# Patient Record
Sex: FEMALE | Race: BLACK OR AFRICAN AMERICAN | NOT HISPANIC OR LATINO | Employment: OTHER | URBAN - METROPOLITAN AREA
[De-identification: names, ages, dates, MRNs, and addresses within clinical notes are randomized per-mention and may not be internally consistent; named-entity substitution may affect disease eponyms.]

---

## 2017-01-23 ENCOUNTER — OFFICE VISIT (OUTPATIENT)
Dept: INTERNAL MEDICINE | Facility: CLINIC | Age: 82
End: 2017-01-23
Payer: MEDICARE

## 2017-01-23 ENCOUNTER — LAB VISIT (OUTPATIENT)
Dept: LAB | Facility: HOSPITAL | Age: 82
End: 2017-01-23
Attending: INTERNAL MEDICINE
Payer: MEDICARE

## 2017-01-23 DIAGNOSIS — M79.673 PAIN OF FOOT, UNSPECIFIED LATERALITY: Primary | ICD-10-CM

## 2017-01-23 DIAGNOSIS — I10 ESSENTIAL HYPERTENSION: ICD-10-CM

## 2017-01-23 LAB
ALBUMIN SERPL BCP-MCNC: 3.7 G/DL
ALP SERPL-CCNC: 70 U/L
ALT SERPL W/O P-5'-P-CCNC: 11 U/L
ANION GAP SERPL CALC-SCNC: 11 MMOL/L
AST SERPL-CCNC: 22 U/L
BILIRUB SERPL-MCNC: 0.4 MG/DL
BUN SERPL-MCNC: 39 MG/DL
CALCIUM SERPL-MCNC: 9.3 MG/DL
CHLORIDE SERPL-SCNC: 111 MMOL/L
CHOLEST/HDLC SERPL: 3.4 {RATIO}
CO2 SERPL-SCNC: 18 MMOL/L
CREAT SERPL-MCNC: 1.7 MG/DL
EST. GFR  (AFRICAN AMERICAN): 31 ML/MIN/1.73 M^2
EST. GFR  (NON AFRICAN AMERICAN): 26.9 ML/MIN/1.73 M^2
GLUCOSE SERPL-MCNC: 78 MG/DL
HDL/CHOLESTEROL RATIO: 29.5 %
HDLC SERPL-MCNC: 166 MG/DL
HDLC SERPL-MCNC: 49 MG/DL
LDLC SERPL CALC-MCNC: 93.4 MG/DL
NONHDLC SERPL-MCNC: 117 MG/DL
POTASSIUM SERPL-SCNC: 5 MMOL/L
PROT SERPL-MCNC: 7.4 G/DL
SODIUM SERPL-SCNC: 140 MMOL/L
TRIGL SERPL-MCNC: 118 MG/DL

## 2017-01-23 PROCEDURE — 36415 COLL VENOUS BLD VENIPUNCTURE: CPT

## 2017-01-23 PROCEDURE — 99499 UNLISTED E&M SERVICE: CPT | Mod: S$GLB,,, | Performed by: INTERNAL MEDICINE

## 2017-01-23 PROCEDURE — 99215 OFFICE O/P EST HI 40 MIN: CPT | Mod: S$GLB,,, | Performed by: INTERNAL MEDICINE

## 2017-01-23 PROCEDURE — 1159F MED LIST DOCD IN RCRD: CPT | Mod: S$GLB,,, | Performed by: INTERNAL MEDICINE

## 2017-01-23 PROCEDURE — 1160F RVW MEDS BY RX/DR IN RCRD: CPT | Mod: S$GLB,,, | Performed by: INTERNAL MEDICINE

## 2017-01-23 PROCEDURE — 99999 PR PBB SHADOW E&M-EST. PATIENT-LVL III: CPT | Mod: PBBFAC,,, | Performed by: INTERNAL MEDICINE

## 2017-01-23 PROCEDURE — 80061 LIPID PANEL: CPT

## 2017-01-23 PROCEDURE — 1157F ADVNC CARE PLAN IN RCRD: CPT | Mod: S$GLB,,, | Performed by: INTERNAL MEDICINE

## 2017-01-23 PROCEDURE — 80053 COMPREHEN METABOLIC PANEL: CPT

## 2017-01-23 RX ORDER — FELODIPINE 10 MG/1
TABLET, EXTENDED RELEASE ORAL
Qty: 90 TABLET | Refills: 1 | Status: SHIPPED | OUTPATIENT
Start: 2017-01-23 | End: 2017-05-26 | Stop reason: SDUPTHER

## 2017-01-23 RX ORDER — LEVOTHYROXINE SODIUM 88 UG/1
88 TABLET ORAL DAILY
Qty: 90 TABLET | Refills: 1 | Status: SHIPPED | OUTPATIENT
Start: 2017-01-23 | End: 2017-05-26 | Stop reason: SDUPTHER

## 2017-01-23 RX ORDER — DOXAZOSIN 2 MG/1
2 TABLET ORAL NIGHTLY
Qty: 90 TABLET | Refills: 1 | Status: SHIPPED | OUTPATIENT
Start: 2017-01-23 | End: 2017-05-26 | Stop reason: SDUPTHER

## 2017-01-23 RX ORDER — BENAZEPRIL HYDROCHLORIDE 40 MG/1
TABLET ORAL
Qty: 90 TABLET | Refills: 1 | Status: SHIPPED | OUTPATIENT
Start: 2017-01-23 | End: 2017-05-26 | Stop reason: SDUPTHER

## 2017-01-23 RX ORDER — LOVASTATIN 20 MG/1
TABLET ORAL
Qty: 90 TABLET | Refills: 1 | Status: SHIPPED | OUTPATIENT
Start: 2017-01-23 | End: 2017-05-26 | Stop reason: SDUPTHER

## 2017-01-23 NOTE — MR AVS SNAPSHOT
[facility] - Internal Medicine  [address]  [facility] [address]  Phone: [phone]  Fax: [phone]                  Johanny Curtis   2017 10:00 AM   Office Visit    Description:  Female : 1930   Provider:  Leticia Weems MD   Department:  [facility] - Internal Medicine           Reason for Visit     Establish Care           Diagnoses this Visit        Comments    Pain of foot, unspecified laterality    -  Primary     Essential hypertension                To Do List           Future Appointments        Provider Department Dept Phone    2017 11:15 AM LAB, SAME DAY [facility] [phone]    2017 8:15 AM Kamilah Luna DPM [facility] Podiatry [phone]    2017 10:30 AM Leticia Weems MD [facility] Internal Medicine [phone]      Goals (5 Years of Data)     None      Follow-Up and Disposition     Return for EPP 4 months.       These Medications        Disp Refills Start End    doxazosin (CARDURA) 2 MG tablet 90 tablet 1 2017     Take 1 tablet (2 mg total) by mouth nightly. At bedtime - Oral    Pharmacy: [facility] - [address] Ph #: [phone]       benazepril (LOTENSIN) 40 MG tablet 90 tablet 1 2017     1 po Every day    Pharmacy: [facility] [address] Ph #: [phone]       felodipine (PLENDIL) 10 MG 24 hr tablet 90 tablet 1 2017     1 po Every day    Pharmacy: [facility] [place] [id number] Ph #: [phone]       levothyroxine (SYNTHROID) 88 MCG tablet 90 tablet 1 2017     Take 1 tablet (88 mcg total) by mouth once daily. - Oral    Pharmacy: [facility] [address] Ph #: [phone]       lovastatin (MEVACOR) 20 MG tablet 90 tablet 1 2017     1 po At bedtime    Pharmacy: [facility] [place], [place]  "- [address] #: [phone]         [facility] On Call     [facility] On Call Nurse Care Line - 24/7 Assistance  Registered nurses in the [facility] provide clinical advisement, health education, appointment booking, and other advisory services.  Call for this free service at [phone].             Medications           Message regarding Medications     Verify the changes and/or additions to your medication regime listed below are the same as discussed with your clinician today.  If any of these changes or additions are incorrect, please notify your healthcare provider.             Verify that the below list of medications is an accurate representation of the medications you are currently taking.  If none reported, the list may be blank. If incorrect, please contact your healthcare provider. Carry this list with you in case of emergency.           Current Medications     acetaminophen-codeine 300-30mg (TYLENOL #3) 300-30 mg Tab 1 po tid prn    ADACEL,TDAP ADOLESN/ADULT,,PF, 2 Lf-(2.5-5-3-5 mcg)-5Lf/0.5 mL Syrg     benazepril (LOTENSIN) 40 MG tablet 1 po Every day    CALCIUM CITRATE/VITAMIN D3 (CALCIUM CITRATE + D ORAL) Take by mouth. 1  By mouth Every day    colchicine (COLCRYS) 0.6 mg tablet Take 1 tablet (0.6 mg total) by mouth every other day.    doxazosin (CARDURA) 2 MG tablet Take 1 tablet (2 mg total) by mouth nightly. At bedtime    felodipine (PLENDIL) 10 MG 24 hr tablet 1 po Every day    levothyroxine (SYNTHROID) 88 MCG tablet Take 1 tablet (88 mcg total) by mouth once daily.    lovastatin (MEVACOR) 20 MG tablet 1 po At bedtime    ZOSTAVAX, PF, 19,400 unit/0.65 mL injection            Clinical Reference Information           Vital Signs - Last Recorded  Most recent update: 1/23/2017 10:13 AM by Rosalinda Navas LPN    Pulse Ht Wt SpO2 BMI    64 5' 1" (1.549 m) 77.3 kg (170 lb 6.7 oz) (!) 93% 32.2 kg/m2      Allergies as of 1/23/2017     No Known Allergies      Immunizations Administered " on Date of Encounter - 1/23/2017     None      Orders Placed During Today's Visit      Normal Orders This Visit    Ambulatory consult to Podiatry     Future Labs/Procedures Expected by Expires    Comprehensive metabolic panel  1/23/2017 1/23/2018    Lipid panel  1/23/2017 1/23/2018      [facility] Sign-Up     Activating your [facility] account is as easy as 1-2-3!     1) Visit [email or web address], select Sign Up Now, enter this activation code and your date of birth, then select Next.  [email or web address]  Expires: 3/9/2017 11:12 AM      2) Create a username and password to use when you visit [facility] in the future and select a security question in case you lose your password and select Next.    3) Enter your e-mail address and click Sign Up!    Additional Information  If you have questions, please e-mail [email or web address] or call [phone] to talk to our [facility] staff. Remember, [facility] is NOT to be used for urgent needs. For medical emergencies, dial 911.

## 2017-01-28 VITALS
WEIGHT: 170.44 LBS | BODY MASS INDEX: 32.18 KG/M2 | DIASTOLIC BLOOD PRESSURE: 60 MMHG | HEIGHT: 61 IN | OXYGEN SATURATION: 95 % | SYSTOLIC BLOOD PRESSURE: 116 MMHG | HEART RATE: 64 BPM

## 2017-01-29 NOTE — PROGRESS NOTES
Subjective:       Patient ID: Johanny Curtis is a 86 y.o. female.    Chief Complaint: Hypertension    HPI: She returns for management of hypertension.  She has had hypertension for over a year.  Current treatment has included medications outlined in medication list.  She denies chest pain or shortness of breath.  No palpitations.  Denies left arm or neck pain.  Review of Systems   Constitutional: Negative for chills, fatigue, fever and unexpected weight change.   Respiratory: Negative for chest tightness and shortness of breath.    Cardiovascular: Negative for chest pain and palpitations.   Gastrointestinal: Negative for abdominal pain and blood in stool.   Neurological: Negative for dizziness, syncope, numbness and headaches.       Objective:      Physical Exam   HENT:   Right Ear: External ear normal.   Left Ear: External ear normal.   Nose: Nose normal.   Mouth/Throat: Oropharynx is clear and moist.   Eyes: Pupils are equal, round, and reactive to light.   Neck: Normal range of motion.   Cardiovascular: Normal rate and regular rhythm.    No murmur heard.  Pulmonary/Chest: Breath sounds normal.   Abdominal: She exhibits no distension. There is no hepatosplenomegaly. There is no tenderness.   Lymphadenopathy:     She has no cervical adenopathy.     She has no axillary adenopathy.   Neurological: She has normal strength and normal reflexes. No cranial nerve deficit or sensory deficit.       Assessment:         assessment and plan: 1.  Hypertension: Check CMP and lipid panel  Plan:       As above

## 2017-02-20 ENCOUNTER — OFFICE VISIT (OUTPATIENT)
Dept: PODIATRY | Facility: CLINIC | Age: 82
End: 2017-02-20
Payer: MEDICARE

## 2017-02-20 VITALS
HEART RATE: 74 BPM | BODY MASS INDEX: 35.5 KG/M2 | SYSTOLIC BLOOD PRESSURE: 129 MMHG | DIASTOLIC BLOOD PRESSURE: 85 MMHG | WEIGHT: 188 LBS | HEIGHT: 61 IN

## 2017-02-20 DIAGNOSIS — M79.672 BILATERAL FOOT PAIN: ICD-10-CM

## 2017-02-20 DIAGNOSIS — L84 PRE-ULCERATIVE CORN OR CALLOUS: Primary | ICD-10-CM

## 2017-02-20 DIAGNOSIS — M79.671 BILATERAL FOOT PAIN: ICD-10-CM

## 2017-02-20 PROCEDURE — 1126F AMNT PAIN NOTED NONE PRSNT: CPT | Mod: S$GLB,,, | Performed by: PODIATRIST

## 2017-02-20 PROCEDURE — 99213 OFFICE O/P EST LOW 20 MIN: CPT | Mod: S$GLB,,, | Performed by: PODIATRIST

## 2017-02-20 PROCEDURE — 99999 PR PBB SHADOW E&M-EST. PATIENT-LVL III: CPT | Mod: PBBFAC,,, | Performed by: PODIATRIST

## 2017-02-20 PROCEDURE — 1157F ADVNC CARE PLAN IN RCRD: CPT | Mod: S$GLB,,, | Performed by: PODIATRIST

## 2017-02-20 PROCEDURE — 1159F MED LIST DOCD IN RCRD: CPT | Mod: S$GLB,,, | Performed by: PODIATRIST

## 2017-02-20 RX ORDER — UREA 1 ML/10ML
1 LOTION TOPICAL 2 TIMES DAILY
Qty: 177 ML | Refills: 6 | Status: SHIPPED | OUTPATIENT
Start: 2017-02-20 | End: 2019-08-12 | Stop reason: ALTCHOICE

## 2017-02-20 NOTE — PATIENT INSTRUCTIONS
Treating Corns and Calluses  If your corns or calluses are mild, reducing friction may help. Different shoes, moleskin patches, or soft pads may be all the treatment you need. In more severe cases, treating tissue buildup may require your doctors care. Sometimes custom-made shoe inserts (orthotics) or special pads are prescribed to reduce friction and pressure.    Change shoes  If you have corns, your doctor may suggest wearing shoes that have more toe room. This way, buckled joints are less likely to be pinched against the top of the shoe. If you have calluses, wearing a cushioned insole, arch support, or heel counter can help reduce friction.  Visit your doctor  In some cases, your doctor may trim away the outer layers of skin that make up the corn or callus. For a painful corn, medicine may be injected beneath the built-up tissue.  Wear orthotics  Orthotics are specially made to meet the needs of your feet. They cushion calluses or divert pressure away from these problem areas. Worn as directed, orthotics help limit existing problems and prevent new ones from forming.  If you need surgery  If a bone or joint is out of place, certain parts of your foot may be under too much pressure. This can cause severe corns and calluses. In such cases, surgery may be the best way to correct the problem.  Outpatient procedures  In most cases, surgery to improve bone position is an outpatient procedure. Your doctor may cut away excess bone, reposition prominent bones, or even fuse joints. Sometimes tendons or ligaments are cut to reduce tension on a bone or joint. Your doctor will talk with you about the procedure that is best suited to your needs.  Date Last Reviewed: 7/1/2016 © 2000-2016 [employer]. [address] [place] [address]. All rights reserved. This information is not intended as a substitute for professional medical care. Always follow your healthcare professional's instructions.

## 2017-02-20 NOTE — LETTER
February 20, 2017      Leticia Weems MD  [address]  [place] [address]           [facility] - Podiatry  [address]  [place] [address]  Phone: [phone]          Patient: Johanny Curtis   MR Number: [id number]   YOB: 1930   Date of Visit: 2/20/2017       Dear Dr. Leticia Weems:    Thank you for referring Johanny Curtis to me for evaluation. Attached you will find relevant portions of my assessment and plan of care.    If you have questions, please do not hesitate to call me. I look forward to following Johanny Curtis along with you.    Sincerely,    Kamilah Luna, KATHERYN    Enclosure  CC:  No Recipients    If you would like to receive this communication electronically, please contact [email or web address] or [phone] to request more information on [employer] Link access.    For providers and/or their staff who would like to refer a patient to [facility], please contact us through our one-stop-shop provider referral line, [facility] , at [phone].    If you feel you have received this communication in error or would no longer like to receive these types of communications, please e-mail [email or web address]

## 2017-02-20 NOTE — PROGRESS NOTES
Podiatry    Consult Requested By: Leticia Weems MD    SUBJECTIVE     History of Present Illness:  Johanny Curtis is a 86 y.o. female who presents today with the chief complaint of painful callus on the bottom of the right foot.  She is requesting debridement of calluses and toenails.    She is on her feet a lot and pain is worse with weight bearing.  She wears a slipper on the right foot for comfort.  She hasn't tried urea lotion to soften the calluses yet.      Patient presents in a wheelchair.  She is accompanied by a caregiver    PCP:  Leticia Weems MD          Patient Active Problem List   Diagnosis    Essential hypertension    Aortic aneurysm    Hereditary and idiopathic peripheral neuropathy    Gout, chronic    Thyroid nodule    Hypothyroidism    Renal osteodystrophy    Tortuous aorta    Anemia in CKD (chronic kidney disease)    Chronic kidney disease, stage IV (severe)    Obesity (BMI 30.0-34.9)         Current Outpatient Prescriptions on File Prior to Visit   Medication Sig Dispense Refill    acetaminophen-codeine 300-30mg (TYLENOL #3) 300-30 mg Tab 1 po tid prn 30 tablet 1    ADACEL,TDAP ADOLESN/ADULT,,PF, 2 Lf-(2.5-5-3-5 mcg)-5Lf/0.5 mL Syrg       benazepril (LOTENSIN) 40 MG tablet 1 po Every day 90 tablet 1    CALCIUM CITRATE/VITAMIN D3 (CALCIUM CITRATE + D ORAL) Take by mouth. 1  By mouth Every day      colchicine (COLCRYS) 0.6 mg tablet Take 1 tablet (0.6 mg total) by mouth every other day. 45 tablet 1    doxazosin (CARDURA) 2 MG tablet Take 1 tablet (2 mg total) by mouth nightly. At bedtime 90 tablet 1    felodipine (PLENDIL) 10 MG 24 hr tablet 1 po Every day 90 tablet 1    levothyroxine (SYNTHROID) 88 MCG tablet Take 1 tablet (88 mcg total) by mouth once daily. 90 tablet 1    lovastatin (MEVACOR) 20 MG tablet 1 po At bedtime 90 tablet 1    ZOSTAVAX, PF, 19,400 unit/0.65 mL injection        No current facility-administered medications on file prior to visit.        Review  "of patient's allergies indicates:  No Known Allergies        Review of Systems:  Constitutional: no fever or chills  Respiratory: no cough or shortness of breath  Cardiovascular: no chest pain or palpitations  Musculoskeletal: positive for arthralgias        OBJECTIVE     Vitals:    02/20/17 0754   BP: 129/85   Pulse: 74   Weight: 85.3 kg (188 lb)   Height: 5' 1" (1.549 m)       Dermatological:  There is atrophic skin tone, turgor, and temperature bilaterally.  The toenails are 1-5 thickened with subungual debris .  There is  presence of hyperkeratotic lesions on sub 1,2,5 of the left foot and a thick hyperkeratosis sub 5th of the right foot, tenderness to palpation.  There is no open wounds.  There is no ecchymoses.  no erythema noted.    Musculoskeletal:  Muscle strength is 4/5 in all groups bilaterally.  Metatarsophalangeal, subtalar, and ankle range of motion are within normal limits without crepitus bilaterally. right hallus varus.  Hammertoes 2-5 bilateral .  right tailor's bunion  Vascular:  1/4 pulses bilateral . Normal CFT.  Toes warm to touch.  Neurological:  Light touch, proprioception, and sharp/dull sensation are all intact bilaterally. Protective threshold with the Purlear-Wienstein monofilament is intact bilaterally.                ASSESSMENT/PLAN     I counseled the patient on her conditions, their implications and medical management.     Pre-ulcerative corn or callous  -     urea 10 % Lotn; Apply 1 application topically 2 (two) times daily. To dry skin on the feet.  - Better shoes with thick soft soles, wider toebox.    Ex. Dr. Das brand shoes.   - With patient's permission,  Calluses trimmed as a courtesy without complications.     Bilateral foot pain  -     urea 10 % Lotn; Apply 1 application topically 2 (two) times daily. To dry skin on the feet.      Return in about 6 months (around 8/20/2017).    "

## 2017-05-26 ENCOUNTER — TELEPHONE (OUTPATIENT)
Dept: NEPHROLOGY | Facility: CLINIC | Age: 82
End: 2017-05-26

## 2017-05-26 ENCOUNTER — HOSPITAL ENCOUNTER (OUTPATIENT)
Dept: RADIOLOGY | Facility: HOSPITAL | Age: 82
Discharge: HOME | End: 2017-05-26
Attending: INTERNAL MEDICINE
Payer: MEDICARE

## 2017-05-26 ENCOUNTER — OFFICE VISIT (OUTPATIENT)
Dept: INTERNAL MEDICINE | Facility: CLINIC | Age: 82
End: 2017-05-26
Payer: MEDICARE

## 2017-05-26 VITALS
HEIGHT: 61 IN | SYSTOLIC BLOOD PRESSURE: 126 MMHG | OXYGEN SATURATION: 95 % | WEIGHT: 158 LBS | DIASTOLIC BLOOD PRESSURE: 80 MMHG | BODY MASS INDEX: 29.83 KG/M2 | TEMPERATURE: 98 F | HEART RATE: 64 BPM

## 2017-05-26 DIAGNOSIS — I10 BENIGN ESSENTIAL HTN: ICD-10-CM

## 2017-05-26 DIAGNOSIS — N18.4 CHRONIC RENAL DISEASE, STAGE IV: ICD-10-CM

## 2017-05-26 DIAGNOSIS — R68.81 EARLY SATIETY: ICD-10-CM

## 2017-05-26 DIAGNOSIS — R63.4 WEIGHT LOSS: ICD-10-CM

## 2017-05-26 DIAGNOSIS — I10 BENIGN ESSENTIAL HTN: Primary | ICD-10-CM

## 2017-05-26 PROCEDURE — 99499 UNLISTED E&M SERVICE: CPT | Mod: S$GLB,,, | Performed by: INTERNAL MEDICINE

## 2017-05-26 PROCEDURE — 1159F MED LIST DOCD IN RCRD: CPT | Mod: S$GLB,,, | Performed by: INTERNAL MEDICINE

## 2017-05-26 PROCEDURE — 71020 XR CHEST PA AND LATERAL: CPT | Mod: TC

## 2017-05-26 PROCEDURE — 99999 PR PBB SHADOW E&M-EST. PATIENT-LVL IV: CPT | Mod: PBBFAC,,, | Performed by: INTERNAL MEDICINE

## 2017-05-26 PROCEDURE — 90732 PPSV23 VACC 2 YRS+ SUBQ/IM: CPT | Mod: S$GLB,,, | Performed by: INTERNAL MEDICINE

## 2017-05-26 PROCEDURE — G0009 ADMIN PNEUMOCOCCAL VACCINE: HCPCS | Mod: S$GLB,,, | Performed by: INTERNAL MEDICINE

## 2017-05-26 PROCEDURE — 1126F AMNT PAIN NOTED NONE PRSNT: CPT | Mod: S$GLB,,, | Performed by: INTERNAL MEDICINE

## 2017-05-26 PROCEDURE — 71020 XR CHEST PA AND LATERAL: CPT | Mod: 26,,, | Performed by: RADIOLOGY

## 2017-05-26 PROCEDURE — 99215 OFFICE O/P EST HI 40 MIN: CPT | Mod: S$GLB,,, | Performed by: INTERNAL MEDICINE

## 2017-05-26 RX ORDER — DOXAZOSIN 2 MG/1
2 TABLET ORAL NIGHTLY
Qty: 90 TABLET | Refills: 1 | Status: SHIPPED | OUTPATIENT
Start: 2017-05-26 | End: 2017-12-12 | Stop reason: SDUPTHER

## 2017-05-26 RX ORDER — LOVASTATIN 20 MG/1
TABLET ORAL
Qty: 90 TABLET | Refills: 1 | Status: SHIPPED | OUTPATIENT
Start: 2017-05-26 | End: 2017-12-12 | Stop reason: SDUPTHER

## 2017-05-26 RX ORDER — FELODIPINE 10 MG/1
TABLET, EXTENDED RELEASE ORAL
Qty: 90 TABLET | Refills: 1 | Status: SHIPPED | OUTPATIENT
Start: 2017-05-26 | End: 2017-12-12 | Stop reason: SDUPTHER

## 2017-05-26 RX ORDER — ACETAMINOPHEN AND CODEINE PHOSPHATE 300; 30 MG/1; MG/1
TABLET ORAL
Qty: 30 TABLET | Refills: 1 | Status: SHIPPED | OUTPATIENT
Start: 2017-05-26 | End: 2018-04-13 | Stop reason: SDUPTHER

## 2017-05-26 RX ORDER — BENAZEPRIL HYDROCHLORIDE 40 MG/1
TABLET ORAL
Qty: 90 TABLET | Refills: 1 | Status: SHIPPED | OUTPATIENT
Start: 2017-05-26 | End: 2017-08-07

## 2017-05-26 RX ORDER — LEVOTHYROXINE SODIUM 88 UG/1
88 TABLET ORAL DAILY
Qty: 90 TABLET | Refills: 1 | Status: SHIPPED | OUTPATIENT
Start: 2017-05-26 | End: 2017-12-12 | Stop reason: SDUPTHER

## 2017-05-27 ENCOUNTER — TELEPHONE (OUTPATIENT)
Dept: INTERNAL MEDICINE | Facility: CLINIC | Age: 82
End: 2017-05-27

## 2017-05-27 DIAGNOSIS — R93.89 ABNORMAL CXR: Primary | ICD-10-CM

## 2017-05-30 ENCOUNTER — TELEPHONE (OUTPATIENT)
Dept: NEPHROLOGY | Facility: CLINIC | Age: 82
End: 2017-05-30

## 2017-06-03 NOTE — PROGRESS NOTES
Subjective:       Patient ID: Johanny Curtis is a 87 y.o. female.    Chief Complaint: Hypertension    HPI: She returns for management of hypertension.  She has had hypertension for over a year.  Current treatment has included medications outlined in medication list.  She denies chest pain or shortness of breath.  No palpitations.  Denies left arm or neck pain.  She's had weight loss.  She does complain of early satiety.      Review of Systems   Constitutional: Negative for chills, fatigue, fever and unexpected weight change.   Respiratory: Negative for chest tightness and shortness of breath.    Cardiovascular: Negative for chest pain and palpitations.   Gastrointestinal: Negative for abdominal pain and blood in stool.   Neurological: Negative for dizziness, syncope, numbness and headaches.       Objective:      Physical Exam   HENT:   Right Ear: External ear normal.   Left Ear: External ear normal.   Nose: Nose normal.   Mouth/Throat: Oropharynx is clear and moist.   Eyes: Pupils are equal, round, and reactive to light.   Neck: Normal range of motion.   Cardiovascular: Normal rate and regular rhythm.    No murmur heard.  Pulmonary/Chest: Breath sounds normal.   Abdominal: She exhibits no distension. There is no hepatosplenomegaly. There is no tenderness.   Lymphadenopathy:     She has no cervical adenopathy.     She has no axillary adenopathy.   Neurological: She has normal strength and normal reflexes. No cranial nerve deficit or sensory deficit.       Assessment:         assessment and plan: 1.  Hypertension: Check CMP  2.  Weight loss: Check TSH, chest x-ray, CBC.  Schedule gastroenterology appointment  3.  Chronic renal insufficiency: Follow up with nephrology  4.  Discussed Pap smear, pelvic exam.  She refused all  Plan:       As above

## 2017-06-07 ENCOUNTER — TELEPHONE (OUTPATIENT)
Dept: INTERNAL MEDICINE | Facility: CLINIC | Age: 82
End: 2017-06-07

## 2017-06-07 ENCOUNTER — HOSPITAL ENCOUNTER (OUTPATIENT)
Dept: RADIOLOGY | Facility: HOSPITAL | Age: 82
Discharge: HOME | End: 2017-06-07
Attending: INTERNAL MEDICINE
Payer: MEDICARE

## 2017-06-07 DIAGNOSIS — R93.89 ABNORMAL CXR: ICD-10-CM

## 2017-06-07 DIAGNOSIS — R93.89 ABNORMAL CT OF THE CHEST: Primary | ICD-10-CM

## 2017-06-07 DIAGNOSIS — N28.89 RENAL MASS: ICD-10-CM

## 2017-06-07 PROCEDURE — 71250 CT THORAX DX C-: CPT | Mod: 26,,, | Performed by: RADIOLOGY

## 2017-06-07 PROCEDURE — 71250 CT THORAX DX C-: CPT | Mod: TC

## 2017-06-20 ENCOUNTER — HOSPITAL ENCOUNTER (OUTPATIENT)
Dept: RADIOLOGY | Facility: HOSPITAL | Age: 82
Discharge: HOME | End: 2017-06-20
Attending: INTERNAL MEDICINE
Payer: MEDICARE

## 2017-06-20 DIAGNOSIS — N28.89 RENAL MASS: ICD-10-CM

## 2017-06-20 PROCEDURE — 76770 US EXAM ABDO BACK WALL COMP: CPT | Mod: 26,,, | Performed by: RADIOLOGY

## 2017-06-20 PROCEDURE — 76770 US EXAM ABDO BACK WALL COMP: CPT | Mod: TC

## 2017-06-28 ENCOUNTER — OFFICE VISIT (OUTPATIENT)
Dept: PULMONOLOGY | Facility: CLINIC | Age: 82
End: 2017-06-28
Payer: MEDICARE

## 2017-06-28 VITALS
WEIGHT: 177 LBS | DIASTOLIC BLOOD PRESSURE: 58 MMHG | HEIGHT: 60 IN | SYSTOLIC BLOOD PRESSURE: 112 MMHG | BODY MASS INDEX: 34.75 KG/M2 | HEART RATE: 61 BPM | OXYGEN SATURATION: 98 %

## 2017-06-28 DIAGNOSIS — R91.8 MULTIPLE PULMONARY NODULES DETERMINED BY COMPUTED TOMOGRAPHY OF LUNG: Primary | ICD-10-CM

## 2017-06-28 PROCEDURE — 99204 OFFICE O/P NEW MOD 45 MIN: CPT | Mod: S$GLB,,, | Performed by: INTERNAL MEDICINE

## 2017-06-28 PROCEDURE — 99999 PR PBB SHADOW E&M-EST. PATIENT-LVL III: CPT | Mod: PBBFAC,,, | Performed by: INTERNAL MEDICINE

## 2017-06-28 PROCEDURE — 1159F MED LIST DOCD IN RCRD: CPT | Mod: S$GLB,,, | Performed by: INTERNAL MEDICINE

## 2017-06-28 NOTE — PROGRESS NOTES
Subjective:       Patient ID: Johanny Curtis is a 87 y.o. female.    Chief Complaint: Abnormal Chest X-ray    HPI   Johanny Curtis 87 y.o. female    has a past medical history of Anemia in CKD (chronic kidney disease); Arthritis; Cataract; Gout, chronic; High cholesterol; Hypertension; Hypothyroidism; Obesity; Plantar fasciitis; and Thyroid trouble.    has a past surgical history that includes Hysterectomy; Skin biopsy; Cholecystectomy; Cataract extraction (3/18/13); Eye surgery; and Breast surgery (Left, 1972).   reports that she quit smoking about 16 years ago. She has a 30.00 pack-year smoking history. She does not have any smokeless tobacco history on file. She reports that she does not drink alcohol or use drugs.  Referred by: Dr. Leticia Weems  Who had concerns including Abnormal Chest X-ray.  The patient's last visit with me was on Visit date not found.    Here for abnl cxr/ct  Lost 10lbs over 3 years  No fever chills, ns, wt changes, nausea, vomiting, diarrhea, constipation, chest pain, tightness, pressure  No etoh since 1985  No tob since 2001, 55 years, .5ppd  Calluses on feet, no surgery, and therefore cannot walk    Review of Systems   All other systems reviewed and are negative.      Objective:      Physical Exam   Constitutional: She is oriented to person, place, and time. She appears well-developed and well-nourished. She appears not cachectic. No distress. She is obese.   HENT:   Head: Normocephalic.   Nose: Nose normal. No mucosal edema.   Mouth/Throat: Normal dentition. No oropharyngeal exudate.   Neck: Normal range of motion. Neck supple. No tracheal deviation present.   Cardiovascular: Normal rate, regular rhythm, normal heart sounds and intact distal pulses.  Exam reveals no gallop and no friction rub.    No murmur heard.  Pulmonary/Chest: Normal expansion, symmetric chest wall expansion, effort normal and breath sounds normal. No stridor.   Abdominal: Soft. Bowel sounds are normal.    Musculoskeletal: Normal range of motion. She exhibits edema. She exhibits no tenderness or deformity.   Lymphadenopathy: No supraclavicular adenopathy is present.     She has no cervical adenopathy.   Neurological: She is alert and oriented to person, place, and time. No cranial nerve deficit. Gait normal.   Skin: Skin is warm and dry. No rash noted. She is not diaphoretic. No cyanosis or erythema. No pallor. Nails show no clubbing.   Psychiatric: She has a normal mood and affect. Her behavior is normal. Judgment and thought content normal.     Personal Diagnostic Review    No flowsheet data found.      Assessment:       No diagnosis found.    Outpatient Encounter Prescriptions as of 6/28/2017   Medication Sig Dispense Refill    acetaminophen-codeine 300-30mg (TYLENOL #3) 300-30 mg Tab 1 po tid prn 30 tablet 1    benazepril (LOTENSIN) 40 MG tablet 1 po Every day 90 tablet 1    CALCIUM CITRATE/VITAMIN D3 (CALCIUM CITRATE + D ORAL) Take by mouth. 1  By mouth Every day      colchicine (COLCRYS) 0.6 mg tablet Take 1 tablet (0.6 mg total) by mouth every other day. 45 tablet 1    doxazosin (CARDURA) 2 MG tablet Take 1 tablet (2 mg total) by mouth nightly. At bedtime 90 tablet 1    felodipine (PLENDIL) 10 MG 24 hr tablet 1 po Every day 90 tablet 1    levothyroxine (SYNTHROID) 88 MCG tablet Take 1 tablet (88 mcg total) by mouth once daily. 90 tablet 1    lovastatin (MEVACOR) 20 MG tablet 1 po At bedtime 90 tablet 1    MULTIVIT-IRON-MIN-FOLIC ACID 3,500-18-0.4 UNIT-MG-MG ORAL CHEW Take by mouth.      urea 10 % Lotn Apply 1 application topically 2 (two) times daily. To dry skin on the feet. 177 mL 6    [DISCONTINUED] ADACEL,TDAP ADOLESN/ADULT,,PF, 2 Lf-(2.5-5-3-5 mcg)-5Lf/0.5 mL Syrg       [DISCONTINUED] ZOSTAVAX, PF, 19,400 unit/0.65 mL injection        No facility-administered encounter medications on file as of 6/28/2017.      No orders of the defined types were placed in this encounter.    Plan:              I personally reviewed the      1. CXR   2. CXR report   3. CT chest   4. CT chest report     Assessment:  Johanny was seen today for abnormal chest x-ray.    Diagnoses and all orders for this visit:    Multiple pulmonary nodules determined by computed tomography of lung        Plan:  Reviewed with patient today, fibrotic changes chronic from 2008, micronodules likely related to infection/inflammation  No further workup in this laura 86yo, asymptomatic lady    Return if symptoms worsen or fail to improve.    There are no Patient Instructions on file for this visit.    Immunization History   Administered Date(s) Administered    Influenza 10/17/2007, 10/16/2008, 10/20/2009, 09/23/2010    Influenza - High Dose 11/03/2011, 12/20/2013, 10/28/2014, 10/22/2015    Pneumococcal Conjugate - 13 Valent 09/14/2015    Pneumococcal Polysaccharide - 23 Valent 05/26/2017    Tdap 06/29/2016    Zoster 06/29/2016    influenza - Quadrivalent 12/19/2016

## 2017-06-28 NOTE — LETTER
June 28, 2017      Leticia Weems MD  [address]  [place] [address]           [facility] - Pulmonary Services  [address]  [place] [address]  Phone: [phone]          Patient: Johanny Curtis   MR Number: [id number]   YOB: 1930   Date of Visit: 6/28/2017       Dear Dr. Leticia Weems:    Thank you for referring Johanny Curtis to me for evaluation. Attached you will find relevant portions of my assessment and plan of care.    If you have questions, please do not hesitate to call me. I look forward to following Johanny Curtis along with you.    Sincerely,    Netta Espinoza MD    Enclosure  CC:  No Recipients    If you would like to receive this communication electronically, please contact [email or web address] or [phone] to request more information on [employer] Link access.    For providers and/or their staff who would like to refer a patient to [facility], please contact us through our one-stop-shop provider referral line, [facility] , at [phone].    If you feel you have received this communication in error or would no longer like to receive these types of communications, please e-mail [email or web address]

## 2017-07-06 ENCOUNTER — OFFICE VISIT (OUTPATIENT)
Dept: INTERNAL MEDICINE | Facility: CLINIC | Age: 82
End: 2017-07-06
Payer: MEDICARE

## 2017-07-06 ENCOUNTER — TELEPHONE (OUTPATIENT)
Dept: INTERNAL MEDICINE | Facility: CLINIC | Age: 82
End: 2017-07-06

## 2017-07-06 VITALS
HEART RATE: 64 BPM | HEIGHT: 60 IN | SYSTOLIC BLOOD PRESSURE: 134 MMHG | DIASTOLIC BLOOD PRESSURE: 56 MMHG | WEIGHT: 183.19 LBS | TEMPERATURE: 99 F | OXYGEN SATURATION: 98 % | BODY MASS INDEX: 35.96 KG/M2

## 2017-07-06 DIAGNOSIS — Z00.00 ENCOUNTER FOR PREVENTIVE HEALTH EXAMINATION: Primary | ICD-10-CM

## 2017-07-06 DIAGNOSIS — G60.9 HEREDITARY AND IDIOPATHIC PERIPHERAL NEUROPATHY: ICD-10-CM

## 2017-07-06 DIAGNOSIS — I77.1 TORTUOUS AORTA: ICD-10-CM

## 2017-07-06 DIAGNOSIS — E66.01 SEVERE OBESITY (BMI 35.0-35.9 WITH COMORBIDITY): ICD-10-CM

## 2017-07-06 DIAGNOSIS — N18.4 CHRONIC KIDNEY DISEASE, STAGE IV (SEVERE): ICD-10-CM

## 2017-07-06 DIAGNOSIS — I70.0 ATHEROSCLEROSIS OF AORTA: ICD-10-CM

## 2017-07-06 DIAGNOSIS — E03.9 HYPOTHYROIDISM, UNSPECIFIED TYPE: ICD-10-CM

## 2017-07-06 DIAGNOSIS — I10 ESSENTIAL HYPERTENSION: ICD-10-CM

## 2017-07-06 PROCEDURE — G0439 PPPS, SUBSEQ VISIT: HCPCS | Mod: S$GLB,,, | Performed by: NURSE PRACTITIONER

## 2017-07-06 PROCEDURE — 99999 PR PBB SHADOW E&M-EST. PATIENT-LVL V: CPT | Mod: PBBFAC,,, | Performed by: NURSE PRACTITIONER

## 2017-07-06 PROCEDURE — 99499 UNLISTED E&M SERVICE: CPT | Mod: S$GLB,,, | Performed by: NURSE PRACTITIONER

## 2017-07-06 NOTE — PATIENT INSTRUCTIONS
Counseling and Referral of Other Preventative  (Italic type indicates deductible and co-insurance are waived)    Patient Name: Johanny Curtis  Today's Date: 7/6/2017      SERVICE LIMITATIONS RECOMMENDATION    Vaccines    · Pneumococcal (once after 65)    · Influenza (annually)    · Hepatitis B (if medium/high risk)    · Prevnar 13      Hepatitis B medium/high risk factors:       - End-stage renal disease       - Hemophiliacs who received Factor VII or         IX concentrates       - Clients of institutions for the mentally             retarded       - Persons who live in the same house as          a HepB carrier       - Homosexual men       - Illicit injectable drug abusers     Pneumococcal: Done, no repeat necessary     Influenza: Done, repeat in one year     Hepatitis B: N/A     Prevnar 13: Done, no repeat necessary    Mammogram (biennial age 50-74)  Annually (age 40 or over)  N/A    Pap (up to age 70 and after 70 if unknown history or abnormal study last 10 years)    N/A     The USPSTF recommends against screening for cervical cancer in women older than age 65 years who have had adequate prior screening and are not otherwise at high risk for cervical cancer.      Colorectal cancer screening (to age 75)    · Fecal occult blood test (annual)  · Flexible sigmoidoscopy (5y)  · Screening colonoscopy (10y)  · Barium enema   Last done 2010, repeat as needed only    Diabetes self-management training (no USPSTF recommendations)  Requires referral by treating physician for patient with diabetes or renal disease. 10 hours of initial DSMT sessions of no less than 30 minutes each in a continuous 12-month period. 2 hours of follow-up DSMT in subsequent years.  N/A    Bone mass measurements (age 65 & older, biennial)  Requires diagnosis related to osteoporosis or estrogen deficiency. Biennial benefit unless patient has history of long-term glucocorticoid  Last done 2015, recommend to repeat every 2  years    Glaucoma  screening (no USPSTF recommendation)  Diabetes mellitus, family history   , age 50 or over    American, age 65 or over  Done this year, repeat every year    Medical nutrition therapy for diabetes or renal disease (no recommended schedule)  Requires referral by treating physician for patient with diabetes or renal disease or kidney transplant within the past 3 years.  Can be provided in same year as diabetes self-management training (DSMT), and CMS recommends medical nutrition therapy take place after DSMT. Up to 3 hours for initial year and 2 hours in subsequent years.  N/A    Cardiovascular screening blood tests (every 5 years)  · Fasting lipid panel  Order as a panel if possible  Done this year, repeat every year    Diabetes screening tests (at least every 3 years, Medicare covers annually or at 6-month intervals for prediabetic patients)  · Fasting blood sugar (FBS) or glucose tolerance test (GTT)  Patient must be diagnosed with one of the following:       - Hypertension       - Dyslipidemia       - Obesity (BMI 30kg/m2)       - Previous elevated impaired FBS or GTT       ... or any two of the following:       - Overweight (BMI 25 but <30)       - Family history of diabetes       - Age 65 or older       - History of gestational diabetes or birth of baby weighing more than 9 pounds  Done this year, repeat every year    Abdominal aortic aneurysm screening (once)  · Sonogram   Limited to patients who meet one of the following criteria:       - Men who are 65-75 years old and have smoked more than 100 cigarette in their lifetime       - Anyone with a family history of abdominal aortic aneurysm       - Anyone recommended for screening by the USPSTF  N/A    HIV screening (annually for increased risk patients)  · HIV-1 and HIV-2 by EIA, or VALENCIA, rapid antibody test or oral mucosa transudate  Patients must be at increased risk for HIV infection per USPSTF guidelines or pregnant. Tests covered  annually for patient at increased risk or as requested by the patient. Pregnant patients may receive up to 3 tests during pregnancy.  Risks discussed, screening is not recommended    Smoking cessation counseling (up to 8 sessions per year)  Patients must be asymptomatic of tobacco-related conditions to receive as a preventative service.  Non-smoker    Subsequent annual wellness visit  At least 12 months since last AWV  Return in one year     The following information is provided to all patients.  This information is to help you find resources for any of the problems found today that may be affecting your health:                Living healthy guide: www.[facility].[place].[facility]      Understanding Diabetes: www.diabetes.org      Eating healthy: www.cdc.gov/healthyweight      CDC home safety checklist: www.cdc.gov/steadi/patient.html      Agency on Aging: www.goea.[place].gov      Alcoholics anonymous (AA): www.aa.org      Physical Activity: www.fabiola.nih.gov/hj9lxjz      Tobacco use: www.quitwithusla.org

## 2017-07-06 NOTE — PROGRESS NOTES
Johanny Curtis presented for a  Medicare AWV and comprehensive Health Risk Assessment today. The following components were reviewed and updated:    · Medical history  · Family History  · Social history  · Allergies and Current Medications  · Health Risk Assessment  · Health Maintenance  · Care Team     ** See Completed Assessments for Annual Wellness Visit within the encounter summary.**       The following assessments were completed:  · Living Situation  · CAGE  · Depression Screening  · Timed Get Up and Go  · Whisper Test  · Cognitive Function Screening  · Nutrition Screening  · ADL Screening  · PAQ Screening    Vitals:    07/06/17 1006   BP: (!) 134/56   BP Location: Right arm   Patient Position: Sitting   BP Method: Manual   Pulse: 64   Temp: 98.6 °F (37 °C)   TempSrc: Oral   SpO2: 98%   Weight: 83.1 kg (183 lb 3.2 oz)   Height: 5' (1.524 m)     Body mass index is 35.78 kg/m².  Physical Exam   Constitutional: She is oriented to person, place, and time. She appears well-developed.   obese   HENT:   Head: Normocephalic and atraumatic.   Nose: Nose normal.   Eyes: Conjunctivae and EOM are normal.   Neck: Normal range of motion. Neck supple.   Cardiovascular: Normal rate, regular rhythm, normal heart sounds and intact distal pulses.    No murmur heard.  Pulmonary/Chest: Effort normal and breath sounds normal.   Musculoskeletal: Normal range of motion.   Neurological: She is alert and oriented to person, place, and time.   Skin: Skin is warm and dry.   Psychiatric: She has a normal mood and affect. Her behavior is normal. Judgment and thought content normal.   Nursing note and vitals reviewed.        Diagnoses and health risks identified today and associated recommendations/orders:    1. Encounter for preventive health examination  Assessment performed. Health maintenance updated. Chart review completed.    2. Atherosclerosis of aorta  Noted on imaging. Stable on statin and blood pressure control. Followed by  PCP.    3. Tortuous aorta  Noted on imaging. Stable on statin and blood pressure control. Followed by PCP.    4. Severe obesity (BMI 35.0-35.9 with comorbidity)  Stable. Walks around house. Adequate diet. Reports weight loss. Followed by PCP.    5. Essential hypertension  Stable on medication. Followed by PCP.    6. Chronic kidney disease, stage IV (severe)  Last creatinine 1.9. Followed by Nephrology.    7. Hypothyroidism, unspecified type  Stable on medication regimen. Followed by PCP.    8. Hereditary and idiopathic peripheral neuropathy  Chronic. Stable. Followed by PCP.    *Daughter Katia present today.    Provided Johanny with a 5-10 year written screening schedule and personal prevention plan. Recommendations were developed using the USPSTF age appropriate recommendations. Education, counseling, and referrals were provided as needed. After Visit Summary printed and given to patient which includes a list of additional screenings\tests needed.    Return for follow up with Primary Care Provider as instructed, ;sooner if problems, HRA in 1 year.    DEMETRA Jamison

## 2017-08-02 ENCOUNTER — OFFICE VISIT (OUTPATIENT)
Dept: GASTROENTEROLOGY | Facility: CLINIC | Age: 82
End: 2017-08-02
Payer: MEDICARE

## 2017-08-02 VITALS
DIASTOLIC BLOOD PRESSURE: 60 MMHG | WEIGHT: 174.63 LBS | BODY MASS INDEX: 34.28 KG/M2 | HEIGHT: 60 IN | HEART RATE: 64 BPM | SYSTOLIC BLOOD PRESSURE: 124 MMHG

## 2017-08-02 DIAGNOSIS — R63.4 WEIGHT LOSS: ICD-10-CM

## 2017-08-02 DIAGNOSIS — R68.81 EARLY SATIETY: Primary | ICD-10-CM

## 2017-08-02 PROCEDURE — 99204 OFFICE O/P NEW MOD 45 MIN: CPT | Mod: GC,S$GLB,, | Performed by: INTERNAL MEDICINE

## 2017-08-02 PROCEDURE — 1125F AMNT PAIN NOTED PAIN PRSNT: CPT | Mod: GC,S$GLB,, | Performed by: INTERNAL MEDICINE

## 2017-08-02 PROCEDURE — 99999 PR PBB SHADOW E&M-EST. PATIENT-LVL IV: CPT | Mod: PBBFAC,GC,, | Performed by: INTERNAL MEDICINE

## 2017-08-02 PROCEDURE — 1159F MED LIST DOCD IN RCRD: CPT | Mod: GC,S$GLB,, | Performed by: INTERNAL MEDICINE

## 2017-08-02 NOTE — PROGRESS NOTES
I have personally taken the history and examined the patient, and I agree with the note and plan as outlined above.

## 2017-08-02 NOTE — PROGRESS NOTES
"     GASTROENTEROLOGY CLINIC NOTE    Reason for visit: The primary encounter diagnosis was Early satiety. A diagnosis of Weight loss was also pertinent to this visit.  Referring provider/PCP: Leticia Weems MD    HPI:  Johanny Curtis is a 87 y.o. female here for evaluation of early satiety and weight loss. She reports over a year, her weight went down from 240 lbs to 175 lbs. Reports initially she tried to lose weight and cut back on her diet. Lately, she reports having decreased appetite and early satiety as she describes "only able to eat smaller portions of the meal". Denies having any associated GI sx. No abdominal pain, nausea, vomiting. She also reports dizziness. She also has sister with pancreatic cancer.     GI ROS:  Reflux: No  Dysphagia: No   Bowel habits: recent diarrhea. Self resolved.  Rectal bleeding/Melena/hematemesis: No  NSAIDs: No  Anticoagulation: No  Anti-platelet therapy: No    Prior GI studies:  EGD: no prior EGD  Colonoscopy in 2010 - normal, good prep.    Review of Systems:  Constitutional: no fever, chills.   Eyes: no visual changes    ENT: no sore throat or dysphagia  Respiratory: no cough or shortness of breath    Cardiovascular: no chest pain or palpitations    Gastrointestinal: as per HPI  Hematologic/Lymphatic: No petechia  Musculoskeletal: no arthralgias or myalgias    Neurological: no seizures, tremors or change in mental status  Behavioral/Psych: No suicidal ideation    Past Medical History: has a past medical history of Anemia in CKD (chronic kidney disease); Arthritis; Cataract; Gout, chronic; High cholesterol; Hypertension; Hypothyroidism; Obesity; Plantar fasciitis; and Thyroid trouble.    Past Surgical History: has a past surgical history that includes Hysterectomy; Skin biopsy; Cholecystectomy; Cataract extraction (3/18/13); Eye surgery; and Breast surgery (Left, 1972).    Family History:family history includes Cancer in her brother, sister, sister, and sister; Cataracts " in her son; Diabetes in her son and son; Glaucoma in her daughter and son; Heart disease in her brother; Lupus in her daughter; Meningitis in her son; Mental illness in her son; No Known Problems in her brother.    Allergies: Reviewed in EPIC.     Social History: reports that she quit smoking about 16 years ago. She has a 30.00 pack-year smoking history. She has never used smokeless tobacco. She reports that she does not drink alcohol or use drugs.    Home medications:   Current Outpatient Prescriptions on File Prior to Visit   Medication Sig Dispense Refill    acetaminophen-codeine 300-30mg (TYLENOL #3) 300-30 mg Tab 1 po tid prn 30 tablet 1    benazepril (LOTENSIN) 40 MG tablet 1 po Every day 90 tablet 1    CALCIUM CITRATE/VITAMIN D3 (CALCIUM CITRATE + D ORAL) Take by mouth. 1  By mouth Every day      colchicine (COLCRYS) 0.6 mg tablet Take 1 tablet (0.6 mg total) by mouth every other day. 45 tablet 1    doxazosin (CARDURA) 2 MG tablet Take 1 tablet (2 mg total) by mouth nightly. At bedtime 90 tablet 1    felodipine (PLENDIL) 10 MG 24 hr tablet 1 po Every day 90 tablet 1    levothyroxine (SYNTHROID) 88 MCG tablet Take 1 tablet (88 mcg total) by mouth once daily. 90 tablet 1    lovastatin (MEVACOR) 20 MG tablet 1 po At bedtime 90 tablet 1    MULTIVIT-IRON-MIN-FOLIC ACID 3,500-18-0.4 UNIT-MG-MG ORAL CHEW Take by mouth.      urea 10 % Lotn Apply 1 application topically 2 (two) times daily. To dry skin on the feet. 177 mL 6     No current facility-administered medications on file prior to visit.        Vital signs:  /60   Pulse 64   Ht 5' (1.524 m)   Wt 79.2 kg (174 lb 9.7 oz)   BMI 34.10 kg/m²     Physical exam:  General: NAD, WBWD  HEENT: Head: Normocephalic, atraumatic.   Eyes: Sclera non-icteric  Neck: Supple  Heart: Regular rate and rythm  Lungs: Clear to auscultation  Abdomen: Bowel sounds normal, no organomegaly, masses, or hernia. No tenderness. No rebound or guarding.  Rectal:  Deferred  Extremities: No deformities, no C/C/E  Neurologic: Able to follow commands and move 4 extremities, sitting on wheelchair.     Routine labs:  Lab Results   Component Value Date    WBC 7.63 05/26/2017    HGB 11.6 (L) 05/26/2017    HCT 35.9 (L) 05/26/2017    MCV 90 05/26/2017     05/26/2017     Lab Results   Component Value Date    INR 1.0 03/29/2007     Lab Results   Component Value Date    IRON 48 05/26/2017    FERRITIN 270 05/26/2017    TIBC 333 05/26/2017    FESATURATED 14 (L) 05/26/2017     Lab Results   Component Value Date     05/26/2017     05/26/2017    K 5.3 (H) 05/26/2017    K 4.8 05/26/2017     05/26/2017     05/26/2017    CO2 26 05/26/2017    CO2 25 05/26/2017    BUN 43 (H) 05/26/2017    BUN 42 (H) 05/26/2017    CREATININE 1.9 (H) 05/26/2017    CREATININE 1.9 (H) 05/26/2017     Lab Results   Component Value Date    ALBUMIN 3.6 05/26/2017    ALBUMIN 3.6 05/26/2017    ALT 10 05/26/2017    AST 21 05/26/2017    ALKPHOS 81 05/26/2017    BILITOT 0.4 05/26/2017     No results found for: GLUCOSE    Imaging:  Imaging Results    None           Assessment:  Johanny Curtis is a 87 y.o. female with early satiety and weight loss. This is less likely due to underlying malignancy but given her family h/o pancreatic cancer and abnormal chest CT, we will evaluate pancreas and abdomen with CT scan. Her sx could be due to H.pylori infection, CKD/uremia. She is mildly anemic with iron studies consistent with anemia of chronic disease.     Recs:  HP stool test   CT abd/pelv - panc protocol (unable to obtain with contrast due to CKD)  Revisit PCP for evaluation of dizziness, this could be due to medications benazepril/cardura. May need to discontinue.  If sx continue worsens, we will consider EGD/EUS as needed.  RTC in 3 months.     Tess Longoria MD  Gastroenterology & Hepatology Fellow  Pager: [phone]      Orders Placed This Encounter   Procedures    CT Abdomen Pelvis   Without Contrast    H. pylori antigen, stool

## 2017-08-04 ENCOUNTER — OFFICE VISIT (OUTPATIENT)
Dept: NEPHROLOGY | Facility: CLINIC | Age: 82
End: 2017-08-04
Payer: MEDICARE

## 2017-08-04 ENCOUNTER — HOSPITAL ENCOUNTER (OUTPATIENT)
Dept: RADIOLOGY | Facility: HOSPITAL | Age: 82
Discharge: HOME | End: 2017-08-04
Attending: INTERNAL MEDICINE
Payer: MEDICARE

## 2017-08-04 VITALS
HEIGHT: 60 IN | WEIGHT: 178.56 LBS | DIASTOLIC BLOOD PRESSURE: 60 MMHG | BODY MASS INDEX: 35.05 KG/M2 | SYSTOLIC BLOOD PRESSURE: 122 MMHG

## 2017-08-04 DIAGNOSIS — N25.0 RENAL OSTEODYSTROPHY: ICD-10-CM

## 2017-08-04 DIAGNOSIS — N18.4 ANEMIA IN STAGE 4 CHRONIC KIDNEY DISEASE: ICD-10-CM

## 2017-08-04 DIAGNOSIS — D63.1 ANEMIA IN STAGE 4 CHRONIC KIDNEY DISEASE: ICD-10-CM

## 2017-08-04 DIAGNOSIS — R68.81 EARLY SATIETY: ICD-10-CM

## 2017-08-04 DIAGNOSIS — E04.1 THYROID NODULE: ICD-10-CM

## 2017-08-04 DIAGNOSIS — I10 ESSENTIAL HYPERTENSION: ICD-10-CM

## 2017-08-04 DIAGNOSIS — R63.4 WEIGHT LOSS: ICD-10-CM

## 2017-08-04 DIAGNOSIS — N18.4 CHRONIC KIDNEY DISEASE, STAGE IV (SEVERE): Primary | ICD-10-CM

## 2017-08-04 PROCEDURE — 1126F AMNT PAIN NOTED NONE PRSNT: CPT | Mod: S$GLB,,, | Performed by: INTERNAL MEDICINE

## 2017-08-04 PROCEDURE — 99499 UNLISTED E&M SERVICE: CPT | Mod: S$GLB,,, | Performed by: INTERNAL MEDICINE

## 2017-08-04 PROCEDURE — 99999 PR PBB SHADOW E&M-EST. PATIENT-LVL II: CPT | Mod: PBBFAC,,, | Performed by: INTERNAL MEDICINE

## 2017-08-04 PROCEDURE — 74176 CT ABD & PELVIS W/O CONTRAST: CPT | Mod: 26,,, | Performed by: RADIOLOGY

## 2017-08-04 PROCEDURE — 1159F MED LIST DOCD IN RCRD: CPT | Mod: S$GLB,,, | Performed by: INTERNAL MEDICINE

## 2017-08-04 PROCEDURE — 99213 OFFICE O/P EST LOW 20 MIN: CPT | Mod: S$GLB,,, | Performed by: INTERNAL MEDICINE

## 2017-08-04 NOTE — PATIENT INSTRUCTIONS
Labs today and in 3 months    Check blood pressure especially lightheaded goals are less than 140/90 and greater than 110/60

## 2017-08-04 NOTE — PROGRESS NOTES
Subjective:       Patient ID: Johanny Curtis is a 87 y.o. Black or  female who presents for follow-up evaluation of renal function.  HPI This is an 85-year-old -American female with  longstanding history of hypertension, stable currently, hypothyroidism, gout,   arthritis, aortic aneurysm, is coming in for f/u of CKD.   No LUTS, dysuria, hematuria, SOB, Chest pain.  The patient states that she is feeling well in general and her blood pressures are stable. She denies any urinary complaints. Her creatinine is stable at 1.8.  No NSAID's.  She has no proteinuria. Her PTH is slightly elevated, but stable for her GFR.  Last labs 5/2107, serum crt 1.5 and K 5.3  She is cold today but in no acute distress indications reviewed and there have been no changes according to the med list she is still taking benazepril 40 mg her daughter said that she is felt lightheaded last week her blood pressure today is stable at the lowest in the last month has been 112/58 they arenot checking it at home when she is lightheaded    6/2017  bilateral cortical thinning and simple cysts.    PAST MEDICAL HISTORY: Significant for hypertension for over 30 years,   hypothyroidism, thyroid nodule, gout, arthritis, aortic aneurysm,   hyperlipidemia, osteopenia, peripheral neuropathy.    SOCIAL HISTORY: Does not smoke, does not drink. Currently not working.    FAMILY HISTORY: No family history of kidney problems.  Review of Systems   Constitutional: Positive for fatigue.   Eyes: Negative for discharge.   Respiratory: Negative for cough, shortness of breath and wheezing.    Cardiovascular: Negative for chest pain and palpitations.   Gastrointestinal: Negative for abdominal pain, diarrhea, nausea and vomiting.   Genitourinary: Negative for dysuria, frequency, hematuria and urgency.   Skin: Negative for color change and rash.   Psychiatric/Behavioral: Negative for confusion.       Objective:     Blood pressure 122/60, height 5'  (1.524 m), weight 81 kg (178 lb 9.2 oz).    Physical Exam   Constitutional: She is oriented to person, place, and time. She appears well-developed and well-nourished. No distress.   Elderly appearing stated age in a wheelchair which limits exam, no apparent distress   HENT:   Head: Normocephalic and atraumatic.   Mouth/Throat: No oropharyngeal exudate.   Eyes: Conjunctivae and EOM are normal. Pupils are equal, round, and reactive to light. Right eye exhibits no discharge. Left eye exhibits no discharge. No scleral icterus.   Neck: Normal range of motion. Neck supple. No JVD present. No tracheal deviation present. No thyromegaly present.   Cardiovascular: Normal rate, regular rhythm, normal heart sounds and intact distal pulses.  Exam reveals no gallop and no friction rub.    No murmur heard.  No peripheral edema   Pulmonary/Chest: Effort normal and breath sounds normal. No stridor. No respiratory distress. She has no wheezes. She has no rales. She exhibits no tenderness.   Abdominal: Soft. Bowel sounds are normal. She exhibits no distension and no mass. There is no tenderness. There is no rebound and no guarding. No hernia.   Musculoskeletal: She exhibits no edema or tenderness.   Lymphadenopathy:     She has no cervical adenopathy.   Neurological: She is alert and oriented to person, place, and time. She exhibits normal muscle tone.   Skin: Skin is warm and dry. No rash noted. She is not diaphoretic. No erythema.   Psychiatric: She has a normal mood and affect. Judgment and thought content normal.   Nursing note and vitals reviewed.      Assessment:       1. Chronic kidney disease, stage IV (severe)    2. Essential hypertension    3. Anemia in stage 4 chronic kidney disease    4. Thyroid nodule    5. Renal osteodystrophy        Plan:         This is an 85-year-old -American female with   longstanding history of hypertension who is coming in for evaluation of CKD.  1. CKD, stage III-IV with an MDRD GFR of 35  mL per minute. Her baseline   creatinines are anywhere from 1.6-1.9 for the past several years with recent creatinine of 1.5 . Her GFR has been stable, but low. Her CKD is likely secondary to her advanced age along   with longstanding history of hypertension. The importance of blood pressure control to decrease progression of kidney disease was once again emphasized and asked her to check bp am/pm for one week prior to visits and bring results.    1. CKD: stable check labs today  Hyperkalemia patient is on a 2 g potassium diet restriction.  If potassium remains elevated we'll need to either cut the benazepril in half to 20 mg or stop completely.  Hypertension:Appears well controlled but with episodes of lightheadedness may need to reduce benazepril anyway await labs and then we'll discuss with her daughter on Monday.     Lab Results   Component Value Date    CREATININE 1.9 (H) 05/26/2017    CREATININE 1.9 (H) 05/26/2017     Protein Creatinine Ratios: Unable to give urine today    Prot/Creat Ratio, Ur   Date Value Ref Range Status   01/23/2015 Unable to calculate 0.0 - 0.2 Final       2. Acid-Base: low K 2gm potassium diet discussed/ given, would like to continue ace   Lab Results   Component Value Date     05/26/2017     05/26/2017    K 5.3 (H) 05/26/2017    K 4.8 05/26/2017    CO2 26 05/26/2017    CO2 25 05/26/2017         3. Renal osteodystrophy: last PTH vit D level  we'll order with next labs  Lab Results   Component Value Date    .0 (H) 01/23/2015    CALCIUM 9.9 05/26/2017    CALCIUM 9.7 05/26/2017    PHOS 3.3 05/26/2017       4. Anemia: will trend  Lab Results   Component Value Date    HGB 11.6 (L) 05/26/2017        5. HTN:       Medication: no change for now but have asked the daughter to follow her blood pressure at home especially  to check it  if she is lightheaded.  Blood pressure should not be less than 110/60      Monitor BP as directed in instructions      7. Weight: Weight: 81 kg  (178 lb 9.2 oz). she states that her daily weights are stable.    Wt Readings from Last 3 Encounters:   08/04/17 81 kg (178 lb 9.2 oz)   08/02/17 79.2 kg (174 lb 9.7 oz)   07/06/17 83.1 kg (183 lb 3.2 oz)       8. Lipid management:   Lab Results   Component Value Date    LDLCALC 93.4 01/23/2017       9. Diet:  Education/referral:       Sodium: 2gm       Potassium: 2gm            11. Please avoid or minimize all NSAIDS (ibuprofen, motrin, aleve, indocin, naprosyn) to minimize the risk to your kidneys.      12. Follow up in  3- months renal function panel CBC today and in 3 months

## 2017-08-07 DIAGNOSIS — N18.5 CKD (CHRONIC KIDNEY DISEASE) STAGE 5, GFR LESS THAN 15 ML/MIN: ICD-10-CM

## 2017-08-07 RX ORDER — SODIUM BICARBONATE 650 MG/1
1300 TABLET ORAL 3 TIMES DAILY
Qty: 120 TABLET | Refills: 11 | COMMUNITY
Start: 2017-08-07 | End: 2017-08-22 | Stop reason: SDUPTHER

## 2017-08-08 ENCOUNTER — TELEPHONE (OUTPATIENT)
Dept: GASTROENTEROLOGY | Facility: CLINIC | Age: 82
End: 2017-08-08

## 2017-08-08 NOTE — PROGRESS NOTES
Labs today potassium 4.8 serum creatinine up to 2.8 from prior 1.9.  Message left with the daughter's mobile number to please call as soon as possible to our office.  DC Benzapril.  Serum CO2 15 begin sodium bicarbonate 650 mg 2 tabs 3 times daily  We'll need to discuss the possibility of renal replacement therapy with the daughter and patient.  Repeat labs in 5 days  Nausea vomiting shortness of breath immediately to the emergency room.

## 2017-08-08 NOTE — TELEPHONE ENCOUNTER
----- Message from Tess Longoria MD sent at 8/8/2017 11:57 AM CDT -----  Irene- Please let her know that CT scan did not show any pancreatic lesions. Showed known kidney lesions as well 3.9 cm infrarenal abdominal aortic aneurysms.

## 2017-08-08 NOTE — TELEPHONE ENCOUNTER
Results given to patient per Dr. Longoria. Patient verbalized understanding. She has an appointment with her kidney doctor on Monday.

## 2017-08-09 ENCOUNTER — TELEPHONE (OUTPATIENT)
Dept: INTERNAL MEDICINE | Facility: CLINIC | Age: 82
End: 2017-08-09

## 2017-08-09 ENCOUNTER — TELEPHONE (OUTPATIENT)
Dept: NEPHROLOGY | Facility: CLINIC | Age: 82
End: 2017-08-09

## 2017-08-09 NOTE — TELEPHONE ENCOUNTER
----- Message from Kim Zee sent at 8/9/2017 11:19 AM CDT -----  Contact: self [phone]  Patient is returning a call from the office . Please advise ., Thanks !

## 2017-08-09 NOTE — TELEPHONE ENCOUNTER
----- Message from Bette Sauer sent at 8/9/2017 10:25 AM CDT -----  Contact: Patient  daughter  Jodi  [phone]  Stephan   -   Returning  The Dr phone call on the pt in regards to renal replacement   Thanks,     Gave pt a call back no answer left vm for pt valentino to call the office.

## 2017-08-14 ENCOUNTER — LAB VISIT (OUTPATIENT)
Dept: LAB | Facility: HOSPITAL | Age: 82
End: 2017-08-14
Attending: INTERNAL MEDICINE
Payer: MEDICARE

## 2017-08-14 DIAGNOSIS — I10 ESSENTIAL HYPERTENSION: ICD-10-CM

## 2017-08-14 DIAGNOSIS — D63.1 ANEMIA IN STAGE 4 CHRONIC KIDNEY DISEASE: ICD-10-CM

## 2017-08-14 DIAGNOSIS — N18.4 ANEMIA IN STAGE 4 CHRONIC KIDNEY DISEASE: ICD-10-CM

## 2017-08-14 DIAGNOSIS — E04.1 THYROID NODULE: ICD-10-CM

## 2017-08-14 DIAGNOSIS — N18.5 CKD (CHRONIC KIDNEY DISEASE) STAGE 5, GFR LESS THAN 15 ML/MIN: ICD-10-CM

## 2017-08-14 DIAGNOSIS — N25.0 RENAL OSTEODYSTROPHY: ICD-10-CM

## 2017-08-14 DIAGNOSIS — N18.4 CHRONIC KIDNEY DISEASE, STAGE IV (SEVERE): ICD-10-CM

## 2017-08-14 LAB
ALBUMIN SERPL BCP-MCNC: 3.4 G/DL
ALBUMIN SERPL BCP-MCNC: 3.4 G/DL
ANION GAP SERPL CALC-SCNC: 8 MMOL/L
ANION GAP SERPL CALC-SCNC: 8 MMOL/L
BASOPHILS # BLD AUTO: 0.02 K/UL
BASOPHILS NFR BLD: 0.3 %
BUN SERPL-MCNC: 27 MG/DL
BUN SERPL-MCNC: 27 MG/DL
CALCIUM SERPL-MCNC: 9.4 MG/DL
CALCIUM SERPL-MCNC: 9.4 MG/DL
CHLORIDE SERPL-SCNC: 111 MMOL/L
CHLORIDE SERPL-SCNC: 111 MMOL/L
CO2 SERPL-SCNC: 21 MMOL/L
CO2 SERPL-SCNC: 21 MMOL/L
CREAT SERPL-MCNC: 1.8 MG/DL
CREAT SERPL-MCNC: 1.8 MG/DL
DIFFERENTIAL METHOD: ABNORMAL
EOSINOPHIL # BLD AUTO: 0.1 K/UL
EOSINOPHIL NFR BLD: 1.1 %
ERYTHROCYTE [DISTWIDTH] IN BLOOD BY AUTOMATED COUNT: 15.7 %
EST. GFR  (AFRICAN AMERICAN): 28.8 ML/MIN/1.73 M^2
EST. GFR  (AFRICAN AMERICAN): 28.8 ML/MIN/1.73 M^2
EST. GFR  (NON AFRICAN AMERICAN): 24.9 ML/MIN/1.73 M^2
EST. GFR  (NON AFRICAN AMERICAN): 24.9 ML/MIN/1.73 M^2
GLUCOSE SERPL-MCNC: 70 MG/DL
GLUCOSE SERPL-MCNC: 70 MG/DL
HCT VFR BLD AUTO: 32.8 %
HGB BLD-MCNC: 10.5 G/DL
LYMPHOCYTES # BLD AUTO: 1.5 K/UL
LYMPHOCYTES NFR BLD: 23.9 %
MCH RBC QN AUTO: 28.3 PG
MCHC RBC AUTO-ENTMCNC: 32 G/DL
MCV RBC AUTO: 88 FL
MONOCYTES # BLD AUTO: 0.4 K/UL
MONOCYTES NFR BLD: 6.5 %
NEUTROPHILS # BLD AUTO: 4.2 K/UL
NEUTROPHILS NFR BLD: 68 %
PHOSPHATE SERPL-MCNC: 2.4 MG/DL
PHOSPHATE SERPL-MCNC: 2.4 MG/DL
PLATELET # BLD AUTO: 201 K/UL
PMV BLD AUTO: 11 FL
POTASSIUM SERPL-SCNC: 4.2 MMOL/L
POTASSIUM SERPL-SCNC: 4.2 MMOL/L
RBC # BLD AUTO: 3.71 M/UL
SODIUM SERPL-SCNC: 140 MMOL/L
SODIUM SERPL-SCNC: 140 MMOL/L
WBC # BLD AUTO: 6.11 K/UL

## 2017-08-14 PROCEDURE — 36415 COLL VENOUS BLD VENIPUNCTURE: CPT

## 2017-08-14 PROCEDURE — 80069 RENAL FUNCTION PANEL: CPT

## 2017-08-14 PROCEDURE — 85025 COMPLETE CBC W/AUTO DIFF WBC: CPT

## 2017-08-15 ENCOUNTER — TELEPHONE (OUTPATIENT)
Dept: GASTROENTEROLOGY | Facility: CLINIC | Age: 82
End: 2017-08-15

## 2017-08-17 ENCOUNTER — TELEPHONE (OUTPATIENT)
Dept: NEPHROLOGY | Facility: CLINIC | Age: 82
End: 2017-08-17

## 2017-08-17 NOTE — TELEPHONE ENCOUNTER
----- Message from Sindy Carroll sent at 8/17/2017 11:24 AM CDT -----  Contact: Caregiver: Annette Lombard  tel: [phone] / cell: [phone] leave msg.  Wants lab results.   Pls call.     Gave pt a call no answer.

## 2017-08-21 ENCOUNTER — TELEPHONE (OUTPATIENT)
Dept: NEPHROLOGY | Facility: CLINIC | Age: 82
End: 2017-08-21

## 2017-08-23 DIAGNOSIS — N18.4 CHRONIC KIDNEY DISEASE, STAGE IV (SEVERE): Primary | ICD-10-CM

## 2017-08-23 DIAGNOSIS — I71.40 ANEURYSM OF ABDOMINAL VESSEL: ICD-10-CM

## 2017-08-23 RX ORDER — SODIUM BICARBONATE 650 MG/1
1300 TABLET ORAL 3 TIMES DAILY
Qty: 120 TABLET | Refills: 11 | COMMUNITY
Start: 2017-08-23 | End: 2017-08-24 | Stop reason: SDUPTHER

## 2017-08-24 DIAGNOSIS — I71.40 AAA (ABDOMINAL AORTIC ANEURYSM) WITHOUT RUPTURE: Primary | ICD-10-CM

## 2017-08-24 RX ORDER — SODIUM BICARBONATE 650 MG/1
1300 TABLET ORAL 3 TIMES DAILY
Qty: 120 TABLET | Refills: 11 | COMMUNITY
Start: 2017-08-24 | End: 2018-01-15 | Stop reason: SDUPTHER

## 2017-08-25 ENCOUNTER — TELEPHONE (OUTPATIENT)
Dept: NEPHROLOGY | Facility: CLINIC | Age: 82
End: 2017-08-25

## 2017-08-25 NOTE — TELEPHONE ENCOUNTER
----- Message from Jennifer Pina sent at 8/24/2017 11:00 AM CDT -----  Contact: dong    OUT OF SODIUM BIOCARB-    Over two weeks     DONG  NEEDS   A   NEW SCRIPT   TO FILL   -    The old script says OTC and they cannot file that as a prescription and submit to insurance     Please send new  Script   For Sodium Bicarb     Thanks     Spoke with pt daughter few days ago daughter stated she was going to buy it OTC gave daughter a phone call today no answer wanted to know did she by chance get the medication.

## 2017-08-25 NOTE — TELEPHONE ENCOUNTER
----- Message from Bette Sauer sent at 8/25/2017 10:03 AM CDT -----  Contact: Pt daughter  Fang    [phone]  Brittanycarluis   -    Returning the nurse phone call in regards to getting a new script for the pt   SODIUM  BIOCARB    Thanks,    Gave call back no answer

## 2017-09-14 ENCOUNTER — HOSPITAL ENCOUNTER (OUTPATIENT)
Dept: VASCULAR SURGERY | Facility: CLINIC | Age: 82
Discharge: HOME | End: 2017-09-14
Attending: SURGERY
Payer: MEDICARE

## 2017-09-14 ENCOUNTER — OFFICE VISIT (OUTPATIENT)
Dept: VASCULAR SURGERY | Facility: CLINIC | Age: 82
End: 2017-09-14
Payer: MEDICARE

## 2017-09-14 VITALS
TEMPERATURE: 98 F | HEIGHT: 60 IN | DIASTOLIC BLOOD PRESSURE: 63 MMHG | HEART RATE: 66 BPM | SYSTOLIC BLOOD PRESSURE: 126 MMHG | BODY MASS INDEX: 34.16 KG/M2 | WEIGHT: 174 LBS

## 2017-09-14 DIAGNOSIS — I71.40 AAA (ABDOMINAL AORTIC ANEURYSM) WITHOUT RUPTURE: ICD-10-CM

## 2017-09-14 DIAGNOSIS — N18.5 CKD (CHRONIC KIDNEY DISEASE) STAGE 5, GFR LESS THAN 15 ML/MIN: ICD-10-CM

## 2017-09-14 DIAGNOSIS — I71.40 AAA (ABDOMINAL AORTIC ANEURYSM) WITHOUT RUPTURE: Primary | ICD-10-CM

## 2017-09-14 PROCEDURE — 99999 PR PBB SHADOW E&M-EST. PATIENT-LVL III: CPT | Mod: PBBFAC,,, | Performed by: SURGERY

## 2017-09-14 PROCEDURE — 1126F AMNT PAIN NOTED NONE PRSNT: CPT | Mod: S$GLB,,, | Performed by: SURGERY

## 2017-09-14 PROCEDURE — 93978 VASCULAR STUDY: CPT | Mod: S$GLB,,, | Performed by: SURGERY

## 2017-09-14 PROCEDURE — 99499 UNLISTED E&M SERVICE: CPT | Mod: S$GLB,,, | Performed by: SURGERY

## 2017-09-14 PROCEDURE — 3008F BODY MASS INDEX DOCD: CPT | Mod: S$GLB,,, | Performed by: SURGERY

## 2017-09-14 PROCEDURE — 1159F MED LIST DOCD IN RCRD: CPT | Mod: S$GLB,,, | Performed by: SURGERY

## 2017-09-14 PROCEDURE — 99213 OFFICE O/P EST LOW 20 MIN: CPT | Mod: S$GLB,,, | Performed by: SURGERY

## 2017-09-14 RX ORDER — HYDROXYZINE HYDROCHLORIDE 50 MG/1
50 TABLET, FILM COATED ORAL 3 TIMES DAILY PRN
COMMUNITY
End: 2017-11-06

## 2017-09-14 NOTE — PROGRESS NOTES
Patient ID: Johanny Curtis is a 87 y.o. female.    I. HISTORY     Chief Complaint: Follow-up      HPI   Johanny Curtis is a 87 y.o. female here for evaluation of a smal AAA. Was told she had an aneurysm several years ago and underwent a recent duplex to follow-up. No family history of AAA. Never smoker.  Lives independently. No claudication symptoms, CP or SOB with exertion.    Her previous U/S showed 3cm AAA, On imaging today it measures 3.1cm at maximum diamte    Review of Systems   Constitution: Negative for decreased appetite.   Eyes: Negative for pain.   Cardiovascular: Negative for chest pain and claudication.   Respiratory: Negative for shortness of breath.    Skin: Negative for color change.   Gastrointestinal: Negative for change in bowel habit.   Genitourinary: Negative for flank pain.   Neurological: Negative for dizziness.   Psychiatric/Behavioral: Negative for altered mental status.          II. PHYSICAL EXAM     Physical Exam   Constitutional: She is oriented to person, place, and time. She appears well-developed and well-nourished. No distress.   HENT:   Head: Normocephalic and atraumatic.   Neck: Normal range of motion. Neck supple.   Cardiovascular: Normal rate and regular rhythm.    No palpable Abdominal mass   Pulmonary/Chest: Effort normal and breath sounds normal. No respiratory distress.   Abdominal: Soft. Bowel sounds are normal. She exhibits no distension. There is no tenderness.   Musculoskeletal: Normal range of motion. She exhibits no edema or deformity.   Neurological: She is alert and oriented to person, place, and time. No cranial nerve deficit.   Skin: Skin is warm and dry. She is not diaphoretic.   Psychiatric: She has a normal mood and affect. Her behavior is normal. Judgment and thought content normal.   Vitals reviewed.      III. ASSESSMENT & PLAN (MEDICAL DECISION MAKING)     1. AAA (abdominal aortic aneurysm) without rupture    2. CKD (chronic kidney disease) stage 5, GFR  less than 15 ml/min        Imaging Results:  AA Ultrasound: 9/14/17 3.0X3.1cm infrarenal AAA   CT: reviewed    Assessment/Diagnosis and Plan:    Small AAA. No indication for treatment.  Repeat duplex every 3 years to monitor for growth.    Melyssa Gordon MD FACS [facility]  Vascular & Endovascular Surgery

## 2017-09-20 ENCOUNTER — TELEPHONE (OUTPATIENT)
Dept: INTERNAL MEDICINE | Facility: CLINIC | Age: 82
End: 2017-09-20

## 2017-09-20 NOTE — TELEPHONE ENCOUNTER
----- Message from Sarah Thacker sent at 9/20/2017 10:58 AM CDT -----  Contact: self/[phone]  Pt called in regards to having a cold and wants to know what can she take.        [facility] pharmacy  Please advise

## 2017-09-28 ENCOUNTER — OFFICE VISIT (OUTPATIENT)
Dept: INTERNAL MEDICINE | Facility: CLINIC | Age: 82
End: 2017-09-28
Payer: MEDICARE

## 2017-09-28 ENCOUNTER — LAB VISIT (OUTPATIENT)
Dept: LAB | Facility: HOSPITAL | Age: 82
End: 2017-09-28
Attending: INTERNAL MEDICINE
Payer: MEDICARE

## 2017-09-28 DIAGNOSIS — I10 ESSENTIAL HYPERTENSION: ICD-10-CM

## 2017-09-28 DIAGNOSIS — Z12.31 ENCOUNTER FOR SCREENING MAMMOGRAM FOR BREAST CANCER: Primary | ICD-10-CM

## 2017-09-28 DIAGNOSIS — R45.84 ANHEDONIA: ICD-10-CM

## 2017-09-28 DIAGNOSIS — H91.90 HEARING LOSS, UNSPECIFIED HEARING LOSS TYPE, UNSPECIFIED LATERALITY: ICD-10-CM

## 2017-09-28 LAB
ALBUMIN SERPL BCP-MCNC: 3 G/DL
ALP SERPL-CCNC: 77 U/L
ALT SERPL W/O P-5'-P-CCNC: 6 U/L
ANION GAP SERPL CALC-SCNC: 12 MMOL/L
AST SERPL-CCNC: 18 U/L
BILIRUB SERPL-MCNC: 0.4 MG/DL
BUN SERPL-MCNC: 27 MG/DL
CALCIUM SERPL-MCNC: 9.7 MG/DL
CHLORIDE SERPL-SCNC: 108 MMOL/L
CO2 SERPL-SCNC: 23 MMOL/L
CREAT SERPL-MCNC: 2.1 MG/DL
EST. GFR  (AFRICAN AMERICAN): 23.9 ML/MIN/1.73 M^2
EST. GFR  (NON AFRICAN AMERICAN): 20.7 ML/MIN/1.73 M^2
FOLATE SERPL-MCNC: 15 NG/ML
GLUCOSE SERPL-MCNC: 80 MG/DL
POTASSIUM SERPL-SCNC: 5 MMOL/L
PROT SERPL-MCNC: 7.4 G/DL
SODIUM SERPL-SCNC: 143 MMOL/L
VIT B12 SERPL-MCNC: 326 PG/ML

## 2017-09-28 PROCEDURE — 3008F BODY MASS INDEX DOCD: CPT | Mod: S$GLB,,, | Performed by: INTERNAL MEDICINE

## 2017-09-28 PROCEDURE — 99214 OFFICE O/P EST MOD 30 MIN: CPT | Mod: S$GLB,,, | Performed by: INTERNAL MEDICINE

## 2017-09-28 PROCEDURE — 99499 UNLISTED E&M SERVICE: CPT | Mod: S$GLB,,, | Performed by: INTERNAL MEDICINE

## 2017-09-28 PROCEDURE — 80053 COMPREHEN METABOLIC PANEL: CPT

## 2017-09-28 PROCEDURE — 82746 ASSAY OF FOLIC ACID SERUM: CPT

## 2017-09-28 PROCEDURE — 1159F MED LIST DOCD IN RCRD: CPT | Mod: S$GLB,,, | Performed by: INTERNAL MEDICINE

## 2017-09-28 PROCEDURE — 36415 COLL VENOUS BLD VENIPUNCTURE: CPT

## 2017-09-28 PROCEDURE — 82607 VITAMIN B-12: CPT

## 2017-09-28 PROCEDURE — 99999 PR PBB SHADOW E&M-EST. PATIENT-LVL V: CPT | Mod: PBBFAC,,, | Performed by: INTERNAL MEDICINE

## 2017-09-28 RX ORDER — DOXYCYCLINE HYCLATE 100 MG
100 TABLET ORAL 2 TIMES DAILY WITH MEALS
Qty: 14 TABLET | Refills: 0 | Status: SHIPPED | OUTPATIENT
Start: 2017-09-28 | End: 2017-10-05

## 2017-10-01 VITALS
TEMPERATURE: 99 F | OXYGEN SATURATION: 95 % | BODY MASS INDEX: 34.16 KG/M2 | HEIGHT: 60 IN | SYSTOLIC BLOOD PRESSURE: 126 MMHG | WEIGHT: 174 LBS | DIASTOLIC BLOOD PRESSURE: 80 MMHG | HEART RATE: 62 BPM

## 2017-10-01 NOTE — PROGRESS NOTES
Subjective:       Patient ID: Johanny Curtis is a 87 y.o. female.    Chief Complaint: Hypertension    HPI: She returns for management of hypertension.  She has had hypertension for over a year.  Current treatment has included medications outlined in medication list.  She denies chest pain or shortness of breath.  No palpitations.  Denies left arm or neck pain.    PAST MEDICAL HISTORY: Hypertension, aortic aneurysm, hypothyroidism/thyroid nodule,   hyperlipidemia, osteopenia, ankle fracture,   osteoarthritis, gout, peripheral neuropathy. She had a  colonoscopy July 2010     PAST SURGICAL HISTORY: Hysterectomy.     MEDICATIONS: Cardura 2 mg at bedtime,  Plendil 10 mg daily, Synthroid 0.088 mg daily, lovastatin 20 mg nightly, colchicine .6mg q OD    ALLERGIES: No known drug allergies.        Review of Systems   Constitutional: Negative for chills, fatigue, fever and unexpected weight change.   Respiratory: Negative for chest tightness and shortness of breath.    Cardiovascular: Negative for chest pain and palpitations.   Gastrointestinal: Negative for abdominal pain and blood in stool.   Neurological: Negative for dizziness, syncope, numbness and headaches.       Objective:      Physical Exam   HENT:   Right Ear: External ear normal.   Left Ear: External ear normal.   Nose: Nose normal.   Mouth/Throat: Oropharynx is clear and moist.   Eyes: Pupils are equal, round, and reactive to light.   Neck: Normal range of motion.   Cardiovascular: Normal rate and regular rhythm.    No murmur heard.  Pulmonary/Chest: Breath sounds normal.   Abdominal: She exhibits no distension. There is no hepatosplenomegaly. There is no tenderness.   Lymphadenopathy:     She has no cervical adenopathy.     She has no axillary adenopathy.   Neurological: She has normal strength and normal reflexes. No cranial nerve deficit or sensory deficit.     breast exam: No mass palpated, no nipple discharge expressed  Assessment:     assessment and plan:  Hypertension: Check CMP, B12, folate.  Schedule mammogram.  Discussed neurology appointment, she declined      Plan:       As above

## 2017-10-26 ENCOUNTER — HOSPITAL ENCOUNTER (OUTPATIENT)
Dept: RADIOLOGY | Facility: HOSPITAL | Age: 82
Discharge: HOME | End: 2017-10-26
Attending: INTERNAL MEDICINE
Payer: MEDICARE

## 2017-10-26 ENCOUNTER — TELEPHONE (OUTPATIENT)
Dept: OTOLARYNGOLOGY | Facility: CLINIC | Age: 82
End: 2017-10-26

## 2017-10-26 VITALS — HEIGHT: 59 IN | WEIGHT: 174 LBS | BODY MASS INDEX: 35.08 KG/M2

## 2017-10-26 DIAGNOSIS — Z12.31 ENCOUNTER FOR SCREENING MAMMOGRAM FOR BREAST CANCER: ICD-10-CM

## 2017-10-26 PROCEDURE — 77067 SCR MAMMO BI INCL CAD: CPT | Mod: 26,,, | Performed by: RADIOLOGY

## 2017-10-26 PROCEDURE — 77067 SCR MAMMO BI INCL CAD: CPT | Mod: TC

## 2017-10-26 NOTE — TELEPHONE ENCOUNTER
----- Message from Sofi Colmenares sent at 10/26/2017  4:20 PM CDT -----  Contact: patient daughter  [phone]-please call above patient daughter ms.lombard need to reschedule her moms appointment call number in message

## 2017-11-06 ENCOUNTER — OFFICE VISIT (OUTPATIENT)
Dept: GASTROENTEROLOGY | Facility: CLINIC | Age: 82
End: 2017-11-06
Payer: MEDICARE

## 2017-11-06 VITALS
WEIGHT: 171.94 LBS | HEART RATE: 76 BPM | SYSTOLIC BLOOD PRESSURE: 113 MMHG | BODY MASS INDEX: 33.76 KG/M2 | HEIGHT: 60 IN | DIASTOLIC BLOOD PRESSURE: 58 MMHG

## 2017-11-06 DIAGNOSIS — R68.81 EARLY SATIETY: Primary | ICD-10-CM

## 2017-11-06 DIAGNOSIS — R63.4 WEIGHT LOSS: ICD-10-CM

## 2017-11-06 PROCEDURE — 99999 PR PBB SHADOW E&M-EST. PATIENT-LVL III: CPT | Mod: PBBFAC,,, | Performed by: NURSE PRACTITIONER

## 2017-11-06 PROCEDURE — 99213 OFFICE O/P EST LOW 20 MIN: CPT | Mod: S$GLB,,, | Performed by: NURSE PRACTITIONER

## 2017-11-06 NOTE — PROGRESS NOTES
[facility] Note    Reason for Visit:  The primary encounter diagnosis was Early satiety. A diagnosis of Weight loss was also pertinent to this visit.    PCP:   Leticia Weems       Referring MD:  No referring provider defined for this encounter.    HPI:  This is a 87 y.o. female here for f/u evaluation of early satiety and weight loss.   Here with a family member.  Previously seen in GI per Dr. Longoria/Dr. Gong 3 months ago.  Subsequent CT w/ pancreatic mass protocol and h. Pylori testing unrevealing.     Currently reports resolution of early satiety. Also states weight is now stable. States she has a very good appetite and is able to complete all meals w/o becoming full.    Abdominal pain - no  Reflux - no  Dysphagia/odynophagia - no   Bowel habits - normal. 2 formed BM/day  GI bleeding - denies hematochezia, hematemesis, melena, BRBPR, black/tarry stools, and coffee ground emesis  NSAID usage - none    ROS:  Constitutional: No fevers, no chills, No unintentional weight loss, no increased fatigue,   ENT: No allergies  CV: No chest pain, no palpitations, no perif. edema, no sob on exertion  Pulm: No cough, No shortness of breath, no wheezes, no sputum  Ophtho: No vision changes  GI: see HPI; also no nausea, no vomiting, no change in appetite  Derm: No rash  Heme: No lymphadenopathy, No bruising  MSK: No arthritis, no muscle pain, no muscle weakness  : No dysuria, No hematuria  Endo: No hot or cold intolerance  Neuro: No syncope, No seizure,       Medical History:  has a past medical history of Anemia in CKD (chronic kidney disease); Arthritis; Cataract; Gout, chronic; High cholesterol; Hypertension; Hypothyroidism; Obesity; Plantar fasciitis; and Thyroid trouble.    Surgical History:  has a past surgical history that includes Hysterectomy; Skin biopsy; Cholecystectomy; Cataract extraction (3/18/13); Eye surgery; Breast surgery (Left, 1972); and Breast biopsy (Left).    Family History:  family history includes Breast cancer in her sister; Cancer in her brother, sister, and sister; Cataracts in her son; Diabetes in her son and son; Glaucoma in her daughter and son; Heart disease in her brother; Lupus in her daughter; Meningitis in her son; Mental illness in her son; No Known Problems in her brother..     Social History:  reports that she quit smoking about 16 years ago. She has a 30.00 pack-year smoking history. She has never used smokeless tobacco. She reports that she does not drink alcohol or use drugs.    Review of patient's allergies indicates:  No Known Allergies    Current Outpatient Prescriptions   Medication Sig    acetaminophen-codeine 300-30mg (TYLENOL #3) 300-30 mg Tab 1 po tid prn    CALCIUM CITRATE/VITAMIN D3 (CALCIUM CITRATE + D ORAL) Take by mouth. 1  By mouth Every day    colchicine (COLCRYS) 0.6 mg tablet Take 1 tablet (0.6 mg total) by mouth every other day.    doxazosin (CARDURA) 2 MG tablet Take 1 tablet (2 mg total) by mouth nightly. At bedtime    felodipine (PLENDIL) 10 MG 24 hr tablet 1 po Every day    levothyroxine (SYNTHROID) 88 MCG tablet Take 1 tablet (88 mcg total) by mouth once daily.    lovastatin (MEVACOR) 20 MG tablet 1 po At bedtime    MULTIVIT-IRON-MIN-FOLIC ACID 3,500-18-0.4 UNIT-MG-MG ORAL CHEW Take by mouth.    sodium bicarbonate 650 MG tablet Take 2 tablets (1,300 mg total) by mouth 3 (three) times daily.    urea 10 % Lotn Apply 1 application topically 2 (two) times daily. To dry skin on the feet.     No current facility-administered medications for this visit.        Objective Findings:    Vital Signs:  BP (!) 113/58   Pulse 76   Ht 5' (1.524 m)   Wt 78 kg (171 lb 15.3 oz)   BMI 33.58 kg/m²   Body mass index is 33.58 kg/m².    Physical Exam:  General Appearance: Well appearing in no acute distress, in w/c  Head:   Normocephalic, without obvious abnormality  Eyes:    No scleral icterus, EOMI  ENT: Neck supple, Lips, mucosa, and tongue normal;  teeth and gums normal  Lungs: CTA bilaterally in anterior and posterior fields, no wheezes, no crackles.  Heart:  Regular rate and rhythm, S1, S2 normal, no murmurs heard  Abdomen: Soft, non tender, non distended with positive bowel sounds in all four quadrants. No hepatosplenomegaly, ascites, or mass  Extremities: 2+ radial pulses, no clubbing, cyanosis or edema  Skin: No rash to exposed areas  Neurologic: A&Ox4      Labs:  Lab Results   Component Value Date    WBC 6.11 2017    HGB 10.5 (L) 2017    HCT 32.8 (L) 2017     2017    CHOL 166 2017    TRIG 118 2017    HDL 49 2017    ALT 6 (L) 2017    AST 18 2017     2017    K 5.0 2017     2017    CREATININE 2.1 (H) 2017    BUN 27 (H) 2017    CO2 23 2017    TSH 1.265 2017    INR 1.0 2007    HGBA1C 6.1 2006       Imagin2017 CT abd/pelvis-kidney lesions. S/p jennifer and hysterectomy. Lung micro nodule and ground glass opacities. Abdominal AA 3.9 cm.   Endoscopy:     colonoscopy Dr. Pagan- CTC. GP. WNL. Repeat PRN.   Assessment:  1. Early satiety    2. Weight loss           Recommendations:  1. Early satiety-resolved. Pt states feeling very well.  2. Weight loss-wt stable.    F/u PRN. EGD +/- EUS if s/s recur.       Order summary:         Thank you so much for allowing me to participate in the care of Johanny De La Paz, APRN, FNP-C

## 2017-11-15 ENCOUNTER — OFFICE VISIT (OUTPATIENT)
Dept: OTOLARYNGOLOGY | Facility: CLINIC | Age: 82
End: 2017-11-15
Payer: MEDICARE

## 2017-11-15 ENCOUNTER — CLINICAL SUPPORT (OUTPATIENT)
Dept: AUDIOLOGY | Facility: CLINIC | Age: 82
End: 2017-11-15
Payer: MEDICARE

## 2017-11-15 VITALS — SYSTOLIC BLOOD PRESSURE: 137 MMHG | TEMPERATURE: 98 F | HEART RATE: 66 BPM | DIASTOLIC BLOOD PRESSURE: 61 MMHG

## 2017-11-15 DIAGNOSIS — H90.A32 MIXED CONDUCTIVE AND SENSORINEURAL HEARING LOSS OF LEFT EAR WITH RESTRICTED HEARING OF RIGHT EAR: Primary | ICD-10-CM

## 2017-11-15 DIAGNOSIS — H65.04 RECURRENT ACUTE SEROUS OTITIS MEDIA OF RIGHT EAR: ICD-10-CM

## 2017-11-15 DIAGNOSIS — H90.3 SENSORINEURAL HEARING LOSS, BILATERAL: Primary | ICD-10-CM

## 2017-11-15 DIAGNOSIS — H90.A22 SENSORINEURAL HEARING LOSS (SNHL) OF LEFT EAR WITH RESTRICTED HEARING OF RIGHT EAR: ICD-10-CM

## 2017-11-15 PROCEDURE — 92550 TYMPANOMETRY & REFLEX THRESH: CPT | Mod: S$GLB,,, | Performed by: PHYSICIAN ASSISTANT

## 2017-11-15 PROCEDURE — 99202 OFFICE O/P NEW SF 15 MIN: CPT | Mod: S$GLB,,, | Performed by: OTOLARYNGOLOGY

## 2017-11-15 PROCEDURE — 92557 COMPREHENSIVE HEARING TEST: CPT | Mod: S$GLB,,, | Performed by: PHYSICIAN ASSISTANT

## 2017-11-15 PROCEDURE — 99999 PR PBB SHADOW E&M-EST. PATIENT-LVL III: CPT | Mod: PBBFAC,,, | Performed by: OTOLARYNGOLOGY

## 2017-11-15 PROCEDURE — 99999 PR PBB SHADOW E&M-EST. PATIENT-LVL I: CPT | Mod: PBBFAC,,,

## 2017-11-15 RX ORDER — AMOXICILLIN 500 MG/1
500 CAPSULE ORAL EVERY 12 HOURS
Qty: 20 CAPSULE | Refills: 0 | Status: SHIPPED | OUTPATIENT
Start: 2017-11-15 | End: 2018-04-03

## 2017-11-15 NOTE — PROGRESS NOTES
Subjective:       Patient ID: Johanny Curtis is a 87 y.o. female.    Chief Complaint: No chief complaint on file.    HPI: Ms. Curtis is an 87-year-old Afro-American female who presents in a wheelchair.  She is accompanied by her daughter I believe.  Her daughter has noticed her mother's decrease hearing perception especially while calling her on the telephone and trying tocommunicateg with her.  I last examined and  treated Ms. Curtis in November 2013 for a right serous otitis media condition and a mixed hearing loss in the right ear compared to the better hearing left ear.    The condition responded to a course of Amoxil/Augmentin and her hearing normalized ultimately.  The right ear audiogram completed at that time* after Rx/DOUG resolution) is reproducible low  She may have the same AD DOUG condition once again.       Past Medical History:   Diagnosis Date    Anemia in CKD (chronic kidney disease)     Arthritis     Cataract     Gout, chronic     High cholesterol     Hypertension     Hypothyroidism     Obesity     Plantar fasciitis     Thyroid trouble      Current Outpatient Prescriptions on File Prior to Visit   Medication Sig Dispense Refill    acetaminophen-codeine 300-30mg (TYLENOL #3) 300-30 mg Tab 1 po tid prn 30 tablet 1    CALCIUM CITRATE/VITAMIN D3 (CALCIUM CITRATE + D ORAL) Take by mouth. 1  By mouth Every day      colchicine (COLCRYS) 0.6 mg tablet Take 1 tablet (0.6 mg total) by mouth every other day. 45 tablet 1    doxazosin (CARDURA) 2 MG tablet Take 1 tablet (2 mg total) by mouth nightly. At bedtime 90 tablet 1    felodipine (PLENDIL) 10 MG 24 hr tablet 1 po Every day 90 tablet 1    levothyroxine (SYNTHROID) 88 MCG tablet Take 1 tablet (88 mcg total) by mouth once daily. 90 tablet 1    lovastatin (MEVACOR) 20 MG tablet 1 po At bedtime 90 tablet 1    MULTIVIT-IRON-MIN-FOLIC ACID 3,500-18-0.4 UNIT-MG-MG ORAL CHEW Take by mouth.      sodium bicarbonate 650 MG tablet Take 2 tablets  (1,300 mg total) by mouth 3 (three) times daily. 120 tablet 11    urea 10 % Lotn Apply 1 application topically 2 (two) times daily. To dry skin on the feet. 177 mL 6     No current facility-administered medications on file prior to visit.      Review of Systems   Ears: Positive for hearing loss and dizziness.    Mouth/Throat: Positive for hoarse voice.   Cardiovascular:  Positive for history of high blood pressure.   Other:  Positive for arthritis.         The patient completed an audiometric study performed by the [facility] audiology service.  The study is duplicated below the results are reviewed with the patient  Objective:             Blood pressure 137/61 pulse 66 temperature 97.9  The patient is helped from the wheelchair into the reclining chair and the micro-procedure room.  Ears are examined under the microscope.  Physical Exam   HENT:   Ears:    Mouth/Throat:           Assessment:       1. Mixed conductive and sensorineural hearing loss of left ear with restricted hearing of right ear    2. 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 Recurrent acute serous otitis media of right ear        Plan:     Audiometry reviewed; compare to 2014 study  Rx for Amoxil 500 mg # 20; take BID x 10 days  Gentle middle ear insufflation techniques encouraged; politzerization attempted; E.T. literatrue provided  RTC 3 weeks  Pt. is a candidate for amplification for the left or both ears  Copy of audiogam provided

## 2017-11-15 NOTE — PROGRESS NOTES
Johanny was seen in the clinic today for an audiological evaluation.  Results indicated a moderate to severe SNHL, AD with poor speech discrimination and a moderate to moderately-severe SNHL, AS with fair speech discrimination. A speech reception threshold was obtained at 70 dBHL, AD and 50 dBHL, AS. Tympanometry testing revealed a flat type B tympanogram, AD and a normal type A tympanogram, AS.  Ipsilateral acoustic reflexes were absent for 1000 Hz and 2000 Hz, AD and were present at 85 dBHL for 1000 Hz and at 90 dBHL for 2000 Hz, AS.     Recommendations:  1. Otologic evaluation  2. Annual hearing evaluation  3. Noise protection  4. Hearing aid consultation following medical clearance.

## 2017-11-15 NOTE — PATIENT INSTRUCTIONS
Audiometry reviewed; compare to 2014 study  Rx for Amoxil 500 mg # 20; take BID x 10 days  Gentle middle ear insufflation techniques encouraged; politzerization attempted; KARRI literatrue provided  RTC 3 weeks  Pt. is a candidate for amplification for the left or both ears  Copy of audiogam provided

## 2017-11-15 NOTE — LETTER
November 16, 2017      Leticia Weems MD  [address]  [facility] [address]           [facility] O[facility]  [address]  [facility] [address]  Phone: [phone]  Fax: [phone]          Patient: Johanny Curtis   MR Number: [id number]   YOB: 1930   Date of Visit: 11/15/2017       Dear Dr. Leticia Weems:    Thank you for referring Johanny Curtis to me for evaluation. Attached you will find relevant portions of my assessment and plan of care.    If you have questions, please do not hesitate to call me. I look forward to following Johanny Curtis along with you.    Sincerely,    Jaswinder Lopez III, MD    Enclosure  CC:  No Recipients    If you would like to receive this communication electronically, please contact [email or web address] or [phone] to request more information on [facility] Link access.    For providers and/or their staff who would like to refer a patient to [facility], please contact us through our one-stop-shop provider referral line, [facility], at [phone].    If you feel you have received this communication in error or would no longer like to receive these types of communications, please e-mail [email or web address]

## 2017-12-11 ENCOUNTER — OFFICE VISIT (OUTPATIENT)
Dept: OTOLARYNGOLOGY | Facility: CLINIC | Age: 82
End: 2017-12-11
Payer: MEDICARE

## 2017-12-11 ENCOUNTER — CLINICAL SUPPORT (OUTPATIENT)
Dept: AUDIOLOGY | Facility: CLINIC | Age: 82
End: 2017-12-11
Payer: MEDICARE

## 2017-12-11 VITALS — HEART RATE: 65 BPM | DIASTOLIC BLOOD PRESSURE: 57 MMHG | SYSTOLIC BLOOD PRESSURE: 126 MMHG | TEMPERATURE: 97 F

## 2017-12-11 DIAGNOSIS — Z86.69 HISTORY OF HEARING LOSS: ICD-10-CM

## 2017-12-11 DIAGNOSIS — H69.91 NEGATIVE MIDDLE EAR PRESSURE OF RIGHT EAR: ICD-10-CM

## 2017-12-11 DIAGNOSIS — Z86.69 HISTORY OF SEROUS OTITIS MEDIA: Primary | ICD-10-CM

## 2017-12-11 DIAGNOSIS — H90.3 SENSORINEURAL HEARING LOSS, BILATERAL: Primary | ICD-10-CM

## 2017-12-11 DIAGNOSIS — H90.A21 SENSORINEURAL HEARING LOSS (SNHL) OF RIGHT EAR WITH RESTRICTED HEARING OF LEFT EAR: ICD-10-CM

## 2017-12-11 PROCEDURE — 92567 TYMPANOMETRY: CPT | Mod: 52,S$GLB,, | Performed by: AUDIOLOGIST

## 2017-12-11 PROCEDURE — 99212 OFFICE O/P EST SF 10 MIN: CPT | Mod: S$GLB,,, | Performed by: OTOLARYNGOLOGY

## 2017-12-11 PROCEDURE — 99999 PR PBB SHADOW E&M-EST. PATIENT-LVL I: CPT | Mod: PBBFAC,,,

## 2017-12-11 PROCEDURE — 92557 COMPREHENSIVE HEARING TEST: CPT | Mod: 52,S$GLB,, | Performed by: AUDIOLOGIST

## 2017-12-11 PROCEDURE — 99999 PR PBB SHADOW E&M-EST. PATIENT-LVL III: CPT | Mod: PBBFAC,,, | Performed by: OTOLARYNGOLOGY

## 2017-12-11 NOTE — PROGRESS NOTES
Subjective:       Patient ID: Johanny Curtis is a 87 y.o. female.    Chief Complaint: No chief complaint on file.    HPI: Ms. Curtis s an 87-year-old Afro-American female who is accompanied by her daughter today.  She presents in a wheelchair.  She is finished a 10 day course of Amoxil prescribed for a right serous otitis media condition physically in evidence during her last visit with me 11/15/17.  Audiometry indicated a right ear mixed hearing loss at that time with a flat type B tympanogram.  SRT scores measured 70 dB for the right ear versus 50 for the left then.  She indicates better hearing in her right ear without pain or discomfort presently.    Past Medical History:   Diagnosis Date    Anemia in CKD (chronic kidney disease)     Arthritis     Cataract     Gout, chronic     High cholesterol     Hypertension     Hypothyroidism     Obesity     Plantar fasciitis     Thyroid trouble      Current Outpatient Prescriptions on File Prior to Visit   Medication Sig Dispense Refill    acetaminophen-codeine 300-30mg (TYLENOL #3) 300-30 mg Tab 1 po tid prn 30 tablet 1    amoxicillin (AMOXIL) 500 MG capsule Take 1 capsule (500 mg total) by mouth every 12 (twelve) hours. 20 capsule 0    CALCIUM CITRATE/VITAMIN D3 (CALCIUM CITRATE + D ORAL) Take by mouth. 1  By mouth Every day      colchicine (COLCRYS) 0.6 mg tablet Take 1 tablet (0.6 mg total) by mouth every other day. 45 tablet 1    doxazosin (CARDURA) 2 MG tablet Take 1 tablet (2 mg total) by mouth nightly. At bedtime 90 tablet 1    felodipine (PLENDIL) 10 MG 24 hr tablet 1 po Every day 90 tablet 1    levothyroxine (SYNTHROID) 88 MCG tablet Take 1 tablet (88 mcg total) by mouth once daily. 90 tablet 1    lovastatin (MEVACOR) 20 MG tablet 1 po At bedtime 90 tablet 1    MULTIVIT-IRON-MIN-FOLIC ACID 3,500-18-0.4 UNIT-MG-MG ORAL CHEW Take by mouth.      sodium bicarbonate 650 MG tablet Take 2 tablets (1,300 mg total) by mouth 3 (three) times daily. 120  tablet 11    urea 10 % Lotn Apply 1 application topically 2 (two) times daily. To dry skin on the feet. 177 mL 6     No current facility-administered medications on file prior to visit.          Review of Systems      The ppatient completed a right ear audiometric study performed by the [facility] audiology service.  The studies duplicated below compared to previous study.  Objective:       Blood pressure 126/57 pulse 65 temperature 96.7  Gen.: Alert and oriented lady in no acute distress  Both ears were examined under the microscope in the micro-procedure room.  Right eardrum is clear and the right middle ear space appears well aerated;   this is a distinct distinct change in appearance from the previous exam of 11/15/17 which indicated a yellowish serous fluid visible behind the right eardrum.  Physical Exam   HENT:   Ears:        Assessment:       1. History of serous otitis media    2. History of hearing loss    3. Negative middle ear pressure of right ear    4. Sensorineural hearing loss (SNHL) of right ear with restricted hearing of left ear        Plan:     AD audiometry reviewed  Gentle middle ear insuffaltion techniques encouraged  Pt. is a candidate for amplification for one or both ears at this time   copies of audiograms ( today's + previous)  provided  Monitor hearing yearly

## 2017-12-11 NOTE — PATIENT INSTRUCTIONS
AD audiometry reviewed  Gentle middle ear insuffaltion techniques encouraged  Pt. is a candidate for amplification for one or both ears at this time   copies of audiograms ( today's + previous)  provided  Monitor hearing yearly

## 2017-12-12 ENCOUNTER — OFFICE VISIT (OUTPATIENT)
Dept: INTERNAL MEDICINE | Facility: CLINIC | Age: 82
End: 2017-12-12
Payer: MEDICARE

## 2017-12-12 ENCOUNTER — LAB VISIT (OUTPATIENT)
Dept: LAB | Facility: HOSPITAL | Age: 82
End: 2017-12-12
Attending: INTERNAL MEDICINE
Payer: MEDICARE

## 2017-12-12 ENCOUNTER — IMMUNIZATION (OUTPATIENT)
Dept: INTERNAL MEDICINE | Facility: CLINIC | Age: 82
End: 2017-12-12
Payer: MEDICARE

## 2017-12-12 DIAGNOSIS — N18.9 CHRONIC RENAL IMPAIRMENT, UNSPECIFIED CKD STAGE: Primary | ICD-10-CM

## 2017-12-12 DIAGNOSIS — I10 HYPERTENSION, UNSPECIFIED TYPE: ICD-10-CM

## 2017-12-12 LAB
ALBUMIN SERPL BCP-MCNC: 3.6 G/DL
ALP SERPL-CCNC: 81 U/L
ALT SERPL W/O P-5'-P-CCNC: 7 U/L
ANION GAP SERPL CALC-SCNC: 8 MMOL/L
AST SERPL-CCNC: 19 U/L
BILIRUB SERPL-MCNC: 0.4 MG/DL
BUN SERPL-MCNC: 43 MG/DL
CALCIUM SERPL-MCNC: 9.3 MG/DL
CHLORIDE SERPL-SCNC: 107 MMOL/L
CO2 SERPL-SCNC: 27 MMOL/L
CREAT SERPL-MCNC: 1.9 MG/DL
EST. GFR  (AFRICAN AMERICAN): 27 ML/MIN/1.73 M^2
EST. GFR  (NON AFRICAN AMERICAN): 23 ML/MIN/1.73 M^2
GLUCOSE SERPL-MCNC: 77 MG/DL
POTASSIUM SERPL-SCNC: 4.6 MMOL/L
PROT SERPL-MCNC: 7.1 G/DL
SODIUM SERPL-SCNC: 142 MMOL/L

## 2017-12-12 PROCEDURE — 99214 OFFICE O/P EST MOD 30 MIN: CPT | Mod: 25,S$GLB,, | Performed by: INTERNAL MEDICINE

## 2017-12-12 PROCEDURE — 90662 IIV NO PRSV INCREASED AG IM: CPT | Mod: S$GLB,,, | Performed by: INTERNAL MEDICINE

## 2017-12-12 PROCEDURE — 99499 UNLISTED E&M SERVICE: CPT | Mod: S$GLB,,, | Performed by: INTERNAL MEDICINE

## 2017-12-12 PROCEDURE — 99999 PR PBB SHADOW E&M-EST. PATIENT-LVL IV: CPT | Mod: PBBFAC,,, | Performed by: INTERNAL MEDICINE

## 2017-12-12 PROCEDURE — 80053 COMPREHEN METABOLIC PANEL: CPT

## 2017-12-12 PROCEDURE — G0008 ADMIN INFLUENZA VIRUS VAC: HCPCS | Mod: S$GLB,,, | Performed by: INTERNAL MEDICINE

## 2017-12-12 PROCEDURE — 36415 COLL VENOUS BLD VENIPUNCTURE: CPT

## 2017-12-12 RX ORDER — FELODIPINE 10 MG/1
TABLET, EXTENDED RELEASE ORAL
Qty: 90 TABLET | Refills: 1 | Status: SHIPPED | OUTPATIENT
Start: 2017-12-12 | End: 2018-04-13 | Stop reason: SDUPTHER

## 2017-12-12 RX ORDER — LEVOTHYROXINE SODIUM 88 UG/1
88 TABLET ORAL DAILY
Qty: 90 TABLET | Refills: 1 | Status: SHIPPED | OUTPATIENT
Start: 2017-12-12 | End: 2018-04-13 | Stop reason: SDUPTHER

## 2017-12-12 RX ORDER — DOXAZOSIN 2 MG/1
2 TABLET ORAL NIGHTLY
Qty: 90 TABLET | Refills: 1 | Status: SHIPPED | OUTPATIENT
Start: 2017-12-12 | End: 2018-04-13 | Stop reason: SDUPTHER

## 2017-12-12 RX ORDER — LOVASTATIN 20 MG/1
TABLET ORAL
Qty: 90 TABLET | Refills: 1 | Status: SHIPPED | OUTPATIENT
Start: 2017-12-12 | End: 2018-04-13 | Stop reason: SDUPTHER

## 2017-12-16 VITALS
TEMPERATURE: 99 F | WEIGHT: 169 LBS | DIASTOLIC BLOOD PRESSURE: 70 MMHG | BODY MASS INDEX: 33.18 KG/M2 | OXYGEN SATURATION: 99 % | SYSTOLIC BLOOD PRESSURE: 128 MMHG | HEIGHT: 60 IN | HEART RATE: 64 BPM

## 2017-12-17 NOTE — PROGRESS NOTES
Subjective:       Patient ID: Johanny Curtis is a 87 y.o. female.    Chief Complaint: Hypertension    HPI: She returns for management of hypertension.  She has had hypertension for over a year.  Current treatment has included medications outlined in medication list.  She denies chest pain or shortness of breath.  No palpitations.  Denies left arm or neck pain.    Medications: See med card    Social history: Does not smoke, does not drink alcohol      Review of Systems   Constitutional: Negative for chills, fatigue, fever and unexpected weight change.   Respiratory: Negative for chest tightness and shortness of breath.    Cardiovascular: Negative for chest pain and palpitations.   Gastrointestinal: Negative for abdominal pain and blood in stool.   Neurological: Negative for dizziness, syncope, numbness and headaches.       Objective:      Physical Exam   HENT:   Right Ear: External ear normal.   Left Ear: External ear normal.   Nose: Nose normal.   Mouth/Throat: Oropharynx is clear and moist.   Eyes: Pupils are equal, round, and reactive to light.   Neck: Normal range of motion.   Cardiovascular: Normal rate and regular rhythm.    No murmur heard.  Pulmonary/Chest: Breath sounds normal.   Abdominal: She exhibits no distension. There is no hepatosplenomegaly. There is no tenderness.   Lymphadenopathy:     She has no cervical adenopathy.     She has no axillary adenopathy.   Neurological: She has normal strength and normal reflexes. No cranial nerve deficit or sensory deficit.       Assessment:         assessment and plan: Hypertension: Check CMP.  Discussed thyroid ultrasound.  She declined  Plan:       As above

## 2018-01-15 ENCOUNTER — OFFICE VISIT (OUTPATIENT)
Dept: NEPHROLOGY | Facility: CLINIC | Age: 83
End: 2018-01-15
Payer: MEDICARE

## 2018-01-15 VITALS
HEIGHT: 60 IN | WEIGHT: 175.94 LBS | OXYGEN SATURATION: 99 % | SYSTOLIC BLOOD PRESSURE: 132 MMHG | HEART RATE: 57 BPM | DIASTOLIC BLOOD PRESSURE: 54 MMHG | BODY MASS INDEX: 34.54 KG/M2

## 2018-01-15 DIAGNOSIS — E55.9 VITAMIN D DEFICIENCY: ICD-10-CM

## 2018-01-15 DIAGNOSIS — N25.81 SECONDARY HYPERPARATHYROIDISM: ICD-10-CM

## 2018-01-15 DIAGNOSIS — N18.4 ANEMIA IN STAGE 4 CHRONIC KIDNEY DISEASE: Primary | ICD-10-CM

## 2018-01-15 DIAGNOSIS — N18.4 CHRONIC KIDNEY DISEASE, STAGE IV (SEVERE): ICD-10-CM

## 2018-01-15 DIAGNOSIS — N25.0 RENAL OSTEODYSTROPHY: ICD-10-CM

## 2018-01-15 DIAGNOSIS — D63.1 ANEMIA IN STAGE 4 CHRONIC KIDNEY DISEASE: Primary | ICD-10-CM

## 2018-01-15 PROCEDURE — 99499 UNLISTED E&M SERVICE: CPT | Mod: S$GLB,,, | Performed by: INTERNAL MEDICINE

## 2018-01-15 PROCEDURE — 99213 OFFICE O/P EST LOW 20 MIN: CPT | Mod: S$GLB,,, | Performed by: INTERNAL MEDICINE

## 2018-01-15 PROCEDURE — 99999 PR PBB SHADOW E&M-EST. PATIENT-LVL III: CPT | Mod: PBBFAC,,, | Performed by: INTERNAL MEDICINE

## 2018-01-15 RX ORDER — FERROUS SULFATE 325(65) MG
325 TABLET ORAL 2 TIMES DAILY
Qty: 180 TABLET | Refills: 3 | COMMUNITY
Start: 2018-01-15 | End: 2020-07-30 | Stop reason: SDUPTHER

## 2018-01-15 RX ORDER — PSEUDOEPHEDRINE HCL 30 MG
1 TABLET ORAL 2 TIMES DAILY
Qty: 180 EACH | Refills: 3 | Status: SHIPPED | OUTPATIENT
Start: 2018-01-15 | End: 2019-04-01 | Stop reason: SDUPTHER

## 2018-01-15 RX ORDER — SODIUM BICARBONATE 650 MG/1
650 TABLET ORAL 2 TIMES DAILY
Qty: 180 TABLET | Refills: 4 | COMMUNITY
Start: 2018-01-15 | End: 2019-08-13 | Stop reason: SDUPTHER

## 2018-01-15 RX ORDER — ERGOCALCIFEROL 1.25 MG/1
50000 CAPSULE ORAL
Qty: 4 CAPSULE | Refills: 3 | Status: SHIPPED | OUTPATIENT
Start: 2018-01-15 | End: 2019-01-15

## 2018-01-15 NOTE — PROGRESS NOTES
Subjective:       Patient ID: Johanny Curtis is a 87 y.o. Black or  female who presents for follow-up evaluation of renal function.    Interval Hx:   off benzapril serum crt improved from 2.8--> 1.9  At baseline   unable to find sodium bicarb   not using cardura only felodipine and lovastatin  She is only using saji citrate w vit d no other vit d supplement      HPI This is an 85-year-old -American female with  longstanding history of hypertension, stable currently, hypothyroidism, gout,   arthritis, aortic aneurysm, is coming in for f/u of CKD.   No LUTS, dysuria, hematuria, SOB, Chest pain.  The patient states that she is feeling well in general and her blood pressures are stable. She denies any urinary complaints. Her creatinine is stable at 1.8.  No NSAID's.  She has no proteinuria. Her PTH is slightly elevated, but stable for her GFR.  Last labs 5/2107, serum crt 1.5 and K 5.3  She is cold today but in no acute distress indications reviewed and there have been no changes according to the med list she is still taking benazepril 40 mg her daughter said that she is felt lightheaded last week her blood pressure today is stable at the lowest in the last month has been 112/58 they arenot checking it at home when she is lightheaded    6/2017  bilateral cortical thinning and simple cysts.    PAST MEDICAL HISTORY: Significant for hypertension for over 30 years,   hypothyroidism, thyroid nodule, gout, arthritis, aortic aneurysm,   hyperlipidemia, osteopenia, peripheral neuropathy.    SOCIAL HISTORY: Does not smoke, does not drink. Currently not working.    FAMILY HISTORY: No family history of kidney problems.  Review of Systems   Constitutional: Positive for fatigue.   Eyes: Negative for discharge.   Respiratory: Positive for shortness of breath. Negative for cough and wheezing.         Sob after housework   Cardiovascular: Negative for chest pain and palpitations.   Gastrointestinal: Negative for  abdominal pain, diarrhea, nausea and vomiting.   Genitourinary: Negative for dysuria, frequency, hematuria and urgency.   Skin: Negative for color change and rash.   Psychiatric/Behavioral: Negative for confusion.       Objective:     Blood pressure (!) 132/54, pulse (!) 57, height 5' (1.524 m), weight 79.8 kg (175 lb 14.8 oz), SpO2 99 %.    Physical Exam   Constitutional: She is oriented to person, place, and time. She appears well-developed and well-nourished. No distress.   Elderly appearing stated age in a wheelchair which limits exam, no apparent distress   HENT:   Head: Normocephalic and atraumatic.   Mouth/Throat: No oropharyngeal exudate.   Eyes: Conjunctivae and EOM are normal. Pupils are equal, round, and reactive to light. Right eye exhibits no discharge. Left eye exhibits no discharge. No scleral icterus.   Neck: Normal range of motion. Neck supple. No JVD present. No tracheal deviation present. No thyromegaly present.   Cardiovascular: Normal rate, regular rhythm, normal heart sounds and intact distal pulses.  Exam reveals no gallop and no friction rub.    No murmur heard.  No peripheral edema   Pulmonary/Chest: Effort normal and breath sounds normal. No stridor. No respiratory distress. She has no wheezes. She has no rales. She exhibits no tenderness.   Abdominal: Soft. Bowel sounds are normal. She exhibits no distension and no mass. There is no tenderness. There is no rebound and no guarding. No hernia.   Musculoskeletal: She exhibits no edema or tenderness.   Lymphadenopathy:     She has no cervical adenopathy.   Neurological: She is alert and oriented to person, place, and time. She exhibits normal muscle tone.   Skin: Skin is warm and dry. No rash noted. She is not diaphoretic. No erythema.   Psychiatric: She has a normal mood and affect. Judgment and thought content normal.   Nursing note and vitals reviewed.      Assessment:       1. Anemia in stage 4 chronic kidney disease    2. Renal  osteodystrophy    3. Chronic kidney disease, stage IV (severe)    4. Vitamin D deficiency    5. Secondary hyperparathyroidism        Plan:         This is an 85-year-old -American female with   longstanding history of hypertension who is coming in for evaluation of CKD.  1. CKD, stage III-IV with an MDRD GFR of 35 mL per minute. Her baseline   creatinines are anywhere from 1.6-1.9 for the past several years with recent creatinine of 1.5 . Her GFR has been stable, but low. Her CKD is likely secondary to her advanced age along   with longstanding history of hypertension. The importance of blood pressure control to decrease progression of kidney disease was once again emphasized and asked her to check bp am/pm for one week prior to visits and bring results.    1. CKD: stable check labs today  Hyperkalemia patient is on a 2 g potassium diet restriction.  If potassium remains elevated we'll need to either cut the benazepril in half to 20 mg or stop completely.  Hypertension:Appears well controlled but with episodes of lightheadedness may need to reduce benazepril anyway await labs and then we'll discuss with her daughter on Monday.     Stable   Lab Results   Component Value Date    CREATININE 1.9 (H) 12/12/2017     Protein Creatinine Ratios: Unable to give urine today    Prot/Creat Ratio, Ur   Date Value Ref Range Status   08/14/2017 Unable to calculate 0.00 - 0.20 Final   01/23/2015 Unable to calculate 0.0 - 0.2 Final       2. Acid-Base: low K 2gm potassium diet   Off benazapril   would use sodium bicarb 650 bid    Lab Results   Component Value Date     12/12/2017    K 4.6 12/12/2017    CO2 27 12/12/2017         3. Renal osteodystrophy: last PTH elevated vit D level  25 , will begin vit d 50k monthly and repeat pth and vit d  And consider 1,25 vit d  Sorensen e saji citr w vit d to saji citrate 25 bid   Lab Results   Component Value Date    .0 (H) 08/04/2017    CALCIUM 9.3 12/12/2017    PHOS 2.4 (L)  08/14/2017    PHOS 2.4 (L) 08/14/2017       4. Anemia: will  Begin po iron feso4 325 bid  Lab Results   Component Value Date    HGB 10.5 (L) 08/14/2017        5. HTN: stable       Medication: no change for now but have asked the daughter to follow her blood pressure at home especially  to check it  if she is lightheaded.  Blood pressure should not be less than 110/60      Monitor BP as directed in instructions      7. Weight: Weight: 79.8 kg (175 lb 14.8 oz). she states that her daily weights are stable.    Wt Readings from Last 3 Encounters:   01/15/18 79.8 kg (175 lb 14.8 oz)   12/12/17 76.7 kg (169 lb)   11/06/17 78 kg (171 lb 15.3 oz)       8. Lipid management:   Lab Results   Component Value Date    LDLCALC 93.4 01/23/2017       9. Diet:  Education/referral:       Sodium: 2gm       Potassium: 2gm            11. Please avoid or minimize all NSAIDS (ibuprofen, motrin, aleve, indocin, naprosyn) to minimize the risk to your kidneys.      12. Follow up in  4 months with labs prior

## 2018-01-15 NOTE — PATIENT INSTRUCTIONS
Rfp, cbc, iron studies, ferritin, pth  Vit d ua uprct in 3 mo    rtc 4 mo    feso4 325 mg twice daily    Stop calcium citrate with vit d   use calcium citrate 250 mg twice daily without vit d     begin vit d 50,0000u once  A month     sodium bicarb 650 mg twice a day with food

## 2018-04-03 ENCOUNTER — TELEPHONE (OUTPATIENT)
Dept: NEPHROLOGY | Facility: CLINIC | Age: 83
End: 2018-04-03

## 2018-04-03 ENCOUNTER — HOSPITAL ENCOUNTER (EMERGENCY)
Facility: HOSPITAL | Age: 83
Discharge: HOME | End: 2018-04-03
Attending: EMERGENCY MEDICINE
Payer: MEDICARE

## 2018-04-03 ENCOUNTER — TELEPHONE (OUTPATIENT)
Dept: INTERNAL MEDICINE | Facility: CLINIC | Age: 83
End: 2018-04-03

## 2018-04-03 VITALS
SYSTOLIC BLOOD PRESSURE: 158 MMHG | WEIGHT: 175 LBS | TEMPERATURE: 98 F | HEART RATE: 83 BPM | RESPIRATION RATE: 18 BRPM | OXYGEN SATURATION: 97 % | BODY MASS INDEX: 35.28 KG/M2 | DIASTOLIC BLOOD PRESSURE: 72 MMHG | HEIGHT: 59 IN

## 2018-04-03 DIAGNOSIS — M25.561 ACUTE PAIN OF RIGHT KNEE: ICD-10-CM

## 2018-04-03 DIAGNOSIS — M17.11 PRIMARY OSTEOARTHRITIS OF RIGHT KNEE: ICD-10-CM

## 2018-04-03 DIAGNOSIS — R30.0 DYSURIA: Primary | ICD-10-CM

## 2018-04-03 LAB
BACTERIA #/AREA URNS AUTO: ABNORMAL /HPF
BILIRUB UR QL STRIP: NEGATIVE
CLARITY UR REFRACT.AUTO: ABNORMAL
COLOR UR AUTO: YELLOW
GLUCOSE UR QL STRIP: NEGATIVE
HGB UR QL STRIP: NEGATIVE
HYALINE CASTS UR QL AUTO: 5 /LPF
KETONES UR QL STRIP: NEGATIVE
LEUKOCYTE ESTERASE UR QL STRIP: ABNORMAL
MICROSCOPIC COMMENT: ABNORMAL
NITRITE UR QL STRIP: NEGATIVE
PH UR STRIP: 5 [PH] (ref 5–8)
PROT UR QL STRIP: ABNORMAL
RBC #/AREA URNS AUTO: 2 /HPF (ref 0–4)
SP GR UR STRIP: 1.02 (ref 1–1.03)
SQUAMOUS #/AREA URNS AUTO: 12 /HPF
URN SPEC COLLECT METH UR: ABNORMAL
UROBILINOGEN UR STRIP-ACNC: NEGATIVE EU/DL
WBC #/AREA URNS AUTO: 15 /HPF (ref 0–5)
YEAST UR QL AUTO: ABNORMAL

## 2018-04-03 PROCEDURE — 87186 SC STD MICRODIL/AGAR DIL: CPT

## 2018-04-03 PROCEDURE — 81001 URINALYSIS AUTO W/SCOPE: CPT

## 2018-04-03 PROCEDURE — 99284 EMERGENCY DEPT VISIT MOD MDM: CPT | Mod: 25

## 2018-04-03 PROCEDURE — 99285 EMERGENCY DEPT VISIT HI MDM: CPT | Mod: ,,, | Performed by: EMERGENCY MEDICINE

## 2018-04-03 PROCEDURE — 87088 URINE BACTERIA CULTURE: CPT

## 2018-04-03 PROCEDURE — 87077 CULTURE AEROBIC IDENTIFY: CPT

## 2018-04-03 PROCEDURE — 25000003 PHARM REV CODE 250: Performed by: EMERGENCY MEDICINE

## 2018-04-03 PROCEDURE — 87086 URINE CULTURE/COLONY COUNT: CPT

## 2018-04-03 RX ORDER — METHYLPREDNISOLONE 4 MG/1
TABLET ORAL
Qty: 1 PACKAGE | Refills: 0 | Status: SHIPPED | OUTPATIENT
Start: 2018-04-03 | End: 2018-04-13

## 2018-04-03 RX ORDER — AMOXICILLIN AND CLAVULANATE POTASSIUM 875; 125 MG/1; MG/1
1 TABLET, FILM COATED ORAL
Status: COMPLETED | OUTPATIENT
Start: 2018-04-03 | End: 2018-04-03

## 2018-04-03 RX ORDER — AMOXICILLIN AND CLAVULANATE POTASSIUM 875; 125 MG/1; MG/1
1 TABLET, FILM COATED ORAL 2 TIMES DAILY
Qty: 10 TABLET | Refills: 0 | Status: SHIPPED | OUTPATIENT
Start: 2018-04-03 | End: 2018-04-08

## 2018-04-03 RX ORDER — ACETAMINOPHEN 325 MG/1
650 TABLET ORAL
Status: COMPLETED | OUTPATIENT
Start: 2018-04-03 | End: 2018-04-03

## 2018-04-03 RX ORDER — PHENAZOPYRIDINE HYDROCHLORIDE 100 MG/1
100 TABLET, FILM COATED ORAL
Status: COMPLETED | OUTPATIENT
Start: 2018-04-03 | End: 2018-04-03

## 2018-04-03 RX ADMIN — PHENAZOPYRIDINE HYDROCHLORIDE 100 MG: 100 TABLET ORAL at 10:04

## 2018-04-03 RX ADMIN — AMOXICILLIN AND CLAVULANATE POTASSIUM 1 TABLET: 875; 125 TABLET, FILM COATED ORAL at 10:04

## 2018-04-03 RX ADMIN — ACETAMINOPHEN 650 MG: 325 TABLET, FILM COATED ORAL at 10:04

## 2018-04-03 NOTE — TELEPHONE ENCOUNTER
----- Message from Jennifer Pina sent at 4/3/2018 12:30 PM CDT -----  Contact: pt's sister  Annette Lombard    Daughter   Called for urgent  appt       Pt right leg very swollen from just under knee going UP  Including thigh   Area is very hard      Please call [phone]     Thanks

## 2018-04-03 NOTE — TELEPHONE ENCOUNTER
----- Message from Carloz Couch sent at 4/3/2018 12:49 PM CDT -----  Contact: Pt's Daughter Ms. Razia gillette  Pt is requesting an earlier appt due to scheduling issues with transportation and would lynnette to have appt time after tomorrow.     Pt can be reached at [phone] Home                                     [phone] Cell    Thank You.

## 2018-04-03 NOTE — TELEPHONE ENCOUNTER
----- Message from Jennifer Pina sent at 4/3/2018 12:30 PM CDT -----  Contact: pt's sister  Annette Lombard    Daughter   Called for urgent  appt       Pt right leg very swollen from just under knee going UP  Including thigh   Area is very hard      Please call [phone]     Thanks

## 2018-04-04 NOTE — ED TRIAGE NOTES
Patient states pain to left knee x 2 days, no injury. No prior pain, usually has gout and arthritis in toes, states swelling on right side and on mid thigh. Pain worse at night. No OTC meds

## 2018-04-04 NOTE — ED NOTES
Patient identifiers verified and correct for Ms Curtis  C/C: Left knee pain   APPEARANCE: awake and alert in NAD.  SKIN: warm, dry and intact. No breakdown or bruising.  MUSCULOSKELETAL: Patient moving all extremities spontaneously, swelling to inside of right knee to mid thigh, Ambulates independently.  RESPIRATORY: Denies shortness of breath.Respirations unlabored.   CARDIAC: Denies CP, 2+ distal pulses; no peripheral edema  ABDOMEN: S/ND/NT, Denies nausea  : voids spontaneously, denies difficulty  Neurologic: AAO x 4; follows commands equal strength in all extremities; denies numbness/tingling. Denies dizziness Denies weakness

## 2018-04-04 NOTE — DISCHARGE INSTRUCTIONS
Take tylenol 650 mg every 6 hours for pain. Use walker provided at all times for next 5 days, then as needed for stability ambulating.

## 2018-04-04 NOTE — ED PROVIDER NOTES
Encounter Date: 4/3/2018    SCRIBE #1 NOTE: I, Robbie Vasquez, am scribing for, and in the presence of,  Dr. Miles. I have scribed the entire note.       History     Chief Complaint   Patient presents with    Knee Pain     r pain and swelling started 2-3 days ago     86 y/o male with co-morbidities of HTN and arthritis and hx of obesity presents to the ED with rt knee pain and associated swelling for 3-4 days. Pt states sudden onset of the symptoms that is new. Pt denies any fall or physical trauma to the leg. Pt also notes of increased frequency of urination, every five minutes. No numbness. No other complaints.       The history is provided by the patient.     Review of patient's allergies indicates:  No Known Allergies  Past Medical History:   Diagnosis Date    AAA (abdominal aortic aneurysm)     Anemia in CKD (chronic kidney disease)     Arthritis     Cataract     Gout, chronic     High cholesterol     Hypertension     Hypothyroidism     Obesity     Plantar fasciitis     Thyroid trouble      Past Surgical History:   Procedure Laterality Date    BREAST BIOPSY Left     BREAST SURGERY Left 1972    benign breast lump    CATARACT EXTRACTION  3/18/13    both eyes -     CHOLECYSTECTOMY      EYE SURGERY      HYSTERECTOMY      SKIN BIOPSY      Left breast     Family History   Problem Relation Age of Onset    Breast cancer Sister     Cataracts Son     Glaucoma Son     Mental illness Son     Diabetes Son     Glaucoma Daughter     Lupus Daughter     Meningitis Son     Heart disease Brother     Cancer Sister      leukemia    Cancer Sister      pancreatic    No Known Problems Brother     Cancer Brother      unknown    Diabetes Son     Blindness Neg Hx     Amblyopia Neg Hx     Hypertension Neg Hx     Macular degeneration Neg Hx     Retinal detachment Neg Hx     Strabismus Neg Hx     Stroke Neg Hx     Thyroid disease Neg Hx      Social History   Substance Use Topics    Smoking  status: Former Smoker     Packs/day: 0.50     Years: 60.00     Quit date: 6/29/2001    Smokeless tobacco: Never Used      Comment: quit in the 90's    Alcohol use No     Review of Systems   Musculoskeletal: Positive for arthralgias and joint swelling.   Neurological: Negative for numbness.       Physical Exam     Initial Vitals [04/03/18 1844]   BP Pulse Resp Temp SpO2   (!) 141/64 87 18 98.1 °F (36.7 °C) 98 %      MAP       89.67         Physical Exam    Constitutional:   88 y/o  female in no acute distress.    HENT:   Head: Normocephalic and atraumatic.   No intraoral lesions noted.   Eyes: EOM are normal. Pupils are equal, round, and reactive to light.   Neck: No tracheal deviation present. No JVD present.   Cardiovascular: Normal rate, regular rhythm, normal heart sounds and intact distal pulses.   Pulmonary/Chest: Breath sounds normal. No stridor. No respiratory distress.   Abdominal: Soft. She exhibits no distension.   Obese abdomen.    Musculoskeletal:   No peripheral edema.   Rt knee: mild global swelling to the rt knee with no associated erythema or deformity. Mild patellar tenderness and pain noted with passive ROM.    Neurological: She is alert and oriented to person, place, and time.   Psychiatric: She has a normal mood and affect. Thought content normal.         ED Course   Procedures  Labs Reviewed   URINALYSIS, REFLEX TO URINE CULTURE - Abnormal; Notable for the following:        Result Value    Appearance, UA Cloudy (*)     Protein, UA 1+ (*)     Leukocytes, UA 2+ (*)     All other components within normal limits    Narrative:     Preferred Collection Type->Urine, Clean Catch   URINALYSIS MICROSCOPIC - Abnormal; Notable for the following:     WBC, UA 15 (*)     Bacteria, UA Few (*)     Yeast, UA Rare (*)     Hyaline Casts, UA 5 (*)     All other components within normal limits    Narrative:     Preferred Collection Type->Urine, Clean Catch   CULTURE, URINE          X-Rays:    Independently Interpreted Readings:   Other Readings:  Knee xray: significant degenerative changes noted to the lateral articular cartiladge, w/o evidence of acute frx or dislocation.     Imaging Results          X-Ray Knee 1 or 2 View Right (Final result)  Result time 04/03/18 21:31:13    Final result by Brandon Yap MD (04/03/18 21:31:13)                 Impression:      1. No acute displaced fracture or dislocation of the knee noting progressive degenerative changes.      Electronically signed by: Brandon Yap MD  Date:    04/03/2018  Time:    21:31             Narrative:    EXAMINATION:  XR KNEE 1 OR 2 VIEW RIGHT    CLINICAL HISTORY:  right knee pain;    TECHNIQUE:  AP and lateral views of the right knee were performed.    COMPARISON:  12/20/2013    FINDINGS:  Three views.    There is marked degenerative change of the knee, particularly involving the lateral compartment, progressed since the previous examination.  No acute displaced fracture or dislocation of the knee.  There is vascular calcification.                                Medical Decision Making:   History:   Old Medical Records: I decided to obtain old medical records.  Differential Diagnosis:   Dysuria   UTI  Knee sprain  Knee dislocation  Independently Interpreted Test(s):   I have ordered and independently interpreted X-rays - see prior notes.  Clinical Tests:   Lab Tests: Ordered and Reviewed  Radiological Study: Ordered and Reviewed            Scribe Attestation:   Scribe #1: I performed the above scribed service and the documentation accurately describes the services I performed. I attest to the accuracy of the note.    Attending Attestation:             Attending ED Notes:   Emergency department evaluation today reveals evidence of pyuria and mild bacteriuria in this patient presenting with urinary frequency without associated fever, nausea, or flank pain.  Also, plain films of the right knee reveal evidence of significant  degenerative change without evidence of dislocation or fracture.  A urine culture has been sent for further clarification of underlying infection 4/inflammation.  An Ace bandage has been applied to the right knee and a walker has been provided for diminished weight-bearing.  Also, a 5 day course of Augmentin 875 has been initiated in the ED.  I have also prescribed a Medrol Dosepak to be taken for right knee acute on chronic pain with underlying degenerative joint disease.  I have recommended the patient maintain her follow-up appointment with her primary care physician in 10 days and return to the emergency department as needed for urgent concerns.             Clinical Impression:   The primary encounter diagnosis was Dysuria. Diagnoses of Acute pain of right knee and Primary osteoarthritis of right knee were also pertinent to this visit.    Disposition:   Disposition: Discharged  Condition: Stable                        Reji Miles MD  04/03/18 6122

## 2018-04-05 LAB — BACTERIA UR CULT: NORMAL

## 2018-04-09 ENCOUNTER — PES CALL (OUTPATIENT)
Dept: ADMINISTRATIVE | Facility: CLINIC | Age: 83
End: 2018-04-09

## 2018-04-13 ENCOUNTER — OFFICE VISIT (OUTPATIENT)
Dept: ORTHOPEDICS | Facility: CLINIC | Age: 83
End: 2018-04-13
Payer: MEDICARE

## 2018-04-13 ENCOUNTER — OFFICE VISIT (OUTPATIENT)
Dept: INTERNAL MEDICINE | Facility: CLINIC | Age: 83
End: 2018-04-13
Payer: MEDICARE

## 2018-04-13 ENCOUNTER — LAB VISIT (OUTPATIENT)
Dept: LAB | Facility: HOSPITAL | Age: 83
End: 2018-04-13
Attending: INTERNAL MEDICINE
Payer: MEDICARE

## 2018-04-13 VITALS — WEIGHT: 180.56 LBS | HEIGHT: 59 IN | BODY MASS INDEX: 36.4 KG/M2

## 2018-04-13 DIAGNOSIS — Z78.0 ASYMPTOMATIC MENOPAUSAL STATE: ICD-10-CM

## 2018-04-13 DIAGNOSIS — M19.90 ARTHRITIS: Primary | ICD-10-CM

## 2018-04-13 DIAGNOSIS — E03.9 HYPOTHYROIDISM, UNSPECIFIED TYPE: ICD-10-CM

## 2018-04-13 DIAGNOSIS — M17.11 PRIMARY OSTEOARTHRITIS OF RIGHT KNEE: Primary | ICD-10-CM

## 2018-04-13 DIAGNOSIS — I71.40 ABDOMINAL AORTIC ANEURYSM WITHOUT RUPTURE: ICD-10-CM

## 2018-04-13 DIAGNOSIS — I10 HYPERTENSION, UNSPECIFIED TYPE: ICD-10-CM

## 2018-04-13 DIAGNOSIS — E66.01 MORBID OBESITY: ICD-10-CM

## 2018-04-13 DIAGNOSIS — M19.90 ARTHRITIS: ICD-10-CM

## 2018-04-13 DIAGNOSIS — N18.5 CHRONIC KIDNEY DISEASE, STAGE V: ICD-10-CM

## 2018-04-13 LAB
ALBUMIN SERPL BCP-MCNC: 3.3 G/DL
ALP SERPL-CCNC: 75 U/L
ALT SERPL W/O P-5'-P-CCNC: 13 U/L
ANION GAP SERPL CALC-SCNC: 12 MMOL/L
AST SERPL-CCNC: 19 U/L
BILIRUB SERPL-MCNC: 0.6 MG/DL
BUN SERPL-MCNC: 36 MG/DL
CALCIUM SERPL-MCNC: 9.8 MG/DL
CHLORIDE SERPL-SCNC: 105 MMOL/L
CHOLEST SERPL-MCNC: 165 MG/DL
CHOLEST/HDLC SERPL: 3.1 {RATIO}
CO2 SERPL-SCNC: 26 MMOL/L
CREAT SERPL-MCNC: 1.9 MG/DL
EST. GFR  (AFRICAN AMERICAN): 27 ML/MIN/1.73 M^2
EST. GFR  (NON AFRICAN AMERICAN): 23 ML/MIN/1.73 M^2
GLUCOSE SERPL-MCNC: 100 MG/DL
HDLC SERPL-MCNC: 53 MG/DL
HDLC SERPL: 32.1 %
LDLC SERPL CALC-MCNC: 89 MG/DL
NONHDLC SERPL-MCNC: 112 MG/DL
POTASSIUM SERPL-SCNC: 4.3 MMOL/L
PROT SERPL-MCNC: 7 G/DL
RHEUMATOID FACT SERPL-ACNC: <10 IU/ML
SODIUM SERPL-SCNC: 143 MMOL/L
TRIGL SERPL-MCNC: 115 MG/DL
TSH SERPL DL<=0.005 MIU/L-ACNC: 2.99 UIU/ML

## 2018-04-13 PROCEDURE — 80053 COMPREHEN METABOLIC PANEL: CPT

## 2018-04-13 PROCEDURE — 36415 COLL VENOUS BLD VENIPUNCTURE: CPT

## 2018-04-13 PROCEDURE — 80061 LIPID PANEL: CPT

## 2018-04-13 PROCEDURE — 99214 OFFICE O/P EST MOD 30 MIN: CPT | Mod: S$GLB,,, | Performed by: INTERNAL MEDICINE

## 2018-04-13 PROCEDURE — 99999 PR PBB SHADOW E&M-EST. PATIENT-LVL III: CPT | Mod: PBBFAC,,, | Performed by: PHYSICIAN ASSISTANT

## 2018-04-13 PROCEDURE — 99499 UNLISTED E&M SERVICE: CPT | Mod: S$PBB,,, | Performed by: INTERNAL MEDICINE

## 2018-04-13 PROCEDURE — 99999 PR PBB SHADOW E&M-EST. PATIENT-LVL IV: CPT | Mod: PBBFAC,,, | Performed by: INTERNAL MEDICINE

## 2018-04-13 PROCEDURE — 84443 ASSAY THYROID STIM HORMONE: CPT

## 2018-04-13 PROCEDURE — 99203 OFFICE O/P NEW LOW 30 MIN: CPT | Mod: S$GLB,,, | Performed by: PHYSICIAN ASSISTANT

## 2018-04-13 PROCEDURE — 86431 RHEUMATOID FACTOR QUANT: CPT

## 2018-04-13 RX ORDER — LOVASTATIN 20 MG/1
TABLET ORAL
Qty: 90 TABLET | Refills: 1 | Status: SHIPPED | OUTPATIENT
Start: 2018-04-13 | End: 2018-09-28 | Stop reason: SDUPTHER

## 2018-04-13 RX ORDER — FELODIPINE 10 MG/1
TABLET, EXTENDED RELEASE ORAL
Qty: 90 TABLET | Refills: 1 | Status: SHIPPED | OUTPATIENT
Start: 2018-04-13 | End: 2018-12-12 | Stop reason: SDUPTHER

## 2018-04-13 RX ORDER — DOXAZOSIN 2 MG/1
2 TABLET ORAL NIGHTLY
Qty: 90 TABLET | Refills: 1 | Status: SHIPPED | OUTPATIENT
Start: 2018-04-13 | End: 2018-09-28 | Stop reason: SDUPTHER

## 2018-04-13 RX ORDER — ACETAMINOPHEN AND CODEINE PHOSPHATE 300; 30 MG/1; MG/1
TABLET ORAL
Qty: 30 TABLET | Refills: 1 | Status: SHIPPED | OUTPATIENT
Start: 2018-04-13 | End: 2018-09-28 | Stop reason: SDUPTHER

## 2018-04-13 RX ORDER — LEVOTHYROXINE SODIUM 88 UG/1
88 TABLET ORAL DAILY
Qty: 90 TABLET | Refills: 1 | Status: SHIPPED | OUTPATIENT
Start: 2018-04-13 | End: 2018-09-28 | Stop reason: SDUPTHER

## 2018-04-13 NOTE — PROGRESS NOTES
Subjective:      Patient ID: Johanny Curtis is a 87 y.o. female.    Chief Complaint: Pain of the Right Knee    HPI  87 year old female presents with chief complaint of right knee pain x 2 weeks. She denies trauma. She has some pain with walking. She took medrol dose pack and says it helped. She is now taking tylenol with good relief. She has h/o CKD. She had some swelling but it has gotten better. She ambulates with a walker. She doesn't have much pain at this time.   Review of Systems   Constitution: Negative for chills, fever and night sweats.   Cardiovascular: Negative for chest pain.   Respiratory: Negative for cough and shortness of breath.    Hematologic/Lymphatic: Does not bruise/bleed easily.   Skin: Negative for color change.   Gastrointestinal: Negative for heartburn.   Genitourinary: Negative for dysuria.   Neurological: Negative for numbness and paresthesias.   Psychiatric/Behavioral: Negative for altered mental status.   Allergic/Immunologic: Negative for persistent infections.         Objective:            General    Vitals reviewed.  Constitutional: She is oriented to person, place, and time. She appears well-developed and well-nourished.   Cardiovascular: Normal rate.    Neurological: She is alert and oriented to person, place, and time.             Right Knee Exam:  ROM 0-110 degrees  No effusion  No warmth or erythema  TTP medial and lateral joint line      X-ray: reviewed by myself. There is marked degenerative change of the knee, particularly involving the lateral compartment, progressed since the previous examination.  No acute displaced fracture or dislocation of the knee.      Assessment:       Encounter Diagnosis   Name Primary?    Primary osteoarthritis of right knee Yes          Plan:       Discussed treatment options with patient. Offered her a cortisone injection but she is not interested at this time. She will continue tylenol as needed. RTC prn.

## 2018-04-17 VITALS
DIASTOLIC BLOOD PRESSURE: 60 MMHG | HEIGHT: 59 IN | WEIGHT: 180.63 LBS | BODY MASS INDEX: 36.41 KG/M2 | SYSTOLIC BLOOD PRESSURE: 116 MMHG | HEART RATE: 73 BPM

## 2018-04-17 PROBLEM — R63.4 WEIGHT LOSS: Status: RESOLVED | Noted: 2017-08-02 | Resolved: 2018-04-17

## 2018-04-17 PROBLEM — R68.81 EARLY SATIETY: Status: RESOLVED | Noted: 2017-08-02 | Resolved: 2018-04-17

## 2018-04-18 NOTE — PROGRESS NOTES
Subjective:       Patient ID: Johanny Curtis is a 87 y.o. female.    Chief Complaint: Hypertension    HPI  She returns for management of hypertension.  She has had hypertension for over a year.  Current treatment has included medications outlined in medication list.  She denies chest pain or shortness of breath.  No palpitations.  Denies left arm or neck pain.    PAST MEDICAL HISTORY: Hypertension, aortic aneurysm, hypothyroidism/thyroid nodule,   hyperlipidemia, osteopenia, ankle fracture,   osteoarthritis, gout, peripheral neuropathy. She had a  colonoscopy July 2010     PAST SURGICAL HISTORY: Hysterectomy.     MEDICATIONS: Cardura 2 mg at bedtime,  Plendil 10 mg daily, Synthroid 0.088 mg daily, lovastatin 20 mg nightly, colchicine .6mg q OD    ALLERGIES: No known drug allergies.        Review of Systems   Constitutional: Negative for chills, fatigue, fever and unexpected weight change.   Respiratory: Negative for chest tightness and shortness of breath.    Cardiovascular: Negative for chest pain and palpitations.   Gastrointestinal: Negative for abdominal pain and blood in stool.   Neurological: Negative for dizziness, syncope, numbness and headaches.       Objective:      Physical Exam   HENT:   Right Ear: External ear normal.   Left Ear: External ear normal.   Nose: Nose normal.   Mouth/Throat: Oropharynx is clear and moist.   Eyes: Pupils are equal, round, and reactive to light.   Neck: Normal range of motion.   Cardiovascular: Normal rate and regular rhythm.    No murmur heard.  Pulmonary/Chest: Breath sounds normal.   Abdominal: She exhibits no distension. There is no hepatosplenomegaly. There is no tenderness.   Lymphadenopathy:     She has no cervical adenopathy.     She has no axillary adenopathy.   Neurological: She has normal strength and normal reflexes. No cranial nerve deficit or sensory deficit.       Assessment/Plan       Assessment and plan: Hypertension: Check CMP and lipid panel  2.  Aortic  aneurysm: No treatment indicated  3.  Check rheumatoid factor and TSH.  Schedule bone density

## 2018-04-30 ENCOUNTER — INITIAL CONSULT (OUTPATIENT)
Dept: RHEUMATOLOGY | Facility: CLINIC | Age: 83
End: 2018-04-30
Payer: MEDICARE

## 2018-04-30 ENCOUNTER — HOSPITAL ENCOUNTER (OUTPATIENT)
Dept: RADIOLOGY | Facility: CLINIC | Age: 83
Discharge: HOME | End: 2018-04-30
Attending: INTERNAL MEDICINE
Payer: MEDICARE

## 2018-04-30 VITALS
DIASTOLIC BLOOD PRESSURE: 63 MMHG | HEIGHT: 59 IN | RESPIRATION RATE: 18 BRPM | BODY MASS INDEX: 35.08 KG/M2 | SYSTOLIC BLOOD PRESSURE: 115 MMHG | HEART RATE: 68 BPM | WEIGHT: 174 LBS

## 2018-04-30 DIAGNOSIS — M15.9 PRIMARY OSTEOARTHRITIS INVOLVING MULTIPLE JOINTS: Primary | ICD-10-CM

## 2018-04-30 DIAGNOSIS — Z78.0 ASYMPTOMATIC MENOPAUSAL STATE: ICD-10-CM

## 2018-04-30 PROCEDURE — 77080 DXA BONE DENSITY AXIAL: CPT | Mod: TC

## 2018-04-30 PROCEDURE — 99203 OFFICE O/P NEW LOW 30 MIN: CPT | Mod: S$GLB,,, | Performed by: INTERNAL MEDICINE

## 2018-04-30 PROCEDURE — 77080 DXA BONE DENSITY AXIAL: CPT | Mod: 26,,, | Performed by: INTERNAL MEDICINE

## 2018-04-30 PROCEDURE — 99999 PR PBB SHADOW E&M-EST. PATIENT-LVL III: CPT | Mod: PBBFAC,,, | Performed by: INTERNAL MEDICINE

## 2018-04-30 NOTE — PROGRESS NOTES
Subjective:       Patient ID: Johanny Curtis is a 87 y.o. female.    Chief Complaint: Disease Management    HPI  86 yo F  with PMH of CKD, peripheral neuropathy, hypothyroidism, gout here for evaluation.  Reports she has gout for many years(does not remember for how long).  She reports history of podagra in the past. Has not had podagra since last year.   She takes colchicine every other day. She has not taken allopurinol. She hs pain in right knee and left shoulder.   Reports she has had the pain in left knee for 4 weeks.  Reports that she has been having left shoulder pain for several months.  Reports swelling in right knee for a bout 4 weeks.  Denies stiffness. Denies any rashes, oral ulcers,fevers, raynauds, photosensitivity, or pleurisy. Pain level is 8/10, aching and non-radiating.  She takes tylenol-3 about twice a week.  Reports that the tylenol- 3 helps her pain. Walking makes her pain worse.  Raising her left arm makes her pain worse.  Denies headaches or jaw pain.      Past Medical History:   Diagnosis Date    AAA (abdominal aortic aneurysm)     Anemia in CKD (chronic kidney disease)     Arthritis     Cataract     Gout, chronic     High cholesterol     Hypertension     Hypothyroidism     Obesity     Plantar fasciitis     Thyroid trouble        Review of Systems   Constitutional: Positive for fatigue. Negative for activity change, appetite change, chills, diaphoresis, fever and unexpected weight change.   HENT: Negative for congestion, ear discharge, ear pain, facial swelling, mouth sores, sinus pressure, sneezing, sore throat, tinnitus and trouble swallowing.    Eyes: Negative for photophobia, pain, discharge, redness, itching and visual disturbance.   Respiratory: Negative for apnea, chest tightness, shortness of breath, wheezing and stridor.    Cardiovascular: Negative for leg swelling.   Gastrointestinal: Negative for abdominal distention, abdominal pain, anal bleeding, blood in stool,  "constipation, diarrhea and nausea.   Endocrine: Negative for cold intolerance and heat intolerance.   Genitourinary: Negative for difficulty urinating and dysuria.   Musculoskeletal: Positive for arthralgias, gait problem and joint swelling. Negative for back pain, myalgias, neck pain and neck stiffness.   Skin: Negative for color change, pallor, rash and wound.   Neurological: Negative for dizziness, seizures, light-headedness and numbness.   Hematological: Negative for adenopathy. Does not bruise/bleed easily.   Psychiatric/Behavioral: Negative for sleep disturbance. The patient is not nervous/anxious.              Objective:   /63   Pulse 68   Resp 18   Ht 4' 11" (1.499 m)   Wt 78.9 kg (174 lb)   BMI 35.14 kg/m²      Physical Exam   Constitutional: She is oriented to person, place, and time.   HENT:   Head: Normocephalic and atraumatic.   Right Ear: External ear normal.   Left Ear: External ear normal.   Nose: Nose normal.   Mouth/Throat: Oropharynx is clear and moist. No oropharyngeal exudate.   Eyes: Conjunctivae and EOM are normal. Pupils are equal, round, and reactive to light. Right eye exhibits no discharge. Left eye exhibits no discharge. No scleral icterus.   Neck: Neck supple. No JVD present. No thyromegaly present.   Cardiovascular: Normal rate, regular rhythm, normal heart sounds and intact distal pulses.  Exam reveals no gallop and no friction rub.    No murmur heard.  Pulmonary/Chest: Effort normal and breath sounds normal. No respiratory distress. She has no wheezes. She has no rales. She exhibits no tenderness.   Abdominal: Soft. Bowel sounds are normal. She exhibits no distension and no mass. There is no tenderness. There is no rebound and no guarding.   Lymphadenopathy:     She has no cervical adenopathy.   Neurological: She is alert and oriented to person, place, and time. No cranial nerve deficit. Gait normal. Coordination normal.   Skin: Skin is dry. No rash noted. No erythema. No " pallor.     Psychiatric: Affect and judgment normal.   Musculoskeletal: She exhibits tenderness and deformity. She exhibits no edema.   RIGHT KNEE WITH CREPITUS AND MILD PAIN WITH EXTENSION           no temporal artery tenderness  Right knee xray (2018): I personally reviewed; 1. No acute displaced fracture or dislocation of the knee noting progressive degenerative changes      Assessment:      88 yo F  with PMH of CKD, peripheral neuropathy, hypothyroidism, gout here for evaluation  On exam, she has crepitus of right knee and xrays are consistent with osteoarthritis.  I dicussed treatment options with patient and daughter and I told them she should go back to ortho. Her knee is enlarged so I will let ortho do steroid injection.  Given her age, would avoid narcotics. There is not enough fluid on exam for me to aspirate but I have low suspicion for her knee pain being from gout given her advanced DJD.    No diagnosis found.        Plan:    follow up ortho  Encouraged weight loss  *

## 2018-04-30 NOTE — LETTER
April 30, 2018      Leticia Weems MD  [address]  [place] [address]           [facility] - Rheumatology  [address]  [place] [address]  Phone: [phone]  Fax: [phone]          Patient: Johanny Curtis   MR Number: [id number]   YOB: 1930   Date of Visit: 4/30/2018       Dear Dr. Leticia Weems:    Thank you for referring Johanny Curtis to me for evaluation. Attached you will find relevant portions of my assessment and plan of care.    If you have questions, please do not hesitate to call me. I look forward to following Johanny Curtis along with you.    Sincerely,    Yeimi Choudhary MD    Enclosure  CC:  No Recipients    If you would like to receive this communication electronically, please contact [email or web address] or [phone] to request more information on [employer] Link access.    For providers and/or their staff who would like to refer a patient to [facility], please contact us through our one-stop-shop provider referral line, [facility] [place], at [phone].    If you feel you have received this communication in error or would no longer like to receive these types of communications, please e-mail [email or web address]

## 2018-05-08 ENCOUNTER — HOSPITAL ENCOUNTER (OUTPATIENT)
Dept: RADIOLOGY | Facility: HOSPITAL | Age: 83
Discharge: HOME | End: 2018-05-08
Attending: PHYSICIAN ASSISTANT
Payer: MEDICARE

## 2018-05-08 ENCOUNTER — OFFICE VISIT (OUTPATIENT)
Dept: ORTHOPEDICS | Facility: CLINIC | Age: 83
End: 2018-05-08
Payer: MEDICARE

## 2018-05-08 DIAGNOSIS — M17.11 PRIMARY OSTEOARTHRITIS OF RIGHT KNEE: ICD-10-CM

## 2018-05-08 DIAGNOSIS — S86.811A STRAIN OF CALF MUSCLE, RIGHT, INITIAL ENCOUNTER: Primary | ICD-10-CM

## 2018-05-08 DIAGNOSIS — M79.604 RIGHT LEG PAIN: ICD-10-CM

## 2018-05-08 PROCEDURE — 99213 OFFICE O/P EST LOW 20 MIN: CPT | Mod: 25,S$GLB,, | Performed by: PHYSICIAN ASSISTANT

## 2018-05-08 PROCEDURE — 99999 PR PBB SHADOW E&M-EST. PATIENT-LVL III: CPT | Mod: PBBFAC,,, | Performed by: PHYSICIAN ASSISTANT

## 2018-05-08 PROCEDURE — 73590 X-RAY EXAM OF LOWER LEG: CPT | Mod: TC,RT

## 2018-05-08 PROCEDURE — 73590 X-RAY EXAM OF LOWER LEG: CPT | Mod: 26,RT,, | Performed by: RADIOLOGY

## 2018-05-08 PROCEDURE — 20610 DRAIN/INJ JOINT/BURSA W/O US: CPT | Mod: RT,S$GLB,, | Performed by: PHYSICIAN ASSISTANT

## 2018-05-08 RX ORDER — METHYLPREDNISOLONE ACETATE 80 MG/ML
80 INJECTION, SUSPENSION INTRA-ARTICULAR; INTRALESIONAL; INTRAMUSCULAR; SOFT TISSUE
Status: COMPLETED | OUTPATIENT
Start: 2018-05-08 | End: 2018-05-08

## 2018-05-08 RX ADMIN — METHYLPREDNISOLONE ACETATE 80 MG: 80 INJECTION, SUSPENSION INTRA-ARTICULAR; INTRALESIONAL; INTRAMUSCULAR; SOFT TISSUE at 03:05

## 2018-05-08 NOTE — PROGRESS NOTES
Subjective:      Patient ID: Johanny Curtis is a 87 y.o. female.    Chief Complaint: No chief complaint on file.    HPI  Patient returns with chief complaint of right knee and lower leg pain. Her knee gave way and she fell on 4/30. She has h/o OA. She reports pain at the calf and mild pain at the knee. It occurs with walking. Tylenol prn gives some relief. She reports swelling and has been icing. She denies LBP. She ambulates with a walker.   Review of Systems   Constitution: Negative for chills, fever and night sweats.   Cardiovascular: Negative for chest pain.   Respiratory: Negative for cough and shortness of breath.    Hematologic/Lymphatic: Does not bruise/bleed easily.   Skin: Negative for color change.   Gastrointestinal: Negative for heartburn.   Genitourinary: Negative for dysuria.   Neurological: Negative for numbness and paresthesias.   Psychiatric/Behavioral: Negative for altered mental status.   Allergic/Immunologic: Negative for persistent infections.         Objective:            General    Vitals reviewed.  Constitutional: She is oriented to person, place, and time. She appears well-developed and well-nourished.   Cardiovascular: Normal rate.    Neurological: She is alert and oriented to person, place, and time.             Right LE Exam:  Knee ROM 0-120 degrees  No effusion or LE swelling  TTP medial and lateral joint line and diffusely at the calf  Normal Bermeo test      X-ray: ordered and reviewed by myself. There is DJD of the knee and ankle.  No fracture dislocation bone destruction seen.      Assessment:       Encounter Diagnoses   Name Primary?    Strain of calf muscle, right, initial encounter Yes    Primary osteoarthritis of right knee           Plan:       Discussed treatment options with patient. She would like a knee injection. Elevate. Alternate ice/heat. Tylenol as needed. She would like a knee brace for support. RTC prn.     PROCEDURE:  I have explained the risks, benefits, and  alternatives of the procedure in detail.  The patient voices understanding and all questions have been answered.  The patient agrees to proceed as planned. So after I performed a sterile prep of the skin in the normal fashion the right knee is injected using a 22 gauge needle from the anterolateral approach with a combination of 4cc 1% plain lidocaine and 80 mg of depo medrol.  The patient is cautioned and immediate relief of pain is secondary to the local anesthetic and will be temporary.  After the anesthetic wears off there may be a increase in pain that may last for a few hours or a few days and they should use ice to help alleviate this flair up of pain.

## 2018-07-06 ENCOUNTER — PES CALL (OUTPATIENT)
Dept: ADMINISTRATIVE | Facility: CLINIC | Age: 83
End: 2018-07-06

## 2018-09-28 ENCOUNTER — OFFICE VISIT (OUTPATIENT)
Dept: INTERNAL MEDICINE | Facility: CLINIC | Age: 83
End: 2018-09-28
Payer: MEDICARE

## 2018-09-28 ENCOUNTER — LAB VISIT (OUTPATIENT)
Dept: LAB | Facility: HOSPITAL | Age: 83
End: 2018-09-28
Attending: INTERNAL MEDICINE
Payer: MEDICARE

## 2018-09-28 ENCOUNTER — IMMUNIZATION (OUTPATIENT)
Dept: INTERNAL MEDICINE | Facility: CLINIC | Age: 83
End: 2018-09-28
Payer: MEDICARE

## 2018-09-28 VITALS
HEART RATE: 76 BPM | DIASTOLIC BLOOD PRESSURE: 56 MMHG | BODY MASS INDEX: 36.4 KG/M2 | WEIGHT: 180.56 LBS | HEIGHT: 59 IN | SYSTOLIC BLOOD PRESSURE: 122 MMHG

## 2018-09-28 DIAGNOSIS — N18.9 CHRONIC RENAL IMPAIRMENT, UNSPECIFIED CKD STAGE: Primary | ICD-10-CM

## 2018-09-28 DIAGNOSIS — I10 HYPERTENSION, UNSPECIFIED TYPE: ICD-10-CM

## 2018-09-28 DIAGNOSIS — Z00.00 ENCOUNTER FOR PREVENTIVE HEALTH EXAMINATION: Primary | ICD-10-CM

## 2018-09-28 DIAGNOSIS — M1A.00X0 IDIOPATHIC CHRONIC GOUT WITHOUT TOPHUS, UNSPECIFIED SITE: ICD-10-CM

## 2018-09-28 DIAGNOSIS — N18.4 CHRONIC KIDNEY DISEASE, STAGE IV (SEVERE): ICD-10-CM

## 2018-09-28 DIAGNOSIS — G60.9 HEREDITARY AND IDIOPATHIC PERIPHERAL NEUROPATHY: ICD-10-CM

## 2018-09-28 DIAGNOSIS — E03.9 HYPOTHYROIDISM, UNSPECIFIED TYPE: ICD-10-CM

## 2018-09-28 DIAGNOSIS — E66.01 SEVERE OBESITY (BMI 35.0-35.9 WITH COMORBIDITY): ICD-10-CM

## 2018-09-28 DIAGNOSIS — R45.84 ANHEDONIA: ICD-10-CM

## 2018-09-28 DIAGNOSIS — I10 ESSENTIAL HYPERTENSION: ICD-10-CM

## 2018-09-28 DIAGNOSIS — I71.40 AAA (ABDOMINAL AORTIC ANEURYSM) WITHOUT RUPTURE: ICD-10-CM

## 2018-09-28 DIAGNOSIS — I71.40 ANEURYSM OF ABDOMINAL VESSEL: ICD-10-CM

## 2018-09-28 PROBLEM — N18.5 CKD (CHRONIC KIDNEY DISEASE) STAGE 5, GFR LESS THAN 15 ML/MIN: Status: RESOLVED | Noted: 2017-08-07 | Resolved: 2018-09-28

## 2018-09-28 LAB
ALBUMIN SERPL BCP-MCNC: 3.9 G/DL
ALP SERPL-CCNC: 78 U/L
ALT SERPL W/O P-5'-P-CCNC: 13 U/L
ANION GAP SERPL CALC-SCNC: 12 MMOL/L
AST SERPL-CCNC: 24 U/L
BASOPHILS # BLD AUTO: 0.02 K/UL
BASOPHILS NFR BLD: 0.2 %
BILIRUB SERPL-MCNC: 0.5 MG/DL
BUN SERPL-MCNC: 33 MG/DL
CALCIUM SERPL-MCNC: 10.3 MG/DL
CHLORIDE SERPL-SCNC: 107 MMOL/L
CO2 SERPL-SCNC: 24 MMOL/L
CREAT SERPL-MCNC: 2.1 MG/DL
DIFFERENTIAL METHOD: ABNORMAL
EOSINOPHIL # BLD AUTO: 0.1 K/UL
EOSINOPHIL NFR BLD: 0.9 %
ERYTHROCYTE [DISTWIDTH] IN BLOOD BY AUTOMATED COUNT: 14.9 %
EST. GFR  (AFRICAN AMERICAN): 24 ML/MIN/1.73 M^2
EST. GFR  (NON AFRICAN AMERICAN): 21 ML/MIN/1.73 M^2
FOLATE SERPL-MCNC: 16.7 NG/ML
GLUCOSE SERPL-MCNC: 82 MG/DL
HCT VFR BLD AUTO: 37.1 %
HGB BLD-MCNC: 11.6 G/DL
LYMPHOCYTES # BLD AUTO: 1.8 K/UL
LYMPHOCYTES NFR BLD: 20.5 %
MCH RBC QN AUTO: 28.6 PG
MCHC RBC AUTO-ENTMCNC: 31.3 G/DL
MCV RBC AUTO: 92 FL
MONOCYTES # BLD AUTO: 0.8 K/UL
MONOCYTES NFR BLD: 9 %
NEUTROPHILS # BLD AUTO: 5.9 K/UL
NEUTROPHILS NFR BLD: 68.8 %
PLATELET # BLD AUTO: 213 K/UL
PMV BLD AUTO: 10.8 FL
POTASSIUM SERPL-SCNC: 4.2 MMOL/L
PROT SERPL-MCNC: 7.8 G/DL
RBC # BLD AUTO: 4.05 M/UL
SODIUM SERPL-SCNC: 143 MMOL/L
VIT B12 SERPL-MCNC: 288 PG/ML
WBC # BLD AUTO: 8.6 K/UL

## 2018-09-28 PROCEDURE — 90662 IIV NO PRSV INCREASED AG IM: CPT | Mod: PBBFAC

## 2018-09-28 PROCEDURE — 80053 COMPREHEN METABOLIC PANEL: CPT

## 2018-09-28 PROCEDURE — 36415 COLL VENOUS BLD VENIPUNCTURE: CPT

## 2018-09-28 PROCEDURE — 85025 COMPLETE CBC W/AUTO DIFF WBC: CPT

## 2018-09-28 PROCEDURE — 82746 ASSAY OF FOLIC ACID SERUM: CPT

## 2018-09-28 PROCEDURE — 99499 UNLISTED E&M SERVICE: CPT | Mod: HCNC,S$GLB,, | Performed by: NURSE PRACTITIONER

## 2018-09-28 PROCEDURE — 99999 PR PBB SHADOW E&M-EST. PATIENT-LVL IV: CPT | Mod: PBBFAC,,, | Performed by: INTERNAL MEDICINE

## 2018-09-28 PROCEDURE — 99999 PR PBB SHADOW E&M-EST. PATIENT-LVL IV: CPT | Mod: PBBFAC,,, | Performed by: NURSE PRACTITIONER

## 2018-09-28 PROCEDURE — 99214 OFFICE O/P EST MOD 30 MIN: CPT | Mod: PBBFAC,25,27 | Performed by: INTERNAL MEDICINE

## 2018-09-28 PROCEDURE — 99214 OFFICE O/P EST MOD 30 MIN: CPT | Mod: PBBFAC | Performed by: NURSE PRACTITIONER

## 2018-09-28 PROCEDURE — 99397 PER PM REEVAL EST PAT 65+ YR: CPT | Mod: S$PBB,,, | Performed by: INTERNAL MEDICINE

## 2018-09-28 PROCEDURE — 82607 VITAMIN B-12: CPT

## 2018-09-28 PROCEDURE — G0439 PPPS, SUBSEQ VISIT: HCPCS | Mod: S$GLB,,, | Performed by: NURSE PRACTITIONER

## 2018-09-28 RX ORDER — ACETAMINOPHEN AND CODEINE PHOSPHATE 300; 30 MG/1; MG/1
TABLET ORAL
Qty: 30 TABLET | Refills: 1 | Status: SHIPPED | OUTPATIENT
Start: 2018-09-28 | End: 2019-03-27 | Stop reason: SDUPTHER

## 2018-09-28 RX ORDER — LEVOTHYROXINE SODIUM 88 UG/1
88 TABLET ORAL DAILY
Qty: 90 TABLET | Refills: 1 | Status: SHIPPED | OUTPATIENT
Start: 2018-09-28 | End: 2019-03-27 | Stop reason: SDUPTHER

## 2018-09-28 RX ORDER — DOXAZOSIN 2 MG/1
2 TABLET ORAL NIGHTLY
Qty: 90 TABLET | Refills: 1 | Status: SHIPPED | OUTPATIENT
Start: 2018-09-28 | End: 2019-03-27 | Stop reason: SDUPTHER

## 2018-09-28 RX ORDER — LOVASTATIN 20 MG/1
TABLET ORAL
Qty: 90 TABLET | Refills: 1 | Status: SHIPPED | OUTPATIENT
Start: 2018-09-28 | End: 2019-03-27 | Stop reason: SDUPTHER

## 2018-09-28 RX ORDER — COLCHICINE 0.6 MG/1
0.6 TABLET ORAL EVERY OTHER DAY
Qty: 45 TABLET | Refills: 1 | Status: SHIPPED | OUTPATIENT
Start: 2018-09-28 | End: 2019-03-27 | Stop reason: SDUPTHER

## 2018-09-28 NOTE — PROGRESS NOTES
"Johanny Curtis presented for a  Medicare AWV and comprehensive Health Risk Assessment today. The following components were reviewed and updated:    · Medical history  · Family History  · Social history  · Allergies and Current Medications  · Health Risk Assessment  · Health Maintenance  · Care Team     ** See Completed Assessments for Annual Wellness Visit within the encounter summary.**       The following assessments were completed:  · Living Situation  · CAGE  · Depression Screening  · Timed Get Up and Go  · Whisper Test  · Cognitive Function Screening*previous memory cog abnormal. PCP notified. Children assist in cares as needed  · Nutrition Screening  · ADL Screening  · PAQ Screening    Vitals:    09/28/18 1112   BP: (!) 122/56   Pulse: 76   Weight: 81.9 kg (180 lb 8.9 oz)   Height: 4' 11" (1.499 m)     Body mass index is 36.47 kg/m².  Physical Exam   Constitutional: She is oriented to person, place, and time. She appears well-developed.   obese   HENT:   Head: Normocephalic and atraumatic.   Nose: Nose normal.   Eyes: Conjunctivae and EOM are normal.   Neck: Neck supple.   Cardiovascular: Normal rate, regular rhythm, normal heart sounds and intact distal pulses.   No murmur heard.  Pulmonary/Chest: Effort normal and breath sounds normal.   Musculoskeletal: Normal range of motion.   Neurological: She is alert and oriented to person, place, and time.   Skin: Skin is warm and dry.   Psychiatric: She has a normal mood and affect. Her behavior is normal. Judgment and thought content normal.   Nursing note and vitals reviewed.        Diagnoses and health risks identified today and associated recommendations/orders:    1. Encounter for preventive health examination  Assessment performed. Health maintenance updated. Chart review completed.    2. AAA (abdominal aortic aneurysm) without rupture  Stable on imaging. Followed by Vascular Surgery.  Chronic.    3. Aneurysm of abdominal vessel  Stable on imaging. Followed by " Vascular Surgery.  Chronic.    4. Severe obesity (BMI 35.0-35.9 with comorbidity)  Chronic. Discussed being more active as tolerated. Patient is a significant fall risk. Discussed chair exercises. Followed by PCP.    5. Idiopathic chronic gout without tophus, unspecified site  Chronic. Continue current regimen. Followed by PCP.    6. Hypothyroidism, unspecified type  Chronic. Continue current regimen. Followed by PCP.    7. Hereditary and idiopathic peripheral neuropathy  Chronic. Continue current regimen. Followed by PCP.    8. Essential hypertension  Chronic. Continue current regimen. Followed by PCP.    9. Chronic kidney disease, stage IV (severe)  Chronic. Continue regimen as instructed. Followed by Nephrology.      Provided Johanny with a 5-10 year written screening schedule and personal prevention plan. Recommendations were developed using the USPSTF age appropriate recommendations. Education, counseling, and referrals were provided as needed. After Visit Summary printed and given to patient which includes a list of additional screenings\tests needed.    Follow-up for follow up with Primary Care Provider as instructed, ;sooner if problems, HRA in 1 year.    DEMETRA Jamison

## 2018-09-28 NOTE — PATIENT INSTRUCTIONS
Counseling and Referral of Other Preventative  (Italic type indicates deductible and co-insurance are waived)    Patient Name: Johanny Curtis  Today's Date: 9/28/2018    Health Maintenance       Date Due Completion Date    Influenza Vaccine 08/01/2018 12/12/2017    Override on 10/22/2015: Done    Lipid Panel 04/13/2019 4/13/2018    DEXA SCAN 04/30/2020 4/30/2018    TETANUS VACCINE 06/29/2026 6/29/2016 (N/S)    Override on 6/29/2016: (N/S) (prescription given)        No orders of the defined types were placed in this encounter.    The following information is provided to all patients.  This information is to help you find resources for any of the problems found today that may be affecting your health:                Living healthy guide: www.Pending sale to [facility].[place].gov      Understanding Diabetes: www.diabetes.org      Eating healthy: www.cdc.gov/healthyweight      CDC home safety checklist: www.cdc.gov/steadi/patient.html      Agency on Aging: www.goea.[place].[facility]      Alcoholics anonymous (AA): www.aa.org      Physical Activity: www.fabiola.nih.gov/uj5sxgz      Tobacco use: www.quitwithus[place].org

## 2018-09-30 VITALS
HEART RATE: 76 BPM | WEIGHT: 180.56 LBS | BODY MASS INDEX: 36.4 KG/M2 | DIASTOLIC BLOOD PRESSURE: 62 MMHG | HEIGHT: 59 IN | SYSTOLIC BLOOD PRESSURE: 124 MMHG

## 2018-09-30 NOTE — PROGRESS NOTES
Subjective:       Patient ID: Johanny Curtis is a 88 y.o. female.    Chief Complaint: Annual Exam    HPI  She is here for annual exam.  Currently without complaint  Review of Systems   Constitutional: Negative for chills, fatigue, fever and unexpected weight change.   Respiratory: Negative for chest tightness and shortness of breath.    Cardiovascular: Negative for chest pain and palpitations.   Gastrointestinal: Negative for abdominal pain and blood in stool.   Neurological: Negative for dizziness, syncope, numbness and headaches.       Objective:      Physical Exam   HENT:   Right Ear: External ear normal.   Left Ear: External ear normal.   Nose: Nose normal.   Mouth/Throat: Oropharynx is clear and moist.   Eyes: Pupils are equal, round, and reactive to light.   Neck: Normal range of motion.   Cardiovascular: Normal rate and regular rhythm.   No murmur heard.  Pulmonary/Chest: Breath sounds normal.   Abdominal: She exhibits no distension. There is no hepatosplenomegaly. There is no tenderness.   Lymphadenopathy:     She has no cervical adenopathy.     She has no axillary adenopathy.   Neurological: She has normal strength and normal reflexes. No cranial nerve deficit or sensory deficit.       Assessment/Plan           assessment and plan:  Annual exam.  Check CMP, CBC, B12, folate

## 2018-12-12 RX ORDER — FELODIPINE 10 MG/1
TABLET, EXTENDED RELEASE ORAL
Qty: 90 TABLET | Refills: 0 | Status: SHIPPED | OUTPATIENT
Start: 2018-12-12 | End: 2019-03-27 | Stop reason: SDUPTHER

## 2019-03-27 ENCOUNTER — LAB VISIT (OUTPATIENT)
Dept: LAB | Facility: HOSPITAL | Age: 84
End: 2019-03-27
Attending: INTERNAL MEDICINE
Payer: MEDICARE

## 2019-03-27 ENCOUNTER — OFFICE VISIT (OUTPATIENT)
Dept: INTERNAL MEDICINE | Facility: CLINIC | Age: 84
End: 2019-03-27
Payer: MEDICARE

## 2019-03-27 DIAGNOSIS — I10 HYPERTENSION, UNSPECIFIED TYPE: ICD-10-CM

## 2019-03-27 DIAGNOSIS — M79.673 PAIN OF FOOT, UNSPECIFIED LATERALITY: Primary | ICD-10-CM

## 2019-03-27 LAB
ALBUMIN SERPL BCP-MCNC: 3.8 G/DL (ref 3.5–5.2)
ALP SERPL-CCNC: 77 U/L (ref 55–135)
ALT SERPL W/O P-5'-P-CCNC: 10 U/L (ref 10–44)
ANION GAP SERPL CALC-SCNC: 9 MMOL/L (ref 8–16)
AST SERPL-CCNC: 24 U/L (ref 10–40)
BILIRUB SERPL-MCNC: 0.4 MG/DL (ref 0.1–1)
BUN SERPL-MCNC: 35 MG/DL (ref 8–23)
CALCIUM SERPL-MCNC: 10.5 MG/DL (ref 8.7–10.5)
CHLORIDE SERPL-SCNC: 107 MMOL/L (ref 95–110)
CHOLEST SERPL-MCNC: 181 MG/DL (ref 120–199)
CHOLEST/HDLC SERPL: 3.6 {RATIO} (ref 2–5)
CO2 SERPL-SCNC: 26 MMOL/L (ref 23–29)
CREAT SERPL-MCNC: 2.1 MG/DL (ref 0.5–1.4)
EST. GFR  (AFRICAN AMERICAN): 24 ML/MIN/1.73 M^2
EST. GFR  (NON AFRICAN AMERICAN): 21 ML/MIN/1.73 M^2
GLUCOSE SERPL-MCNC: 88 MG/DL (ref 70–110)
HDLC SERPL-MCNC: 50 MG/DL (ref 40–75)
HDLC SERPL: 27.6 % (ref 20–50)
LDLC SERPL CALC-MCNC: 100.6 MG/DL (ref 63–159)
NONHDLC SERPL-MCNC: 131 MG/DL
POTASSIUM SERPL-SCNC: 4.8 MMOL/L (ref 3.5–5.1)
PROT SERPL-MCNC: 7.8 G/DL (ref 6–8.4)
SODIUM SERPL-SCNC: 142 MMOL/L (ref 136–145)
TRIGL SERPL-MCNC: 152 MG/DL (ref 30–150)
TSH SERPL DL<=0.005 MIU/L-ACNC: 2.21 UIU/ML (ref 0.4–4)

## 2019-03-27 PROCEDURE — 1101F PT FALLS ASSESS-DOCD LE1/YR: CPT | Mod: HCNC,CPTII,S$GLB, | Performed by: INTERNAL MEDICINE

## 2019-03-27 PROCEDURE — 99999 PR PBB SHADOW E&M-EST. PATIENT-LVL IV: CPT | Mod: PBBFAC,HCNC,, | Performed by: INTERNAL MEDICINE

## 2019-03-27 PROCEDURE — 99499 UNLISTED E&M SERVICE: CPT | Mod: S$GLB,,, | Performed by: INTERNAL MEDICINE

## 2019-03-27 PROCEDURE — 99214 PR OFFICE/OUTPT VISIT, EST, LEVL IV, 30-39 MIN: ICD-10-PCS | Mod: HCNC,S$GLB,, | Performed by: INTERNAL MEDICINE

## 2019-03-27 PROCEDURE — 36415 COLL VENOUS BLD VENIPUNCTURE: CPT | Mod: HCNC

## 2019-03-27 PROCEDURE — 1101F PR PT FALLS ASSESS DOC 0-1 FALLS W/OUT INJ PAST YR: ICD-10-PCS | Mod: HCNC,CPTII,S$GLB, | Performed by: INTERNAL MEDICINE

## 2019-03-27 PROCEDURE — 99499 RISK ADDL DX/OHS AUDIT: ICD-10-PCS | Mod: S$GLB,,, | Performed by: INTERNAL MEDICINE

## 2019-03-27 PROCEDURE — 99999 PR PBB SHADOW E&M-EST. PATIENT-LVL IV: ICD-10-PCS | Mod: PBBFAC,HCNC,, | Performed by: INTERNAL MEDICINE

## 2019-03-27 PROCEDURE — 84443 ASSAY THYROID STIM HORMONE: CPT | Mod: HCNC

## 2019-03-27 PROCEDURE — 80053 COMPREHEN METABOLIC PANEL: CPT | Mod: HCNC

## 2019-03-27 PROCEDURE — 80061 LIPID PANEL: CPT | Mod: HCNC

## 2019-03-27 PROCEDURE — 99214 OFFICE O/P EST MOD 30 MIN: CPT | Mod: HCNC,S$GLB,, | Performed by: INTERNAL MEDICINE

## 2019-03-27 RX ORDER — LEVOTHYROXINE SODIUM 88 UG/1
88 TABLET ORAL DAILY
Qty: 90 TABLET | Refills: 1 | Status: SHIPPED | OUTPATIENT
Start: 2019-03-27 | End: 2019-04-03 | Stop reason: SDUPTHER

## 2019-03-27 RX ORDER — ACETAMINOPHEN AND CODEINE PHOSPHATE 300; 30 MG/1; MG/1
TABLET ORAL
Qty: 30 TABLET | Refills: 1 | Status: SHIPPED | OUTPATIENT
Start: 2019-03-27 | End: 2020-09-03 | Stop reason: SDUPTHER

## 2019-03-27 RX ORDER — LOVASTATIN 20 MG/1
TABLET ORAL
Qty: 90 TABLET | Refills: 1 | Status: SHIPPED | OUTPATIENT
Start: 2019-03-27 | End: 2019-07-29 | Stop reason: SDUPTHER

## 2019-03-27 RX ORDER — FELODIPINE 10 MG/1
TABLET, EXTENDED RELEASE ORAL
Qty: 90 TABLET | Refills: 1 | Status: SHIPPED | OUTPATIENT
Start: 2019-03-27 | End: 2019-07-29 | Stop reason: SDUPTHER

## 2019-03-27 RX ORDER — DOXAZOSIN 2 MG/1
2 TABLET ORAL NIGHTLY
Qty: 90 TABLET | Refills: 1 | Status: SHIPPED | OUTPATIENT
Start: 2019-03-27 | End: 2019-07-29 | Stop reason: SDUPTHER

## 2019-03-27 RX ORDER — COLCHICINE 0.6 MG/1
0.6 TABLET ORAL EVERY OTHER DAY
Qty: 45 TABLET | Refills: 1 | Status: SHIPPED | OUTPATIENT
Start: 2019-03-27 | End: 2019-07-29 | Stop reason: SDUPTHER

## 2019-04-01 DIAGNOSIS — E55.9 VITAMIN D DEFICIENCY: ICD-10-CM

## 2019-04-01 DIAGNOSIS — D63.1 ANEMIA IN STAGE 4 CHRONIC KIDNEY DISEASE: ICD-10-CM

## 2019-04-01 DIAGNOSIS — N25.0 RENAL OSTEODYSTROPHY: ICD-10-CM

## 2019-04-01 DIAGNOSIS — N18.4 ANEMIA IN STAGE 4 CHRONIC KIDNEY DISEASE: ICD-10-CM

## 2019-04-01 DIAGNOSIS — N25.81 SECONDARY HYPERPARATHYROIDISM: ICD-10-CM

## 2019-04-01 DIAGNOSIS — N18.4 CHRONIC KIDNEY DISEASE, STAGE IV (SEVERE): ICD-10-CM

## 2019-04-01 RX ORDER — PSEUDOEPHEDRINE HCL 30 MG
1 TABLET ORAL 2 TIMES DAILY
Qty: 180 EACH | Refills: 3 | Status: SHIPPED | OUTPATIENT
Start: 2019-04-01 | End: 2020-07-30

## 2019-04-03 ENCOUNTER — TELEPHONE (OUTPATIENT)
Dept: INTERNAL MEDICINE | Facility: CLINIC | Age: 84
End: 2019-04-03

## 2019-04-03 RX ORDER — LEVOTHYROXINE SODIUM 88 UG/1
88 TABLET ORAL DAILY
Qty: 30 TABLET | Refills: 1 | Status: SHIPPED | OUTPATIENT
Start: 2019-04-03 | End: 2019-05-09 | Stop reason: SDUPTHER

## 2019-04-03 NOTE — TELEPHONE ENCOUNTER
----- Message from Woody Wise sent at 4/3/2019  8:55 AM CDT -----  Contact: Pharmacy and Patient  [phone]  RX request - refill or new RX.  Is this a refill or new RX:  Refill  RX name and strength: levothyroxine (SYNTHROID) 88 MCG tablet    Is this a 30 day or 90 day RX:  Temp supply    Pharmacy name and phone # [id number] - [place], [place] - [address] AT [facility] [phone] (Phone)  [phone] (Fax)    Comments:  Temp supply until mail order reaches pt. Home.  Please call an advise  Thank you

## 2019-04-03 NOTE — TELEPHONE ENCOUNTER
----- Message from Yeimi Pate sent at 4/3/2019  2:02 PM CDT -----  Contact: self/[phone]  Patient is returning a phone call.  Who left a message for the patient: Ketty  Does patient know what this is regarding:    Comments:

## 2019-04-06 VITALS
OXYGEN SATURATION: 96 % | HEART RATE: 69 BPM | DIASTOLIC BLOOD PRESSURE: 62 MMHG | HEIGHT: 59 IN | WEIGHT: 182.56 LBS | BODY MASS INDEX: 36.8 KG/M2 | SYSTOLIC BLOOD PRESSURE: 126 MMHG

## 2019-04-07 NOTE — PROGRESS NOTES
Subjective:       Patient ID: Johanny Curtis is a 88 y.o. female.    Chief Complaint: Hypertension    HPI  She returns for management of hypertension.  She has had hypertension for over a year.  Current treatment has included medications outlined in medication list.  She denies chest pain or shortness of breath.  No palpitations.  Denies left arm or neck pain.    Medications:  See med list    Social history:  Does not smoke, does not drink alcohol      Review of Systems   Constitutional: Negative for chills, fatigue, fever and unexpected weight change.   Respiratory: Negative for chest tightness and shortness of breath.    Cardiovascular: Negative for chest pain and palpitations.   Gastrointestinal: Negative for abdominal pain and blood in stool.   Neurological: Negative for dizziness, syncope, numbness and headaches.       Objective:      Physical Exam   HENT:   Right Ear: External ear normal.   Left Ear: External ear normal.   Nose: Nose normal.   Mouth/Throat: Oropharynx is clear and moist.   Eyes: Pupils are equal, round, and reactive to light.   Neck: Normal range of motion.   Cardiovascular: Normal rate and regular rhythm.   No murmur heard.  Pulmonary/Chest: Breath sounds normal.   Abdominal: She exhibits no distension. There is no hepatosplenomegaly. There is no tenderness.   Lymphadenopathy:     She has no cervical adenopathy.     She has no axillary adenopathy.   Neurological: She has normal strength and normal reflexes. No cranial nerve deficit or sensory deficit.       Assessment/Plan       Assessment and plan:  Hypertension:  Check CMP, lipid panel, TSH.  Discussed mammogram and breast exam.  She declined

## 2019-04-09 NOTE — PROGRESS NOTES
Patient, Johanny Curtis (MRN #[id number]), presented with a recorded BMI of 36.87 kg/m^2 and a documented comorbidity(s):  - Hypertension  to which the severe obesity is a contributing factor. This is consistent with the definition of severe obesity (BMI 35.0-39.9) with comorbidity (ICD-10 E66.01, Z68.35). The patient's severe obesity was monitored, evaluated, addressed and/or treated. This addendum to the medical record is made on 04/09/2019.

## 2019-04-10 ENCOUNTER — OFFICE VISIT (OUTPATIENT)
Dept: PODIATRY | Facility: CLINIC | Age: 84
End: 2019-04-10
Payer: MEDICARE

## 2019-04-10 VITALS
DIASTOLIC BLOOD PRESSURE: 68 MMHG | SYSTOLIC BLOOD PRESSURE: 146 MMHG | BODY MASS INDEX: 36.69 KG/M2 | HEART RATE: 74 BPM | HEIGHT: 59 IN | WEIGHT: 182 LBS

## 2019-04-10 DIAGNOSIS — L84 CORN OR CALLUS: ICD-10-CM

## 2019-04-10 DIAGNOSIS — I73.9 PERIPHERAL VASCULAR DISEASE: Primary | ICD-10-CM

## 2019-04-10 DIAGNOSIS — L60.9 DISEASE OF NAIL: ICD-10-CM

## 2019-04-10 PROCEDURE — 99999 PR PBB SHADOW E&M-EST. PATIENT-LVL III: CPT | Mod: PBBFAC,HCNC,, | Performed by: PODIATRIST

## 2019-04-10 PROCEDURE — 99999 PR PBB SHADOW E&M-EST. PATIENT-LVL III: ICD-10-PCS | Mod: PBBFAC,HCNC,, | Performed by: PODIATRIST

## 2019-04-10 PROCEDURE — 1101F PR PT FALLS ASSESS DOC 0-1 FALLS W/OUT INJ PAST YR: ICD-10-PCS | Mod: HCNC,CPTII,S$GLB, | Performed by: PODIATRIST

## 2019-04-10 PROCEDURE — 11057 PARNG/CUTG B9 HYPRKR LES >4: CPT | Mod: Q8,HCNC,S$GLB, | Performed by: PODIATRIST

## 2019-04-10 PROCEDURE — 1101F PT FALLS ASSESS-DOCD LE1/YR: CPT | Mod: HCNC,CPTII,S$GLB, | Performed by: PODIATRIST

## 2019-04-10 PROCEDURE — 11721 DEBRIDE NAIL 6 OR MORE: CPT | Mod: 59,Q8,HCNC,S$GLB | Performed by: PODIATRIST

## 2019-04-10 PROCEDURE — 99214 OFFICE O/P EST MOD 30 MIN: CPT | Mod: 25,HCNC,S$GLB, | Performed by: PODIATRIST

## 2019-04-10 PROCEDURE — 11721 PR DEBRIDEMENT OF NAILS, 6 OR MORE: ICD-10-PCS | Mod: 59,Q8,HCNC,S$GLB | Performed by: PODIATRIST

## 2019-04-10 PROCEDURE — 11057 PR TRIM BENIGN HYPERKERATOTIC SKIN LESION,>4: ICD-10-PCS | Mod: Q8,HCNC,S$GLB, | Performed by: PODIATRIST

## 2019-04-10 PROCEDURE — 99214 PR OFFICE/OUTPT VISIT, EST, LEVL IV, 30-39 MIN: ICD-10-PCS | Mod: 25,HCNC,S$GLB, | Performed by: PODIATRIST

## 2019-04-10 RX ORDER — AMMONIUM LACTATE 12 G/100G
CREAM TOPICAL
Qty: 140 G | Refills: 11 | Status: ON HOLD | OUTPATIENT
Start: 2019-04-10 | End: 2020-09-09

## 2019-04-10 NOTE — LETTER
April 12, 2019      Leticia Weems MD  [address]  [place] [address]           [facility] - Podiatry  [address]  [facility] [address]  Phone: [phone]          Patient: Johanny Curtis   MR Number: [id number]   YOB: 1930   Date of Visit: 4/10/2019       Dear Dr. Leticia Weems:    Thank you for referring Johanny Curtis to me for evaluation. Attached you will find relevant portions of my assessment and plan of care.    If you have questions, please do not hesitate to call me. I look forward to following Johanny Curtis along with you.    Sincerely,    Alexx Huang, DPOLIVER    Enclosure  CC:  No Recipients    If you would like to receive this communication electronically, please contact [email or web address] or [phone] to request more information on [email or web address] Link access.    For providers and/or their staff who would like to refer a patient to [facility], please contact us through our one-stop-shop provider referral line, [facility], at [phone].    If you feel you have received this communication in error or would no longer like to receive these types of communications, please e-mail [email or web address]

## 2019-04-12 NOTE — PROGRESS NOTES
Subjective:      Patient ID: Johanny Curtis is a 88 y.o. female.    Chief Complaint: Callouses (bilateral pcp Dr Cyr 3/27/19) and Nail Care    Johanny is a 88 y.o. female who presents to the clinic for evaluation and treatment of high risk feet. Johanny has a past medical history of AAA (abdominal aortic aneurysm), Anemia in CKD (chronic kidney disease), Arthritis, Cataract, Gout, chronic, High cholesterol, Hypertension, Hypothyroidism, Obesity, Plantar fasciitis, and Thyroid trouble. The patient's chief complaint is long, thick toenails. This patient has documented high risk feet requiring routine maintenance secondary to peripheral vascular disease.    PCP: Leticia Weems MD    Date Last Seen by PCP:   Chief Complaint   Patient presents with    Elmer     bilateral pcp Dr Cyr 3/27/19    Nail Care         Current shoe gear:  Affected Foot: Casual shoes     Unaffected Foot: Casual shoes    Last encounter in this department: Visit date not found    Hemoglobin A1C   Date Value Ref Range Status   04/28/2006 6.1 4.5 - 6.2 % Final       Review of Systems   Constitution: Negative for chills and fever.   HENT: Negative for congestion and tinnitus.    Eyes: Negative for double vision and visual disturbance.   Cardiovascular: Positive for claudication. Negative for chest pain.   Respiratory: Negative for hemoptysis and shortness of breath.    Endocrine: Negative for cold intolerance and heat intolerance.   Hematologic/Lymphatic: Negative for adenopathy and bleeding problem.   Skin: Positive for color change, dry skin, nail changes and poor wound healing.   Musculoskeletal: Positive for stiffness. Negative for myalgias.   Gastrointestinal: Negative for nausea and vomiting.   Genitourinary: Negative for dysuria and hematuria.   Neurological: Positive for sensory change.   Psychiatric/Behavioral: Negative for altered mental status and suicidal ideas.   Allergic/Immunologic: Negative for environmental allergies  and persistent infections.           Objective:      Physical Exam   Constitutional: She is oriented to person, place, and time. She appears well-developed and well-nourished.   Cardiovascular:   Pulses:       Dorsalis pedis pulses are 0 on the right side, and 0 on the left side.        Posterior tibial pulses are 1+ on the right side, and 1+ on the left side.   Pulmonary/Chest: Effort normal.   Musculoskeletal: Normal range of motion.   Anterior, lateral, and posterior muscle groups bilateral lower extremities show strength 4 over 5 symmetrically. Inspection and palpation of the joints and bones reveal no crepitus or joint effusion. No tenderness upon palpation. Mild plantar flexor contractures noted to digits 2 through 5 bilaterally.  Angle and base of gait are normal.   Feet:   Right Foot:   Skin Integrity: Positive for callus and dry skin.   Left Foot:   Skin Integrity: Positive for callus and dry skin.   Neurological: She is alert and oriented to person, place, and time. She displays atrophy and abnormal reflex. A sensory deficit is present.   Reflex Scores:       Patellar reflexes are 1+ on the right side and 1+ on the left side.       Achilles reflexes are 1+ on the right side and 1+ on the left side.  Skin: Skin is warm and dry. Capillary refill takes 2 to 3 seconds. There is pallor.   Skin bilateral lower extremities noted to be thin, dry, and atrophic.  Toenails thickened, discolored, with subungual fungal debris and tenderness noted.  Hyperkeratotic lesions noted to both feet plantarly with tenderness, x6 lesions total located sub 1st and 5th metatarsophalangeal joints bilaterally as well as bilateral heels..   Psychiatric: She has a normal mood and affect.   Vitals reviewed.            Assessment:       Encounter Diagnoses   Name Primary?    Peripheral vascular disease Yes    Disease of nail     Corn or callus          Plan:       Johanny was seen today for callouses and nail care.    Diagnoses and  all orders for this visit:    Peripheral vascular disease    Disease of nail    Corn or callus    Other orders  -     ammonium lactate 12 % Crea; Apply twice daily to affected parts both feet as needed.      I counseled the patient on her conditions, their implications and medical management.      Routine Foot Care    Performed by:  Alexx Huang. DPM  Authorized by:  Patient     Consent Done?:  Yes (Verbal)     Nail Care Type:  Debride  Location(s): All  (Left 1st Toe, Left 3rd Toe, Left 2nd Toe, Left 4th Toe, Left 5th Toe, Right 1st Toe, Right 2nd Toe, Right 3rd Toe, Right 4th Toe and Right 5th Toe)  Patient tolerance:  Patient tolerated the procedure well with no immediate complications     With patient's permission, the toenails mentioned above were aggressively reduced and debrided using a nail nipper, removing all offending nail and debris. The patient will continue to monitor the areas daily, inspect the feet, wear protective shoe gear when ambulatory, and moisturizer to maintain skin integrity.      Callus Care Type: Debride    With patient's permission, the calluses/hyperkeratotic lesions mentioned above were aggressively reduced and debrided using a number 15 blade. The patient will continue to monitor the areas daily, inspect the feet, wear protective shoe gear when ambulatory, and moisturizer to maintain skin integrity.     Appropriate foot care discussed in detail as well as appropriate shoe gear.  Follow up in 3-6 months as needed.  .

## 2019-04-15 NOTE — PROGRESS NOTES
Patient Johanny Curtis, MRN [id number], was dependent on dialysis (ICD10 Z99.2) at the time of this visit on 4/10/19. This addendum is made to the medical record on 04/15/2019.

## 2019-04-26 ENCOUNTER — PES CALL (OUTPATIENT)
Dept: ADMINISTRATIVE | Facility: CLINIC | Age: 84
End: 2019-04-26

## 2019-05-09 RX ORDER — LEVOTHYROXINE SODIUM 88 UG/1
88 TABLET ORAL DAILY
Qty: 30 TABLET | Refills: 2 | Status: SHIPPED | OUTPATIENT
Start: 2019-05-09 | End: 2019-07-10 | Stop reason: SDUPTHER

## 2019-05-09 NOTE — TELEPHONE ENCOUNTER
----- Message from Soumya Dos Santos sent at 5/9/2019 10:51 AM CDT -----  Contact: [facility] [phone]  .Refill request. -- 90 day supply   levothyroxine (SYNTHROID) 88 MCG tablet     ..  [facility] - [place], [place] - [address]  [address] [address]  Phone: [phone] Fax: [phone]

## 2019-05-09 NOTE — TELEPHONE ENCOUNTER
Approved Medications      levothyroxine (SYNTHROID) 88 MCG tablet         Sig: Take 1 tablet (88 mcg total) by mouth once daily.    Disp:  30 tablet    Refills:  2    Start: 5/9/2019    Class: Normal    Authorized by: Leticia Weems MD        To be filled at: [facility] [address]

## 2019-07-10 RX ORDER — LEVOTHYROXINE SODIUM 88 UG/1
88 TABLET ORAL DAILY
Qty: 90 TABLET | Refills: 0 | Status: SHIPPED | OUTPATIENT
Start: 2019-07-10 | End: 2019-11-30 | Stop reason: SDUPTHER

## 2019-07-29 ENCOUNTER — LAB VISIT (OUTPATIENT)
Dept: LAB | Facility: HOSPITAL | Age: 84
End: 2019-07-29
Attending: INTERNAL MEDICINE
Payer: MEDICARE

## 2019-07-29 ENCOUNTER — OFFICE VISIT (OUTPATIENT)
Dept: INTERNAL MEDICINE | Facility: CLINIC | Age: 84
End: 2019-07-29
Payer: MEDICARE

## 2019-07-29 VITALS
BODY MASS INDEX: 34.89 KG/M2 | DIASTOLIC BLOOD PRESSURE: 76 MMHG | HEIGHT: 60 IN | SYSTOLIC BLOOD PRESSURE: 130 MMHG | HEART RATE: 65 BPM | WEIGHT: 177.69 LBS | OXYGEN SATURATION: 97 %

## 2019-07-29 DIAGNOSIS — M79.673 PAIN OF FOOT, UNSPECIFIED LATERALITY: Primary | ICD-10-CM

## 2019-07-29 DIAGNOSIS — E04.1 THYROID NODULE: ICD-10-CM

## 2019-07-29 DIAGNOSIS — I10 HYPERTENSION, UNSPECIFIED TYPE: ICD-10-CM

## 2019-07-29 DIAGNOSIS — I73.9 PVD (PERIPHERAL VASCULAR DISEASE): ICD-10-CM

## 2019-07-29 LAB
ALBUMIN SERPL BCP-MCNC: 3.8 G/DL (ref 3.5–5.2)
ALP SERPL-CCNC: 75 U/L (ref 55–135)
ALT SERPL W/O P-5'-P-CCNC: 11 U/L (ref 10–44)
ANION GAP SERPL CALC-SCNC: 13 MMOL/L (ref 8–16)
AST SERPL-CCNC: 24 U/L (ref 10–40)
BILIRUB SERPL-MCNC: 0.4 MG/DL (ref 0.1–1)
BUN SERPL-MCNC: 43 MG/DL (ref 8–23)
CALCIUM SERPL-MCNC: 10 MG/DL (ref 8.7–10.5)
CHLORIDE SERPL-SCNC: 108 MMOL/L (ref 95–110)
CO2 SERPL-SCNC: 21 MMOL/L (ref 23–29)
CREAT SERPL-MCNC: 2.3 MG/DL (ref 0.5–1.4)
EST. GFR  (AFRICAN AMERICAN): 21 ML/MIN/1.73 M^2
EST. GFR  (NON AFRICAN AMERICAN): 18 ML/MIN/1.73 M^2
GLUCOSE SERPL-MCNC: 83 MG/DL (ref 70–110)
POTASSIUM SERPL-SCNC: 4.3 MMOL/L (ref 3.5–5.1)
PROT SERPL-MCNC: 7.4 G/DL (ref 6–8.4)
SODIUM SERPL-SCNC: 142 MMOL/L (ref 136–145)

## 2019-07-29 PROCEDURE — 36415 COLL VENOUS BLD VENIPUNCTURE: CPT | Mod: HCNC

## 2019-07-29 PROCEDURE — 99214 PR OFFICE/OUTPT VISIT, EST, LEVL IV, 30-39 MIN: ICD-10-PCS | Mod: HCNC,S$GLB,, | Performed by: INTERNAL MEDICINE

## 2019-07-29 PROCEDURE — 99214 OFFICE O/P EST MOD 30 MIN: CPT | Mod: HCNC,S$GLB,, | Performed by: INTERNAL MEDICINE

## 2019-07-29 PROCEDURE — 99999 PR PBB SHADOW E&M-EST. PATIENT-LVL V: CPT | Mod: PBBFAC,HCNC,, | Performed by: INTERNAL MEDICINE

## 2019-07-29 PROCEDURE — 80053 COMPREHEN METABOLIC PANEL: CPT | Mod: HCNC

## 2019-07-29 PROCEDURE — 1101F PR PT FALLS ASSESS DOC 0-1 FALLS W/OUT INJ PAST YR: ICD-10-PCS | Mod: HCNC,CPTII,S$GLB, | Performed by: INTERNAL MEDICINE

## 2019-07-29 PROCEDURE — 99999 PR PBB SHADOW E&M-EST. PATIENT-LVL V: ICD-10-PCS | Mod: PBBFAC,HCNC,, | Performed by: INTERNAL MEDICINE

## 2019-07-29 PROCEDURE — 1101F PT FALLS ASSESS-DOCD LE1/YR: CPT | Mod: HCNC,CPTII,S$GLB, | Performed by: INTERNAL MEDICINE

## 2019-07-29 RX ORDER — DOXAZOSIN 2 MG/1
2 TABLET ORAL NIGHTLY
Qty: 90 TABLET | Refills: 1 | Status: SHIPPED | OUTPATIENT
Start: 2019-07-29 | End: 2020-01-20

## 2019-07-29 RX ORDER — COLCHICINE 0.6 MG/1
0.6 TABLET ORAL EVERY OTHER DAY
Qty: 45 TABLET | Refills: 1 | Status: SHIPPED | OUTPATIENT
Start: 2019-07-29 | End: 2020-07-30

## 2019-07-29 RX ORDER — LOVASTATIN 20 MG/1
TABLET ORAL
Qty: 90 TABLET | Refills: 1 | Status: SHIPPED | OUTPATIENT
Start: 2019-07-29 | End: 2020-05-06 | Stop reason: SDUPTHER

## 2019-07-29 RX ORDER — FELODIPINE 10 MG/1
TABLET, EXTENDED RELEASE ORAL
Qty: 90 TABLET | Refills: 1 | Status: SHIPPED | OUTPATIENT
Start: 2019-07-29 | End: 2020-04-10 | Stop reason: SDUPTHER

## 2019-08-03 ENCOUNTER — TELEPHONE (OUTPATIENT)
Dept: INTERNAL MEDICINE | Facility: CLINIC | Age: 84
End: 2019-08-03

## 2019-08-03 DIAGNOSIS — N18.9 CHRONIC RENAL IMPAIRMENT, UNSPECIFIED CKD STAGE: Primary | ICD-10-CM

## 2019-08-03 NOTE — TELEPHONE ENCOUNTER
Please contact patient and inform her that her lab work indicates she needs to follow up Nephrology.  Order in.  Please schedule

## 2019-08-03 NOTE — PROGRESS NOTES
Subjective:       Patient ID: Johanny Curtis is a 89 y.o. female.    Chief Complaint: Hypertension    HPI  She returns for management of hypertension.  She has had hypertension for over a year.  Current treatment has included medications outlined in medication list.  She denies chest pain or shortness of breath.  No palpitations.  Denies left arm or neck pain.    PAST MEDICAL HISTORY: Hypertension, aortic aneurysm, hypothyroidism/thyroid nodule,   hyperlipidemia, osteopenia, ankle fracture,   osteoarthritis, gout, peripheral neuropathy. She had a  colonoscopy July 2010     PAST SURGICAL HISTORY: Hysterectomy.     MEDICATIONS: Cardura 2 mg at bedtime,  Plendil 10 mg daily, Synthroid 0.088 mg daily, lovastatin 20 mg nightly, colchicine .6mg q OD    ALLERGIES: No known drug allergies.        Review of Systems   Constitutional: Negative for chills, fatigue, fever and unexpected weight change.   Respiratory: Negative for chest tightness and shortness of breath.    Cardiovascular: Negative for chest pain and palpitations.   Gastrointestinal: Negative for abdominal pain and blood in stool.   Neurological: Negative for dizziness, syncope, numbness and headaches.       Objective:      Physical Exam   HENT:   Right Ear: External ear normal.   Left Ear: External ear normal.   Nose: Nose normal.   Mouth/Throat: Oropharynx is clear and moist.   Eyes: Pupils are equal, round, and reactive to light.   Neck: Normal range of motion.   Cardiovascular: Normal rate and regular rhythm.   No murmur heard.  Pulmonary/Chest: Breath sounds normal.   Abdominal: She exhibits no distension. There is no hepatosplenomegaly. There is no tenderness.   Lymphadenopathy:     She has no cervical adenopathy.     She has no axillary adenopathy.   Neurological: She has normal strength and normal reflexes. No cranial nerve deficit or sensory deficit.       Assessment/Plan       Assessment and plan:  Hypertension:  Check CMP.  Schedule SHEEBA and thyroid  ultrasound.  Discussed Pap smear, pelvic exam.  She declined all

## 2019-08-05 NOTE — TELEPHONE ENCOUNTER
Pt informed of results, understanding verbalized. Called nephrology dept to schedule appt. Pt stated she would have to get in contact with her daughter because her daughter brings her to the appts. LM for daughter to call nephrology to schedule appt. Number left on voicemail.

## 2019-08-07 ENCOUNTER — TELEPHONE (OUTPATIENT)
Dept: NEPHROLOGY | Facility: CLINIC | Age: 84
End: 2019-08-07

## 2019-08-07 DIAGNOSIS — N18.4 CHRONIC KIDNEY DISEASE, STAGE IV (SEVERE): Primary | ICD-10-CM

## 2019-08-09 ENCOUNTER — TELEPHONE (OUTPATIENT)
Dept: INTERNAL MEDICINE | Facility: CLINIC | Age: 84
End: 2019-08-09

## 2019-08-12 ENCOUNTER — HOSPITAL ENCOUNTER (OUTPATIENT)
Dept: VASCULAR SURGERY | Facility: CLINIC | Age: 84
Discharge: HOME | End: 2019-08-12
Attending: INTERNAL MEDICINE
Payer: MEDICARE

## 2019-08-12 ENCOUNTER — TELEPHONE (OUTPATIENT)
Dept: NEPHROLOGY | Facility: CLINIC | Age: 84
End: 2019-08-12

## 2019-08-12 ENCOUNTER — OFFICE VISIT (OUTPATIENT)
Dept: INTERNAL MEDICINE | Facility: CLINIC | Age: 84
End: 2019-08-12
Payer: MEDICARE

## 2019-08-12 VITALS
HEART RATE: 73 BPM | HEIGHT: 60 IN | DIASTOLIC BLOOD PRESSURE: 64 MMHG | BODY MASS INDEX: 34.89 KG/M2 | WEIGHT: 177.69 LBS | SYSTOLIC BLOOD PRESSURE: 122 MMHG

## 2019-08-12 DIAGNOSIS — N18.4 ANEMIA IN STAGE 4 CHRONIC KIDNEY DISEASE: ICD-10-CM

## 2019-08-12 DIAGNOSIS — D63.1 ANEMIA IN STAGE 4 CHRONIC KIDNEY DISEASE: ICD-10-CM

## 2019-08-12 DIAGNOSIS — I70.0 AORTIC ATHEROSCLEROSIS: ICD-10-CM

## 2019-08-12 DIAGNOSIS — N18.4 CHRONIC KIDNEY DISEASE, STAGE IV (SEVERE): ICD-10-CM

## 2019-08-12 DIAGNOSIS — I10 ESSENTIAL HYPERTENSION: ICD-10-CM

## 2019-08-12 DIAGNOSIS — I73.9 PVD (PERIPHERAL VASCULAR DISEASE): ICD-10-CM

## 2019-08-12 DIAGNOSIS — N25.0 RENAL OSTEODYSTROPHY: ICD-10-CM

## 2019-08-12 DIAGNOSIS — E04.1 THYROID NODULE: ICD-10-CM

## 2019-08-12 DIAGNOSIS — G60.9 HEREDITARY AND IDIOPATHIC PERIPHERAL NEUROPATHY: ICD-10-CM

## 2019-08-12 DIAGNOSIS — M1A.00X0 IDIOPATHIC CHRONIC GOUT WITHOUT TOPHUS, UNSPECIFIED SITE: ICD-10-CM

## 2019-08-12 DIAGNOSIS — M85.80 OSTEOPENIA, UNSPECIFIED LOCATION: ICD-10-CM

## 2019-08-12 DIAGNOSIS — Z00.00 ENCOUNTER FOR PREVENTIVE HEALTH EXAMINATION: Primary | ICD-10-CM

## 2019-08-12 DIAGNOSIS — I71.40 AAA (ABDOMINAL AORTIC ANEURYSM) WITHOUT RUPTURE: ICD-10-CM

## 2019-08-12 DIAGNOSIS — E66.09 CLASS 1 OBESITY DUE TO EXCESS CALORIES WITH SERIOUS COMORBIDITY IN ADULT, UNSPECIFIED BMI: ICD-10-CM

## 2019-08-12 PROCEDURE — 93923 PR NON-INVASIVE PHYSIOLOGIC STUDY EXTREMITY 3 LEVELS: ICD-10-PCS | Mod: HCNC,S$GLB,, | Performed by: SURGERY

## 2019-08-12 PROCEDURE — 99499 UNLISTED E&M SERVICE: CPT | Mod: HCNC,S$GLB,, | Performed by: NURSE PRACTITIONER

## 2019-08-12 PROCEDURE — 99499 RISK ADDL DX/OHS AUDIT: ICD-10-PCS | Mod: HCNC,S$GLB,, | Performed by: NURSE PRACTITIONER

## 2019-08-12 PROCEDURE — 99999 PR PBB SHADOW E&M-EST. PATIENT-LVL IV: ICD-10-PCS | Mod: PBBFAC,HCNC,, | Performed by: NURSE PRACTITIONER

## 2019-08-12 PROCEDURE — G0439 PR MEDICARE ANNUAL WELLNESS SUBSEQUENT VISIT: ICD-10-PCS | Mod: HCNC,S$GLB,, | Performed by: NURSE PRACTITIONER

## 2019-08-12 PROCEDURE — G0439 PPPS, SUBSEQ VISIT: HCPCS | Mod: HCNC,S$GLB,, | Performed by: NURSE PRACTITIONER

## 2019-08-12 PROCEDURE — 99999 PR PBB SHADOW E&M-EST. PATIENT-LVL IV: CPT | Mod: PBBFAC,HCNC,, | Performed by: NURSE PRACTITIONER

## 2019-08-12 PROCEDURE — 93923 UPR/LXTR ART STDY 3+ LVLS: CPT | Mod: HCNC,S$GLB,, | Performed by: SURGERY

## 2019-08-12 NOTE — PATIENT INSTRUCTIONS
Counseling and Referral of Other Preventative  (Italic type indicates deductible and co-insurance are waived)    Patient Name: Johanny Curtis  Today's Date: 8/12/2019    Health Maintenance       Date Due Completion Date    Influenza Vaccine (1) 08/01/2019 9/28/2018    Shingles Vaccine (2 of 3) 08/12/2020 (Originally 8/24/2016) 6/29/2016  - Obtain new shingles vaccine - SHINGRIX - when available    Lipid Panel 03/27/2020 3/27/2019    DEXA SCAN 04/30/2020 4/30/2018    TETANUS VACCINE 06/29/2026 6/29/2016 (N/S)    Override on 6/29/2016: (N/S) (prescription given)        No orders of the defined types were placed in this encounter.    The following information is provided to all patients.  This information is to help you find resources for any of the problems found today that may be affecting your health:                Living healthy guide: www.[facility].[place].gov      Understanding Diabetes: www.diabetes.org      Eating healthy: www.cdc.gov/healthyweight      CDC home safety checklist: www.cdc.gov/steadi/patient.html      Agency on Aging: www.goea.[place].[facility]      Alcoholics anonymous (AA): www.aa.org      Physical Activity: www.fabiola.nih.gov/ku0yweu      Tobacco use: www.quitwithusla.org

## 2019-08-12 NOTE — PROGRESS NOTES
I offered to discuss end of life issues, including information on how to make advance directives that the patient could use to name someone who would make medical decisions on their behalf if they became too ill to make themselves.    _X_Patient declined, Patient has cognitive impairment. Family makes decisions  ___Patient is interested, I provided paper work and offered to discuss.

## 2019-08-12 NOTE — PROGRESS NOTES
Johanny Curtis presented for a  Medicare AWV and comprehensive Health Risk Assessment today. The following components were reviewed and updated:    · Medical history  · Family History  · Social history  · Allergies and Current Medications  · Health Risk Assessment  · Health Maintenance  · Care Team     ** See Completed Assessments for Annual Wellness Visit within the encounter summary.**       The following assessments were completed:  · Living Situation  · CAGE  · Depression Screening  · Timed Get Up and Go  · Whisper Test  · Cognitive Function Screening - not administered secondary to cognitive impairment  · Nutrition Screening  · ADL Screening  · PAQ Screening    Vitals:    08/12/19 1035   BP: 122/64   BP Location: Right arm   Pulse: 73   Weight: 80.6 kg (177 lb 11.1 oz)   Height: 5' (1.524 m)     Body mass index is 34.7 kg/m².  Physical Exam   Constitutional: She is oriented to person, place, and time.   Frail   HENT:   Head: Normocephalic.   Cardiovascular: Normal rate and regular rhythm.   Pulmonary/Chest: Effort normal and breath sounds normal.   Abdominal: Soft. Bowel sounds are normal.   Musculoskeletal:   In wheelchair today   Neurological: She is alert and oriented to person, place, and time.   Skin: Skin is warm and dry.   Psychiatric: She has a normal mood and affect.   Nursing note and vitals reviewed.        Diagnoses and health risks identified today and associated recommendations/orders:    1. Encounter for preventive health examination  Here for Health Risk Assessment/Annual Wellness Visit.  Health maintenance reviewed and updated. Follow up in one year.    2. Essential hypertension  Chronic, stable on current medications. Followed by PCP.    3. Aortic atherosclerosis  Chronic, stable on current medications. Noted CT abdomen/pelvis 8/02/17. Followed by PCP.    4. AAA (abdominal aortic aneurysm) without rupture  Chronic, stable on current medications. Followed by PCP, Vascular Surgery.    5. PVD  (peripheral vascular disease)  Chronic, stable on current medications. Followed by PCP, Vascular Surgery.    6. Hereditary and idiopathic peripheral neuropathy  Chronic, stable. Followed by PCP.    7. Chronic kidney disease, stage IV (severe)  Chronic, stable. Followed by PCP, Nephrology.    8. Anemia in stage 4 chronic kidney disease  Chronic, stable. Followed by PCP, Nephrology.    9. Renal osteodystrophy  Chronic, stable. Followed by PCP, Nephrology.    10. Thyroid nodule  Chronic, stable. Followed by PCP.    11. Class 1 obesity due to excess calories with serious comorbidity in adult, unspecified BMI  Chronic, stable. Followed by PCP.    12. Idiopathic chronic gout without tophus, unspecified site  Chronic, stable on current medication. Followed by PCP.    13. Osteopenia, unspecified location  Chronic, stable on current medication. Followed by PCP.      Provided Johanny with a 5-10 year written screening schedule and personal prevention plan. Recommendations were developed using the USPSTF age appropriate recommendations. Education, counseling, and referrals were provided as needed. After Visit Summary printed and given to patient which includes a list of additional screenings\tests needed.    Follow up in about 5 months (around 1/25/2020).with PCP    Reva Dunaway NP

## 2019-08-13 ENCOUNTER — TELEPHONE (OUTPATIENT)
Dept: NEPHROLOGY | Facility: CLINIC | Age: 84
End: 2019-08-13

## 2019-08-13 ENCOUNTER — OFFICE VISIT (OUTPATIENT)
Dept: NEPHROLOGY | Facility: CLINIC | Age: 84
End: 2019-08-13
Payer: MEDICARE

## 2019-08-13 VITALS
WEIGHT: 177.69 LBS | DIASTOLIC BLOOD PRESSURE: 56 MMHG | SYSTOLIC BLOOD PRESSURE: 114 MMHG | HEART RATE: 67 BPM | OXYGEN SATURATION: 100 % | HEIGHT: 60 IN | RESPIRATION RATE: 14 BRPM | BODY MASS INDEX: 34.89 KG/M2

## 2019-08-13 DIAGNOSIS — N18.4 ANEMIA IN STAGE 4 CHRONIC KIDNEY DISEASE: ICD-10-CM

## 2019-08-13 DIAGNOSIS — I10 ESSENTIAL HYPERTENSION: ICD-10-CM

## 2019-08-13 DIAGNOSIS — D63.1 ANEMIA IN STAGE 4 CHRONIC KIDNEY DISEASE: ICD-10-CM

## 2019-08-13 DIAGNOSIS — E87.20 ACIDOSIS, METABOLIC: Primary | ICD-10-CM

## 2019-08-13 DIAGNOSIS — M1A.00X0 IDIOPATHIC CHRONIC GOUT WITHOUT TOPHUS, UNSPECIFIED SITE: ICD-10-CM

## 2019-08-13 DIAGNOSIS — I73.9 PVD (PERIPHERAL VASCULAR DISEASE): ICD-10-CM

## 2019-08-13 DIAGNOSIS — N18.4 CHRONIC KIDNEY DISEASE, STAGE IV (SEVERE): ICD-10-CM

## 2019-08-13 DIAGNOSIS — I10 HYPERTENSION, UNSPECIFIED TYPE: ICD-10-CM

## 2019-08-13 PROCEDURE — 1101F PT FALLS ASSESS-DOCD LE1/YR: CPT | Mod: HCNC,CPTII,S$GLB, | Performed by: INTERNAL MEDICINE

## 2019-08-13 PROCEDURE — 99214 PR OFFICE/OUTPT VISIT, EST, LEVL IV, 30-39 MIN: ICD-10-PCS | Mod: HCNC,S$GLB,, | Performed by: INTERNAL MEDICINE

## 2019-08-13 PROCEDURE — 99214 OFFICE O/P EST MOD 30 MIN: CPT | Mod: HCNC,S$GLB,, | Performed by: INTERNAL MEDICINE

## 2019-08-13 PROCEDURE — 99499 UNLISTED E&M SERVICE: CPT | Mod: HCNC,S$GLB,, | Performed by: INTERNAL MEDICINE

## 2019-08-13 PROCEDURE — 99999 PR PBB SHADOW E&M-EST. PATIENT-LVL IV: CPT | Mod: PBBFAC,HCNC,, | Performed by: INTERNAL MEDICINE

## 2019-08-13 PROCEDURE — 99999 PR PBB SHADOW E&M-EST. PATIENT-LVL IV: ICD-10-PCS | Mod: PBBFAC,HCNC,, | Performed by: INTERNAL MEDICINE

## 2019-08-13 PROCEDURE — 1101F PR PT FALLS ASSESS DOC 0-1 FALLS W/OUT INJ PAST YR: ICD-10-PCS | Mod: HCNC,CPTII,S$GLB, | Performed by: INTERNAL MEDICINE

## 2019-08-13 PROCEDURE — 99499 RISK ADDL DX/OHS AUDIT: ICD-10-PCS | Mod: HCNC,S$GLB,, | Performed by: INTERNAL MEDICINE

## 2019-08-13 RX ORDER — SODIUM BICARBONATE 650 MG/1
650 TABLET ORAL 2 TIMES DAILY
Qty: 180 TABLET | Refills: 4 | COMMUNITY
Start: 2019-08-13 | End: 2020-05-27 | Stop reason: SDUPTHER

## 2019-08-13 NOTE — LETTER
August 13, 2019      Leticia Weems MD  [address]  [place] [address]           [facility] - Nephrology  [address]  [place] [address]  Phone: [phone]  Fax: [phone]          Patient: Johanny Curtis   MR Number: [id number]   YOB: 1930   Date of Visit: 8/13/2019       Dear Dr. Leticia Weems:    Thank you for referring Johanny Curtis to me for evaluation. Attached you will find relevant portions of my assessment and plan of care.    If you have questions, please do not hesitate to call me. I look forward to following Johanny Curtis along with you.    Sincerely,    Columba Kelsey MD    Enclosure  CC:  No Recipients    If you would like to receive this communication electronically, please contact [email or web address] or [phone] to request more information on [employer] access.    For providers and/or their staff who would like to refer a patient to [facility], please contact us through our one-stop-shop provider referral line, [facility], at [phone].    If you feel you have received this communication in error or would no longer like to receive these types of communications, please e-mail [email or web address]

## 2019-08-13 NOTE — PROGRESS NOTES
Progress Note  Nephrology      Referring physician: Leticia Weems MD    SUBJECTIVE:   89 y.o. female who is new to me  has a past medical history of AAA (abdominal aortic aneurysm), Anemia in CKD (chronic kidney disease), Arthritis, Cataract, Gout, chronic, High cholesterol, Hypertension, Hypothyroidism, Obesity, Plantar fasciitis, PVD (peripheral vascular disease), and Thyroid trouble. who has been following up in renal clinic for CKD, she feels well, no sob or urinary symptoms,no major complaints, her cr was noted to be in baseline of around 2s with deterioration over years, she uses walker at home but her functionality is declining.        OBJECTIVE:     Vitals:    08/13/19 1008   BP: (!) 114/56   Pulse: 67   Resp: 14          Physical Exam:  General: no distress, well nourished, on wheel chair  HENT: PERRLA, Normal mouth nose and ears.  Neck: no JVD and thyroid not enlarged, symmetric, no tenderness/mass/nodules  Lungs: clear to auscultation bilaterally and normal respiratory effort  Cardiovascular: regular rate and rhythm, S1, S2 normal, no murmur, click, rub or gallop.   Abdomen: soft, non-tender non-distented; bowel sounds normal  Skin: No rashes or lesions  Musculoskeletal:no edema, no deformities.   Lymph Nodes: No cervical or supraclavicular adenopathy  Neurologic: Normal strength and tone. No focal numbness or weakness    Dialysis Access: Not applicable.        Medications:    Current Outpatient Medications:     acetaminophen-codeine 300-30mg (TYLENOL #3) 300-30 mg Tab, 1 po tid prn, Disp: 30 tablet, Rfl: 1    ammonium lactate 12 % Crea, Apply twice daily to affected parts both feet as needed., Disp: 140 g, Rfl: 11    calcium citrate 250 mg calcium Tab, Take 1 tablet by mouth 2 (two) times daily., Disp: 180 each, Rfl: 3    colchicine (COLCRYS) 0.6 mg tablet, Take 1 tablet (0.6 mg total) by mouth every other day., Disp: 45 tablet, Rfl: 1    doxazosin (CARDURA) 2 MG tablet, Take 1 tablet (2 mg  total) by mouth nightly. At bedtime, Disp: 90 tablet, Rfl: 1    felodipine (PLENDIL) 10 MG 24 hr tablet, TAKE 1 TABLET EVERY DAY, Disp: 90 tablet, Rfl: 1    ferrous sulfate 325 mg (65 mg iron) Tab tablet, Take 1 tablet (325 mg total) by mouth 2 (two) times daily., Disp: 180 tablet, Rfl: 3    levothyroxine (SYNTHROID) 88 MCG tablet, TAKE 1 TABLET (88 MCG TOTAL) BY MOUTH ONCE DAILY., Disp: 90 tablet, Rfl: 0    lovastatin (MEVACOR) 20 MG tablet, 1 po At bedtime, Disp: 90 tablet, Rfl: 1    MULTIVIT-IRON-MIN-FOLIC ACID 3,500-18-0.4 UNIT-MG-MG ORAL CHEW, Take by mouth., Disp: , Rfl:     sodium bicarbonate 650 MG tablet, Take 1 tablet (650 mg total) by mouth 2 (two) times daily., Disp: 180 tablet, Rfl: 4         Laboratory:  Lab Results   Component Value Date    CREATININE 2.3 (H) 07/29/2019       Prot/Creat Ratio, Ur   Date Value Ref Range Status   08/14/2017 Unable to calculate 0.00 - 0.20 Final   01/23/2015 Unable to calculate 0.0 - 0.2 Final       Lab Results   Component Value Date     07/29/2019    K 4.3 07/29/2019    CO2 21 (L) 07/29/2019       last PTH   Lab Results   Component Value Date    .0 (H) 08/04/2017    CALCIUM 10.0 07/29/2019    PHOS 2.4 (L) 08/14/2017    PHOS 2.4 (L) 08/14/2017       Lab Results   Component Value Date    HGB 11.6 (L) 09/28/2018        Lab Results   Component Value Date    HGBA1C 6.1 04/28/2006       Lab Results   Component Value Date    LDLCALC 100.6 03/27/2019       Other Labs were reviewed  I personally reviewed her renal US: shows chronic changes    ASSESSMENT/PLAN:     CKD IV  -likely from HTN and PVD  -Cr baseline ~ 2.5 with GFR ~ 20 ml/min which has been declining 2-3 ml/min/yr  -UPCR none  -Renal US in 2017 with chronic changes and two left cysts/ one right cyst    HTN  -well controlled off meds    Anemia  -from ckd  -Hgb goal ~ 10  -Iron stores not available    Secondary Hyperparathyroidism  -Phos/Ca acceptable  -PTH n/a    Acid/Base  -None gap Metabolic  acidosis        PLAN:  -add Nabicarb 650 mg bid.  -discussed the goal of care and possible need for dialysis down the road, she and family will think about it, given risks and benefits of it.  -Avoid NSAIDs intake        RTC in 3 months with blood work      SARWAT MAURICIO MD  NEPHROLOGY ATTENDING

## 2019-08-16 ENCOUNTER — TELEPHONE (OUTPATIENT)
Dept: INTERNAL MEDICINE | Facility: CLINIC | Age: 84
End: 2019-08-16

## 2019-08-16 DIAGNOSIS — I73.9 PERIPHERAL VASCULAR DISEASE: Primary | ICD-10-CM

## 2019-08-16 NOTE — TELEPHONE ENCOUNTER
Please contact patient and inform her that her vascular study does reveal evidence of vascular disease. I would like for her to see vascular Medicine.  Order in.  Please schedule

## 2019-08-19 ENCOUNTER — HOSPITAL ENCOUNTER (OUTPATIENT)
Dept: RADIOLOGY | Facility: HOSPITAL | Age: 84
Discharge: HOME | End: 2019-08-19
Attending: INTERNAL MEDICINE
Payer: MEDICARE

## 2019-08-19 DIAGNOSIS — E04.1 THYROID NODULE: ICD-10-CM

## 2019-08-19 PROCEDURE — 76536 US EXAM OF HEAD AND NECK: CPT | Mod: TC,HCNC

## 2019-08-19 PROCEDURE — 76536 US SOFT TISSUE HEAD NECK THYROID: ICD-10-PCS | Mod: 26,HCNC,, | Performed by: INTERNAL MEDICINE

## 2019-08-19 PROCEDURE — 76536 US EXAM OF HEAD AND NECK: CPT | Mod: 26,HCNC,, | Performed by: INTERNAL MEDICINE

## 2019-08-26 ENCOUNTER — OFFICE VISIT (OUTPATIENT)
Dept: PODIATRY | Facility: CLINIC | Age: 84
End: 2019-08-26
Payer: MEDICARE

## 2019-08-26 ENCOUNTER — OFFICE VISIT (OUTPATIENT)
Dept: CARDIOLOGY | Facility: CLINIC | Age: 84
End: 2019-08-26
Payer: MEDICARE

## 2019-08-26 VITALS
DIASTOLIC BLOOD PRESSURE: 62 MMHG | HEIGHT: 57 IN | WEIGHT: 175 LBS | SYSTOLIC BLOOD PRESSURE: 159 MMHG | HEART RATE: 82 BPM | BODY MASS INDEX: 37.76 KG/M2

## 2019-08-26 VITALS
SYSTOLIC BLOOD PRESSURE: 146 MMHG | HEIGHT: 57 IN | HEART RATE: 65 BPM | WEIGHT: 175.94 LBS | DIASTOLIC BLOOD PRESSURE: 64 MMHG | BODY MASS INDEX: 37.95 KG/M2

## 2019-08-26 DIAGNOSIS — L84 CORN OR CALLUS: ICD-10-CM

## 2019-08-26 DIAGNOSIS — I70.0 AORTIC ATHEROSCLEROSIS: ICD-10-CM

## 2019-08-26 DIAGNOSIS — I73.9 PERIPHERAL ARTERIAL DISEASE: Primary | ICD-10-CM

## 2019-08-26 DIAGNOSIS — E78.5 HYPERLIPIDEMIA, UNSPECIFIED HYPERLIPIDEMIA TYPE: ICD-10-CM

## 2019-08-26 DIAGNOSIS — N18.4 CHRONIC KIDNEY DISEASE, STAGE IV (SEVERE): ICD-10-CM

## 2019-08-26 DIAGNOSIS — B35.1 ONYCHOMYCOSIS DUE TO DERMATOPHYTE: ICD-10-CM

## 2019-08-26 DIAGNOSIS — I71.40 AAA (ABDOMINAL AORTIC ANEURYSM) WITHOUT RUPTURE: ICD-10-CM

## 2019-08-26 DIAGNOSIS — I73.9 PERIPHERAL VASCULAR DISEASE: Primary | ICD-10-CM

## 2019-08-26 DIAGNOSIS — I10 ESSENTIAL HYPERTENSION: ICD-10-CM

## 2019-08-26 DIAGNOSIS — I73.9 PVD (PERIPHERAL VASCULAR DISEASE): ICD-10-CM

## 2019-08-26 PROBLEM — E66.09 CLASS 1 OBESITY DUE TO EXCESS CALORIES WITH SERIOUS COMORBIDITY IN ADULT: Status: RESOLVED | Noted: 2017-07-06 | Resolved: 2019-08-26

## 2019-08-26 PROCEDURE — 99999 PR PBB SHADOW E&M-EST. PATIENT-LVL IV: ICD-10-PCS | Mod: PBBFAC,HCNC,,

## 2019-08-26 PROCEDURE — 99999 PR PBB SHADOW E&M-EST. PATIENT-LVL III: CPT | Mod: PBBFAC,HCNC,, | Performed by: PODIATRIST

## 2019-08-26 PROCEDURE — 99213 OFFICE O/P EST LOW 20 MIN: CPT | Mod: 25,HCNC,S$GLB, | Performed by: PODIATRIST

## 2019-08-26 PROCEDURE — 11721 DEBRIDE NAIL 6 OR MORE: CPT | Mod: 59,Q8,HCNC,S$GLB | Performed by: PODIATRIST

## 2019-08-26 PROCEDURE — 99999 PR PBB SHADOW E&M-EST. PATIENT-LVL IV: CPT | Mod: PBBFAC,HCNC,,

## 2019-08-26 PROCEDURE — 99999 PR PBB SHADOW E&M-EST. PATIENT-LVL III: ICD-10-PCS | Mod: PBBFAC,HCNC,, | Performed by: PODIATRIST

## 2019-08-26 PROCEDURE — 99213 PR OFFICE/OUTPT VISIT, EST, LEVL III, 20-29 MIN: ICD-10-PCS | Mod: 25,HCNC,S$GLB, | Performed by: PODIATRIST

## 2019-08-26 PROCEDURE — 99203 PR OFFICE/OUTPT VISIT, NEW, LEVL III, 30-44 MIN: ICD-10-PCS | Mod: HCNC,S$GLB,, | Performed by: INTERNAL MEDICINE

## 2019-08-26 PROCEDURE — 1101F PT FALLS ASSESS-DOCD LE1/YR: CPT | Mod: HCNC,CPTII,S$GLB, | Performed by: INTERNAL MEDICINE

## 2019-08-26 PROCEDURE — 99203 OFFICE O/P NEW LOW 30 MIN: CPT | Mod: HCNC,S$GLB,, | Performed by: INTERNAL MEDICINE

## 2019-08-26 PROCEDURE — 1101F PR PT FALLS ASSESS DOC 0-1 FALLS W/OUT INJ PAST YR: ICD-10-PCS | Mod: HCNC,CPTII,S$GLB, | Performed by: PODIATRIST

## 2019-08-26 PROCEDURE — 1101F PT FALLS ASSESS-DOCD LE1/YR: CPT | Mod: HCNC,CPTII,S$GLB, | Performed by: PODIATRIST

## 2019-08-26 PROCEDURE — 11056 PARNG/CUTG B9 HYPRKR LES 2-4: CPT | Mod: Q8,HCNC,S$GLB, | Performed by: PODIATRIST

## 2019-08-26 PROCEDURE — 11721 PR DEBRIDEMENT OF NAILS, 6 OR MORE: ICD-10-PCS | Mod: 59,Q8,HCNC,S$GLB | Performed by: PODIATRIST

## 2019-08-26 PROCEDURE — 11056 PR TRIM BENIGN HYPERKERATOTIC SKIN LESION,2-4: ICD-10-PCS | Mod: Q8,HCNC,S$GLB, | Performed by: PODIATRIST

## 2019-08-26 PROCEDURE — 1101F PR PT FALLS ASSESS DOC 0-1 FALLS W/OUT INJ PAST YR: ICD-10-PCS | Mod: HCNC,CPTII,S$GLB, | Performed by: INTERNAL MEDICINE

## 2019-08-26 RX ORDER — NAPROXEN SODIUM 220 MG/1
81 TABLET, FILM COATED ORAL DAILY
Qty: 30 TABLET | Refills: 11 | COMMUNITY
Start: 2019-08-26 | End: 2022-01-01

## 2019-08-26 NOTE — PROGRESS NOTES
Clinic Note  8/26/2019      Subjective:       Patient ID:  Johanny is a 89 y.o. female being seen for an new visit.      Chief Complaint: Peripheral Vascular Disease    Ms. Curtis is an 89 y/o AAF presenting to the cardiology clinic per referral from her primary care doctor from recent SHEEBA 8/19/19 indicating PAD. The patient has known medical issues of AAA (diagnosed in 2015 3 cm --> 2017 3X3.1cm followed by Dr. Gordon), HLD, HTN, and CKD IV. The patient reports no pain or burning sensation in her legs with walking. 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 She does not reported any temperature changes, loss of sensation, or skin color or texture changes in her legs.     SHEEBA Showed 0.88 on the Right LE and 0.75 on the Left LE indicating Mild peripheral arterial obstructive disease and mild-mod peripheral arterial obstructive disease respectively. B/L pedal pulses were palpable and right posterior tibial pulse was biphasic on doppler with left posterior tibial pulse weak per doppler. Patient has recently been started on a lovastatin 20 mg QD 7/29/19 by PCP for elevated lipid panel on 3/2019 showing total cholesterol 181,  HDL  50. Repeat lipid panel ordered with goal for LDL to be below 70. Patient was started on ASA 81 mg for peripheral vascular disease. Plans to follow up in 6 months for re-evaluation of B/L LE PVD and further work up of trigeminy arrhythmia auscultated this visit.      Family History   Problem Relation Age of Onset    Breast cancer Sister     Cataracts Son     Glaucoma Son     Mental illness Son     Diabetes Son     Glaucoma Daughter     Lupus Daughter     Meningitis Son     Heart disease Brother     Cancer Sister         leukemia    Cancer Sister         pancreatic    No Known Problems Brother     Cancer Brother         unknown    Diabetes Son      Blindness Neg Hx     Amblyopia Neg Hx     Hypertension Neg Hx     Macular degeneration Neg Hx     Retinal detachment Neg Hx     Strabismus Neg Hx     Stroke Neg Hx     Thyroid disease Neg Hx      Social History     Socioeconomic History    Marital status:      Spouse name: Not on file    Number of children: Not on file    Years of education: Not on file    Highest education level: Not on file   Occupational History    Not on file   Social Needs    Financial resource strain: Not on file    Food insecurity:     Worry: Not on file     Inability: Not on file    Transportation needs:     Medical: Not on file     Non-medical: Not on file   Tobacco Use    Smoking status: Former Smoker     Packs/day: 0.50     Years: 60.00     Pack years: 30.00     Last attempt to quit: 2001     Years since quittin.1    Smokeless tobacco: Never Used    Tobacco comment: quit in the    Substance and Sexual Activity    Alcohol use: No    Drug use: No    Sexual activity: Not Currently   Lifestyle    Physical activity:     Days per week: Not on file     Minutes per session: Not on file    Stress: Not on file   Relationships    Social connections:     Talks on phone: Not on file     Gets together: Not on file     Attends Religion service: Not on file     Active member of club or organization: Not on file     Attends meetings of clubs or organizations: Not on file     Relationship status: Not on file   Other Topics Concern    Not on file   Social History Narrative    Not on file     Past Surgical History:   Procedure Laterality Date    BREAST BIOPSY Left     BREAST SURGERY Left     benign breast lump    CATARACT EXTRACTION  3/18/13    both eyes -     CHOLECYSTECTOMY      EYE SURGERY      HYSTERECTOMY      INSERTION, IOL PROSTHESIS Left 2013    [facility]    PHACOEMULSIFICATION, CATARACT Left 2013    [facility]  at [facility]    PHACOEMULSIFICATION, CATARACT Right 3/18/2013    [facility] at [facility] OR    SKIN BIOPSY      Left breast     Medication List with Changes/Refills   New Medications    ASPIRIN 81 MG CHEW    Take 1 tablet (81 mg total) by mouth once daily.   Current Medications    ACETAMINOPHEN-CODEINE 300-30MG (TYLENOL #3) 300-30 MG TAB    1 po tid prn    AMMONIUM LACTATE 12 % CREA    Apply twice daily to affected parts both feet as needed.    CALCIUM CITRATE 250 MG CALCIUM TAB    Take 1 tablet by mouth 2 (two) times daily.    COLCHICINE (COLCRYS) 0.6 MG TABLET    Take 1 tablet (0.6 mg total) by mouth every other day.    DOXAZOSIN (CARDURA) 2 MG TABLET    Take 1 tablet (2 mg total) by mouth nightly. At bedtime    FELODIPINE (PLENDIL) 10 MG 24 HR TABLET    TAKE 1 TABLET EVERY DAY    FERROUS SULFATE 325 MG (65 MG IRON) TAB TABLET    Take 1 tablet (325 mg total) by mouth 2 (two) times daily.    LEVOTHYROXINE (SYNTHROID) 88 MCG TABLET    TAKE 1 TABLET (88 MCG TOTAL) BY MOUTH ONCE DAILY.    LOVASTATIN (MEVACOR) 20 MG TABLET    1 po At bedtime    MULTIVIT-IRON-MIN-FOLIC ACID 3,500-18-0.4 UNIT-MG-MG ORAL CHEW    Take by mouth.    SODIUM BICARBONATE 650 MG TABLET    Take 1 tablet (650 mg total) by mouth 2 (two) times daily.     Patient Active Problem List   Diagnosis    Essential hypertension    Hereditary and idiopathic peripheral neuropathy    Gout, chronic    Thyroid nodule    Hypothyroidism    Renal osteodystrophy    Anemia in CKD (chronic kidney disease)    Chronic kidney disease, stage IV (severe)    Class 1 obesity due to excess calories with serious comorbidity in adult    AAA (abdominal aortic aneurysm) without rupture    PVD (peripheral vascular disease)    Aortic atherosclerosis    Osteopenia     Review of Systems   Constitutional: Negative for chills and malaise/fatigue.   HENT: Negative for hearing loss and tinnitus.    Respiratory: Negative for cough and shortness of breath.   "  Cardiovascular: Negative for chest pain, palpitations, claudication and leg swelling.   Gastrointestinal: Negative for abdominal pain, blood in stool, heartburn, nausea and vomiting.   Neurological: Negative for dizziness, tingling, loss of consciousness, weakness and headaches.         Objective:      BP (!) 146/64 (BP Location: Left arm, Patient Position: Sitting, BP Method: Large (Automatic))   Pulse 65   Ht 4' 9" (1.448 m)   Wt 79.8 kg (175 lb 14.8 oz)   BMI 38.07 kg/m²   Estimated body mass index is 38.07 kg/m² as calculated from the following:    Height as of this encounter: 4' 9" (1.448 m).    Weight as of this encounter: 79.8 kg (175 lb 14.8 oz).  Physical Exam   Constitutional: She is oriented to person, place, and time and well-developed, well-nourished, and in no distress. No distress.   HENT:   Head: Normocephalic and atraumatic.   Eyes: Conjunctivae and EOM are normal.   Neck: Neck supple. No JVD present.   Cardiovascular: An irregular rhythm present.   Pulses:       Dorsalis pedis pulses are 2+ on the right side, and 1+ on the left side.        Posterior tibial pulses are 2+ on the right side, and 1+ on the left side.   Trigeminal arrhythmia auscultated  No abdominal bruit auscultated   Abdominal: Soft. Bowel sounds are normal. She exhibits no distension. There is no tenderness.   Musculoskeletal: She exhibits no edema.   Neurological: She is alert and oriented to person, place, and time.   Skin: Skin is warm and dry. She is not diaphoretic.   Calluses present on planta aspect of feet B/L   Psychiatric: Mood, memory, affect and judgment normal.         Assessment and Plan:     1. Peripheral Vascular Disease  -- repeat lipid panel ordered with goal LDL less than 70. Continue on statin at current dosage for now  -- begin taking ASA 81 mg    2. AAA  -- Follow up with Dr. Gordon in one year for repeat U/S    3. Trigeminy arrhythmia  -- Asymptomatic  -- Follow up in 6 months for further work up and " management    4. Hyperlipidemia   -- see above    5. Essential HTN  -- Continue current home medications      Problem List Items Addressed This Visit     None      Visit Diagnoses     Peripheral arterial disease    -  Primary    Relevant Orders    Lipid panel    Hyperlipidemia         Relevant Orders    Lipid panel          Follow up:   Follow up in about 6 months (around 2/26/2020).     Other Orders Placed This Visit:  Orders Placed This Encounter   Procedures    Lipid panel     Standing Status:   Future     Standing Expiration Date:   10/24/2020           Divina Tripathi

## 2019-08-26 NOTE — LETTER
August 29, 2019      Leticia Weems MD  [address]  [place] [address]           [facility] - Podiatry  [address]  [place] [address]  Phone: [phone]          Patient: Johanny Curtis   MR Number: [id number]   YOB: 1930   Date of Visit: 8/26/2019       Dear Dr. Leticia Weems:    Thank you for referring Johanny Curtis to me for evaluation. Attached you will find relevant portions of my assessment and plan of care.    If you have questions, please do not hesitate to call me. I look forward to following Johanny Curtis along with you.    Sincerely,    Alexx Huang, DPOLIVER    Enclosure  CC:  No Recipients    If you would like to receive this communication electronically, please contact [email or web address] or [phone] to request more information on [facility] Link access.    For providers and/or their staff who would like to refer a patient to [facility], please contact us through our one-stop-shop provider referral line, [facility], at [phone].    If you feel you have received this communication in error or would no longer like to receive these types of communications, please e-mail [email or web address]

## 2019-08-29 NOTE — PROGRESS NOTES
Subjective:      Patient ID: Johanny Curtis is a 89 y.o. female.    Chief Complaint: Foot Pain (calluse )    Johanny is a 89 y.o. female who presents to the clinic for evaluation and treatment of high risk feet. Johanny has a past medical history of AAA (abdominal aortic aneurysm), Anemia in CKD (chronic kidney disease), Arthritis, Cataract, Class 1 obesity due to excess calories with serious comorbidity in adult (7/6/2017), Gout, chronic, High cholesterol, Hypertension, Hypothyroidism, Obesity, Plantar fasciitis, PVD (peripheral vascular disease), and Thyroid trouble. The patient's chief complaint is long, thick toenails. This patient has documented high risk feet requiring routine maintenance secondary to peripheral vascular disease.    PCP: Leticai Weems MD    Date Last Seen by PCP: alma      Current shoe gear:  Affected Foot: Casual shoes     Unaffected Foot: Casual shoes    Last encounter in this department: 4/10/2019    Hemoglobin A1C   Date Value Ref Range Status   04/28/2006 6.1 4.5 - 6.2 % Final       Review of Systems   Constitution: Negative for chills and fever.   HENT: Negative for congestion and tinnitus.    Eyes: Negative for double vision and visual disturbance.   Cardiovascular: Positive for claudication. Negative for chest pain.   Respiratory: Negative for hemoptysis and shortness of breath.    Endocrine: Negative for cold intolerance and heat intolerance.   Hematologic/Lymphatic: Negative for adenopathy and bleeding problem.   Skin: Positive for color change, dry skin, nail changes and poor wound healing.   Musculoskeletal: Positive for stiffness. Negative for myalgias.   Gastrointestinal: Negative for nausea and vomiting.   Genitourinary: Negative for dysuria and hematuria.   Neurological: Positive for sensory change.   Psychiatric/Behavioral: Negative for altered mental status and suicidal ideas.   Allergic/Immunologic: Negative for environmental allergies and persistent infections.            Objective:      Physical Exam   Constitutional: She is oriented to person, place, and time. She appears well-developed and well-nourished.   Cardiovascular:   Pulses:       Dorsalis pedis pulses are 0 on the right side, and 0 on the left side.        Posterior tibial pulses are 1+ on the right side, and 1+ on the left side.   Pulmonary/Chest: Effort normal.   Musculoskeletal: Normal range of motion.   Anterior, lateral, and posterior muscle groups bilateral lower extremities show strength 4 over 5 symmetrically. Inspection and palpation of the joints and bones reveal no crepitus or joint effusion. No tenderness upon palpation. Mild plantar flexor contractures noted to digits 2 through 5 bilaterally.  Angle and base of gait are normal.   Feet:   Right Foot:   Skin Integrity: Positive for callus and dry skin.   Left Foot:   Skin Integrity: Positive for callus and dry skin.   Neurological: She is alert and oriented to person, place, and time. She displays atrophy and abnormal reflex. A sensory deficit is present.   Reflex Scores:       Patellar reflexes are 1+ on the right side and 1+ on the left side.       Achilles reflexes are 1+ on the right side and 1+ on the left side.  Skin: Skin is warm and dry. Capillary refill takes 2 to 3 seconds. There is pallor.   Skin bilateral lower extremities noted to be thin, dry, and atrophic.  Toenails thickened, discolored, with subungual fungal debris and tenderness noted.  Hyperkeratotic lesions noted to both feet plantarly with tenderness.   Psychiatric: She has a normal mood and affect.   Vitals reviewed.            Assessment:       No diagnosis found.      Plan:       There are no diagnoses linked to this encounter.  I counseled the patient on her conditions, their implications and medical management.      Routine Foot Care    Performed by:  Alexx Huang. DPM  Authorized by:  Patient     Consent Done?:  Yes (Verbal)     Nail Care Type:  Debride  Location(s): All  (Left  1st Toe, Left 3rd Toe, Left 2nd Toe, Left 4th Toe, Left 5th Toe, Right 1st Toe, Right 2nd Toe, Right 3rd Toe, Right 4th Toe and Right 5th Toe)  Patient tolerance:  Patient tolerated the procedure well with no immediate complications     With patient's permission, the toenails mentioned above were aggressively reduced and debrided using a nail nipper, removing all offending nail and debris. The patient will continue to monitor the areas daily, inspect the feet, wear protective shoe gear when ambulatory, and moisturizer to maintain skin integrity.      Callus Care Type: Debride    With patient's permission, the calluses/hyperkeratotic lesions mentioned above were aggressively reduced and debrided using a number 15 blade. The patient will continue to monitor the areas daily, inspect the feet, wear protective shoe gear when ambulatory, and moisturizer to maintain skin integrity.     Appropriate skin care, foot care, and shoe gear were all discussed.  Follow-up in 3-6 months.    .

## 2019-09-26 ENCOUNTER — LAB VISIT (OUTPATIENT)
Dept: LAB | Facility: HOSPITAL | Age: 84
End: 2019-09-26
Payer: MEDICARE

## 2019-09-26 DIAGNOSIS — E78.5 HYPERLIPIDEMIA, UNSPECIFIED HYPERLIPIDEMIA TYPE: ICD-10-CM

## 2019-09-26 DIAGNOSIS — I73.9 PERIPHERAL ARTERIAL DISEASE: ICD-10-CM

## 2019-09-26 LAB
CHOLEST SERPL-MCNC: 179 MG/DL (ref 120–199)
CHOLEST/HDLC SERPL: 4.1 {RATIO} (ref 2–5)
HDLC SERPL-MCNC: 44 MG/DL (ref 40–75)
HDLC SERPL: 24.6 % (ref 20–50)
LDLC SERPL CALC-MCNC: 107.6 MG/DL (ref 63–159)
NONHDLC SERPL-MCNC: 135 MG/DL
TRIGL SERPL-MCNC: 137 MG/DL (ref 30–150)

## 2019-09-26 PROCEDURE — 36415 COLL VENOUS BLD VENIPUNCTURE: CPT | Mod: HCNC

## 2019-09-26 PROCEDURE — 80061 LIPID PANEL: CPT | Mod: HCNC

## 2019-09-30 NOTE — TELEPHONE ENCOUNTER
----- Message from Sarah Kruse sent at 9/30/2019  1:52 PM CDT -----  Contact: patient [phone]  Patient is calling for an RX refill or new RX.  Is this a refill or new RX:  refill  RX name and strength: acetaminophen-codeine 300-30mg (TYLENOL #3) 300-30 mg Tab  Directions (copy/paste from chart):   1 po tid prn  Is this a 30 day or 90 day RX:  30  Local pharmacy or mail order pharmacy:  local  Pharmacy name and phone # [id number] - [place], [place] - [address] AT [facility] [phone]    Comments:

## 2019-10-01 RX ORDER — ACETAMINOPHEN AND CODEINE PHOSPHATE 300; 30 MG/1; MG/1
TABLET ORAL
Qty: 30 TABLET | Refills: 1 | OUTPATIENT
Start: 2019-10-01

## 2019-11-08 ENCOUNTER — TELEPHONE (OUTPATIENT)
Dept: NEPHROLOGY | Facility: CLINIC | Age: 84
End: 2019-11-08

## 2019-11-11 ENCOUNTER — TELEPHONE (OUTPATIENT)
Dept: NEPHROLOGY | Facility: CLINIC | Age: 84
End: 2019-11-11

## 2019-11-14 ENCOUNTER — CLINICAL SUPPORT (OUTPATIENT)
Dept: INTERNAL MEDICINE | Facility: CLINIC | Age: 84
End: 2019-11-14
Payer: MEDICARE

## 2019-11-14 ENCOUNTER — LAB VISIT (OUTPATIENT)
Dept: LAB | Facility: HOSPITAL | Age: 84
End: 2019-11-14
Attending: INTERNAL MEDICINE
Payer: MEDICARE

## 2019-11-14 DIAGNOSIS — N18.4 CHRONIC KIDNEY DISEASE, STAGE IV (SEVERE): ICD-10-CM

## 2019-11-14 DIAGNOSIS — I10 HYPERTENSION, UNSPECIFIED TYPE: ICD-10-CM

## 2019-11-14 DIAGNOSIS — E87.20 ACIDOSIS, METABOLIC: ICD-10-CM

## 2019-11-14 LAB
ALBUMIN SERPL BCP-MCNC: 3.5 G/DL (ref 3.5–5.2)
ALBUMIN SERPL BCP-MCNC: 3.5 G/DL (ref 3.5–5.2)
ANION GAP SERPL CALC-SCNC: 9 MMOL/L (ref 8–16)
ANION GAP SERPL CALC-SCNC: 9 MMOL/L (ref 8–16)
BUN SERPL-MCNC: 50 MG/DL (ref 8–23)
BUN SERPL-MCNC: 50 MG/DL (ref 8–23)
CALCIUM SERPL-MCNC: 9.3 MG/DL (ref 8.7–10.5)
CALCIUM SERPL-MCNC: 9.3 MG/DL (ref 8.7–10.5)
CHLORIDE SERPL-SCNC: 109 MMOL/L (ref 95–110)
CHLORIDE SERPL-SCNC: 109 MMOL/L (ref 95–110)
CO2 SERPL-SCNC: 25 MMOL/L (ref 23–29)
CO2 SERPL-SCNC: 25 MMOL/L (ref 23–29)
CREAT SERPL-MCNC: 2.4 MG/DL (ref 0.5–1.4)
CREAT SERPL-MCNC: 2.4 MG/DL (ref 0.5–1.4)
ERYTHROCYTE [DISTWIDTH] IN BLOOD BY AUTOMATED COUNT: 15.5 % (ref 11.5–14.5)
EST. GFR  (AFRICAN AMERICAN): 20 ML/MIN/1.73 M^2
EST. GFR  (AFRICAN AMERICAN): 20 ML/MIN/1.73 M^2
EST. GFR  (NON AFRICAN AMERICAN): 17.4 ML/MIN/1.73 M^2
EST. GFR  (NON AFRICAN AMERICAN): 17.4 ML/MIN/1.73 M^2
FERRITIN SERPL-MCNC: 218 NG/ML (ref 20–300)
GLUCOSE SERPL-MCNC: 90 MG/DL (ref 70–110)
GLUCOSE SERPL-MCNC: 90 MG/DL (ref 70–110)
HCT VFR BLD AUTO: 33.7 % (ref 37–48.5)
HGB BLD-MCNC: 10.9 G/DL (ref 12–16)
IRON SERPL-MCNC: 44 UG/DL (ref 30–160)
MCH RBC QN AUTO: 29 PG (ref 27–31)
MCHC RBC AUTO-ENTMCNC: 32.3 G/DL (ref 32–36)
MCV RBC AUTO: 90 FL (ref 82–98)
PHOSPHATE SERPL-MCNC: 3.6 MG/DL (ref 2.7–4.5)
PHOSPHATE SERPL-MCNC: 3.6 MG/DL (ref 2.7–4.5)
PLATELET # BLD AUTO: 209 K/UL (ref 150–350)
PMV BLD AUTO: 10.3 FL (ref 9.2–12.9)
POTASSIUM SERPL-SCNC: 4.8 MMOL/L (ref 3.5–5.1)
POTASSIUM SERPL-SCNC: 4.8 MMOL/L (ref 3.5–5.1)
PTH-INTACT SERPL-MCNC: 423 PG/ML (ref 9–77)
RBC # BLD AUTO: 3.76 M/UL (ref 4–5.4)
SATURATED IRON: 14 % (ref 20–50)
SODIUM SERPL-SCNC: 143 MMOL/L (ref 136–145)
SODIUM SERPL-SCNC: 143 MMOL/L (ref 136–145)
TOTAL IRON BINDING CAPACITY: 308 UG/DL (ref 250–450)
TRANSFERRIN SERPL-MCNC: 208 MG/DL (ref 200–375)
URATE SERPL-MCNC: 7.9 MG/DL (ref 2.4–5.7)
WBC # BLD AUTO: 7.04 K/UL (ref 3.9–12.7)

## 2019-11-14 PROCEDURE — G0008 FLU VACCINE - HIGH DOSE (65+) PRESERVATIVE FREE IM: ICD-10-PCS | Mod: HCNC,S$GLB,, | Performed by: INTERNAL MEDICINE

## 2019-11-14 PROCEDURE — G0008 ADMIN INFLUENZA VIRUS VAC: HCPCS | Mod: HCNC,S$GLB,, | Performed by: INTERNAL MEDICINE

## 2019-11-14 PROCEDURE — 36415 COLL VENOUS BLD VENIPUNCTURE: CPT | Mod: HCNC

## 2019-11-14 PROCEDURE — 85027 COMPLETE CBC AUTOMATED: CPT | Mod: HCNC

## 2019-11-14 PROCEDURE — 84550 ASSAY OF BLOOD/URIC ACID: CPT | Mod: HCNC

## 2019-11-14 PROCEDURE — 90662 IIV NO PRSV INCREASED AG IM: CPT | Mod: HCNC,S$GLB,, | Performed by: INTERNAL MEDICINE

## 2019-11-14 PROCEDURE — 90662 FLU VACCINE - HIGH DOSE (65+) PRESERVATIVE FREE IM: ICD-10-PCS | Mod: HCNC,S$GLB,, | Performed by: INTERNAL MEDICINE

## 2019-11-14 PROCEDURE — 80069 RENAL FUNCTION PANEL: CPT | Mod: HCNC

## 2019-11-14 PROCEDURE — 82728 ASSAY OF FERRITIN: CPT | Mod: HCNC

## 2019-11-14 PROCEDURE — 83970 ASSAY OF PARATHORMONE: CPT | Mod: HCNC

## 2019-11-14 PROCEDURE — 83540 ASSAY OF IRON: CPT | Mod: HCNC

## 2019-11-15 ENCOUNTER — LAB VISIT (OUTPATIENT)
Dept: LAB | Facility: HOSPITAL | Age: 84
End: 2019-11-15
Attending: INTERNAL MEDICINE
Payer: MEDICARE

## 2019-11-15 DIAGNOSIS — N18.4 CHRONIC KIDNEY DISEASE, STAGE IV (SEVERE): ICD-10-CM

## 2019-11-15 DIAGNOSIS — E87.20 ACIDOSIS, METABOLIC: ICD-10-CM

## 2019-11-15 DIAGNOSIS — I10 HYPERTENSION, UNSPECIFIED TYPE: ICD-10-CM

## 2019-11-15 PROCEDURE — 82043 UR ALBUMIN QUANTITATIVE: CPT | Mod: HCNC

## 2019-11-15 PROCEDURE — 82570 ASSAY OF URINE CREATININE: CPT | Mod: HCNC

## 2019-11-15 PROCEDURE — 81001 URINALYSIS AUTO W/SCOPE: CPT | Mod: HCNC

## 2019-11-16 LAB
ALBUMIN/CREAT UR: 24.9 UG/MG (ref 0–30)
BACTERIA #/AREA URNS AUTO: ABNORMAL /HPF
BILIRUB UR QL STRIP: NEGATIVE
CLARITY UR REFRACT.AUTO: ABNORMAL
COLOR UR AUTO: YELLOW
CREAT UR-MCNC: 181 MG/DL (ref 15–325)
CREAT UR-MCNC: 181 MG/DL (ref 15–325)
GLUCOSE UR QL STRIP: NEGATIVE
HGB UR QL STRIP: NEGATIVE
HYALINE CASTS UR QL AUTO: 7 /LPF
KETONES UR QL STRIP: NEGATIVE
LEUKOCYTE ESTERASE UR QL STRIP: ABNORMAL
MICROALBUMIN UR DL<=1MG/L-MCNC: 45 UG/ML
MICROSCOPIC COMMENT: ABNORMAL
NITRITE UR QL STRIP: NEGATIVE
PH UR STRIP: 5 [PH] (ref 5–8)
PROT UR QL STRIP: NEGATIVE
PROT UR-MCNC: 18 MG/DL (ref 0–15)
PROT/CREAT UR: 0.1 MG/G{CREAT} (ref 0–0.2)
RBC #/AREA URNS AUTO: 1 /HPF (ref 0–4)
SP GR UR STRIP: 1.01 (ref 1–1.03)
SQUAMOUS #/AREA URNS AUTO: 2 /HPF
URN SPEC COLLECT METH UR: ABNORMAL
WBC #/AREA URNS AUTO: 11 /HPF (ref 0–5)

## 2019-11-19 ENCOUNTER — OFFICE VISIT (OUTPATIENT)
Dept: NEPHROLOGY | Facility: CLINIC | Age: 84
End: 2019-11-19
Payer: MEDICARE

## 2019-11-19 ENCOUNTER — TELEPHONE (OUTPATIENT)
Dept: NEPHROLOGY | Facility: CLINIC | Age: 84
End: 2019-11-19

## 2019-11-19 VITALS
DIASTOLIC BLOOD PRESSURE: 56 MMHG | WEIGHT: 166.69 LBS | HEIGHT: 57 IN | HEART RATE: 68 BPM | BODY MASS INDEX: 35.96 KG/M2 | SYSTOLIC BLOOD PRESSURE: 122 MMHG | OXYGEN SATURATION: 97 %

## 2019-11-19 DIAGNOSIS — I73.9 PVD (PERIPHERAL VASCULAR DISEASE): ICD-10-CM

## 2019-11-19 DIAGNOSIS — N18.4 CHRONIC KIDNEY DISEASE, STAGE IV (SEVERE): Primary | ICD-10-CM

## 2019-11-19 DIAGNOSIS — D63.1 ANEMIA IN STAGE 4 CHRONIC KIDNEY DISEASE: ICD-10-CM

## 2019-11-19 DIAGNOSIS — I10 HYPERTENSION, UNSPECIFIED TYPE: ICD-10-CM

## 2019-11-19 DIAGNOSIS — N18.4 ANEMIA IN STAGE 4 CHRONIC KIDNEY DISEASE: ICD-10-CM

## 2019-11-19 DIAGNOSIS — N25.81 SECONDARY HYPERPARATHYROIDISM: ICD-10-CM

## 2019-11-19 PROCEDURE — 99214 OFFICE O/P EST MOD 30 MIN: CPT | Mod: HCNC,S$GLB,, | Performed by: INTERNAL MEDICINE

## 2019-11-19 PROCEDURE — 99214 PR OFFICE/OUTPT VISIT, EST, LEVL IV, 30-39 MIN: ICD-10-PCS | Mod: HCNC,S$GLB,, | Performed by: INTERNAL MEDICINE

## 2019-11-19 PROCEDURE — 1101F PR PT FALLS ASSESS DOC 0-1 FALLS W/OUT INJ PAST YR: ICD-10-PCS | Mod: HCNC,CPTII,S$GLB, | Performed by: INTERNAL MEDICINE

## 2019-11-19 PROCEDURE — 1159F MED LIST DOCD IN RCRD: CPT | Mod: HCNC,S$GLB,, | Performed by: INTERNAL MEDICINE

## 2019-11-19 PROCEDURE — 99999 PR PBB SHADOW E&M-EST. PATIENT-LVL III: ICD-10-PCS | Mod: PBBFAC,HCNC,, | Performed by: INTERNAL MEDICINE

## 2019-11-19 PROCEDURE — 99999 PR PBB SHADOW E&M-EST. PATIENT-LVL III: CPT | Mod: PBBFAC,HCNC,, | Performed by: INTERNAL MEDICINE

## 2019-11-19 PROCEDURE — 1159F PR MEDICATION LIST DOCUMENTED IN MEDICAL RECORD: ICD-10-PCS | Mod: HCNC,S$GLB,, | Performed by: INTERNAL MEDICINE

## 2019-11-19 PROCEDURE — 1126F PR PAIN SEVERITY QUANTIFIED, NO PAIN PRESENT: ICD-10-PCS | Mod: HCNC,S$GLB,, | Performed by: INTERNAL MEDICINE

## 2019-11-19 PROCEDURE — 1101F PT FALLS ASSESS-DOCD LE1/YR: CPT | Mod: HCNC,CPTII,S$GLB, | Performed by: INTERNAL MEDICINE

## 2019-11-19 PROCEDURE — 1126F AMNT PAIN NOTED NONE PRSNT: CPT | Mod: HCNC,S$GLB,, | Performed by: INTERNAL MEDICINE

## 2019-11-19 RX ORDER — CALCITRIOL 0.25 UG/1
0.25 CAPSULE ORAL
Qty: 60 CAPSULE | Refills: 6 | Status: SHIPPED | OUTPATIENT
Start: 2019-11-22 | End: 2020-05-15 | Stop reason: SDUPTHER

## 2019-11-19 RX ORDER — HEPARIN 100 UNIT/ML
500 SYRINGE INTRAVENOUS
Status: CANCELLED | OUTPATIENT
Start: 2019-12-02

## 2019-11-19 RX ORDER — SODIUM CHLORIDE 0.9 % (FLUSH) 0.9 %
10 SYRINGE (ML) INJECTION
Status: CANCELLED | OUTPATIENT
Start: 2019-12-02

## 2019-11-19 NOTE — PROGRESS NOTES
Progress Note  Nephrology      Referring physician: Leticia Weems MD    SUBJECTIVE:   89 y.o. female  has a past medical history of AAA (abdominal aortic aneurysm), Anemia in CKD (chronic kidney disease), Arthritis, Cataract, Class 1 obesity due to excess calories with serious comorbidity in adult (7/6/2017), Gout, chronic, High cholesterol, Hypertension, Hypothyroidism, Obesity, Plantar fasciitis, PVD (peripheral vascular disease), and Thyroid trouble. who has been following up in renal clinic for CKD, she feels well, no sob or urinary symptoms,no major complaints, her cr was noted to be in baseline of around 2s with deterioration over years, she uses walker at home but her functionality is declining.        OBJECTIVE:     Vitals:    11/19/19 0933   BP: (!) 122/56   Pulse: 68          Physical Exam:  General: no distress, well nourished, on wheel chair  HENT: PERRLA, Normal mouth nose and ears.  Neck: no JVD and thyroid not enlarged, symmetric, no tenderness/mass/nodules  Lungs: clear to auscultation bilaterally and normal respiratory effort  Cardiovascular: regular rate and rhythm, S1, S2 normal, + murmur, no click, rub or gallop.   Abdomen: soft, non-tender non-distented; bowel sounds normal  Skin: No rashes or lesions  Musculoskeletal:no edema, no deformities.   Lymph Nodes: No cervical or supraclavicular adenopathy  Neurologic: Normal strength and tone. No focal numbness or weakness    Dialysis Access: Not applicable.        Medications:    Current Outpatient Medications:     acetaminophen-codeine 300-30mg (TYLENOL #3) 300-30 mg Tab, 1 po tid prn, Disp: 30 tablet, Rfl: 1    ammonium lactate 12 % Crea, Apply twice daily to affected parts both feet as needed., Disp: 140 g, Rfl: 11    aspirin 81 MG Chew, Take 1 tablet (81 mg total) by mouth once daily., Disp: 30 tablet, Rfl: 11    calcium citrate 250 mg calcium Tab, Take 1 tablet by mouth 2 (two) times daily., Disp: 180 each, Rfl: 3    colchicine (COLCRYS)  0.6 mg tablet, Take 1 tablet (0.6 mg total) by mouth every other day., Disp: 45 tablet, Rfl: 1    doxazosin (CARDURA) 2 MG tablet, Take 1 tablet (2 mg total) by mouth nightly. At bedtime, Disp: 90 tablet, Rfl: 1    felodipine (PLENDIL) 10 MG 24 hr tablet, TAKE 1 TABLET EVERY DAY, Disp: 90 tablet, Rfl: 1    ferrous sulfate 325 mg (65 mg iron) Tab tablet, Take 1 tablet (325 mg total) by mouth 2 (two) times daily., Disp: 180 tablet, Rfl: 3    levothyroxine (SYNTHROID) 88 MCG tablet, TAKE 1 TABLET (88 MCG TOTAL) BY MOUTH ONCE DAILY., Disp: 90 tablet, Rfl: 0    lovastatin (MEVACOR) 20 MG tablet, 1 po At bedtime, Disp: 90 tablet, Rfl: 1    MULTIVIT-IRON-MIN-FOLIC ACID 3,500-18-0.4 UNIT-MG-MG ORAL CHEW, Take by mouth., Disp: , Rfl:     sodium bicarbonate 650 MG tablet, Take 1 tablet (650 mg total) by mouth 2 (two) times daily., Disp: 180 tablet, Rfl: 4    [START ON 11/22/2019] calcitRIOL (ROCALTROL) 0.25 MCG Cap, Take 1 capsule (0.25 mcg total) by mouth every Monday and Friday., Disp: 60 capsule, Rfl: 6         Laboratory:  Lab Results   Component Value Date    CREATININE 2.4 (H) 11/14/2019    CREATININE 2.4 (H) 11/14/2019       Prot/Creat Ratio, Ur   Date Value Ref Range Status   11/15/2019 0.10 0.00 - 0.20 Final   08/14/2017 Unable to calculate 0.00 - 0.20 Final   01/23/2015 Unable to calculate 0.0 - 0.2 Final       Lab Results   Component Value Date     11/14/2019     11/14/2019    K 4.8 11/14/2019    K 4.8 11/14/2019    CO2 25 11/14/2019    CO2 25 11/14/2019       last PTH   Lab Results   Component Value Date    .0 (H) 11/14/2019    CALCIUM 9.3 11/14/2019    CALCIUM 9.3 11/14/2019    PHOS 3.6 11/14/2019    PHOS 3.6 11/14/2019       Lab Results   Component Value Date    HGB 10.9 (L) 11/14/2019        Lab Results   Component Value Date    HGBA1C 6.1 04/28/2006       Lab Results   Component Value Date    LDLCALC 107.6 09/26/2019       Other Labs were reviewed      ASSESSMENT/PLAN:     CKD  IV  -likely from HTN and PVD  -Cr baseline ~ 2.5 with GFR ~ 20 ml/min which has been declining 2-3 ml/min/yr  -UPCR none  -Renal US in 2017 with chronic changes and two left cysts/ one right cyst    HTN  -well controlled off meds    Anemia  -from ckd  -Hgb goal ~ 10  -Iron stores low    Secondary Hyperparathyroidism  -Phos/Ca acceptable  -PTH high    Acid/Base  -None gap Metabolic acidosis, improved on Nabicrab        PLAN:  -Start Iron infusion.  -Start Calcitriol M/F  -discussed the goal of care and possible need for dialysis down the road, she and family will think about it, given risks and benefits.  -Avoid NSAIDs intake        RTC in 3 months with blood work      SARWAT MAURICIO MD  NEPHROLOGY ATTENDING

## 2019-11-26 ENCOUNTER — OFFICE VISIT (OUTPATIENT)
Dept: PODIATRY | Facility: CLINIC | Age: 84
End: 2019-11-26
Payer: MEDICARE

## 2019-11-26 VITALS
WEIGHT: 166 LBS | HEIGHT: 57 IN | DIASTOLIC BLOOD PRESSURE: 63 MMHG | HEART RATE: 67 BPM | SYSTOLIC BLOOD PRESSURE: 142 MMHG | BODY MASS INDEX: 35.81 KG/M2

## 2019-11-26 DIAGNOSIS — B35.1 ONYCHOMYCOSIS DUE TO DERMATOPHYTE: ICD-10-CM

## 2019-11-26 DIAGNOSIS — I73.9 PERIPHERAL VASCULAR DISEASE: Primary | ICD-10-CM

## 2019-11-26 DIAGNOSIS — L60.9 DISEASE OF NAIL: ICD-10-CM

## 2019-11-26 PROCEDURE — 1126F PR PAIN SEVERITY QUANTIFIED, NO PAIN PRESENT: ICD-10-PCS | Mod: HCNC,S$GLB,, | Performed by: PODIATRIST

## 2019-11-26 PROCEDURE — 99999 PR PBB SHADOW E&M-EST. PATIENT-LVL III: CPT | Mod: PBBFAC,HCNC,, | Performed by: PODIATRIST

## 2019-11-26 PROCEDURE — 1159F MED LIST DOCD IN RCRD: CPT | Mod: HCNC,S$GLB,, | Performed by: PODIATRIST

## 2019-11-26 PROCEDURE — 99212 PR OFFICE/OUTPT VISIT, EST, LEVL II, 10-19 MIN: ICD-10-PCS | Mod: 25,HCNC,S$GLB, | Performed by: PODIATRIST

## 2019-11-26 PROCEDURE — 11056 PR TRIM BENIGN HYPERKERATOTIC SKIN LESION,2-4: ICD-10-PCS | Mod: HCNC,S$GLB,, | Performed by: PODIATRIST

## 2019-11-26 PROCEDURE — 1101F PT FALLS ASSESS-DOCD LE1/YR: CPT | Mod: HCNC,CPTII,S$GLB, | Performed by: PODIATRIST

## 2019-11-26 PROCEDURE — 99999 PR PBB SHADOW E&M-EST. PATIENT-LVL III: ICD-10-PCS | Mod: PBBFAC,HCNC,, | Performed by: PODIATRIST

## 2019-11-26 PROCEDURE — 99212 OFFICE O/P EST SF 10 MIN: CPT | Mod: 25,HCNC,S$GLB, | Performed by: PODIATRIST

## 2019-11-26 PROCEDURE — 1101F PR PT FALLS ASSESS DOC 0-1 FALLS W/OUT INJ PAST YR: ICD-10-PCS | Mod: HCNC,CPTII,S$GLB, | Performed by: PODIATRIST

## 2019-11-26 PROCEDURE — 11721 PR DEBRIDEMENT OF NAILS, 6 OR MORE: ICD-10-PCS | Mod: HCNC,Q8,59,S$GLB | Performed by: PODIATRIST

## 2019-11-26 PROCEDURE — 1126F AMNT PAIN NOTED NONE PRSNT: CPT | Mod: HCNC,S$GLB,, | Performed by: PODIATRIST

## 2019-11-26 PROCEDURE — 11721 DEBRIDE NAIL 6 OR MORE: CPT | Mod: HCNC,Q8,59,S$GLB | Performed by: PODIATRIST

## 2019-11-26 PROCEDURE — 1159F PR MEDICATION LIST DOCUMENTED IN MEDICAL RECORD: ICD-10-PCS | Mod: HCNC,S$GLB,, | Performed by: PODIATRIST

## 2019-11-26 PROCEDURE — 11056 PARNG/CUTG B9 HYPRKR LES 2-4: CPT | Mod: HCNC,S$GLB,, | Performed by: PODIATRIST

## 2019-11-26 NOTE — LETTER
December 1, 2019      Leticia Weems MD  [address]  [place] [address]           [facility] - Podiatry  [address]  [place] [address]  Phone: [phone]          Patient: Johanny Curtis   MR Number: [id number]   YOB: 1930   Date of Visit: 11/26/2019       Dear Dr. Leticia Weems:    Thank you for referring Johanny Curtis to me for evaluation. Attached you will find relevant portions of my assessment and plan of care.    If you have questions, please do not hesitate to call me. I look forward to following Johanny Curtis along with you.    Sincerely,    Alexx Huang, DPOLIVER    Enclosure  CC:  No Recipients    If you would like to receive this communication electronically, please contact [email or web address] or [phone] to request more information on [employer] [facility] access.    For providers and/or their staff who would like to refer a patient to [facility], please contact us through our one-stop-shop provider referral line, [facility], at [phone].    If you feel you have received this communication in error or would no longer like to receive these types of communications, please e-mail [email or web address]

## 2019-12-01 RX ORDER — LEVOTHYROXINE SODIUM 88 UG/1
88 TABLET ORAL DAILY
Qty: 90 TABLET | Refills: 0 | Status: SHIPPED | OUTPATIENT
Start: 2019-12-01 | End: 2019-12-02

## 2019-12-01 NOTE — PROGRESS NOTES
Subjective:      Patient ID: Johanny Curtis is a 89 y.o. female.    Chief Complaint: Nail Care (bilateral pcp Dr Cyr 7/29/19)    Johanny is a 89 y.o. female who presents to the clinic for evaluation and treatment of high risk feet. Johanny has a past medical history of AAA (abdominal aortic aneurysm), Anemia in CKD (chronic kidney disease), Arthritis, Cataract, Class 1 obesity due to excess calories with serious comorbidity in adult (7/6/2017), Gout, chronic, High cholesterol, Hypertension, Hypothyroidism, Obesity, Plantar fasciitis, PVD (peripheral vascular disease), and Thyroid trouble. The patient's chief complaint is long, thick toenails. This patient has documented high risk feet requiring routine maintenance secondary to peripheral vascular disease.  She is here for routine foot care and bilateral lower extremity edema.    PCP: Leticia Weems MD    Date Last Seen by PCP:   Chief Complaint   Patient presents with    Nail Care     bilateral pcp Dr Cyr 7/29/19           Current shoe gear:  Affected Foot: Casual shoes     Unaffected Foot: Casual shoes    Last encounter in this department: 4/10/2019    Hemoglobin A1C   Date Value Ref Range Status   04/28/2006 6.1 4.5 - 6.2 % Final       Review of Systems   Constitution: Negative for chills and fever.   HENT: Negative for congestion and tinnitus.    Eyes: Negative for double vision and visual disturbance.   Cardiovascular: Positive for claudication. Negative for chest pain.   Respiratory: Negative for hemoptysis and shortness of breath.    Endocrine: Negative for cold intolerance and heat intolerance.   Hematologic/Lymphatic: Negative for adenopathy and bleeding problem.   Skin: Positive for color change, dry skin, nail changes and poor wound healing.   Musculoskeletal: Positive for stiffness. Negative for myalgias.   Gastrointestinal: Negative for nausea and vomiting.   Genitourinary: Negative for dysuria and hematuria.   Neurological: Positive for  sensory change.   Psychiatric/Behavioral: Negative for altered mental status and suicidal ideas.   Allergic/Immunologic: Negative for environmental allergies and persistent infections.           Objective:      Physical Exam   Constitutional: She is oriented to person, place, and time. She appears well-developed and well-nourished.   Cardiovascular:   Pulses:       Dorsalis pedis pulses are 0 on the right side, and 0 on the left side.        Posterior tibial pulses are 1+ on the right side, and 1+ on the left side.   Pulmonary/Chest: Effort normal.   Musculoskeletal: Normal range of motion.   Anterior, lateral, and posterior muscle groups bilateral lower extremities show strength 4 over 5 symmetrically. Inspection and palpation of the joints and bones reveal no crepitus or joint effusion. No tenderness upon palpation. Mild plantar flexor contractures noted to digits 2 through 5 bilaterally.  Angle and base of gait are normal.   Feet:   Right Foot:   Skin Integrity: Positive for callus and dry skin.   Left Foot:   Skin Integrity: Positive for callus and dry skin.   Neurological: She is alert and oriented to person, place, and time. She displays atrophy and abnormal reflex. A sensory deficit is present.   Reflex Scores:       Patellar reflexes are 1+ on the right side and 1+ on the left side.       Achilles reflexes are 1+ on the right side and 1+ on the left side.  Skin: Skin is warm and dry. Capillary refill takes 2 to 3 seconds. There is pallor.   Skin bilateral lower extremities noted to be thin, dry, and atrophic.  Toenails thickened, discolored, with subungual fungal debris and tenderness noted.  Hyperkeratotic lesions noted to both feet plantarly with tenderness.   Psychiatric: She has a normal mood and affect.   Vitals reviewed.            Assessment:       Encounter Diagnoses   Name Primary?    Peripheral vascular disease Yes    Onychomycosis due to dermatophyte     Disease of nail          Plan:        Johanny was seen today for nail care.    Diagnoses and all orders for this visit:    Peripheral vascular disease    Onychomycosis due to dermatophyte    Disease of nail      I counseled the patient on her conditions, their implications and medical management.      Routine Foot Care    Performed by:  Alexx Huang. DPM  Authorized by:  Patient     Consent Done?:  Yes (Verbal)     Nail Care Type:  Debride  Location(s): All  (Left 1st Toe, Left 3rd Toe, Left 2nd Toe, Left 4th Toe, Left 5th Toe, Right 1st Toe, Right 2nd Toe, Right 3rd Toe, Right 4th Toe and Right 5th Toe)  Patient tolerance:  Patient tolerated the procedure well with no immediate complications     With patient's permission, the toenails mentioned above were aggressively reduced and debrided using a nail nipper, removing all offending nail and debris. The patient will continue to monitor the areas daily, inspect the feet, wear protective shoe gear when ambulatory, and moisturizer to maintain skin integrity.      Callus Care Type: Debride    With patient's permission, the calluses/hyperkeratotic lesions mentioned above were aggressively reduced and debrided using a number 15 blade. The patient will continue to monitor the areas daily, inspect the feet, wear protective shoe gear when ambulatory, and moisturizer to maintain skin integrity.     Discussed over-the-counter mild compression therapy bilateral lower extremities in appropriate skin care.  Appropriate skin care, foot care, and shoe gear were all discussed.  Follow-up in 3-6 months.    .

## 2019-12-02 ENCOUNTER — TELEPHONE (OUTPATIENT)
Dept: INTERNAL MEDICINE | Facility: CLINIC | Age: 84
End: 2019-12-02

## 2019-12-02 RX ORDER — LEVOTHYROXINE SODIUM 88 UG/1
88 TABLET ORAL DAILY
Qty: 14 TABLET | Refills: 0 | Status: SHIPPED | OUTPATIENT
Start: 2019-12-02 | End: 2020-02-04

## 2019-12-02 NOTE — TELEPHONE ENCOUNTER
----- Message from Doe King sent at 12/2/2019 12:09 PM CST -----  Contact: Katia machado [phone]  Patient is calling for an RX refill or new RX.  Is this a refill or new RX:  refill  RX name and strength: levothyroxine (SYNTHROID) 88 MCG tablet  Directions (copy/paste from chart):    Is this a 30 day or 90 day RX:  90 day   Local pharmacy or mail order pharmacy:  Mail order  Pharmacy name and phone # (copy/paste from chart):   [facility] [address] [phone] (Phone)  [phone] (Fax)  Comments:      Patient is calling for an RX refill or new RX.  Is this a refill or new RX:  new  RX name and strength: levothyroxine (SYNTHROID) 88 MCG tablet  Directions (copy/paste from chart):    Is this a 30 day or 90 day RX:  Two week supplies  Local pharmacy or mail order pharmacy:  local  Pharmacy name and phone # (copy/paste from chart):  [id number] - [place], [place] - [address] AT [facility] [phone] (Phone)  [phone] (Fax)   Comments:  Patient is out of medication, please advise

## 2019-12-02 NOTE — TELEPHONE ENCOUNTER
Pt gets her synthroid through [facility] mail delivery but wants to know if she can have a 2 week supply for until the mail delivery gets there

## 2020-01-20 RX ORDER — DOXAZOSIN 2 MG/1
TABLET ORAL
Qty: 90 TABLET | Refills: 0 | Status: SHIPPED | OUTPATIENT
Start: 2020-01-20 | End: 2020-06-04 | Stop reason: SDUPTHER

## 2020-01-24 ENCOUNTER — OFFICE VISIT (OUTPATIENT)
Dept: INTERNAL MEDICINE | Facility: CLINIC | Age: 85
End: 2020-01-24
Payer: MEDICARE

## 2020-01-24 ENCOUNTER — LAB VISIT (OUTPATIENT)
Dept: LAB | Facility: HOSPITAL | Age: 85
End: 2020-01-24
Attending: INTERNAL MEDICINE
Payer: MEDICARE

## 2020-01-24 VITALS
BODY MASS INDEX: 34.5 KG/M2 | HEIGHT: 61 IN | DIASTOLIC BLOOD PRESSURE: 78 MMHG | WEIGHT: 182.75 LBS | SYSTOLIC BLOOD PRESSURE: 130 MMHG | HEART RATE: 71 BPM | OXYGEN SATURATION: 99 %

## 2020-01-24 DIAGNOSIS — E03.9 HYPOTHYROIDISM, UNSPECIFIED TYPE: ICD-10-CM

## 2020-01-24 DIAGNOSIS — I10 HYPERTENSION, UNSPECIFIED TYPE: Primary | ICD-10-CM

## 2020-01-24 DIAGNOSIS — I10 HYPERTENSION, UNSPECIFIED TYPE: ICD-10-CM

## 2020-01-24 LAB
ALBUMIN SERPL BCP-MCNC: 3.8 G/DL (ref 3.5–5.2)
ALP SERPL-CCNC: 78 U/L (ref 55–135)
ALT SERPL W/O P-5'-P-CCNC: 10 U/L (ref 10–44)
ANION GAP SERPL CALC-SCNC: 10 MMOL/L (ref 8–16)
AST SERPL-CCNC: 21 U/L (ref 10–40)
BILIRUB SERPL-MCNC: 0.4 MG/DL (ref 0.1–1)
BUN SERPL-MCNC: 40 MG/DL (ref 8–23)
CALCIUM SERPL-MCNC: 9.9 MG/DL (ref 8.7–10.5)
CHLORIDE SERPL-SCNC: 107 MMOL/L (ref 95–110)
CO2 SERPL-SCNC: 26 MMOL/L (ref 23–29)
CREAT SERPL-MCNC: 2.4 MG/DL (ref 0.5–1.4)
EST. GFR  (AFRICAN AMERICAN): 20 ML/MIN/1.73 M^2
EST. GFR  (NON AFRICAN AMERICAN): 17 ML/MIN/1.73 M^2
GLUCOSE SERPL-MCNC: 84 MG/DL (ref 70–110)
POTASSIUM SERPL-SCNC: 4.5 MMOL/L (ref 3.5–5.1)
PROT SERPL-MCNC: 8 G/DL (ref 6–8.4)
SODIUM SERPL-SCNC: 143 MMOL/L (ref 136–145)

## 2020-01-24 PROCEDURE — 1159F MED LIST DOCD IN RCRD: CPT | Mod: HCNC,S$GLB,, | Performed by: INTERNAL MEDICINE

## 2020-01-24 PROCEDURE — 1159F PR MEDICATION LIST DOCUMENTED IN MEDICAL RECORD: ICD-10-PCS | Mod: HCNC,S$GLB,, | Performed by: INTERNAL MEDICINE

## 2020-01-24 PROCEDURE — 99214 OFFICE O/P EST MOD 30 MIN: CPT | Mod: HCNC,S$GLB,, | Performed by: INTERNAL MEDICINE

## 2020-01-24 PROCEDURE — 99214 PR OFFICE/OUTPT VISIT, EST, LEVL IV, 30-39 MIN: ICD-10-PCS | Mod: HCNC,S$GLB,, | Performed by: INTERNAL MEDICINE

## 2020-01-24 PROCEDURE — 1101F PR PT FALLS ASSESS DOC 0-1 FALLS W/OUT INJ PAST YR: ICD-10-PCS | Mod: HCNC,CPTII,S$GLB, | Performed by: INTERNAL MEDICINE

## 2020-01-24 PROCEDURE — 1101F PT FALLS ASSESS-DOCD LE1/YR: CPT | Mod: HCNC,CPTII,S$GLB, | Performed by: INTERNAL MEDICINE

## 2020-01-24 PROCEDURE — 84443 ASSAY THYROID STIM HORMONE: CPT | Mod: HCNC

## 2020-01-24 PROCEDURE — 80053 COMPREHEN METABOLIC PANEL: CPT | Mod: HCNC

## 2020-01-24 PROCEDURE — 1126F AMNT PAIN NOTED NONE PRSNT: CPT | Mod: HCNC,S$GLB,, | Performed by: INTERNAL MEDICINE

## 2020-01-24 PROCEDURE — 36415 COLL VENOUS BLD VENIPUNCTURE: CPT | Mod: HCNC

## 2020-01-24 PROCEDURE — 1126F PR PAIN SEVERITY QUANTIFIED, NO PAIN PRESENT: ICD-10-PCS | Mod: HCNC,S$GLB,, | Performed by: INTERNAL MEDICINE

## 2020-01-24 PROCEDURE — 99999 PR PBB SHADOW E&M-EST. PATIENT-LVL IV: ICD-10-PCS | Mod: PBBFAC,HCNC,, | Performed by: INTERNAL MEDICINE

## 2020-01-24 PROCEDURE — 99999 PR PBB SHADOW E&M-EST. PATIENT-LVL IV: CPT | Mod: PBBFAC,HCNC,, | Performed by: INTERNAL MEDICINE

## 2020-01-25 LAB — TSH SERPL DL<=0.005 MIU/L-ACNC: 0.98 UIU/ML (ref 0.4–4)

## 2020-01-27 NOTE — PROGRESS NOTES
Subjective:       Patient ID: Johanny Curtis is a 89 y.o. female.    Chief Complaint: Hypertension    HPI  She returns for management of hypertension.  She has had hypertension for over a year.  Current treatment has included medications outlined in medication list.  She denies chest pain or shortness of breath.  No palpitations.  Denies left arm or neck pain.    Medications:  See med list    Social history:  Does not smoke, does not drink alcohol      Review of Systems   Constitutional: Negative for chills, fatigue, fever and unexpected weight change.   Respiratory: Negative for chest tightness and shortness of breath.    Cardiovascular: Negative for chest pain and palpitations.   Gastrointestinal: Negative for abdominal pain and blood in stool.   Neurological: Negative for dizziness, syncope, numbness and headaches.       Objective:      Physical Exam   HENT:   Right Ear: External ear normal.   Left Ear: External ear normal.   Nose: Nose normal.   Mouth/Throat: Oropharynx is clear and moist.   Eyes: Pupils are equal, round, and reactive to light.   Neck: Normal range of motion.   Cardiovascular: Normal rate and regular rhythm.   No murmur heard.  Pulmonary/Chest: Breath sounds normal.   Abdominal: She exhibits no distension. There is no hepatosplenomegaly. There is no tenderness.   Lymphadenopathy:     She has no cervical adenopathy.     She has no axillary adenopathy.   Neurological: She has normal strength and normal reflexes. No cranial nerve deficit or sensory deficit.       Assessment/Plan       Assessment and plan:  Hypertension:  Check CMP and TSH.  Discussed mammogram, she declined

## 2020-02-04 RX ORDER — LEVOTHYROXINE SODIUM 88 UG/1
88 TABLET ORAL DAILY
Qty: 90 TABLET | Refills: 1 | Status: SHIPPED | OUTPATIENT
Start: 2020-02-04 | End: 2020-06-04 | Stop reason: SDUPTHER

## 2020-02-10 ENCOUNTER — LAB VISIT (OUTPATIENT)
Dept: LAB | Facility: HOSPITAL | Age: 85
End: 2020-02-10
Attending: INTERNAL MEDICINE
Payer: MEDICARE

## 2020-02-10 DIAGNOSIS — I10 HYPERTENSION, UNSPECIFIED TYPE: ICD-10-CM

## 2020-02-10 DIAGNOSIS — N18.4 ANEMIA IN STAGE 4 CHRONIC KIDNEY DISEASE: ICD-10-CM

## 2020-02-10 DIAGNOSIS — N18.4 CHRONIC KIDNEY DISEASE, STAGE IV (SEVERE): ICD-10-CM

## 2020-02-10 DIAGNOSIS — I73.9 PVD (PERIPHERAL VASCULAR DISEASE): ICD-10-CM

## 2020-02-10 DIAGNOSIS — N25.81 SECONDARY HYPERPARATHYROIDISM: ICD-10-CM

## 2020-02-10 DIAGNOSIS — D63.1 ANEMIA IN STAGE 4 CHRONIC KIDNEY DISEASE: ICD-10-CM

## 2020-02-10 LAB
ALBUMIN SERPL BCP-MCNC: 3.5 G/DL (ref 3.5–5.2)
ANION GAP SERPL CALC-SCNC: 10 MMOL/L (ref 8–16)
BUN SERPL-MCNC: 51 MG/DL (ref 8–23)
CALCIUM SERPL-MCNC: 9.9 MG/DL (ref 8.7–10.5)
CHLORIDE SERPL-SCNC: 108 MMOL/L (ref 95–110)
CO2 SERPL-SCNC: 23 MMOL/L (ref 23–29)
CREAT SERPL-MCNC: 2.5 MG/DL (ref 0.5–1.4)
ERYTHROCYTE [DISTWIDTH] IN BLOOD BY AUTOMATED COUNT: 14.9 % (ref 11.5–14.5)
EST. GFR  (AFRICAN AMERICAN): 19.1 ML/MIN/1.73 M^2
EST. GFR  (NON AFRICAN AMERICAN): 16.5 ML/MIN/1.73 M^2
FERRITIN SERPL-MCNC: 193 NG/ML (ref 20–300)
GLUCOSE SERPL-MCNC: 91 MG/DL (ref 70–110)
HCT VFR BLD AUTO: 35.2 % (ref 37–48.5)
HGB BLD-MCNC: 10.5 G/DL (ref 12–16)
IRON SERPL-MCNC: 37 UG/DL (ref 30–160)
MCH RBC QN AUTO: 27.9 PG (ref 27–31)
MCHC RBC AUTO-ENTMCNC: 29.8 G/DL (ref 32–36)
MCV RBC AUTO: 93 FL (ref 82–98)
PHOSPHATE SERPL-MCNC: 3.4 MG/DL (ref 2.7–4.5)
PLATELET # BLD AUTO: 190 K/UL (ref 150–350)
PMV BLD AUTO: 10.9 FL (ref 9.2–12.9)
POTASSIUM SERPL-SCNC: 5.3 MMOL/L (ref 3.5–5.1)
PTH-INTACT SERPL-MCNC: 260 PG/ML (ref 9–77)
RBC # BLD AUTO: 3.77 M/UL (ref 4–5.4)
SATURATED IRON: 12 % (ref 20–50)
SODIUM SERPL-SCNC: 141 MMOL/L (ref 136–145)
TOTAL IRON BINDING CAPACITY: 300 UG/DL (ref 250–450)
TRANSFERRIN SERPL-MCNC: 203 MG/DL (ref 200–375)
WBC # BLD AUTO: 5.86 K/UL (ref 3.9–12.7)

## 2020-02-10 PROCEDURE — 83540 ASSAY OF IRON: CPT | Mod: HCNC

## 2020-02-10 PROCEDURE — 80069 RENAL FUNCTION PANEL: CPT | Mod: HCNC

## 2020-02-10 PROCEDURE — 85027 COMPLETE CBC AUTOMATED: CPT | Mod: HCNC

## 2020-02-10 PROCEDURE — 83970 ASSAY OF PARATHORMONE: CPT | Mod: HCNC

## 2020-02-10 PROCEDURE — 82728 ASSAY OF FERRITIN: CPT | Mod: HCNC

## 2020-02-10 PROCEDURE — 36415 COLL VENOUS BLD VENIPUNCTURE: CPT | Mod: HCNC

## 2020-02-19 ENCOUNTER — OFFICE VISIT (OUTPATIENT)
Dept: NEPHROLOGY | Facility: CLINIC | Age: 85
End: 2020-02-19
Payer: MEDICARE

## 2020-02-19 VITALS
BODY MASS INDEX: 34.01 KG/M2 | DIASTOLIC BLOOD PRESSURE: 56 MMHG | HEART RATE: 80 BPM | HEIGHT: 61 IN | OXYGEN SATURATION: 98 % | SYSTOLIC BLOOD PRESSURE: 122 MMHG | WEIGHT: 180.13 LBS

## 2020-02-19 DIAGNOSIS — N18.4 CHRONIC KIDNEY DISEASE, STAGE IV (SEVERE): Primary | ICD-10-CM

## 2020-02-19 DIAGNOSIS — I73.9 PVD (PERIPHERAL VASCULAR DISEASE): ICD-10-CM

## 2020-02-19 DIAGNOSIS — I10 ESSENTIAL HYPERTENSION: ICD-10-CM

## 2020-02-19 PROCEDURE — 99214 OFFICE O/P EST MOD 30 MIN: CPT | Mod: HCNC,S$GLB,, | Performed by: INTERNAL MEDICINE

## 2020-02-19 PROCEDURE — 1125F PR PAIN SEVERITY QUANTIFIED, PAIN PRESENT: ICD-10-PCS | Mod: HCNC,S$GLB,, | Performed by: INTERNAL MEDICINE

## 2020-02-19 PROCEDURE — 99999 PR PBB SHADOW E&M-EST. PATIENT-LVL IV: ICD-10-PCS | Mod: PBBFAC,HCNC,, | Performed by: INTERNAL MEDICINE

## 2020-02-19 PROCEDURE — 1101F PT FALLS ASSESS-DOCD LE1/YR: CPT | Mod: HCNC,CPTII,S$GLB, | Performed by: INTERNAL MEDICINE

## 2020-02-19 PROCEDURE — 99499 RISK ADDL DX/OHS AUDIT: ICD-10-PCS | Mod: HCNC,S$GLB,, | Performed by: INTERNAL MEDICINE

## 2020-02-19 PROCEDURE — 1101F PR PT FALLS ASSESS DOC 0-1 FALLS W/OUT INJ PAST YR: ICD-10-PCS | Mod: HCNC,CPTII,S$GLB, | Performed by: INTERNAL MEDICINE

## 2020-02-19 PROCEDURE — 1159F PR MEDICATION LIST DOCUMENTED IN MEDICAL RECORD: ICD-10-PCS | Mod: HCNC,S$GLB,, | Performed by: INTERNAL MEDICINE

## 2020-02-19 PROCEDURE — 99214 PR OFFICE/OUTPT VISIT, EST, LEVL IV, 30-39 MIN: ICD-10-PCS | Mod: HCNC,S$GLB,, | Performed by: INTERNAL MEDICINE

## 2020-02-19 PROCEDURE — 99999 PR PBB SHADOW E&M-EST. PATIENT-LVL IV: CPT | Mod: PBBFAC,HCNC,, | Performed by: INTERNAL MEDICINE

## 2020-02-19 PROCEDURE — 99499 UNLISTED E&M SERVICE: CPT | Mod: HCNC,S$GLB,, | Performed by: INTERNAL MEDICINE

## 2020-02-19 PROCEDURE — 1125F AMNT PAIN NOTED PAIN PRSNT: CPT | Mod: HCNC,S$GLB,, | Performed by: INTERNAL MEDICINE

## 2020-02-19 PROCEDURE — 1159F MED LIST DOCD IN RCRD: CPT | Mod: HCNC,S$GLB,, | Performed by: INTERNAL MEDICINE

## 2020-02-19 RX ORDER — FERROUS SULFATE 325(65) MG
325 TABLET, DELAYED RELEASE (ENTERIC COATED) ORAL 2 TIMES DAILY
Qty: 120 TABLET | Refills: 6 | Status: ON HOLD | COMMUNITY
Start: 2020-02-19 | End: 2021-04-17 | Stop reason: SDUPTHER

## 2020-02-19 NOTE — PROGRESS NOTES
Progress Note  Nephrology      Referring physician: Leticia Weems MD    SUBJECTIVE:   89 y.o. female  has a past medical history of AAA (abdominal aortic aneurysm), Anemia in CKD (chronic kidney disease), Arthritis, Cataract, Class 1 obesity due to excess calories with serious comorbidity in adult (7/6/2017), Gout, chronic, High cholesterol, Hypertension, Hypothyroidism, Obesity, Plantar fasciitis, PVD (peripheral vascular disease), and Thyroid trouble. who has been following up in renal clinic for CKD, she feels well, no sob or urinary symptoms,no major complaints, her cr was noted to be in baseline of around 2s with deterioration over years, she uses walker at home but her functionality is declining.        OBJECTIVE:     /56     Physical Exam:  General: no distress, well nourished, on wheel chair  HENT: PERRLA, Normal mouth nose and ears.  Neck: no JVD and thyroid not enlarged, symmetric, no tenderness/mass/nodules  Lungs: clear to auscultation bilaterally and normal respiratory effort  Cardiovascular: regular rate and rhythm, S1, S2 normal, + murmur, no click, rub or gallop.   Abdomen: soft, non-tender non-distented; bowel sounds normal  Skin: No rashes or lesions  Musculoskeletal:no edema, no deformities.   Lymph Nodes: No cervical or supraclavicular adenopathy  Neurologic: Normal strength and tone. No focal numbness or weakness    Dialysis Access: Not applicable.        Medications:    Current Outpatient Medications:     acetaminophen-codeine 300-30mg (TYLENOL #3) 300-30 mg Tab, 1 po tid prn, Disp: 30 tablet, Rfl: 1    ammonium lactate 12 % Crea, Apply twice daily to affected parts both feet as needed., Disp: 140 g, Rfl: 11    aspirin 81 MG Chew, Take 1 tablet (81 mg total) by mouth once daily., Disp: 30 tablet, Rfl: 11    calcitRIOL (ROCALTROL) 0.25 MCG Cap, Take 1 capsule (0.25 mcg total) by mouth every Monday and Friday., Disp: 60 capsule, Rfl: 6    calcium citrate 250 mg calcium Tab, Take 1  tablet by mouth 2 (two) times daily., Disp: 180 each, Rfl: 3    colchicine (COLCRYS) 0.6 mg tablet, Take 1 tablet (0.6 mg total) by mouth every other day., Disp: 45 tablet, Rfl: 1    doxazosin (CARDURA) 2 MG tablet, TAKE 1 TABLET NIGHTLY AT BEDTIME, Disp: 90 tablet, Rfl: 0    felodipine (PLENDIL) 10 MG 24 hr tablet, TAKE 1 TABLET EVERY DAY, Disp: 90 tablet, Rfl: 1    ferrous sulfate 325 mg (65 mg iron) Tab tablet, Take 1 tablet (325 mg total) by mouth 2 (two) times daily., Disp: 180 tablet, Rfl: 3    levothyroxine (SYNTHROID) 88 MCG tablet, TAKE 1 TABLET (88 MCG TOTAL) BY MOUTH ONCE DAILY., Disp: 90 tablet, Rfl: 1    lovastatin (MEVACOR) 20 MG tablet, 1 po At bedtime, Disp: 90 tablet, Rfl: 1    MULTIVIT-IRON-MIN-FOLIC ACID 3,500-18-0.4 UNIT-MG-MG ORAL CHEW, Take by mouth., Disp: , Rfl:     sodium bicarbonate 650 MG tablet, Take 1 tablet (650 mg total) by mouth 2 (two) times daily., Disp: 180 tablet, Rfl: 4         Laboratory:  Lab Results   Component Value Date    CREATININE 2.5 (H) 02/10/2020       Prot/Creat Ratio, Ur   Date Value Ref Range Status   11/15/2019 0.10 0.00 - 0.20 Final   08/14/2017 Unable to calculate 0.00 - 0.20 Final   01/23/2015 Unable to calculate 0.0 - 0.2 Final       Lab Results   Component Value Date     02/10/2020    K 5.3 (H) 02/10/2020    CO2 23 02/10/2020       last PTH   Lab Results   Component Value Date    .0 (H) 02/10/2020    CALCIUM 9.9 02/10/2020    PHOS 3.4 02/10/2020       Lab Results   Component Value Date    HGB 10.5 (L) 02/10/2020        Lab Results   Component Value Date    HGBA1C 6.1 04/28/2006       Lab Results   Component Value Date    LDLCALC 107.6 09/26/2019       Other Labs were reviewed      ASSESSMENT/PLAN:     CKD IV  -likely from HTN and PVD  -Cr baseline ~ 2.5 with GFR ~ 20 ml/min which has been declining 2-3 ml/min/yr  -UPCR none  -Renal US in 2017 with chronic changes and two left cysts/ one right cyst  -Mild hyperkalemia    HTN  -well  controlled off meds    Anemia  -from ckd  -Hgb goal ~ 10  -Iron stores low    Secondary Hyperparathyroidism  -Phos/Ca acceptable  -PTH trending down on Calcitriol    Acid/Base  -None gap Metabolic acidosis, improved on Nabicrab        PLAN:  -didn't show for Iron infusion, will start oral Iron  -Discussed low K diet  -Start Calcitriol M/F  -discussed the goal of care and possible need for dialysis down the road, she and family will think about it, given risks and benefits.  -Avoid NSAIDs intake        RTC in 3-4 months with blood work      SARWAT MAURICIO MD  NEPHROLOGY ATTENDING

## 2020-03-11 ENCOUNTER — PES CALL (OUTPATIENT)
Dept: ADMINISTRATIVE | Facility: CLINIC | Age: 85
End: 2020-03-11

## 2020-04-10 RX ORDER — FELODIPINE 10 MG/1
TABLET, EXTENDED RELEASE ORAL
Qty: 90 TABLET | Refills: 0 | Status: SHIPPED | OUTPATIENT
Start: 2020-04-10 | End: 2020-07-09

## 2020-04-10 NOTE — TELEPHONE ENCOUNTER
----- Message from Kat Bates sent at 4/10/2020  1:23 PM CDT -----  Contact: Katia pt daughter   Katia pt daughter called and pt needs a refill     Refill on : felodipine (PLENDIL) 10 MG 24 hr tablet    [facility] [address]   [address] [facility] [address]   Phone number [phone]    If you mail the order please mail it to her daughter address because she staying with her for the time being.  Address : [address] [place] [address]  [place] [id number]      Katia can be reached at [phone] or [phone]   Pt need it as soon as possible Pt took the last one today

## 2020-05-04 ENCOUNTER — OFFICE VISIT (OUTPATIENT)
Dept: CARDIOLOGY | Facility: CLINIC | Age: 85
End: 2020-05-04
Payer: MEDICARE

## 2020-05-04 DIAGNOSIS — E03.9 HYPOTHYROIDISM, UNSPECIFIED TYPE: ICD-10-CM

## 2020-05-04 DIAGNOSIS — I73.9 PERIPHERAL ARTERIAL DISEASE: ICD-10-CM

## 2020-05-04 DIAGNOSIS — I10 ESSENTIAL HYPERTENSION: ICD-10-CM

## 2020-05-04 DIAGNOSIS — I70.0 AORTIC ATHEROSCLEROSIS: ICD-10-CM

## 2020-05-04 DIAGNOSIS — N18.4 CHRONIC KIDNEY DISEASE, STAGE IV (SEVERE): ICD-10-CM

## 2020-05-04 DIAGNOSIS — E78.5 HYPERLIPIDEMIA, UNSPECIFIED HYPERLIPIDEMIA TYPE: ICD-10-CM

## 2020-05-04 DIAGNOSIS — I71.40 AAA (ABDOMINAL AORTIC ANEURYSM) WITHOUT RUPTURE: Primary | ICD-10-CM

## 2020-05-04 PROCEDURE — 1159F PR MEDICATION LIST DOCUMENTED IN MEDICAL RECORD: ICD-10-PCS | Mod: 95,HCNC,, | Performed by: INTERNAL MEDICINE

## 2020-05-04 PROCEDURE — 99441 PR PHYSICIAN TELEPHONE EVALUATION 5-10 MIN: CPT | Mod: 95,HCNC,, | Performed by: INTERNAL MEDICINE

## 2020-05-04 PROCEDURE — 1101F PR PT FALLS ASSESS DOC 0-1 FALLS W/OUT INJ PAST YR: ICD-10-PCS | Mod: 95,HCNC,CPTII, | Performed by: INTERNAL MEDICINE

## 2020-05-04 PROCEDURE — 99441 PR PHYSICIAN TELEPHONE EVALUATION 5-10 MIN: ICD-10-PCS | Mod: 95,HCNC,, | Performed by: INTERNAL MEDICINE

## 2020-05-04 PROCEDURE — 1159F MED LIST DOCD IN RCRD: CPT | Mod: 95,HCNC,, | Performed by: INTERNAL MEDICINE

## 2020-05-04 PROCEDURE — 1101F PT FALLS ASSESS-DOCD LE1/YR: CPT | Mod: 95,HCNC,CPTII, | Performed by: INTERNAL MEDICINE

## 2020-05-04 NOTE — PROGRESS NOTES
Established Patient - Audio Only Telehealth Visit     The patient location is: Home (daughter's)  The chief complaint leading to consultation is: Fllow-up of abdominal aortic aneurysm  Visit type: Virtual visit with audio only (telephone)  Total time spent with patient: 10 min       The reason for the audio only service rather than synchronous audio and video virtual visit was related to technical difficulties or patient preference/necessity.     Each patient to whom I provide medical services by telemedicine is:  (1) informed of the relationship between the physician and patient and the respective role of any other health care provider with respect to management of the patient; and (2) notified that they may decline to receive medical services by telemedicine and may withdraw from such care at any time. Patient verbally consented to receive this service via voice-only telephone call.    Chart has been dictated using voice recognition software.  It is not been reviewed carefully for any transcriptional errors due to this technology.   Subjective:   Patient ID:  Johanny Curtis is a 89 y.o. female who presents for follow-up of No chief complaint on file.      HPI: Patient previously seen in fellos' clinic with peripheral arterial disease (positive SHEEBA 29-Jul-2019), AAA (diagnosed in 2015 3 cm --> 2017 3X3.1cm followed by Dr. Gordon), hyperlipidemia, hypertension, and CKD-4.      Patient denies any chest discomfort on exertion or at rest. Patient denies any palpitations, lightheadedness, or syncope.  Patient denies any dyspnea at rest or on exertion, orthopnea, or PND. Patient denies lower extremity claudication. Ablle to walk around house at least 4x with walker.        Past Medical History:   Diagnosis Date    AAA (abdominal aortic aneurysm)     Anemia in CKD (chronic kidney disease)     Arthritis     Cataract     Class 1 obesity due to excess calories with serious comorbidity in adult 7/6/2017    Gout,  chronic     High cholesterol     Hypertension     Hypothyroidism     Obesity     Plantar fasciitis     PVD (peripheral vascular disease)     Thyroid trouble        Outpatient Medications Prior to Visit   Medication Sig Dispense Refill    acetaminophen-codeine 300-30mg (TYLENOL #3) 300-30 mg Tab 1 po tid prn 30 tablet 1    aspirin 81 MG Chew Take 1 tablet (81 mg total) by mouth once daily. 30 tablet 11    calcitRIOL (ROCALTROL) 0.25 MCG Cap Take 1 capsule (0.25 mcg total) by mouth every Monday and Friday. 60 capsule 6    doxazosin (CARDURA) 2 MG tablet TAKE 1 TABLET NIGHTLY AT BEDTIME 90 tablet 0    felodipine (PLENDIL) 10 MG 24 hr tablet TAKE 1 TABLET EVERY DAY 90 tablet 0    ferrous sulfate 325 (65 FE) MG EC tablet Take 1 tablet (325 mg total) by mouth 2 (two) times daily. 120 tablet 6    levothyroxine (SYNTHROID) 88 MCG tablet TAKE 1 TABLET (88 MCG TOTAL) BY MOUTH ONCE DAILY. 90 tablet 1    lovastatin (MEVACOR) 20 MG tablet 1 po At bedtime 90 tablet 1    MULTIVIT-IRON-MIN-FOLIC ACID 3,500-18-0.4 UNIT-MG-MG ORAL CHEW Take by mouth.      ammonium lactate 12 % Crea Apply twice daily to affected parts both feet as needed. 140 g 11    calcium citrate 250 mg calcium Tab Take 1 tablet by mouth 2 (two) times daily. (Patient not taking: Reported on 5/4/2020) 180 each 3    colchicine (COLCRYS) 0.6 mg tablet Take 1 tablet (0.6 mg total) by mouth every other day. (Patient not taking: Reported on 5/4/2020) 45 tablet 1    ferrous sulfate 325 mg (65 mg iron) Tab tablet Take 1 tablet (325 mg total) by mouth 2 (two) times daily. (Patient not taking: Reported on 5/4/2020) 180 tablet 3    sodium bicarbonate 650 MG tablet Take 1 tablet (650 mg total) by mouth 2 (two) times daily. (Patient not taking: Reported on 5/4/2020) 180 tablet 4     No facility-administered medications prior to visit.        ROS - No change from prior visit in neurologic, respiratory, endocrine, GI, hematologic, or constitutional complaints    Objective:   Physical Exam   Constitutional:   Vital signs:  Blood pressure - left arm: 136/69                              right arm: 128/64  Pulse - 71 bpm  Temp - 98.2 degrees F  Weight - 182.1 lbs           Lab Results   Component Value Date     02/10/2020    K 5.3 (H) 02/10/2020    BUN 51 (H) 02/10/2020    CREATININE 2.5 (H) 02/10/2020    GLU 91 02/10/2020    HGBA1C 6.1 04/28/2006    CHOL 179 09/26/2019    HDL 44 09/26/2019    LDLCALC 107.6 09/26/2019    TRIG 137 09/26/2019    CHOLHDL 24.6 09/26/2019    HGB 10.5 (L) 02/10/2020    HCT 35.2 (L) 02/10/2020     02/10/2020    INR 1.0 03/29/2007       Assessment:     1. AAA (abdominal aortic aneurysm) without rupture    2. Aortic atherosclerosis    3. Chronic kidney disease, stage IV (severe)    4. Essential hypertension    5. Peripheral arterial disease    6. Hyperlipidemia, unspecified hyperlipidemia type    7. Hypothyroidism, unspecified type      Patient has no symptoms of cardiac ischemia, heart failure, or significant arrhythmias.  Blood pressure is adequately controlled.  Will continue on current medications.  Patient should have a repeat abdominal ultrasound in approximately 6 months to reassess the size of her aortic aneurysm. Unless there are intervening problems, patient should return for re-evaluation in 6 months.   Plan:     Diagnoses and all orders for this visit:    AAA (abdominal aortic aneurysm) without rupture    Aortic atherosclerosis    Chronic kidney disease, stage IV (severe)    Essential hypertension    Peripheral arterial disease    Hyperlipidemia, unspecified hyperlipidemia type    Hypothyroidism, unspecified type          Donell Stanton MD  Consultative Cardiology     This service was not originating from a related E/M service provided within the previous 7 days nor will  to an E/M service or procedure within the next 24 hours or my soonest available appointment.  Prevailing standard of care was able to be met in this  audio-only visit.

## 2020-05-04 NOTE — Clinical Note
Thank you for referring Johanny Curtis for follow-up of abdominal aortic aneurysm. Please see my note for details of this encounter. If you have any questions, please contact me.  Thank you again for the referral.

## 2020-05-04 NOTE — LETTER
May 4, 2020      Leticia Weems MD  [address]  [place] [address]           [facility]  [address]  [place] [address]  Phone: [phone]          Patient: Johanny Curtis   MR Number: [id number]   YOB: 1930   Date of Visit: 5/4/2020       Dear Dr. Leticia Weems:    Thank you for referring Johanny Curtis to me for evaluation. Attached you will find relevant portions of my assessment and plan of care.    If you have questions, please do not hesitate to call me. I look forward to following Johanny Curtis along with you.    Sincerely,    Donell Stanton MD    Enclosure  CC:  No Recipients    If you would like to receive this communication electronically, please contact [email or web address] or [phone] to request more information on [facility] access.    For providers and/or their staff who would like to refer a patient to [facility], please contact us through our one-stop-shop provider referral line, [facility] , at [phone].    If you feel you have received this communication in error or would no longer like to receive these types of communications, please e-mail [email or web address]

## 2020-05-06 RX ORDER — LOVASTATIN 20 MG/1
TABLET ORAL
Qty: 90 TABLET | Refills: 0 | Status: SHIPPED | OUTPATIENT
Start: 2020-05-06 | End: 2020-07-30 | Stop reason: SDUPTHER

## 2020-05-06 NOTE — TELEPHONE ENCOUNTER
----- Message from Ingrid Martinez sent at 5/6/2020  2:13 PM CDT -----  Contact: [phone]  Type: Rx    Name of medication(s): lovastatin (MEVACOR) 20 MG tablet    Is this a refill? New rx?  Temporary supply     Who prescribed medication?  Dr. Weems    Pharmacy Name, Phone, & Location:  [id number] [address] [facility]  Comments:   Please advise, thank you

## 2020-05-15 ENCOUNTER — TELEPHONE (OUTPATIENT)
Dept: NEPHROLOGY | Facility: CLINIC | Age: 85
End: 2020-05-15

## 2020-05-15 RX ORDER — CALCITRIOL 0.25 UG/1
0.25 CAPSULE ORAL
Qty: 60 CAPSULE | Refills: 6 | Status: CANCELLED | OUTPATIENT
Start: 2020-05-15

## 2020-05-18 ENCOUNTER — PES CALL (OUTPATIENT)
Dept: ADMINISTRATIVE | Facility: CLINIC | Age: 85
End: 2020-05-18

## 2020-05-19 ENCOUNTER — TELEPHONE (OUTPATIENT)
Dept: NEPHROLOGY | Facility: CLINIC | Age: 85
End: 2020-05-19

## 2020-05-19 RX ORDER — CALCITRIOL 0.25 UG/1
0.25 CAPSULE ORAL
Qty: 60 CAPSULE | Refills: 6 | Status: SHIPPED | OUTPATIENT
Start: 2020-05-22 | End: 2021-04-19 | Stop reason: SDUPTHER

## 2020-05-19 NOTE — TELEPHONE ENCOUNTER
----- Message from Surendra Ragsdale sent at 5/19/2020 11:31 AM CDT -----  Contact: Pt Daughter - Katia Bojorquezard   calcitRIOL (ROCALTROL) 0.25 MCG Cap    [facility] - [place], [place] - [address]      [phone] (home)

## 2020-05-26 ENCOUNTER — TELEPHONE (OUTPATIENT)
Dept: NEPHROLOGY | Facility: CLINIC | Age: 85
End: 2020-05-26

## 2020-05-26 NOTE — TELEPHONE ENCOUNTER
Pt daughter call in office , stated sod bicarb needed strength.  Fwd msg to  for clarifications.   [phone] - [phone]

## 2020-05-27 ENCOUNTER — TELEPHONE (OUTPATIENT)
Dept: NEPHROLOGY | Facility: CLINIC | Age: 85
End: 2020-05-27

## 2020-05-27 RX ORDER — SODIUM BICARBONATE 650 MG/1
650 TABLET ORAL 2 TIMES DAILY
Qty: 180 TABLET | Refills: 4 | COMMUNITY
Start: 2020-05-27 | End: 2021-04-19 | Stop reason: SDUPTHER

## 2020-05-27 NOTE — TELEPHONE ENCOUNTER
sodium bicarbonate 650 MG tablet 180 tablet 4 5/27/2020 5/27/2021 No   Sig - Route: Take 1 tablet (650 mg total) by mouth 2 (two) times daily. - Oral     [id number]

## 2020-06-04 RX ORDER — DOXAZOSIN 2 MG/1
2 TABLET ORAL NIGHTLY
Qty: 90 TABLET | Refills: 0 | Status: SHIPPED | OUTPATIENT
Start: 2020-06-04 | End: 2020-07-30 | Stop reason: SDUPTHER

## 2020-06-04 RX ORDER — LEVOTHYROXINE SODIUM 88 UG/1
88 TABLET ORAL DAILY
Qty: 90 TABLET | Refills: 0 | Status: SHIPPED | OUTPATIENT
Start: 2020-06-04 | End: 2020-07-30 | Stop reason: SDUPTHER

## 2020-06-04 NOTE — TELEPHONE ENCOUNTER
----- Message from Karely Menezes sent at 6/4/2020 10:38 AM CDT -----  Contact: Katia/daughter/ [phone]  Requesting an RX refill or new RX.  Is this a refill or new RX: refill   RX name and strength: levothyroxine (SYNTHROID) 88 MCG tablet 90 tablet   Directions (copy/paste from chart):   TAKE 1 TABLET (88 MCG TOTAL) BY MOUTH ONCE DAILY.   Is this a 30 day or 90 day RX:  90  Local pharmacy or mail order pharmacy:  [facility] #[id number] [address] AT [facility] [phone] (Phone)  [phone] (Fax)  Pharmacy name and phone # (copy/paste from chart):     Comments:      Requesting an RX refill or new RX.  Is this a refill or new RX: refill   RX name and strength: doxazosin (CARDURA) 2 MG tablet 90 tablet   Directions (copy/paste from chart):  TAKE 1 TABLET NIGHTLY AT BEDTIME   Is this a 30 day or 90 day RX:  90  Local pharmacy or mail order pharmacy:  [facility] #[id number] [place] [id number] [facility] AT [facility] [phone] (Phone)  [phone] (Fax)  Pharmacy name and phone # (copy/paste from chart):     Comments:

## 2020-06-04 NOTE — TELEPHONE ENCOUNTER
Approved Medications      levothyroxine (SYNTHROID) 88 MCG tablet         Sig: Take 1 tablet (88 mcg total) by mouth once daily.    Disp:  90 tablet    Refills:  0    Start: 6/4/2020    Class: Normal    Authorized by: Leticia Weems MD         doxazosin (CARDURA) 2 MG tablet         Sig: Take 1 tablet (2 mg total) by mouth nightly.    Disp:  90 tablet    Refills:  0    Start: 6/4/2020    Class: Normal    Authorized by: Leticia Weems MD    To pharmacy: .        To be filled at: [facility] #[id number] [facility] [address] [facility] AT [facility]

## 2020-07-30 ENCOUNTER — OFFICE VISIT (OUTPATIENT)
Dept: INTERNAL MEDICINE | Facility: CLINIC | Age: 85
End: 2020-07-30
Payer: MEDICARE

## 2020-07-30 ENCOUNTER — LAB VISIT (OUTPATIENT)
Dept: LAB | Facility: HOSPITAL | Age: 85
End: 2020-07-30
Attending: INTERNAL MEDICINE
Payer: MEDICARE

## 2020-07-30 DIAGNOSIS — R45.84 ANHEDONIA: ICD-10-CM

## 2020-07-30 DIAGNOSIS — I10 HYPERTENSION, UNSPECIFIED TYPE: ICD-10-CM

## 2020-07-30 DIAGNOSIS — N18.9 CHRONIC RENAL IMPAIRMENT, UNSPECIFIED CKD STAGE: Primary | ICD-10-CM

## 2020-07-30 LAB
ALBUMIN SERPL BCP-MCNC: 3.5 G/DL (ref 3.5–5.2)
ALP SERPL-CCNC: 209 U/L (ref 55–135)
ALT SERPL W/O P-5'-P-CCNC: 235 U/L (ref 10–44)
ANION GAP SERPL CALC-SCNC: 12 MMOL/L (ref 8–16)
AST SERPL-CCNC: 227 U/L (ref 10–40)
BASOPHILS # BLD AUTO: 0.02 K/UL (ref 0–0.2)
BASOPHILS NFR BLD: 0.3 % (ref 0–1.9)
BILIRUB SERPL-MCNC: 0.5 MG/DL (ref 0.1–1)
BUN SERPL-MCNC: 41 MG/DL (ref 8–23)
CALCIUM SERPL-MCNC: 9.5 MG/DL (ref 8.7–10.5)
CHLORIDE SERPL-SCNC: 107 MMOL/L (ref 95–110)
CO2 SERPL-SCNC: 22 MMOL/L (ref 23–29)
CREAT SERPL-MCNC: 2 MG/DL (ref 0.5–1.4)
DIFFERENTIAL METHOD: ABNORMAL
EOSINOPHIL # BLD AUTO: 0.1 K/UL (ref 0–0.5)
EOSINOPHIL NFR BLD: 1.1 % (ref 0–8)
ERYTHROCYTE [DISTWIDTH] IN BLOOD BY AUTOMATED COUNT: 14.9 % (ref 11.5–14.5)
EST. GFR  (AFRICAN AMERICAN): 24.8 ML/MIN/1.73 M^2
EST. GFR  (NON AFRICAN AMERICAN): 21.5 ML/MIN/1.73 M^2
FOLATE SERPL-MCNC: 32.6 NG/ML (ref 4–24)
GLUCOSE SERPL-MCNC: 82 MG/DL (ref 70–110)
HCT VFR BLD AUTO: 38.1 % (ref 37–48.5)
HGB BLD-MCNC: 11.5 G/DL (ref 12–16)
IMM GRANULOCYTES # BLD AUTO: 0.03 K/UL (ref 0–0.04)
IMM GRANULOCYTES NFR BLD AUTO: 0.5 % (ref 0–0.5)
LYMPHOCYTES # BLD AUTO: 1.2 K/UL (ref 1–4.8)
LYMPHOCYTES NFR BLD: 19 % (ref 18–48)
MCH RBC QN AUTO: 28.9 PG (ref 27–31)
MCHC RBC AUTO-ENTMCNC: 30.2 G/DL (ref 32–36)
MCV RBC AUTO: 96 FL (ref 82–98)
MONOCYTES # BLD AUTO: 0.4 K/UL (ref 0.3–1)
MONOCYTES NFR BLD: 7 % (ref 4–15)
NEUTROPHILS # BLD AUTO: 4.4 K/UL (ref 1.8–7.7)
NEUTROPHILS NFR BLD: 72.1 % (ref 38–73)
NRBC BLD-RTO: 0 /100 WBC
PLATELET # BLD AUTO: 183 K/UL (ref 150–350)
PMV BLD AUTO: 11 FL (ref 9.2–12.9)
POTASSIUM SERPL-SCNC: 4.5 MMOL/L (ref 3.5–5.1)
PROT SERPL-MCNC: 7.4 G/DL (ref 6–8.4)
RBC # BLD AUTO: 3.98 M/UL (ref 4–5.4)
SODIUM SERPL-SCNC: 141 MMOL/L (ref 136–145)
VIT B12 SERPL-MCNC: 848 PG/ML (ref 210–950)
WBC # BLD AUTO: 6.15 K/UL (ref 3.9–12.7)

## 2020-07-30 PROCEDURE — 80053 COMPREHEN METABOLIC PANEL: CPT | Mod: HCNC

## 2020-07-30 PROCEDURE — 85025 COMPLETE CBC W/AUTO DIFF WBC: CPT | Mod: HCNC

## 2020-07-30 PROCEDURE — 99397 PR PREVENTIVE VISIT,EST,65 & OVER: ICD-10-PCS | Mod: HCNC,S$GLB,, | Performed by: INTERNAL MEDICINE

## 2020-07-30 PROCEDURE — 82746 ASSAY OF FOLIC ACID SERUM: CPT | Mod: HCNC

## 2020-07-30 PROCEDURE — 36415 COLL VENOUS BLD VENIPUNCTURE: CPT | Mod: HCNC

## 2020-07-30 PROCEDURE — 99999 PR PBB SHADOW E&M-EST. PATIENT-LVL IV: ICD-10-PCS | Mod: PBBFAC,HCNC,, | Performed by: INTERNAL MEDICINE

## 2020-07-30 PROCEDURE — 99999 PR PBB SHADOW E&M-EST. PATIENT-LVL IV: CPT | Mod: PBBFAC,HCNC,, | Performed by: INTERNAL MEDICINE

## 2020-07-30 PROCEDURE — 82607 VITAMIN B-12: CPT | Mod: HCNC

## 2020-07-30 PROCEDURE — 99397 PER PM REEVAL EST PAT 65+ YR: CPT | Mod: HCNC,S$GLB,, | Performed by: INTERNAL MEDICINE

## 2020-07-30 RX ORDER — LOVASTATIN 20 MG/1
TABLET ORAL
Qty: 90 TABLET | Refills: 1 | Status: SHIPPED | OUTPATIENT
Start: 2020-07-30 | End: 2020-08-31 | Stop reason: CLARIF

## 2020-07-30 RX ORDER — LEVOTHYROXINE SODIUM 88 UG/1
88 TABLET ORAL DAILY
Qty: 90 TABLET | Refills: 1 | Status: SHIPPED | OUTPATIENT
Start: 2020-07-30 | End: 2020-08-18 | Stop reason: SDUPTHER

## 2020-07-30 RX ORDER — FELODIPINE 10 MG/1
TABLET, EXTENDED RELEASE ORAL
Qty: 90 TABLET | Refills: 1 | Status: SHIPPED | OUTPATIENT
Start: 2020-07-30 | End: 2020-08-18 | Stop reason: SDUPTHER

## 2020-07-30 RX ORDER — DOXAZOSIN 2 MG/1
2 TABLET ORAL NIGHTLY
Qty: 90 TABLET | Refills: 1 | Status: SHIPPED | OUTPATIENT
Start: 2020-07-30 | End: 2020-08-18 | Stop reason: SDUPTHER

## 2020-08-01 ENCOUNTER — TELEPHONE (OUTPATIENT)
Dept: INTERNAL MEDICINE | Facility: CLINIC | Age: 85
End: 2020-08-01

## 2020-08-01 DIAGNOSIS — I10 HYPERTENSION, UNSPECIFIED TYPE: Primary | ICD-10-CM

## 2020-08-01 DIAGNOSIS — N18.9 CHRONIC RENAL IMPAIRMENT, UNSPECIFIED CKD STAGE: ICD-10-CM

## 2020-08-01 NOTE — TELEPHONE ENCOUNTER
Please contact patient and inform her that her liver blood work is elevated.  Please advise her to discontinue her lovastatin.  Please ask if she is having any abdominal pain or nausea and vomiting.    Lab work also indicates she needs to follow-up with nephrology.  Order in.  Please schedule.    I would like to recheck her liver blood work in 1 week.  Lab order in.  Please schedule

## 2020-08-02 VITALS
OXYGEN SATURATION: 98 % | WEIGHT: 183.88 LBS | HEART RATE: 85 BPM | DIASTOLIC BLOOD PRESSURE: 60 MMHG | BODY MASS INDEX: 34.72 KG/M2 | SYSTOLIC BLOOD PRESSURE: 118 MMHG | TEMPERATURE: 99 F | HEIGHT: 61 IN

## 2020-08-02 NOTE — PROGRESS NOTES
Subjective:       Patient ID: Johanny Curtis is a 89 y.o. female.    Chief Complaint: Annual Exam    HPI  She is here for annual exam.  Currently without complaint       PAST MEDICAL HISTORY: Hypertension, aortic aneurysm, hypothyroidism, peripheral vascular disease,   hyperlipidemia, osteopenia, ankle fracture,   osteoarthritis, gout, peripheral neuropathy. She had a  colonoscopy July 2010     PAST SURGICAL HISTORY: Hysterectomy.     MEDICATIONS: Cardura 2 mg at bedtime,  Plendil 10 mg daily, Synthroid 0.088 mg daily, lovastatin 20 mg nightly    ALLERGIES: No known drug allergies.     Review of Systems   Constitutional: Negative for chills, fatigue, fever and unexpected weight change.   Respiratory: Negative for chest tightness and shortness of breath.    Cardiovascular: Negative for chest pain and palpitations.   Gastrointestinal: Negative for abdominal pain and blood in stool.   Neurological: Negative for dizziness, syncope, numbness and headaches.       Objective:      Physical Exam  HENT:      Right Ear: External ear normal.      Left Ear: External ear normal.      Nose: Nose normal.      Mouth/Throat:      Mouth: Mucous membranes are moist.      Pharynx: Oropharynx is clear.   Eyes:      Pupils: Pupils are equal, round, and reactive to light.   Neck:      Musculoskeletal: Normal range of motion.   Cardiovascular:      Rate and Rhythm: Normal rate and regular rhythm.      Heart sounds: No murmur.   Pulmonary:      Breath sounds: Normal breath sounds.   Abdominal:      General: There is no distension.      Palpations: There is no hepatomegaly or splenomegaly.      Tenderness: There is no abdominal tenderness.   Lymphadenopathy:      Cervical: No cervical adenopathy.      Upper Body:      Right upper body: No axillary adenopathy.      Left upper body: No axillary adenopathy.   Neurological:      Cranial Nerves: No cranial nerve deficit.      Sensory: No sensory deficit.      Motor: Motor function is intact.       Deep Tendon Reflexes: Reflexes are normal and symmetric.         Assessment/Plan       Assessment and plan:  Annual exam.  Check CMP, CBC, B12, folate

## 2020-08-03 NOTE — TELEPHONE ENCOUNTER
Spoke with pt Results given Pt was reluctant to be scheduled for Nephro and lab work .She says she now lives with her daughter and she would have to make sure her daughter can jesús her. Informed pt to stop taking Lovastatin . Verbalized understanding. Said she would have daughter to call us back.

## 2020-08-18 RX ORDER — FELODIPINE 10 MG/1
TABLET, EXTENDED RELEASE ORAL
Qty: 90 TABLET | Refills: 1 | Status: ON HOLD | OUTPATIENT
Start: 2020-08-18 | End: 2020-09-11 | Stop reason: HOSPADM

## 2020-08-18 RX ORDER — DOXAZOSIN 2 MG/1
2 TABLET ORAL NIGHTLY
Qty: 90 TABLET | Refills: 1 | Status: ON HOLD | OUTPATIENT
Start: 2020-08-18 | End: 2020-09-11 | Stop reason: HOSPADM

## 2020-08-18 RX ORDER — DOXAZOSIN 2 MG/1
2 TABLET ORAL NIGHTLY
Qty: 90 TABLET | Refills: 1 | Status: CANCELLED | OUTPATIENT
Start: 2020-08-18

## 2020-08-18 RX ORDER — FELODIPINE 10 MG/1
TABLET, EXTENDED RELEASE ORAL
Qty: 90 TABLET | Refills: 1 | Status: CANCELLED | OUTPATIENT
Start: 2020-08-18

## 2020-08-18 RX ORDER — LEVOTHYROXINE SODIUM 88 UG/1
88 TABLET ORAL DAILY
Qty: 90 TABLET | Refills: 1 | Status: CANCELLED | OUTPATIENT
Start: 2020-08-18

## 2020-08-18 RX ORDER — LEVOTHYROXINE SODIUM 88 UG/1
88 TABLET ORAL DAILY
Qty: 90 TABLET | Refills: 1 | Status: SHIPPED | OUTPATIENT
Start: 2020-08-18 | End: 2021-03-01 | Stop reason: SDUPTHER

## 2020-08-18 NOTE — TELEPHONE ENCOUNTER
"----- Message from Woody Wise sent at 8/18/2020 10:54 AM CDT -----  Regarding: Rx refill  Contact: [facility] [phone]  RX request - refill or new RX.  Is this a refill or new RX:  Refill   RX name and strength:  felodipine (PLENDIL) 10 MG 24 hr tablet   Directions:   Is this a 30 day or 90 day RX:    Pharmacy name and phone # [facility] - [address] [phone] (Phone) [phone] (Fax)      Comments:  3 Rx's requesting       RX request - refill or new RX.  Is this a refill or new RX:  Refill   RX name and strength:  levothyroxine (SYNTHROID) 88 MCG tablet   Directions:   Is this a 30 day or 90 day RX:    Pharmacy name and phone # (DON'T enter "on file" or "in chart"):   Comments:        RX request - refill or new RX.  Is this a refill or new RX:  Refill   RX name and strength: doxazosin (CARDURA) 2 MG tablet  Directions:   Is this a 30 day or 90 day RX:    Pharmacy name and phone # (DON'T enter "on file" or "in chart"):   Comments:        Please call an advise  Thank you      "

## 2020-08-31 ENCOUNTER — TELEPHONE (OUTPATIENT)
Dept: INTERNAL MEDICINE | Facility: CLINIC | Age: 85
End: 2020-08-31

## 2020-08-31 ENCOUNTER — HOSPITAL ENCOUNTER (EMERGENCY)
Facility: HOSPITAL | Age: 85
Discharge: HOME | End: 2020-08-31
Attending: EMERGENCY MEDICINE
Payer: MEDICARE

## 2020-08-31 VITALS
BODY MASS INDEX: 34.55 KG/M2 | WEIGHT: 183 LBS | HEART RATE: 76 BPM | SYSTOLIC BLOOD PRESSURE: 169 MMHG | HEIGHT: 61 IN | OXYGEN SATURATION: 100 % | TEMPERATURE: 97 F | RESPIRATION RATE: 16 BRPM | DIASTOLIC BLOOD PRESSURE: 75 MMHG

## 2020-08-31 DIAGNOSIS — R40.4 TRANSIENT ALTERATION OF AWARENESS: Primary | ICD-10-CM

## 2020-08-31 LAB
ALBUMIN SERPL BCP-MCNC: 2.9 G/DL (ref 3.5–5.2)
ALP SERPL-CCNC: 702 U/L (ref 55–135)
ALT SERPL W/O P-5'-P-CCNC: 119 U/L (ref 10–44)
ANION GAP SERPL CALC-SCNC: 11 MMOL/L (ref 8–16)
AST SERPL-CCNC: 92 U/L (ref 10–40)
BASOPHILS # BLD AUTO: 0.02 K/UL (ref 0–0.2)
BASOPHILS NFR BLD: 0.3 % (ref 0–1.9)
BILIRUB SERPL-MCNC: 0.8 MG/DL (ref 0.1–1)
BILIRUB UR QL STRIP: NEGATIVE
BUN SERPL-MCNC: 36 MG/DL (ref 8–23)
CALCIUM SERPL-MCNC: 9.6 MG/DL (ref 8.7–10.5)
CHLORIDE SERPL-SCNC: 107 MMOL/L (ref 95–110)
CLARITY UR REFRACT.AUTO: ABNORMAL
CO2 SERPL-SCNC: 23 MMOL/L (ref 23–29)
COLOR UR AUTO: YELLOW
CREAT SERPL-MCNC: 2.2 MG/DL (ref 0.5–1.4)
DIFFERENTIAL METHOD: ABNORMAL
EOSINOPHIL # BLD AUTO: 0 K/UL (ref 0–0.5)
EOSINOPHIL NFR BLD: 0.6 % (ref 0–8)
ERYTHROCYTE [DISTWIDTH] IN BLOOD BY AUTOMATED COUNT: 15.8 % (ref 11.5–14.5)
EST. GFR  (AFRICAN AMERICAN): 22.1 ML/MIN/1.73 M^2
EST. GFR  (NON AFRICAN AMERICAN): 19.2 ML/MIN/1.73 M^2
GLUCOSE SERPL-MCNC: 109 MG/DL (ref 70–110)
GLUCOSE UR QL STRIP: NEGATIVE
HCT VFR BLD AUTO: 35.4 % (ref 37–48.5)
HGB BLD-MCNC: 10.8 G/DL (ref 12–16)
HGB UR QL STRIP: NEGATIVE
IMM GRANULOCYTES # BLD AUTO: 0.16 K/UL (ref 0–0.04)
IMM GRANULOCYTES NFR BLD AUTO: 2.2 % (ref 0–0.5)
KETONES UR QL STRIP: NEGATIVE
LEUKOCYTE ESTERASE UR QL STRIP: NEGATIVE
LYMPHOCYTES # BLD AUTO: 1.1 K/UL (ref 1–4.8)
LYMPHOCYTES NFR BLD: 14.7 % (ref 18–48)
MAGNESIUM SERPL-MCNC: 2 MG/DL (ref 1.6–2.6)
MCH RBC QN AUTO: 29 PG (ref 27–31)
MCHC RBC AUTO-ENTMCNC: 30.5 G/DL (ref 32–36)
MCV RBC AUTO: 95 FL (ref 82–98)
MONOCYTES # BLD AUTO: 0.6 K/UL (ref 0.3–1)
MONOCYTES NFR BLD: 8.4 % (ref 4–15)
NEUTROPHILS # BLD AUTO: 5.3 K/UL (ref 1.8–7.7)
NEUTROPHILS NFR BLD: 73.8 % (ref 38–73)
NITRITE UR QL STRIP: NEGATIVE
NRBC BLD-RTO: 0 /100 WBC
PH UR STRIP: 5 [PH] (ref 5–8)
PHOSPHATE SERPL-MCNC: 2.9 MG/DL (ref 2.7–4.5)
PLATELET # BLD AUTO: 203 K/UL (ref 150–350)
PMV BLD AUTO: 10.9 FL (ref 9.2–12.9)
POCT GLUCOSE: 111 MG/DL (ref 70–110)
POTASSIUM SERPL-SCNC: 4.4 MMOL/L (ref 3.5–5.1)
PROT SERPL-MCNC: 7.5 G/DL (ref 6–8.4)
PROT UR QL STRIP: NEGATIVE
RBC # BLD AUTO: 3.73 M/UL (ref 4–5.4)
SODIUM SERPL-SCNC: 141 MMOL/L (ref 136–145)
SP GR UR STRIP: 1.01 (ref 1–1.03)
TSH SERPL DL<=0.005 MIU/L-ACNC: 1.56 UIU/ML (ref 0.4–4)
URN SPEC COLLECT METH UR: ABNORMAL
WBC # BLD AUTO: 7.15 K/UL (ref 3.9–12.7)

## 2020-08-31 PROCEDURE — 84100 ASSAY OF PHOSPHORUS: CPT | Mod: HCNC

## 2020-08-31 PROCEDURE — 99284 PR EMERGENCY DEPT VISIT,LEVEL IV: ICD-10-PCS | Mod: ,,, | Performed by: EMERGENCY MEDICINE

## 2020-08-31 PROCEDURE — 81003 URINALYSIS AUTO W/O SCOPE: CPT | Mod: HCNC

## 2020-08-31 PROCEDURE — 85025 COMPLETE CBC W/AUTO DIFF WBC: CPT | Mod: HCNC

## 2020-08-31 PROCEDURE — 93010 ELECTROCARDIOGRAM REPORT: CPT | Mod: HCNC,,, | Performed by: INTERNAL MEDICINE

## 2020-08-31 PROCEDURE — 80053 COMPREHEN METABOLIC PANEL: CPT | Mod: HCNC

## 2020-08-31 PROCEDURE — 93005 ELECTROCARDIOGRAM TRACING: CPT | Mod: HCNC

## 2020-08-31 PROCEDURE — 83735 ASSAY OF MAGNESIUM: CPT | Mod: HCNC

## 2020-08-31 PROCEDURE — 84443 ASSAY THYROID STIM HORMONE: CPT | Mod: HCNC

## 2020-08-31 PROCEDURE — 99285 EMERGENCY DEPT VISIT HI MDM: CPT | Mod: 25,HCNC

## 2020-08-31 PROCEDURE — 82962 GLUCOSE BLOOD TEST: CPT | Mod: HCNC

## 2020-08-31 PROCEDURE — 94761 N-INVAS EAR/PLS OXIMETRY MLT: CPT | Mod: HCNC

## 2020-08-31 PROCEDURE — 99284 EMERGENCY DEPT VISIT MOD MDM: CPT | Mod: ,,, | Performed by: EMERGENCY MEDICINE

## 2020-08-31 PROCEDURE — 93010 EKG 12-LEAD: ICD-10-PCS | Mod: HCNC,,, | Performed by: INTERNAL MEDICINE

## 2020-08-31 NOTE — DISCHARGE INSTRUCTIONS
CT head shows mild cerebral volume loss, slightly asymmetrically affecting the left cerebral hemisphere.  Chronic carotid stenosis to be considered.  Further evaluation with carotid ultrasound, CTA, or MRA if warranted clinically.     Alk phos elevated (700), LFT's elevated. Follow up with PCP.

## 2020-08-31 NOTE — TELEPHONE ENCOUNTER
----- Message from Maura Moise sent at 8/31/2020  9:26 AM CDT -----  Contact: dtr Annette Lombard [phone]  Dtr is calling to notify Dr Weems patient is on her way to the hospital, she passed out and don't know why. Bring her to the ED.    thanks

## 2020-08-31 NOTE — ED NOTES
Pt awake and alert; resting quietly on stretcher.  Pt remains on continuous cardiac and pulse ox monitoring with non-invasive blood pressure to cycle every 30 minutes.  VS stable; NSR noted. Pt denies pain at this time; no acute distress or discomfort reported or observed.  Pt on purewick; pt is able to reposition self on stretcher. Bed locked in lowest position; side rails up and locked x 2; call light, bedside table, and personal belongings within reach. Room assessed for safety measures and cleanliness; no action needed at this time. Plan of care discussed.  Pt instructed to alert nurse for assistance and before attempting to get out of bed; verbalizes understanding. Pt denies needs or complaints at this time; will continue to monitor.

## 2020-08-31 NOTE — ED TRIAGE NOTES
To the ED following her bath, she was sitting on the toilet, eyes began fixed, not responding to her daughter, leaning backward, 4th episode, 2 times in July, beginning of Aug and today.

## 2020-08-31 NOTE — ED PROVIDER NOTES
Encounter Date: 8/31/2020       History     Chief Complaint   Patient presents with    Fall     arrived via NO EMS with c/o syncope moving from bath tub, duration 2-3 minutes, witness by daughter, denies injury     89-year-old female with past medical history of ESRD (not on dialysis), HTN, hypothyroidism, HLD, and PVD who presents for evaluation of episode of altered mental status.  Patient's daughter Katia provides history via telephone.  States that today after she gave her mom a bath, she had an episode of unresponsiveness.  During the episode the patient was sitting, her eyes had fixed gaze, she did not lose muscle tone is, she was unable to follow commands or answer questions.  The patient does not recall the episode.  It lasted approximately 10 min.  After episode she felt fine in respect to her normal self.  Patient's daughter states she has had 3 other episodes like this within the past 2 months, always after she has a bath.  She has not been evaluated for this.  She has never fallen or sustained trauma to her head or body during these episodes.  She takes aspirin 81 mg daily.  She has no complaints at this time- no headache, lightheadedness, dizziness, changes in vision or hearing, chest pain, shortness of breath, abdominal pain, nausea vomiting diarrhea.  She has otherwise felt fine.  She ambulates with a walker.  She lives with her daughter. Per daughter, she was supposed to start dialysis this Spring but it got delayed because of COVID.         Review of patient's allergies indicates:  No Known Allergies  Past Medical History:   Diagnosis Date    AAA (abdominal aortic aneurysm)     Anemia in CKD (chronic kidney disease)     Arthritis     Cataract     Class 1 obesity due to excess calories with serious comorbidity in adult 7/6/2017    Gout, chronic     High cholesterol     Hypertension     Hypothyroidism     Obesity     Plantar fasciitis     PVD (peripheral vascular disease)     Thyroid  trouble      Past Surgical History:   Procedure Laterality Date    BREAST BIOPSY Left     BREAST SURGERY Left 1972    benign breast lump    CATARACT EXTRACTION  3/18/13    both eyes -     CHOLECYSTECTOMY      EYE SURGERY      HYSTERECTOMY      SKIN BIOPSY      Left breast     Family History   Problem Relation Age of Onset    Breast cancer Sister     Cataracts Son     Glaucoma Son     Mental illness Son     Diabetes Son     Glaucoma Daughter     Lupus Daughter     Meningitis Son     Heart disease Brother     Cancer Sister         leukemia    Cancer Sister         pancreatic    No Known Problems Brother     Cancer Brother         unknown    Diabetes Son     Blindness Neg Hx     Amblyopia Neg Hx     Hypertension Neg Hx     Macular degeneration Neg Hx     Retinal detachment Neg Hx     Strabismus Neg Hx     Stroke Neg Hx     Thyroid disease Neg Hx      Social History     Tobacco Use    Smoking status: Former Smoker     Packs/day: 0.50     Years: 60.00     Pack years: 30.00     Quit date: 2001     Years since quittin.1    Smokeless tobacco: Never Used    Tobacco comment: quit in the    Substance Use Topics    Alcohol use: No    Drug use: No     Review of Systems   Constitutional: Negative for chills, diaphoresis, fatigue and fever.   HENT: Negative for ear pain, hearing loss, sore throat and tinnitus.    Eyes: Negative for pain and visual disturbance.   Respiratory: Negative for cough, chest tightness and shortness of breath.    Cardiovascular: Negative for chest pain, palpitations and leg swelling.   Gastrointestinal: Negative for abdominal pain, diarrhea, nausea and vomiting.   Genitourinary: Negative for dysuria.   Musculoskeletal: Negative for back pain and neck pain.   Skin: Negative for rash.   Neurological: Negative for dizziness, syncope, weakness, light-headedness and headaches.   Psychiatric/Behavioral: Negative.        Physical Exam     Initial Vitals  [08/31/20 1031]   BP Pulse Resp Temp SpO2   (!) 130/57 80 16 96.8 °F (36 °C) 97 %      MAP       --         Physical Exam    Nursing note and vitals reviewed.  Constitutional: She appears well-developed and well-nourished. She is not diaphoretic. No distress.   HENT:   Head: Normocephalic.   Right Ear: External ear normal.   Left Ear: External ear normal.   Nose: Nose normal.   Mouth/Throat: Oropharynx is clear and moist. No oropharyngeal exudate.   Eyes: Conjunctivae and EOM are normal. Pupils are equal, round, and reactive to light. Right eye exhibits no discharge. Left eye exhibits no discharge. No scleral icterus.   Neck: Normal range of motion. Neck supple.   Cardiovascular: Normal rate, regular rhythm, normal heart sounds and intact distal pulses.   Pulmonary/Chest: Breath sounds normal. No respiratory distress. She has no wheezes. She has no rhonchi. She has no rales. She exhibits no tenderness.   Abdominal: Soft. She exhibits no distension. There is no abdominal tenderness. There is no rebound and no guarding.   Musculoskeletal: Normal range of motion. No tenderness or edema.   Neurological: She is alert and oriented to person, place, and time. She has normal reflexes. She displays normal reflexes. No cranial nerve deficit or sensory deficit. GCS score is 15. GCS eye subscore is 4. GCS verbal subscore is 5. GCS motor subscore is 6.   Skin: Skin is warm and dry. Capillary refill takes less than 2 seconds.   Psychiatric: She has a normal mood and affect. Her behavior is normal. Judgment and thought content normal.         ED Course   Procedures  Labs Reviewed   POCT GLUCOSE - Abnormal; Notable for the following components:       Result Value    POCT Glucose 111 (*)     All other components within normal limits   CBC W/ AUTO DIFFERENTIAL   COMPREHENSIVE METABOLIC PANEL   MAGNESIUM   PHOSPHORUS   URINALYSIS, REFLEX TO URINE CULTURE   TSH     EKG Readings: (Independently Interpreted)   Initial Reading: No STEMI.    No changes from prior     ECG Results          EKG 12-lead (In process)  Result time 08/31/20 11:54:20    In process by Interface, Lab In [facility] (08/31/20 11:54:20)                 Narrative:    Test Reason : R55,    Vent. Rate : 077 BPM     Atrial Rate : 081 BPM     P-R Int : 186 ms          QRS Dur : 070 ms      QT Int : 356 ms       P-R-T Axes : 051 014 073 degrees     QTc Int : 403 ms    Age and gender specific analysis  Normal sinus rhythm  Normal ECG  When compared with ECG of 29-APR-2011 11:04,  No significant change was found    Referred By: [email or web address]   SELF           Confirmed By:                             Imaging Results          CT Head Without Contrast (In process)               X-Rays:   Independently Interpreted Readings:   Head CT: CT Head Impression:     No evidence of acute hemorrhage or major vascular distribution infarct.     Mild cerebral volume loss, slightly asymmetrically affecting the left cerebral hemisphere.    Chronic carotid stenosis to be considered.  Further evaluation with carotid ultrasound, CTA, or MRA if warranted clinically.     Right mastoid air cell fluid.        Medical Decision Making:   History:   I obtained history from: someone other than patient and EMS provider.       <> Summary of History: See HPI  Old Medical Records: I decided to obtain old medical records.  Old Records Summarized: records from clinic visits.  Initial Assessment:   89yoF pmhx ESRD (not on dialysis), HTN, hypothyroidism, HLD, and PVD with 10-minute episode of AMS after bathing most likely due to vasovagal response.   Differential Diagnosis:   DDX  -vasovagal response: x3 similar episodes in the past 2 months, always after bathing, no post-event symptoms, no loss of muscle tone  -seizure: unlikely absence seizure- would except shorter episodes, would be unusual to have new onset in 89yo, episodes only occur after bathing  -TIA: possible but no reported FNDs during episodes other than AMS  -CVA:  unlikely given no FNDs  -subdural: possible given age, will obtain CT head non-con  -electrolyte abornormality: will eval with CMP, mag, phos  Independently Interpreted Test(s):   I have ordered and independently interpreted EKG Reading(s) - see prior notes  Clinical Tests:   Lab Tests: Ordered and Reviewed  Radiological Study: Ordered and Reviewed  Medical Tests: Ordered and Reviewed  ED Management:  On initial exam, patient A&Ox3, NAD, VS wnl. Non-toxic, non-septic, NVI with no FNDs. No complaints at this time. Does not recall the episode. Workup to evaluate above stated ddx ordred including CBC, CMP, mag, phos, TSH, CT head non-con, EKG.    1:00 PM  CMP consistent with ESRD; alk phos 700 from 200 on 7/31, AST/ALT 92/119 from 200's on 7/31  CBC with Hb 10.9 similar to baseline, no other abnormality  TSH wnl  Mag/phos wnl  CT head with no acute abnormality; chronic carotid stenosis considered       2:45 PM  UA wnl  Discussed with attending- patient safe for dispo home with outpatient f/u  Recommend f/u with PCP within 1 week for elevated LFTs and alk phos, CT head concerning for carotid stenosis.  Called patient's daughter Katia to update her on plan                                       Clinical Impression:       ICD-10-CM ICD-9-CM   1. Transient alteration of awareness  R40.4 780.02                                Nilsa Shepherd MD  Resident  08/31/20 [id number]

## 2020-08-31 NOTE — ED NOTES
Speaking with pt daughterKatia to obtain information. Contact name and # provided.   LOC: The patient is awake, alert, and oriented to place, time, situation. Affect is appropriate.  Speech is appropriate and clear.     APPEARANCE: Patient resting comfortably in no acute distress.  Patient is clean and well groomed.    SKIN: The skin is warm and dry; color consistent with ethnicity.  Patient has normal skin turgor and moist mucus membranes.  Skin intact; no breakdown or bruising noted.     MUSCULOSKELETAL: Patient moving upper and lower extremities without difficulty. Ambulates with walker assistance.   RESPIRATORY: Airway is open and patent. Respirations spontaneous, even, easy, and non-labored.  Patient has a normal effort and rate.  No accessory muscle use noted. Denies cough.     CARDIAC:  No peripheral edema noted. No complaints of chest pain.      ABDOMEN: Soft and non tender to palpation.  No distention noted.     NEUROLOGIC: Eyes open spontaneously.  Behavior appropriate to situation.  Follows commands; facial expression symmetrical.  Purposeful motor response noted; normal sensation in all extremities. Unaware of incident this am.

## 2020-09-01 ENCOUNTER — TELEPHONE (OUTPATIENT)
Dept: INTERNAL MEDICINE | Facility: CLINIC | Age: 85
End: 2020-09-01

## 2020-09-01 NOTE — TELEPHONE ENCOUNTER
----- Message from Yeimi Pate sent at 9/1/2020  8:54 AM CDT -----  Contact: Es/Daughter/[phone]  Caller is requesting an earlier appt than we can schedule.  Caller declined first available appointment listed below. Caller will not accept being placed on the wait list and is requesting a message be sent to the provider.  When is the next available appointment:  10/05/20  Did you offer to schedule the next available appt and put the patient on the wait list?:  yes, no   What visit type: ep  Symptoms:  er f/u  Patient preference of timeframe to be scheduled:  ASAP  What is the reason the patient is requesting a sooner appointment? (insurance terminating, changing jobs):    Would the patient rather a call back or a response via [facility]?:    Comments:  offered an jordan with an np but refused.

## 2020-09-01 NOTE — TELEPHONE ENCOUNTER
Spoke with the daughter and she agreed to date and time of appointment. 9/3/2020 at 8 am for an ER follow up.     Please schedule.

## 2020-09-03 ENCOUNTER — OFFICE VISIT (OUTPATIENT)
Dept: INTERNAL MEDICINE | Facility: CLINIC | Age: 85
End: 2020-09-03
Payer: MEDICARE

## 2020-09-03 ENCOUNTER — LAB VISIT (OUTPATIENT)
Dept: LAB | Facility: HOSPITAL | Age: 85
End: 2020-09-03
Attending: INTERNAL MEDICINE
Payer: MEDICARE

## 2020-09-03 DIAGNOSIS — R74.8 ELEVATED LIVER ENZYMES: ICD-10-CM

## 2020-09-03 DIAGNOSIS — I10 HYPERTENSION, UNSPECIFIED TYPE: ICD-10-CM

## 2020-09-03 DIAGNOSIS — R53.82 CHRONIC FATIGUE, UNSPECIFIED: ICD-10-CM

## 2020-09-03 DIAGNOSIS — N18.9 CHRONIC RENAL IMPAIRMENT, UNSPECIFIED CKD STAGE: Primary | ICD-10-CM

## 2020-09-03 DIAGNOSIS — R55 SYNCOPE, UNSPECIFIED SYNCOPE TYPE: ICD-10-CM

## 2020-09-03 LAB
ALBUMIN SERPL BCP-MCNC: 2.9 G/DL (ref 3.5–5.2)
ALP SERPL-CCNC: 707 U/L (ref 55–135)
ALT SERPL W/O P-5'-P-CCNC: 130 U/L (ref 10–44)
ANION GAP SERPL CALC-SCNC: 11 MMOL/L (ref 8–16)
AST SERPL-CCNC: 149 U/L (ref 10–40)
BILIRUB SERPL-MCNC: 2.9 MG/DL (ref 0.1–1)
BUN SERPL-MCNC: 33 MG/DL (ref 8–23)
CALCIUM SERPL-MCNC: 9.9 MG/DL (ref 8.7–10.5)
CHLORIDE SERPL-SCNC: 106 MMOL/L (ref 95–110)
CO2 SERPL-SCNC: 25 MMOL/L (ref 23–29)
CREAT SERPL-MCNC: 2.3 MG/DL (ref 0.5–1.4)
EST. GFR  (AFRICAN AMERICAN): 20.9 ML/MIN/1.73 M^2
EST. GFR  (NON AFRICAN AMERICAN): 18.2 ML/MIN/1.73 M^2
GLUCOSE SERPL-MCNC: 95 MG/DL (ref 70–110)
POTASSIUM SERPL-SCNC: 3.8 MMOL/L (ref 3.5–5.1)
PROT SERPL-MCNC: 7.5 G/DL (ref 6–8.4)
SODIUM SERPL-SCNC: 142 MMOL/L (ref 136–145)

## 2020-09-03 PROCEDURE — 99499 RISK ADDL DX/OHS AUDIT: ICD-10-PCS | Mod: HCNC,S$GLB,, | Performed by: INTERNAL MEDICINE

## 2020-09-03 PROCEDURE — 80074 ACUTE HEPATITIS PANEL: CPT | Mod: HCNC

## 2020-09-03 PROCEDURE — 99215 PR OFFICE/OUTPT VISIT, EST, LEVL V, 40-54 MIN: ICD-10-PCS | Mod: HCNC,S$GLB,, | Performed by: INTERNAL MEDICINE

## 2020-09-03 PROCEDURE — 3288F FALL RISK ASSESSMENT DOCD: CPT | Mod: HCNC,CPTII,S$GLB, | Performed by: INTERNAL MEDICINE

## 2020-09-03 PROCEDURE — 1159F PR MEDICATION LIST DOCUMENTED IN MEDICAL RECORD: ICD-10-PCS | Mod: HCNC,S$GLB,, | Performed by: INTERNAL MEDICINE

## 2020-09-03 PROCEDURE — 99215 OFFICE O/P EST HI 40 MIN: CPT | Mod: HCNC,S$GLB,, | Performed by: INTERNAL MEDICINE

## 2020-09-03 PROCEDURE — 80053 COMPREHEN METABOLIC PANEL: CPT | Mod: HCNC

## 2020-09-03 PROCEDURE — 3288F PR FALLS RISK ASSESSMENT DOCUMENTED: ICD-10-PCS | Mod: HCNC,CPTII,S$GLB, | Performed by: INTERNAL MEDICINE

## 2020-09-03 PROCEDURE — 36415 COLL VENOUS BLD VENIPUNCTURE: CPT | Mod: HCNC

## 2020-09-03 PROCEDURE — 99999 PR PBB SHADOW E&M-EST. PATIENT-LVL V: ICD-10-PCS | Mod: PBBFAC,HCNC,, | Performed by: INTERNAL MEDICINE

## 2020-09-03 PROCEDURE — 99499 UNLISTED E&M SERVICE: CPT | Mod: HCNC,S$GLB,, | Performed by: INTERNAL MEDICINE

## 2020-09-03 PROCEDURE — 1159F MED LIST DOCD IN RCRD: CPT | Mod: HCNC,S$GLB,, | Performed by: INTERNAL MEDICINE

## 2020-09-03 PROCEDURE — 1100F PR PT FALLS ASSESS DOC 2+ FALLS/FALL W/INJURY/YR: ICD-10-PCS | Mod: HCNC,CPTII,S$GLB, | Performed by: INTERNAL MEDICINE

## 2020-09-03 PROCEDURE — 1126F AMNT PAIN NOTED NONE PRSNT: CPT | Mod: HCNC,S$GLB,, | Performed by: INTERNAL MEDICINE

## 2020-09-03 PROCEDURE — 99999 PR PBB SHADOW E&M-EST. PATIENT-LVL V: CPT | Mod: PBBFAC,HCNC,, | Performed by: INTERNAL MEDICINE

## 2020-09-03 PROCEDURE — 1100F PTFALLS ASSESS-DOCD GE2>/YR: CPT | Mod: HCNC,CPTII,S$GLB, | Performed by: INTERNAL MEDICINE

## 2020-09-03 PROCEDURE — 1126F PR PAIN SEVERITY QUANTIFIED, NO PAIN PRESENT: ICD-10-PCS | Mod: HCNC,S$GLB,, | Performed by: INTERNAL MEDICINE

## 2020-09-04 LAB
HAV IGM SERPL QL IA: NEGATIVE
HBV CORE IGM SERPL QL IA: NEGATIVE
HBV SURFACE AG SERPL QL IA: NEGATIVE
HCV AB SERPL QL IA: NEGATIVE

## 2020-09-05 ENCOUNTER — TELEPHONE (OUTPATIENT)
Dept: INTERNAL MEDICINE | Facility: CLINIC | Age: 85
End: 2020-09-05

## 2020-09-05 DIAGNOSIS — R74.8 ELEVATED LIVER ENZYMES: Primary | ICD-10-CM

## 2020-09-05 NOTE — TELEPHONE ENCOUNTER
Contacted patient about her lab result. She is currently asymptomatic. Will schedule hepatology appt and liver ultrasound. Advised her if any symptoms develop, she is to call or go to the ER

## 2020-09-07 VITALS
OXYGEN SATURATION: 96 % | SYSTOLIC BLOOD PRESSURE: 118 MMHG | WEIGHT: 176.81 LBS | DIASTOLIC BLOOD PRESSURE: 60 MMHG | TEMPERATURE: 99 F | HEIGHT: 59 IN | HEART RATE: 62 BPM | BODY MASS INDEX: 35.64 KG/M2

## 2020-09-07 NOTE — PROGRESS NOTES
Subjective:       Patient ID: Johanny Curtis is a 89 y.o. female.    Chief Complaint: Follow-up    HPI  She is here for Emergency Department follow-up.  Please see emergency department records  Review of Systems   Constitutional: Negative for chills, fatigue, fever and unexpected weight change.   Respiratory: Negative for chest tightness and shortness of breath.    Cardiovascular: Negative for chest pain and palpitations.   Gastrointestinal: Negative for abdominal pain and blood in stool.   Neurological: Negative for dizziness, syncope, numbness and headaches.       Objective:      Physical Exam  HENT:      Right Ear: External ear normal.      Left Ear: External ear normal.      Nose: Nose normal.      Mouth/Throat:      Mouth: Mucous membranes are moist.      Pharynx: Oropharynx is clear.   Eyes:      Pupils: Pupils are equal, round, and reactive to light.   Neck:      Musculoskeletal: Normal range of motion.   Cardiovascular:      Rate and Rhythm: Normal rate and regular rhythm.      Heart sounds: No murmur.   Pulmonary:      Breath sounds: Normal breath sounds.   Abdominal:      General: There is no distension.      Palpations: There is no hepatomegaly or splenomegaly.      Tenderness: There is no abdominal tenderness.   Lymphadenopathy:      Cervical: No cervical adenopathy.      Upper Body:      Right upper body: No axillary adenopathy.      Left upper body: No axillary adenopathy.   Neurological:      Cranial Nerves: No cranial nerve deficit.      Sensory: No sensory deficit.      Motor: Motor function is intact.      Deep Tendon Reflexes: Reflexes are normal and symmetric.         Assessment/Plan           assessment and plan:  Check CMP, hepatitis panel, carotid ultrasound.  She was here for urgent care only appointment.  She will return to clinic for a physical

## 2020-09-08 ENCOUNTER — ANESTHESIA EVENT (OUTPATIENT)
Dept: ENDOSCOPY | Facility: HOSPITAL | Age: 85
DRG: 871 | End: 2020-09-08
Payer: MEDICARE

## 2020-09-08 ENCOUNTER — HOSPITAL ENCOUNTER (INPATIENT)
Facility: HOSPITAL | Age: 85
LOS: 3 days | Discharge: HOME HEALTH | DRG: 871 | End: 2020-09-11
Attending: EMERGENCY MEDICINE | Admitting: INTERNAL MEDICINE
Payer: MEDICARE

## 2020-09-08 ENCOUNTER — ANESTHESIA (OUTPATIENT)
Dept: ENDOSCOPY | Facility: HOSPITAL | Age: 85
DRG: 871 | End: 2020-09-08
Payer: MEDICARE

## 2020-09-08 DIAGNOSIS — K83.1 BILIARY OBSTRUCTION: Primary | ICD-10-CM

## 2020-09-08 DIAGNOSIS — N18.4 CKD (CHRONIC KIDNEY DISEASE), STAGE IV: ICD-10-CM

## 2020-09-08 DIAGNOSIS — R78.81 BACTEREMIA: ICD-10-CM

## 2020-09-08 DIAGNOSIS — I71.40 AAA (ABDOMINAL AORTIC ANEURYSM) WITHOUT RUPTURE: ICD-10-CM

## 2020-09-08 DIAGNOSIS — I10 ESSENTIAL HYPERTENSION: ICD-10-CM

## 2020-09-08 DIAGNOSIS — K83.09 ACUTE CHOLANGITIS: ICD-10-CM

## 2020-09-08 DIAGNOSIS — E03.9 HYPOTHYROIDISM, UNSPECIFIED TYPE: ICD-10-CM

## 2020-09-08 DIAGNOSIS — R10.9 ABDOMINAL PAIN: ICD-10-CM

## 2020-09-08 DIAGNOSIS — A41.9 SEPSIS, DUE TO UNSPECIFIED ORGANISM, UNSPECIFIED WHETHER ACUTE ORGAN DYSFUNCTION PRESENT: ICD-10-CM

## 2020-09-08 LAB
ALBUMIN SERPL BCP-MCNC: 2.6 G/DL (ref 3.5–5.2)
ALBUMIN SERPL BCP-MCNC: 3 G/DL (ref 3.5–5.2)
ALP SERPL-CCNC: 765 U/L (ref 55–135)
ALP SERPL-CCNC: 845 U/L (ref 55–135)
ALT SERPL W/O P-5'-P-CCNC: 146 U/L (ref 10–44)
ALT SERPL W/O P-5'-P-CCNC: 173 U/L (ref 10–44)
ANION GAP SERPL CALC-SCNC: 10 MMOL/L (ref 8–16)
ANION GAP SERPL CALC-SCNC: 14 MMOL/L (ref 8–16)
AST SERPL-CCNC: 143 U/L (ref 10–40)
AST SERPL-CCNC: 203 U/L (ref 10–40)
BACTERIA #/AREA URNS AUTO: NORMAL /HPF
BASOPHILS # BLD AUTO: 0.02 K/UL (ref 0–0.2)
BASOPHILS # BLD AUTO: 0.02 K/UL (ref 0–0.2)
BASOPHILS NFR BLD: 0.1 % (ref 0–1.9)
BASOPHILS NFR BLD: 0.1 % (ref 0–1.9)
BILIRUB SERPL-MCNC: 3.6 MG/DL (ref 0.1–1)
BILIRUB SERPL-MCNC: 4.6 MG/DL (ref 0.1–1)
BILIRUB UR QL STRIP: NEGATIVE
BUN SERPL-MCNC: 29 MG/DL (ref 8–23)
BUN SERPL-MCNC: 30 MG/DL (ref 6–30)
BUN SERPL-MCNC: 32 MG/DL (ref 8–23)
CALCIUM SERPL-MCNC: 9.2 MG/DL (ref 8.7–10.5)
CALCIUM SERPL-MCNC: 9.7 MG/DL (ref 8.7–10.5)
CHLORIDE SERPL-SCNC: 104 MMOL/L (ref 95–110)
CHLORIDE SERPL-SCNC: 106 MMOL/L (ref 95–110)
CHLORIDE SERPL-SCNC: 107 MMOL/L (ref 95–110)
CLARITY UR REFRACT.AUTO: ABNORMAL
CO2 SERPL-SCNC: 19 MMOL/L (ref 23–29)
CO2 SERPL-SCNC: 21 MMOL/L (ref 23–29)
COLOR UR AUTO: ABNORMAL
CREAT SERPL-MCNC: 2 MG/DL (ref 0.5–1.4)
CREAT SERPL-MCNC: 2.3 MG/DL (ref 0.5–1.4)
CREAT SERPL-MCNC: 2.4 MG/DL (ref 0.5–1.4)
DIFFERENTIAL METHOD: ABNORMAL
DIFFERENTIAL METHOD: ABNORMAL
EOSINOPHIL # BLD AUTO: 0 K/UL (ref 0–0.5)
EOSINOPHIL # BLD AUTO: 0 K/UL (ref 0–0.5)
EOSINOPHIL NFR BLD: 0 % (ref 0–8)
EOSINOPHIL NFR BLD: 0 % (ref 0–8)
ERYTHROCYTE [DISTWIDTH] IN BLOOD BY AUTOMATED COUNT: 15.9 % (ref 11.5–14.5)
ERYTHROCYTE [DISTWIDTH] IN BLOOD BY AUTOMATED COUNT: 16 % (ref 11.5–14.5)
EST. GFR  (AFRICAN AMERICAN): 20.9 ML/MIN/1.73 M^2
EST. GFR  (AFRICAN AMERICAN): 24.8 ML/MIN/1.73 M^2
EST. GFR  (NON AFRICAN AMERICAN): 18.2 ML/MIN/1.73 M^2
EST. GFR  (NON AFRICAN AMERICAN): 21.5 ML/MIN/1.73 M^2
GLUCOSE SERPL-MCNC: 182 MG/DL (ref 70–110)
GLUCOSE SERPL-MCNC: 186 MG/DL (ref 70–110)
GLUCOSE SERPL-MCNC: 198 MG/DL (ref 70–110)
GLUCOSE UR QL STRIP: ABNORMAL
HCT VFR BLD AUTO: 35.8 % (ref 37–48.5)
HCT VFR BLD AUTO: 36.4 % (ref 37–48.5)
HCT VFR BLD CALC: 35 %PCV (ref 36–54)
HGB BLD-MCNC: 10.8 G/DL (ref 12–16)
HGB BLD-MCNC: 11.3 G/DL (ref 12–16)
HGB UR QL STRIP: NEGATIVE
HYALINE CASTS UR QL AUTO: 0 /LPF
IMM GRANULOCYTES # BLD AUTO: 0.13 K/UL (ref 0–0.04)
IMM GRANULOCYTES # BLD AUTO: 0.19 K/UL (ref 0–0.04)
IMM GRANULOCYTES NFR BLD AUTO: 0.8 % (ref 0–0.5)
IMM GRANULOCYTES NFR BLD AUTO: 0.9 % (ref 0–0.5)
KETONES UR QL STRIP: NEGATIVE
LACTATE SERPL-SCNC: 1.2 MMOL/L (ref 0.5–2.2)
LACTATE SERPL-SCNC: 2.9 MMOL/L (ref 0.5–2.2)
LEUKOCYTE ESTERASE UR QL STRIP: NEGATIVE
LIPASE SERPL-CCNC: 146 U/L (ref 4–60)
LYMPHOCYTES # BLD AUTO: 0.6 K/UL (ref 1–4.8)
LYMPHOCYTES # BLD AUTO: 0.7 K/UL (ref 1–4.8)
LYMPHOCYTES NFR BLD: 3.1 % (ref 18–48)
LYMPHOCYTES NFR BLD: 3.5 % (ref 18–48)
MAGNESIUM SERPL-MCNC: 1.8 MG/DL (ref 1.6–2.6)
MCH RBC QN AUTO: 28.5 PG (ref 27–31)
MCH RBC QN AUTO: 29 PG (ref 27–31)
MCHC RBC AUTO-ENTMCNC: 30.2 G/DL (ref 32–36)
MCHC RBC AUTO-ENTMCNC: 31 G/DL (ref 32–36)
MCV RBC AUTO: 92 FL (ref 82–98)
MCV RBC AUTO: 96 FL (ref 82–98)
MICROSCOPIC COMMENT: NORMAL
MONOCYTES # BLD AUTO: 0.8 K/UL (ref 0.3–1)
MONOCYTES # BLD AUTO: 0.8 K/UL (ref 0.3–1)
MONOCYTES NFR BLD: 3.6 % (ref 4–15)
MONOCYTES NFR BLD: 4.8 % (ref 4–15)
NEUTROPHILS # BLD AUTO: 14.5 K/UL (ref 1.8–7.7)
NEUTROPHILS # BLD AUTO: 19.7 K/UL (ref 1.8–7.7)
NEUTROPHILS NFR BLD: 90.8 % (ref 38–73)
NEUTROPHILS NFR BLD: 92.3 % (ref 38–73)
NITRITE UR QL STRIP: NEGATIVE
NRBC BLD-RTO: 0 /100 WBC
NRBC BLD-RTO: 0 /100 WBC
PH UR STRIP: 6 [PH] (ref 5–8)
PLATELET # BLD AUTO: 296 K/UL (ref 150–350)
PLATELET # BLD AUTO: 300 K/UL (ref 150–350)
PMV BLD AUTO: 10 FL (ref 9.2–12.9)
PMV BLD AUTO: 9.7 FL (ref 9.2–12.9)
POC IONIZED CALCIUM: 1.19 MMOL/L (ref 1.06–1.42)
POC TCO2 (MEASURED): 20 MMOL/L (ref 23–29)
POTASSIUM BLD-SCNC: 4.1 MMOL/L (ref 3.5–5.1)
POTASSIUM SERPL-SCNC: 3.9 MMOL/L (ref 3.5–5.1)
POTASSIUM SERPL-SCNC: 4.2 MMOL/L (ref 3.5–5.1)
PROT SERPL-MCNC: 7.2 G/DL (ref 6–8.4)
PROT SERPL-MCNC: 7.5 G/DL (ref 6–8.4)
PROT UR QL STRIP: ABNORMAL
RBC # BLD AUTO: 3.73 M/UL (ref 4–5.4)
RBC # BLD AUTO: 3.97 M/UL (ref 4–5.4)
RBC #/AREA URNS AUTO: 1 /HPF (ref 0–4)
SAMPLE: ABNORMAL
SARS-COV-2 RDRP RESP QL NAA+PROBE: NEGATIVE
SODIUM BLD-SCNC: 137 MMOL/L (ref 136–145)
SODIUM SERPL-SCNC: 137 MMOL/L (ref 136–145)
SODIUM SERPL-SCNC: 138 MMOL/L (ref 136–145)
SP GR UR STRIP: 1.02 (ref 1–1.03)
TROPONIN I SERPL DL<=0.01 NG/ML-MCNC: 0.03 NG/ML (ref 0–0.03)
TROPONIN I SERPL DL<=0.01 NG/ML-MCNC: 0.04 NG/ML (ref 0–0.03)
URN SPEC COLLECT METH UR: ABNORMAL
WBC # BLD AUTO: 15.93 K/UL (ref 3.9–12.7)
WBC # BLD AUTO: 21.31 K/UL (ref 3.9–12.7)
WBC #/AREA URNS AUTO: 1 /HPF (ref 0–5)

## 2020-09-08 PROCEDURE — 84484 ASSAY OF TROPONIN QUANT: CPT | Mod: HCNC

## 2020-09-08 PROCEDURE — 87040 BLOOD CULTURE FOR BACTERIA: CPT | Mod: HCNC

## 2020-09-08 PROCEDURE — 93010 ELECTROCARDIOGRAM REPORT: CPT | Mod: HCNC,,, | Performed by: INTERNAL MEDICINE

## 2020-09-08 PROCEDURE — 93005 ELECTROCARDIOGRAM TRACING: CPT | Mod: HCNC

## 2020-09-08 PROCEDURE — 74328 PR  X-RAY FOR BILE DUCT ENDOSCOPY: ICD-10-PCS | Mod: 26,HCNC,, | Performed by: INTERNAL MEDICINE

## 2020-09-08 PROCEDURE — 85025 COMPLETE CBC W/AUTO DIFF WBC: CPT | Mod: 91,HCNC

## 2020-09-08 PROCEDURE — 43262 ENDO CHOLANGIOPANCREATOGRAPH: CPT | Mod: HCNC | Performed by: INTERNAL MEDICINE

## 2020-09-08 PROCEDURE — 96375 TX/PRO/DX INJ NEW DRUG ADDON: CPT | Mod: HCNC

## 2020-09-08 PROCEDURE — 93010 EKG 12-LEAD: ICD-10-PCS | Mod: HCNC,,, | Performed by: INTERNAL MEDICINE

## 2020-09-08 PROCEDURE — 99220 PR INITIAL OBSERVATION CARE,LEVL III: CPT | Mod: HCNC,GC,, | Performed by: INTERNAL MEDICINE

## 2020-09-08 PROCEDURE — 37000008 HC ANESTHESIA 1ST 15 MINUTES: Mod: HCNC | Performed by: INTERNAL MEDICINE

## 2020-09-08 PROCEDURE — 83690 ASSAY OF LIPASE: CPT | Mod: HCNC

## 2020-09-08 PROCEDURE — 99285 EMERGENCY DEPT VISIT HI MDM: CPT | Mod: ,,, | Performed by: PHYSICIAN ASSISTANT

## 2020-09-08 PROCEDURE — 43264 ERCP REMOVE DUCT CALCULI: CPT | Mod: HCNC,,, | Performed by: INTERNAL MEDICINE

## 2020-09-08 PROCEDURE — 63600175 PHARM REV CODE 636 W HCPCS: Mod: HCNC | Performed by: STUDENT IN AN ORGANIZED HEALTH CARE EDUCATION/TRAINING PROGRAM

## 2020-09-08 PROCEDURE — 27201702 HC BASKET, RETRIEVAL/LITHOTRIPTOR: Mod: HCNC | Performed by: INTERNAL MEDICINE

## 2020-09-08 PROCEDURE — 43259 EGD US EXAM DUODENUM/JEJUNUM: CPT | Mod: 51,HCNC,, | Performed by: INTERNAL MEDICINE

## 2020-09-08 PROCEDURE — 99220 PR INITIAL OBSERVATION CARE,LEVL III: ICD-10-PCS | Mod: HCNC,GC,, | Performed by: INTERNAL MEDICINE

## 2020-09-08 PROCEDURE — 80053 COMPREHEN METABOLIC PANEL: CPT | Mod: 91,HCNC

## 2020-09-08 PROCEDURE — C1769 GUIDE WIRE: HCPCS | Mod: HCNC | Performed by: INTERNAL MEDICINE

## 2020-09-08 PROCEDURE — 96366 THER/PROPH/DIAG IV INF ADDON: CPT | Performed by: EMERGENCY MEDICINE

## 2020-09-08 PROCEDURE — 96375 TX/PRO/DX INJ NEW DRUG ADDON: CPT | Performed by: EMERGENCY MEDICINE

## 2020-09-08 PROCEDURE — G0378 HOSPITAL OBSERVATION PER HR: HCPCS | Mod: HCNC

## 2020-09-08 PROCEDURE — 87186 SC STD MICRODIL/AGAR DIL: CPT | Mod: HCNC

## 2020-09-08 PROCEDURE — 63600175 PHARM REV CODE 636 W HCPCS: Mod: HCNC | Performed by: NURSE ANESTHETIST, CERTIFIED REGISTERED

## 2020-09-08 PROCEDURE — 25000003 PHARM REV CODE 250: Mod: HCNC | Performed by: STUDENT IN AN ORGANIZED HEALTH CARE EDUCATION/TRAINING PROGRAM

## 2020-09-08 PROCEDURE — 80047 BASIC METABLC PNL IONIZED CA: CPT | Mod: HCNC

## 2020-09-08 PROCEDURE — 27201674 HC SPHINCTERTOME: Mod: HCNC | Performed by: INTERNAL MEDICINE

## 2020-09-08 PROCEDURE — D9220A PRA ANESTHESIA: Mod: HCNC,CRNA,, | Performed by: NURSE ANESTHETIST, CERTIFIED REGISTERED

## 2020-09-08 PROCEDURE — 43262 PR ERCP,SPHINCTEROTOMY: ICD-10-PCS | Mod: 51,HCNC,, | Performed by: INTERNAL MEDICINE

## 2020-09-08 PROCEDURE — 99223 1ST HOSP IP/OBS HIGH 75: CPT | Mod: 25,HCNC,GC, | Performed by: INTERNAL MEDICINE

## 2020-09-08 PROCEDURE — 25000003 PHARM REV CODE 250: Mod: HCNC | Performed by: NURSE ANESTHETIST, CERTIFIED REGISTERED

## 2020-09-08 PROCEDURE — 25000003 PHARM REV CODE 250: Mod: HCNC | Performed by: PHYSICIAN ASSISTANT

## 2020-09-08 PROCEDURE — 96376 TX/PRO/DX INJ SAME DRUG ADON: CPT | Mod: HCNC

## 2020-09-08 PROCEDURE — 63600175 PHARM REV CODE 636 W HCPCS: Mod: HCNC | Performed by: PHYSICIAN ASSISTANT

## 2020-09-08 PROCEDURE — 43273 PR ENDOSCOPIC CANNULATION PAPILLA, ADD-ON: ICD-10-PCS | Mod: HCNC,,, | Performed by: INTERNAL MEDICINE

## 2020-09-08 PROCEDURE — 99285 PR EMERGENCY DEPT VISIT,LEVEL V: ICD-10-PCS | Mod: ,,, | Performed by: PHYSICIAN ASSISTANT

## 2020-09-08 PROCEDURE — 81001 URINALYSIS AUTO W/SCOPE: CPT | Mod: HCNC

## 2020-09-08 PROCEDURE — 74328 X-RAY BILE DUCT ENDOSCOPY: CPT | Mod: 26,HCNC,, | Performed by: INTERNAL MEDICINE

## 2020-09-08 PROCEDURE — 87040 BLOOD CULTURE FOR BACTERIA: CPT | Mod: 59,HCNC

## 2020-09-08 PROCEDURE — 43262 ENDO CHOLANGIOPANCREATOGRAPH: CPT | Mod: 51,HCNC,, | Performed by: INTERNAL MEDICINE

## 2020-09-08 PROCEDURE — 43259 EGD US EXAM DUODENUM/JEJUNUM: CPT | Mod: HCNC | Performed by: INTERNAL MEDICINE

## 2020-09-08 PROCEDURE — 83605 ASSAY OF LACTIC ACID: CPT | Mod: HCNC

## 2020-09-08 PROCEDURE — 99285 EMERGENCY DEPT VISIT HI MDM: CPT | Mod: 25,HCNC

## 2020-09-08 PROCEDURE — 43259 PR ENDOSCOPIC ULTRASOUND EXAM: ICD-10-PCS | Mod: 51,HCNC,, | Performed by: INTERNAL MEDICINE

## 2020-09-08 PROCEDURE — 43264 PR ERCP,W/REMOVAL STONE,BIL/PANCR DUCTS: ICD-10-PCS | Mod: HCNC,,, | Performed by: INTERNAL MEDICINE

## 2020-09-08 PROCEDURE — 11000001 HC ACUTE MED/SURG PRIVATE ROOM: Mod: HCNC

## 2020-09-08 PROCEDURE — 83605 ASSAY OF LACTIC ACID: CPT | Mod: 91,HCNC

## 2020-09-08 PROCEDURE — 27202125 HC BALLOON, EXTRACTION (ANY): Mod: HCNC | Performed by: INTERNAL MEDICINE

## 2020-09-08 PROCEDURE — 37000009 HC ANESTHESIA EA ADD 15 MINS: Mod: HCNC | Performed by: INTERNAL MEDICINE

## 2020-09-08 PROCEDURE — D9220A PRA ANESTHESIA: ICD-10-PCS | Mod: HCNC,CRNA,, | Performed by: NURSE ANESTHETIST, CERTIFIED REGISTERED

## 2020-09-08 PROCEDURE — 74328 X-RAY BILE DUCT ENDOSCOPY: CPT | Mod: HCNC | Performed by: INTERNAL MEDICINE

## 2020-09-08 PROCEDURE — 43273 ENDOSCOPIC PANCREATOSCOPY: CPT | Mod: HCNC | Performed by: INTERNAL MEDICINE

## 2020-09-08 PROCEDURE — 43273 ENDOSCOPIC PANCREATOSCOPY: CPT | Mod: HCNC,,, | Performed by: INTERNAL MEDICINE

## 2020-09-08 PROCEDURE — 25500020 PHARM REV CODE 255: Mod: HCNC | Performed by: INTERNAL MEDICINE

## 2020-09-08 PROCEDURE — 99223 PR INITIAL HOSPITAL CARE,LEVL III: ICD-10-PCS | Mod: 25,HCNC,GC, | Performed by: INTERNAL MEDICINE

## 2020-09-08 PROCEDURE — D9220A PRA ANESTHESIA: ICD-10-PCS | Mod: HCNC,ANES,, | Performed by: ANESTHESIOLOGY

## 2020-09-08 PROCEDURE — 43264 ERCP REMOVE DUCT CALCULI: CPT | Mod: HCNC | Performed by: INTERNAL MEDICINE

## 2020-09-08 PROCEDURE — 96361 HYDRATE IV INFUSION ADD-ON: CPT | Performed by: EMERGENCY MEDICINE

## 2020-09-08 PROCEDURE — 87077 CULTURE AEROBIC IDENTIFY: CPT | Mod: 59,HCNC

## 2020-09-08 PROCEDURE — 36415 COLL VENOUS BLD VENIPUNCTURE: CPT | Mod: HCNC

## 2020-09-08 PROCEDURE — U0002 COVID-19 LAB TEST NON-CDC: HCPCS | Mod: HCNC

## 2020-09-08 PROCEDURE — 83735 ASSAY OF MAGNESIUM: CPT | Mod: HCNC

## 2020-09-08 PROCEDURE — 25000003 PHARM REV CODE 250: Mod: HCNC | Performed by: INTERNAL MEDICINE

## 2020-09-08 PROCEDURE — 80053 COMPREHEN METABOLIC PANEL: CPT | Mod: HCNC

## 2020-09-08 PROCEDURE — D9220A PRA ANESTHESIA: Mod: HCNC,ANES,, | Performed by: ANESTHESIOLOGY

## 2020-09-08 PROCEDURE — 96365 THER/PROPH/DIAG IV INF INIT: CPT | Performed by: EMERGENCY MEDICINE

## 2020-09-08 RX ORDER — SODIUM CHLORIDE 0.9 % (FLUSH) 0.9 %
10 SYRINGE (ML) INJECTION
Status: DISCONTINUED | OUTPATIENT
Start: 2020-09-08 | End: 2020-09-11 | Stop reason: HOSPADM

## 2020-09-08 RX ORDER — HYDROMORPHONE HYDROCHLORIDE 1 MG/ML
0.2 INJECTION, SOLUTION INTRAMUSCULAR; INTRAVENOUS; SUBCUTANEOUS EVERY 6 HOURS PRN
Status: DISCONTINUED | OUTPATIENT
Start: 2020-09-08 | End: 2020-09-11 | Stop reason: HOSPADM

## 2020-09-08 RX ORDER — DOXAZOSIN 2 MG/1
2 TABLET ORAL NIGHTLY
Status: DISCONTINUED | OUTPATIENT
Start: 2020-09-08 | End: 2020-09-11 | Stop reason: HOSPADM

## 2020-09-08 RX ORDER — ONDANSETRON 8 MG/1
8 TABLET, ORALLY DISINTEGRATING ORAL EVERY 8 HOURS PRN
Status: DISCONTINUED | OUTPATIENT
Start: 2020-09-08 | End: 2020-09-11 | Stop reason: HOSPADM

## 2020-09-08 RX ORDER — NAPROXEN SODIUM 220 MG/1
81 TABLET, FILM COATED ORAL DAILY
Status: DISCONTINUED | OUTPATIENT
Start: 2020-09-09 | End: 2020-09-08

## 2020-09-08 RX ORDER — HYDROMORPHONE HYDROCHLORIDE 1 MG/ML
0.5 INJECTION, SOLUTION INTRAMUSCULAR; INTRAVENOUS; SUBCUTANEOUS EVERY 6 HOURS PRN
Status: DISCONTINUED | OUTPATIENT
Start: 2020-09-08 | End: 2020-09-08

## 2020-09-08 RX ORDER — LEVOTHYROXINE SODIUM 88 UG/1
88 TABLET ORAL
Status: DISCONTINUED | OUTPATIENT
Start: 2020-09-09 | End: 2020-09-11 | Stop reason: HOSPADM

## 2020-09-08 RX ORDER — LIDOCAINE HYDROCHLORIDE 20 MG/ML
INJECTION INTRAVENOUS
Status: DISCONTINUED | OUTPATIENT
Start: 2020-09-08 | End: 2020-09-08

## 2020-09-08 RX ORDER — PROPOFOL 10 MG/ML
VIAL (ML) INTRAVENOUS CONTINUOUS PRN
Status: DISCONTINUED | OUTPATIENT
Start: 2020-09-08 | End: 2020-09-08

## 2020-09-08 RX ORDER — SODIUM BICARBONATE 650 MG/1
650 TABLET ORAL 2 TIMES DAILY
Status: DISCONTINUED | OUTPATIENT
Start: 2020-09-08 | End: 2020-09-11 | Stop reason: HOSPADM

## 2020-09-08 RX ORDER — INDOMETHACIN 50 MG/1
SUPPOSITORY RECTAL
Status: COMPLETED | OUTPATIENT
Start: 2020-09-08 | End: 2020-09-08

## 2020-09-08 RX ORDER — PROCHLORPERAZINE EDISYLATE 5 MG/ML
5 INJECTION INTRAMUSCULAR; INTRAVENOUS EVERY 6 HOURS PRN
Status: DISCONTINUED | OUTPATIENT
Start: 2020-09-08 | End: 2020-09-11 | Stop reason: HOSPADM

## 2020-09-08 RX ORDER — SODIUM CHLORIDE 9 MG/ML
INJECTION, SOLUTION INTRAVENOUS CONTINUOUS
Status: DISCONTINUED | OUTPATIENT
Start: 2020-09-08 | End: 2020-09-09

## 2020-09-08 RX ORDER — ACETAMINOPHEN 325 MG/1
650 TABLET ORAL EVERY 8 HOURS PRN
Status: DISCONTINUED | OUTPATIENT
Start: 2020-09-08 | End: 2020-09-11 | Stop reason: HOSPADM

## 2020-09-08 RX ORDER — FELODIPINE 10 MG/1
10 TABLET, EXTENDED RELEASE ORAL DAILY
Status: DISCONTINUED | OUTPATIENT
Start: 2020-09-08 | End: 2020-09-10

## 2020-09-08 RX ORDER — TALC
6 POWDER (GRAM) TOPICAL NIGHTLY PRN
Status: DISCONTINUED | OUTPATIENT
Start: 2020-09-08 | End: 2020-09-11 | Stop reason: HOSPADM

## 2020-09-08 RX ORDER — MORPHINE SULFATE 4 MG/ML
4 INJECTION, SOLUTION INTRAMUSCULAR; INTRAVENOUS
Status: COMPLETED | OUTPATIENT
Start: 2020-09-08 | End: 2020-09-08

## 2020-09-08 RX ORDER — ENOXAPARIN SODIUM 100 MG/ML
40 INJECTION SUBCUTANEOUS EVERY 24 HOURS
Status: DISCONTINUED | OUTPATIENT
Start: 2020-09-09 | End: 2020-09-08

## 2020-09-08 RX ORDER — ENOXAPARIN SODIUM 100 MG/ML
30 INJECTION SUBCUTANEOUS EVERY 24 HOURS
Status: DISCONTINUED | OUTPATIENT
Start: 2020-09-09 | End: 2020-09-11 | Stop reason: HOSPADM

## 2020-09-08 RX ORDER — PROPOFOL 10 MG/ML
VIAL (ML) INTRAVENOUS
Status: DISCONTINUED | OUTPATIENT
Start: 2020-09-08 | End: 2020-09-08

## 2020-09-08 RX ADMIN — SODIUM CHLORIDE, SODIUM GLUCONATE, SODIUM ACETATE, POTASSIUM CHLORIDE, MAGNESIUM CHLORIDE, SODIUM PHOSPHATE, DIBASIC, AND POTASSIUM PHOSPHATE: .53; .5; .37; .037; .03; .012; .00082 INJECTION, SOLUTION INTRAVENOUS at 02:09

## 2020-09-08 RX ADMIN — SODIUM BICARBONATE 650 MG TABLET 650 MG: at 08:09

## 2020-09-08 RX ADMIN — IOHEXOL 42 ML: 300 INJECTION, SOLUTION INTRAVENOUS at 03:09

## 2020-09-08 RX ADMIN — PROPOFOL 100 MCG/KG/MIN: 10 INJECTION, EMULSION INTRAVENOUS at 02:09

## 2020-09-08 RX ADMIN — SODIUM CHLORIDE: 0.9 INJECTION, SOLUTION INTRAVENOUS at 01:09

## 2020-09-08 RX ADMIN — FELODIPINE 10 MG: 10 TABLET, EXTENDED RELEASE ORAL at 06:09

## 2020-09-08 RX ADMIN — INDOMETHACIN 100 MG: 50 SUPPOSITORY RECTAL at 03:09

## 2020-09-08 RX ADMIN — HYDROMORPHONE HYDROCHLORIDE 0.5 MG: 1 INJECTION, SOLUTION INTRAMUSCULAR; INTRAVENOUS; SUBCUTANEOUS at 12:09

## 2020-09-08 RX ADMIN — MORPHINE SULFATE 4 MG: 4 INJECTION INTRAVENOUS at 09:09

## 2020-09-08 RX ADMIN — SODIUM CHLORIDE 500 ML: 0.9 INJECTION, SOLUTION INTRAVENOUS at 11:09

## 2020-09-08 RX ADMIN — PIPERACILLIN SODIUM,TAZOBACTAM SODIUM 4.5 G: 4; .5 INJECTION, POWDER, FOR SOLUTION INTRAVENOUS at 11:09

## 2020-09-08 RX ADMIN — PIPERACILLIN AND TAZOBACTAM 4.5 G: 4; .5 INJECTION, POWDER, LYOPHILIZED, FOR SOLUTION INTRAVENOUS; PARENTERAL at 08:09

## 2020-09-08 RX ADMIN — DOXAZOSIN 2 MG: 2 TABLET ORAL at 08:09

## 2020-09-08 RX ADMIN — PROPOFOL 30 MG: 10 INJECTION, EMULSION INTRAVENOUS at 02:09

## 2020-09-08 RX ADMIN — LIDOCAINE HYDROCHLORIDE 75 MG: 20 INJECTION, SOLUTION INTRAVENOUS at 02:09

## 2020-09-08 RX ADMIN — PROPOFOL 50 MCG/KG/MIN: 10 INJECTION, EMULSION INTRAVENOUS at 02:09

## 2020-09-08 NOTE — ED NOTES
Pt resting comfortably and independently repositioned in stretcher with bed locked in lowest position for safety. NAD noted at this time. Respirations even and unlabored and visible chest rise noted.  Patient offered bathroom assistance and denies need at this time. Pt instructed to call if assistance is needed. Pt on continuous cardiac, BP, and O2 monitoring. Call light within reach. Daughter at bedside. Updated on POC. No needs at this time. Will continue to monitor.

## 2020-09-08 NOTE — [employer] PATIENT INSTRUCTIONS
Discharge Summary/Instructions after an Endoscopic Procedure  Patient Name: Johanny Curtis  Patient MRN: [id number]  Patient YOB: 1930 Tuesday, September 8, 2020  Rasheed Balbuena MD  RESTRICTIONS:  During your procedure today, you received medications for sedation.  These   medications may affect your judgment, balance and coordination.  Therefore,   for 24 hours, you have the following restrictions:   - DO NOT drive a car, operate machinery, make legal/financial decisions,   sign important papers or drink alcohol.    ACTIVITY:  Today: no heavy lifting, straining or running due to procedural   sedation/anesthesia.  The following day: return to full activity including work.  DIET:  Eat and drink normally unless instructed otherwise.     TREATMENT FOR COMMON SIDE EFFECTS:  - Mild abdominal pain, nausea, belching, bloating or excessive gas:  rest,   eat lightly and use a heating pad.  - Sore Throat: treat with throat lozenges and/or gargle with warm salt   water.  - Because air was used during the procedure, expelling large amounts of air   from your rectum or belching is normal.  - If a bowel prep was taken, you may not have a bowel movement for 1-3 days.    This is normal.  SYMPTOMS TO WATCH FOR AND REPORT TO YOUR PHYSICIAN:  1. Abdominal pain or bloating, other than gas cramps.  2. Chest pain.  3. Back pain.  4. Signs of infection such as: chills or fever occurring within 24 hours   after the procedure.  5. Rectal bleeding, which would show as bright red, maroon, or black stools.   (A tablespoon of blood from the rectum is not serious, especially if   hemorrhoids are present.)  6. Vomiting.  7. Weakness or dizziness.  GO DIRECTLY TO THE NEAREST EMERGENCY ROOM IF YOU HAVE ANY OF THE FOLLOWING:      Difficulty breathing              Chills and/or fever over 101 F   Persistent vomiting and/or vomiting blood   Severe abdominal pain   Severe chest pain   Black, tarry stools   Bleeding- more than one  tablespoon   Any other symptom or condition that you feel may need urgent attention  Your doctor recommends these additional instructions:  If any biopsies were taken, your doctors clinic will contact you in 1 to 2   weeks with any results.  - Proceed with ERCP.   - Resume previous diet.   - Continue present medications.   - Return to referring physician.   - Patient has a contact number available for emergencies.  The signs and   symptoms of potential delayed complications were discussed with the   patient.  Return to normal activities tomorrow.  Written discharge   instructions were provided to the patient.  For questions, problems or results please call your physician - Rasheed Balbuena MD at Work:  [phone].  [facility], EMERGENCY ROOM PHONE NUMBER: [phone]  IF A COMPLICATION OR EMERGENCY SITUATION ARISES AND YOU ARE UNABLE TO REACH   YOUR PHYSICIAN - GO DIRECTLY TO THE EMERGENCY ROOM.  Rasheed Balbuena MD  9/8/2020 3:14:03 PM  This report has been verified and signed electronically.  [employer]

## 2020-09-08 NOTE — HOSPITAL COURSE
Patient evaluated in the ED by [facility]. CT Abdo/pelvis showed mild intrahepatic in moderate extrahepatic biliary ductal dilatation. This is unfortunate as pt is already s/p cholecystectomy. [facility] consulted and took for EUS/ERCP 9/8. Stone was removed and bliary sphincterotomy was performed. Admitted to  2 for observation and management. LFTs as well as elevated bilirubin down-trended. Started on IV antibiotics for possible cholangitis. Patient abdominal pain resolved and tolerating liquid diet.  Blood cultures growing GNR, ultimately E coli. Midline placed and ID recommended 2 weeks of IV CTX. Pt subsequently d/c'd home with home health.

## 2020-09-08 NOTE — SUBJECTIVE & OBJECTIVE
Past Medical History:   Diagnosis Date    AAA (abdominal aortic aneurysm)     Anemia in CKD (chronic kidney disease)     Arthritis     Cataract     Class 1 obesity due to excess calories with serious comorbidity in adult 7/6/2017    Gout, chronic     High cholesterol     Hypertension     Hypothyroidism     Obesity     Plantar fasciitis     PVD (peripheral vascular disease)     Thyroid trouble        Past Surgical History:   Procedure Laterality Date    BREAST BIOPSY Left     BREAST SURGERY Left 1972    benign breast lump    CATARACT EXTRACTION  3/18/13    both eyes -     CHOLECYSTECTOMY      EYE SURGERY      HYSTERECTOMY      SKIN BIOPSY      Left breast       Review of patient's allergies indicates:  No Known Allergies    No current facility-administered medications on file prior to encounter.      Current Outpatient Medications on File Prior to Encounter   Medication Sig    levothyroxine (SYNTHROID) 88 MCG tablet Take 1 tablet (88 mcg total) by mouth once daily.    ammonium lactate 12 % Crea Apply twice daily to affected parts both feet as needed.    aspirin 81 MG Chew Take 1 tablet (81 mg total) by mouth once daily.    calcitRIOL (ROCALTROL) 0.25 MCG Cap Take 1 capsule (0.25 mcg total) by mouth every Monday and Friday.    doxazosin (CARDURA) 2 MG tablet Take 1 tablet (2 mg total) by mouth nightly.    felodipine (PLENDIL) 10 MG 24 hr tablet TAKE 1 TABLET BY MOUTH EVERY DAY    ferrous sulfate 325 (65 FE) MG EC tablet Take 1 tablet (325 mg total) by mouth 2 (two) times daily.    MULTIVIT-IRON-MIN-FOLIC ACID 3,500-18-0.4 UNIT-MG-MG ORAL CHEW Take by mouth.    sodium bicarbonate 650 MG tablet Take 1 tablet (650 mg total) by mouth 2 (two) times daily.     Family History     Problem Relation (Age of Onset)    Breast cancer Sister    Cancer Sister, Sister, Brother    Cataracts Son    Diabetes Son, Son    Glaucoma Son, Daughter    Heart disease Brother    Lupus Daughter    Meningitis  Son    Mental illness Son    No Known Problems Brother        Tobacco Use    Smoking status: Former Smoker     Packs/day: 0.50     Years: 60.00     Pack years: 30.00     Quit date: 2001     Years since quittin.2    Smokeless tobacco: Never Used    Tobacco comment: quit in the    Substance and Sexual Activity    Alcohol use: No    Drug use: No    Sexual activity: Not Currently     Review of Systems   Constitutional: Negative for appetite change, chills, diaphoresis and fever.   HENT: Negative for sinus pressure, sinus pain, sore throat and trouble swallowing.    Eyes: Negative for visual disturbance.   Respiratory: Negative for cough, shortness of breath and wheezing.    Cardiovascular: Negative for chest pain, palpitations and leg swelling.   Gastrointestinal: Positive for abdominal pain (localized to RLQ). Negative for abdominal distention, constipation, diarrhea, nausea and vomiting.   Endocrine: Negative for polyuria.   Genitourinary: Negative for dysuria.   Musculoskeletal: Negative for arthralgias, gait problem and neck pain.   Neurological: Negative for dizziness, weakness, light-headedness and headaches.     Objective:     Vital Signs (Most Recent):  Temp: 98.6 °F (37 °C) (20 1324)  Pulse: 97 (20 1324)  Resp: (!) 22 (20 1324)  BP: (!) 165/77 (20 1325)  SpO2: 100 % (20 1324) Vital Signs (24h Range):  Temp:  [97.5 °F (36.4 °C)-98.6 °F (37 °C)] 98.6 °F (37 °C)  Pulse:  [80-97] 97  Resp:  [16-22] 22  SpO2:  [95 %-100 %] 100 %  BP: (165-181)/(72-79) 165/77     Weight: 79.8 kg (176 lb)  Body mass index is 35.55 kg/m².    Physical Exam  Vitals signs reviewed.   Constitutional:       General: She is not in acute distress.     Appearance: Normal appearance. She is not ill-appearing, toxic-appearing or diaphoretic.   HENT:      Head: Normocephalic and atraumatic.      Mouth/Throat:      Mouth: Mucous membranes are moist.      Pharynx: Oropharynx is clear. No  oropharyngeal exudate or posterior oropharyngeal erythema.   Eyes:      General: No scleral icterus.     Extraocular Movements: Extraocular movements intact.      Conjunctiva/sclera: Conjunctivae normal.      Pupils: Pupils are equal, round, and reactive to light.   Neck:      Musculoskeletal: Normal range of motion and neck supple. No muscular tenderness.   Cardiovascular:      Rate and Rhythm: Normal rate and regular rhythm.      Pulses: Normal pulses.      Heart sounds: Normal heart sounds. No murmur. No gallop.    Pulmonary:      Effort: Pulmonary effort is normal. No respiratory distress.      Breath sounds: Normal breath sounds. No wheezing or rales.   Abdominal:      General: Abdomen is flat. Bowel sounds are normal. There is no distension.      Palpations: Abdomen is soft. There is no mass.      Tenderness: There is abdominal tenderness (TTP in RLQ). There is no guarding or rebound.   Musculoskeletal:         General: No swelling.      Right lower leg: No edema.      Left lower leg: No edema.   Skin:     Coloration: Skin is not jaundiced.   Neurological:      General: No focal deficit present.      Mental Status: She is alert and oriented to person, place, and time. Mental status is at baseline.           CRANIAL NERVES     CN III, IV, VI   Pupils are equal, round, and reactive to light.       Significant Labs:   BMP:   Recent Labs   Lab 09/08/20  0910   *      K 4.2      CO2 19*   BUN 32*   CREATININE 2.3*   CALCIUM 9.7   MG 1.8     CBC:   Recent Labs   Lab 09/08/20  0854 09/08/20  0905   WBC 15.93*  --    HGB 10.8*  --    HCT 35.8* 35*     --      CMP:   Recent Labs   Lab 09/08/20  0910      K 4.2      CO2 19*   *   BUN 32*   CREATININE 2.3*   CALCIUM 9.7   PROT 7.5   ALBUMIN 3.0*   BILITOT 3.6*   ALKPHOS 845*   *   *   ANIONGAP 14   EGFRNONAA 18.2*     All pertinent labs within the past 24 hours have been reviewed.    Significant Imaging: CT  Abd/pelvis wo contrast: The extrahepatic biliary system is also increased in caliber, measuring up to approximately 2.7 cm in transverse dimension

## 2020-09-08 NOTE — DISCHARGE INSTRUCTIONS
Patient Instructions: Please keep a log of your blood pressure and contact PCP if you are sustaining recordings greater than 150 systolic.  Return for eval for fever,  abdominal pain, nausea or vomiting.      ERCP (Endoscopic Retrograde Cholangiopancreatography)     A balloon at the tip of a catheter opens above the stone. The stone is pulled out of the duct and leaves your body through stool.     ERCP stands for endoscopic retrograde cholangiopancreatography. This procedure is used to view the biliary and pancreatic ducts.  It is used to evaluate diseases that affect the ducts and to help locate and treat blockages that may be present.  How do I get ready for ERCP?  · Talk to your doctor about any health problems or allergies you have.  · Ask your doctor about the risks of ERCP. These include pancreatitis, infection, bleeding, and tearing the bowel.  · Be sure your doctor knows about all medicines you take. You may be told to stop taking some or all of them before the test. This includes:  · All prescription medicines  · Over-the-counter medicines that don't need a prescription  ·  Any street drugs you may use   · Herbs, vitamins, kelp, seaweed, cough syrups, and other supplements  · You may be asked to take antibiotics ahead of time.  · Avoid blood-thinning medicines for 1 week before ERCP.  · Do not eat or drink for 8 to 12 hours before ERCP.  · Have someone ready to take you home.  What happens during the procedure?  · You may be given medicine through an IV to help you relax.  · Your throat is numbed.  · A thin tube (endoscope) is placed into your throat. It is advanced from the throat through the upper digestive tract, to the common bile duct opening. The endoscope lets the doctor see the common bile and pancreatic ducts on a video screen.  · A cut may be made where the common bile duct opens to the duodenum to make it easier to remove stones.  · As blockages are located and removed, X-rays are  taken.  · Contrast dye is injected through a catheter to make the duct show up better on the X-rays.  · An imaging technique that uses X-rays to obtain real-time moving images of internal organs is called fluoroscopy. Fluoroscopy is used to watch and guide progress of the procedure.   · In some cases, a plastic tube (stent) is placed to hold the ducts open. This stent may be replaced or removed in 6 to 8 weeks. Or it may be left to fall out on its own and be passed in the stool.  What happens after ERCP?  Your doctor may discuss the test results right away or a return visit may be scheduled. You may go home the same day or spend the night in the hospital. Follow these tips:  · You can return to a normal routine the day after the ERCP.  · If a cut was made in the duct, avoid blood-thinning medicines such as aspirin for 5 to 7 days.  · Call your doctor right away if you have a fever or abdominal pain. These may be signs of an infection or torn bowel.   Date Last Reviewed: 6/19/2015  © 4303-6750 [employer]. [address] [place] [address]. All rights reserved. This information is not intended as a substitute for professional medical care. Always follow your healthcare professional's instructions.        Endoscopic Ultrasound (EUS)    An endoscopic ultrasound (EUS) is a test to look at the inside of your gastrointestinal (GI) tract. It's commonly used to look for cancers or growths in the esophagus, stomach, pancreas, liver, and rectum. It can help to stage cancer (see how advanced a cancer is). EUS may also be used to help diagnose certain diseases or to drain cysts or abscesses.  What is EUS?  EUS shows both ultrasound images and live video of the GI tract. During the test, a flexible tube called an endoscope (scope) is used. At the end of the scope is a tiny video camera and light. The video camera sends live images to a monitor. The scope also contains a very small ultrasound device. This  uses sound waves to create images and send them to a monitor.  A needle is passed through the scope. The needle can be used take a small sample of tissue for testing. This is called a biopsy. The needle can be used to take a sample of fluid. This is called fine-needle aspiration (FNA).  Risks and possible complications of EUS  Risks and possible complications include the following:  · Bleeding  · Infection  · A perforation (hole) in the digestive tract   · Risks of sedation or anesthesia   Before the test  Be prepared prior to the test:  · Tell your healthcare provider what medicine you take. This includes vitamins, herbs, and over-the-counter medicine. It also includes any blood thinners, such as warfarin, clopidogrel, ibuprofen, or daily aspirin. Ask your healthcare provider if you need to stop taking some or all of them before the test.  · You may be prescribed antibiotics to take before or after the test. This depends on the area being studied and what is done during the test. These medicines help prevent infection.  · Carefully follow the instructions for preparing for the test to make sure results are accurate. Instructions may include:  ¨ If youre having an EUS of the upper GI tract (esophagus, stomach, duodenum, pancreas, liver):  § Do not eat or drink for 6 hours before the test.  ¨ If youre having an EUS of the lower GI tract (rectum):  § Before the test, do bowel prep as instructed to clean your rectum of stool. This may involve a clear liquid diet and using a laxative (liquid or pills) the night before the test. Or it may mean doing one or more enemas the morning of the test.  § Do not eat or drink for 6 hours before the test.  · Be sure to arrive on time at the facility. Bring your identification and health insurance card. Leave valuables at home. If you have them, bring X-rays or other test results with you.  Let the healthcare provider know  For your safety, tell the healthcare provider if  you:  · Take insulin. Your dose may need to be changed on the day of your test.  · Are allergic to latex.  · Have any other allergies.  · Are taking blood thinners.   During the test  An endoscopic ultrasound usually takes place in a hospital. The procedure itself may take 1 to 2 hours. You will likely go home soon afterward. During the test:  · You lie on your left side on an exam table.  · An intravenous (IV) line will be put into a vein in your arm or hand. This line supplies fluids and medicines. To keep you comfortable during the test, you will be given a sedative medicine. This medicine prevents discomfort and will make you sleepy.  · If you are having an EUS of the upper GI tract, local anesthetic may be sprayed in your throat. This will help you be more comfortable as the healthcare provider inserts the scope. The healthcare provider then gently puts the flexible scope into your mouth or nose and down your throat.  · If youre having an EUS of the lower GI tract, the healthcare provider gently puts the flexible scope into your anus.  · During the test, the scope sends live video and ultrasound images from inside your body to nearby monitors. These are used to examine your GI tract. Specialized procedures, such as drainage, are done as needed.  · The healthcare provider may discuss the results with you soon after the test. Biopsy results take several  days.  · In most cases, you can go home within a few hours of the test. When you leave the facility, have an adult family member or friend drive you, even if you don't feel that sleepy.  After the test  Here is what to expect after the test:  · You may feel tired from the sedative. This should wear off by the end of the day.  · If you had an upper digestive endoscopy, your throat may feel sore for a day or two. Over-the-counter sore throat lozenges and spray should help.  · You can eat and drink normally as soon as the test is done.  When to call the healthcare  provider  Call your healthcare provider if you notice any of the following:  · Fever of 100.4°F (38.0°C) or higher, or as advised by your healthcare provider  · Shortness of breath  · Vomiting blood, blood in stool, or black stools  · Coughing or hoarse voice that wont go away   Date Last Reviewed: 7/1/2016  © 1170-5127 [employer]. [address] [place] [address], [place] [address]. All rights reserved. This information is not intended as a substitute for professional medical care. Always follow your healthcare professional's instructions.

## 2020-09-08 NOTE — TREATMENT PLAN
"AES Post Procedure Treatment Plan    Interval history:  ERCP",EUS / ERCP completed. Dilated bile duct found with stone. Sphincterotomy performed, obstruction releived.    Plan:  - s/p ERCP",EUS / ERCP  - monitor for signs and symptoms of complications  - Continue ABx for cholangitis to complete 7 day course  - Patient's family informed of procedure results  - advance diet as tolerated  - Trend LFTs  - we will continue to follow    "

## 2020-09-08 NOTE — ANESTHESIA POSTPROCEDURE EVALUATION
Anesthesia Post Evaluation    Patient: Johanny Curtis    Procedure(s) Performed: Procedure(s) (LRB):  ERCP (ENDOSCOPIC RETROGRADE CHOLANGIOPANCREATOGRAPHY) (N/A)  ULTRASOUND, UPPER GI TRACT, ENDOSCOPIC (N/A)    Final Anesthesia Type: general    Patient location during evaluation: PACU  Patient participation: Yes- Able to Participate  Level of consciousness: awake and alert and oriented  Post-procedure vital signs: reviewed and stable  Pain management: adequate  Airway patency: patent    PONV status at discharge: No PONV  Anesthetic complications: no      Cardiovascular status: blood pressure returned to baseline  Respiratory status: unassisted  Hydration status: euvolemic  Follow-up not needed.          Vitals Value Taken Time   /77 09/08/20 1630   Temp 36.7 °C (98 °F) 09/08/20 1630   Pulse 85 09/08/20 1630   Resp 18 09/08/20 1630   SpO2 100 % 09/08/20 1630         Event Time   Out of Recovery 16:14:20         Pain/Liang Score: Pain Rating Prior to Med Admin: 10 (9/8/2020 12:45 PM)  Liang Score: 9 (9/8/2020  4:00 PM)

## 2020-09-08 NOTE — HPI
Mrs Curtis is a 89 yo AAF with a PMHx of HTN, hypothyroidism, obesity, abdominal aortic aneursym, and CKD who presents to [facility] ED complaining of abdominal pain. Patient awoke this morning around 5 am complaining of RLQ abdominal pain, worse when lying down, and radiating to the back. Patient says this pain woke her up from sleep, and she has never experienced this before. She describes this pain as constant in nature, and she did not try anything to relieve this pain. Patient had a normal BM yesterday and denies any BRBPR, dark stools, or change in bowel habits. She has hadite the past couple  her usual appetof days, and denies any nausea or vomiting. Patient denies any chest pain, fever, chills, or SOB. Of note, patient has been seen in clinic for elevated LFTs with upcoming US liver. She has also been seen recently in the ED for multiple episodes of syncope, with workup insignificant, likely vasovagal episodes.     In the ED, afebrile, pulse 80, R 16, 177/72, 97% on RA. Labs significant for WBC 15.9, AlkP 845, T bili 3.6, , , Lactate 2.9. CXR showed no acute infiltrates. CT noncon showed intrahepatic dilation and extrahepatic dilation up to 2.7 cm.

## 2020-09-08 NOTE — NURSING TRANSFER
Nursing Transfer Note      9/8/2020      Transfer 746    Transfer via stretcher    Transfer with O2    Transported by transporter    Medicines sent: none    Chart send with patient: yes    Notified: daughter    Patient reassessed at: 9/8/20

## 2020-09-08 NOTE — ASSESSMENT & PLAN NOTE
Hx of AAA, not greatly enlarged since last visualized on imaging. CT today showed 3.9-4.0 cm    Plan  - normal aneurysm monitoring

## 2020-09-08 NOTE — ED PROVIDER NOTES
"Encounter Date: 9/8/2020       History     Chief Complaint   Patient presents with    Abdominal Pain     Pt with recent elevated liver enzymes     HPI   Johanny Curtis is a 89 y.o. female with medical history of HTN, Hypothyroidism, Obesity, AAA, CKD presenting to the ED with the chief complaint of abdominal pain. History obtained from patient and daughter at bedside. Patient awoke today a few hours ago with RLQ abdominal pain that worsens with lying flat. She has had a "good" appetite over the past 2 days. She had a normal, non-bloody bowel movement yesterday. Seen in the ED on 8/31/2020 in the ED for syncopal episode likely vasovagal in etiology. Daughter reports she was about to have another syncopal episode this morning, but never experienced LOC. No reported fever, chest pain, SOB, nausea, vomiting, urinary or bowel movement changes. She is able to ambulate a short distance with a walker at her baseline. She has been having fluctuating elevated liver enzymes levels and scheduled to see Hepatology and Liver U/S for further evaluation in the future.    Review of patient's allergies indicates:  No Known Allergies  Past Medical History:   Diagnosis Date    AAA (abdominal aortic aneurysm)     Anemia in CKD (chronic kidney disease)     Arthritis     Cataract     Class 1 obesity due to excess calories with serious comorbidity in adult 7/6/2017    Gout, chronic     High cholesterol     Hypertension     Hypothyroidism     Obesity     Plantar fasciitis     PVD (peripheral vascular disease)     Thyroid trouble      Past Surgical History:   Procedure Laterality Date    BREAST BIOPSY Left     BREAST SURGERY Left 1972    benign breast lump    CATARACT EXTRACTION  3/18/13    both eyes -     CHOLECYSTECTOMY      EYE SURGERY      HYSTERECTOMY      SKIN BIOPSY      Left breast     Family History   Problem Relation Age of Onset    Breast cancer Sister     Cataracts Son     Glaucoma Son     " Mental illness Son     Diabetes Son     Glaucoma Daughter     Lupus Daughter     Meningitis Son     Heart disease Brother     Cancer Sister         leukemia    Cancer Sister         pancreatic    No Known Problems Brother     Cancer Brother         unknown    Diabetes Son     Blindness Neg Hx     Amblyopia Neg Hx     Hypertension Neg Hx     Macular degeneration Neg Hx     Retinal detachment Neg Hx     Strabismus Neg Hx     Stroke Neg Hx     Thyroid disease Neg Hx      Social History     Tobacco Use    Smoking status: Former Smoker     Packs/day: 0.50     Years: 60.00     Pack years: 30.00     Quit date: 2001     Years since quittin.2    Smokeless tobacco: Never Used    Tobacco comment: quit in the    Substance Use Topics    Alcohol use: No    Drug use: No     Review of Systems   Constitutional: Negative for chills, diaphoresis and fever.   HENT: Negative for sore throat.    Eyes: Negative for pain and redness.   Respiratory: Negative for shortness of breath.    Cardiovascular: Negative for chest pain.   Gastrointestinal: Positive for abdominal pain. Negative for nausea.   Genitourinary: Negative for dysuria.   Musculoskeletal: Negative for back pain.   Skin: Negative for rash.   Neurological: Negative for weakness.   Hematological: Does not bruise/bleed easily.     Physical Exam     Initial Vitals [20 0816]   BP Pulse Resp Temp SpO2   (!) 177/72 80 16 97.5 °F (36.4 °C) 97 %      MAP       --         Physical Exam    Constitutional: She appears well-developed and well-nourished. She is not diaphoretic.   Obese female. Appears uncomfortable   HENT:   Head: Normocephalic and atraumatic.   Mouth/Throat: Oropharynx is clear and moist.   Eyes: EOM are normal. Pupils are equal, round, and reactive to light.   Neck: Normal range of motion. Neck supple.   Cardiovascular: Normal rate and regular rhythm.   +2 femoral pulses BLE   Pulmonary/Chest: Breath sounds normal. No respiratory  distress. She has no wheezes.   Abdominal: Soft. She exhibits no distension. There is no abdominal tenderness.   Musculoskeletal: Normal range of motion. No tenderness or edema.   Neurological: She is alert and oriented to person, place, and time. She has normal strength. No cranial nerve deficit or sensory deficit.   Skin: Skin is warm and dry. No rash noted. No erythema.       ED Course   Procedures  Labs Reviewed   CBC W/ AUTO DIFFERENTIAL - Abnormal; Notable for the following components:       Result Value    WBC 15.93 (*)     RBC 3.73 (*)     Hemoglobin 10.8 (*)     Hematocrit 35.8 (*)     Mean Corpuscular Hemoglobin Conc 30.2 (*)     RDW 15.9 (*)     Immature Granulocytes 0.8 (*)     Gran # (ANC) 14.5 (*)     Immature Grans (Abs) 0.13 (*)     Lymph # 0.6 (*)     Gran% 90.8 (*)     Lymph% 3.5 (*)     All other components within normal limits   COMPREHENSIVE METABOLIC PANEL - Abnormal; Notable for the following components:    CO2 19 (*)     Glucose 186 (*)     BUN, Bld 32 (*)     Creatinine 2.3 (*)     Albumin 3.0 (*)     Total Bilirubin 3.6 (*)     Alkaline Phosphatase 845 (*)      (*)      (*)     eGFR if  20.9 (*)     eGFR if non  18.2 (*)     All other components within normal limits   LACTIC ACID, PLASMA - Abnormal; Notable for the following components:    Lactate (Lactic Acid) 2.9 (*)     All other components within normal limits   LIPASE - Abnormal; Notable for the following components:    Lipase 146 (*)     All other components within normal limits   TROPONIN I - Abnormal; Notable for the following components:    Troponin I 0.040 (*)     All other components within normal limits   ISTAT PROCEDURE - Abnormal; Notable for the following components:    POC Glucose 198 (*)     POC Creatinine 2.4 (*)     POC TCO2 (MEASURED) 20 (*)     POC Hematocrit 35 (*)     All other components within normal limits   CULTURE, BLOOD   CULTURE, BLOOD   MAGNESIUM   SARS-COV-2 RNA  AMPLIFICATION, QUAL   URINALYSIS, REFLEX TO URINE CULTURE   TROPONIN I   LACTIC ACID, PLASMA   ISTAT CHEM8        ECG Results          EKG 12-lead (In process)  Result time 09/08/20 10:34:49    In process by Interface, Lab In [facility] (09/08/20 10:34:49)                 Narrative:    Test Reason : R10.9,    Vent. Rate : 089 BPM     Atrial Rate : 089 BPM     P-R Int : 182 ms          QRS Dur : 076 ms      QT Int : 352 ms       P-R-T Axes : 047 012 073 degrees     QTc Int : 428 ms    Normal sinus rhythm  Normal ECG  When compared with ECG of 31-AUG-2020 11:33,  No significant change was found    Referred By: [email or web address]   SELF           Confirmed By:                             Imaging Results           CT Abdomen Pelvis  Without Contrast (Final result)  Result time 09/08/20 10:29:26   Procedure changed from CT Abdomen Pelvis With Contrast     Final result by Steve Mensah MD (09/08/20 10:29:26)                 Impression:      1. No definite acute findings in the abdomen or pelvis to account for the patient's reported symptoms.  2. Since the prior examination of 08/04/2017, there is new mild intrahepatic and moderate extrahepatic biliary ductal dilatation.  Within limitations imposed by lack of intravenous or oral contrast, no definite obstructing stone or mass lesion is identified.  While some of this appearance is at least partially on the basis of post cholecystectomy state and senescent change, ultrasound or MRI could be performed for more sensitive evaluation of this finding, as clinically warranted.  3. Redemonstrated multiple hyperattenuating lesions of both kidneys, which may represent hemorrhagic cysts.  As noted previously, solid renal masses cannot be excluded in the absence of intravenous contrast.  Further evaluation with ultrasound or MRI would provide superior characterization.  4. Similar appearance of fusiform infrarenal abdominal aortic aneurysm compared to the 2017 CT.  5. Additional details, as  per report body.  This report was flagged in Epic as abnormal.      Electronically signed by: Steve Mensah  Date:    09/08/2020  Time:    10:29             Narrative:    EXAMINATION:  CT ABDOMEN PELVIS WITHOUT CONTRAST    CLINICAL HISTORY:  Abdominal pain, acute, nonlocalized;    TECHNIQUE:  Low dose axial images, sagittal and coronal reformations were obtained from the lung bases to the pubic symphysis,.    COMPARISON:  CT of the abdomen pelvis performed 08/04/2017    FINDINGS:  Heart: Normal in size. No pericardial effusion.  Aortic annular and coronary artery calcifications are noted.    Lung Bases: Subsegmental atelectasis suggested at the right greater than left lung bases.  Additionally, a component of chronic scar is suggested given similar appearance to the 08/04/2017 study.    Liver: Non-specific coarse calcification the peripheral left hepatic lobe.    Gallbladder: Status post cholecystectomy.    Bile Ducts: Relatively mild degree of intrahepatic biliary ductal dilatation has progressed since the 08/04/2017 study.  The extrahepatic biliary system is also increased in caliber, measuring up to approximately 2.7 cm in transverse dimension.  Within limitations of unenhanced CT technique, no definite obstructing stone or mass lesion is noted in the region of the common duct, duodenum, or pancreas.    Pancreas: No mass or peripancreatic fat stranding.    Spleen: Unremarkable.    Adrenals: Unremarkable.    Kidneys/ Ureters: As before, both kidneys are lobulated in contour with multiple exophytic cystic and hyperattenuating lesions, the latter which may reflect hemorrhagic cysts, although mass cannot be excluded given lack of intravenous contrast.  Additional few subcentimeter hypoattenuating lesions in both kidneys are too small to definitely characterize by CT, but would be favored to be benign.  No evidence of hydro nephrosis or nephrolithiasis.  No ureteral dilatation.    Bladder: No evidence of wall  thickening.    Reproductive organs: Status post hysterectomy.    GI Tract/Mesentery: No evidence of bowel obstruction or inflammation.  A moderate stool burden is noted in the distal colon and rectum.  A normal appearing appendix is identified.  Scattered relatively minor sigmoid diverticulosis without evidence of acute inflammation.    Peritoneal Space: No ascites. No free air.    Retroperitoneum: No significant adenopathy.    Abdominal wall: Unremarkable.    Vasculature: Aortoiliac atherosclerotic calcification.  Infrarenal abdominal aortic aneurysm, fusiform, continues to measure up to approximately 3.9-4 cm, not substantially changed.    Bones: No acute fracture.  Degenerative findings are noted in the lumbar spine.                               X-Ray Chest AP Portable (Final result)  Result time 09/08/20 09:27:49    Final result by Sally Liz MD (09/08/20 09:27:49)                 Impression:      Reduced lung volumes.  Rightward shift of mediastinal structures at least in part related to patient positioning.  There may also be a component of volume loss on the right.  Repeat radiograph with better inspiratory effort may be considered.  I see no acute consolidation.      Electronically signed by: Sally Lzi  Date:    09/08/2020  Time:    09:27             Narrative:    EXAMINATION:  XR CHEST AP PORTABLE    CLINICAL HISTORY:  Unspecified abdominal pain    TECHNIQUE:  Single frontal view of the chest was performed.    COMPARISON:  05/26/2017    FINDINGS:  Patient is rotated to the right.  Lung volumes are reduced compared to previous.  Reticulonodular opacities again seen in the right lung, chronic.  No acute consolidation, pleural effusion, or pneumothorax.  Cardiac silhouette is stable in size when accounting for reduced lung volumes and technique.                                 Medical Decision Making:   History:   Old Medical Records: I decided to obtain old medical records.  Old Records  "Summarized: records from clinic visits and records from previous admission(s).  Clinical Tests:   Lab Tests: Ordered and Reviewed  Radiological Study: Ordered and Reviewed  Medical Tests: Ordered and Reviewed  Sepsis Perfusion Assessment: I attest, a sepsis perfusion exam was performed within 6 hours of Septic Shock presentation, following fluid resuscitation.  Other:   I have discussed this case with another health care provider.       <> Summary of the Discussion: [facility], Hospital Medicine       APC / Resident Notes:   89 y.o. female with medical history of HTN, Hypothyroidism, Obesity, AAA, CKD presenting to the ED c/o RLQ abdominal pain that began this morning. No associated N/V/D and daughter reports a "good appetite" over the past 2 days. Reports having transient elevation in liver enzymes and scheduled to have liver U/S and hepatology consult in the future. DDx includes but not limited to appendicitis, bowel obstruction, malignancy, AAA, nephrolithiasis, pyelonephritis, UTI.     Discussed concerns for biliary obstruction given CT findings with [facility] and they will come see the patient. Official recommendations pending. Will cover for cholangitis and sepsis at this time with Zosyn. Gentle IVF given her elderly age. Only meeting observation criteria at this time. Placed in observation under medicine service. Patient expresses understanding and agreeable to the plan. I have discussed the care of this patient with my supervising physician.              ED Course as of Sep 08 1226   Tue Sep 08, 2020   1011 Lactate, Kehinde(!): 2.9 [DC]   1012 WBC(!): 15.93 [DC]   1041 CT significant for new mild intrahepatic and moderate extrahepatic biliary ductal dilatation    [BA]   1224 BILIRUBIN TOTAL(!): 3.6 [BA]   1225 AST(!): 203 [BA]   1225 ALT(!): 173 [BA]   1225 Alkaline Phosphatase(!): 845 [BA]      ED Course User Index  [BA] Frederick Monroe PA-C  [DC] Erik Stephens MD                Clinical Impression:       ICD-10-CM " ICD-9-CM   1. Biliary obstruction  K83.1 576.2   2. Abdominal pain  R10.9 789.00   3. Sepsis, due to unspecified organism, unspecified whether acute organ dysfunction present  A41.9 038.9     995.91         Disposition:   Disposition: Placed in Observation  Condition: Serious     ED Disposition Condition    Observation                           Frederick Monroe PA-C  09/08/20 1226       Frederick Monroe PA-C  09/08/20 1236

## 2020-09-08 NOTE — H&P
[facility] Hospital Medicine  History & Physical    Patient Name: Johanny Curtis  MRN: [id number]  Admission Date: 9/8/2020  Attending Physician: Lynnette Marshall MD   Primary Care Provider: Leticia Weems MD    Hospital Medicine Team: [facility] HOSP MED 2 Vidal Jimenez MD     Patient information was obtained from patient, relative(s) and ER records.     Subjective:     Principal Problem:Biliary obstruction    Chief Complaint:   Chief Complaint   Patient presents with    Abdominal Pain     Pt with recent elevated liver enzymes        HPI: Mrs Curtis is a 89 yo AAF with a PMHx of HTN, hypothyroidism, obesity, abdominal aortic aneursym, and CKD who presents to [facility] ED complaining of abdominal pain. Patient awoke this morning around 5 am complaining of RLQ abdominal pain, worse when lying down, and radiating to the back. Patient says this pain woke her up from sleep, and she has never experienced this before. She describes this pain as constant in nature, and she did not try anything to relieve this pain. Patient had a normal BM yesterday and denies any BRBPR, dark stools, or change in bowel habits. She has hadite the past couple  her usual appetof days, and denies any nausea or vomiting. Patient denies any chest pain, fever, chills, or SOB. Of note, patient has been seen in clinic for elevated LFTs with upcoming US liver. She has also been seen recently in the ED for multiple episodes of syncope, with workup insignificant, likely vasovagal episodes.     In the ED, afebrile, pulse 80, R 16, 177/72, 97% on RA. Labs significant for WBC 15.9, AlkP 845, T bili 3.6, , , Lactate 2.9. CXR showed no acute infiltrates. CT noncon showed intrahepatic dilation and extrahepatic dilation up to 2.7 cm.         Past Medical History:   Diagnosis Date    AAA (abdominal aortic aneurysm)     Anemia in CKD (chronic kidney disease)     Arthritis     Cataract     Class 1 obesity due to excess calories  with serious comorbidity in adult 7/6/2017    Gout, chronic     High cholesterol     Hypertension     Hypothyroidism     Obesity     Plantar fasciitis     PVD (peripheral vascular disease)     Thyroid trouble        Past Surgical History:   Procedure Laterality Date    BREAST BIOPSY Left     BREAST SURGERY Left 1972    benign breast lump    CATARACT EXTRACTION  3/18/13    both eyes -     CHOLECYSTECTOMY      EYE SURGERY      HYSTERECTOMY      SKIN BIOPSY      Left breast       Review of patient's allergies indicates:  No Known Allergies    No current facility-administered medications on file prior to encounter.      Current Outpatient Medications on File Prior to Encounter   Medication Sig    levothyroxine (SYNTHROID) 88 MCG tablet Take 1 tablet (88 mcg total) by mouth once daily.    ammonium lactate 12 % Crea Apply twice daily to affected parts both feet as needed.    aspirin 81 MG Chew Take 1 tablet (81 mg total) by mouth once daily.    calcitRIOL (ROCALTROL) 0.25 MCG Cap Take 1 capsule (0.25 mcg total) by mouth every Monday and Friday.    doxazosin (CARDURA) 2 MG tablet Take 1 tablet (2 mg total) by mouth nightly.    felodipine (PLENDIL) 10 MG 24 hr tablet TAKE 1 TABLET BY MOUTH EVERY DAY    ferrous sulfate 325 (65 FE) MG EC tablet Take 1 tablet (325 mg total) by mouth 2 (two) times daily.    MULTIVIT-IRON-MIN-FOLIC ACID 3,500-18-0.4 UNIT-MG-MG ORAL CHEW Take by mouth.    sodium bicarbonate 650 MG tablet Take 1 tablet (650 mg total) by mouth 2 (two) times daily.     Family History     Problem Relation (Age of Onset)    Breast cancer Sister    Cancer Sister, Sister, Brother    Cataracts Son    Diabetes Son, Son    Glaucoma Son, Daughter    Heart disease Brother    Lupus Daughter    Meningitis Son    Mental illness Son    No Known Problems Brother        Tobacco Use    Smoking status: Former Smoker     Packs/day: 0.50     Years: 60.00     Pack years: 30.00     Quit date: 6/29/2001      Years since quittin.2    Smokeless tobacco: Never Used    Tobacco comment: quit in the s   Substance and Sexual Activity    Alcohol use: No    Drug use: No    Sexual activity: Not Currently     Review of Systems   Constitutional: Negative for appetite change, chills, diaphoresis and fever.   HENT: Negative for sinus pressure, sinus pain, sore throat and trouble swallowing.    Eyes: Negative for visual disturbance.   Respiratory: Negative for cough, shortness of breath and wheezing.    Cardiovascular: Negative for chest pain, palpitations and leg swelling.   Gastrointestinal: Positive for abdominal pain (localized to RLQ). Negative for abdominal distention, constipation, diarrhea, nausea and vomiting.   Endocrine: Negative for polyuria.   Genitourinary: Negative for dysuria.   Musculoskeletal: Negative for arthralgias, gait problem and neck pain.   Neurological: Negative for dizziness, weakness, light-headedness and headaches.     Objective:     Vital Signs (Most Recent):  Temp: 98.6 °F (37 °C) (20 1324)  Pulse: 97 (20 1324)  Resp: (!) 22 (20 1324)  BP: (!) 165/77 (20 1325)  SpO2: 100 % (20 1324) Vital Signs (24h Range):  Temp:  [97.5 °F (36.4 °C)-98.6 °F (37 °C)] 98.6 °F (37 °C)  Pulse:  [80-97] 97  Resp:  [16-22] 22  SpO2:  [95 %-100 %] 100 %  BP: (165-181)/(72-79) 165/77     Weight: 79.8 kg (176 lb)  Body mass index is 35.55 kg/m².    Physical Exam  Vitals signs reviewed.   Constitutional:       General: She is not in acute distress.     Appearance: Normal appearance. She is not ill-appearing, toxic-appearing or diaphoretic.   HENT:      Head: Normocephalic and atraumatic.      Mouth/Throat:      Mouth: Mucous membranes are moist.      Pharynx: Oropharynx is clear. No oropharyngeal exudate or posterior oropharyngeal erythema.   Eyes:      General: No scleral icterus.     Extraocular Movements: Extraocular movements intact.      Conjunctiva/sclera: Conjunctivae normal.       Pupils: Pupils are equal, round, and reactive to light.   Neck:      Musculoskeletal: Normal range of motion and neck supple. No muscular tenderness.   Cardiovascular:      Rate and Rhythm: Normal rate and regular rhythm.      Pulses: Normal pulses.      Heart sounds: Normal heart sounds. No murmur. No gallop.    Pulmonary:      Effort: Pulmonary effort is normal. No respiratory distress.      Breath sounds: Normal breath sounds. No wheezing or rales.   Abdominal:      General: Abdomen is flat. Bowel sounds are normal. There is no distension.      Palpations: Abdomen is soft. There is no mass.      Tenderness: There is abdominal tenderness (TTP in RLQ). There is no guarding or rebound.   Musculoskeletal:         General: No swelling.      Right lower leg: No edema.      Left lower leg: No edema.   Skin:     Coloration: Skin is not jaundiced.   Neurological:      General: No focal deficit present.      Mental Status: She is alert and oriented to person, place, and time. Mental status is at baseline.           CRANIAL NERVES     CN III, IV, VI   Pupils are equal, round, and reactive to light.       Significant Labs:   BMP:   Recent Labs   Lab 09/08/20  0910   *      K 4.2      CO2 19*   BUN 32*   CREATININE 2.3*   CALCIUM 9.7   MG 1.8     CBC:   Recent Labs   Lab 09/08/20  0854 09/08/20  0905   WBC 15.93*  --    HGB 10.8*  --    HCT 35.8* 35*     --      CMP:   Recent Labs   Lab 09/08/20  0910      K 4.2      CO2 19*   *   BUN 32*   CREATININE 2.3*   CALCIUM 9.7   PROT 7.5   ALBUMIN 3.0*   BILITOT 3.6*   ALKPHOS 845*   *   *   ANIONGAP 14   EGFRNONAA 18.2*     All pertinent labs within the past 24 hours have been reviewed.    Significant Imaging: CT Abd/pelvis wo contrast: The extrahepatic biliary system is also increased in caliber, measuring up to approximately 2.7 cm in transverse dimension    Assessment/Plan:     * Biliary obstruction  S/p  cholecsytectomy (90's), presents with abdominal pain mainly localized to RLQ, with obstructive lab picture consisting of elevated LFTs, Alk Phos, and Tbili.  CT noncontrast showed mild intrahepatic and moderate extrahepatic ductal dilation up to 2.7 cm.    Plan:  - [facility] consulted in ED  - ERCP today where sphincterotomy performed, obstruction relieved  - clear liquid diet, advance as tolerated  - concern for possible cholangitis with abdominal pain, biliary obstruction, and elevated WBC  - patient afebrile, hypertensive in ED and has remained so since  - continue zosyn - 7 day course for suspected cholangtis  - UA no nitrites no leukocytes  - CXR- no acute consolidations  - Bcx- will kandy      CKD (chronic kidney disease), stage IV  Baseline Cr 2.0-2.4.    Plan  - renally dose all medications  - avoid NSAIDs and other nephrotoxic agents      Essential hypertension  Hx of essential hypertension on felodipine 10 mg daily at home    Plan:  - continue home medication  - will re-evaluate patient's hypertension after pain is controlled      AAA (abdominal aortic aneurysm) without rupture  Hx of AAA, not greatly enlarged since last visualized on imaging. CT today showed 3.9-4.0 cm    Plan  - normal aneurysm monitoring      Hypothyroidism  Hx of hypothyroidism    Plan  - continue home synthroid        VTE Risk Mitigation (From admission, onward)         Ordered     IP VTE HIGH RISK PATIENT  Once      09/08/20 1231     Place sequential compression device  Until discontinued      09/08/20 1231                   Vidal Jimenez MD, PGY-1  Department of Hospital Medicine   [facility]

## 2020-09-08 NOTE — CONSULTS
[facility]  Advanced Endoscopy Service  Consult Note    Patient Name: Johanny Curtis  MRN: [id number]  Admission Date: 9/8/2020  Hospital Length of Stay: 0 days  Code Status: No Order   Attending Provider: Erik Stephens MD   Consulting Provider: Mabel James MD  Primary Care Physician: Leticia Weems MD  Principal Problem:<principal problem not specified>    Inpatient consult to Advanced Endoscopy Service (AES)  Consult performed by: Mabel James MD  Consult ordered by: Frederick Monroe PA-C        Subjective:     HPI: Johanny Curtis is a 89 y.o. female with history of HTN, hypothyroidism, AAA, CKD presented to the ED for right lower quadrant abdominal pain and back pain since this morning.  History was obtained from the patient and her daughter at bedside.  She reports that the patient was in her usual state of health this morning when all of a sudden she developed right lower quadrant abdominal pain radiating to her back that was continuous, not provoked by anything.  She did not take anything to help with the pain as she was not sure what it was coming from.  This has never happened before and was not associated with nausea, vomiting, fevers, diarrhea or blood in her stool.  She was previously noted to have elevated liver enzymes and was supposed to undergo an ultrasound to further work it up but developed this pain which prompted her ED visit.    In the ED the patient was afebrile and hemodynamically stable.  Labs notable for elevated LFTs ALT to 3 , alk-phos 845, T bili 3.6 WBC count 15.  CT abdomen and non con showed intra and extrahepatic biliary dilation up to 2.7 cm in diameter.  AES was consulted for possible cholangitis and ERCP.        Past Medical History:   Diagnosis Date    AAA (abdominal aortic aneurysm)     Anemia in CKD (chronic kidney disease)     Arthritis     Cataract     Class 1 obesity due to excess calories with serious comorbidity in adult  2017    Gout, chronic     High cholesterol     Hypertension     Hypothyroidism     Obesity     Plantar fasciitis     PVD (peripheral vascular disease)     Thyroid trouble        Past Surgical History:   Procedure Laterality Date    BREAST BIOPSY Left     BREAST SURGERY Left 1972    benign breast lump    CATARACT EXTRACTION  3/18/13    both eyes -     CHOLECYSTECTOMY      EYE SURGERY      HYSTERECTOMY      SKIN BIOPSY      Left breast       Family History   Problem Relation Age of Onset    Breast cancer Sister     Cataracts Son     Glaucoma Son     Mental illness Son     Diabetes Son     Glaucoma Daughter     Lupus Daughter     Meningitis Son     Heart disease Brother     Cancer Sister         leukemia    Cancer Sister         pancreatic    No Known Problems Brother     Cancer Brother         unknown    Diabetes Son     Blindness Neg Hx     Amblyopia Neg Hx     Hypertension Neg Hx     Macular degeneration Neg Hx     Retinal detachment Neg Hx     Strabismus Neg Hx     Stroke Neg Hx     Thyroid disease Neg Hx        Social History     Socioeconomic History    Marital status:      Spouse name: Not on file    Number of children: Not on file    Years of education: Not on file    Highest education level: Not on file   Occupational History    Not on file   Social Needs    Financial resource strain: Not on file    Food insecurity     Worry: Not on file     Inability: Not on file    Transportation needs     Medical: Not on file     Non-medical: Not on file   Tobacco Use    Smoking status: Former Smoker     Packs/day: 0.50     Years: 60.00     Pack years: 30.00     Quit date: 2001     Years since quittin.2    Smokeless tobacco: Never Used    Tobacco comment: quit in the 's   Substance and Sexual Activity    Alcohol use: No    Drug use: No    Sexual activity: Not Currently   Lifestyle    Physical activity     Days per week: Not on file      Minutes per session: Not on file    Stress: Not on file   Relationships    Social connections     Talks on phone: Not on file     Gets together: Not on file     Attends Sabianist service: Not on file     Active member of club or organization: Not on file     Attends meetings of clubs or organizations: Not on file     Relationship status: Not on file   Other Topics Concern    Not on file   Social History Narrative    Not on file       No current facility-administered medications on file prior to encounter.      Current Outpatient Medications on File Prior to Encounter   Medication Sig Dispense Refill    levothyroxine (SYNTHROID) 88 MCG tablet Take 1 tablet (88 mcg total) by mouth once daily. 90 tablet 1    ammonium lactate 12 % Crea Apply twice daily to affected parts both feet as needed. 140 g 11    aspirin 81 MG Chew Take 1 tablet (81 mg total) by mouth once daily. 30 tablet 11    calcitRIOL (ROCALTROL) 0.25 MCG Cap Take 1 capsule (0.25 mcg total) by mouth every Monday and Friday. 60 capsule 6    doxazosin (CARDURA) 2 MG tablet Take 1 tablet (2 mg total) by mouth nightly. 90 tablet 1    felodipine (PLENDIL) 10 MG 24 hr tablet TAKE 1 TABLET BY MOUTH EVERY DAY 90 tablet 1    ferrous sulfate 325 (65 FE) MG EC tablet Take 1 tablet (325 mg total) by mouth 2 (two) times daily. 120 tablet 6    MULTIVIT-IRON-MIN-FOLIC ACID 3,500-18-0.4 UNIT-MG-MG ORAL CHEW Take by mouth.      sodium bicarbonate 650 MG tablet Take 1 tablet (650 mg total) by mouth 2 (two) times daily. 180 tablet 4       Review of patient's allergies indicates:  No Known Allergies    Review of Systems   Constitutional: Negative for chills and fever.   HENT: Negative for sore throat.    Eyes: Negative.    Respiratory: Negative for cough and shortness of breath.    Cardiovascular: Negative for chest pain.   Gastrointestinal: Positive for abdominal pain. Negative for blood in stool, constipation, diarrhea, melena, nausea and vomiting.    Genitourinary: Negative for dysuria.   Musculoskeletal: Positive for back pain.   Neurological: Positive for dizziness.   Psychiatric/Behavioral: Positive for memory loss.        Objective:     Vitals:    09/08/20 1128   BP:    Pulse: 91   Resp:    Temp:          Constitutional:  not in acute distress  HENT: Head: Normal, normocephalic, atraumatic.  Eyes: conjunctiva clear and sclera nonicteric  Cardiovascular: regular rate and rhythm  Respiratory: normal chest expansion & respiratory effort   and no accessory muscle use  GI: soft, non-tender, without masses or organomegaly  Musculoskeletal: no muscular tenderness noted  Skin: normal color  Neurological: AOx2  Psychiatric: lethargic      Significant Labs:  Recent Labs   Lab 09/08/20  0854   HGB 10.8*       Lab Results   Component Value Date    WBC 15.93 (H) 09/08/2020    HGB 10.8 (L) 09/08/2020    HCT 35 (L) 09/08/2020    MCV 96 09/08/2020     09/08/2020       Lab Results   Component Value Date     09/08/2020    K 4.2 09/08/2020     09/08/2020    CO2 19 (L) 09/08/2020    BUN 32 (H) 09/08/2020    CREATININE 2.3 (H) 09/08/2020    CALCIUM 9.7 09/08/2020    ANIONGAP 14 09/08/2020    ESTGFRAFRICA 20.9 (A) 09/08/2020    EGFRNONAA 18.2 (A) 09/08/2020       Lab Results   Component Value Date     (H) 09/08/2020     (H) 09/08/2020    ALKPHOS 845 (H) 09/08/2020    BILITOT 3.6 (H) 09/08/2020       Lab Results   Component Value Date    INR 1.0 03/29/2007       Significant Imaging:  Reviewed pertinent radiology findings.       Assessment/Plan:     Johanny Curtis is a 89 y.o. female with history of HTN, hypothyroidism, AAA, CKD admitted for biliary obstruction and concern for cholangitis.    Problem List:  1. Biliary obstruction  2. Acute Cholangitis    Patient with no active signs of infection however in the setting of an elevated white count and biliary obstruction on imaging, cholangitis is in the picture.  Differential diagnosis for biliary  obstruction includes stone, fibrotic stricture, malignancy.  Will proceed with endoscopic evaluation with EUS/ERCP.    Recommendations:  - plan for EUS/ERCP today  - keep patient NPO  - hold anticoagulation  - keep Hgb > 7, platelets > 50  - agree with blood cultures and empiric antibiotics at this time    Thank you for involving us in the care of Johanny Curtis. Please call with any additional questions, concerns or changes in the patient's clinical status. We will continue to follow.    Mabel James MD  Gastroenterology Fellow PGY IV   [facility]

## 2020-09-08 NOTE — ANESTHESIA PREPROCEDURE EVALUATION
09/08/2020  Johanny Curtis is a 89 y.o., female with pmh listed below presenting for ERCP for dilated biliary system seen on CT and abdominal pain. Spoke to patient's daughter Annette Lombard for consent and medical history.    Past Medical History:   Diagnosis Date    AAA (abdominal aortic aneurysm)     Anemia in CKD (chronic kidney disease)     Arthritis     Cataract     Class 1 obesity due to excess calories with serious comorbidity in adult 7/6/2017    Gout, chronic     High cholesterol     Hypertension     Hypothyroidism     Obesity     Plantar fasciitis     PVD (peripheral vascular disease)     Thyroid trouble      Past Surgical History:   Procedure Laterality Date    BREAST BIOPSY Left     BREAST SURGERY Left 1972    benign breast lump    CATARACT EXTRACTION  3/18/13    both eyes -     CHOLECYSTECTOMY      EYE SURGERY      HYSTERECTOMY      SKIN BIOPSY      Left breast         Anesthesia Evaluation    I have reviewed the Patient Summary Reports.   I have reviewed the NPO Status.   I have reviewed the Medications.     Review of Systems  Anesthesia Hx:  No problems with previous Anesthesia   Denies Personal Hx of Anesthesia complications.   Social:  Non-Smoker    Cardiovascular:   Exercise tolerance: poor Hypertension ECG has been reviewed. Infrarenal aortic aneurysm seen on CT, stable at 3.9 cm as seen on previous scans   Pulmonary:  Pulmonary Normal    Renal/:   Chronic Renal Disease    Hepatic/GI:   Abdominal pain now with dilated biliary system seen on CT.   CT:9/8/20:  Impression:     1. No definite acute findings in the abdomen or pelvis to account for the patient's reported symptoms.  2. Since the prior examination of 08/04/2017, there is new mild intrahepatic and moderate extrahepatic biliary ductal dilatation. Within limitations imposed by lack of intravenous or  oral contrast, no definite obstructing stone or mass lesion is identified.  While some of this appearance is at least partially on the basis of post cholecystectomy state and senescent change, ultrasound or MRI could be performed for more sensitive evaluation of this finding, as clinically warranted.  3. Redemonstrated multiple hyperattenuating lesions of both kidneys, which may represent hemorrhagic cysts.  As noted previously, solid renal masses cannot be excluded in the absence of intravenous contrast.  Further evaluation with ultrasound or MRI would provide superior characterization.  4. Similar appearance of fusiform infrarenal abdominal aortic aneurysm compared to the 2017 CT.      Neurological:   Peripheral Neuropathy    Endocrine:   Hypothyroidism        Physical Exam  General:  Well nourished    Airway/Jaw/Neck:  Airway Findings: Mouth Opening: Normal Tongue: Normal  General Airway Assessment: Adult  Mallampati: II  Jaw/Neck Findings:  Neck ROM: Normal ROM      Dental:  Dental Findings: In tact   Chest/Lungs:  Chest/Lungs Findings: Clear to auscultation, Normal Respiratory Rate     Heart/Vascular:  Heart Findings: Rate: Normal  Rhythm: Regular Rhythm  Sounds: Normal        Mental Status:  Mental Status Findings: Normal        Anesthesia Plan  Type of Anesthesia, risks & benefits discussed:  Anesthesia Type:  general, MAC  Patient's Preference:   Intra-op Monitoring Plan: standard ASA monitors  Intra-op Monitoring Plan Comments:   Post Op Pain Control Plan: multimodal analgesia, IV/PO Opioids PRN and per primary service following discharge from PACU  Post Op Pain Control Plan Comments:   Induction:   IV  Beta Blocker:  Patient is not currently on a Beta-Blocker (No further documentation required).       Informed Consent: Patient understands risks and agrees with Anesthesia plan.  Questions answered. Anesthesia consent signed with patient.  ASA Score: 3     Day of Surgery Review of History & Physical:    H&P  update referred to the surgeon.         Ready For Surgery From Anesthesia Perspective.

## 2020-09-08 NOTE — ASSESSMENT & PLAN NOTE
Hx of essential hypertension on felodipine 10 mg daily at home    Plan:  - continue home medication  - will re-evaluate patient's hypertension after pain is controlled

## 2020-09-08 NOTE — TRANSFER OF CARE
"Anesthesia Transfer of Care Note    Patient: Johanny Curtis    Procedure(s) Performed: Procedure(s) (LRB):  ERCP (ENDOSCOPIC RETROGRADE CHOLANGIOPANCREATOGRAPHY) (N/A)  ULTRASOUND, UPPER GI TRACT, ENDOSCOPIC (N/A)    Patient location: PACU    Anesthesia Type: general    Transport from OR: Transported from OR on 2-3 L/min O2 by NC with adequate spontaneous ventilation    Post pain: adequate analgesia    Post assessment: no apparent anesthetic complications    Post vital signs: stable    Level of consciousness: awake    Nausea/Vomiting: no nausea/vomiting    Complications: none    Transfer of care protocol was followed      Last vitals:   Visit Vitals  BP (!) 165/77   Pulse 97   Temp 37 °C (98.6 °F) (Axillary)   Resp (!) 22   Ht 4' 11" (1.499 m)   Wt 79.8 kg (176 lb)   SpO2 100%   Breastfeeding No   BMI 35.55 kg/m²     "

## 2020-09-08 NOTE — ASSESSMENT & PLAN NOTE
S/p cholecsytectomy (90's), presents with abdominal pain mainly localized to RLQ, with obstructive lab picture consisting of elevated LFTs, Alk Phos, and Tbili.  CT noncontrast showed mild intrahepatic and moderate extrahepatic ductal dilation up to 2.7 cm.    Plan:  - AES consulted in ED  - ERCP 9/8 where sphincterotomy performed, obstruction relieved  - to full liquid diet today, advance as tolerated  - concern for possible cholangitis with abdominal pain, biliary obstruction, and elevated WBC  - patient afebrile, hypertensive in ED and has remained so since  - continue zosyn - 7 day course for suspected cholangtis  - will draw amylase, lipase is signs/symptoms of post-ERCP pancreatitis  - UA no nitrites no leukocytes  - CXR- no acute consolidations  - Bcx- 2/2 gram negative rods, continue zosyn, will repeat bcx tomorrow

## 2020-09-08 NOTE — PLAN OF CARE
Pt received from procedure. Oriented to room and call bell system. Denies pain. Daughter called and updated. Wcm.

## 2020-09-08 NOTE — ASSESSMENT & PLAN NOTE
Baseline Cr 2.0-2.4.    Plan  - renally dose all medications  - avoid NSAIDs and other nephrotoxic agents

## 2020-09-08 NOTE — ED NOTES
Pt resting comfortably and independently repositioned in stretcher with bed locked in lowest position for safety. NAD noted at this time. Respirations even and unlabored and visible chest rise noted.  Patient offered bathroom assistance and denies need at this time. Pt instructed to call if assistance is needed. Pt on continuous cardiac, BP, and O2 monitoring. Call light within reach. Daughter at bedside. No needs at this time. Will continue to monitor.

## 2020-09-08 NOTE — PROGRESS NOTES
Patient, Johanny Curtis (MRN #[id number]), presented with a recorded BMI of 35.71 kg/m^2 and a documented comorbidity(s):  - Hypertension  to which the severe obesity is a contributing factor. This is consistent with the definition of severe obesity (BMI 35.0-39.9) with comorbidity (ICD-10 E66.01, Z68.35). The patient's severe obesity was monitored, evaluated, addressed and/or treated. This addendum to the medical record is made on 09/08/2020.

## 2020-09-08 NOTE — ED TRIAGE NOTES
Johanny Curtis, a 89 y.o. female presents to the ED w/ complaint of right side abd pain starting this morning. Last BM yesterday. Denies appetite changes, n/v/d, constipation, CP, HA, vision changes, cough, fever, chills, dysuria. Pt has hx of increased liver enzymes. Was told to come to ED if abd pain occurred.     Triage note:  Chief Complaint   Patient presents with    Abdominal Pain     Pt with recent elevated liver enzymes     Review of patient's allergies indicates:  No Known Allergies  Past Medical History:   Diagnosis Date    AAA (abdominal aortic aneurysm)     Anemia in CKD (chronic kidney disease)     Arthritis     Cataract     Class 1 obesity due to excess calories with serious comorbidity in adult 7/6/2017    Gout, chronic     High cholesterol     Hypertension     Hypothyroidism     Obesity     Plantar fasciitis     PVD (peripheral vascular disease)     Thyroid trouble      Patient identifiers verified and correct for Johanny Curtis.    LOC: The patient is awake, alert and aware of environment with an appropriate affect, the patient is oriented x 3 and speaking appropriately.  APPEARANCE: Patient resting comfortably and in no acute distress, patient is clean and well groomed, patient's clothing is properly fastened.  SKIN: The skin is warm and dry, color consistent with ethnicity, patient has normal skin turgor and moist mucus membranes, skin intact, no breakdown or bruising noted.  MUSCULOSKELETAL: Patient moving all extremities spontaneously, no obvious swelling or deformities noted.  RESPIRATORY: Airway is open and patent, respirations are spontaneous, patient has a normal effort and rate, no accessory muscle use noted.   CARDIAC: Patient has a normal rate and regular rhythm, no periphreal edema noted, capillary refill < 3 seconds.  ABDOMEN: Soft and non tender to palpation, no distention noted. Guarding right side  NEUROLOGIC: PERRL, 3 mm bilaterally, eyes open spontaneously,  behavior appropriate to situation, follows commands, facial expression symmetrical, bilateral hand grasp equal and even, purposeful motor response noted, normal sensation in all extremities when touched with a finger.

## 2020-09-08 NOTE — [employer] PATIENT INSTRUCTIONS
Discharge Summary/Instructions after an Endoscopic Procedure  Patient Name: Johanny Curtis  Patient MRN: [id number]  Patient YOB: 1930 Tuesday, September 8, 2020  Rasheed Balbuena MD  RESTRICTIONS:  During your procedure today, you received medications for sedation.  These   medications may affect your judgment, balance and coordination.  Therefore,   for 24 hours, you have the following restrictions:   - DO NOT drive a car, operate machinery, make legal/financial decisions,   sign important papers or drink alcohol.    ACTIVITY:  Today: no heavy lifting, straining or running due to procedural   sedation/anesthesia.  The following day: return to full activity including work.  DIET:  Eat and drink normally unless instructed otherwise.     TREATMENT FOR COMMON SIDE EFFECTS:  - Mild abdominal pain, nausea, belching, bloating or excessive gas:  rest,   eat lightly and use a heating pad.  - Sore Throat: treat with throat lozenges and/or gargle with warm salt   water.  - Because air was used during the procedure, expelling large amounts of air   from your rectum or belching is normal.  - If a bowel prep was taken, you may not have a bowel movement for 1-3 days.    This is normal.  SYMPTOMS TO WATCH FOR AND REPORT TO YOUR PHYSICIAN:  1. Abdominal pain or bloating, other than gas cramps.  2. Chest pain.  3. Back pain.  4. Signs of infection such as: chills or fever occurring within 24 hours   after the procedure.  5. Rectal bleeding, which would show as bright red, maroon, or black stools.   (A tablespoon of blood from the rectum is not serious, especially if   hemorrhoids are present.)  6. Vomiting.  7. Weakness or dizziness.  GO DIRECTLY TO THE NEAREST EMERGENCY ROOM IF YOU HAVE ANY OF THE FOLLOWING:      Difficulty breathing              Chills and/or fever over 101 F   Persistent vomiting and/or vomiting blood   Severe abdominal pain   Severe chest pain   Black, tarry stools   Bleeding- more than one  tablespoon   Any other symptom or condition that you feel may need urgent attention  Your doctor recommends these additional instructions:  If any biopsies were taken, your doctors clinic will contact you in 1 to 2   weeks with any results.  - Resume previous diet.   - Continue present medications.   - Return to referring physician.   - Return patient to [facility] for ongoing care.   - Monitor liver tests.  - Antibiotics for clinical cholangitis for 10 days total.  For questions, problems or results please call your physician - Rasheed Balbuena MD at Work:  [phone].  [facility], EMERGENCY ROOM PHONE NUMBER: [phone]  IF A COMPLICATION OR EMERGENCY SITUATION ARISES AND YOU ARE UNABLE TO REACH   YOUR PHYSICIAN - GO DIRECTLY TO THE EMERGENCY ROOM.  Rasheed Balbuena MD  9/8/2020 3:22:08 PM  This report has been verified and signed electronically.  [employer]

## 2020-09-08 NOTE — ASSESSMENT & PLAN NOTE
S/p cholecsytectomy (90's), presents with abdominal pain mainly localized to RLQ, with obstructive lab picture consisting of elevated LFTs, Alk Phos, and Tbili.  CT noncontrast showed mild intrahepatic and moderate extrahepatic ductal dilation up to 2.7 cm.    Plan:  - AES consulted in ED  - ERCP today where sphincterotomy performed, obstruction relieved  - clear liquid diet, advance as tolerated  - concern for possible cholangitis with abdominal pain, biliary obstruction, and elevated WBC  - patient afebrile, hypertensive in ED and has remained so since  - continue zosyn - 7 day course for suspected cholangtis  - UA no nitrites no leukocytes  - CXR- no acute consolidations  - Bcx- will kandy

## 2020-09-09 ENCOUNTER — TELEPHONE (OUTPATIENT)
Dept: INTERNAL MEDICINE | Facility: CLINIC | Age: 85
End: 2020-09-09

## 2020-09-09 LAB
ALBUMIN SERPL BCP-MCNC: 2.4 G/DL (ref 3.5–5.2)
ALBUMIN SERPL BCP-MCNC: 2.4 G/DL (ref 3.5–5.2)
ALP SERPL-CCNC: 623 U/L (ref 55–135)
ALP SERPL-CCNC: 671 U/L (ref 55–135)
ALT SERPL W/O P-5'-P-CCNC: 102 U/L (ref 10–44)
ALT SERPL W/O P-5'-P-CCNC: 115 U/L (ref 10–44)
ANION GAP SERPL CALC-SCNC: 9 MMOL/L (ref 8–16)
ANION GAP SERPL CALC-SCNC: 9 MMOL/L (ref 8–16)
AST SERPL-CCNC: 107 U/L (ref 10–40)
AST SERPL-CCNC: 88 U/L (ref 10–40)
BASOPHILS # BLD AUTO: 0.01 K/UL (ref 0–0.2)
BASOPHILS # BLD AUTO: 0.02 K/UL (ref 0–0.2)
BASOPHILS NFR BLD: 0.1 % (ref 0–1.9)
BASOPHILS NFR BLD: 0.2 % (ref 0–1.9)
BILIRUB SERPL-MCNC: 3.2 MG/DL (ref 0.1–1)
BILIRUB SERPL-MCNC: 4.2 MG/DL (ref 0.1–1)
BUN SERPL-MCNC: 32 MG/DL (ref 8–23)
BUN SERPL-MCNC: 36 MG/DL (ref 8–23)
CALCIUM SERPL-MCNC: 9.1 MG/DL (ref 8.7–10.5)
CALCIUM SERPL-MCNC: 9.2 MG/DL (ref 8.7–10.5)
CHLORIDE SERPL-SCNC: 107 MMOL/L (ref 95–110)
CHLORIDE SERPL-SCNC: 107 MMOL/L (ref 95–110)
CO2 SERPL-SCNC: 23 MMOL/L (ref 23–29)
CO2 SERPL-SCNC: 23 MMOL/L (ref 23–29)
CREAT SERPL-MCNC: 2.1 MG/DL (ref 0.5–1.4)
CREAT SERPL-MCNC: 2.2 MG/DL (ref 0.5–1.4)
DIFFERENTIAL METHOD: ABNORMAL
DIFFERENTIAL METHOD: ABNORMAL
EOSINOPHIL # BLD AUTO: 0.1 K/UL (ref 0–0.5)
EOSINOPHIL # BLD AUTO: 0.1 K/UL (ref 0–0.5)
EOSINOPHIL NFR BLD: 0.6 % (ref 0–8)
EOSINOPHIL NFR BLD: 1.1 % (ref 0–8)
ERYTHROCYTE [DISTWIDTH] IN BLOOD BY AUTOMATED COUNT: 16.1 % (ref 11.5–14.5)
ERYTHROCYTE [DISTWIDTH] IN BLOOD BY AUTOMATED COUNT: 16.3 % (ref 11.5–14.5)
EST. GFR  (AFRICAN AMERICAN): 22.1 ML/MIN/1.73 M^2
EST. GFR  (AFRICAN AMERICAN): 23.4 ML/MIN/1.73 M^2
EST. GFR  (NON AFRICAN AMERICAN): 19.2 ML/MIN/1.73 M^2
EST. GFR  (NON AFRICAN AMERICAN): 20.3 ML/MIN/1.73 M^2
GLUCOSE SERPL-MCNC: 106 MG/DL (ref 70–110)
GLUCOSE SERPL-MCNC: 78 MG/DL (ref 70–110)
HCT VFR BLD AUTO: 30.2 % (ref 37–48.5)
HCT VFR BLD AUTO: 31.4 % (ref 37–48.5)
HGB BLD-MCNC: 10.1 G/DL (ref 12–16)
HGB BLD-MCNC: 9.7 G/DL (ref 12–16)
IMM GRANULOCYTES # BLD AUTO: 0.1 K/UL (ref 0–0.04)
IMM GRANULOCYTES # BLD AUTO: 0.11 K/UL (ref 0–0.04)
IMM GRANULOCYTES NFR BLD AUTO: 0.7 % (ref 0–0.5)
IMM GRANULOCYTES NFR BLD AUTO: 1 % (ref 0–0.5)
LYMPHOCYTES # BLD AUTO: 1.3 K/UL (ref 1–4.8)
LYMPHOCYTES # BLD AUTO: 1.6 K/UL (ref 1–4.8)
LYMPHOCYTES NFR BLD: 13.7 % (ref 18–48)
LYMPHOCYTES NFR BLD: 9.4 % (ref 18–48)
MAGNESIUM SERPL-MCNC: 2 MG/DL (ref 1.6–2.6)
MCH RBC QN AUTO: 29 PG (ref 27–31)
MCH RBC QN AUTO: 29.1 PG (ref 27–31)
MCHC RBC AUTO-ENTMCNC: 32.1 G/DL (ref 32–36)
MCHC RBC AUTO-ENTMCNC: 32.2 G/DL (ref 32–36)
MCV RBC AUTO: 90 FL (ref 82–98)
MCV RBC AUTO: 91 FL (ref 82–98)
MONOCYTES # BLD AUTO: 0.6 K/UL (ref 0.3–1)
MONOCYTES # BLD AUTO: 0.7 K/UL (ref 0.3–1)
MONOCYTES NFR BLD: 4.8 % (ref 4–15)
MONOCYTES NFR BLD: 5 % (ref 4–15)
NEUTROPHILS # BLD AUTO: 12.1 K/UL (ref 1.8–7.7)
NEUTROPHILS # BLD AUTO: 9.1 K/UL (ref 1.8–7.7)
NEUTROPHILS NFR BLD: 79 % (ref 38–73)
NEUTROPHILS NFR BLD: 84.4 % (ref 38–73)
NRBC BLD-RTO: 0 /100 WBC
NRBC BLD-RTO: 0 /100 WBC
PHOSPHATE SERPL-MCNC: 3.1 MG/DL (ref 2.7–4.5)
PLATELET # BLD AUTO: 277 K/UL (ref 150–350)
PLATELET # BLD AUTO: 281 K/UL (ref 150–350)
PMV BLD AUTO: 10.4 FL (ref 9.2–12.9)
PMV BLD AUTO: 10.4 FL (ref 9.2–12.9)
POTASSIUM SERPL-SCNC: 3.6 MMOL/L (ref 3.5–5.1)
POTASSIUM SERPL-SCNC: 3.8 MMOL/L (ref 3.5–5.1)
PROT SERPL-MCNC: 6.7 G/DL (ref 6–8.4)
PROT SERPL-MCNC: 6.7 G/DL (ref 6–8.4)
RBC # BLD AUTO: 3.34 M/UL (ref 4–5.4)
RBC # BLD AUTO: 3.47 M/UL (ref 4–5.4)
SODIUM SERPL-SCNC: 139 MMOL/L (ref 136–145)
SODIUM SERPL-SCNC: 139 MMOL/L (ref 136–145)
WBC # BLD AUTO: 11.5 K/UL (ref 3.9–12.7)
WBC # BLD AUTO: 14.33 K/UL (ref 3.9–12.7)

## 2020-09-09 PROCEDURE — 97530 THERAPEUTIC ACTIVITIES: CPT | Mod: HCNC

## 2020-09-09 PROCEDURE — 83735 ASSAY OF MAGNESIUM: CPT | Mod: HCNC

## 2020-09-09 PROCEDURE — 63600175 PHARM REV CODE 636 W HCPCS: Mod: HCNC | Performed by: STUDENT IN AN ORGANIZED HEALTH CARE EDUCATION/TRAINING PROGRAM

## 2020-09-09 PROCEDURE — 80053 COMPREHEN METABOLIC PANEL: CPT | Mod: HCNC

## 2020-09-09 PROCEDURE — 36415 COLL VENOUS BLD VENIPUNCTURE: CPT | Mod: HCNC

## 2020-09-09 PROCEDURE — 84100 ASSAY OF PHOSPHORUS: CPT | Mod: HCNC

## 2020-09-09 PROCEDURE — 85025 COMPLETE CBC W/AUTO DIFF WBC: CPT | Mod: HCNC

## 2020-09-09 PROCEDURE — 99233 SBSQ HOSP IP/OBS HIGH 50: CPT | Mod: HCNC,GC,, | Performed by: INTERNAL MEDICINE

## 2020-09-09 PROCEDURE — 97162 PT EVAL MOD COMPLEX 30 MIN: CPT | Mod: HCNC

## 2020-09-09 PROCEDURE — 25000003 PHARM REV CODE 250: Mod: HCNC | Performed by: STUDENT IN AN ORGANIZED HEALTH CARE EDUCATION/TRAINING PROGRAM

## 2020-09-09 PROCEDURE — 99233 PR SUBSEQUENT HOSPITAL CARE,LEVL III: ICD-10-PCS | Mod: HCNC,GC,, | Performed by: INTERNAL MEDICINE

## 2020-09-09 PROCEDURE — 97165 OT EVAL LOW COMPLEX 30 MIN: CPT | Mod: HCNC

## 2020-09-09 PROCEDURE — 96366 THER/PROPH/DIAG IV INF ADDON: CPT | Performed by: EMERGENCY MEDICINE

## 2020-09-09 PROCEDURE — 94761 N-INVAS EAR/PLS OXIMETRY MLT: CPT | Mod: HCNC

## 2020-09-09 PROCEDURE — 11000001 HC ACUTE MED/SURG PRIVATE ROOM: Mod: HCNC

## 2020-09-09 RX ORDER — SYRING-NEEDL,DISP,INSUL,0.3 ML 29 G X1/2"
296 SYRINGE, EMPTY DISPOSABLE MISCELLANEOUS DAILY PRN
Status: ON HOLD | COMMUNITY
End: 2021-04-17 | Stop reason: HOSPADM

## 2020-09-09 RX ADMIN — SODIUM BICARBONATE 650 MG TABLET 650 MG: at 08:09

## 2020-09-09 RX ADMIN — LEVOTHYROXINE SODIUM 88 MCG: 88 TABLET ORAL at 05:09

## 2020-09-09 RX ADMIN — PIPERACILLIN AND TAZOBACTAM 4.5 G: 4; .5 INJECTION, POWDER, LYOPHILIZED, FOR SOLUTION INTRAVENOUS; PARENTERAL at 08:09

## 2020-09-09 RX ADMIN — DOXAZOSIN 2 MG: 2 TABLET ORAL at 08:09

## 2020-09-09 RX ADMIN — ENOXAPARIN SODIUM 30 MG: 40 INJECTION SUBCUTANEOUS at 04:09

## 2020-09-09 NOTE — TELEPHONE ENCOUNTER
----- Message from Celina Hall sent at 9/9/2020 10:15 AM CDT -----  Contact: [phone] Katia (daughter) [phone]  Patient's daughter is requesting a call back from the nurse in regards to a liver ultrasound. Patient is in the hospital and daughter thinks is best to have the ultrasound done while she is there. Please advise

## 2020-09-09 NOTE — PLAN OF CARE
Safety measures maintained. VSS on RA. Pt had no complaints of pain throughout night. Bed in lowest position, call light within reach, side rails up x2. Pt has no complaints at this time. Will continue to monitor.

## 2020-09-09 NOTE — PLAN OF CARE
Problem: Occupational Therapy Goal  Goal: Occupational Therapy Goal  Description: Goals to be met by: 9/23/20     Patient will increase functional independence with ADLs by performing:    Feeding with [place].  UE Dressing with Minimal Assistance.  LE Dressing with Minimal Assistance.  Grooming while standing at sink with Contact Guard Assistance.  Toileting from toilet with Contact Guard Assistance for hygiene and clothing management.   Toilet transfer to toilet with Contact Guard Assistance.    Outcome: Ongoing, Progressing     Evaluation complete-see note for details. Goals and POC established.    AV Durbin  9/9/2020   Pager #: [phone]

## 2020-09-09 NOTE — SUBJECTIVE & OBJECTIVE
Interval History: Patient seen and examined by myself this morning. Patient reports no acute events overnight. Patient says her abdominal pain is gone today. No trouble tolerating her clear liquid diet. Will advance as tolerated. Made patient aware of her bacteremia, will call and update daughter later. VS stable overnight. All questions and concerns addressed with patient at bedside this morning. Will continue to monitor.      Review of Systems   Constitutional: Negative for appetite change, chills, diaphoresis and fever.   HENT: Negative for sinus pressure, sinus pain, sore throat and trouble swallowing.    Eyes: Negative for visual disturbance.   Respiratory: Negative for cough, shortness of breath and wheezing.    Cardiovascular: Negative for chest pain, palpitations and leg swelling.   Gastrointestinal: Negative for abdominal distention, abdominal pain (RLQ pain has resolved), constipation, diarrhea, nausea and vomiting.   Endocrine: Negative for polyuria.   Genitourinary: Negative for dysuria.   Musculoskeletal: Negative for arthralgias, gait problem and neck pain.   Neurological: Negative for dizziness, weakness, light-headedness and headaches.     Objective:     Vital Signs (Most Recent):  Temp: 97.6 °F (36.4 °C) (09/09/20 1118)  Pulse: 72 (09/09/20 1118)  Resp: 16 (09/09/20 1118)  BP: (!) 145/63 (09/09/20 1118)  SpO2: (!) 94 % (09/09/20 1118) Vital Signs (24h Range):  Temp:  [96.1 °F (35.6 °C)-98.1 °F (36.7 °C)] 97.6 °F (36.4 °C)  Pulse:  [] 72  Resp:  [16-22] 16  SpO2:  [92 %-100 %] 94 %  BP: (145-169)/(63-77) 145/63     Weight: 79.8 kg (176 lb)  Body mass index is 35.55 kg/m².    Intake/Output Summary (Last 24 hours) at 9/9/2020 1417  Last data filed at 9/9/2020 1310  Gross per 24 hour   Intake 1820 ml   Output --   Net 1820 ml      Physical Exam  Vitals signs reviewed.   Constitutional:       General: She is not in acute distress.     Appearance: Normal appearance. She is not ill-appearing,  toxic-appearing or diaphoretic.   HENT:      Head: Normocephalic and atraumatic.      Mouth/Throat:      Mouth: Mucous membranes are moist.      Pharynx: Oropharynx is clear. No oropharyngeal exudate or posterior oropharyngeal erythema.   Eyes:      General: No scleral icterus.     Extraocular Movements: Extraocular movements intact.      Conjunctiva/sclera: Conjunctivae normal.      Pupils: Pupils are equal, round, and reactive to light.   Neck:      Musculoskeletal: Normal range of motion and neck supple. No muscular tenderness.   Cardiovascular:      Rate and Rhythm: Normal rate and regular rhythm.      Pulses: Normal pulses.      Heart sounds: Normal heart sounds. No murmur. No gallop.    Pulmonary:      Effort: Pulmonary effort is normal. No respiratory distress.      Breath sounds: Normal breath sounds. No wheezing or rales.   Abdominal:      General: Abdomen is flat. Bowel sounds are normal. There is no distension.      Palpations: Abdomen is soft. There is no mass.      Tenderness: There is no abdominal tenderness (no tenderness to palpation in all 4 quadrants). There is no guarding or rebound.   Musculoskeletal:         General: No swelling.      Right lower leg: No edema.      Left lower leg: No edema.   Skin:     Coloration: Skin is not jaundiced.   Neurological:      General: No focal deficit present.      Mental Status: She is alert and oriented to person, place, and time. Mental status is at baseline.         Significant Labs:   Bilirubin:   Recent Labs   Lab 08/31/20  1200 09/03/20  0911 09/08/20  0910 09/08/20  1839 09/09/20  0747   BILITOT 0.8 2.9* 3.6* 4.6* 4.2*     CBC:   Recent Labs   Lab 09/08/20  0854 09/08/20  0905 09/08/20 1839 09/09/20  0747   WBC 15.93*  --  21.31* 14.33*   HGB 10.8*  --  11.3* 9.7*   HCT 35.8* 35* 36.4* 30.2*     --  300 277     CMP:   Recent Labs   Lab 09/08/20  0910 09/08/20  1839 09/09/20  0747    138 139   K 4.2 3.9 3.8    107 107   CO2 19* 21* 23    * 182* 78   BUN 32* 29* 32*   CREATININE 2.3* 2.0* 2.1*   CALCIUM 9.7 9.2 9.2   PROT 7.5 7.2 6.7   ALBUMIN 3.0* 2.6* 2.4*   BILITOT 3.6* 4.6* 4.2*   ALKPHOS 845* 765* 671*   * 143* 107*   * 146* 115*   ANIONGAP 14 10 9   EGFRNONAA 18.2* 21.5* 20.3*     Lipase:   Recent Labs   Lab 09/08/20  0910   LIPASE 146*     Urine Studies:   Recent Labs   Lab 09/08/20  2048   COLORU Divina   APPEARANCEUA Hazy*   PHUR 6.0   SPECGRAV 1.020   PROTEINUA 1+*   GLUCUA 2+*   KETONESU Negative   BILIRUBINUA Negative   OCCULTUA Negative   NITRITE Negative   LEUKOCYTESUR Negative   RBCUA 1   WBCUA 1   BACTERIA Rare   HYALINECASTS 0     All pertinent labs within the past 24 hours have been reviewed.    Significant Imaging: I have reviewed all pertinent imaging results/findings within the past 24 hours.

## 2020-09-09 NOTE — PROGRESS NOTES
[facility] Hospital Medicine  Progress Note    Patient Name: Johanny Curtis  MRN: [id number]  Patient Class: IP- Inpatient   Admission Date: 9/8/2020  Length of Stay: 0 days  Attending Physician: Lynnette Marshall MD  Primary Care Provider: Leticia Weems MD    Hospital Medicine [place] [facility] [place] [place] 2 Vidal Jimenez MD    Subjective:     Principal Problem:Biliary obstruction        HPI:  Mrs Curtis is a 89 yo AAF with a PMHx of HTN, hypothyroidism, obesity, abdominal aortic aneursym, and CKD who [place] [facility] [place] complaining of abdominal pain. Patient awoke this morning around 5 am complaining of RLQ abdominal pain, worse when lying down, and radiating to the back. Patient says this pain woke her up from sleep, and she has never experienced this before. She describes this pain as constant in nature, and she did not try anything to relieve this pain. Patient had a normal BM yesterday and denies any BRBPR, dark stools, or change in bowel habits. She has hadite the past couple  her usual appetof days, and denies any nausea or vomiting. Patient denies any chest pain, fever, chills, or SOB. Of note, patient has been seen in clinic for elevated LFTs with upcoming US liver. She has also been seen recently in the ED for multiple episodes of syncope, with workup insignificant, likely vasovagal episodes.     In the ED, afebrile, pulse 80, R 16, 177/72, 97% on RA. Labs significant for WBC 15.9, AlkP 845, T bili 3.6, , , Lactate 2.9. CXR showed no acute infiltrates. CT noncon showed intrahepatic dilation and extrahepatic dilation up to 2.7 cm.         Overview/Hospital Course:  Patient evaluated in the ED by [facility]. Taken to Endo suite for EUS -> ERCP where stone was removed. Admitted to IM 2 for observation and management.     Interval History: Patient seen and examined by myself this morning. Patient reports no acute events overnight. Patient says her abdominal pain is  gone today. No trouble tolerating her clear liquid diet. Will advance as tolerated. Made patient aware of her bacteremia, will call and update daughter later. VS stable overnight. All questions and concerns addressed with patient at bedside this morning. Will continue to monitor.      Review of Systems   Constitutional: Negative for appetite change, chills, diaphoresis and fever.   HENT: Negative for sinus pressure, sinus pain, sore throat and trouble swallowing.    Eyes: Negative for visual disturbance.   Respiratory: Negative for cough, shortness of breath and wheezing.    Cardiovascular: Negative for chest pain, palpitations and leg swelling.   Gastrointestinal: Negative for abdominal distention, abdominal pain (RLQ pain has resolved), constipation, diarrhea, nausea and vomiting.   Endocrine: Negative for polyuria.   Genitourinary: Negative for dysuria.   Musculoskeletal: Negative for arthralgias, gait problem and neck pain.   Neurological: Negative for dizziness, weakness, light-headedness and headaches.     Objective:     Vital Signs (Most Recent):  Temp: 97.6 °F (36.4 °C) (09/09/20 1118)  Pulse: 72 (09/09/20 1118)  Resp: 16 (09/09/20 1118)  BP: (!) 145/63 (09/09/20 1118)  SpO2: (!) 94 % (09/09/20 1118) Vital Signs (24h Range):  Temp:  [96.1 °F (35.6 °C)-98.1 °F (36.7 °C)] 97.6 °F (36.4 °C)  Pulse:  [] 72  Resp:  [16-22] 16  SpO2:  [92 %-100 %] 94 %  BP: (145-169)/(63-77) 145/63     Weight: 79.8 kg (176 lb)  Body mass index is 35.55 kg/m².    Intake/Output Summary (Last 24 hours) at 9/9/2020 1417  Last data filed at 9/9/2020 1310  Gross per 24 hour   Intake 1820 ml   Output --   Net 1820 ml      Physical Exam  Vitals signs reviewed.   Constitutional:       General: She is not in acute distress.     Appearance: Normal appearance. She is not ill-appearing, toxic-appearing or diaphoretic.   HENT:      Head: Normocephalic and atraumatic.      Mouth/Throat:      Mouth: Mucous membranes are moist.      Pharynx:  Oropharynx is clear. No oropharyngeal exudate or posterior oropharyngeal erythema.   Eyes:      General: No scleral icterus.     Extraocular Movements: Extraocular movements intact.      Conjunctiva/sclera: Conjunctivae normal.      Pupils: Pupils are equal, round, and reactive to light.   Neck:      Musculoskeletal: Normal range of motion and neck supple. No muscular tenderness.   Cardiovascular:      Rate and Rhythm: Normal rate and regular rhythm.      Pulses: Normal pulses.      Heart sounds: Normal heart sounds. No murmur. No gallop.    Pulmonary:      Effort: Pulmonary effort is normal. No respiratory distress.      Breath sounds: Normal breath sounds. No wheezing or rales.   Abdominal:      General: Abdomen is flat. Bowel sounds are normal. There is no distension.      Palpations: Abdomen is soft. There is no mass.      Tenderness: There is no abdominal tenderness (no tenderness to palpation in all 4 quadrants). There is no guarding or rebound.   Musculoskeletal:         General: No swelling.      Right lower leg: No edema.      Left lower leg: No edema.   Skin:     Coloration: Skin is not jaundiced.   Neurological:      General: No focal deficit present.      Mental Status: She is alert and oriented to person, place, and time. Mental status is at baseline.         Significant Labs:   Bilirubin:   Recent Labs   Lab 08/31/20  1200 09/03/20  0911 09/08/20  0910 09/08/20  1839 09/09/20  0747   BILITOT 0.8 2.9* 3.6* 4.6* 4.2*     CBC:   Recent Labs   Lab 09/08/20  0854 09/08/20  0905 09/08/20  1839 09/09/20  0747   WBC 15.93*  --  21.31* 14.33*   HGB 10.8*  --  11.3* 9.7*   HCT 35.8* 35* 36.4* 30.2*     --  300 277     CMP:   Recent Labs   Lab 09/08/20  0910 09/08/20  1839 09/09/20  0747    138 139   K 4.2 3.9 3.8    107 107   CO2 19* 21* 23   * 182* 78   BUN 32* 29* 32*   CREATININE 2.3* 2.0* 2.1*   CALCIUM 9.7 9.2 9.2   PROT 7.5 7.2 6.7   ALBUMIN 3.0* 2.6* 2.4*   BILITOT 3.6* 4.6* 4.2*    ALKPHOS 845* 765* 671*   * 143* 107*   * 146* 115*   ANIONGAP 14 10 9   EGFRNONAA 18.2* 21.5* 20.3*     Lipase:   Recent Labs   Lab 09/08/20  0910   LIPASE 146*     Urine Studies:   Recent Labs   Lab 09/08/20 2048   COLORU Divina   APPEARANCEUA Hazy*   PHUR 6.0   SPECGRAV 1.020   PROTEINUA 1+*   GLUCUA 2+*   KETONESU Negative   BILIRUBINUA Negative   OCCULTUA Negative   NITRITE Negative   LEUKOCYTESUR Negative   RBCUA 1   WBCUA 1   BACTERIA Rare   HYALINECASTS 0     All pertinent labs within the past 24 hours have been reviewed.    Significant Imaging: I have reviewed all pertinent imaging results/findings within the past 24 hours.      Assessment/Plan:      * Biliary obstruction  S/p cholecsytectomy (90's), presents with abdominal pain mainly localized to RLQ, with obstructive lab picture consisting of elevated LFTs, Alk Phos, and Tbili.  CT noncontrast showed mild intrahepatic and moderate extrahepatic ductal dilation up to 2.7 cm.    Plan:  - AES consulted in ED  - ERCP 9/8 where sphincterotomy performed, obstruction relieved  - to full liquid diet today, advance as tolerated  - concern for possible cholangitis with abdominal pain, biliary obstruction, and elevated WBC  - patient afebrile, hypertensive in ED and has remained so since  - continue zosyn - 7 day course for suspected cholangtis  - will draw amylase, lipase is signs/symptoms of post-ERCP pancreatitis  - UA no nitrites no leukocytes  - CXR- no acute consolidations  - Bcx- 2/2 gram negative rods, continue zosyn, will repeat bcx tomorrow      CKD (chronic kidney disease), stage IV  Baseline Cr 2.0-2.4.    Plan  - renally dose all medications  - avoid NSAIDs and other nephrotoxic agents      Essential hypertension  Hx of essential hypertension on felodipine 10 mg daily at home    Plan:  - continue home medication  - will re-evaluate patient's hypertension after pain is controlled      AAA (abdominal aortic aneurysm) without rupture  Hx of  AAA, not greatly enlarged since last visualized on imaging. CT today showed 3.9-4.0 cm    Plan  - normal aneurysm monitoring      Hypothyroidism  Hx of hypothyroidism    Plan  - continue home synthroid        VTE Risk Mitigation (From admission, onward)         Ordered     enoxaparin injection 30 mg  Every 24 hours      09/08/20 1856     IP VTE HIGH RISK PATIENT  Once      09/08/20 1231     Place sequential compression device  Until discontinued      09/08/20 1231                Discharge Planning   ION: 9/11/2020     Code Status: Full Code   Is the patient medically ready for discharge?:     Reason for patient still in hospital (select all that apply): Laboratory test and Treatment           Vidal Jimenez MD, PGY-1  Department of Hospital Medicine   [facility]

## 2020-09-09 NOTE — TREATMENT PLAN
AES Follow-up Note     Johanny Curtis was seen and examined.   1 Day Post-Op s/p ERCP   No abdominal discomfort. Tolerating diet.    Vitals stable. Afebrile.     Lab Results   Component Value Date     (H) 09/08/2020     (H) 09/08/2020    ALKPHOS 765 (H) 09/08/2020    BILITOT 4.6 (H) 09/08/2020    BILITOT 3.6 (H) 09/08/2020    BILITOT 2.9 (H) 09/03/2020       No sx/sx of post-ERCP pancreatitis or other procedural complications.   AES will sign off. Please call if any questions/concerns.  Complete ABx course for total 10 days for cholangitis  Patient will be contacted with follow-up information.     Mabel James MD  Gastroenterology & Hepatology Fellow

## 2020-09-09 NOTE — NURSING
VSS, HR on tele NSR-ST, up to 120's @ times w/ movement, unsustained, pt asymptomatic, MD notified & aware. No pain. IV ABx cont'd via PIV. Diet advanced from clears to renal - tolerated thus far w/o discomfort. S/v in purewick, occasional incontinence noted. -BM. OOB to chair for morning/afternoon, x2 assist. Rested in afternoon. Safety maintained throughout. Bed alarm on. Fall precautions reviewed w/ pt who indicated understanding. Son & daughter updated via phone and in person. See flowsheet for detail.

## 2020-09-09 NOTE — TELEPHONE ENCOUNTER
Spoke with daughter Katia Informed her Dr Cyr agree to have work ups done while pt is in the hospital. Verbalized understanding. Number called [phone]

## 2020-09-09 NOTE — PLAN OF CARE
Problem: Physical Therapy Goal  Goal: Physical Therapy Goal  Description: Goals to be met by: 20     Patient will increase functional independence with mobility by performin. Supine to sit with Modified [place]  2. Sit to supine with Modified [place]  3. Sit to stand transfer with Modified [place]  4. Bed to chair transfer with Stand-by Assistance using Rolling Walker  5. Gait  x 100 feet with Stand-by Assistance using Rolling Walker.   6. Lower extremity exercise program x30 reps per handout, with independence    Outcome: Ongoing, Progressing        PT eval complete. Patient is safe to return home once medically ready and would benefit from home health physical therapy in order to improve safety and independence in the home environment.       Maral Damon, PT, DPT  2020

## 2020-09-09 NOTE — PT/OT/SLP EVAL
"Occupational Therapy   Evaluation    Name: Johanny Curtis  MRN: [id number]  Admitting Diagnosis:  Biliary obstruction 1 Day Post-Op    Recommendations:     Discharge Recommendations: home health OT  Discharge Equipment Recommendations:  none  Barriers to discharge:  None    Assessment:     Johanny Curtis is a 89 y.o. female with a medical diagnosis of Biliary obstruction.  She presents with HOB elevating reporting recent return to bed. Pt required max encouragement to participate in OT session. Pt reports functioning near baseline but demonstrates global deconditioning and generalized weakness.  Performance deficits affecting function: weakness, impaired self care skills, impaired functional mobilty, gait instability, impaired balance, decreased safety awareness.   Pt would benefit from skilled OT services in order to maximize independence with ADLs and facilitate safe discharge. Pt would benefit from home health OT once medically stable for discharge.     Rehab Prognosis: Good; patient would benefit from acute skilled OT services to address these deficits and reach maximum level of function.       Plan:     Patient to be seen 2 x/week to address the above listed problems via self-care/home management, therapeutic activities, therapeutic exercises  · Plan of Care Expires: 10/09/20  · Plan of Care Reviewed with: patient    Subjective     Chief Complaint: "I just got back in bed"  Patient/Family Comments/goals: to return home    Occupational Profile:  Living Environment: Pt lives with her daughter in a H with 1 BINA and no HR. Pt has a tub/shower combo with a shower chair  Previous level of function: Pt required assistance from her daughter for ADLs (bathing and dressing) and uses a RW for functional mobility  Roles and Routines: Pt does not drive and enjoys playing cards  Equipment Used at Home:  shower chair, walker, rolling  Assistance upon Discharge: Upon discharge, pt will have 24/7 assist from her " daughter    Pain/Comfort:  · Pain Rating 1: (reports no pain; groaned throughout session)    Patients cultural, spiritual, Oriental orthodox conflicts given the current situation: no    Objective:     Communicated with: RN prior to session.  Patient found HOB elevated with peripheral IV, PureWick upon OT entry to room.    General Precautions: Standard, fall   Orthopedic Precautions:N/A   Braces: N/A     Occupational Performance:    Bed Mobility:    · Patient completed Rolling/Turning to Left with  contact guard assistance  · Patient completed Scooting/Bridging with contact guard assistance  · Patient completed Supine to Sit with minimum assistance    Functional Mobility/Transfers:  · Patient completed Sit <> Stand Transfer with contact guard assistance  with  rolling walker   · Functional Mobility: Pt ambulated 10ft x2  with CGA and RW in order to maximize functional activity tolerance and standing balance required for engagement in occupations of choice.    · Pt required VCs for RW management, hand placement and sequencing    Activities of Daily Living:  · Grooming: set-up A to wash face with HOB elevated    Cognitive/Visual Perceptual:  Cognitive/Psychosocial Skills:     -       Oriented to: Person, Place, Time and Situation   -       Follows Commands/attention:Follows one-step commands  -       Communication: clear/fluent  -       Memory: No Deficits noted  -       Safety awareness/insight to disability: intact   -       Mood/Affect/Coping skills/emotional control: Appropriate to situation    Physical Exam:  Upper Extremity Range of Motion:     -       Right Upper Extremity: WFL  -       Left Upper Extremity: WFL  Upper Extremity Strength:    -       Right Upper Extremity: WFL  -       Left Upper Extremity: WFL   Strength:    -       Right Upper Extremity: WFL  -       Left Upper Extremity: WFL  Fine Motor Coordination:    -       Intact    AMPAC 6 Click ADL:  AMPAC Total Score: 18    Treatment &  Education:  -Therapist provided facilitation and instruction of proper body mechanics, energy conservation, and fall prevention strategies during tasks listed above.  -Pt educated on role of OT, POC and goals for therapy  -Pt educated on importance of OOB activities with staff member assistance and sitting OOB majority of the day.   -Pt verbalized understanding. Pt expressed no further concerns/questions  -Whiteboard updated   Education:    Patient left HOB elevated with all lines intact and call button in reach    GOALS:   Multidisciplinary Problems     Occupational Therapy Goals        Problem: Occupational Therapy Goal    Goal Priority Disciplines Outcome Interventions   Occupational Therapy Goal     OT, PT/OT Ongoing, Progressing    Description: Goals to be met by: 9/23/20     Patient will increase functional independence with ADLs by performing:    Feeding with [place].  UE Dressing with Minimal Assistance.  LE Dressing with Minimal Assistance.  Grooming while standing at sink with Contact Guard Assistance.  Toileting from toilet with Contact Guard Assistance for hygiene and clothing management.   Toilet transfer to toilet with Contact Guard Assistance.                     History:     Past Medical History:   Diagnosis Date    AAA (abdominal aortic aneurysm)     Anemia in CKD (chronic kidney disease)     Arthritis     Cataract     Class 1 obesity due to excess calories with serious comorbidity in adult 7/6/2017    Gout, chronic     High cholesterol     Hypertension     Hypothyroidism     Obesity     Plantar fasciitis     PVD (peripheral vascular disease)     Thyroid trouble        Past Surgical History:   Procedure Laterality Date    BREAST BIOPSY Left     BREAST SURGERY Left 1972    benign breast lump    CATARACT EXTRACTION  3/18/13    both eyes -     CHOLECYSTECTOMY      ENDOSCOPIC ULTRASOUND OF UPPER GASTROINTESTINAL TRACT N/A 9/8/2020    Procedure: ULTRASOUND, UPPER GI  TRACT, ENDOSCOPIC;  Surgeon: Rasheed Balbuena MD;  Location: [facility] ([address] [place] [address]);  Service: Endoscopy;  Laterality: N/A;    ERCP N/A 9/8/2020    Procedure: ERCP (ENDOSCOPIC RETROGRADE CHOLANGIOPANCREATOGRAPHY);  Surgeon: Rasheed Balbuena MD;  Location: [facility] ([address] [place] [address]);  Service: Endoscopy;  Laterality: N/A;    EYE SURGERY      HYSTERECTOMY      SKIN BIOPSY      Left breast       Time Tracking:     OT Date of Treatment: 09/09/20  OT Start Time: 1350  OT Stop Time: 1407  OT Total Time (min): 17 min    Billable Minutes:Evaluation 9  Therapeutic Activity 8    Loretta Mayo, OT  9/9/2020

## 2020-09-09 NOTE — PLAN OF CARE
CM met with patient at the bedside to discuss discharge planning assessment. Pt lives with family and has transportation at discharge. Pt verified PCP and Pharmacy. CM will continue to follow for discharge needs.         09/09/20 1527   Discharge Assessment   Assessment Type Discharge Planning Assessment   Confirmed/corrected address and phone number on facesheet? Yes   Assessment information obtained from? Patient   Expected Length of Stay (days) 3   Communicated expected length of stay with patient/caregiver yes   Prior to hospitilization cognitive status: Alert/Oriented   Prior to hospitalization functional status: Assistive Equipment   Current cognitive status: Alert/Oriented   Current Functional Status: Assistive Equipment   Lives With child(shani), adult   Able to Return to Prior Arrangements yes   Is patient able to care for self after discharge? Yes   Patient's perception of discharge disposition home or selfcare   Readmission Within the Last 30 Days no previous admission in last 30 days   Patient currently being followed by outpatient case management? No   Patient currently receives any other outside agency services? No   Equipment Currently Used at Home walker, rolling   Do you have any problems affording any of your prescribed medications? No   Is the patient taking medications as prescribed? yes   Does the patient have transportation home? Yes   Transportation Anticipated family or friend will provide   Does the patient receive services at the [facility]? No   Discharge Plan A Home Health   Discharge Plan B Home with family   DME Needed Upon Discharge  none   Patient/Family in Agreement with Plan yes

## 2020-09-09 NOTE — PT/OT/SLP PROGRESS
"Physical Therapy Treatment    Patient Name:  Johanny Curtis   MRN:  [id number]    Recommendations:     Discharge Recommendations:  home, home health PT   Discharge Equipment Recommendations: none   Barriers to discharge: None    Assessment:     Johanny Curtis is a 89 y.o. female admitted with a medical diagnosis of Biliary obstruction.  She presents with the following impairments/functional limitations:  weakness, impaired functional mobilty, impaired self care skills, gait instability, impaired balance, decreased safety awareness Patient pleasant and willing to participate in therapy. Alert and oriented but with difficulty motor planning and sequencing. Patient is near baseline but demonstrates global deconditioning and will benefit from skilled physical therapy to prevent decline in functional mobility in the acute care setting. Patient is safe to return home once medically ready and would benefit from home health physical therapy in order to improve safety and independence in the home environment.     Rehab Prognosis: Good; patient would benefit from acute skilled PT services to address these deficits and reach maximum level of function.    Recent Surgery: Procedure(s) (LRB):  ERCP (ENDOSCOPIC RETROGRADE CHOLANGIOPANCREATOGRAPHY) (N/A)  ULTRASOUND, UPPER GI TRACT, ENDOSCOPIC (N/A) 1 Day Post-Op    Plan:     During this hospitalization, patient to be seen 2 x/week to address the identified rehab impairments via gait training, therapeutic activities, therapeutic exercises, neuromuscular re-education and progress toward the following goals:    · Plan of Care Expires:  10/08/20    Subjective     Chief Complaint: none stated  Patient/Family Comments/goals: "I will do whatever is best for me" when asked to sit up in the chair v. bed  Pain/Comfort:  · Pain Rating 1: other (see comments)(patient denied pain but grunted and moaned the entire session)  · Pain Rating Post-Intervention 1: other (see " comments)      Objective:     Communicated with nurse prior to session.  Patient found HOB elevated with peripheral IV, PureWick upon PT entry to room.     General Precautions: Standard, fall   Orthopedic Precautions:N/A   Braces: N/A     Functional Mobility:  · Bed Mobility:     · Rolling Left:  contact guard assistance  · Scooting: minimum assistance  · Supine to Sit: contact guard assistance  · Transfers:     · Sit to Stand:  minimum assistance with rolling walker,  Patient instructed on proper hand placement with RW for safe transfers.   · Bed to Chair: minimum assistance with  rolling walker  using  Step Transfer,  Patient instructed on proper hand placement with RW for safe transfers.   · Toilet Transfer: minimum assistance with  hand-held assist  using  Step Transfer,  Patient instructed on proper hand placement with RW for safe transfers.   · Gait: ~12' + 12' (seated rest break for toileting) with RW, CGA. Maximal v/t cues for walker management, to stand inside the walker, difficulty navigating objects with rolling walker. Flexed posture, decreased gait speed, decreased bonny. Patient grunting throughout gait but denies pain when asked.       AM-PAC 6 CLICK MOBILITY  Turning over in bed (including adjusting bedclothes, sheets and blankets)?: 3  Sitting down on and standing up from a chair with arms (e.g., wheelchair, bedside commode, etc.): 3  Moving from lying on back to sitting on the side of the bed?: 3  Moving to and from a bed to a chair (including a wheelchair)?: 3  Need to walk in hospital room?: 3  Climbing 3-5 steps with a railing?: 2  Basic Mobility Total Score: 17       Therapeutic Activities and Exercises:   Patient educated on role of PT in the hospital. Pt educated on importance of OOB activity to decrease the risks associated with bed rest. Pt educated on plan and goals with physical therapy.   Instruction provided for safety and technique for gait and transfers with RW.     Patient  instructed on proper hand placement with RW for safe transfers.   Patient performed 5 sit to stands total during session for transfers and to doff/don brief and perform hygiene. Patient demonstrates unsafe hand placement on RW during sit<>stands.    Patient left up in chair with all lines intact, call button in reach and team present..    GOALS:   Multidisciplinary Problems     Physical Therapy Goals        Problem: Physical Therapy Goal    Goal Priority Disciplines Outcome Goal Variances Interventions   Physical Therapy Goal     PT, PT/OT Ongoing, Progressing     Description: Goals to be met by: 20     Patient will increase functional independence with mobility by performin. Supine to sit with Modified [place]  2. Sit to supine with Modified [place]  3. Sit to stand transfer with Modified [place]  4. Bed to chair transfer with Stand-by Assistance using Rolling Walker  5. Gait  x 100 feet with Stand-by Assistance using Rolling Walker.   6. Lower extremity exercise program x30 reps per handout, with independence                     Time Tracking:     PT Received On: 20  PT Start Time: 1028     PT Stop Time: 1102  PT Total Time (min): 34 min     Billable Minutes: Evaluation 10 and Therapeutic Activity 24    Treatment Type: Other (see comments)(eval)  PT/PTA: PT     PTA Visit Number: 0     Maral Damon, PT  2020

## 2020-09-10 PROBLEM — R78.81 BACTEREMIA: Status: ACTIVE | Noted: 2020-09-10

## 2020-09-10 LAB
ALBUMIN SERPL BCP-MCNC: 2.2 G/DL (ref 3.5–5.2)
ALBUMIN SERPL BCP-MCNC: 2.2 G/DL (ref 3.5–5.2)
ALP SERPL-CCNC: 543 U/L (ref 55–135)
ALP SERPL-CCNC: 553 U/L (ref 55–135)
ALT SERPL W/O P-5'-P-CCNC: 82 U/L (ref 10–44)
ALT SERPL W/O P-5'-P-CCNC: 90 U/L (ref 10–44)
ANION GAP SERPL CALC-SCNC: 8 MMOL/L (ref 8–16)
ANION GAP SERPL CALC-SCNC: 8 MMOL/L (ref 8–16)
AST SERPL-CCNC: 66 U/L (ref 10–40)
AST SERPL-CCNC: 79 U/L (ref 10–40)
BACTERIA BLD CULT: ABNORMAL
BASOPHILS # BLD AUTO: 0.03 K/UL (ref 0–0.2)
BASOPHILS # BLD AUTO: 0.04 K/UL (ref 0–0.2)
BASOPHILS NFR BLD: 0.3 % (ref 0–1.9)
BASOPHILS NFR BLD: 0.5 % (ref 0–1.9)
BILIRUB SERPL-MCNC: 1.8 MG/DL (ref 0.1–1)
BILIRUB SERPL-MCNC: 2.7 MG/DL (ref 0.1–1)
BUN SERPL-MCNC: 29 MG/DL (ref 8–23)
BUN SERPL-MCNC: 32 MG/DL (ref 8–23)
CALCIUM SERPL-MCNC: 8.8 MG/DL (ref 8.7–10.5)
CALCIUM SERPL-MCNC: 9.2 MG/DL (ref 8.7–10.5)
CHLORIDE SERPL-SCNC: 108 MMOL/L (ref 95–110)
CHLORIDE SERPL-SCNC: 109 MMOL/L (ref 95–110)
CO2 SERPL-SCNC: 22 MMOL/L (ref 23–29)
CO2 SERPL-SCNC: 24 MMOL/L (ref 23–29)
CREAT SERPL-MCNC: 2.1 MG/DL (ref 0.5–1.4)
CREAT SERPL-MCNC: 2.1 MG/DL (ref 0.5–1.4)
DIFFERENTIAL METHOD: ABNORMAL
DIFFERENTIAL METHOD: ABNORMAL
EOSINOPHIL # BLD AUTO: 0.2 K/UL (ref 0–0.5)
EOSINOPHIL # BLD AUTO: 0.2 K/UL (ref 0–0.5)
EOSINOPHIL NFR BLD: 2.1 % (ref 0–8)
EOSINOPHIL NFR BLD: 2.1 % (ref 0–8)
ERYTHROCYTE [DISTWIDTH] IN BLOOD BY AUTOMATED COUNT: 16.3 % (ref 11.5–14.5)
ERYTHROCYTE [DISTWIDTH] IN BLOOD BY AUTOMATED COUNT: 16.4 % (ref 11.5–14.5)
EST. GFR  (AFRICAN AMERICAN): 23.4 ML/MIN/1.73 M^2
EST. GFR  (AFRICAN AMERICAN): 23.4 ML/MIN/1.73 M^2
EST. GFR  (NON AFRICAN AMERICAN): 20.3 ML/MIN/1.73 M^2
EST. GFR  (NON AFRICAN AMERICAN): 20.3 ML/MIN/1.73 M^2
GLUCOSE SERPL-MCNC: 104 MG/DL (ref 70–110)
GLUCOSE SERPL-MCNC: 77 MG/DL (ref 70–110)
HCT VFR BLD AUTO: 29.1 % (ref 37–48.5)
HCT VFR BLD AUTO: 32 % (ref 37–48.5)
HGB BLD-MCNC: 10.1 G/DL (ref 12–16)
HGB BLD-MCNC: 9.4 G/DL (ref 12–16)
IMM GRANULOCYTES # BLD AUTO: 0.11 K/UL (ref 0–0.04)
IMM GRANULOCYTES # BLD AUTO: 0.11 K/UL (ref 0–0.04)
IMM GRANULOCYTES NFR BLD AUTO: 1.1 % (ref 0–0.5)
IMM GRANULOCYTES NFR BLD AUTO: 1.3 % (ref 0–0.5)
LYMPHOCYTES # BLD AUTO: 1.7 K/UL (ref 1–4.8)
LYMPHOCYTES # BLD AUTO: 1.8 K/UL (ref 1–4.8)
LYMPHOCYTES NFR BLD: 17.6 % (ref 18–48)
LYMPHOCYTES NFR BLD: 20.2 % (ref 18–48)
MAGNESIUM SERPL-MCNC: 1.8 MG/DL (ref 1.6–2.6)
MCH RBC QN AUTO: 28.6 PG (ref 27–31)
MCH RBC QN AUTO: 28.8 PG (ref 27–31)
MCHC RBC AUTO-ENTMCNC: 31.6 G/DL (ref 32–36)
MCHC RBC AUTO-ENTMCNC: 32.3 G/DL (ref 32–36)
MCV RBC AUTO: 89 FL (ref 82–98)
MCV RBC AUTO: 91 FL (ref 82–98)
MONOCYTES # BLD AUTO: 0.6 K/UL (ref 0.3–1)
MONOCYTES # BLD AUTO: 0.7 K/UL (ref 0.3–1)
MONOCYTES NFR BLD: 6.9 % (ref 4–15)
MONOCYTES NFR BLD: 7.2 % (ref 4–15)
NEUTROPHILS # BLD AUTO: 5.7 K/UL (ref 1.8–7.7)
NEUTROPHILS # BLD AUTO: 7.3 K/UL (ref 1.8–7.7)
NEUTROPHILS NFR BLD: 69 % (ref 38–73)
NEUTROPHILS NFR BLD: 71.7 % (ref 38–73)
NRBC BLD-RTO: 0 /100 WBC
NRBC BLD-RTO: 0 /100 WBC
PHOSPHATE SERPL-MCNC: 2.7 MG/DL (ref 2.7–4.5)
PLATELET # BLD AUTO: 302 K/UL (ref 150–350)
PLATELET # BLD AUTO: 318 K/UL (ref 150–350)
PMV BLD AUTO: 10.2 FL (ref 9.2–12.9)
PMV BLD AUTO: 10.7 FL (ref 9.2–12.9)
POTASSIUM SERPL-SCNC: 3.5 MMOL/L (ref 3.5–5.1)
POTASSIUM SERPL-SCNC: 3.7 MMOL/L (ref 3.5–5.1)
PROT SERPL-MCNC: 6.3 G/DL (ref 6–8.4)
PROT SERPL-MCNC: 6.6 G/DL (ref 6–8.4)
RBC # BLD AUTO: 3.26 M/UL (ref 4–5.4)
RBC # BLD AUTO: 3.53 M/UL (ref 4–5.4)
SODIUM SERPL-SCNC: 139 MMOL/L (ref 136–145)
SODIUM SERPL-SCNC: 140 MMOL/L (ref 136–145)
WBC # BLD AUTO: 10.13 K/UL (ref 3.9–12.7)
WBC # BLD AUTO: 8.23 K/UL (ref 3.9–12.7)

## 2020-09-10 PROCEDURE — 63600175 PHARM REV CODE 636 W HCPCS: Mod: HCNC | Performed by: STUDENT IN AN ORGANIZED HEALTH CARE EDUCATION/TRAINING PROGRAM

## 2020-09-10 PROCEDURE — C1751 CATH, INF, PER/CENT/MIDLINE: HCPCS | Mod: HCNC

## 2020-09-10 PROCEDURE — 36415 COLL VENOUS BLD VENIPUNCTURE: CPT | Mod: HCNC

## 2020-09-10 PROCEDURE — 11000001 HC ACUTE MED/SURG PRIVATE ROOM: Mod: HCNC

## 2020-09-10 PROCEDURE — 76937 US GUIDE VASCULAR ACCESS: CPT | Mod: HCNC

## 2020-09-10 PROCEDURE — 83735 ASSAY OF MAGNESIUM: CPT | Mod: HCNC

## 2020-09-10 PROCEDURE — 85025 COMPLETE CBC W/AUTO DIFF WBC: CPT | Mod: 91,HCNC

## 2020-09-10 PROCEDURE — 99233 PR SUBSEQUENT HOSPITAL CARE,LEVL III: ICD-10-PCS | Mod: HCNC,GC,, | Performed by: STUDENT IN AN ORGANIZED HEALTH CARE EDUCATION/TRAINING PROGRAM

## 2020-09-10 PROCEDURE — 80053 COMPREHEN METABOLIC PANEL: CPT | Mod: 91,HCNC

## 2020-09-10 PROCEDURE — 94761 N-INVAS EAR/PLS OXIMETRY MLT: CPT | Mod: HCNC

## 2020-09-10 PROCEDURE — 84100 ASSAY OF PHOSPHORUS: CPT | Mod: HCNC

## 2020-09-10 PROCEDURE — 25000003 PHARM REV CODE 250: Mod: HCNC | Performed by: STUDENT IN AN ORGANIZED HEALTH CARE EDUCATION/TRAINING PROGRAM

## 2020-09-10 PROCEDURE — 99233 SBSQ HOSP IP/OBS HIGH 50: CPT | Mod: HCNC,GC,, | Performed by: STUDENT IN AN ORGANIZED HEALTH CARE EDUCATION/TRAINING PROGRAM

## 2020-09-10 PROCEDURE — 36410 VNPNXR 3YR/> PHY/QHP DX/THER: CPT | Mod: HCNC

## 2020-09-10 RX ORDER — NIFEDIPINE 30 MG/1
30 TABLET, EXTENDED RELEASE ORAL ONCE
Status: COMPLETED | OUTPATIENT
Start: 2020-09-10 | End: 2020-09-10

## 2020-09-10 RX ORDER — NIFEDIPINE 30 MG/1
60 TABLET, EXTENDED RELEASE ORAL DAILY
Status: DISCONTINUED | OUTPATIENT
Start: 2020-09-11 | End: 2020-09-11

## 2020-09-10 RX ORDER — METRONIDAZOLE 500 MG/1
500 TABLET ORAL EVERY 8 HOURS
Status: DISCONTINUED | OUTPATIENT
Start: 2020-09-10 | End: 2020-09-11 | Stop reason: HOSPADM

## 2020-09-10 RX ORDER — NAPROXEN SODIUM 220 MG/1
81 TABLET, FILM COATED ORAL DAILY
Status: DISCONTINUED | OUTPATIENT
Start: 2020-09-10 | End: 2020-09-11 | Stop reason: HOSPADM

## 2020-09-10 RX ADMIN — ENOXAPARIN SODIUM 30 MG: 40 INJECTION SUBCUTANEOUS at 05:09

## 2020-09-10 RX ADMIN — METRONIDAZOLE 500 MG: 500 TABLET ORAL at 09:09

## 2020-09-10 RX ADMIN — SODIUM BICARBONATE 650 MG TABLET 650 MG: at 09:09

## 2020-09-10 RX ADMIN — ASPIRIN 81 MG: 81 TABLET, CHEWABLE ORAL at 01:09

## 2020-09-10 RX ADMIN — CEFTRIAXONE 2 G: 2 INJECTION, SOLUTION INTRAVENOUS at 01:09

## 2020-09-10 RX ADMIN — LEVOTHYROXINE SODIUM 88 MCG: 88 TABLET ORAL at 05:09

## 2020-09-10 RX ADMIN — FELODIPINE 10 MG: 10 TABLET, EXTENDED RELEASE ORAL at 09:09

## 2020-09-10 RX ADMIN — DOXAZOSIN 2 MG: 2 TABLET ORAL at 09:09

## 2020-09-10 RX ADMIN — PIPERACILLIN AND TAZOBACTAM 4.5 G: 4; .5 INJECTION, POWDER, LYOPHILIZED, FOR SOLUTION INTRAVENOUS; PARENTERAL at 09:09

## 2020-09-10 RX ADMIN — NIFEDIPINE 30 MG: 30 TABLET, FILM COATED, EXTENDED RELEASE ORAL at 03:09

## 2020-09-10 RX ADMIN — METRONIDAZOLE 500 MG: 500 TABLET ORAL at 01:09

## 2020-09-10 NOTE — PLAN OF CARE
09/10/20 1333   Post-Acute Status   Post-Acute Authorization Home Health   Home Health Status Referrals Sent

## 2020-09-10 NOTE — ASSESSMENT & PLAN NOTE
S/p cholecsytectomy (90's), presents with abdominal pain mainly localized to RLQ, with obstructive lab picture consisting of elevated LFTs, Alk Phos, and Tbili.  CT noncontrast showed mild intrahepatic and moderate extrahepatic ductal dilation up to 2.7 cm.    Plan:  - AES consulted in ED  - ERCP 9/8 where sphincterotomy performed, obstruction relieved  - to full diet today, tolerating well  - concern for possible cholangitis with abdominal pain, biliary obstruction, and elevated WBC  - patient afebrile, hypertensive in ED and has remained so since  - de-escalated from zosyn to metro + rocephin today with hopes for discharge home tomorrow after midline  - ID consult today  - will draw amylase, lipase is signs/symptoms of post-ERCP pancreatitis  - UA no nitrites no leukocytes  - CXR- no acute consolidations  - Bcx- 2/2 gram negative rods, continue abx, repeat bcx NGTD

## 2020-09-10 NOTE — SUBJECTIVE & OBJECTIVE
Interval History: Patient seen and examined by myself this morning. No acute events overnight. Patient tolerating her regular diet well. Denies any pain. Denies any fever, chills, nausea, vomiting, chest pain, SOB, diarrhea, constipation, or lightheadedness. All questions and concerns addressed with patient at bedside this morning. Will continue to monitor.    Review of Systems   Constitutional: Negative for appetite change, chills, diaphoresis and fever.   HENT: Negative for sinus pressure, sinus pain, sore throat and trouble swallowing.    Eyes: Negative for visual disturbance.   Respiratory: Negative for cough, shortness of breath and wheezing.    Cardiovascular: Negative for chest pain, palpitations and leg swelling.   Gastrointestinal: Negative for abdominal distention, abdominal pain (RLQ pain has resolved), constipation, diarrhea, nausea and vomiting.   Endocrine: Negative for polyuria.   Genitourinary: Negative for dysuria.   Musculoskeletal: Negative for arthralgias, gait problem and neck pain.   Neurological: Negative for dizziness, weakness, light-headedness and headaches.     Objective:     Vital Signs (Most Recent):  Temp: 98 °F (36.7 °C) (09/10/20 1112)  Pulse: 108 (09/10/20 1112)  Resp: 20 (09/10/20 1112)  BP: (!) 186/78 (09/10/20 1202)  SpO2: 97 % (09/10/20 1112) Vital Signs (24h Range):  Temp:  [96.7 °F (35.9 °C)-98.8 °F (37.1 °C)] 98 °F (36.7 °C)  Pulse:  [] 108  Resp:  [16-20] 20  SpO2:  [97 %-100 %] 97 %  BP: (157-191)/(70-84) 186/78     Weight: 79.8 kg (176 lb)  Body mass index is 35.55 kg/m².    Intake/Output Summary (Last 24 hours) at 9/10/2020 1407  Last data filed at 9/10/2020 1100  Gross per 24 hour   Intake 555 ml   Output 550 ml   Net 5 ml      Physical Exam  Vitals signs reviewed.   Constitutional:       General: She is not in acute distress.     Appearance: Normal appearance. She is not ill-appearing, toxic-appearing or diaphoretic.   HENT:      Head: Normocephalic and atraumatic.       Mouth/Throat:      Mouth: Mucous membranes are moist.      Pharynx: Oropharynx is clear. No oropharyngeal exudate or posterior oropharyngeal erythema.   Eyes:      General: No scleral icterus.     Extraocular Movements: Extraocular movements intact.      Conjunctiva/sclera: Conjunctivae normal.      Pupils: Pupils are equal, round, and reactive to light.   Neck:      Musculoskeletal: Normal range of motion and neck supple. No muscular tenderness.   Cardiovascular:      Rate and Rhythm: Normal rate and regular rhythm.      Pulses: Normal pulses.      Heart sounds: Normal heart sounds. No murmur. No gallop.    Pulmonary:      Effort: Pulmonary effort is normal. No respiratory distress.      Breath sounds: Normal breath sounds. No wheezing or rales.   Abdominal:      General: Abdomen is flat. Bowel sounds are normal. There is no distension.      Palpations: Abdomen is soft. There is no mass.      Tenderness: There is no abdominal tenderness (no tenderness to palpation in all 4 quadrants). There is no guarding or rebound.   Musculoskeletal:         General: No swelling.      Right lower leg: No edema.      Left lower leg: No edema.   Skin:     Coloration: Skin is not jaundiced.   Neurological:      General: No focal deficit present.      Mental Status: She is alert and oriented to person, place, and time. Mental status is at baseline.         Significant Labs:   CBC:   Recent Labs   Lab 09/09/20  0747 09/09/20  1933 09/10/20  0431   WBC 14.33* 11.50 10.13   HGB 9.7* 10.1* 9.4*   HCT 30.2* 31.4* 29.1*    281 302     CMP:   Recent Labs   Lab 09/09/20  0747 09/09/20  1933 09/10/20  0431    139 139   K 3.8 3.6 3.5    107 109   CO2 23 23 22*   GLU 78 106 77   BUN 32* 36* 32*   CREATININE 2.1* 2.2* 2.1*   CALCIUM 9.2 9.1 8.8   PROT 6.7 6.7 6.3   ALBUMIN 2.4* 2.4* 2.2*   BILITOT 4.2* 3.2* 2.7*   ALKPHOS 671* 623* 553*   * 88* 79*   * 102* 90*   ANIONGAP 9 9 8   EGFRNONAA 20.3* 19.2* 20.3*      All pertinent labs within the past 24 hours have been reviewed.    Significant Imaging: No significant imaging in past 24 hours

## 2020-09-10 NOTE — PHARMACY MED REC
"Admission Medication Reconciliation - Pharmacy Consult Note    The home medication history was taken by Carolin Kumar, Pharmacy Tech.     Based on information gathered and subsequent review by the clinical pharmacist, the items below may need attention.     You may go to "Admission" then "Reconcile Home Medications" tabs to review and/or act upon these items.     Potentially problematic discrepancies with current MAR  o Patient IS taking the following which was not ordered upon admit  o Aspirin 81 mg PO daily (initially held for ERCP)  o Calcitriol 0.25 mcg PO Mon and Fri  o Ferrous sulfate 325 mg PO BID    Please address this information as you see fit.  Feel free to contact us if you have any questions or require assistance.    Len Cervantes, Pharm.D., BCPS  [id number]                  .    .            "

## 2020-09-10 NOTE — NURSING
POC discussed with pt. Understanding verbalized. No falls or injuries noted during during shift. Safety and fall precautions maintained. Pt has no complaints. Will continue to monitor

## 2020-09-10 NOTE — PROGRESS NOTES
[facility] Hospital Medicine  Progress Note    Patient Name: Johanny Curtis  MRN: [id number]  Patient Class: IP- Inpatient   Admission Date: 9/8/2020  Length of Stay: 1 days  Attending Physician: Mino Leonard MD  Primary Care Provider: Leticia Weems MD    [place] [facility] Vidal Jimenez MD    Subjective:     Principal Problem:Biliary obstruction        HPI:  Mrs Curtis is a 89 yo AAF with a PMHx of HTN, hypothyroidism, obesity, abdominal aortic aneursym, and CKD who [place] [facility] [place] complaining of abdominal pain. Patient awoke this morning around 5 am complaining of RLQ abdominal pain, worse when lying down, and radiating to the back. Patient says this pain woke her up from sleep, and she has never experienced this before. She describes this pain as constant in nature, and she did not try anything to relieve this pain. Patient had a normal BM yesterday and denies any BRBPR, dark stools, or change in bowel habits. She has hadite the past couple  her usual appetof days, and denies any nausea or vomiting. Patient denies any chest pain, fever, chills, or SOB. Of note, patient has been seen in clinic for elevated LFTs with upcoming US liver. She has also been seen recently in the ED for multiple episodes of syncope, with workup insignificant, likely vasovagal episodes.     In the ED, afebrile, pulse 80, R 16, 177/72, 97% on RA. Labs significant for WBC 15.9, AlkP 845, T bili 3.6, , , Lactate 2.9. CXR showed no acute infiltrates. CT noncon showed intrahepatic dilation and extrahepatic dilation up to 2.7 cm.         Overview/Hospital Course:  Patient evaluated in the ED by [facility]. Taken to Endo suite for EUS -> ERCP where stone was removed. Admitted to IM 2 for observation and management.     Interval History: Patient seen and examined by myself this morning. No acute events overnight. Patient tolerating her regular diet well.  Denies any pain. Denies any fever, chills, nausea, vomiting, chest pain, SOB, diarrhea, constipation, or lightheadedness. All questions and concerns addressed with patient at bedside this morning. Will continue to monitor.    Review of Systems   Constitutional: Negative for appetite change, chills, diaphoresis and fever.   HENT: Negative for sinus pressure, sinus pain, sore throat and trouble swallowing.    Eyes: Negative for visual disturbance.   Respiratory: Negative for cough, shortness of breath and wheezing.    Cardiovascular: Negative for chest pain, palpitations and leg swelling.   Gastrointestinal: Negative for abdominal distention, abdominal pain (RLQ pain has resolved), constipation, diarrhea, nausea and vomiting.   Endocrine: Negative for polyuria.   Genitourinary: Negative for dysuria.   Musculoskeletal: Negative for arthralgias, gait problem and neck pain.   Neurological: Negative for dizziness, weakness, light-headedness and headaches.     Objective:     Vital Signs (Most Recent):  Temp: 98 °F (36.7 °C) (09/10/20 1112)  Pulse: 108 (09/10/20 1112)  Resp: 20 (09/10/20 1112)  BP: (!) 186/78 (09/10/20 1202)  SpO2: 97 % (09/10/20 1112) Vital Signs (24h Range):  Temp:  [96.7 °F (35.9 °C)-98.8 °F (37.1 °C)] 98 °F (36.7 °C)  Pulse:  [] 108  Resp:  [16-20] 20  SpO2:  [97 %-100 %] 97 %  BP: (157-191)/(70-84) 186/78     Weight: 79.8 kg (176 lb)  Body mass index is 35.55 kg/m².    Intake/Output Summary (Last 24 hours) at 9/10/2020 1407  Last data filed at 9/10/2020 1100  Gross per 24 hour   Intake 555 ml   Output 550 ml   Net 5 ml      Physical Exam  Vitals signs reviewed.   Constitutional:       General: She is not in acute distress.     Appearance: Normal appearance. She is not ill-appearing, toxic-appearing or diaphoretic.   HENT:      Head: Normocephalic and atraumatic.      Mouth/Throat:      Mouth: Mucous membranes are moist.      Pharynx: Oropharynx is clear. No oropharyngeal exudate or posterior  oropharyngeal erythema.   Eyes:      General: No scleral icterus.     Extraocular Movements: Extraocular movements intact.      Conjunctiva/sclera: Conjunctivae normal.      Pupils: Pupils are equal, round, and reactive to light.   Neck:      Musculoskeletal: Normal range of motion and neck supple. No muscular tenderness.   Cardiovascular:      Rate and Rhythm: Normal rate and regular rhythm.      Pulses: Normal pulses.      Heart sounds: Normal heart sounds. No murmur. No gallop.    Pulmonary:      Effort: Pulmonary effort is normal. No respiratory distress.      Breath sounds: Normal breath sounds. No wheezing or rales.   Abdominal:      General: Abdomen is flat. Bowel sounds are normal. There is no distension.      Palpations: Abdomen is soft. There is no mass.      Tenderness: There is no abdominal tenderness (no tenderness to palpation in all 4 quadrants). There is no guarding or rebound.   Musculoskeletal:         General: No swelling.      Right lower leg: No edema.      Left lower leg: No edema.   Skin:     Coloration: Skin is not jaundiced.   Neurological:      General: No focal deficit present.      Mental Status: She is alert and oriented to person, place, and time. Mental status is at baseline.         Significant Labs:   CBC:   Recent Labs   Lab 09/09/20  0747 09/09/20  1933 09/10/20  0431   WBC 14.33* 11.50 10.13   HGB 9.7* 10.1* 9.4*   HCT 30.2* 31.4* 29.1*    281 302     CMP:   Recent Labs   Lab 09/09/20  0747 09/09/20  1933 09/10/20  0431    139 139   K 3.8 3.6 3.5    107 109   CO2 23 23 22*   GLU 78 106 77   BUN 32* 36* 32*   CREATININE 2.1* 2.2* 2.1*   CALCIUM 9.2 9.1 8.8   PROT 6.7 6.7 6.3   ALBUMIN 2.4* 2.4* 2.2*   BILITOT 4.2* 3.2* 2.7*   ALKPHOS 671* 623* 553*   * 88* 79*   * 102* 90*   ANIONGAP 9 9 8   EGFRNONAA 20.3* 19.2* 20.3*     All pertinent labs within the past 24 hours have been reviewed.    Significant Imaging: No significant imaging in past 24  hours      Assessment/Plan:      * Biliary obstruction  S/p cholecsytectomy (90's), presents with abdominal pain mainly localized to RLQ, with obstructive lab picture consisting of elevated LFTs, Alk Phos, and Tbili.  CT noncontrast showed mild intrahepatic and moderate extrahepatic ductal dilation up to 2.7 cm.    Plan:  - AES consulted in ED  - ERCP 9/8 where sphincterotomy performed, obstruction relieved  - to full diet today, tolerating well  - concern for possible cholangitis with abdominal pain, biliary obstruction, and elevated WBC  - patient afebrile, hypertensive in ED and has remained so since  - de-escalated from zosyn to metro + rocephin today with hopes for discharge home tomorrow after midline  - will draw amylase, lipase is signs/symptoms of post-ERCP pancreatitis  - UA no nitrites no leukocytes  - CXR- no acute consolidations  - Bcx- 2/2 gram negative rods, continue abx, repeat bcx NGTD      Sepsis  - please see biliary obstruction      CKD (chronic kidney disease), stage IV  Baseline Cr 2.0-2.4.    Plan  - renally dose all medications  - avoid NSAIDs and other nephrotoxic agents      Essential hypertension  Hx of essential hypertension on felodipine 10 mg daily at home    Plan:  - continue home medication  - will re-evaluate patient's hypertension after pain is controlled      AAA (abdominal aortic aneurysm) without rupture  Hx of AAA, not greatly enlarged since last visualized on imaging. CT today showed 3.9-4.0 cm    Plan  - normal aneurysm monitoring  - avoid FQs in tx of her infection      Hypothyroidism  Hx of hypothyroidism    Plan  - continue home synthroid        VTE Risk Mitigation (From admission, onward)         Ordered     enoxaparin injection 30 mg  Every 24 hours      09/08/20 1856     IP VTE HIGH RISK PATIENT  Once      09/08/20 1231     Place sequential compression device  Until discontinued      09/08/20 1231                Discharge Planning   ION: 9/11/2020     Code Status: Full  Code   Is the patient medically ready for discharge?:     Reason for patient still in hospital (select all that apply): Patient trending condition, Treatment and Consult recommendations  Discharge Plan A: Home Health          Vidal Jimenez MD, PGY-1  Department of Hospital Medicine   [facility]

## 2020-09-10 NOTE — ASSESSMENT & PLAN NOTE
Hx of AAA, not greatly enlarged since last visualized on imaging. CT today showed 3.9-4.0 cm    Plan  - normal aneurysm monitoring  - avoid FQs in tx of her infection

## 2020-09-10 NOTE — PLAN OF CARE
[facility]    HOME HEALTH ORDERS  FACE TO FACE ENCOUNTER    Patient Name: Johanny Curtis  YOB: 1930    PCP: Leticia Weems MD   PCP Address: [address] / [place] [address]  PCP Phone Number: [phone]  PCP Fax: [phone]    Encounter Date: 09/10/2020    Admit to Home Health    Diagnoses:  Active Hospital Problems    Diagnosis  POA    *Biliary obstruction [K83.1]  Yes     Priority: 1 - High    Sepsis [A41.9]  Yes     Priority: 2     CKD (chronic kidney disease), stage IV [N18.4]  Unknown     Priority: 2     Essential hypertension [I10]  Yes     Priority: 2     Bacteremia [R78.81]  Unknown    Acute cholangitis [K83.09]  Yes    AAA (abdominal aortic aneurysm) without rupture [I71.4]  Yes    Hypothyroidism [E03.9]  Yes      Resolved Hospital Problems   No resolved problems to display.       Future Appointments   Date Time Provider Department Center   9/14/2020 10:00 AM Columba Kelsey MD [facility]           I have seen and examined this patient face to face today. My clinical findings that support the need for the home health skilled services and home bound status are the following:  Weakness/numbness causing balance and gait disturbance due to Infection and Weakness/Debility making it taxing to leave home.  Requiring assistive device to leave home due to unsteady gait caused by  Infection and Weakness/Debility.  Medical restrictions requiring assistance of another human to leave home due to  Unstable ambulation, Frequent Falls and IV infusion Needs.    Allergies:Review of patient's allergies indicates:  No Known Allergies    Diet: regular diet    Activities: activity as tolerated    Nursing:   SN to complete comprehensive assessment including routine vital signs. Instruct on disease process and s/s of complications to report to MD. Review/verify medication list sent home with the patient at time of discharge  and instruct patient/caregiver  as needed. Frequency may be adjusted depending on start of care date.    Notify MD if SBP > 160 or < 90; DBP > 90 or < 50; HR > 120 or < 50; Temp > 101; Other:         CONSULTS:    Physical Therapy to evaluate and treat. Evaluate for home safety and equipment needs; Establish/upgrade home exercise program. Perform / instruct on therapeutic exercises, gait training, transfer training, and Range of Motion.  Occupational Therapy to evaluate and treat. Evaluate home environment for safety and equipment needs. Perform/Instruct on transfers, ADL training, ROM, and therapeutic exercises.  Aide to provide assistance with personal care, ADLs, and vital signs.    MISCELLANEOUS CARE:  Home Infusion Therapy:   SN to perform Infusion Therapy/Central Line Care.  Review Central Line Care & Central Line Flush with patient.    Administer (drug and dose):   cefTRIAXone (ROCEPHIN) 2 g/50 mL D5W IVPB 2 g Every 24 hours (non-standard times) 9/10/2020 9/17/2020       Last dose given: 9/11/20                         Home dose due: 9/12/20    Scrub the Hub: Prior to accessing the line, always perform a 30 second alcohol scrub  Each lumen of the central line is to be flushed at least daily with 10 mL Normal Saline and 3 mL Heparin flush (10 units/mL)  Skilled Nurse (SN) may draw blood from IV access  Blood Draw Procedure:   - Aspirate at least 5 mL of blood   - Discard   - Obtain specimen   - Change injection cap   - Flush with 20 mL Normal Saline followed by a                 3-5 mL Heparin flush (10 units/mL)  Central :   - Sterile dressing changes are done weekly and as needed.   - Use chlor-hexadine scrub to cleanse site, apply Biopatch to insertion site,       apply securement device dressing   - Injection caps are changed weekly and after EVERY lab draw.   - If sterile gauze is under dressing to control oozing,                 dressing change must be performed every 24 hours until gauze is not needed.    WOUND CARE  ORDERS  n/a      Medications: Review discharge medications with patient and family and provide education.      Current Discharge Medication List      CONTINUE these medications which have NOT CHANGED    Details   aspirin 81 MG Chew Take 1 tablet (81 mg total) by mouth once daily.  Qty: 30 tablet, Refills: 11      calcitRIOL (ROCALTROL) 0.25 MCG Cap Take 1 capsule (0.25 mcg total) by mouth every Monday and Friday.  Qty: 60 capsule, Refills: 6      doxazosin (CARDURA) 2 MG tablet Take 1 tablet (2 mg total) by mouth nightly.  Qty: 90 tablet, Refills: 1    Comments: .      felodipine (PLENDIL) 10 MG 24 hr tablet TAKE 1 TABLET BY MOUTH EVERY DAY  Qty: 90 tablet, Refills: 1    Comments: .      ferrous sulfate 325 (65 FE) MG EC tablet Take 1 tablet (325 mg total) by mouth 2 (two) times daily.  Qty: 120 tablet, Refills: 6      levothyroxine (SYNTHROID) 88 MCG tablet Take 1 tablet (88 mcg total) by mouth once daily.  Qty: 90 tablet, Refills: 1      magnesium citrate solution Take 296 mLs by mouth daily as needed (constipation).      MULTIVIT-IRON-MIN-FOLIC ACID 3,500-18-0.4 UNIT-MG-MG ORAL CHEW Take 1 capsule by mouth once daily.       sodium bicarbonate 650 MG tablet Take 1 tablet (650 mg total) by mouth 2 (two) times daily.  Qty: 180 tablet, Refills: 4             I certify that this patient is confined to her home and needs physical therapy and occupational therapy.

## 2020-09-11 VITALS
OXYGEN SATURATION: 95 % | BODY MASS INDEX: 35.48 KG/M2 | DIASTOLIC BLOOD PRESSURE: 69 MMHG | HEART RATE: 105 BPM | HEIGHT: 59 IN | SYSTOLIC BLOOD PRESSURE: 155 MMHG | TEMPERATURE: 99 F | RESPIRATION RATE: 18 BRPM | WEIGHT: 176 LBS

## 2020-09-11 LAB
ALBUMIN SERPL BCP-MCNC: 2.2 G/DL (ref 3.5–5.2)
ALP SERPL-CCNC: 511 U/L (ref 55–135)
ALT SERPL W/O P-5'-P-CCNC: 72 U/L (ref 10–44)
ANION GAP SERPL CALC-SCNC: 8 MMOL/L (ref 8–16)
AST SERPL-CCNC: 58 U/L (ref 10–40)
BASOPHILS # BLD AUTO: 0.04 K/UL (ref 0–0.2)
BASOPHILS NFR BLD: 0.5 % (ref 0–1.9)
BILIRUB SERPL-MCNC: 1.4 MG/DL (ref 0.1–1)
BUN SERPL-MCNC: 31 MG/DL (ref 8–23)
CALCIUM SERPL-MCNC: 9.3 MG/DL (ref 8.7–10.5)
CHLORIDE SERPL-SCNC: 108 MMOL/L (ref 95–110)
CO2 SERPL-SCNC: 23 MMOL/L (ref 23–29)
CREAT SERPL-MCNC: 2 MG/DL (ref 0.5–1.4)
DIFFERENTIAL METHOD: ABNORMAL
EOSINOPHIL # BLD AUTO: 0.1 K/UL (ref 0–0.5)
EOSINOPHIL NFR BLD: 1.4 % (ref 0–8)
ERYTHROCYTE [DISTWIDTH] IN BLOOD BY AUTOMATED COUNT: 16.4 % (ref 11.5–14.5)
EST. GFR  (AFRICAN AMERICAN): 24.8 ML/MIN/1.73 M^2
EST. GFR  (NON AFRICAN AMERICAN): 21.5 ML/MIN/1.73 M^2
GLUCOSE SERPL-MCNC: 110 MG/DL (ref 70–110)
HCT VFR BLD AUTO: 31.2 % (ref 37–48.5)
HGB BLD-MCNC: 9.7 G/DL (ref 12–16)
IMM GRANULOCYTES # BLD AUTO: 0.13 K/UL (ref 0–0.04)
IMM GRANULOCYTES NFR BLD AUTO: 1.5 % (ref 0–0.5)
LYMPHOCYTES # BLD AUTO: 1.8 K/UL (ref 1–4.8)
LYMPHOCYTES NFR BLD: 20.6 % (ref 18–48)
MAGNESIUM SERPL-MCNC: 1.8 MG/DL (ref 1.6–2.6)
MCH RBC QN AUTO: 28.5 PG (ref 27–31)
MCHC RBC AUTO-ENTMCNC: 31.1 G/DL (ref 32–36)
MCV RBC AUTO: 92 FL (ref 82–98)
MONOCYTES # BLD AUTO: 0.6 K/UL (ref 0.3–1)
MONOCYTES NFR BLD: 7.4 % (ref 4–15)
NEUTROPHILS # BLD AUTO: 6 K/UL (ref 1.8–7.7)
NEUTROPHILS NFR BLD: 68.6 % (ref 38–73)
NRBC BLD-RTO: 0 /100 WBC
PHOSPHATE SERPL-MCNC: 2.5 MG/DL (ref 2.7–4.5)
PLATELET # BLD AUTO: 308 K/UL (ref 150–350)
PMV BLD AUTO: 10.5 FL (ref 9.2–12.9)
POTASSIUM SERPL-SCNC: 3.7 MMOL/L (ref 3.5–5.1)
PROT SERPL-MCNC: 6.4 G/DL (ref 6–8.4)
RBC # BLD AUTO: 3.4 M/UL (ref 4–5.4)
SODIUM SERPL-SCNC: 139 MMOL/L (ref 136–145)
WBC # BLD AUTO: 8.7 K/UL (ref 3.9–12.7)

## 2020-09-11 PROCEDURE — 63600175 PHARM REV CODE 636 W HCPCS: Mod: HCNC | Performed by: STUDENT IN AN ORGANIZED HEALTH CARE EDUCATION/TRAINING PROGRAM

## 2020-09-11 PROCEDURE — 99223 1ST HOSP IP/OBS HIGH 75: CPT | Mod: HCNC,,, | Performed by: PHYSICIAN ASSISTANT

## 2020-09-11 PROCEDURE — 99223 PR INITIAL HOSPITAL CARE,LEVL III: ICD-10-PCS | Mod: HCNC,,, | Performed by: PHYSICIAN ASSISTANT

## 2020-09-11 PROCEDURE — 80053 COMPREHEN METABOLIC PANEL: CPT | Mod: HCNC

## 2020-09-11 PROCEDURE — 87040 BLOOD CULTURE FOR BACTERIA: CPT | Mod: 59,HCNC

## 2020-09-11 PROCEDURE — 36415 COLL VENOUS BLD VENIPUNCTURE: CPT | Mod: HCNC

## 2020-09-11 PROCEDURE — 84100 ASSAY OF PHOSPHORUS: CPT | Mod: HCNC

## 2020-09-11 PROCEDURE — 99239 PR HOSPITAL DISCHARGE DAY,>30 MIN: ICD-10-PCS | Mod: HCNC,GC,, | Performed by: STUDENT IN AN ORGANIZED HEALTH CARE EDUCATION/TRAINING PROGRAM

## 2020-09-11 PROCEDURE — 85025 COMPLETE CBC W/AUTO DIFF WBC: CPT | Mod: HCNC

## 2020-09-11 PROCEDURE — 99239 HOSP IP/OBS DSCHRG MGMT >30: CPT | Mod: HCNC,GC,, | Performed by: STUDENT IN AN ORGANIZED HEALTH CARE EDUCATION/TRAINING PROGRAM

## 2020-09-11 PROCEDURE — 25000003 PHARM REV CODE 250: Mod: HCNC | Performed by: STUDENT IN AN ORGANIZED HEALTH CARE EDUCATION/TRAINING PROGRAM

## 2020-09-11 PROCEDURE — 83735 ASSAY OF MAGNESIUM: CPT | Mod: HCNC

## 2020-09-11 RX ORDER — NIFEDIPINE 60 MG/1
60 TABLET, EXTENDED RELEASE ORAL DAILY
Qty: 30 TABLET | Refills: 1 | Status: SHIPPED | OUTPATIENT
Start: 2020-09-12 | End: 2020-11-09 | Stop reason: SDUPTHER

## 2020-09-11 RX ORDER — POLYETHYLENE GLYCOL 3350 17 G/17G
17 POWDER, FOR SOLUTION ORAL DAILY
Status: DISCONTINUED | OUTPATIENT
Start: 2020-09-11 | End: 2020-09-11 | Stop reason: HOSPADM

## 2020-09-11 RX ORDER — NIFEDIPINE 30 MG/1
90 TABLET, EXTENDED RELEASE ORAL DAILY
Status: DISCONTINUED | OUTPATIENT
Start: 2020-09-11 | End: 2020-09-11 | Stop reason: HOSPADM

## 2020-09-11 RX ADMIN — ENOXAPARIN SODIUM 30 MG: 40 INJECTION SUBCUTANEOUS at 05:09

## 2020-09-11 RX ADMIN — CEFTRIAXONE 2 G: 2 INJECTION, SOLUTION INTRAVENOUS at 01:09

## 2020-09-11 RX ADMIN — METRONIDAZOLE 500 MG: 500 TABLET ORAL at 02:09

## 2020-09-11 RX ADMIN — METRONIDAZOLE 500 MG: 500 TABLET ORAL at 05:09

## 2020-09-11 RX ADMIN — ASPIRIN 81 MG: 81 TABLET, CHEWABLE ORAL at 09:09

## 2020-09-11 RX ADMIN — SODIUM BICARBONATE 650 MG TABLET 650 MG: at 09:09

## 2020-09-11 RX ADMIN — ACETAMINOPHEN 650 MG: 325 TABLET ORAL at 05:09

## 2020-09-11 RX ADMIN — LEVOTHYROXINE SODIUM 88 MCG: 88 TABLET ORAL at 05:09

## 2020-09-11 RX ADMIN — POLYETHYLENE GLYCOL 3350 17 G: 17 POWDER, FOR SOLUTION ORAL at 05:09

## 2020-09-11 RX ADMIN — NIFEDIPINE 90 MG: 30 TABLET, FILM COATED, EXTENDED RELEASE ORAL at 09:09

## 2020-09-11 NOTE — CONSULTS
[facility]  Infectious Disease  Consult Note    Patient Name: Johanny Curtis  MRN: [id number]  Admission Date: 9/8/2020  Hospital Length of Stay: 2 days  Attending Physician: Mino Leonard MD  Primary Care Provider: Leticia Weems MD     Isolation Status: No active isolations    Patient information was obtained from patient and ER records.      Consults  Assessment/Plan:     Bacteremia  Bacteremia with E coli suspected from biliary stone obstruction, now s/p removal and sphincterotomy - stable to improved  - BCXs 9/8 E coli - ceftriaxone  - BCXs 9/8 - 12hr after 1st - NGTD  - Patient with HX of AAA cipro contraindicated  - midline placed 9/10    Plan:  1. Ceftriaxone 2g IV q24h x 2 weeks from 1st negative culture - presume 9/9   2. Weekly labs  3. Repeat BCXs today  4. FU in ID clinic in 7-10d  5. Patient was encouraged to call the office for further concerns or complaints.  Business card provided.  Will sign off  6.  Discussed with ID staff  7. Primary team DC'ing today - will sign off.    Infection: E coli bacteremia secondary to choleangitis    Discharge antibiotics:   Ceftriaxone 2g Iv q24 h x 2 weeks    End date of IV antibiotics: 9/24    Weekly outpatient laboratory on Monday or Tuesday while on IV antibiotics.    CBC   CMP     Fax laboratory results to [facility] at [phone] with attn: Paolo Angeles PA-C    Outpatient Infectious Diseases clinic follow up will be arranged and found in patient calendar.    Prior to discharge, please ensure the patient's follow-up has been scheduled.  If there is still no follow-up scheduled in Infectious Diseases clinic, please send an EPIC message to Alysa Bai in Infectious Diseases.                     Thank you for your consult. I will sign off. Please contact us if you have any additional questions.    SETH Arzate  Infectious Disease  [facility]    Subjective:     Principal Problem: Biliary  obstruction    HPI: Mrs Curtis is a 89 yo AAF with a PMHx of HTN, hypothyroidism, obesity, abdominal aortic aneursym, and CKD who [place] [facility] [place] complaining of abdominal pain. Patient awoke this morning around 5 am complaining of RLQ abdominal pain, worse when lying down, and radiating to the back. Patient says this pain woke her up from sleep, and she has never experienced this before. She describes this pain as constant in nature, and she did not try anything to relieve this pain. Patient had a normal BM yesterday and denies any BRBPR, dark stools, or change in bowel habits. She has hadite the past couple  her usual appetof days, and denies any nausea or vomiting. Patient denies any chest pain, fever, chills, or SOB. Of note, patient has been seen in clinic for elevated LFTs with upcoming US liver. She has also been seen recently in the ED for multiple episodes of syncope, with workup insignificant, likely vasovagal episodes.      In the ED, afebrile, pulse 80, R 16, 177/72, 97% on RA. Labs significant for WBC 15.9, AlkP 845, T bili 3.6, , , Lactate 2.9. CXR showed no acute infiltrates. CT noncon showed intrahepatic dilation and extrahepatic dilation up to 2.7 cm.     Underwent ERCP, sphincterotomy, removal of stone on 9/8. BCXs 9/8 with 4/4 E coli and 12hr later drawn again and are NGTD.  Patient on ceftriaxone.  Afebrile.  WBC normalized.  Patient     Past Medical History:   Diagnosis Date    AAA (abdominal aortic aneurysm)     Anemia in CKD (chronic kidney disease)     Arthritis     Cataract     Class 1 obesity due to excess calories with serious comorbidity in adult 7/6/2017    Gout, chronic     High cholesterol     Hypertension     Hypothyroidism     Obesity     Plantar fasciitis     PVD (peripheral vascular disease)     Thyroid trouble        Past Surgical History:   Procedure Laterality Date    BREAST BIOPSY Left     BREAST SURGERY Left 1972    benign breast lump     CATARACT EXTRACTION  3/18/13    both eyes -     CHOLECYSTECTOMY      ENDOSCOPIC ULTRASOUND OF UPPER GASTROINTESTINAL TRACT N/A 9/8/2020    Procedure: ULTRASOUND, UPPER GI TRACT, ENDOSCOPIC;  Surgeon: Rasheed Balbuena MD;  [facility]  Service: Endoscopy;  Laterality: N/A;    ERCP N/A 9/8/2020    Procedure: ERCP (ENDOSCOPIC RETROGRADE CHOLANGIOPANCREATOGRAPHY);  Surgeon: Rasheed Balbuena MD;  Location: [facility] ([address] [place] [address]);  Service: Endoscopy;  Laterality: N/A;    EYE SURGERY      HYSTERECTOMY      SKIN BIOPSY      Left breast       Review of patient's allergies indicates:  No Known Allergies    Medications:  Medications Prior to Admission   Medication Sig    aspirin 81 MG Chew Take 1 tablet (81 mg total) by mouth once daily.    calcitRIOL (ROCALTROL) 0.25 MCG Cap Take 1 capsule (0.25 mcg total) by mouth every Monday and Friday.    doxazosin (CARDURA) 2 MG tablet Take 1 tablet (2 mg total) by mouth nightly.    felodipine (PLENDIL) 10 MG 24 hr tablet TAKE 1 TABLET BY MOUTH EVERY DAY    ferrous sulfate 325 (65 FE) MG EC tablet Take 1 tablet (325 mg total) by mouth 2 (two) times daily.    levothyroxine (SYNTHROID) 88 MCG tablet Take 1 tablet (88 mcg total) by mouth once daily.    magnesium citrate solution Take 296 mLs by mouth daily as needed (constipation).    MULTIVIT-IRON-MIN-FOLIC ACID 3,500-18-0.4 UNIT-MG-MG ORAL CHEW Take 1 capsule by mouth once daily.     sodium bicarbonate 650 MG tablet Take 1 tablet (650 mg total) by mouth 2 (two) times daily. (Patient taking differently: Take 1,300 mg by mouth once daily. )     Antibiotics (From admission, onward)    Start     Stop Route Frequency Ordered    09/10/20 1400  metroNIDAZOLE tablet 500 mg      09/17 1359 Oral Every 8 hours 09/10/20 1146    09/10/20 1200  cefTRIAXone (ROCEPHIN) 2 g/50 mL D5W IVPB      09/17 1159 IV Every 24 hours (non-standard times) 09/10/20 1146        Antifungals (From admission, onward)    None         Antivirals (From admission, onward)    None           Immunization History   Administered Date(s) Administered    Influenza 10/17/2007, 10/16/2008, 10/20/2009, 2010    Influenza - High Dose - PF (65 years and older) 2011, 2013, 10/28/2014, 10/22/2015, 2017, 2018, 2019    Influenza - Quadrivalent 2016    Pneumococcal Conjugate - 13 Valent 2015    Pneumococcal Polysaccharide - 23 Valent 2017    Tdap 2016    Zoster 2016       Family History     Problem Relation (Age of Onset)    Breast cancer Sister    Cancer Sister, Sister, Brother    Cataracts Son    Diabetes Son, Son    Glaucoma Son, Daughter    Heart disease Brother    Lupus Daughter    Meningitis Son    Mental illness Son    No Known Problems Brother        Social History     Socioeconomic History    Marital status:      Spouse name: Not on file    Number of children: Not on file    Years of education: Not on file    Highest education level: Not on file   Occupational History    Not on file   Social Needs    Financial resource strain: Not on file    Food insecurity     Worry: Not on file     Inability: Not on file    Transportation needs     Medical: Not on file     Non-medical: Not on file   Tobacco Use    Smoking status: Former Smoker     Packs/day: 0.50     Years: 60.00     Pack years: 30.00     Quit date: 2001     Years since quittin.2    Smokeless tobacco: Never Used    Tobacco comment: quit in the s   Substance and Sexual Activity    Alcohol use: No    Drug use: No    Sexual activity: Not Currently   Lifestyle    Physical activity     Days per week: Not on file     Minutes per session: Not on file    Stress: Not on file   Relationships    Social connections     Talks on phone: Not on file     Gets together: Not on file     Attends Scientologist service: Not on file     Active member of club or organization: Not on file     Attends meetings of  clubs or organizations: Not on file     Relationship status: Not on file   Other Topics Concern    Not on file   Social History Narrative    Not on file     Review of Systems   Constitutional: Negative for appetite change, chills, diaphoresis, fatigue, fever and unexpected weight change.   HENT: Negative for congestion, ear pain, hearing loss, sore throat and tinnitus.    Eyes: Negative for pain, redness and visual disturbance.   Respiratory: Negative for cough, chest tightness, shortness of breath and wheezing.    Cardiovascular: Negative for chest pain.   Gastrointestinal: Negative for abdominal pain, constipation, diarrhea, nausea and vomiting.   Endocrine: Negative for cold intolerance and heat intolerance.   Genitourinary: Negative for decreased urine volume, difficulty urinating, dysuria, flank pain, frequency, hematuria and urgency.   Musculoskeletal: Negative for arthralgias, back pain, myalgias and neck pain.   Skin: Negative for rash and wound.   Allergic/Immunologic: Negative for environmental allergies, food allergies and immunocompromised state.   Neurological: Negative for dizziness, facial asymmetry, weakness, light-headedness, numbness and headaches.   Hematological: Negative for adenopathy. Does not bruise/bleed easily.   Psychiatric/Behavioral: Negative for agitation, behavioral problems and confusion.     Objective:     Vital Signs (Most Recent):  Temp: 99.3 °F (37.4 °C) (09/11/20 1108)  Pulse: 96 (09/11/20 1131)  Resp: 16 (09/11/20 1108)  BP: (!) 149/70 (09/11/20 1108)  SpO2: 97 % (09/11/20 1108) Vital Signs (24h Range):  Temp:  [96.5 °F (35.8 °C)-99.3 °F (37.4 °C)] 99.3 °F (37.4 °C)  Pulse:  [] 96  Resp:  [16-24] 16  SpO2:  [92 %-97 %] 97 %  BP: (143-189)/(68-80) 149/70     Weight: 79.8 kg (176 lb)  Body mass index is 35.55 kg/m².    Estimated Creatinine Clearance: 23.6 mL/min (A) (based on SCr of 2 mg/dL (H)).    Physical Exam  Constitutional:       General: She is not in acute  distress.     Appearance: She is well-developed. She is not diaphoretic.   HENT:      Head: Normocephalic and atraumatic.   Cardiovascular:      Rate and Rhythm: Normal rate and regular rhythm.      Heart sounds: Normal heart sounds. No murmur. No friction rub. No gallop.    Pulmonary:      Effort: Pulmonary effort is normal. No respiratory distress.      Breath sounds: Normal breath sounds. No wheezing or rales.   Abdominal:      General: Bowel sounds are normal. There is no distension.      Palpations: Abdomen is soft. There is no mass.      Tenderness: There is no abdominal tenderness. There is no guarding or rebound.   Skin:     General: Skin is warm and dry.   Neurological:      Mental Status: She is alert and oriented to person, place, and time.   Psychiatric:         Mood and Affect: Mood normal.         Behavior: Behavior normal.         Significant Labs:   Blood Culture:   Recent Labs   Lab 09/08/20  1123 09/08/20  1124 09/08/20  2310   LABBLOO Gram stain lyubov bottle: Gram negative rods  Results called to and read back by:Mariluz Desouza RN 09/08/2020  22:37  Gram stain aer bottle: Gram negative rods  ESCHERICHIA COLI* Gram stain lyubov bottle: Gram negative rods  Results called to and read back by:Mariluz Desouza RN 09/08/2020  22:38  Gram stain aer bottle: Gram negative rods  Positive results previously called 09/09/2020  01:03  ESCHERICHIA COLI  For susceptibility see order #[id number]  * No growth to date  No Growth to date  No growth to date  No Growth to date     CBC:   Recent Labs   Lab 09/10/20  0431 09/10/20  1859 09/11/20  0733   WBC 10.13 8.23 8.70   HGB 9.4* 10.1* 9.7*   HCT 29.1* 32.0* 31.2*    318 308     CMP:   Recent Labs   Lab 09/10/20  0431 09/10/20  1859 09/11/20  0733    140 139   K 3.5 3.7 3.7    108 108   CO2 22* 24 23   GLU 77 104 110   BUN 32* 29* 31*   CREATININE 2.1* 2.1* 2.0*   CALCIUM 8.8 9.2 9.3   PROT 6.3 6.6 6.4   ALBUMIN 2.2* 2.2* 2.2*   BILITOT 2.7* 1.8*  1.4*   ALKPHOS 553* 543* 511*   AST 79* 66* 58*   ALT 90* 82* 72*   ANIONGAP 8 8 8   EGFRNONAA 20.3* 20.3* 21.5*     Wound Culture: No results for input(s): LABAERO in the last 4320 hours.  All pertinent labs within the past 24 hours have been reviewed.    Significant Imaging: I have reviewed all pertinent imaging results/findings within the past 24 hours.   US Endoscopic Ultrasound [[id number]] Resulted: 09/08/20 1513   Order Status: Completed Updated: 09/08/20 1513   Narrative:     See OP Notes for results.     IMPRESSION: See OP Notes for results.             This procedure was auto-finalized by: Virtual Radiologist   [place] ERCP Biliary And Pancreatic By Non Rad Tech [[id number]] Resulted: 09/08/20 1513   Order Status: Completed Updated: 09/08/20 1513   Narrative:     See OP Notes for results.     IMPRESSION: See OP Notes for results.             This procedure was auto-finalized by: Virtual Radiologist   CT Abdomen Pelvis Without Contrast [[id number]] (Abnormal) Resulted: 09/08/20 1029   Order Status: Completed Updated: 09/08/20 1031   Narrative:     EXAMINATION:   CT ABDOMEN PELVIS WITHOUT CONTRAST     CLINICAL HISTORY:   Abdominal pain, acute, nonlocalized;     TECHNIQUE:   Low dose axial images, sagittal and coronal reformations were obtained from the lung bases to the pubic symphysis,.     COMPARISON:   CT of the abdomen pelvis performed 08/04/2017     FINDINGS:   Heart: Normal in size. No pericardial effusion.  Aortic annular and coronary artery calcifications are noted.     Lung Bases: Subsegmental atelectasis suggested at the right greater than left lung bases.  Additionally, a component of chronic scar is suggested given similar appearance to the 08/04/2017 study.     Liver: Non-specific coarse calcification the peripheral left hepatic lobe.     Gallbladder: Status post cholecystectomy.     Bile Ducts: Relatively mild degree of intrahepatic biliary ductal dilatation has progressed since the 08/04/2017 study.   The extrahepatic biliary system is also increased in caliber, measuring up to approximately 2.7 cm in transverse dimension.  Within limitations of unenhanced CT technique, no definite obstructing stone or mass lesion is noted in the region of the common duct, duodenum, or pancreas.     Pancreas: No mass or peripancreatic fat stranding.     Spleen: Unremarkable.     Adrenals: Unremarkable.     Kidneys/ Ureters: As before, both kidneys are lobulated in contour with multiple exophytic cystic and hyperattenuating lesions, the latter which may reflect hemorrhagic cysts, although mass cannot be excluded given lack of intravenous contrast.  Additional few subcentimeter hypoattenuating lesions in both kidneys are too small to definitely characterize by CT, but would be favored to be benign.  No evidence of hydro nephrosis or nephrolithiasis.  No ureteral dilatation.     Bladder: No evidence of wall thickening.     Reproductive organs: Status post hysterectomy.     GI Tract/Mesentery: No evidence of bowel obstruction or inflammation.  A moderate stool burden is noted in the distal colon and rectum.  A normal appearing appendix is identified.  Scattered relatively minor sigmoid diverticulosis without evidence of acute inflammation.     Peritoneal Space: No ascites. No free air.     Retroperitoneum: No significant adenopathy.     Abdominal wall: Unremarkable.     Vasculature: Aortoiliac atherosclerotic calcification.  Infrarenal abdominal aortic aneurysm, fusiform, continues to measure up to approximately 3.9-4 cm, not substantially changed.     Bones: No acute fracture.  Degenerative findings are noted in the lumbar spine.    Impression:       1. No definite acute findings in the abdomen or pelvis to account for the patient's reported symptoms.   2. Since the prior examination of 08/04/2017, there is new mild intrahepatic and moderate extrahepatic biliary ductal dilatation.  Within limitations imposed by lack of intravenous  or oral contrast, no definite obstructing stone or mass lesion is identified.  While some of this appearance is at least partially on the basis of post cholecystectomy state and senescent change, ultrasound or MRI could be performed for more sensitive evaluation of this finding, as clinically warranted.   3. Redemonstrated multiple hyperattenuating lesions of both kidneys, which may represent hemorrhagic cysts.  As noted previously, solid renal masses cannot be excluded in the absence of intravenous contrast.  Further evaluation with ultrasound or MRI would provide superior characterization.   4. Similar appearance of fusiform infrarenal abdominal aortic aneurysm compared to the 2017 CT.   5. Additional details, as per report body.   This report was flagged in Epic as abnormal.       Electronically signed by: Steve Mensah   Date: 09/08/2020   Time: 10:29   X-Ray Chest AP Portable [[id number]] Resulted: 09/08/20 0927   Order Status: Completed Updated: 09/08/20 0930   Narrative:     EXAMINATION:   XR CHEST AP PORTABLE     CLINICAL HISTORY:   Unspecified abdominal pain     TECHNIQUE:   Single frontal view of the chest was performed.     COMPARISON:   05/26/2017     FINDINGS:   Patient is rotated to the right.  Lung volumes are reduced compared to previous.  Reticulonodular opacities again seen in the right lung, chronic.  No acute consolidation, pleural effusion, or pneumothorax.  Cardiac silhouette is stable in size when accounting for reduced lung volumes and technique.    Impression:       Reduced lung volumes.  Rightward shift of mediastinal structures at least in part related to patient positioning.  There may also be a component of volume loss on the right.  Repeat radiograph with better inspiratory effort may be considered.  I see no acute consolidation.       Electronically signed by: Sally Liz   Date: 09/08/2020   Time: 09:27   Imaging History    2020  Date Procedure Name Status Accession Number Location    09/08/20 03:13 PM US Endoscopic Ultrasound Final [id number] [facility]   09/08/20 03:12 PM [place] ERCP Biliary And Pancreatic By Non Rad Tech Final [id number] [facility]   09/08/20 10:03 AM CT Abdomen Pelvis  Without Contrast Final [id number] [facility]   09/08/20 09:21 AM X-Ray Chest AP Portable Final [id number] [facility]   08/31/20 12:28 PM CT Head Without Contrast Final [id number] [facility]

## 2020-09-11 NOTE — CONSULTS
[facility]  Infectious Disease  Consult Note    Patient Name: Johanny Curtis  MRN: [id number]  Admission Date: 9/8/2020  Hospital Length of Stay: 2 days  Attending Physician: Mino Leonard MD  Primary Care Provider: Leticia Weems MD       Inpatient consult to Infectious Diseases  Consult performed by: SETH Lewis Jr.  Consult ordered by: Vidal Jimenez MD      Consult received.  Full consult to follow.      SETH Arzate  Infectious Disease  [facility]

## 2020-09-11 NOTE — DISCHARGE SUMMARY
[facility] Hospital Medicine  Discharge Summary      Patient Name: Johanny Curtis  MRN: [id number]  Admission Date: 9/8/2020  Hospital Length of Stay: 2 days  Discharge Date and Time:  09/11/2020 1:36 PM  Attending Physician: Mino Leonard MD   Discharging Provider: David Velázquez MD  Primary Care Provider: Leticia Weems MD  Hospital Medicine Team: [facility] MED 2 David Velázquez MD    HPI:   Mrs Curtis is a 89 yo AAF with a PMHx of HTN, hypothyroidism, obesity, abdominal aortic aneursym, and CKD who presents to [facility] ED complaining of abdominal pain. Patient awoke this morning around 5 am complaining of RLQ abdominal pain, worse when lying down, and radiating to the back. Patient says this pain woke her up from sleep, and she has never experienced this before. She describes this pain as constant in nature, and she did not try anything to relieve this pain. Patient had a normal BM yesterday and denies any BRBPR, dark stools, or change in bowel habits. She has hadite the past couple  her usual appetof days, and denies any nausea or vomiting. Patient denies any chest pain, fever, chills, or SOB. Of note, patient has been seen in clinic for elevated LFTs with upcoming US liver. She has also been seen recently in the ED for multiple episodes of syncope, with workup insignificant, likely vasovagal episodes.     In the ED, afebrile, pulse 80, R 16, 177/72, 97% on RA. Labs significant for WBC 15.9, AlkP 845, T bili 3.6, , , Lactate 2.9. CXR showed no acute infiltrates. CT noncon showed intrahepatic dilation and extrahepatic dilation up to 2.7 cm.       Procedure(s) (LRB):  ERCP (ENDOSCOPIC RETROGRADE CHOLANGIOPANCREATOGRAPHY) (N/A)  ULTRASOUND, UPPER GI TRACT, ENDOSCOPIC (N/A)      Hospital Course:   Patient evaluated in the ED by [facility]. CT Abdo/pelvis showed mild intrahepatic in moderate extrahepatic biliary ductal dilatation. This is unfortunate as pt  is already s/p cholecystectomy. AES consulted and took for EUS/ERCP 9/8. Stone was removed and bliary sphincterotomy was performed. Admitted to IM 2 for observation and management. LFTs as well as elevated bilirubin down-trended. Started on IV antibiotics for possible cholangitis. Patient abdominal pain resolved and tolerating liquid diet.  Blood cultures growing GNR, ultimately E coli. Midline placed and ID recommended 2 weeks of IV CTX. Pt subsequently d/c'd home with home health.      Vitals:    09/11/20 0718 09/11/20 1000 09/11/20 1108 09/11/20 1131   BP:  (!) 154/71 (!) 149/70    BP Location:       Patient Position:       Pulse: 109 102 100 96   Resp:  18 16    Temp:   99.3 °F (37.4 °C)    TempSrc:   Axillary    SpO2:   97%    Weight:       Height:           Physical Exam  Vitals signs reviewed.   Constitutional:       General: She is not in acute distress.     Appearance: Normal appearance. She is not ill-appearing, toxic-appearing or diaphoretic.   HENT:      Head: Normocephalic and atraumatic.      Mouth/Throat:      Mouth: Mucous membranes are moist.      Pharynx: Oropharynx is clear. No oropharyngeal exudate or posterior oropharyngeal erythema.   Eyes:      General: No scleral icterus.     Extraocular Movements: Extraocular movements intact.      Conjunctiva/sclera: Conjunctivae normal.      Pupils: Pupils are equal, round, and reactive to light.   Neck:      Musculoskeletal: Normal range of motion and neck supple. No muscular tenderness.   Cardiovascular:      Rate and Rhythm: Normal rate and regular rhythm.      Pulses: Normal pulses.      Heart sounds: Normal heart sounds. No murmur. No gallop.    Pulmonary:      Effort: Pulmonary effort is normal. No respiratory distress.      Breath sounds: Normal breath sounds. No wheezing or rales.   Abdominal:      General: Abdomen is flat. Bowel sounds are normal. There is no distension.      Palpations: Abdomen is soft. There is no mass.      Tenderness: There is  no abdominal tenderness (no tenderness to palpation in all 4 quadrants). There is no guarding or rebound.   Musculoskeletal:         General: No swelling.      Right lower leg: No edema.      Left lower leg: No edema.   Skin:     Coloration: Skin is not jaundiced.   Neurological:      General: No focal deficit present.      Mental Status: She is alert and oriented to person, place, and time. Mental status is at baseline.        Consults:   Consults (From admission, onward)        Status Ordering Provider     Inpatient consult to [facility] ([facility])  Once     Provider:  (Not yet assigned)    Completed LENNY ANAND     Inpatient consult to Infectious Diseases  Once     Provider:  (Not yet assigned)    Completed TATIANA YI     Inpatient consult to Midline team  Once     Provider:  (Not yet assigned)    Completed ZORAN BRODY            Final Active Diagnoses:    Diagnosis Date Noted POA    PRINCIPAL PROBLEM:  Biliary obstruction [K83.1] 09/08/2020 Yes    Bacteremia [R78.81] 09/10/2020 Unknown    Sepsis [A41.9]  Yes    Acute cholangitis [K83.09]  Yes    AAA (abdominal aortic aneurysm) without rupture [I71.4]  Yes    CKD (chronic kidney disease), stage IV [N18.4] 06/29/2016 Unknown    Hypothyroidism [E03.9] 08/23/2013 Yes    Essential hypertension [I10] 09/04/2012 Yes      Problems Resolved During this Admission:       Discharged Condition: stable    Patient Instructions: Please keep a log of your blood pressure and contact PCP if you are sustaining recordings greater than 150 systolic.      Diet general         Pending Diagnostic Studies:     None         Medications:  Reconciled Home Medications:      Medication List      START taking these medications    NIFEdipine 60 MG (OSM) 24 hr tablet  Commonly known as: PROCARDIA-XL  Take 1 tablet (60 mg total) by mouth once daily.  Start taking on: September 12, 2020        CHANGE how you take these medications    sodium bicarbonate 650  MG tablet  Take 1 tablet (650 mg total) by mouth 2 (two) times daily.  What changed:   · how much to take  · when to take this        CONTINUE taking these medications    aspirin 81 MG Chew  Take 1 tablet (81 mg total) by mouth once daily.     calcitRIOL 0.25 MCG Cap  Commonly known as: ROCALTROL  Take 1 capsule (0.25 mcg total) by mouth every Monday and Friday.     CENTRUM 3,500-18-0.4 unit-mg-mg Chew  Generic drug: multivit-iron-min-folic acid  Take 1 capsule by mouth once daily.     ferrous sulfate 325 (65 FE) MG EC tablet  Take 1 tablet (325 mg total) by mouth 2 (two) times daily.     levothyroxine 88 MCG tablet  Commonly known as: SYNTHROID  Take 1 tablet (88 mcg total) by mouth once daily.     magnesium citrate solution  Take 296 mLs by mouth daily as needed (constipation).        STOP taking these medications    doxazosin 2 MG tablet  Commonly known as: CARDURA     felodipine 10 MG 24 hr tablet  Commonly known as: PLENDIL            Indwelling Lines/Drains at time of discharge:   Lines/Drains/Airways     Drain            Female External Urinary Catheter 09/10/20 2000 less than 1 day                Time spent on the discharge of patient: 35 minutes  Patient was seen and examined on the date of discharge and determined to be suitable for discharge.         David Velázquez MD  Department of Hospital Medicine  [facility]

## 2020-09-11 NOTE — SUBJECTIVE & OBJECTIVE
Past Medical History:   Diagnosis Date    AAA (abdominal aortic aneurysm)     Anemia in CKD (chronic kidney disease)     Arthritis     Cataract     Class 1 obesity due to excess calories with serious comorbidity in adult 7/6/2017    Gout, chronic     High cholesterol     Hypertension     Hypothyroidism     Obesity     Plantar fasciitis     PVD (peripheral vascular disease)     Thyroid trouble        Past Surgical History:   Procedure Laterality Date    BREAST BIOPSY Left     BREAST SURGERY Left 1972    benign breast lump    CATARACT EXTRACTION  3/18/13    both eyes -     CHOLECYSTECTOMY      ENDOSCOPIC ULTRASOUND OF UPPER GASTROINTESTINAL TRACT N/A 9/8/2020    Procedure: ULTRASOUND, UPPER GI TRACT, ENDOSCOPIC;  Surgeon: Rasheed Balbuena MD;  Location: [facility] ([address] [place] [address]);  Service: Endoscopy;  Laterality: N/A;    ERCP N/A 9/8/2020    Procedure: ERCP (ENDOSCOPIC RETROGRADE CHOLANGIOPANCREATOGRAPHY);  Surgeon: Rasheed Balbuena MD;  Location: [facility] ([address] [place] [address]);  Service: Endoscopy;  Laterality: N/A;    EYE SURGERY      HYSTERECTOMY      SKIN BIOPSY      Left breast       Review of patient's allergies indicates:  No Known Allergies    Medications:  Medications Prior to Admission   Medication Sig    aspirin 81 MG Chew Take 1 tablet (81 mg total) by mouth once daily.    calcitRIOL (ROCALTROL) 0.25 MCG Cap Take 1 capsule (0.25 mcg total) by mouth every Monday and Friday.    doxazosin (CARDURA) 2 MG tablet Take 1 tablet (2 mg total) by mouth nightly.    felodipine (PLENDIL) 10 MG 24 hr tablet TAKE 1 TABLET BY MOUTH EVERY DAY    ferrous sulfate 325 (65 FE) MG EC tablet Take 1 tablet (325 mg total) by mouth 2 (two) times daily.    levothyroxine (SYNTHROID) 88 MCG tablet Take 1 tablet (88 mcg total) by mouth once daily.    magnesium citrate solution Take 296 mLs by mouth daily as needed (constipation).    MULTIVIT-IRON-MIN-FOLIC ACID 3,500-18-0.4 UNIT-MG-MG ORAL CHEW Take 1  capsule by mouth once daily.     sodium bicarbonate 650 MG tablet Take 1 tablet (650 mg total) by mouth 2 (two) times daily. (Patient taking differently: Take 1,300 mg by mouth once daily. )     Antibiotics (From admission, onward)    Start     Stop Route Frequency Ordered    09/10/20 1400  metroNIDAZOLE tablet 500 mg      09/17 1359 Oral Every 8 hours 09/10/20 1146    09/10/20 1200  cefTRIAXone (ROCEPHIN) 2 g/50 mL D5W IVPB      09/17 1159 IV Every 24 hours (non-standard times) 09/10/20 1146        Antifungals (From admission, onward)    None        Antivirals (From admission, onward)    None           Immunization History   Administered Date(s) Administered    Influenza 10/17/2007, 10/16/2008, 10/20/2009, 09/23/2010    Influenza - High Dose - PF (65 years and older) 11/03/2011, 12/20/2013, 10/28/2014, 10/22/2015, 12/12/2017, 09/28/2018, 11/14/2019    Influenza - Quadrivalent 12/19/2016    Pneumococcal Conjugate - 13 Valent 09/14/2015    Pneumococcal Polysaccharide - 23 Valent 05/26/2017    Tdap 06/29/2016    Zoster 06/29/2016       Family History     Problem Relation (Age of Onset)    Breast cancer Sister    Cancer Sister, Sister, Brother    Cataracts Son    Diabetes Son, Son    Glaucoma Son, Daughter    Heart disease Brother    Lupus Daughter    Meningitis Son    Mental illness Son    No Known Problems Brother        Social History     Socioeconomic History    Marital status:      Spouse name: Not on file    Number of children: Not on file    Years of education: Not on file    Highest education level: Not on file   Occupational History    Not on file   Social Needs    Financial resource strain: Not on file    Food insecurity     Worry: Not on file     Inability: Not on file    Transportation needs     Medical: Not on file     Non-medical: Not on file   Tobacco Use    Smoking status: Former Smoker     Packs/day: 0.50     Years: 60.00     Pack years: 30.00     Quit date: 6/29/2001     Years  since quittin.2    Smokeless tobacco: Never Used    Tobacco comment: quit in the 's   Substance and Sexual Activity    Alcohol use: No    Drug use: No    Sexual activity: Not Currently   Lifestyle    Physical activity     Days per week: Not on file     Minutes per session: Not on file    Stress: Not on file   Relationships    Social connections     Talks on phone: Not on file     Gets together: Not on file     Attends Latter day service: Not on file     Active member of club or organization: Not on file     Attends meetings of clubs or organizations: Not on file     Relationship status: Not on file   Other Topics Concern    Not on file   Social History Narrative    Not on file     Review of Systems   Constitutional: Negative for appetite change, chills, diaphoresis, fatigue, fever and unexpected weight change.   HENT: Negative for congestion, ear pain, hearing loss, sore throat and tinnitus.    Eyes: Negative for pain, redness and visual disturbance.   Respiratory: Negative for cough, chest tightness, shortness of breath and wheezing.    Cardiovascular: Negative for chest pain.   Gastrointestinal: Negative for abdominal pain, constipation, diarrhea, nausea and vomiting.   Endocrine: Negative for cold intolerance and heat intolerance.   Genitourinary: Negative for decreased urine volume, difficulty urinating, dysuria, flank pain, frequency, hematuria and urgency.   Musculoskeletal: Negative for arthralgias, back pain, myalgias and neck pain.   Skin: Negative for rash and wound.   Allergic/Immunologic: Negative for environmental allergies, food allergies and immunocompromised state.   Neurological: Negative for dizziness, facial asymmetry, weakness, light-headedness, numbness and headaches.   Hematological: Negative for adenopathy. Does not bruise/bleed easily.   Psychiatric/Behavioral: Negative for agitation, behavioral problems and confusion.     Objective:     Vital Signs (Most Recent):  Temp: 99.3  °F (37.4 °C) (09/11/20 1108)  Pulse: 96 (09/11/20 1131)  Resp: 16 (09/11/20 1108)  BP: (!) 149/70 (09/11/20 1108)  SpO2: 97 % (09/11/20 1108) Vital Signs (24h Range):  Temp:  [96.5 °F (35.8 °C)-99.3 °F (37.4 °C)] 99.3 °F (37.4 °C)  Pulse:  [] 96  Resp:  [16-24] 16  SpO2:  [92 %-97 %] 97 %  BP: (143-189)/(68-80) 149/70     Weight: 79.8 kg (176 lb)  Body mass index is 35.55 kg/m².    Estimated Creatinine Clearance: 23.6 mL/min (A) (based on SCr of 2 mg/dL (H)).    Physical Exam  Constitutional:       General: She is not in acute distress.     Appearance: She is well-developed. She is not diaphoretic.   HENT:      Head: Normocephalic and atraumatic.   Cardiovascular:      Rate and Rhythm: Normal rate and regular rhythm.      Heart sounds: Normal heart sounds. No murmur. No friction rub. No gallop.    Pulmonary:      Effort: Pulmonary effort is normal. No respiratory distress.      Breath sounds: Normal breath sounds. No wheezing or rales.   Abdominal:      General: Bowel sounds are normal. There is no distension.      Palpations: Abdomen is soft. There is no mass.      Tenderness: There is no abdominal tenderness. There is no guarding or rebound.   Skin:     General: Skin is warm and dry.   Neurological:      Mental Status: She is alert and oriented to person, place, and time.   Psychiatric:         Mood and Affect: Mood normal.         Behavior: Behavior normal.         Significant Labs:   Blood Culture:   Recent Labs   Lab 09/08/20  1123 09/08/20  1124 09/08/20  2310   LABBLOO Gram stain lyubov bottle: Gram negative rods  Results called to and read back by:Mariluz Desouza RN 09/08/2020  22:37  Gram stain aer bottle: Gram negative rods  ESCHERICHIA COLI* Gram stain lyubov bottle: Gram negative rods  Results called to and read back by:Mariluz Desouza RN 09/08/2020  22:38  Gram stain aer bottle: Gram negative rods  Positive results previously called 09/09/2020  01:03  ESCHERICHIA COLI  For susceptibility see order  #[id number]  * No growth to date  No Growth to date  No growth to date  No Growth to date     CBC:   Recent Labs   Lab 09/10/20  0431 09/10/20  1859 09/11/20  0733   WBC 10.13 8.23 8.70   HGB 9.4* 10.1* 9.7*   HCT 29.1* 32.0* 31.2*    318 308     CMP:   Recent Labs   Lab 09/10/20  0431 09/10/20  1859 09/11/20  0733    140 139   K 3.5 3.7 3.7    108 108   CO2 22* 24 23   GLU 77 104 110   BUN 32* 29* 31*   CREATININE 2.1* 2.1* 2.0*   CALCIUM 8.8 9.2 9.3   PROT 6.3 6.6 6.4   ALBUMIN 2.2* 2.2* 2.2*   BILITOT 2.7* 1.8* 1.4*   ALKPHOS 553* 543* 511*   AST 79* 66* 58*   ALT 90* 82* 72*   ANIONGAP 8 8 8   EGFRNONAA 20.3* 20.3* 21.5*     Wound Culture: No results for input(s): LABAERO in the last 4320 hours.  All pertinent labs within the past 24 hours have been reviewed.    Significant Imaging: I have reviewed all pertinent imaging results/findings within the past 24 hours.   US Endoscopic Ultrasound [[id number]] Resulted: 09/08/20 1513   Order Status: Completed Updated: 09/08/20 1513   Narrative:     See OP Notes for results.     IMPRESSION: See OP Notes for results.             This procedure was auto-finalized by: Virtual Radiologist   [place] ERCP Biliary And Pancreatic By Non Rad Tech [[id number]] Resulted: 09/08/20 1513   Order Status: Completed Updated: 09/08/20 1513   Narrative:     See OP Notes for results.     IMPRESSION: See OP Notes for results.             This procedure was auto-finalized by: Virtual Radiologist   CT Abdomen Pelvis Without Contrast [[id number]] (Abnormal) Resulted: 09/08/20 1029   Order Status: Completed Updated: 09/08/20 1031   Narrative:     EXAMINATION:   CT ABDOMEN PELVIS WITHOUT CONTRAST     CLINICAL HISTORY:   Abdominal pain, acute, nonlocalized;     TECHNIQUE:   Low dose axial images, sagittal and coronal reformations were obtained from the lung bases to the pubic symphysis,.     COMPARISON:   CT of the abdomen pelvis performed 08/04/2017     FINDINGS:   Heart: Normal in  size. No pericardial effusion.  Aortic annular and coronary artery calcifications are noted.     Lung Bases: Subsegmental atelectasis suggested at the right greater than left lung bases.  Additionally, a component of chronic scar is suggested given similar appearance to the 08/04/2017 study.     Liver: Non-specific coarse calcification the peripheral left hepatic lobe.     Gallbladder: Status post cholecystectomy.     Bile Ducts: Relatively mild degree of intrahepatic biliary ductal dilatation has progressed since the 08/04/2017 study.  The extrahepatic biliary system is also increased in caliber, measuring up to approximately 2.7 cm in transverse dimension.  Within limitations of unenhanced CT technique, no definite obstructing stone or mass lesion is noted in the region of the common duct, duodenum, or pancreas.     Pancreas: No mass or peripancreatic fat stranding.     Spleen: Unremarkable.     Adrenals: Unremarkable.     Kidneys/ Ureters: As before, both kidneys are lobulated in contour with multiple exophytic cystic and hyperattenuating lesions, the latter which may reflect hemorrhagic cysts, although mass cannot be excluded given lack of intravenous contrast.  Additional few subcentimeter hypoattenuating lesions in both kidneys are too small to definitely characterize by CT, but would be favored to be benign.  No evidence of hydro nephrosis or nephrolithiasis.  No ureteral dilatation.     Bladder: No evidence of wall thickening.     Reproductive organs: Status post hysterectomy.     GI Tract/Mesentery: No evidence of bowel obstruction or inflammation.  A moderate stool burden is noted in the distal colon and rectum.  A normal appearing appendix is identified.  Scattered relatively minor sigmoid diverticulosis without evidence of acute inflammation.     Peritoneal Space: No ascites. No free air.     Retroperitoneum: No significant adenopathy.     Abdominal wall: Unremarkable.     Vasculature: Aortoiliac  atherosclerotic calcification.  Infrarenal abdominal aortic aneurysm, fusiform, continues to measure up to approximately 3.9-4 cm, not substantially changed.     Bones: No acute fracture.  Degenerative findings are noted in the lumbar spine.    Impression:       1. No definite acute findings in the abdomen or pelvis to account for the patient's reported symptoms.   2. Since the prior examination of 08/04/2017, there is new mild intrahepatic and moderate extrahepatic biliary ductal dilatation.  Within limitations imposed by lack of intravenous or oral contrast, no definite obstructing stone or mass lesion is identified.  While some of this appearance is at least partially on the basis of post cholecystectomy state and senescent change, ultrasound or MRI could be performed for more sensitive evaluation of this finding, as clinically warranted.   3. Redemonstrated multiple hyperattenuating lesions of both kidneys, which may represent hemorrhagic cysts.  As noted previously, solid renal masses cannot be excluded in the absence of intravenous contrast.  Further evaluation with ultrasound or MRI would provide superior characterization.   4. Similar appearance of fusiform infrarenal abdominal aortic aneurysm compared to the 2017 CT.   5. Additional details, as per report body.   This report was flagged in Epic as abnormal.       Electronically signed by: Steve Mensah   Date: 09/08/2020   Time: 10:29   X-Ray Chest AP Portable [[id number]] Resulted: 09/08/20 0927   Order Status: Completed Updated: 09/08/20 0930   Narrative:     EXAMINATION:   XR CHEST AP PORTABLE     CLINICAL HISTORY:   Unspecified abdominal pain     TECHNIQUE:   Single frontal view of the chest was performed.     COMPARISON:   05/26/2017     FINDINGS:   Patient is rotated to the right.  Lung volumes are reduced compared to previous.  Reticulonodular opacities again seen in the right lung, chronic.  No acute consolidation, pleural effusion, or pneumothorax.   Cardiac silhouette is stable in size when accounting for reduced lung volumes and technique.    Impression:       Reduced lung volumes.  Rightward shift of mediastinal structures at least in part related to patient positioning.  There may also be a component of volume loss on the right.  Repeat radiograph with better inspiratory effort may be considered.  I see no acute consolidation.       Electronically signed by: Sally Liz   Date: 09/08/2020   Time: 09:27   Imaging History    2020  Date Procedure Name Status Accession Number Location   09/08/20 03:13 PM US Endoscopic Ultrasound Final [id number] [facility]   09/08/20 03:12 PM [place] ERCP Biliary And Pancreatic By Non Rad Tech Final [id number] [facility]   09/08/20 10:03 AM CT Abdomen Pelvis  Without Contrast Final [id number] [facility]   09/08/20 09:21 AM X-Ray Chest AP Portable Final [id number] [facility]   08/31/20 12:28 PM CT Head Without Contrast Final [id number] [facility]

## 2020-09-11 NOTE — PLAN OF CARE
[facility] educator met with patient and daughter to discuss IVABX to discharge home with. Patient will dc home with daughter support. Daughter/ Katia [phone] will infuse medication. Educated on [facility].A.[facility] procedure. S.A.S.[facility] mat provided.  Patient education checklist reviewed withdaughter. She  is agreeable to dc home with infusion. Katia is able to return demonstration of administration with example provided.Katia feels comfortable with infusion. Katia flushed midline with saline and heparin with minimal cues. Patient will dc home with ceftriaxone 2 GM IV q 24 hrs. [facility] will follow patient  for weekly dressing changes and lab draws. Will ask home infusion pharmacist to add labs and send to home health.  Time allotted for questions.       [facility].       Medication delivery will be made to bedside      [facility]  Bianca Díaz Rn, Clinical Educator  Cell [phone]  Office [phone]  Fax [phone]

## 2020-09-11 NOTE — CARE UPDATE
Rapid Response Nurse Chart Check     Chart check completed, noted that labs were not drawn this morning. charge RN, Janette contacted. No concerns verbalized at this time. Instructed to call [phone] for further concerns or assistance.

## 2020-09-11 NOTE — PLAN OF CARE
Clinical Educator met with patient for introductory visit. She would like for me to contact her daughter, Katia [phone]. Clinical Educator spoke with her to introduce services. She is a little anxious about infusion. Clinical Educator offered support. Informed she will get home health to help support her. She is agreeable. Awaiting ID recommendations. Will coordinate visit with daughter once ID has recommendations. Will cont to follow.     [facility]  Bianca Díaz RN, Clinical Educator  Cell [phone]  Office [phone]  Fax [phone]

## 2020-09-11 NOTE — HPI
Mrs Curtis is a 89 yo AAF with a PMHx of HTN, hypothyroidism, obesity, abdominal aortic aneursym, and CKD who presents to [facility] ED complaining of abdominal pain. Patient awoke this morning around 5 am complaining of RLQ abdominal pain, worse when lying down, and radiating to the back. Patient says this pain woke her up from sleep, and she has never experienced this before. She describes this pain as constant in nature, and she did not try anything to relieve this pain. Patient had a normal BM yesterday and denies any BRBPR, dark stools, or change in bowel habits. She has hadite the past couple  her usual appetof days, and denies any nausea or vomiting. Patient denies any chest pain, fever, chills, or SOB. Of note, patient has been seen in clinic for elevated LFTs with upcoming US liver. She has also been seen recently in the ED for multiple episodes of syncope, with workup insignificant, likely vasovagal episodes.      In the ED, afebrile, pulse 80, R 16, 177/72, 97% on RA. Labs significant for WBC 15.9, AlkP 845, T bili 3.6, , , Lactate 2.9. CXR showed no acute infiltrates. CT noncon showed intrahepatic dilation and extrahepatic dilation up to 2.7 cm.     Underwent ERCP, sphincterotomy, removal of stone on 9/8. BCXs 9/8 with 4/4 E coli and 12hr later drawn again and are NGTD.  Patient on ceftriaxone.  Afebrile.  WBC normalized.  Patient

## 2020-09-11 NOTE — ASSESSMENT & PLAN NOTE
Bacteremia with E coli suspected from biliary stone obstruction, now s/p removal and sphincterotomy - stable to improved  - BCXs 9/8 E coli - ceftriaxone  - BCXs 9/8 - 12hr after 1st - NGTD  - Patient with HX of AAA cipro contraindicated  - midline placed 9/10    Plan:  1. Ceftriaxone 2g IV q24h x 2 weeks from 1st negative culture - presume 9/9   2. Weekly labs  3. Repeat BCXs today  4. FU in ID clinic in 7-10d  5. Patient was encouraged to call the office for further concerns or complaints.  Business card provided.  Will sign off  6.  Discussed with ID staff  7. Primary team DC'ing today - will sign off.    Infection: E coli bacteremia secondary to choleangitis    Discharge antibiotics:   Ceftriaxone 2g Iv q24 h x 2 weeks    End date of IV antibiotics: 9/24    Weekly outpatient laboratory on Monday or Tuesday while on IV antibiotics.    CBC   CMP     Fax laboratory results to [facility] at [phone] with attn: Paolo Angeles PA-C    Outpatient Infectious Diseases clinic follow up will be arranged and found in patient calendar.    Prior to discharge, please ensure the patient's follow-up has been scheduled.  If there is still no follow-up scheduled in Infectious Diseases clinic, please send an EPIC message to Alysa Bai in Infectious Diseases.

## 2020-09-11 NOTE — PLAN OF CARE
Referral received. Orders noted. Waiting for ID final recommendations. Will run benefits. Patient has 18 gauge left brachial midline. Once benefits received. Team will call patient and Clinical Educator will teach.       [facility]  Bianca Díaz Rn, Clinical Educator  Cell [phone]  Office [phone]  Fax [phone]

## 2020-09-11 NOTE — PLAN OF CARE
Problem: Fall Injury Risk  Goal: Absence of Fall and Fall-Related Injury  Outcome: Met     Problem: Adult Inpatient Plan of Care  Goal: Plan of Care Review  Outcome: Met  Goal: Patient-Specific Goal (Individualization)  Outcome: Met  Goal: Absence of Hospital-Acquired Illness or Injury  Outcome: Ongoing, Progressing  Goal: Optimal Comfort and Wellbeing  Outcome: Met  Goal: Readiness for Transition of Care  Outcome: Met  Goal: Rounds/Family Conference  Outcome: Met     Problem: Skin Injury Risk Increased  Goal: Skin Health and Integrity  Outcome: Met     Problem: Adjustment to Illness (Sepsis/Septic Shock)  Goal: Optimal Coping  Outcome: Met     Problem: Bleeding (Sepsis/Septic Shock)  Goal: Absence of Bleeding  Outcome: Met     Problem: Glycemic Control Impaired (Sepsis/Septic Shock)  Goal: Blood Glucose Level Within Desired Range  Outcome: Met     Problem: Hemodynamic Instability (Sepsis/Septic Shock)  Goal: Effective Tissue Perfusion  Outcome: Met     Problem: Infection (Sepsis/Septic Shock)  Goal: Absence of Infection Signs/Symptoms  Outcome: Met     Problem: Nutrition Impaired (Sepsis/Septic Shock)  Goal: Optimal Nutrition Intake  Outcome: Met     Problem: Respiratory Compromise (Sepsis/Septic Shock)  Goal: Effective Oxygenation and Ventilation  Outcome: Met     Problem: Infection  Goal: Infection Symptom Resolution  Outcome: Met     Problem: Pain Acute  Goal: Optimal Pain Control  Outcome: Met    Patient is being discharged with daughter at bedside, right upper arm midline catheter will be left in place for the home infusion.  Awaiting instructions for home infusion for antibiotics.  No c/o pain or SOB.  Patient remains free from fall or injury.  Discharge instructions given to pt and daughter, understanding confirmed by daughter, all questions answered.

## 2020-09-11 NOTE — PLAN OF CARE
Pt progressing towards goals.remains on telemetry,SR to ST.continue iv antibiotics.afebrile.reports adequate pain relief.pure wick intact ,voiding danielle urine.safety precautions maintained.bed in low position.rails up x3.call bell in reach.bed alarm in use for pt safety.continue plan of care.

## 2020-09-11 NOTE — PLAN OF CARE
[facility]    HOME HEALTH ORDERS  FACE TO FACE ENCOUNTER    Patient Name: Johanny Curtis  YOB: 1930    PCP: Leticia Weems MD   PCP Address: [address] / [place] [address]  PCP Phone Number: [phone]  PCP Fax: [phone]    Encounter Date: 09/11/2020    Admit to Home Health    Diagnoses:  Active Hospital Problems    Diagnosis  POA    *Biliary obstruction [K83.1]  Yes    Bacteremia [R78.81]  Unknown    Sepsis [A41.9]  Yes    Acute cholangitis [K83.09]  Yes    AAA (abdominal aortic aneurysm) without rupture [I71.4]  Yes    CKD (chronic kidney disease), stage IV [N18.4]  Unknown    Hypothyroidism [E03.9]  Yes    Essential hypertension [I10]  Yes      Resolved Hospital Problems   No resolved problems to display.       Future Appointments   Date Time Provider Department Center   9/14/2020 10:00 AM Columba Kelsey MD [facility]       I have seen and examined this patient face to face today. My clinical findings that support the need for the home health skilled services and home bound status are the following:  Weakness/numbness causing balance and gait disturbance due to Infection and Weakness/Debility making it taxing to leave home.    Allergies:Review of patient's allergies indicates:  No Known Allergies    Diet: renal diet    Activities: activity as tolerated and ambulate in house    Nursing:   SN to complete comprehensive assessment including routine vital signs. Instruct on disease process and s/s of complications to report to MD. Review/verify medication list sent home with the patient at time of discharge  and instruct patient/caregiver as needed. Frequency may be adjusted depending on start of care date.    Notify MD if SBP > 160 or < 90; DBP > 90 or < 50; HR > 120 or < 50; Temp > 101    CONSULTS:    Physical Therapy to evaluate and treat. Evaluate for home safety and equipment needs; Establish/upgrade home exercise program. Perform /  instruct on therapeutic exercises, gait training, transfer training, and Range of Motion.  Occupational Therapy to evaluate and treat. Evaluate home environment for safety and equipment needs. Perform/Instruct on transfers, ADL training, ROM, and therapeutic exercises.    MISCELLANEOUS CARE:      Home Infusion Therapy:   SN to perform Infusion Therapy/Central Line Care.  Review Central Line Care & Central Line Flush with patient.    Administer (drug and dose): CEFTRIAXONE 2g Iv q24h x 2 weeks from 1st neg BCX     Last dose given: 09/11/2021 (@ 1300)                Home dose due: 09/12/2021   STOP DATE: 09/21/2021 (last dose to be given on this day)    Scrub the Hub: Prior to accessing the line, always perform a 30 second alcohol scrub  Each lumen of the central line is to be flushed at least daily with 10 mL Normal Saline and 3 mL Heparin flush (10 units/mL)  Skilled Nurse (SN) may draw blood from IV access  Blood Draw Procedure:   - Aspirate at least 5 mL of blood   - Discard   - Obtain specimen   - Change injection cap   - Flush with 20 mL Normal Saline followed by a                 3-5 mL Heparin flush (10 units/mL)  Central :   - Sterile dressing changes are done weekly and as needed.   - Use chlor-hexadine scrub to cleanse site, apply Biopatch to insertion site,       apply securement device dressing   - Injection caps are changed weekly and after EVERY lab draw.   - If sterile gauze is under dressing to control oozing,                 dressing change must be performed every 24 hours until gauze is not needed.    Medications: Review discharge medications with patient and family and provide education.      Current Discharge Medication List      CONTINUE these medications which have NOT CHANGED    Details   aspirin 81 MG Chew Take 1 tablet (81 mg total) by mouth once daily.  Qty: 30 tablet, Refills: 11      calcitRIOL (ROCALTROL) 0.25 MCG Cap Take 1 capsule (0.25 mcg total) by mouth every Monday  and Friday.  Qty: 60 capsule, Refills: 6      doxazosin (CARDURA) 2 MG tablet Take 1 tablet (2 mg total) by mouth nightly.  Qty: 90 tablet, Refills: 1    Comments: .      felodipine (PLENDIL) 10 MG 24 hr tablet TAKE 1 TABLET BY MOUTH EVERY DAY  Qty: 90 tablet, Refills: 1    Comments: .      ferrous sulfate 325 (65 FE) MG EC tablet Take 1 tablet (325 mg total) by mouth 2 (two) times daily.  Qty: 120 tablet, Refills: 6      levothyroxine (SYNTHROID) 88 MCG tablet Take 1 tablet (88 mcg total) by mouth once daily.  Qty: 90 tablet, Refills: 1      magnesium citrate solution Take 296 mLs by mouth daily as needed (constipation).      MULTIVIT-IRON-MIN-FOLIC ACID 3,500-18-0.4 UNIT-MG-MG ORAL CHEW Take 1 capsule by mouth once daily.       sodium bicarbonate 650 MG tablet Take 1 tablet (650 mg total) by mouth 2 (two) times daily.  Qty: 180 tablet, Refills: 4             I certify that this patient is confined to her home and needs intermittent skilled nursing care, physical therapy and speech therapy.

## 2020-09-12 PROCEDURE — G0180 PR HOME HEALTH MD CERTIFICATION: ICD-10-PCS | Mod: ,,, | Performed by: INTERNAL MEDICINE

## 2020-09-12 PROCEDURE — G0180 MD CERTIFICATION HHA PATIENT: HCPCS | Mod: ,,, | Performed by: INTERNAL MEDICINE

## 2020-09-14 LAB
BACTERIA BLD CULT: NORMAL
BACTERIA BLD CULT: NORMAL

## 2020-09-16 LAB
BACTERIA BLD CULT: NORMAL
BACTERIA BLD CULT: NORMAL

## 2020-09-18 ENCOUNTER — PES CALL (OUTPATIENT)
Dept: ADMINISTRATIVE | Facility: CLINIC | Age: 85
End: 2020-09-18

## 2020-09-21 ENCOUNTER — TELEPHONE (OUTPATIENT)
Dept: INFECTIOUS DISEASES | Facility: CLINIC | Age: 85
End: 2020-09-21

## 2020-09-21 ENCOUNTER — NURSE TRIAGE (OUTPATIENT)
Dept: ADMINISTRATIVE | Facility: CLINIC | Age: 85
End: 2020-09-21

## 2020-09-21 ENCOUNTER — PATIENT OUTREACH (OUTPATIENT)
Dept: ADMINISTRATIVE | Facility: OTHER | Age: 85
End: 2020-09-21

## 2020-09-21 NOTE — TELEPHONE ENCOUNTER
Post procedure follow up call attempted, no contact made. Left VM message.     Reason for Disposition   Message left on unidentified voice mail. Phone number verified.    Additional Information   Negative: Caller has already spoken with the PCP (or office), and has no further questions   Negative: Caller has already spoken with another triager and has no further questions   Negative: Caller has already spoken with another triager or PCP (or office), and has further questions and triager able to answer questions.   Negative: Busy signal.  First attempt to contact caller.  Follow-up call scheduled within 15 minutes.   Negative: No answer.  First attempt to contact caller.  Follow-up call scheduled within 15 minutes.   Negative: Message left on identified voicemail    Protocols used: NO CONTACT OR DUPLICATE CONTACT CALL-A-OH

## 2020-09-22 ENCOUNTER — OFFICE VISIT (OUTPATIENT)
Dept: NEPHROLOGY | Facility: CLINIC | Age: 85
End: 2020-09-22
Payer: MEDICARE

## 2020-09-22 VITALS
BODY MASS INDEX: 35.35 KG/M2 | HEART RATE: 83 BPM | WEIGHT: 175 LBS | OXYGEN SATURATION: 98 % | SYSTOLIC BLOOD PRESSURE: 124 MMHG | DIASTOLIC BLOOD PRESSURE: 46 MMHG

## 2020-09-22 DIAGNOSIS — I10 ESSENTIAL HYPERTENSION: ICD-10-CM

## 2020-09-22 DIAGNOSIS — N18.4 CHRONIC KIDNEY DISEASE, STAGE IV (SEVERE): ICD-10-CM

## 2020-09-22 DIAGNOSIS — D63.1 ANEMIA IN STAGE 4 CHRONIC KIDNEY DISEASE: ICD-10-CM

## 2020-09-22 DIAGNOSIS — I73.9 PVD (PERIPHERAL VASCULAR DISEASE): Primary | ICD-10-CM

## 2020-09-22 DIAGNOSIS — N18.4 ANEMIA IN STAGE 4 CHRONIC KIDNEY DISEASE: ICD-10-CM

## 2020-09-22 PROCEDURE — 1159F PR MEDICATION LIST DOCUMENTED IN MEDICAL RECORD: ICD-10-PCS | Mod: HCNC,S$GLB,, | Performed by: INTERNAL MEDICINE

## 2020-09-22 PROCEDURE — 1101F PT FALLS ASSESS-DOCD LE1/YR: CPT | Mod: HCNC,CPTII,S$GLB, | Performed by: INTERNAL MEDICINE

## 2020-09-22 PROCEDURE — 1101F PR PT FALLS ASSESS DOC 0-1 FALLS W/OUT INJ PAST YR: ICD-10-PCS | Mod: HCNC,CPTII,S$GLB, | Performed by: INTERNAL MEDICINE

## 2020-09-22 PROCEDURE — 99999 PR PBB SHADOW E&M-EST. PATIENT-LVL III: CPT | Mod: PBBFAC,HCNC,, | Performed by: INTERNAL MEDICINE

## 2020-09-22 PROCEDURE — 1126F PR PAIN SEVERITY QUANTIFIED, NO PAIN PRESENT: ICD-10-PCS | Mod: HCNC,S$GLB,, | Performed by: INTERNAL MEDICINE

## 2020-09-22 PROCEDURE — 99999 PR PBB SHADOW E&M-EST. PATIENT-LVL III: ICD-10-PCS | Mod: PBBFAC,HCNC,, | Performed by: INTERNAL MEDICINE

## 2020-09-22 PROCEDURE — 1159F MED LIST DOCD IN RCRD: CPT | Mod: HCNC,S$GLB,, | Performed by: INTERNAL MEDICINE

## 2020-09-22 PROCEDURE — 99214 PR OFFICE/OUTPT VISIT, EST, LEVL IV, 30-39 MIN: ICD-10-PCS | Mod: HCNC,S$GLB,, | Performed by: INTERNAL MEDICINE

## 2020-09-22 PROCEDURE — 99214 OFFICE O/P EST MOD 30 MIN: CPT | Mod: HCNC,S$GLB,, | Performed by: INTERNAL MEDICINE

## 2020-09-22 PROCEDURE — 1126F AMNT PAIN NOTED NONE PRSNT: CPT | Mod: HCNC,S$GLB,, | Performed by: INTERNAL MEDICINE

## 2020-09-22 NOTE — PROGRESS NOTES
Progress Note  Nephrology      Referring physician: Leticia Weems MD    SUBJECTIVE:   89 y.o. female  has a past medical history of AAA (abdominal aortic aneurysm), Anemia in CKD (chronic kidney disease), Arthritis, Cataract, Class 1 obesity due to excess calories with serious comorbidity in adult (7/6/2017), Gout, chronic, High cholesterol, Hypertension, Hypothyroidism, Obesity, Plantar fasciitis, PVD (peripheral vascular disease), and Thyroid trouble. who has been following up in renal clinic for CKD, she feels well, no sob or urinary symptoms,no major complaints, her cr was noted to be in baseline of around 2s with deterioration over years, she uses walker at home but her functionality is declining.        OBJECTIVE:     /56     Physical Exam:  General: no distress, well nourished, on wheel chair  HENT: PERRLA, Normal mouth nose and ears.  Neck: no JVD and thyroid not enlarged, symmetric, no tenderness/mass/nodules  Lungs: clear to auscultation bilaterally and normal respiratory effort  Cardiovascular: regular rate and rhythm, S1, S2 normal, + murmur, no click, rub or gallop.   Abdomen: soft, non-tender non-distented; bowel sounds normal  Skin: No rashes or lesions  Musculoskeletal:no edema, no deformities.   Lymph Nodes: No cervical or supraclavicular adenopathy  Neurologic: Normal strength and tone. No focal numbness or weakness    Dialysis Access: Not applicable.        Medications:    Current Outpatient Medications:     aspirin 81 MG Chew, Take 1 tablet (81 mg total) by mouth once daily., Disp: 30 tablet, Rfl: 11    calcitRIOL (ROCALTROL) 0.25 MCG Cap, Take 1 capsule (0.25 mcg total) by mouth every Monday and Friday., Disp: 60 capsule, Rfl: 6    ferrous sulfate 325 (65 FE) MG EC tablet, Take 1 tablet (325 mg total) by mouth 2 (two) times daily., Disp: 120 tablet, Rfl: 6    levothyroxine (SYNTHROID) 88 MCG tablet, Take 1 tablet (88 mcg total) by mouth once daily., Disp: 90 tablet, Rfl: 1     magnesium citrate solution, Take 296 mLs by mouth daily as needed (constipation)., Disp: , Rfl:     MULTIVIT-IRON-MIN-FOLIC ACID 3,500-18-0.4 UNIT-MG-MG ORAL CHEW, Take 1 capsule by mouth once daily. , Disp: , Rfl:     NIFEdipine (PROCARDIA-XL) 60 MG (OSM) 24 hr tablet, Take 1 tablet (60 mg total) by mouth once daily., Disp: 30 tablet, Rfl: 1    sodium bicarbonate 650 MG tablet, Take 1 tablet (650 mg total) by mouth 2 (two) times daily. (Patient taking differently: Take 1,300 mg by mouth once daily. ), Disp: 180 tablet, Rfl: 4         Laboratory:  Lab Results   Component Value Date    CREATININE 2.0 (H) 09/11/2020       Prot/Creat Ratio, Ur   Date Value Ref Range Status   11/15/2019 0.10 0.00 - 0.20 Final   08/14/2017 Unable to calculate 0.00 - 0.20 Final   01/23/2015 Unable to calculate 0.0 - 0.2 Final       Lab Results   Component Value Date     09/11/2020    K 3.7 09/11/2020    CO2 23 09/11/2020       last PTH   Lab Results   Component Value Date    .0 (H) 02/10/2020    CALCIUM 9.3 09/11/2020    PHOS 2.5 (L) 09/11/2020       Lab Results   Component Value Date    HGB 9.7 (L) 09/11/2020        Lab Results   Component Value Date    HGBA1C 6.1 04/28/2006       Lab Results   Component Value Date    LDLCALC 107.6 09/26/2019       Other Labs were reviewed      ASSESSMENT/PLAN:     CKD IV  -likely from HTN and PVD  -Cr baseline ~ 2.0-2.5 with GFR ~ 20-25 ml/min , declining ~ 2-3 ml/min/yr  -UPCR none  -Renal US in 2017 with chronic changes and two left cysts/ one right cyst      HTN  -well controlled on Nifedipine    Anemia  -from ckd  -Hgb goal ~ 10  -Iron stores low on Iron pills    Secondary Hyperparathyroidism  -Phos/Ca acceptable  -PTH trending down on Calcitriol M/F    Acid/Base  -None gap Metabolic acidosis, improved on Nabicrab        PLAN:  -Continue Nifedipine.  -Avoid NSAIDs intake        RTC in 4 months with blood work      SARWAT MAURICIO MD  NEPHROLOGY ATTENDING       Pt comes from triage. Pt reports she had a c section last Monday, denies complications with delivery. Pt reports since the c section she has had shortness of breath. Pt appears to be breathing easy, unlabored, no signs of distress.

## 2020-09-23 ENCOUNTER — TELEPHONE (OUTPATIENT)
Dept: INFECTIOUS DISEASES | Facility: CLINIC | Age: 85
End: 2020-09-23

## 2020-09-23 NOTE — TELEPHONE ENCOUNTER
Spoke to Pt's daughter,   Pt was instructed by Lashell, nurse with [facility], to stop her IV ABX in 9/21/2020. Pt's daughter stated she was not instructed to flush her PICC line until line was remove.    Called Wilber with [facility] to inform    Pt's daughter confirm appointment for tomorrow.

## 2020-09-23 NOTE — TELEPHONE ENCOUNTER
----- Message from Consuelo Tate MA sent at 9/23/2020 12:07 PM CDT -----  Dr. Aguirre is seeing this patient.  ----- Message -----  From: Crystal Colin  Sent: 9/23/2020  11:59 AM CDT  To: Thomas BOYD Staff    [facility] called want to know if you need him to do blood work on the pt before her appt on 9/24/20. On Monday, 9/21 the daughter stopped the IV antibiotic  and she  never flushed the pic line. Call back [phone]

## 2020-09-24 ENCOUNTER — CLINICAL SUPPORT (OUTPATIENT)
Dept: INFECTIOUS DISEASES | Facility: CLINIC | Age: 85
End: 2020-09-24
Payer: MEDICARE

## 2020-09-24 ENCOUNTER — OFFICE VISIT (OUTPATIENT)
Dept: INFECTIOUS DISEASES | Facility: CLINIC | Age: 85
End: 2020-09-24
Payer: MEDICARE

## 2020-09-24 VITALS
BODY MASS INDEX: 35.35 KG/M2 | DIASTOLIC BLOOD PRESSURE: 67 MMHG | HEIGHT: 59 IN | TEMPERATURE: 98 F | SYSTOLIC BLOOD PRESSURE: 128 MMHG | HEART RATE: 80 BPM

## 2020-09-24 DIAGNOSIS — B96.20 BACTEREMIA DUE TO ESCHERICHIA COLI: ICD-10-CM

## 2020-09-24 DIAGNOSIS — Z23 NEED FOR INFLUENZA VACCINATION: ICD-10-CM

## 2020-09-24 DIAGNOSIS — R78.81 BACTEREMIA DUE TO ESCHERICHIA COLI: ICD-10-CM

## 2020-09-24 DIAGNOSIS — R78.81 BACTEREMIA: ICD-10-CM

## 2020-09-24 DIAGNOSIS — K80.50 CHOLEDOCHOLITHIASIS: ICD-10-CM

## 2020-09-24 DIAGNOSIS — K83.09 ACUTE CHOLANGITIS: Primary | ICD-10-CM

## 2020-09-24 PROCEDURE — 90662 FLU VACCINE - HIGH DOSE (65+) PRESERVATIVE FREE IM: ICD-10-PCS | Mod: HCNC,S$GLB,, | Performed by: INTERNAL MEDICINE

## 2020-09-24 PROCEDURE — 1159F PR MEDICATION LIST DOCUMENTED IN MEDICAL RECORD: ICD-10-PCS | Mod: HCNC,S$GLB,, | Performed by: INTERNAL MEDICINE

## 2020-09-24 PROCEDURE — 99213 PR OFFICE/OUTPT VISIT, EST, LEVL III, 20-29 MIN: ICD-10-PCS | Mod: HCNC,25,S$GLB, | Performed by: INTERNAL MEDICINE

## 2020-09-24 PROCEDURE — 99999 PR PBB SHADOW E&M-EST. PATIENT-LVL III: CPT | Mod: PBBFAC,HCNC,, | Performed by: INTERNAL MEDICINE

## 2020-09-24 PROCEDURE — 1159F MED LIST DOCD IN RCRD: CPT | Mod: HCNC,S$GLB,, | Performed by: INTERNAL MEDICINE

## 2020-09-24 PROCEDURE — G0008 ADMIN INFLUENZA VIRUS VAC: HCPCS | Mod: HCNC,S$GLB,, | Performed by: INTERNAL MEDICINE

## 2020-09-24 PROCEDURE — G0008 FLU VACCINE - HIGH DOSE (65+) PRESERVATIVE FREE IM: ICD-10-PCS | Mod: HCNC,S$GLB,, | Performed by: INTERNAL MEDICINE

## 2020-09-24 PROCEDURE — 1101F PT FALLS ASSESS-DOCD LE1/YR: CPT | Mod: HCNC,CPTII,S$GLB, | Performed by: INTERNAL MEDICINE

## 2020-09-24 PROCEDURE — 99999 PR PBB SHADOW E&M-EST. PATIENT-LVL III: ICD-10-PCS | Mod: PBBFAC,HCNC,, | Performed by: INTERNAL MEDICINE

## 2020-09-24 PROCEDURE — 1126F PR PAIN SEVERITY QUANTIFIED, NO PAIN PRESENT: ICD-10-PCS | Mod: HCNC,S$GLB,, | Performed by: INTERNAL MEDICINE

## 2020-09-24 PROCEDURE — 1101F PR PT FALLS ASSESS DOC 0-1 FALLS W/OUT INJ PAST YR: ICD-10-PCS | Mod: HCNC,CPTII,S$GLB, | Performed by: INTERNAL MEDICINE

## 2020-09-24 PROCEDURE — 90662 IIV NO PRSV INCREASED AG IM: CPT | Mod: HCNC,S$GLB,, | Performed by: INTERNAL MEDICINE

## 2020-09-24 PROCEDURE — 99213 OFFICE O/P EST LOW 20 MIN: CPT | Mod: HCNC,25,S$GLB, | Performed by: INTERNAL MEDICINE

## 2020-09-24 PROCEDURE — 1126F AMNT PAIN NOTED NONE PRSNT: CPT | Mod: HCNC,S$GLB,, | Performed by: INTERNAL MEDICINE

## 2020-09-24 NOTE — PROGRESS NOTES
Subjective:      Patient ID: Johanny Curtis is a 89 y.o. female.    Chief Complaint:Hospital Follow Up      History of Present Illness    No complaints. Tolerated antibiotics well - stopped on 9/21. EOC was supposed to be today per ID notes.  Patient had successful ERCP and sphincterotomy. Blood cultures were negative on 9/11. Afebrile, no symptoms off antibiotics since 9/21.    Review of Systems   Constitution: Positive for malaise/fatigue. Negative for decreased appetite, fever, night sweats, weight gain and weight loss.   HENT: Positive for hearing loss. Negative for congestion, ear pain, hoarse voice, sore throat and tinnitus.    Eyes: Negative for blurred vision, redness and visual disturbance.   Cardiovascular: Negative for chest pain, leg swelling and palpitations.   Respiratory: Negative for cough, hemoptysis, shortness of breath and sputum production.    Hematologic/Lymphatic: Negative for adenopathy. Does not bruise/bleed easily.   Skin: Negative for dry skin, itching, rash and suspicious lesions.   Musculoskeletal: Negative for back pain, joint pain, myalgias and neck pain.   Gastrointestinal: Negative for abdominal pain, constipation, diarrhea, heartburn, nausea and vomiting.   Genitourinary: Positive for frequency. Negative for dysuria, flank pain, hematuria, hesitancy and urgency.   Neurological: Positive for weakness. Negative for dizziness, headaches, numbness and paresthesias.   Psychiatric/Behavioral: Negative for depression and memory loss. The patient does not have insomnia and is not nervous/anxious.      Objective:   Physical Exam  Constitutional:       Appearance: Normal appearance.   HENT:      Head: Normocephalic and atraumatic.   Eyes:      Conjunctiva/sclera: Conjunctivae normal.      Pupils: Pupils are equal, round, and reactive to light.   Cardiovascular:      Rate and Rhythm: Normal rate and regular rhythm.   Abdominal:      General: Abdomen is flat. Bowel sounds are normal. There is  no distension.      Palpations: Abdomen is soft.      Tenderness: There is no abdominal tenderness.   Musculoskeletal: Normal range of motion.         General: No swelling.      Comments: Midline in left arm without erythema or tenderness.   Skin:     General: Skin is warm and dry.       Assessment:       1. Acute cholangitis    2. Bacteremia    3. Bacteremia due to Escherichia coli    4. Choledocholithiasis    5. Need for influenza vaccination          Plan:       Patient is doing well after finishing IV antibiotics on 9/21. ERCP and sphincterotomy were successful in source control for bacteremia. Midline removed today. No need for additional antibiotics. Flu vaccine today, at daughter's request. Follow up as needed.

## 2020-09-26 ENCOUNTER — DOCUMENTATION ONLY (OUTPATIENT)
Dept: TRANSPLANT | Facility: CLINIC | Age: 85
End: 2020-09-26

## 2020-09-26 NOTE — NURSING
Pt records reviewed.   Pt will be referred to Hepatology.    Initial referral received  from the workque.   Referring Provider/diagnosis    Leticia Weems MD     Elevated lfts    Referral letter sent to patient.

## 2020-09-26 NOTE — LETTER
September 26, 2020    Johanny Curtis  [address]  [place] [address]      Dear Johanny Curtis:    Your doctor has referred you to the [facility]. We are sending this letter to remind you to make an appointment with us to complete the referral process.     Please call us at [phone] to schedule an appointment. We look forward to seeing you soon.     If you received a call and have been scheduled, please disregard this letter.       Sincerely,        [facility]   [facility] [facility] [place] [address]  [phone]

## 2020-09-29 ENCOUNTER — PATIENT MESSAGE (OUTPATIENT)
Dept: OTHER | Facility: OTHER | Age: 85
End: 2020-09-29

## 2020-10-06 ENCOUNTER — DOCUMENT SCAN (OUTPATIENT)
Dept: HOME HEALTH SERVICES | Facility: HOSPITAL | Age: 85
End: 2020-10-06
Payer: MEDICARE

## 2020-10-13 ENCOUNTER — OFFICE VISIT (OUTPATIENT)
Dept: HEPATOLOGY | Facility: CLINIC | Age: 85
End: 2020-10-13
Payer: MEDICARE

## 2020-10-13 ENCOUNTER — HOSPITAL ENCOUNTER (OUTPATIENT)
Dept: RADIOLOGY | Facility: HOSPITAL | Age: 85
Discharge: HOME | End: 2020-10-13
Attending: INTERNAL MEDICINE
Payer: MEDICARE

## 2020-10-13 ENCOUNTER — PATIENT MESSAGE (OUTPATIENT)
Dept: HEPATOLOGY | Facility: CLINIC | Age: 85
End: 2020-10-13

## 2020-10-13 VITALS
HEART RATE: 90 BPM | BODY MASS INDEX: 37.97 KG/M2 | TEMPERATURE: 97 F | RESPIRATION RATE: 18 BRPM | WEIGHT: 176 LBS | SYSTOLIC BLOOD PRESSURE: 147 MMHG | DIASTOLIC BLOOD PRESSURE: 67 MMHG | HEIGHT: 57 IN | OXYGEN SATURATION: 95 %

## 2020-10-13 DIAGNOSIS — R74.8 ELEVATED LIVER ENZYMES: Primary | ICD-10-CM

## 2020-10-13 DIAGNOSIS — K83.1 BILIARY OBSTRUCTION: ICD-10-CM

## 2020-10-13 DIAGNOSIS — I10 HYPERTENSION, UNSPECIFIED TYPE: ICD-10-CM

## 2020-10-13 DIAGNOSIS — R55 SYNCOPE, UNSPECIFIED SYNCOPE TYPE: ICD-10-CM

## 2020-10-13 DIAGNOSIS — K80.50 CHOLEDOCHOLITHIASIS: ICD-10-CM

## 2020-10-13 PROBLEM — R78.81 BACTEREMIA DUE TO ESCHERICHIA COLI: Status: RESOLVED | Noted: 2020-09-24 | Resolved: 2020-10-13

## 2020-10-13 PROBLEM — R78.81 BACTEREMIA: Status: RESOLVED | Noted: 2020-09-10 | Resolved: 2020-10-13

## 2020-10-13 PROBLEM — B96.20 BACTEREMIA DUE TO ESCHERICHIA COLI: Status: RESOLVED | Noted: 2020-09-24 | Resolved: 2020-10-13

## 2020-10-13 PROCEDURE — 93880 EXTRACRANIAL BILAT STUDY: CPT | Mod: 26,HCNC,, | Performed by: RADIOLOGY

## 2020-10-13 PROCEDURE — 1159F PR MEDICATION LIST DOCUMENTED IN MEDICAL RECORD: ICD-10-PCS | Mod: HCNC,S$GLB,, | Performed by: NURSE PRACTITIONER

## 2020-10-13 PROCEDURE — 1159F MED LIST DOCD IN RCRD: CPT | Mod: HCNC,S$GLB,, | Performed by: NURSE PRACTITIONER

## 2020-10-13 PROCEDURE — 1101F PT FALLS ASSESS-DOCD LE1/YR: CPT | Mod: HCNC,CPTII,S$GLB, | Performed by: NURSE PRACTITIONER

## 2020-10-13 PROCEDURE — 93880 EXTRACRANIAL BILAT STUDY: CPT | Mod: TC,HCNC

## 2020-10-13 PROCEDURE — 1126F PR PAIN SEVERITY QUANTIFIED, NO PAIN PRESENT: ICD-10-PCS | Mod: HCNC,S$GLB,, | Performed by: NURSE PRACTITIONER

## 2020-10-13 PROCEDURE — 93880 US CAROTID BILATERAL: ICD-10-PCS | Mod: 26,HCNC,, | Performed by: RADIOLOGY

## 2020-10-13 PROCEDURE — 1126F AMNT PAIN NOTED NONE PRSNT: CPT | Mod: HCNC,S$GLB,, | Performed by: NURSE PRACTITIONER

## 2020-10-13 PROCEDURE — 99204 PR OFFICE/OUTPT VISIT, NEW, LEVL IV, 45-59 MIN: ICD-10-PCS | Mod: HCNC,S$GLB,, | Performed by: NURSE PRACTITIONER

## 2020-10-13 PROCEDURE — 1101F PR PT FALLS ASSESS DOC 0-1 FALLS W/OUT INJ PAST YR: ICD-10-PCS | Mod: HCNC,CPTII,S$GLB, | Performed by: NURSE PRACTITIONER

## 2020-10-13 PROCEDURE — 99999 PR PBB SHADOW E&M-EST. PATIENT-LVL V: ICD-10-PCS | Mod: PBBFAC,HCNC,, | Performed by: NURSE PRACTITIONER

## 2020-10-13 PROCEDURE — 99999 PR PBB SHADOW E&M-EST. PATIENT-LVL V: CPT | Mod: PBBFAC,HCNC,, | Performed by: NURSE PRACTITIONER

## 2020-10-13 PROCEDURE — 99204 OFFICE O/P NEW MOD 45 MIN: CPT | Mod: HCNC,S$GLB,, | Performed by: NURSE PRACTITIONER

## 2020-10-13 NOTE — TELEPHONE ENCOUNTER
----- Message from Pilar Masood sent at 10/13/2020 11:48 AM CDT -----  Contact: Daughter [phone]  Requesting an RX refill or new RX.  Is this a refill or new RX:  refill  RX name and strength: felodipine 10 MG  Is this a 30 day or 90 day RX:    Pharmacy name and phone # (copy/paste from chart):   [id number] - [place], [place] - [address] [facility] [address] [phone] (Phone)  [phone] (Fax)      Comments:

## 2020-10-13 NOTE — LETTER
October 13, 2020      Leticia Weems MD  [address]  [facility] [address]           [facility]  [address]  [facility] [address]  Phone: [phone]  Fax: [phone]          Patient: Johanny Curtis   MR Number: [id number]   YOB: 1930   Date of Visit: 10/13/2020       Dear Dr. Leticia Weems:    Thank you for referring Johanny Curtis to me for evaluation. Attached you will find relevant portions of my assessment and plan of care.    If you have questions, please do not hesitate to call me. I look forward to following Johanny Curtis along with you.    Sincerely,    Celia Castle, LYLA    Enclosure  CC:  No Recipients    If you would like to receive this communication electronically, please contact [email or web address] or [phone] to request more information on [employer] access.    For providers and/or their staff who would like to refer a patient to [facility], please contact us through our one-stop-shop provider referral line, [facility], at [phone].    If you feel you have received this communication in error or would no longer like to receive these types of communications, please e-mail [email or web address]

## 2020-10-13 NOTE — TELEPHONE ENCOUNTER
Spoke with daughter and informed her of Dr. Weems message and she stated an verbal understanding. Also she thought her mom was off of the nifedipine, but she stated she is out of that medication and would like for you to fill it, if that is what she is suppose to be on.     Please advise!

## 2020-10-13 NOTE — PROGRESS NOTES
[facility] VISIT NEW PT NOTE    REFERRING PROVIDER:  Dr. Leticia Weems  PCP: Leticia Weems MD     CHIEF COMPLAINT: elevated liver enzymes     HPI: This is a 89 y.o. Black or  female with PMH noted below, presenting for evaluation of elevated liver enzymes    Previous serologic w/u negative for viral hepatitis A, B and C    Prior serologic workup:   Lab Results   Component Value Date    FERRITIN 193 02/10/2020    FESATURATED 12 (L) 02/10/2020    HEPBSAG Negative 09/03/2020    HEPCAB Negative 09/03/2020    HEPAIGM Negative 09/03/2020       Interval HPI: Presents today with daughter. Of note: she was recently seen by advanced endoscopy team for EUS/ERCP on 9/8/20 and choledocholithiasis was suspected and biliary tree swept during ERCP   Liver enzymes, bilirubin, alk phos trending down since procedure    Labs done 9/2020 show elevated but improving transaminase levels (ALT > AST, elevated since 7/30/2020 with fluctuating levels, started trending down after ERCP)  Platelets WNL, alk phos elevated but improving, elevated since 7/30/20, peaked (845) 9/8/20    Lab Results   Component Value Date    ALT 72 (H) 09/11/2020    AST 58 (H) 09/11/2020    ALKPHOS 511 (H) 09/11/2020    BILITOT 1.4 (H) 09/11/2020    ALBUMIN 2.2 (L) 09/11/2020    INR 1.0 03/29/2007     09/11/2020     Abd CT done 9/8/20 showed biliary dilation, no splenomegaly. Non-specific coarse calcification the peripheral left hepatic lobe.    Denies family history of liver disease . Denies alcohol consumption currently or in the past-     Immunity to Hep A and B - will check today     Denies jaundice, dark urine, abdominal distention, hematemesis, melena, slowed mentation. No abnormal skin rashes. No generalized joint or muscle pain.      Review of patient's allergies indicates:  No Known Allergies    Current Outpatient Medications on File Prior to Visit   Medication Sig Dispense Refill    aspirin 81 MG Chew Take 1  tablet (81 mg total) by mouth once daily. 30 tablet 11    calcitRIOL (ROCALTROL) 0.25 MCG Cap Take 1 capsule (0.25 mcg total) by mouth every Monday and Friday. 60 capsule 6    ferrous sulfate 325 (65 FE) MG EC tablet Take 1 tablet (325 mg total) by mouth 2 (two) times daily. 120 tablet 6    levothyroxine (SYNTHROID) 88 MCG tablet Take 1 tablet (88 mcg total) by mouth once daily. 90 tablet 1    magnesium citrate solution Take 296 mLs by mouth daily as needed (constipation).      MULTIVIT-IRON-MIN-FOLIC ACID 3,500-18-0.4 UNIT-MG-MG ORAL CHEW Take 1 capsule by mouth once daily.       NIFEdipine (PROCARDIA-XL) 60 MG (OSM) 24 hr tablet Take 1 tablet (60 mg total) by mouth once daily. 30 tablet 1    sodium bicarbonate 650 MG tablet Take 1 tablet (650 mg total) by mouth 2 (two) times daily. (Patient taking differently: Take 1,300 mg by mouth once daily. ) 180 tablet 4     No current facility-administered medications on file prior to visit.        PMHX:  has a past medical history of AAA (abdominal aortic aneurysm), Anemia in CKD (chronic kidney disease), Arthritis, Cataract, Class 1 obesity due to excess calories with serious comorbidity in adult (2017), Gout, chronic, High cholesterol, Hypertension, Hypothyroidism, Obesity, Plantar fasciitis, PVD (peripheral vascular disease), and Thyroid trouble.    PSHX:  has a past surgical history that includes Hysterectomy; Skin biopsy; Cholecystectomy; Cataract extraction (3/18/13); Eye surgery; Breast surgery (Left, 1972); Breast biopsy (Left); ERCP (N/A, 2020); and Endoscopic ultrasound of upper gastrointestinal tract (N/A, 2020).    FAMILY HISTORY: Negative for liver disease, reviewed in [facility]    SOCIAL HISTORY:   Social History     Tobacco Use   Smoking Status Former Smoker    Packs/day: 0.50    Years: 60.00    Pack years: 30.00    Quit date: 2001    Years since quittin.3   Smokeless Tobacco Never Used   Tobacco Comment    quit in the   "      Social History     Substance and Sexual Activity   Alcohol Use No       Social History     Substance and Sexual Activity   Drug Use No       ROS:   GENERAL: Denies fever, chills, weight loss/gain, fatigue  HEENT: Denies headaches, dizziness, vision/hearing changes  CARDIOVASCULAR: Denies chest pain, palpitations, or edema  RESPIRATORY: Denies dyspnea, cough  GI: Denies abdominal pain, rectal bleeding, nausea, vomiting. No change in bowel pattern or color  : Denies dysuria, hematuria   SKIN: Denies rash, itching   NEURO: Denies confusion, memory loss, or mood changes  PSYCH: Denies depression or anxiety  HEME/LYMPH: Denies easy bruising or bleeding    PHYSICAL EXAM:   Friendly Black or  female, in no acute distress; alert and oriented to person, place and time  VITALS: BP (!) 147/67 (BP Location: Right arm, Patient Position: Sitting, BP Method: Medium (Automatic))   Pulse 90   Temp 97 °F (36.1 °C) (Oral)   Resp 18   Ht 4' 9" (1.448 m)   Wt 79.8 kg (176 lb)   SpO2 95%   BMI 38.09 kg/m²   HENT: Normocephalic, without obvious abnormality. Oral mucosa pink and moist. Dentition good.  EYES: Sclerae anicteric. No conjunctival pallor.   NECK: Supple. No masses or cervical adenopathy.  CARDIOVASCULAR: Regular rate and rhythm. No murmurs.  RESPIRATORY: Normal respiratory effort. BBS CTA. No wheezes or crackles.  GI: Soft, non-tender, non-distended. No hepatosplenomegaly. No masses palpable. No ascites.  EXTREMITIES:  No clubbing, cyanosis or edema.  SKIN: Warm and dry. No jaundice. No rashes noted to exposed skin. No telangectasias noted. No palmar erythema.  NEURO:  Normal gait. No asterixis.  PSYCH:  Memory intact. Thought and speech pattern appropriate. Behavior normal. No depression or anxiety noted.    DIAGNOSTIC STUDIES:    CT SCAN-   Done 9/2020  FINDINGS:  Heart: Normal in size. No pericardial effusion.  Aortic annular and coronary artery calcifications are noted.     Lung Bases: " Subsegmental atelectasis suggested at the right greater than left lung bases.  Additionally, a component of chronic scar is suggested given similar appearance to the 08/04/2017 study.     Liver: Non-specific coarse calcification the peripheral left hepatic lobe.     Gallbladder: Status post cholecystectomy.     Bile Ducts: Relatively mild degree of intrahepatic biliary ductal dilatation has progressed since the 08/04/2017 study.  The extrahepatic biliary system is also increased in caliber, measuring up to approximately 2.7 cm in transverse dimension.  Within limitations of unenhanced CT technique, no definite obstructing stone or mass lesion is noted in the region of the common duct, duodenum, or pancreas.     Pancreas: No mass or peripancreatic fat stranding.     Spleen: Unremarkable.     Adrenals: Unremarkable.     Kidneys/ Ureters: As before, both kidneys are lobulated in contour with multiple exophytic cystic and hyperattenuating lesions, the latter which may reflect hemorrhagic cysts, although mass cannot be excluded given lack of intravenous contrast.  Additional few subcentimeter hypoattenuating lesions in both kidneys are too small to definitely characterize by CT, but would be favored to be benign.  No evidence of hydro nephrosis or nephrolithiasis.  No ureteral dilatation.     Bladder: No evidence of wall thickening.     Reproductive organs: Status post hysterectomy.     GI Tract/Mesentery: No evidence of bowel obstruction or inflammation.  A moderate stool burden is noted in the distal colon and rectum.  A normal appearing appendix is identified.  Scattered relatively minor sigmoid diverticulosis without evidence of acute inflammation.     Peritoneal Space: No ascites. No free air.     Retroperitoneum: No significant adenopathy.     Abdominal wall: Unremarkable.     Vasculature: Aortoiliac atherosclerotic calcification.  Infrarenal abdominal aortic aneurysm, fusiform, continues to measure up to  approximately 3.9-4 cm, not substantially changed.     Bones: No acute fracture.  Degenerative findings are noted in the lumbar spine.     Impression:     1. No definite acute findings in the abdomen or pelvis to account for the patient's reported symptoms.  2. Since the prior examination of 2017, there is new mild intrahepatic and moderate extrahepatic biliary ductal dilatation.  Within limitations imposed by lack of intravenous or oral contrast, no definite obstructing stone or mass lesion is identified.  While some of this appearance is at least partially on the basis of post cholecystectomy state and senescent change, ultrasound or MRI could be performed for more sensitive evaluation of this finding, as clinically warranted.  3. Redemonstrated multiple hyperattenuating lesions of both kidneys, which may represent hemorrhagic cysts.  As noted previously, solid renal masses cannot be excluded in the absence of intravenous contrast.  Further evaluation with ultrasound or MRI would provide superior characterization.  4. Similar appearance of fusiform infrarenal abdominal aortic aneurysm compared to the 2017 CT.    EUS/ERCP - done 2020  Impression:           - The major papilla was on the rim of a                         diverticulum.                         - The major papilla appeared to be bulging.                         - A filling defect consistent with a stone was seen                         on the cholangiogram.                         - The examination was suspicious for                         choledocholithiasis.                         - A biliary sphincterotomy was performed.                         - The biliary tree was swept and debris was found.                         The stone was likely sweeped out but could not be                         seen.                         - Cholangioscopy was performed using a                          scope showing no stones in the main duct.      LIVER BIOPSY-  None in the past    FIBROSCAN - can consider once acute elevation resolves if elevated liver enzymes persist , no indication currently       ASSESSMENT & PLAN:  89 y.o. Black or  female with:  1. Elevated liver enzymes   -- Labs note elevated transaminase levels (ALT > AST), elevated since 7/2020, improving/trending down after ERCP. Chronically WNL before 7/2020  ---EUS/ERCP suspected choledocholithiasis, biliary tree swept  -- do not suspect any medications contributing   --- previous serological work up : neg viral Hep A, B and C  --- Hep A and B immunity: will check today, will arrange Hep A and B vaccines if needed    -- labs today then in 6 weeks to monitor if trending down after ERCP  Orders Placed This Encounter   Procedures    Hepatitis A antibody, IgG    Hepatitis B Core Antibody, Total    Hepatitis B Surface Ab, Qualitative    Hepatic Function Panel    Hepatic Function Panel      -- can consider fibroscan in future if liver enzymes do not normalize     2. Choledocholithiases s/p ERCP  - likely cause of abnormal liver tests, anticipate return to normal over time    Follow up for pending results of workup.  If labs return to normal, will not need chronic f/u    ADDENDUM 12/15/20: Repeat labs with normal liver enzymes. No hepatology f/u needed     Thank you for allowing me to participate in the care of WANDER Alcocer    CC'ed note to:   Leticia Weems MD

## 2020-10-13 NOTE — PATIENT INSTRUCTIONS
1. Your high liver tests may have been related to the bile duct abnormality/stone that they cleaned out in September  2. Repeat labs today and in 6 weeks  3. Follow up pending results of above

## 2020-10-14 RX ORDER — NIFEDIPINE 60 MG/1
60 TABLET, EXTENDED RELEASE ORAL DAILY
Qty: 30 TABLET | Refills: 2 | Status: SHIPPED | OUTPATIENT
Start: 2020-10-14 | End: 2021-04-16 | Stop reason: SDUPTHER

## 2020-10-14 NOTE — TELEPHONE ENCOUNTER
Approved Prescriptions     NIFEdipine (PROCARDIA-XL) 60 MG (OSM) 24 hr tablet         Sig: Take 1 tablet (60 mg total) by mouth once daily.    Disp:  30 tablet    Refills:  2    Start: 10/14/2020 - 10/14/2021    Class: Normal    Authorized by: Leticia Weems MD    To pharmacy: .        To be filled at: [id number] - [place], [place] - [address] AT [address]

## 2020-10-15 ENCOUNTER — EXTERNAL HOME HEALTH (OUTPATIENT)
Dept: HOME HEALTH SERVICES | Facility: HOSPITAL | Age: 85
End: 2020-10-15
Payer: MEDICARE

## 2020-10-15 ENCOUNTER — PATIENT MESSAGE (OUTPATIENT)
Dept: HEPATOLOGY | Facility: CLINIC | Age: 85
End: 2020-10-15

## 2020-10-15 DIAGNOSIS — Z23 NEED FOR HEPATITIS B VACCINATION: Primary | ICD-10-CM

## 2020-10-17 ENCOUNTER — TELEPHONE (OUTPATIENT)
Dept: INTERNAL MEDICINE | Facility: CLINIC | Age: 85
End: 2020-10-17

## 2020-10-17 DIAGNOSIS — I73.9 PVD (PERIPHERAL VASCULAR DISEASE): Primary | ICD-10-CM

## 2020-10-17 NOTE — TELEPHONE ENCOUNTER
Please contact patient and inform her it does appear there are some blockages in her carotid blood vessels.  I would like for her to see vascular.  Order in.  Please schedule

## 2020-10-19 NOTE — TELEPHONE ENCOUNTER
Pt and daughter informed of results, understanding verbalized. Vascular appt scheduled for October 29th.

## 2020-10-22 ENCOUNTER — PES CALL (OUTPATIENT)
Dept: ADMINISTRATIVE | Facility: CLINIC | Age: 85
End: 2020-10-22

## 2020-11-05 ENCOUNTER — INITIAL CONSULT (OUTPATIENT)
Dept: VASCULAR SURGERY | Facility: CLINIC | Age: 85
End: 2020-11-05
Attending: SURGERY
Payer: MEDICARE

## 2020-11-05 VITALS
SYSTOLIC BLOOD PRESSURE: 143 MMHG | TEMPERATURE: 98 F | BODY MASS INDEX: 31.63 KG/M2 | HEART RATE: 84 BPM | DIASTOLIC BLOOD PRESSURE: 66 MMHG | WEIGHT: 167.56 LBS | HEIGHT: 61 IN

## 2020-11-05 DIAGNOSIS — I73.9 PVD (PERIPHERAL VASCULAR DISEASE): ICD-10-CM

## 2020-11-05 DIAGNOSIS — I65.23 CAROTID STENOSIS, ASYMPTOMATIC, BILATERAL: Primary | ICD-10-CM

## 2020-11-05 PROCEDURE — 99999 PR PBB SHADOW E&M-EST. PATIENT-LVL III: CPT | Mod: PBBFAC,HCNC,, | Performed by: SURGERY

## 2020-11-05 PROCEDURE — 1159F MED LIST DOCD IN RCRD: CPT | Mod: HCNC,S$GLB,, | Performed by: SURGERY

## 2020-11-05 PROCEDURE — 1159F PR MEDICATION LIST DOCUMENTED IN MEDICAL RECORD: ICD-10-PCS | Mod: HCNC,S$GLB,, | Performed by: SURGERY

## 2020-11-05 PROCEDURE — 99999 PR PBB SHADOW E&M-EST. PATIENT-LVL III: ICD-10-PCS | Mod: PBBFAC,HCNC,, | Performed by: SURGERY

## 2020-11-05 PROCEDURE — 99204 OFFICE O/P NEW MOD 45 MIN: CPT | Mod: HCNC,S$GLB,, | Performed by: SURGERY

## 2020-11-05 PROCEDURE — 99204 PR OFFICE/OUTPT VISIT, NEW, LEVL IV, 45-59 MIN: ICD-10-PCS | Mod: HCNC,S$GLB,, | Performed by: SURGERY

## 2020-11-05 NOTE — LETTER
November 13, 2020      Leticia Weems MD  [address]  [facility] [address]           [facility] 5th Fl  [address]  [facility] [address]  Phone: [phone]  Fax: [phone]          Patient: Johanny Curtis   MR Number: [id number]   YOB: 1930   Date of Visit: 11/5/2020       Dear Dr. Leticia Weems:    Thank you for referring Johanny Curtis to me for evaluation. Attached you will find relevant portions of my assessment and plan of care.    If you have questions, please do not hesitate to call me. I look forward to following Johanny Curtis along with you.    Sincerely,    Jose Sherwood MD    Enclosure  CC:  No Recipients    If you would like to receive this communication electronically, please contact [email or web address] or [phone] to request more information on [employer] access.    For providers and/or their staff who would like to refer a patient to [facility], please contact us through our one-stop-shop provider referral line, [facility], at [phone].    If you feel you have received this communication in error or would no longer like to receive these types of communications, please e-mail [email or web address]

## 2020-11-05 NOTE — PROGRESS NOTES
Johanny Curtis  11/05/2020    HPI:  Ms. Curtis is a 89 y.o. female with a h/o AAA, CKD, gout, HLD, HTN, and PVD who is here today for evaluation of  Carotid stenosis.    Patient had an episode of syncope a couple months back where she went to the ED for evaluation.  CT head noncon was performed without any significant findings.  She had a carotid US performed which shows >80% stenosis bilaterally.  The patient states that she has never had an MI or stroke.  Denies any symptoms of amaurosis fugax.  No weakness of any extremities, no loss of sensation, no speech difficulties.  Only symptoms she is having is episodes of passing out which her daughter says she has to wake her up from.  The patient does not remember these episodes.    denies MI/stroke  Tobacco use: quit 2001    Past Medical History:   Diagnosis Date    AAA (abdominal aortic aneurysm)     Anemia in CKD (chronic kidney disease)     Arthritis     Bacteremia due to Escherichia coli 9/24/2020    Cataract     Class 1 obesity due to excess calories with serious comorbidity in adult 7/6/2017    Gout, chronic     High cholesterol     Hypertension     Hypothyroidism     Obesity     Plantar fasciitis     PVD (peripheral vascular disease)     Thyroid trouble      Past Surgical History:   Procedure Laterality Date    BREAST BIOPSY Left     BREAST SURGERY Left 1972    benign breast lump    CATARACT EXTRACTION  3/18/13    both eyes -     CHOLECYSTECTOMY      ENDOSCOPIC ULTRASOUND OF UPPER GASTROINTESTINAL TRACT N/A 9/8/2020    Procedure: ULTRASOUND, UPPER GI TRACT, ENDOSCOPIC;  Surgeon: Rasheed Balbuena MD;  Location: [address]);  Service: Endoscopy;  Laterality: N/A;    ERCP N/A 9/8/2020    Procedure: ERCP (ENDOSCOPIC RETROGRADE CHOLANGIOPANCREATOGRAPHY);  Surgeon: Rasheed Balbuena MD;  Location: [address]);  Service: Endoscopy;  Laterality: N/A;    EYE SURGERY      HYSTERECTOMY      SKIN BIOPSY      Left  breast     Family History   Problem Relation Age of Onset    Breast cancer Sister     Cataracts Son     Glaucoma Son     Mental illness Son     Diabetes Son     Glaucoma Daughter     Lupus Daughter     Meningitis Son     Heart disease Brother     Cancer Sister         leukemia    Cancer Sister         pancreatic    No Known Problems Brother     Cancer Brother         unknown    Diabetes Son     Blindness Neg Hx     Amblyopia Neg Hx     Hypertension Neg Hx     Macular degeneration Neg Hx     Retinal detachment Neg Hx     Strabismus Neg Hx     Stroke Neg Hx     Thyroid disease Neg Hx      Social History     Socioeconomic History    Marital status:      Spouse name: Not on file    Number of children: Not on file    Years of education: Not on file    Highest education level: Not on file   Occupational History    Not on file   Social Needs    Financial resource strain: Not on file    Food insecurity     Worry: Not on file     Inability: Not on file    Transportation needs     Medical: Not on file     Non-medical: Not on file   Tobacco Use    Smoking status: Former Smoker     Packs/day: 0.50     Years: 60.00     Pack years: 30.00     Quit date: 2001     Years since quittin.3    Smokeless tobacco: Never Used    Tobacco comment: quit in the 's   Substance and Sexual Activity    Alcohol use: No    Drug use: No    Sexual activity: Not Currently   Lifestyle    Physical activity     Days per week: Not on file     Minutes per session: Not on file    Stress: Not on file   Relationships    Social connections     Talks on phone: Not on file     Gets together: Not on file     Attends Nondenominational service: Not on file     Active member of club or organization: Not on file     Attends meetings of clubs or organizations: Not on file     Relationship status: Not on file   Other Topics Concern    Not on file   Social History Narrative    Not on file       Current Outpatient  Medications:     calcitRIOL (ROCALTROL) 0.25 MCG Cap, Take 1 capsule (0.25 mcg total) by mouth every Monday and Friday., Disp: 60 capsule, Rfl: 6    ferrous sulfate 325 (65 FE) MG EC tablet, Take 1 tablet (325 mg total) by mouth 2 (two) times daily., Disp: 120 tablet, Rfl: 6    levothyroxine (SYNTHROID) 88 MCG tablet, Take 1 tablet (88 mcg total) by mouth once daily., Disp: 90 tablet, Rfl: 1    magnesium citrate solution, Take 296 mLs by mouth daily as needed (constipation)., Disp: , Rfl:     MULTIVIT-IRON-MIN-FOLIC ACID 3,500-18-0.4 UNIT-MG-MG ORAL CHEW, Take 1 capsule by mouth once daily. , Disp: , Rfl:     NIFEdipine (PROCARDIA-XL) 60 MG (OSM) 24 hr tablet, Take 1 tablet (60 mg total) by mouth once daily., Disp: 30 tablet, Rfl: 1    NIFEdipine (PROCARDIA-XL) 60 MG (OSM) 24 hr tablet, Take 1 tablet (60 mg total) by mouth once daily., Disp: 30 tablet, Rfl: 2    sodium bicarbonate 650 MG tablet, Take 1 tablet (650 mg total) by mouth 2 (two) times daily. (Patient taking differently: Take 1,300 mg by mouth once daily. ), Disp: 180 tablet, Rfl: 4    aspirin 81 MG Chew, Take 1 tablet (81 mg total) by mouth once daily., Disp: 30 tablet, Rfl: 11     REVIEW OF SYSTEMS:  General: negative; Respiratory: no cough, shortness of breath, or wheezing; Cardiovascular: no chest pain or dyspnea on exertion; Gastrointestinal: no abdominal pain, change in bowel habits, or bloody stools; Genito-Urinary: no dysuria, trouble voiding, or hematuria; Musculoskeletal: no weakness or decreased range of motion, Neurological: no TIA or stroke symptoms, episodes of passing out; Psychiatric: no nervousness, anxiety or depression.    PHYSICAL EXAM:   Right Arm BP - Sittin/66 (20)  Left Arm BP - Sittin/63 (20)  Pulse: 84  Temp: 98.4 °F (36.9 °C)    General appearance: Alert, in no distress.  Oriented to person, place, and time  Neurological: Normal speech, no focal findings noted; CN II - XII grossly  intact  Musculoskeletal:Digits/nail without cyanosis/clubbing.  Normal muscle strength/tone, normal range of motion.  Neck: Supple, no significant adenopathy, no carotid bruit can be auscultated 2+ carotid pulse bilaterally  Chest:Clear to auscultation, no wheezes, rales or rhonchi, symmetric air entry, No use of accessory muscles   Cardiac:Normal rate and regular rhythm, S1 and S2 normal, systolic ejection murmur noted  Abdomen:Soft, nontender, nondistended, no masses or organomegaly, No rebound tenderness noted; bowel sounds normal,  No groin adenopathy   Extremities: 2+ femoral pulses bilaterally, 2+ pedal pulses palpable.no pre-tibial edema. No ulcerations    LAB RESULTS:  Lab Results   Component Value Date    K 3.7 09/11/2020    K 3.7 09/10/2020    K 3.5 09/10/2020    CREATININE 2.0 (H) 09/11/2020    CREATININE 2.1 (H) 09/10/2020    CREATININE 2.1 (H) 09/10/2020     Lab Results   Component Value Date    WBC 8.70 09/11/2020    WBC 8.23 09/10/2020    WBC 10.13 09/10/2020    HCT 31.2 (L) 09/11/2020    HCT 32.0 (L) 09/10/2020    HCT 29.1 (L) 09/10/2020     09/11/2020     09/10/2020     09/10/2020     Lab Results   Component Value Date    HGBA1C 6.1 04/28/2006     IMAGING:  Narrative & Impression     EXAMINATION:  US CAROTID BILATERAL     CLINICAL HISTORY:  Essential (primary) hypertension     TECHNIQUE:  Grayscale and color Doppler ultrasound examination of the carotid and vertebral artery systems bilaterally.  Stenosis estimates are per the NASCET measurement criteria.     COMPARISON:  None.     FINDINGS:  Right:     Internal Carotid Artery (ICA):     Peak systolic velocity 656 cm/sec     End diastolic velocity 289 cm/sec     ICA/CCA peak systolic ratio: 17.7     ICA/CCA end diastolic ratio: 28.9     Plaque formation: Homogeneous     Vertebral artery: Antegrade flow and normal waveform.     Left:     Internal Carotid Artery (ICA):     Peak systolic velocity 589 cm/sec     End diastolic  velocity 271 cm/sec     ICA/CCA peak systolic ratio: 16.4     ICA/CCA end diastolic ratio: 38.7     Plaque formation: Homogeneous     Vertebral artery: Antegrade flow and normal waveform.     Impression:     The velocities within the proximal internal carotid arteries bilaterally correspond to greater than 80% stenosis bilaterally.     This report was flagged in Epic as abnormal.     EXAMINATION:  CT HEAD WITHOUT CONTRAST     CLINICAL HISTORY:  Syncope, simple, normal neuro exam;Altered mental status;transient AMS.;     TECHNIQUE:  Low dose axial CT images obtained throughout the head without the use of intravenous contrast.  Axial, sagittal and coronal reconstructions were performed.     COMPARISON:  None.     FINDINGS:  Intracranial compartment:     Prominence of the ventricles and sulci compatible with mild generalized cerebral volume loss.  This affects the left hemisphere slightly more than the right, particularly noting asymmetric expansion of the sylvian fissure and parietal sulci.  No hydrocephalus.     No parenchymal mass, hemorrhage, edema or major vascular distribution infarct.     No extra-axial blood or fluid collections.     Skull/extracranial contents (limited evaluation):     No fracture.     Right mastoid air cell fluid.  Left mastoids and partially visualized paranasal sinuses are clear.     Impression:     No evidence of acute hemorrhage or major vascular distribution infarct.     Mild cerebral volume loss, slightly asymmetrically affecting the left cerebral hemisphere.  Chronic carotid stenosis to be considered.  Further evaluation with carotid ultrasound, CTA, or MRA if warranted clinically.     Right mastoid air cell fluid.    IMP/PLAN:  89 y.o. female with bilateral carotid stenosis.  She is not having any stroke or TIA symptoms other than episodes of syncope which may be cardiac in origin, vasovagal, or orthostatic.  Patient is not interested in any intervention at this time for her carotid  stenosis and patient is unlikely to receive any long term benefit at her age.    - Follow up as needed for any worsening symptoms  - Recommend ASA and high intensity statin therapy    Ronaldo De Los Santos, PGY 2  General Surgery  Pager: [phone]      STAFF ADDENDUM    I have reviewed the relevant data and the resident's assessment and agree with the findings and plan as outlined above.  Given her age, asymptomatic status, and medical comorbidities, medical therapy with asa and statin is most appropriate.     Jose Sherwood MD  Vascular & Endovascular Surgery

## 2020-11-09 RX ORDER — NIFEDIPINE 60 MG/1
60 TABLET, EXTENDED RELEASE ORAL DAILY
Qty: 30 TABLET | Refills: 1 | Status: SHIPPED | OUTPATIENT
Start: 2020-11-09 | End: 2021-01-11 | Stop reason: SDUPTHER

## 2020-11-16 NOTE — TELEPHONE ENCOUNTER
Called patient spoke to patient daughter who takes care of the patient. Remind her to open patient Portal so they can read her results. She understood and verbalized understanding.

## 2020-12-02 NOTE — TELEPHONE ENCOUNTER
Spoke with Sally from [facility] and informed her of  message and she stated an verbal agreement.

## 2020-12-02 NOTE — TELEPHONE ENCOUNTER
----- Message from Aayush Colmenares sent at 12/2/2020 12:00 PM CST -----  Regarding: Refill  Contact: Kate  Requesting an RX refill or new RX.    Is this a refill or new RX: Refill     RX name and strength:doxazosin (CARDURA) 2 MG tablet     Is this a 30 day or 90 day RX: 30    Pharmacy name and phone # (copy/paste from chart):    [facility] - [facility] [address] [facility]  [address] [facility] [address]  Phone: [phone] Fax: [phone]    Comments: [facility] [phone]-----calling to get a refill on the pt doxazosin (CARDURA) 2 MG tablet. The pharmacy is requesting a call back

## 2020-12-08 ENCOUNTER — PATIENT MESSAGE (OUTPATIENT)
Dept: INTERNAL MEDICINE | Facility: CLINIC | Age: 85
End: 2020-12-08

## 2020-12-10 ENCOUNTER — TELEPHONE (OUTPATIENT)
Dept: INTERNAL MEDICINE | Facility: CLINIC | Age: 85
End: 2020-12-10

## 2020-12-10 NOTE — TELEPHONE ENCOUNTER
Spoke with patient daughter and informed her of  message.     Leticia Weems MD         12/2/20 12:35 PM  Note     She is not supposed to be taking that medication any longer           Daughter stated an verbal understanding.

## 2020-12-10 NOTE — TELEPHONE ENCOUNTER
----- Message from Ruth Pfeiffer sent at 12/10/2020 10:17 AM CST -----  Contact: Annette Lombard (daughter) @ [phone]  Patient is returning a phone call.  Who left a message for the patient: Dr Weems  Does patient know what this is regarding:  Yes  Comments:

## 2020-12-11 ENCOUNTER — PATIENT MESSAGE (OUTPATIENT)
Dept: OTHER | Facility: OTHER | Age: 85
End: 2020-12-11

## 2020-12-14 ENCOUNTER — LAB VISIT (OUTPATIENT)
Dept: LAB | Facility: HOSPITAL | Age: 85
End: 2020-12-14
Attending: INTERNAL MEDICINE
Payer: MEDICARE

## 2020-12-14 DIAGNOSIS — R74.8 ELEVATED LIVER ENZYMES: ICD-10-CM

## 2020-12-14 LAB
ALBUMIN SERPL BCP-MCNC: 3.5 G/DL (ref 3.5–5.2)
ALP SERPL-CCNC: 71 U/L (ref 55–135)
ALT SERPL W/O P-5'-P-CCNC: 9 U/L (ref 10–44)
AST SERPL-CCNC: 23 U/L (ref 10–40)
BILIRUB DIRECT SERPL-MCNC: 0.2 MG/DL (ref 0.1–0.3)
BILIRUB SERPL-MCNC: 0.4 MG/DL (ref 0.1–1)
PROT SERPL-MCNC: 7.5 G/DL (ref 6–8.4)

## 2020-12-14 PROCEDURE — 36415 COLL VENOUS BLD VENIPUNCTURE: CPT | Mod: HCNC

## 2020-12-14 PROCEDURE — 80076 HEPATIC FUNCTION PANEL: CPT | Mod: HCNC

## 2020-12-15 ENCOUNTER — TELEPHONE (OUTPATIENT)
Dept: HEPATOLOGY | Facility: CLINIC | Age: 85
End: 2020-12-15

## 2020-12-15 NOTE — TELEPHONE ENCOUNTER
Contacted patient and informed her of the message as instructed and patient stated thank you.       Instructions:    Please call pt to notify her that liver labs are normal, no hepatology follow up needed

## 2021-01-01 ENCOUNTER — HOSPITAL ENCOUNTER (OUTPATIENT)
Dept: RADIOLOGY | Facility: HOSPITAL | Age: 86
Discharge: HOME | End: 2021-08-05
Attending: INTERNAL MEDICINE
Payer: MEDICARE

## 2021-01-01 ENCOUNTER — PES CALL (OUTPATIENT)
Dept: ADMINISTRATIVE | Facility: CLINIC | Age: 86
End: 2021-01-01
Payer: MEDICARE

## 2021-01-01 ENCOUNTER — OFFICE VISIT (OUTPATIENT)
Dept: HOME HEALTH SERVICES | Facility: CLINIC | Age: 86
End: 2021-01-01
Payer: MEDICARE

## 2021-01-01 VITALS
HEIGHT: 59 IN | OXYGEN SATURATION: 96 % | BODY MASS INDEX: 32.25 KG/M2 | HEART RATE: 71 BPM | DIASTOLIC BLOOD PRESSURE: 70 MMHG | SYSTOLIC BLOOD PRESSURE: 131 MMHG | TEMPERATURE: 98 F | WEIGHT: 160 LBS

## 2021-01-01 DIAGNOSIS — I10 ESSENTIAL HYPERTENSION: ICD-10-CM

## 2021-01-01 DIAGNOSIS — R26.9 ABNORMALITY OF GAIT AND MOBILITY: ICD-10-CM

## 2021-01-01 DIAGNOSIS — E78.5 HYPERLIPIDEMIA, UNSPECIFIED HYPERLIPIDEMIA TYPE: ICD-10-CM

## 2021-01-01 DIAGNOSIS — I71.40 AAA (ABDOMINAL AORTIC ANEURYSM) WITHOUT RUPTURE: ICD-10-CM

## 2021-01-01 DIAGNOSIS — E04.1 THYROID NODULE: ICD-10-CM

## 2021-01-01 DIAGNOSIS — N25.81 SECONDARY HYPERPARATHYROIDISM OF RENAL ORIGIN: ICD-10-CM

## 2021-01-01 DIAGNOSIS — M85.80 OSTEOPENIA, UNSPECIFIED LOCATION: ICD-10-CM

## 2021-01-01 DIAGNOSIS — N18.4 CKD (CHRONIC KIDNEY DISEASE), STAGE IV: ICD-10-CM

## 2021-01-01 DIAGNOSIS — I73.9 PERIPHERAL ARTERIAL DISEASE: ICD-10-CM

## 2021-01-01 DIAGNOSIS — N18.4 ANEMIA IN STAGE 4 CHRONIC KIDNEY DISEASE: ICD-10-CM

## 2021-01-01 DIAGNOSIS — Z74.09 OTHER REDUCED MOBILITY: ICD-10-CM

## 2021-01-01 DIAGNOSIS — D63.1 ANEMIA IN STAGE 4 CHRONIC KIDNEY DISEASE: ICD-10-CM

## 2021-01-01 DIAGNOSIS — R74.8 ELEVATED LIVER ENZYMES: ICD-10-CM

## 2021-01-01 DIAGNOSIS — Z99.89 DEPENDENCE ON OTHER ENABLING MACHINES AND DEVICES: ICD-10-CM

## 2021-01-01 DIAGNOSIS — I70.0 AORTIC ATHEROSCLEROSIS: ICD-10-CM

## 2021-01-01 DIAGNOSIS — Z00.00 ENCOUNTER FOR PREVENTIVE HEALTH EXAMINATION: Primary | ICD-10-CM

## 2021-01-01 DIAGNOSIS — E03.9 HYPOTHYROIDISM, UNSPECIFIED TYPE: ICD-10-CM

## 2021-01-01 PROCEDURE — 1126F AMNT PAIN NOTED NONE PRSNT: CPT | Mod: S$GLB,,, | Performed by: NURSE PRACTITIONER

## 2021-01-01 PROCEDURE — 1126F PR PAIN SEVERITY QUANTIFIED, NO PAIN PRESENT: ICD-10-PCS | Mod: S$GLB,,, | Performed by: NURSE PRACTITIONER

## 2021-01-01 PROCEDURE — 1158F PR ADVANCE CARE PLANNING DISCUSS DOCUMENTED IN MEDICAL RECORD: ICD-10-PCS | Mod: S$GLB,,, | Performed by: NURSE PRACTITIONER

## 2021-01-01 PROCEDURE — 99499 RISK ADDL DX/OHS AUDIT: ICD-10-PCS | Mod: S$GLB,,, | Performed by: NURSE PRACTITIONER

## 2021-01-01 PROCEDURE — 76705 US ABDOMEN LIMITED: ICD-10-PCS | Mod: 26,,, | Performed by: STUDENT IN AN ORGANIZED HEALTH CARE EDUCATION/TRAINING PROGRAM

## 2021-01-01 PROCEDURE — 99499 UNLISTED E&M SERVICE: CPT | Mod: S$GLB,,, | Performed by: NURSE PRACTITIONER

## 2021-01-01 PROCEDURE — 1100F PTFALLS ASSESS-DOCD GE2>/YR: CPT | Mod: CPTII,S$GLB,, | Performed by: NURSE PRACTITIONER

## 2021-01-01 PROCEDURE — G0439 PPPS, SUBSEQ VISIT: HCPCS | Mod: S$GLB,,, | Performed by: NURSE PRACTITIONER

## 2021-01-01 PROCEDURE — 3288F FALL RISK ASSESSMENT DOCD: CPT | Mod: CPTII,S$GLB,, | Performed by: NURSE PRACTITIONER

## 2021-01-01 PROCEDURE — G9919 PR SCREENING AND POSITIVE: ICD-10-PCS | Mod: CPTII,S$GLB,, | Performed by: NURSE PRACTITIONER

## 2021-01-01 PROCEDURE — 3288F PR FALLS RISK ASSESSMENT DOCUMENTED: ICD-10-PCS | Mod: CPTII,S$GLB,, | Performed by: NURSE PRACTITIONER

## 2021-01-01 PROCEDURE — 76705 ECHO EXAM OF ABDOMEN: CPT | Mod: 26,,, | Performed by: STUDENT IN AN ORGANIZED HEALTH CARE EDUCATION/TRAINING PROGRAM

## 2021-01-01 PROCEDURE — G0439 PR MEDICARE ANNUAL WELLNESS SUBSEQUENT VISIT: ICD-10-PCS | Mod: S$GLB,,, | Performed by: NURSE PRACTITIONER

## 2021-01-01 PROCEDURE — 1158F ADVNC CARE PLAN TLK DOCD: CPT | Mod: S$GLB,,, | Performed by: NURSE PRACTITIONER

## 2021-01-01 PROCEDURE — 76705 ECHO EXAM OF ABDOMEN: CPT | Mod: TC

## 2021-01-01 PROCEDURE — G9919 SCRN ND POS ND PROV OF REC: HCPCS | Mod: CPTII,S$GLB,, | Performed by: NURSE PRACTITIONER

## 2021-01-01 PROCEDURE — 1100F PR PT FALLS ASSESS DOC 2+ FALLS/FALL W/INJURY/YR: ICD-10-PCS | Mod: CPTII,S$GLB,, | Performed by: NURSE PRACTITIONER

## 2021-01-01 RX ORDER — NIFEDIPINE 60 MG/1
60 TABLET, EXTENDED RELEASE ORAL DAILY
Qty: 30 TABLET | Refills: 1 | Status: CANCELLED | OUTPATIENT
Start: 2021-01-01 | End: 2022-07-11

## 2021-01-11 RX ORDER — NIFEDIPINE 60 MG/1
60 TABLET, EXTENDED RELEASE ORAL DAILY
Qty: 30 TABLET | Refills: 1 | Status: SHIPPED | OUTPATIENT
Start: 2021-01-11 | End: 2021-03-12 | Stop reason: SDUPTHER

## 2021-02-08 ENCOUNTER — OFFICE VISIT (OUTPATIENT)
Dept: CARDIOLOGY | Facility: CLINIC | Age: 86
End: 2021-02-08
Payer: MEDICARE

## 2021-02-08 VITALS
HEART RATE: 69 BPM | SYSTOLIC BLOOD PRESSURE: 150 MMHG | HEIGHT: 59 IN | DIASTOLIC BLOOD PRESSURE: 69 MMHG | WEIGHT: 167.75 LBS | BODY MASS INDEX: 33.82 KG/M2

## 2021-02-08 DIAGNOSIS — I71.40 AAA (ABDOMINAL AORTIC ANEURYSM) WITHOUT RUPTURE: ICD-10-CM

## 2021-02-08 DIAGNOSIS — N18.4 CKD (CHRONIC KIDNEY DISEASE), STAGE IV: ICD-10-CM

## 2021-02-08 DIAGNOSIS — N18.4 ANEMIA IN STAGE 4 CHRONIC KIDNEY DISEASE: ICD-10-CM

## 2021-02-08 DIAGNOSIS — I70.0 AORTIC ATHEROSCLEROSIS: ICD-10-CM

## 2021-02-08 DIAGNOSIS — E78.5 HYPERLIPIDEMIA, UNSPECIFIED HYPERLIPIDEMIA TYPE: ICD-10-CM

## 2021-02-08 DIAGNOSIS — D63.1 ANEMIA IN STAGE 4 CHRONIC KIDNEY DISEASE: ICD-10-CM

## 2021-02-08 DIAGNOSIS — I10 ESSENTIAL HYPERTENSION: Primary | ICD-10-CM

## 2021-02-08 PROCEDURE — 99214 OFFICE O/P EST MOD 30 MIN: CPT | Mod: S$GLB,,, | Performed by: INTERNAL MEDICINE

## 2021-02-08 PROCEDURE — 1159F PR MEDICATION LIST DOCUMENTED IN MEDICAL RECORD: ICD-10-PCS | Mod: S$GLB,,, | Performed by: INTERNAL MEDICINE

## 2021-02-08 PROCEDURE — 99499 RISK ADDL DX/OHS AUDIT: ICD-10-PCS | Mod: S$GLB,,, | Performed by: INTERNAL MEDICINE

## 2021-02-08 PROCEDURE — 99214 PR OFFICE/OUTPT VISIT, EST, LEVL IV, 30-39 MIN: ICD-10-PCS | Mod: S$GLB,,, | Performed by: INTERNAL MEDICINE

## 2021-02-08 PROCEDURE — 1126F AMNT PAIN NOTED NONE PRSNT: CPT | Mod: S$GLB,,, | Performed by: INTERNAL MEDICINE

## 2021-02-08 PROCEDURE — 99499 UNLISTED E&M SERVICE: CPT | Mod: S$GLB,,, | Performed by: INTERNAL MEDICINE

## 2021-02-08 PROCEDURE — 99999 PR PBB SHADOW E&M-EST. PATIENT-LVL IV: ICD-10-PCS | Mod: PBBFAC,,, | Performed by: INTERNAL MEDICINE

## 2021-02-08 PROCEDURE — 1159F MED LIST DOCD IN RCRD: CPT | Mod: S$GLB,,, | Performed by: INTERNAL MEDICINE

## 2021-02-08 PROCEDURE — 99999 PR PBB SHADOW E&M-EST. PATIENT-LVL IV: CPT | Mod: PBBFAC,,, | Performed by: INTERNAL MEDICINE

## 2021-02-08 PROCEDURE — 1126F PR PAIN SEVERITY QUANTIFIED, NO PAIN PRESENT: ICD-10-PCS | Mod: S$GLB,,, | Performed by: INTERNAL MEDICINE

## 2021-02-10 RX ORDER — LOVASTATIN 20 MG/1
TABLET ORAL
COMMUNITY
Start: 2020-11-06 | End: 2021-02-10 | Stop reason: SDUPTHER

## 2021-02-10 RX ORDER — LOVASTATIN 20 MG/1
TABLET ORAL
Qty: 30 TABLET | Refills: 1 | Status: SHIPPED | OUTPATIENT
Start: 2021-02-10 | End: 2021-04-16

## 2021-02-26 ENCOUNTER — HOSPITAL ENCOUNTER (OUTPATIENT)
Dept: CARDIOLOGY | Facility: HOSPITAL | Age: 86
Discharge: HOME | End: 2021-02-26
Attending: INTERNAL MEDICINE
Payer: MEDICARE

## 2021-02-26 VITALS
HEART RATE: 73 BPM | HEIGHT: 59 IN | WEIGHT: 167 LBS | DIASTOLIC BLOOD PRESSURE: 75 MMHG | SYSTOLIC BLOOD PRESSURE: 112 MMHG | BODY MASS INDEX: 33.67 KG/M2

## 2021-02-26 DIAGNOSIS — I10 ESSENTIAL HYPERTENSION: ICD-10-CM

## 2021-02-26 LAB
ASCENDING AORTA: 2.74 CM
AV INDEX (PROSTH): 0.52
AV MEAN GRADIENT: 7 MMHG
AV PEAK GRADIENT: 12 MMHG
AV VALVE AREA: 1.66 CM2
AV VELOCITY RATIO: 0.55
BSA FOR ECHO PROCEDURE: 1.78 M2
CV ECHO LV RWT: 0.63 CM
DOP CALC AO PEAK VEL: 1.76 M/S
DOP CALC AO VTI: 36.03 CM
DOP CALC LVOT AREA: 3.2 CM2
DOP CALC LVOT DIAMETER: 2.02 CM
DOP CALC LVOT PEAK VEL: 0.96 M/S
DOP CALC LVOT STROKE VOLUME: 59.71 CM3
DOP CALCLVOT PEAK VEL VTI: 18.64 CM
E WAVE DECELERATION TIME: 248.79 MSEC
E/A RATIO: 0.72
E/E' RATIO: 19 M/S
ECHO LV POSTERIOR WALL: 0.96 CM (ref 0.6–1.1)
FRACTIONAL SHORTENING: 30 % (ref 28–44)
INTERVENTRICULAR SEPTUM: 0.95 CM (ref 0.6–1.1)
LA MAJOR: 5.3 CM
LA MINOR: 5 CM
LA WIDTH: 3.6 CM
LEFT ATRIUM SIZE: 3.37 CM
LEFT ATRIUM VOLUME INDEX MOD: 17.6 ML/M2
LEFT ATRIUM VOLUME INDEX: 31 ML/M2
LEFT ATRIUM VOLUME MOD: 30.13 CM3
LEFT ATRIUM VOLUME: 53.06 CM3
LEFT INTERNAL DIMENSION IN SYSTOLE: 2.13 CM (ref 2.1–4)
LEFT VENTRICLE DIASTOLIC VOLUME INDEX: 21.49 ML/M2
LEFT VENTRICLE DIASTOLIC VOLUME: 36.74 ML
LEFT VENTRICLE MASS INDEX: 46 G/M2
LEFT VENTRICLE SYSTOLIC VOLUME INDEX: 8.7 ML/M2
LEFT VENTRICLE SYSTOLIC VOLUME: 14.93 ML
LEFT VENTRICULAR INTERNAL DIMENSION IN DIASTOLE: 3.06 CM (ref 3.5–6)
LEFT VENTRICULAR MASS: 78.9 G
LV LATERAL E/E' RATIO: 19 M/S
LV SEPTAL E/E' RATIO: 19 M/S
MV A" WAVE DURATION": 14.84 MSEC
MV PEAK A VEL: 1.05 M/S
MV PEAK E VEL: 0.76 M/S
MV STENOSIS PRESSURE HALF TIME: 72.15 MS
MV VALVE AREA P 1/2 METHOD: 3.05 CM2
PISA TR MAX VEL: 2.49 M/S
PULM VEIN S/D RATIO: 1.57
PV PEAK D VEL: 0.28 M/S
PV PEAK S VEL: 0.44 M/S
RA MAJOR: 3.54 CM
RA PRESSURE: 3 MMHG
RA WIDTH: 2.71 CM
RIGHT VENTRICULAR END-DIASTOLIC DIMENSION: 2.8 CM
RV TISSUE DOPPLER FREE WALL SYSTOLIC VELOCITY 1 (APICAL 4 CHAMBER VIEW): 9.83 CM/S
SINUS: 2.63 CM
STJ: 2.51 CM
TDI LATERAL: 0.04 M/S
TDI SEPTAL: 0.04 M/S
TDI: 0.04 M/S
TR MAX PG: 25 MMHG
TRICUSPID ANNULAR PLANE SYSTOLIC EXCURSION: 1.52 CM
TV REST PULMONARY ARTERY PRESSURE: 28 MMHG

## 2021-02-26 PROCEDURE — 93306 TTE W/DOPPLER COMPLETE: CPT

## 2021-02-26 PROCEDURE — 93306 ECHO (CUPID ONLY): ICD-10-PCS | Mod: 26,,, | Performed by: INTERNAL MEDICINE

## 2021-02-26 PROCEDURE — 93306 TTE W/DOPPLER COMPLETE: CPT | Mod: 26,,, | Performed by: INTERNAL MEDICINE

## 2021-03-01 RX ORDER — LEVOTHYROXINE SODIUM 88 UG/1
88 TABLET ORAL DAILY
Qty: 90 TABLET | Refills: 1 | Status: SHIPPED | OUTPATIENT
Start: 2021-03-01 | End: 2021-04-20 | Stop reason: SDUPTHER

## 2021-03-03 DIAGNOSIS — I10 ESSENTIAL HYPERTENSION: Primary | ICD-10-CM

## 2021-03-04 RX ORDER — HYDROCHLOROTHIAZIDE 25 MG/1
25 TABLET ORAL DAILY
Qty: 30 TABLET | Refills: 11 | Status: ON HOLD | OUTPATIENT
Start: 2021-03-04 | End: 2021-04-17 | Stop reason: SDUPTHER

## 2021-03-16 RX ORDER — NIFEDIPINE 60 MG/1
60 TABLET, EXTENDED RELEASE ORAL DAILY
Qty: 30 TABLET | Refills: 1 | Status: SHIPPED | OUTPATIENT
Start: 2021-03-16 | End: 2021-05-15 | Stop reason: SDUPTHER

## 2021-04-15 ENCOUNTER — HOSPITAL ENCOUNTER (OUTPATIENT)
Facility: HOSPITAL | Age: 86
Discharge: HOME HEALTH | End: 2021-04-17
Attending: EMERGENCY MEDICINE | Admitting: EMERGENCY MEDICINE
Payer: MEDICARE

## 2021-04-15 DIAGNOSIS — N17.9 AKI (ACUTE KIDNEY INJURY): Primary | ICD-10-CM

## 2021-04-15 DIAGNOSIS — R55 VASOVAGAL NEAR SYNCOPE: ICD-10-CM

## 2021-04-15 DIAGNOSIS — R55 PRE-SYNCOPE: ICD-10-CM

## 2021-04-15 DIAGNOSIS — I95.1 ORTHOSTASIS: ICD-10-CM

## 2021-04-15 DIAGNOSIS — R42 EPISODE OF DIZZINESS: ICD-10-CM

## 2021-04-15 DIAGNOSIS — R79.89 TROPONIN LEVEL ELEVATED: ICD-10-CM

## 2021-04-15 DIAGNOSIS — R07.9 CHEST PAIN: ICD-10-CM

## 2021-04-15 DIAGNOSIS — R55 NEAR SYNCOPE: ICD-10-CM

## 2021-04-15 DIAGNOSIS — I10 ESSENTIAL HYPERTENSION: ICD-10-CM

## 2021-04-15 LAB
ALBUMIN SERPL BCP-MCNC: 3.3 G/DL (ref 3.5–5.2)
ALP SERPL-CCNC: 73 U/L (ref 55–135)
ALT SERPL W/O P-5'-P-CCNC: 10 U/L (ref 10–44)
ANION GAP SERPL CALC-SCNC: 12 MMOL/L (ref 8–16)
AST SERPL-CCNC: 25 U/L (ref 10–40)
BACTERIA #/AREA URNS AUTO: ABNORMAL /HPF
BASOPHILS # BLD AUTO: 0.02 K/UL (ref 0–0.2)
BASOPHILS NFR BLD: 0.2 % (ref 0–1.9)
BILIRUB SERPL-MCNC: 0.4 MG/DL (ref 0.1–1)
BILIRUB UR QL STRIP: NEGATIVE
BNP SERPL-MCNC: 67 PG/ML (ref 0–99)
BUN SERPL-MCNC: 44 MG/DL (ref 6–30)
BUN SERPL-MCNC: 48 MG/DL (ref 8–23)
CALCIUM SERPL-MCNC: 10.4 MG/DL (ref 8.7–10.5)
CHLORIDE SERPL-SCNC: 101 MMOL/L (ref 95–110)
CHLORIDE SERPL-SCNC: 101 MMOL/L (ref 95–110)
CLARITY UR REFRACT.AUTO: ABNORMAL
CO2 SERPL-SCNC: 26 MMOL/L (ref 23–29)
COLOR UR AUTO: YELLOW
CREAT SERPL-MCNC: 2.5 MG/DL (ref 0.5–1.4)
CREAT SERPL-MCNC: 2.6 MG/DL (ref 0.5–1.4)
CTP QC/QA: YES
DIFFERENTIAL METHOD: ABNORMAL
EOSINOPHIL # BLD AUTO: 0.1 K/UL (ref 0–0.5)
EOSINOPHIL NFR BLD: 0.6 % (ref 0–8)
ERYTHROCYTE [DISTWIDTH] IN BLOOD BY AUTOMATED COUNT: 14.3 % (ref 11.5–14.5)
EST. GFR  (AFRICAN AMERICAN): 18.9 ML/MIN/1.73 M^2
EST. GFR  (NON AFRICAN AMERICAN): 16.4 ML/MIN/1.73 M^2
GLUCOSE SERPL-MCNC: 110 MG/DL (ref 70–110)
GLUCOSE SERPL-MCNC: 114 MG/DL (ref 70–110)
GLUCOSE UR QL STRIP: NEGATIVE
HCT VFR BLD AUTO: 37.8 % (ref 37–48.5)
HCT VFR BLD CALC: 39 %PCV (ref 36–54)
HGB BLD-MCNC: 12.4 G/DL (ref 12–16)
HGB UR QL STRIP: NEGATIVE
IMM GRANULOCYTES # BLD AUTO: 0.05 K/UL (ref 0–0.04)
IMM GRANULOCYTES NFR BLD AUTO: 0.6 % (ref 0–0.5)
KETONES UR QL STRIP: NEGATIVE
LEUKOCYTE ESTERASE UR QL STRIP: ABNORMAL
LYMPHOCYTES # BLD AUTO: 1.8 K/UL (ref 1–4.8)
LYMPHOCYTES NFR BLD: 20.7 % (ref 18–48)
MAGNESIUM SERPL-MCNC: 2 MG/DL (ref 1.6–2.6)
MCH RBC QN AUTO: 30.3 PG (ref 27–31)
MCHC RBC AUTO-ENTMCNC: 32.8 G/DL (ref 32–36)
MCV RBC AUTO: 92 FL (ref 82–98)
MICROSCOPIC COMMENT: ABNORMAL
MONOCYTES # BLD AUTO: 0.7 K/UL (ref 0.3–1)
MONOCYTES NFR BLD: 8.2 % (ref 4–15)
NEUTROPHILS # BLD AUTO: 5.9 K/UL (ref 1.8–7.7)
NEUTROPHILS NFR BLD: 69.7 % (ref 38–73)
NITRITE UR QL STRIP: NEGATIVE
NRBC BLD-RTO: 0 /100 WBC
PH UR STRIP: 6 [PH] (ref 5–8)
PLATELET # BLD AUTO: 242 K/UL (ref 150–450)
PMV BLD AUTO: 10 FL (ref 9.2–12.9)
POC IONIZED CALCIUM: 1.14 MMOL/L (ref 1.06–1.42)
POC TCO2 (MEASURED): 26 MMOL/L (ref 23–29)
POTASSIUM BLD-SCNC: 3.5 MMOL/L (ref 3.5–5.1)
POTASSIUM SERPL-SCNC: 3.7 MMOL/L (ref 3.5–5.1)
PROT SERPL-MCNC: 7.3 G/DL (ref 6–8.4)
PROT UR QL STRIP: NEGATIVE
RBC # BLD AUTO: 4.09 M/UL (ref 4–5.4)
RBC #/AREA URNS AUTO: 1 /HPF (ref 0–4)
SAMPLE: ABNORMAL
SARS-COV-2 RDRP RESP QL NAA+PROBE: NEGATIVE
SODIUM BLD-SCNC: 137 MMOL/L (ref 136–145)
SODIUM SERPL-SCNC: 139 MMOL/L (ref 136–145)
SP GR UR STRIP: 1.01 (ref 1–1.03)
SQUAMOUS #/AREA URNS AUTO: 2 /HPF
TROPONIN I SERPL DL<=0.01 NG/ML-MCNC: 0.04 NG/ML (ref 0–0.03)
TROPONIN I SERPL DL<=0.01 NG/ML-MCNC: 0.04 NG/ML (ref 0–0.03)
URN SPEC COLLECT METH UR: ABNORMAL
WBC # BLD AUTO: 8.44 K/UL (ref 3.9–12.7)
WBC #/AREA URNS AUTO: 3 /HPF (ref 0–5)

## 2021-04-15 PROCEDURE — 96361 HYDRATE IV INFUSION ADD-ON: CPT

## 2021-04-15 PROCEDURE — 81001 URINALYSIS AUTO W/SCOPE: CPT | Performed by: EMERGENCY MEDICINE

## 2021-04-15 PROCEDURE — 93005 ELECTROCARDIOGRAM TRACING: CPT

## 2021-04-15 PROCEDURE — 83735 ASSAY OF MAGNESIUM: CPT | Performed by: EMERGENCY MEDICINE

## 2021-04-15 PROCEDURE — 93010 ELECTROCARDIOGRAM REPORT: CPT | Mod: ,,, | Performed by: INTERNAL MEDICINE

## 2021-04-15 PROCEDURE — 99220 PR INITIAL OBSERVATION CARE,LEVL III: CPT | Mod: ,,, | Performed by: PHYSICIAN ASSISTANT

## 2021-04-15 PROCEDURE — 93010 EKG 12-LEAD: ICD-10-PCS | Mod: 76,,, | Performed by: INTERNAL MEDICINE

## 2021-04-15 PROCEDURE — 99499 NO LOS: ICD-10-PCS | Mod: ,,, | Performed by: PHYSICIAN ASSISTANT

## 2021-04-15 PROCEDURE — U0002 COVID-19 LAB TEST NON-CDC: HCPCS | Performed by: EMERGENCY MEDICINE

## 2021-04-15 PROCEDURE — 99499 UNLISTED E&M SERVICE: CPT | Mod: ,,, | Performed by: PHYSICIAN ASSISTANT

## 2021-04-15 PROCEDURE — 83880 ASSAY OF NATRIURETIC PEPTIDE: CPT | Performed by: EMERGENCY MEDICINE

## 2021-04-15 PROCEDURE — 99285 EMERGENCY DEPT VISIT HI MDM: CPT | Mod: 25

## 2021-04-15 PROCEDURE — 80053 COMPREHEN METABOLIC PANEL: CPT | Performed by: EMERGENCY MEDICINE

## 2021-04-15 PROCEDURE — 96360 HYDRATION IV INFUSION INIT: CPT

## 2021-04-15 PROCEDURE — 85025 COMPLETE CBC W/AUTO DIFF WBC: CPT | Performed by: EMERGENCY MEDICINE

## 2021-04-15 PROCEDURE — 99220 PR INITIAL OBSERVATION CARE,LEVL III: ICD-10-PCS | Mod: ,,, | Performed by: PHYSICIAN ASSISTANT

## 2021-04-15 PROCEDURE — 84484 ASSAY OF TROPONIN QUANT: CPT | Performed by: EMERGENCY MEDICINE

## 2021-04-15 PROCEDURE — 25000003 PHARM REV CODE 250: Performed by: EMERGENCY MEDICINE

## 2021-04-15 PROCEDURE — 80047 BASIC METABLC PNL IONIZED CA: CPT | Mod: 59

## 2021-04-15 PROCEDURE — G0378 HOSPITAL OBSERVATION PER HR: HCPCS

## 2021-04-15 RX ADMIN — SODIUM CHLORIDE 500 ML: 0.9 INJECTION, SOLUTION INTRAVENOUS at 06:04

## 2021-04-15 RX ADMIN — SODIUM CHLORIDE 500 ML: 0.9 INJECTION, SOLUTION INTRAVENOUS at 08:04

## 2021-04-16 PROBLEM — I95.1 ORTHOSTASIS: Status: ACTIVE | Noted: 2021-04-16

## 2021-04-16 PROBLEM — R42 EPISODE OF DIZZINESS: Status: ACTIVE | Noted: 2021-04-16

## 2021-04-16 PROBLEM — R55 VASOVAGAL NEAR SYNCOPE: Status: ACTIVE | Noted: 2021-04-16

## 2021-04-16 LAB
ANION GAP SERPL CALC-SCNC: 15 MMOL/L (ref 8–16)
BUN SERPL-MCNC: 40 MG/DL (ref 8–23)
CALCIUM SERPL-MCNC: 9.8 MG/DL (ref 8.7–10.5)
CHLORIDE SERPL-SCNC: 107 MMOL/L (ref 95–110)
CO2 SERPL-SCNC: 19 MMOL/L (ref 23–29)
CREAT SERPL-MCNC: 2 MG/DL (ref 0.5–1.4)
EST. GFR  (AFRICAN AMERICAN): 24.8 ML/MIN/1.73 M^2
EST. GFR  (NON AFRICAN AMERICAN): 21.5 ML/MIN/1.73 M^2
GLUCOSE SERPL-MCNC: 84 MG/DL (ref 70–110)
POTASSIUM SERPL-SCNC: 4.3 MMOL/L (ref 3.5–5.1)
SODIUM SERPL-SCNC: 141 MMOL/L (ref 136–145)
TROPONIN I SERPL DL<=0.01 NG/ML-MCNC: 0.05 NG/ML (ref 0–0.03)
TROPONIN I SERPL DL<=0.01 NG/ML-MCNC: 0.06 NG/ML (ref 0–0.03)

## 2021-04-16 PROCEDURE — 84484 ASSAY OF TROPONIN QUANT: CPT | Mod: 91 | Performed by: PHYSICIAN ASSISTANT

## 2021-04-16 PROCEDURE — 25000003 PHARM REV CODE 250: Performed by: NURSE PRACTITIONER

## 2021-04-16 PROCEDURE — 99220 PR INITIAL OBSERVATION CARE,LEVL III: CPT | Mod: ,,, | Performed by: NURSE PRACTITIONER

## 2021-04-16 PROCEDURE — G0378 HOSPITAL OBSERVATION PER HR: HCPCS

## 2021-04-16 PROCEDURE — 25000003 PHARM REV CODE 250: Performed by: PHYSICIAN ASSISTANT

## 2021-04-16 PROCEDURE — 84484 ASSAY OF TROPONIN QUANT: CPT | Performed by: PHYSICIAN ASSISTANT

## 2021-04-16 PROCEDURE — 96361 HYDRATE IV INFUSION ADD-ON: CPT

## 2021-04-16 PROCEDURE — 80048 BASIC METABOLIC PNL TOTAL CA: CPT | Performed by: PHYSICIAN ASSISTANT

## 2021-04-16 PROCEDURE — 63600175 PHARM REV CODE 636 W HCPCS: Performed by: EMERGENCY MEDICINE

## 2021-04-16 PROCEDURE — 99220 PR INITIAL OBSERVATION CARE,LEVL III: ICD-10-PCS | Mod: ,,, | Performed by: NURSE PRACTITIONER

## 2021-04-16 PROCEDURE — 99226 PR SUBSEQUENT OBSERVATION CARE,LEVEL III: CPT | Mod: ,,, | Performed by: PHYSICIAN ASSISTANT

## 2021-04-16 PROCEDURE — 99226 PR SUBSEQUENT OBSERVATION CARE,LEVEL III: ICD-10-PCS | Mod: ,,, | Performed by: PHYSICIAN ASSISTANT

## 2021-04-16 RX ORDER — PROMETHAZINE HYDROCHLORIDE 25 MG/1
25 TABLET ORAL EVERY 6 HOURS PRN
Status: DISCONTINUED | OUTPATIENT
Start: 2021-04-16 | End: 2021-04-17 | Stop reason: HOSPADM

## 2021-04-16 RX ORDER — LOVASTATIN 20 MG/1
20 TABLET ORAL NIGHTLY
Status: DISCONTINUED | OUTPATIENT
Start: 2021-04-16 | End: 2021-04-17

## 2021-04-16 RX ORDER — LEVOTHYROXINE SODIUM 88 UG/1
88 TABLET ORAL DAILY
Status: DISCONTINUED | OUTPATIENT
Start: 2021-04-16 | End: 2021-04-17 | Stop reason: HOSPADM

## 2021-04-16 RX ORDER — TALC
6 POWDER (GRAM) TOPICAL NIGHTLY PRN
Status: DISCONTINUED | OUTPATIENT
Start: 2021-04-16 | End: 2021-04-17 | Stop reason: HOSPADM

## 2021-04-16 RX ORDER — NIFEDIPINE 60 MG/1
60 TABLET, EXTENDED RELEASE ORAL DAILY
Status: DISCONTINUED | OUTPATIENT
Start: 2021-04-16 | End: 2021-04-16

## 2021-04-16 RX ORDER — FERROUS SULFATE 325(65) MG
325 TABLET, DELAYED RELEASE (ENTERIC COATED) ORAL DAILY
Status: DISCONTINUED | OUTPATIENT
Start: 2021-04-17 | End: 2021-04-17 | Stop reason: HOSPADM

## 2021-04-16 RX ORDER — SODIUM CHLORIDE 9 MG/ML
INJECTION, SOLUTION INTRAVENOUS CONTINUOUS
Status: DISCONTINUED | OUTPATIENT
Start: 2021-04-16 | End: 2021-04-17 | Stop reason: HOSPADM

## 2021-04-16 RX ORDER — CALCITRIOL 0.25 UG/1
0.25 CAPSULE ORAL
Status: DISCONTINUED | OUTPATIENT
Start: 2021-04-16 | End: 2021-04-17 | Stop reason: HOSPADM

## 2021-04-16 RX ORDER — ACETAMINOPHEN 325 MG/1
650 TABLET ORAL EVERY 4 HOURS PRN
Status: DISCONTINUED | OUTPATIENT
Start: 2021-04-16 | End: 2021-04-17 | Stop reason: HOSPADM

## 2021-04-16 RX ORDER — ONDANSETRON 2 MG/ML
4 INJECTION INTRAMUSCULAR; INTRAVENOUS EVERY 8 HOURS PRN
Status: DISCONTINUED | OUTPATIENT
Start: 2021-04-16 | End: 2021-04-17 | Stop reason: HOSPADM

## 2021-04-16 RX ORDER — ACETAMINOPHEN 500 MG
1000 TABLET ORAL EVERY 8 HOURS PRN
Status: DISCONTINUED | OUTPATIENT
Start: 2021-04-16 | End: 2021-04-17 | Stop reason: HOSPADM

## 2021-04-16 RX ORDER — SODIUM BICARBONATE 650 MG/1
650 TABLET ORAL 2 TIMES DAILY
Status: DISCONTINUED | OUTPATIENT
Start: 2021-04-16 | End: 2021-04-17 | Stop reason: HOSPADM

## 2021-04-16 RX ORDER — HYDROCHLOROTHIAZIDE 25 MG/1
25 TABLET ORAL DAILY
Status: DISCONTINUED | OUTPATIENT
Start: 2021-04-16 | End: 2021-04-16

## 2021-04-16 RX ORDER — AMOXICILLIN 250 MG
1 CAPSULE ORAL 2 TIMES DAILY
Status: DISCONTINUED | OUTPATIENT
Start: 2021-04-16 | End: 2021-04-17 | Stop reason: HOSPADM

## 2021-04-16 RX ORDER — HEPARIN SODIUM 5000 [USP'U]/ML
5000 INJECTION, SOLUTION INTRAVENOUS; SUBCUTANEOUS EVERY 8 HOURS
Status: DISCONTINUED | OUTPATIENT
Start: 2021-04-16 | End: 2021-04-17 | Stop reason: HOSPADM

## 2021-04-16 RX ORDER — GLUCAGON 1 MG
1 KIT INJECTION
Status: DISCONTINUED | OUTPATIENT
Start: 2021-04-16 | End: 2021-04-17 | Stop reason: HOSPADM

## 2021-04-16 RX ORDER — IBUPROFEN 200 MG
16 TABLET ORAL
Status: DISCONTINUED | OUTPATIENT
Start: 2021-04-16 | End: 2021-04-17 | Stop reason: HOSPADM

## 2021-04-16 RX ORDER — SODIUM CHLORIDE 0.9 % (FLUSH) 0.9 %
10 SYRINGE (ML) INJECTION
Status: DISCONTINUED | OUTPATIENT
Start: 2021-04-16 | End: 2021-04-17 | Stop reason: HOSPADM

## 2021-04-16 RX ORDER — IPRATROPIUM BROMIDE AND ALBUTEROL SULFATE 2.5; .5 MG/3ML; MG/3ML
3 SOLUTION RESPIRATORY (INHALATION) EVERY 6 HOURS PRN
Status: DISCONTINUED | OUTPATIENT
Start: 2021-04-16 | End: 2021-04-17 | Stop reason: HOSPADM

## 2021-04-16 RX ORDER — NAPROXEN SODIUM 220 MG/1
81 TABLET, FILM COATED ORAL DAILY
Status: DISCONTINUED | OUTPATIENT
Start: 2021-04-17 | End: 2021-04-17 | Stop reason: HOSPADM

## 2021-04-16 RX ORDER — ALUMINUM HYDROXIDE, MAGNESIUM HYDROXIDE, AND SIMETHICONE 2400; 240; 2400 MG/30ML; MG/30ML; MG/30ML
30 SUSPENSION ORAL EVERY 6 HOURS PRN
Status: DISCONTINUED | OUTPATIENT
Start: 2021-04-16 | End: 2021-04-17 | Stop reason: HOSPADM

## 2021-04-16 RX ORDER — IBUPROFEN 200 MG
24 TABLET ORAL
Status: DISCONTINUED | OUTPATIENT
Start: 2021-04-16 | End: 2021-04-17 | Stop reason: HOSPADM

## 2021-04-16 RX ADMIN — SODIUM BICARBONATE 650 MG TABLET 650 MG: at 09:04

## 2021-04-16 RX ADMIN — NIFEDIPINE 60 MG: 60 TABLET, FILM COATED, EXTENDED RELEASE ORAL at 08:04

## 2021-04-16 RX ADMIN — LOVASTATIN 20 MG: 20 TABLET ORAL at 09:04

## 2021-04-16 RX ADMIN — SODIUM CHLORIDE, SODIUM LACTATE, POTASSIUM CHLORIDE, AND CALCIUM CHLORIDE 500 ML: .6; .31; .03; .02 INJECTION, SOLUTION INTRAVENOUS at 01:04

## 2021-04-16 RX ADMIN — SODIUM CHLORIDE: 0.9 INJECTION, SOLUTION INTRAVENOUS at 02:04

## 2021-04-16 RX ADMIN — LEVOTHYROXINE SODIUM 88 MCG: 88 TABLET ORAL at 09:04

## 2021-04-16 RX ADMIN — LOVASTATIN 20 MG: 20 TABLET ORAL at 01:04

## 2021-04-16 RX ADMIN — HYDROCHLOROTHIAZIDE 25 MG: 25 TABLET ORAL at 08:04

## 2021-04-16 RX ADMIN — CALCITRIOL CAPSULES 0.25 MCG 0.25 MCG: 0.25 CAPSULE ORAL at 07:04

## 2021-04-17 VITALS
HEIGHT: 59 IN | HEART RATE: 106 BPM | WEIGHT: 176.56 LBS | BODY MASS INDEX: 35.6 KG/M2 | OXYGEN SATURATION: 97 % | TEMPERATURE: 100 F | SYSTOLIC BLOOD PRESSURE: 162 MMHG | DIASTOLIC BLOOD PRESSURE: 72 MMHG | RESPIRATION RATE: 20 BRPM

## 2021-04-17 LAB
ALBUMIN SERPL BCP-MCNC: 3.1 G/DL (ref 3.5–5.2)
ALP SERPL-CCNC: 70 U/L (ref 55–135)
ALT SERPL W/O P-5'-P-CCNC: 9 U/L (ref 10–44)
ANION GAP SERPL CALC-SCNC: 12 MMOL/L (ref 8–16)
AST SERPL-CCNC: 26 U/L (ref 10–40)
BACTERIA #/AREA URNS AUTO: ABNORMAL /HPF
BASOPHILS # BLD AUTO: 0.02 K/UL (ref 0–0.2)
BASOPHILS NFR BLD: 0.2 % (ref 0–1.9)
BILIRUB SERPL-MCNC: 0.5 MG/DL (ref 0.1–1)
BILIRUB UR QL STRIP: NEGATIVE
BUN SERPL-MCNC: 30 MG/DL (ref 8–23)
CALCIUM SERPL-MCNC: 9.5 MG/DL (ref 8.7–10.5)
CHLORIDE SERPL-SCNC: 106 MMOL/L (ref 95–110)
CLARITY UR REFRACT.AUTO: ABNORMAL
CO2 SERPL-SCNC: 22 MMOL/L (ref 23–29)
COLOR UR AUTO: YELLOW
CORTIS SERPL-MCNC: 15.3 UG/DL (ref 4.3–22.4)
CREAT SERPL-MCNC: 1.7 MG/DL (ref 0.5–1.4)
DIFFERENTIAL METHOD: NORMAL
EOSINOPHIL # BLD AUTO: 0.2 K/UL (ref 0–0.5)
EOSINOPHIL NFR BLD: 1.8 % (ref 0–8)
ERYTHROCYTE [DISTWIDTH] IN BLOOD BY AUTOMATED COUNT: 14.4 % (ref 11.5–14.5)
EST. GFR  (AFRICAN AMERICAN): 30.2 ML/MIN/1.73 M^2
EST. GFR  (NON AFRICAN AMERICAN): 26.2 ML/MIN/1.73 M^2
GLUCOSE SERPL-MCNC: 85 MG/DL (ref 70–110)
GLUCOSE UR QL STRIP: NEGATIVE
HCT VFR BLD AUTO: 38.8 % (ref 37–48.5)
HGB BLD-MCNC: 12.5 G/DL (ref 12–16)
HGB UR QL STRIP: NEGATIVE
HYALINE CASTS UR QL AUTO: 1 /LPF
IMM GRANULOCYTES # BLD AUTO: 0.04 K/UL (ref 0–0.04)
IMM GRANULOCYTES NFR BLD AUTO: 0.5 % (ref 0–0.5)
KETONES UR QL STRIP: ABNORMAL
LEUKOCYTE ESTERASE UR QL STRIP: NEGATIVE
LYMPHOCYTES # BLD AUTO: 1.8 K/UL (ref 1–4.8)
LYMPHOCYTES NFR BLD: 20.7 % (ref 18–48)
MAGNESIUM SERPL-MCNC: 1.7 MG/DL (ref 1.6–2.6)
MCH RBC QN AUTO: 29.7 PG (ref 27–31)
MCHC RBC AUTO-ENTMCNC: 32.2 G/DL (ref 32–36)
MCV RBC AUTO: 92 FL (ref 82–98)
MICROSCOPIC COMMENT: ABNORMAL
MONOCYTES # BLD AUTO: 0.5 K/UL (ref 0.3–1)
MONOCYTES NFR BLD: 6.2 % (ref 4–15)
NEUTROPHILS # BLD AUTO: 6.2 K/UL (ref 1.8–7.7)
NEUTROPHILS NFR BLD: 70.6 % (ref 38–73)
NITRITE UR QL STRIP: POSITIVE
NRBC BLD-RTO: 0 /100 WBC
PH UR STRIP: 6 [PH] (ref 5–8)
PHOSPHATE SERPL-MCNC: 2.4 MG/DL (ref 2.7–4.5)
PLATELET # BLD AUTO: 229 K/UL (ref 150–450)
PMV BLD AUTO: 10.1 FL (ref 9.2–12.9)
POTASSIUM SERPL-SCNC: 3.3 MMOL/L (ref 3.5–5.1)
PROT SERPL-MCNC: 7 G/DL (ref 6–8.4)
PROT UR QL STRIP: NEGATIVE
RBC # BLD AUTO: 4.21 M/UL (ref 4–5.4)
RBC #/AREA URNS AUTO: 1 /HPF (ref 0–4)
SODIUM SERPL-SCNC: 140 MMOL/L (ref 136–145)
SP GR UR STRIP: 1.01 (ref 1–1.03)
SQUAMOUS #/AREA URNS AUTO: 0 /HPF
T4 FREE SERPL-MCNC: 1.36 NG/DL (ref 0.71–1.51)
TSH SERPL DL<=0.005 MIU/L-ACNC: 1.3 UIU/ML (ref 0.4–4)
URN SPEC COLLECT METH UR: ABNORMAL
WBC # BLD AUTO: 8.75 K/UL (ref 3.9–12.7)
WBC #/AREA URNS AUTO: 0 /HPF (ref 0–5)

## 2021-04-17 PROCEDURE — 84100 ASSAY OF PHOSPHORUS: CPT | Performed by: NURSE PRACTITIONER

## 2021-04-17 PROCEDURE — 97162 PT EVAL MOD COMPLEX 30 MIN: CPT

## 2021-04-17 PROCEDURE — 83735 ASSAY OF MAGNESIUM: CPT | Performed by: NURSE PRACTITIONER

## 2021-04-17 PROCEDURE — 97116 GAIT TRAINING THERAPY: CPT

## 2021-04-17 PROCEDURE — 25000003 PHARM REV CODE 250: Performed by: NURSE PRACTITIONER

## 2021-04-17 PROCEDURE — 96372 THER/PROPH/DIAG INJ SC/IM: CPT | Mod: 59

## 2021-04-17 PROCEDURE — 96361 HYDRATE IV INFUSION ADD-ON: CPT

## 2021-04-17 PROCEDURE — 97165 OT EVAL LOW COMPLEX 30 MIN: CPT

## 2021-04-17 PROCEDURE — 99217 PR OBSERVATION CARE DISCHARGE: CPT | Mod: ,,, | Performed by: NURSE PRACTITIONER

## 2021-04-17 PROCEDURE — 84443 ASSAY THYROID STIM HORMONE: CPT | Performed by: NURSE PRACTITIONER

## 2021-04-17 PROCEDURE — 99217 PR OBSERVATION CARE DISCHARGE: ICD-10-PCS | Mod: ,,, | Performed by: NURSE PRACTITIONER

## 2021-04-17 PROCEDURE — 80053 COMPREHEN METABOLIC PANEL: CPT | Performed by: NURSE PRACTITIONER

## 2021-04-17 PROCEDURE — 81001 URINALYSIS AUTO W/SCOPE: CPT | Performed by: NURSE PRACTITIONER

## 2021-04-17 PROCEDURE — G0378 HOSPITAL OBSERVATION PER HR: HCPCS

## 2021-04-17 PROCEDURE — 82533 TOTAL CORTISOL: CPT | Performed by: NURSE PRACTITIONER

## 2021-04-17 PROCEDURE — 63600175 PHARM REV CODE 636 W HCPCS: Performed by: NURSE PRACTITIONER

## 2021-04-17 PROCEDURE — 36415 COLL VENOUS BLD VENIPUNCTURE: CPT | Performed by: NURSE PRACTITIONER

## 2021-04-17 PROCEDURE — 85025 COMPLETE CBC W/AUTO DIFF WBC: CPT | Performed by: NURSE PRACTITIONER

## 2021-04-17 PROCEDURE — 84439 ASSAY OF FREE THYROXINE: CPT | Performed by: NURSE PRACTITIONER

## 2021-04-17 RX ORDER — FERROUS SULFATE 325(65) MG
325 TABLET, DELAYED RELEASE (ENTERIC COATED) ORAL DAILY
Qty: 120 TABLET | Refills: 6
Start: 2021-04-17

## 2021-04-17 RX ORDER — POTASSIUM CHLORIDE 750 MG/1
60 CAPSULE, EXTENDED RELEASE ORAL ONCE
Status: COMPLETED | OUTPATIENT
Start: 2021-04-17 | End: 2021-04-17

## 2021-04-17 RX ORDER — MAGNESIUM SULFATE HEPTAHYDRATE 40 MG/ML
2 INJECTION, SOLUTION INTRAVENOUS
Status: COMPLETED | OUTPATIENT
Start: 2021-04-17 | End: 2021-04-17

## 2021-04-17 RX ORDER — HYDROCHLOROTHIAZIDE 25 MG/1
25 TABLET ORAL EVERY OTHER DAY
Qty: 15 TABLET | Refills: 11
Start: 2021-04-17 | End: 2021-04-19 | Stop reason: SDUPTHER

## 2021-04-17 RX ADMIN — SODIUM CHLORIDE: 0.9 INJECTION, SOLUTION INTRAVENOUS at 12:04

## 2021-04-17 RX ADMIN — THERA TABS 1 TABLET: TAB at 10:04

## 2021-04-17 RX ADMIN — LEVOTHYROXINE SODIUM 88 MCG: 88 TABLET ORAL at 10:04

## 2021-04-17 RX ADMIN — MAGNESIUM SULFATE 2 G: 2 INJECTION INTRAVENOUS at 01:04

## 2021-04-17 RX ADMIN — ASPIRIN 81 MG CHEWABLE TABLET 81 MG: 81 TABLET CHEWABLE at 10:04

## 2021-04-17 RX ADMIN — POTASSIUM CHLORIDE 60 MEQ: 10 CAPSULE, COATED, EXTENDED RELEASE ORAL at 01:04

## 2021-04-17 RX ADMIN — SODIUM BICARBONATE 650 MG TABLET 650 MG: at 10:04

## 2021-04-17 RX ADMIN — ACETAMINOPHEN 1000 MG: 500 TABLET, FILM COATED ORAL at 06:04

## 2021-04-17 RX ADMIN — MAGNESIUM SULFATE 2 G: 2 INJECTION INTRAVENOUS at 04:04

## 2021-04-17 RX ADMIN — HEPARIN SODIUM 5000 UNITS: 5000 INJECTION INTRAVENOUS; SUBCUTANEOUS at 05:04

## 2021-04-17 RX ADMIN — DOCUSATE SODIUM 50MG AND SENNOSIDES 8.6MG 1 TABLET: 8.6; 5 TABLET, FILM COATED ORAL at 10:04

## 2021-04-17 RX ADMIN — HEPARIN SODIUM 5000 UNITS: 5000 INJECTION INTRAVENOUS; SUBCUTANEOUS at 04:04

## 2021-04-17 RX ADMIN — FERROUS SULFATE TAB EC 325 MG (65 MG FE EQUIVALENT) 325 MG: 325 (65 FE) TABLET DELAYED RESPONSE at 10:04

## 2021-04-19 ENCOUNTER — TELEPHONE (OUTPATIENT)
Dept: INTERNAL MEDICINE | Facility: CLINIC | Age: 86
End: 2021-04-19

## 2021-04-19 RX ORDER — SODIUM BICARBONATE 650 MG/1
650 TABLET ORAL 2 TIMES DAILY
Qty: 180 TABLET | Refills: 4 | COMMUNITY
Start: 2021-04-19 | End: 2021-05-14

## 2021-04-19 RX ORDER — CALCITRIOL 0.25 UG/1
0.25 CAPSULE ORAL
Qty: 60 CAPSULE | Refills: 6 | Status: ON HOLD | OUTPATIENT
Start: 2021-04-19 | End: 2022-01-01 | Stop reason: HOSPADM

## 2021-04-20 RX ORDER — LEVOTHYROXINE SODIUM 88 UG/1
88 TABLET ORAL DAILY
Qty: 90 TABLET | Refills: 1 | Status: SHIPPED | OUTPATIENT
Start: 2021-01-01 | End: 2021-01-01

## 2021-04-23 ENCOUNTER — OFFICE VISIT (OUTPATIENT)
Dept: INTERNAL MEDICINE | Facility: CLINIC | Age: 86
End: 2021-04-23
Payer: MEDICARE

## 2021-04-23 ENCOUNTER — LAB VISIT (OUTPATIENT)
Dept: LAB | Facility: HOSPITAL | Age: 86
End: 2021-04-23
Attending: INTERNAL MEDICINE
Payer: MEDICARE

## 2021-04-23 DIAGNOSIS — I10 HYPERTENSION, UNSPECIFIED TYPE: ICD-10-CM

## 2021-04-23 DIAGNOSIS — I10 HYPERTENSION, UNSPECIFIED TYPE: Primary | ICD-10-CM

## 2021-04-23 LAB
ANION GAP SERPL CALC-SCNC: 11 MMOL/L (ref 8–16)
BUN SERPL-MCNC: 37 MG/DL (ref 8–23)
CALCIUM SERPL-MCNC: 10.1 MG/DL (ref 8.7–10.5)
CHLORIDE SERPL-SCNC: 100 MMOL/L (ref 95–110)
CO2 SERPL-SCNC: 28 MMOL/L (ref 23–29)
CREAT SERPL-MCNC: 2.3 MG/DL (ref 0.5–1.4)
EST. GFR  (AFRICAN AMERICAN): 20.9 ML/MIN/1.73 M^2
EST. GFR  (NON AFRICAN AMERICAN): 18.2 ML/MIN/1.73 M^2
GLUCOSE SERPL-MCNC: 93 MG/DL (ref 70–110)
POTASSIUM SERPL-SCNC: 4.4 MMOL/L (ref 3.5–5.1)
SODIUM SERPL-SCNC: 139 MMOL/L (ref 136–145)

## 2021-04-23 PROCEDURE — 1159F MED LIST DOCD IN RCRD: CPT | Mod: S$GLB,,, | Performed by: INTERNAL MEDICINE

## 2021-04-23 PROCEDURE — 36415 COLL VENOUS BLD VENIPUNCTURE: CPT | Performed by: INTERNAL MEDICINE

## 2021-04-23 PROCEDURE — 99215 PR OFFICE/OUTPT VISIT, EST, LEVL V, 40-54 MIN: ICD-10-PCS | Mod: S$GLB,,, | Performed by: INTERNAL MEDICINE

## 2021-04-23 PROCEDURE — 99499 RISK ADDL DX/OHS AUDIT: ICD-10-PCS | Mod: S$GLB,,, | Performed by: INTERNAL MEDICINE

## 2021-04-23 PROCEDURE — 99999 PR PBB SHADOW E&M-EST. PATIENT-LVL III: CPT | Mod: PBBFAC,,, | Performed by: INTERNAL MEDICINE

## 2021-04-23 PROCEDURE — 1126F PR PAIN SEVERITY QUANTIFIED, NO PAIN PRESENT: ICD-10-PCS | Mod: S$GLB,,, | Performed by: INTERNAL MEDICINE

## 2021-04-23 PROCEDURE — 1159F PR MEDICATION LIST DOCUMENTED IN MEDICAL RECORD: ICD-10-PCS | Mod: S$GLB,,, | Performed by: INTERNAL MEDICINE

## 2021-04-23 PROCEDURE — 1101F PR PT FALLS ASSESS DOC 0-1 FALLS W/OUT INJ PAST YR: ICD-10-PCS | Mod: CPTII,S$GLB,, | Performed by: INTERNAL MEDICINE

## 2021-04-23 PROCEDURE — 1101F PT FALLS ASSESS-DOCD LE1/YR: CPT | Mod: CPTII,S$GLB,, | Performed by: INTERNAL MEDICINE

## 2021-04-23 PROCEDURE — 1126F AMNT PAIN NOTED NONE PRSNT: CPT | Mod: S$GLB,,, | Performed by: INTERNAL MEDICINE

## 2021-04-23 PROCEDURE — 99999 PR PBB SHADOW E&M-EST. PATIENT-LVL III: ICD-10-PCS | Mod: PBBFAC,,, | Performed by: INTERNAL MEDICINE

## 2021-04-23 PROCEDURE — 99499 UNLISTED E&M SERVICE: CPT | Mod: S$GLB,,, | Performed by: INTERNAL MEDICINE

## 2021-04-23 PROCEDURE — 3288F FALL RISK ASSESSMENT DOCD: CPT | Mod: CPTII,S$GLB,, | Performed by: INTERNAL MEDICINE

## 2021-04-23 PROCEDURE — 99215 OFFICE O/P EST HI 40 MIN: CPT | Mod: S$GLB,,, | Performed by: INTERNAL MEDICINE

## 2021-04-23 PROCEDURE — 80048 BASIC METABOLIC PNL TOTAL CA: CPT | Performed by: INTERNAL MEDICINE

## 2021-04-23 PROCEDURE — 3288F PR FALLS RISK ASSESSMENT DOCUMENTED: ICD-10-PCS | Mod: CPTII,S$GLB,, | Performed by: INTERNAL MEDICINE

## 2021-04-24 ENCOUNTER — TELEPHONE (OUTPATIENT)
Dept: INTERNAL MEDICINE | Facility: CLINIC | Age: 86
End: 2021-04-24

## 2021-04-24 DIAGNOSIS — N18.9 CHRONIC RENAL IMPAIRMENT, UNSPECIFIED CKD STAGE: Primary | ICD-10-CM

## 2021-04-28 ENCOUNTER — TELEPHONE (OUTPATIENT)
Dept: INTERNAL MEDICINE | Facility: CLINIC | Age: 86
End: 2021-04-28

## 2021-04-29 VITALS
SYSTOLIC BLOOD PRESSURE: 116 MMHG | HEIGHT: 59 IN | BODY MASS INDEX: 35.67 KG/M2 | OXYGEN SATURATION: 99 % | DIASTOLIC BLOOD PRESSURE: 62 MMHG | HEART RATE: 84 BPM | TEMPERATURE: 99 F

## 2021-04-30 ENCOUNTER — PES CALL (OUTPATIENT)
Dept: ADMINISTRATIVE | Facility: CLINIC | Age: 86
End: 2021-04-30

## 2021-05-06 ENCOUNTER — TELEPHONE (OUTPATIENT)
Dept: NEPHROLOGY | Facility: CLINIC | Age: 86
End: 2021-05-06

## 2021-05-17 RX ORDER — NIFEDIPINE 60 MG/1
60 TABLET, EXTENDED RELEASE ORAL DAILY
Qty: 30 TABLET | Refills: 1 | Status: SHIPPED | OUTPATIENT
Start: 2021-05-17 | End: 2022-01-01 | Stop reason: SDUPTHER

## 2021-06-01 ENCOUNTER — LAB VISIT (OUTPATIENT)
Dept: PRIMARY CARE CLINIC | Facility: CLINIC | Age: 86
End: 2021-06-01
Payer: MEDICARE

## 2021-06-01 DIAGNOSIS — N18.4 CHRONIC KIDNEY DISEASE, STAGE IV (SEVERE): ICD-10-CM

## 2021-06-01 DIAGNOSIS — D63.1 ANEMIA IN STAGE 4 CHRONIC KIDNEY DISEASE: ICD-10-CM

## 2021-06-01 DIAGNOSIS — I10 ESSENTIAL HYPERTENSION: ICD-10-CM

## 2021-06-01 DIAGNOSIS — N18.4 ANEMIA IN STAGE 4 CHRONIC KIDNEY DISEASE: ICD-10-CM

## 2021-06-01 DIAGNOSIS — I73.9 PVD (PERIPHERAL VASCULAR DISEASE): ICD-10-CM

## 2021-06-01 LAB
ALBUMIN SERPL BCP-MCNC: 3.4 G/DL (ref 3.5–5.2)
ANION GAP SERPL CALC-SCNC: 12 MMOL/L (ref 8–16)
BUN SERPL-MCNC: 32 MG/DL (ref 10–30)
CALCIUM SERPL-MCNC: 10 MG/DL (ref 8.7–10.5)
CHLORIDE SERPL-SCNC: 106 MMOL/L (ref 95–110)
CO2 SERPL-SCNC: 23 MMOL/L (ref 23–29)
CREAT SERPL-MCNC: 1.8 MG/DL (ref 0.5–1.4)
ERYTHROCYTE [DISTWIDTH] IN BLOOD BY AUTOMATED COUNT: 15 % (ref 11.5–14.5)
EST. GFR  (AFRICAN AMERICAN): 28 ML/MIN/1.73 M^2
EST. GFR  (NON AFRICAN AMERICAN): 24.3 ML/MIN/1.73 M^2
FERRITIN SERPL-MCNC: 271 NG/ML (ref 20–300)
GLUCOSE SERPL-MCNC: 81 MG/DL (ref 70–110)
HCT VFR BLD AUTO: 39.1 % (ref 37–48.5)
HGB BLD-MCNC: 12.4 G/DL (ref 12–16)
IRON SERPL-MCNC: 60 UG/DL (ref 30–160)
MCH RBC QN AUTO: 29.9 PG (ref 27–31)
MCHC RBC AUTO-ENTMCNC: 31.7 G/DL (ref 32–36)
MCV RBC AUTO: 94 FL (ref 82–98)
PHOSPHATE SERPL-MCNC: 2.5 MG/DL (ref 2.7–4.5)
PLATELET # BLD AUTO: 251 K/UL (ref 150–450)
PMV BLD AUTO: 11.3 FL (ref 9.2–12.9)
POTASSIUM SERPL-SCNC: 3.9 MMOL/L (ref 3.5–5.1)
PTH-INTACT SERPL-MCNC: 306 PG/ML (ref 9–77)
RBC # BLD AUTO: 4.15 M/UL (ref 4–5.4)
SATURATED IRON: 22 % (ref 20–50)
SODIUM SERPL-SCNC: 141 MMOL/L (ref 136–145)
TOTAL IRON BINDING CAPACITY: 278 UG/DL (ref 250–450)
TRANSFERRIN SERPL-MCNC: 188 MG/DL (ref 200–375)
WBC # BLD AUTO: 6.25 K/UL (ref 3.9–12.7)

## 2021-06-01 PROCEDURE — 80069 RENAL FUNCTION PANEL: CPT | Performed by: INTERNAL MEDICINE

## 2021-06-01 PROCEDURE — 36415 COLL VENOUS BLD VENIPUNCTURE: CPT | Performed by: INTERNAL MEDICINE

## 2021-06-01 PROCEDURE — 83970 ASSAY OF PARATHORMONE: CPT | Performed by: INTERNAL MEDICINE

## 2021-06-01 PROCEDURE — 36415 PR COLLECTION VENOUS BLOOD,VENIPUNCTURE: ICD-10-PCS | Mod: S$GLB,,, | Performed by: FAMILY MEDICINE

## 2021-06-01 PROCEDURE — 82728 ASSAY OF FERRITIN: CPT | Performed by: INTERNAL MEDICINE

## 2021-06-01 PROCEDURE — 85027 COMPLETE CBC AUTOMATED: CPT | Performed by: INTERNAL MEDICINE

## 2021-06-01 PROCEDURE — 36415 COLL VENOUS BLD VENIPUNCTURE: CPT | Mod: S$GLB,,, | Performed by: FAMILY MEDICINE

## 2021-06-01 PROCEDURE — 83540 ASSAY OF IRON: CPT | Performed by: INTERNAL MEDICINE

## 2021-06-02 ENCOUNTER — OFFICE VISIT (OUTPATIENT)
Dept: NEPHROLOGY | Facility: CLINIC | Age: 86
End: 2021-06-02
Payer: MEDICARE

## 2021-06-02 VITALS
WEIGHT: 160.69 LBS | HEIGHT: 59 IN | DIASTOLIC BLOOD PRESSURE: 70 MMHG | SYSTOLIC BLOOD PRESSURE: 140 MMHG | HEART RATE: 91 BPM | BODY MASS INDEX: 32.4 KG/M2 | OXYGEN SATURATION: 97 %

## 2021-06-02 DIAGNOSIS — I73.9 PVD (PERIPHERAL VASCULAR DISEASE): Primary | ICD-10-CM

## 2021-06-02 DIAGNOSIS — N18.4 CHRONIC KIDNEY DISEASE, STAGE IV (SEVERE): ICD-10-CM

## 2021-06-02 DIAGNOSIS — I10 ESSENTIAL HYPERTENSION: ICD-10-CM

## 2021-06-02 PROCEDURE — 1126F AMNT PAIN NOTED NONE PRSNT: CPT | Mod: S$GLB,,, | Performed by: INTERNAL MEDICINE

## 2021-06-02 PROCEDURE — 99999 PR PBB SHADOW E&M-EST. PATIENT-LVL III: ICD-10-PCS | Mod: PBBFAC,,, | Performed by: INTERNAL MEDICINE

## 2021-06-02 PROCEDURE — 99999 PR PBB SHADOW E&M-EST. PATIENT-LVL III: CPT | Mod: PBBFAC,,, | Performed by: INTERNAL MEDICINE

## 2021-06-02 PROCEDURE — 1101F PT FALLS ASSESS-DOCD LE1/YR: CPT | Mod: CPTII,S$GLB,, | Performed by: INTERNAL MEDICINE

## 2021-06-02 PROCEDURE — 1159F MED LIST DOCD IN RCRD: CPT | Mod: S$GLB,,, | Performed by: INTERNAL MEDICINE

## 2021-06-02 PROCEDURE — 99213 PR OFFICE/OUTPT VISIT, EST, LEVL III, 20-29 MIN: ICD-10-PCS | Mod: S$GLB,,, | Performed by: INTERNAL MEDICINE

## 2021-06-02 PROCEDURE — 1101F PR PT FALLS ASSESS DOC 0-1 FALLS W/OUT INJ PAST YR: ICD-10-PCS | Mod: CPTII,S$GLB,, | Performed by: INTERNAL MEDICINE

## 2021-06-02 PROCEDURE — 3288F PR FALLS RISK ASSESSMENT DOCUMENTED: ICD-10-PCS | Mod: CPTII,S$GLB,, | Performed by: INTERNAL MEDICINE

## 2021-06-02 PROCEDURE — 1126F PR PAIN SEVERITY QUANTIFIED, NO PAIN PRESENT: ICD-10-PCS | Mod: S$GLB,,, | Performed by: INTERNAL MEDICINE

## 2021-06-02 PROCEDURE — 1159F PR MEDICATION LIST DOCUMENTED IN MEDICAL RECORD: ICD-10-PCS | Mod: S$GLB,,, | Performed by: INTERNAL MEDICINE

## 2021-06-02 PROCEDURE — 3288F FALL RISK ASSESSMENT DOCD: CPT | Mod: CPTII,S$GLB,, | Performed by: INTERNAL MEDICINE

## 2021-06-02 PROCEDURE — 99213 OFFICE O/P EST LOW 20 MIN: CPT | Mod: S$GLB,,, | Performed by: INTERNAL MEDICINE

## 2021-06-23 PROBLEM — N25.81 SECONDARY HYPERPARATHYROIDISM OF RENAL ORIGIN: Status: ACTIVE | Noted: 2021-01-01

## 2021-12-30 NOTE — TELEPHONE ENCOUNTER
----- Message from Tess Longoria MD sent at 8/15/2017  2:09 PM CDT -----  Irene - Please let her know that test results were normal. No evidence of H.pylori infection in stool testing.  
Results given to patient per Dr. Longoria. Patient verbalized understanding.  
yes

## 2022-01-01 ENCOUNTER — EXTERNAL HOME HEALTH (OUTPATIENT)
Dept: HOME HEALTH SERVICES | Facility: HOSPITAL | Age: 87
End: 2022-01-01
Payer: MEDICARE

## 2022-01-01 ENCOUNTER — DOCUMENT SCAN (OUTPATIENT)
Dept: HOME HEALTH SERVICES | Facility: HOSPITAL | Age: 87
End: 2022-01-01
Payer: MEDICARE

## 2022-01-01 ENCOUNTER — OUTPATIENT CASE MANAGEMENT (OUTPATIENT)
Dept: ADMINISTRATIVE | Facility: OTHER | Age: 87
End: 2022-01-01
Payer: MEDICARE

## 2022-01-01 ENCOUNTER — TELEPHONE (OUTPATIENT)
Dept: HEPATOLOGY | Facility: HOSPITAL | Age: 87
End: 2022-01-01
Payer: MEDICARE

## 2022-01-01 ENCOUNTER — LAB VISIT (OUTPATIENT)
Dept: LAB | Facility: OTHER | Age: 87
End: 2022-01-01
Payer: MEDICARE

## 2022-01-01 ENCOUNTER — PES CALL (OUTPATIENT)
Dept: ADMINISTRATIVE | Facility: CLINIC | Age: 87
End: 2022-01-01
Payer: MEDICARE

## 2022-01-01 ENCOUNTER — TELEPHONE (OUTPATIENT)
Dept: INTERNAL MEDICINE | Facility: CLINIC | Age: 87
End: 2022-01-01
Payer: MEDICARE

## 2022-01-01 ENCOUNTER — HOSPITAL ENCOUNTER (INPATIENT)
Facility: HOSPITAL | Age: 87
LOS: 5 days | Discharge: HOME | DRG: 291 | End: 2022-02-02
Attending: EMERGENCY MEDICINE | Admitting: INTERNAL MEDICINE
Payer: MEDICARE

## 2022-01-01 ENCOUNTER — PATIENT OUTREACH (OUTPATIENT)
Dept: ADMINISTRATIVE | Facility: OTHER | Age: 87
End: 2022-01-01
Payer: MEDICARE

## 2022-01-01 ENCOUNTER — OFFICE VISIT (OUTPATIENT)
Dept: INTERNAL MEDICINE | Facility: CLINIC | Age: 87
End: 2022-01-01
Payer: MEDICARE

## 2022-01-01 ENCOUNTER — DOCUMENT SCAN (OUTPATIENT)
Dept: HOME HEALTH SERVICES | Facility: HOSPITAL | Age: 87
End: 2022-01-01

## 2022-01-01 ENCOUNTER — HOSPITAL ENCOUNTER (INPATIENT)
Facility: HOSPITAL | Age: 87
LOS: 16 days | Discharge: HOME HEALTH | DRG: 291 | End: 2022-02-18
Attending: HOSPITALIST | Admitting: INTERNAL MEDICINE
Payer: MEDICARE

## 2022-01-01 ENCOUNTER — CARE AT HOME (OUTPATIENT)
Dept: HOME HEALTH SERVICES | Facility: CLINIC | Age: 87
End: 2022-01-01
Payer: MEDICARE

## 2022-01-01 ENCOUNTER — LAB VISIT (OUTPATIENT)
Dept: PRIMARY CARE CLINIC | Facility: CLINIC | Age: 87
End: 2022-01-01
Payer: MEDICARE

## 2022-01-01 ENCOUNTER — LAB VISIT (OUTPATIENT)
Dept: PRIMARY CARE CLINIC | Facility: CLINIC | Age: 87
DRG: 291 | End: 2022-01-01
Payer: MEDICARE

## 2022-01-01 ENCOUNTER — PATIENT OUTREACH (OUTPATIENT)
Dept: ADMINISTRATIVE | Facility: CLINIC | Age: 87
End: 2022-01-01
Payer: MEDICARE

## 2022-01-01 ENCOUNTER — TELEPHONE (OUTPATIENT)
Dept: INTERNAL MEDICINE | Facility: CLINIC | Age: 87
End: 2022-01-01

## 2022-01-01 ENCOUNTER — HOSPITAL ENCOUNTER (INPATIENT)
Facility: HOSPITAL | Age: 87
LOS: 10 days | Discharge: HOME HEALTH | DRG: 871 | End: 2022-04-19
Attending: EMERGENCY MEDICINE | Admitting: STUDENT IN AN ORGANIZED HEALTH CARE EDUCATION/TRAINING PROGRAM
Payer: MEDICARE

## 2022-01-01 ENCOUNTER — NURSE TRIAGE (OUTPATIENT)
Dept: ADMINISTRATIVE | Facility: CLINIC | Age: 87
End: 2022-01-01
Payer: MEDICARE

## 2022-01-01 VITALS
TEMPERATURE: 98 F | DIASTOLIC BLOOD PRESSURE: 66 MMHG | HEART RATE: 74 BPM | OXYGEN SATURATION: 98 % | RESPIRATION RATE: 20 BRPM | SYSTOLIC BLOOD PRESSURE: 128 MMHG

## 2022-01-01 VITALS
BODY MASS INDEX: 29.9 KG/M2 | OXYGEN SATURATION: 96 % | HEART RATE: 95 BPM | RESPIRATION RATE: 18 BRPM | TEMPERATURE: 98 F | DIASTOLIC BLOOD PRESSURE: 92 MMHG | WEIGHT: 142.44 LBS | HEIGHT: 58 IN | SYSTOLIC BLOOD PRESSURE: 141 MMHG

## 2022-01-01 VITALS
HEART RATE: 78 BPM | DIASTOLIC BLOOD PRESSURE: 70 MMHG | RESPIRATION RATE: 20 BRPM | TEMPERATURE: 98 F | SYSTOLIC BLOOD PRESSURE: 120 MMHG | OXYGEN SATURATION: 98 %

## 2022-01-01 VITALS
OXYGEN SATURATION: 98 % | HEART RATE: 71 BPM | DIASTOLIC BLOOD PRESSURE: 53 MMHG | SYSTOLIC BLOOD PRESSURE: 117 MMHG | TEMPERATURE: 98 F | RESPIRATION RATE: 20 BRPM

## 2022-01-01 VITALS
BODY MASS INDEX: 28.91 KG/M2 | HEART RATE: 108 BPM | TEMPERATURE: 98 F | HEIGHT: 57 IN | RESPIRATION RATE: 18 BRPM | SYSTOLIC BLOOD PRESSURE: 156 MMHG | DIASTOLIC BLOOD PRESSURE: 68 MMHG | WEIGHT: 134 LBS | OXYGEN SATURATION: 96 %

## 2022-01-01 VITALS
BODY MASS INDEX: 34.57 KG/M2 | HEART RATE: 86 BPM | RESPIRATION RATE: 18 BRPM | SYSTOLIC BLOOD PRESSURE: 152 MMHG | TEMPERATURE: 99 F | DIASTOLIC BLOOD PRESSURE: 67 MMHG | WEIGHT: 164.69 LBS | OXYGEN SATURATION: 97 % | HEIGHT: 58 IN

## 2022-01-01 VITALS
HEIGHT: 59 IN | DIASTOLIC BLOOD PRESSURE: 116 MMHG | HEART RATE: 111 BPM | OXYGEN SATURATION: 97 % | BODY MASS INDEX: 32.32 KG/M2 | SYSTOLIC BLOOD PRESSURE: 164 MMHG

## 2022-01-01 DIAGNOSIS — R41.82 ALTERED MENTAL STATUS: ICD-10-CM

## 2022-01-01 DIAGNOSIS — I50.9 HEART FAILURE: ICD-10-CM

## 2022-01-01 DIAGNOSIS — F02.818 LATE ONSET ALZHEIMER'S DEMENTIA WITH BEHAVIORAL DISTURBANCE: Chronic | ICD-10-CM

## 2022-01-01 DIAGNOSIS — B95.8 BACTEREMIA DUE TO STAPHYLOCOCCUS: ICD-10-CM

## 2022-01-01 DIAGNOSIS — T14.8XXA BLOOD BLISTER: ICD-10-CM

## 2022-01-01 DIAGNOSIS — I11.0 HYPERTENSIVE HEART DISEASE WITH CONGESTIVE HEART FAILURE: Primary | ICD-10-CM

## 2022-01-01 DIAGNOSIS — N18.4 ANEMIA IN STAGE 4 CHRONIC KIDNEY DISEASE: ICD-10-CM

## 2022-01-01 DIAGNOSIS — L89.301 PRESSURE INJURY OF BUTTOCK, STAGE 1, UNSPECIFIED LATERALITY: ICD-10-CM

## 2022-01-01 DIAGNOSIS — I10 ESSENTIAL HYPERTENSION: ICD-10-CM

## 2022-01-01 DIAGNOSIS — G30.1 LATE ONSET ALZHEIMER'S DEMENTIA WITH BEHAVIORAL DISTURBANCE: Chronic | ICD-10-CM

## 2022-01-01 DIAGNOSIS — M10.9 ACUTE GOUT, UNSPECIFIED CAUSE, UNSPECIFIED SITE: ICD-10-CM

## 2022-01-01 DIAGNOSIS — M79.89 LEG SWELLING: ICD-10-CM

## 2022-01-01 DIAGNOSIS — M79.672 BILATERAL FOOT PAIN: ICD-10-CM

## 2022-01-01 DIAGNOSIS — I10 HYPERTENSION, UNSPECIFIED TYPE: Primary | ICD-10-CM

## 2022-01-01 DIAGNOSIS — I11.0 HYPERTENSIVE HEART DISEASE WITH CONGESTIVE HEART FAILURE: ICD-10-CM

## 2022-01-01 DIAGNOSIS — L89.620 PRESSURE INJURY OF LEFT HEEL, UNSTAGEABLE: ICD-10-CM

## 2022-01-01 DIAGNOSIS — N17.9 AKI (ACUTE KIDNEY INJURY): ICD-10-CM

## 2022-01-01 DIAGNOSIS — Z20.822 ENCOUNTER FOR LABORATORY TESTING FOR COVID-19 VIRUS: ICD-10-CM

## 2022-01-01 DIAGNOSIS — M79.671 BILATERAL FOOT PAIN: ICD-10-CM

## 2022-01-01 DIAGNOSIS — I10 ESSENTIAL HYPERTENSION: Primary | ICD-10-CM

## 2022-01-01 DIAGNOSIS — N17.9 AKI (ACUTE KIDNEY INJURY): Primary | ICD-10-CM

## 2022-01-01 DIAGNOSIS — E78.5 HYPERLIPIDEMIA, UNSPECIFIED HYPERLIPIDEMIA TYPE: ICD-10-CM

## 2022-01-01 DIAGNOSIS — I13.0 HYPERTENSIVE HEART AND RENAL DISEASE WITH CONGESTIVE HEART FAILURE: Primary | ICD-10-CM

## 2022-01-01 DIAGNOSIS — I50.20 HEART FAILURE WITH REDUCED EJECTION FRACTION: ICD-10-CM

## 2022-01-01 DIAGNOSIS — N18.9 CHRONIC RENAL IMPAIRMENT, UNSPECIFIED CKD STAGE: ICD-10-CM

## 2022-01-01 DIAGNOSIS — N18.4 CKD (CHRONIC KIDNEY DISEASE), STAGE IV: Chronic | ICD-10-CM

## 2022-01-01 DIAGNOSIS — G30.1 LATE ONSET ALZHEIMER'S DEMENTIA WITH BEHAVIORAL DISTURBANCE: ICD-10-CM

## 2022-01-01 DIAGNOSIS — I50.20 HEART FAILURE, SYSTOLIC: ICD-10-CM

## 2022-01-01 DIAGNOSIS — I50.9 CONGESTIVE HEART FAILURE, UNSPECIFIED HF CHRONICITY, UNSPECIFIED HEART FAILURE TYPE: Primary | ICD-10-CM

## 2022-01-01 DIAGNOSIS — E66.01 MORBID (SEVERE) OBESITY DUE TO EXCESS CALORIES: ICD-10-CM

## 2022-01-01 DIAGNOSIS — A41.9 SEPSIS: ICD-10-CM

## 2022-01-01 DIAGNOSIS — I50.20 HEART FAILURE WITH REDUCED EJECTION FRACTION: Chronic | ICD-10-CM

## 2022-01-01 DIAGNOSIS — E03.9 HYPOTHYROIDISM, UNSPECIFIED TYPE: ICD-10-CM

## 2022-01-01 DIAGNOSIS — I13.0 HYPERTENSIVE HEART AND RENAL DISEASE WITH CONGESTIVE HEART FAILURE: ICD-10-CM

## 2022-01-01 DIAGNOSIS — R06.02 SHORTNESS OF BREATH: ICD-10-CM

## 2022-01-01 DIAGNOSIS — D63.1 ANEMIA IN STAGE 4 CHRONIC KIDNEY DISEASE: ICD-10-CM

## 2022-01-01 DIAGNOSIS — A41.9 SEPSIS, DUE TO UNSPECIFIED ORGANISM, UNSPECIFIED WHETHER ACUTE ORGAN DYSFUNCTION PRESENT: Primary | ICD-10-CM

## 2022-01-01 DIAGNOSIS — L03.90 CELLULITIS, UNSPECIFIED CELLULITIS SITE: ICD-10-CM

## 2022-01-01 DIAGNOSIS — L30.8 DERMATITIS ASSOCIATED WITH MOISTURE: ICD-10-CM

## 2022-01-01 DIAGNOSIS — R78.81 BACTEREMIA DUE TO STAPHYLOCOCCUS: ICD-10-CM

## 2022-01-01 DIAGNOSIS — I50.20 HEART FAILURE WITH REDUCED EJECTION FRACTION: Primary | ICD-10-CM

## 2022-01-01 DIAGNOSIS — N39.0 URINARY TRACT INFECTION WITHOUT HEMATURIA, SITE UNSPECIFIED: ICD-10-CM

## 2022-01-01 DIAGNOSIS — G93.41 ENCEPHALOPATHY, METABOLIC: ICD-10-CM

## 2022-01-01 DIAGNOSIS — N18.4 CKD (CHRONIC KIDNEY DISEASE), STAGE IV: ICD-10-CM

## 2022-01-01 DIAGNOSIS — F02.818 LATE ONSET ALZHEIMER'S DEMENTIA WITH BEHAVIORAL DISTURBANCE: ICD-10-CM

## 2022-01-01 DIAGNOSIS — I11.0 HYPERTENSIVE HEART DISEASE WITH COMBINED SYSTOLIC AND DIASTOLIC CONGESTIVE HEART FAILURE, UNSPECIFIED HF CHRONICITY: ICD-10-CM

## 2022-01-01 DIAGNOSIS — M1A.00X0 IDIOPATHIC CHRONIC GOUT WITHOUT TOPHUS, UNSPECIFIED SITE: Chronic | ICD-10-CM

## 2022-01-01 DIAGNOSIS — I10 HYPERTENSION, UNSPECIFIED TYPE: ICD-10-CM

## 2022-01-01 DIAGNOSIS — R09.02 HYPOXIA: ICD-10-CM

## 2022-01-01 DIAGNOSIS — K59.00 CONSTIPATION, UNSPECIFIED CONSTIPATION TYPE: ICD-10-CM

## 2022-01-01 DIAGNOSIS — L89.301 PRESSURE INJURY OF BUTTOCK, STAGE 1, UNSPECIFIED LATERALITY: Primary | ICD-10-CM

## 2022-01-01 DIAGNOSIS — E79.0 HYPERURICEMIA: Primary | ICD-10-CM

## 2022-01-01 DIAGNOSIS — I10 ESSENTIAL HYPERTENSION: Chronic | ICD-10-CM

## 2022-01-01 DIAGNOSIS — L89.152 PRESSURE INJURY OF SACRAL REGION, STAGE 2: ICD-10-CM

## 2022-01-01 DIAGNOSIS — N39.0 URINARY TRACT INFECTION WITHOUT HEMATURIA, SITE UNSPECIFIED: Primary | ICD-10-CM

## 2022-01-01 DIAGNOSIS — I50.40 HYPERTENSIVE HEART DISEASE WITH COMBINED SYSTOLIC AND DIASTOLIC CONGESTIVE HEART FAILURE, UNSPECIFIED HF CHRONICITY: ICD-10-CM

## 2022-01-01 LAB
ALBUMIN SERPL BCP-MCNC: 2.1 G/DL (ref 3.5–5.2)
ALBUMIN SERPL BCP-MCNC: 3.4 G/DL (ref 3.5–5.2)
ALBUMIN SERPL BCP-MCNC: 3.7 G/DL (ref 3.5–5.2)
ALLENS TEST: ABNORMAL
ALP SERPL-CCNC: 100 U/L (ref 55–135)
ALP SERPL-CCNC: 102 U/L (ref 55–135)
ALP SERPL-CCNC: 104 U/L (ref 55–135)
ALT SERPL W/O P-5'-P-CCNC: 38 U/L (ref 10–44)
ALT SERPL W/O P-5'-P-CCNC: 41 U/L (ref 10–44)
ALT SERPL W/O P-5'-P-CCNC: 8 U/L (ref 10–44)
ANION GAP SERPL CALC-SCNC: 10 MMOL/L (ref 8–16)
ANION GAP SERPL CALC-SCNC: 11 MMOL/L (ref 8–16)
ANION GAP SERPL CALC-SCNC: 12 MMOL/L (ref 8–16)
ANION GAP SERPL CALC-SCNC: 13 MMOL/L (ref 8–16)
ANION GAP SERPL CALC-SCNC: 14 MMOL/L (ref 8–16)
ANION GAP SERPL CALC-SCNC: 16 MMOL/L (ref 8–16)
ANION GAP SERPL CALC-SCNC: 17 MMOL/L (ref 8–16)
ANION GAP SERPL CALC-SCNC: 9 MMOL/L (ref 8–16)
ANISOCYTOSIS BLD QL SMEAR: SLIGHT
ASCENDING AORTA: 3.12 CM
AST SERPL-CCNC: 18 U/L (ref 10–40)
AST SERPL-CCNC: 41 U/L (ref 10–40)
AST SERPL-CCNC: 42 U/L (ref 10–40)
AV INDEX (PROSTH): 0.4
AV MEAN GRADIENT: 11 MMHG
AV PEAK GRADIENT: 22 MMHG
AV VALVE AREA: 1.48 CM2
AV VELOCITY RATIO: 0.38
BACTERIA #/AREA URNS AUTO: ABNORMAL /HPF
BACTERIA #/AREA URNS AUTO: ABNORMAL /HPF
BACTERIA BLD CULT: ABNORMAL
BACTERIA BLD CULT: NORMAL
BACTERIA UR CULT: ABNORMAL
BACTERIA UR CULT: ABNORMAL
BACTERIA UR CULT: NORMAL
BASO STIPL BLD QL SMEAR: ABNORMAL
BASOPHILS # BLD AUTO: 0.01 K/UL (ref 0–0.2)
BASOPHILS # BLD AUTO: 0.01 K/UL (ref 0–0.2)
BASOPHILS # BLD AUTO: 0.02 K/UL (ref 0–0.2)
BASOPHILS # BLD AUTO: 0.03 K/UL (ref 0–0.2)
BASOPHILS # BLD AUTO: 0.03 K/UL (ref 0–0.2)
BASOPHILS # BLD AUTO: 0.04 K/UL (ref 0–0.2)
BASOPHILS # BLD AUTO: 0.05 K/UL (ref 0–0.2)
BASOPHILS # BLD AUTO: 0.06 K/UL (ref 0–0.2)
BASOPHILS # BLD AUTO: 0.07 K/UL (ref 0–0.2)
BASOPHILS # BLD AUTO: 0.08 K/UL (ref 0–0.2)
BASOPHILS NFR BLD: 0 % (ref 0–1.9)
BASOPHILS NFR BLD: 0 % (ref 0–1.9)
BASOPHILS NFR BLD: 0.2 % (ref 0–1.9)
BASOPHILS NFR BLD: 0.3 % (ref 0–1.9)
BASOPHILS NFR BLD: 0.4 % (ref 0–1.9)
BASOPHILS NFR BLD: 0.4 % (ref 0–1.9)
BASOPHILS NFR BLD: 0.6 % (ref 0–1.9)
BASOPHILS NFR BLD: 0.6 % (ref 0–1.9)
BASOPHILS NFR BLD: 0.8 % (ref 0–1.9)
BASOPHILS NFR BLD: 1 % (ref 0–1.9)
BILIRUB SERPL-MCNC: 0.4 MG/DL (ref 0.1–1)
BILIRUB SERPL-MCNC: 0.5 MG/DL (ref 0.1–1)
BILIRUB SERPL-MCNC: 0.8 MG/DL (ref 0.1–1)
BILIRUB UR QL STRIP: NEGATIVE
BILIRUB UR QL STRIP: NEGATIVE
BNP SERPL-MCNC: 4129 PG/ML (ref 0–99)
BNP SERPL-MCNC: 793 PG/ML (ref 0–99)
BSA FOR ECHO PROCEDURE: 1.75 M2
BUN SERPL-MCNC: 40 MG/DL (ref 10–30)
BUN SERPL-MCNC: 40 MG/DL (ref 10–30)
BUN SERPL-MCNC: 42 MG/DL (ref 10–30)
BUN SERPL-MCNC: 44 MG/DL (ref 10–30)
BUN SERPL-MCNC: 46 MG/DL (ref 10–30)
BUN SERPL-MCNC: 46 MG/DL (ref 10–30)
BUN SERPL-MCNC: 47 MG/DL (ref 10–30)
BUN SERPL-MCNC: 47 MG/DL (ref 10–30)
BUN SERPL-MCNC: 48 MG/DL (ref 10–30)
BUN SERPL-MCNC: 49 MG/DL (ref 10–30)
BUN SERPL-MCNC: 50 MG/DL (ref 10–30)
BUN SERPL-MCNC: 50 MG/DL (ref 10–30)
BUN SERPL-MCNC: 51 MG/DL (ref 10–30)
BUN SERPL-MCNC: 53 MG/DL (ref 10–30)
BUN SERPL-MCNC: 54 MG/DL (ref 10–30)
BUN SERPL-MCNC: 55 MG/DL (ref 10–30)
BUN SERPL-MCNC: 56 MG/DL (ref 10–30)
BUN SERPL-MCNC: 58 MG/DL (ref 10–30)
BURR CELLS BLD QL SMEAR: ABNORMAL
CALCIUM SERPL-MCNC: 10.2 MG/DL (ref 8.7–10.5)
CALCIUM SERPL-MCNC: 10.5 MG/DL (ref 8.7–10.5)
CALCIUM SERPL-MCNC: 10.9 MG/DL (ref 8.7–10.5)
CALCIUM SERPL-MCNC: 11 MG/DL (ref 8.7–10.5)
CALCIUM SERPL-MCNC: 11.1 MG/DL (ref 8.7–10.5)
CALCIUM SERPL-MCNC: 11.3 MG/DL (ref 8.7–10.5)
CALCIUM SERPL-MCNC: 11.4 MG/DL (ref 8.7–10.5)
CALCIUM SERPL-MCNC: 11.5 MG/DL (ref 8.7–10.5)
CALCIUM SERPL-MCNC: 11.5 MG/DL (ref 8.7–10.5)
CALCIUM SERPL-MCNC: 11.7 MG/DL (ref 8.7–10.5)
CALCIUM SERPL-MCNC: 11.8 MG/DL (ref 8.7–10.5)
CALCIUM SERPL-MCNC: 11.9 MG/DL (ref 8.7–10.5)
CALCIUM SERPL-MCNC: 11.9 MG/DL (ref 8.7–10.5)
CALCIUM SERPL-MCNC: 12.2 MG/DL (ref 8.7–10.5)
CALCIUM SERPL-MCNC: 9 MG/DL (ref 8.7–10.5)
CALCIUM SERPL-MCNC: 9.3 MG/DL (ref 8.7–10.5)
CALCIUM SERPL-MCNC: 9.4 MG/DL (ref 8.7–10.5)
CALCIUM SERPL-MCNC: 9.7 MG/DL (ref 8.7–10.5)
CALCIUM SERPL-MCNC: 9.8 MG/DL (ref 8.7–10.5)
CALCIUM SERPL-MCNC: 9.9 MG/DL (ref 8.7–10.5)
CALCIUM SERPL-MCNC: 9.9 MG/DL (ref 8.7–10.5)
CHLORIDE SERPL-SCNC: 100 MMOL/L (ref 95–110)
CHLORIDE SERPL-SCNC: 102 MMOL/L (ref 95–110)
CHLORIDE SERPL-SCNC: 104 MMOL/L (ref 95–110)
CHLORIDE SERPL-SCNC: 105 MMOL/L (ref 95–110)
CHLORIDE SERPL-SCNC: 106 MMOL/L (ref 95–110)
CHLORIDE SERPL-SCNC: 107 MMOL/L (ref 95–110)
CHLORIDE SERPL-SCNC: 107 MMOL/L (ref 95–110)
CHLORIDE SERPL-SCNC: 108 MMOL/L (ref 95–110)
CHLORIDE SERPL-SCNC: 111 MMOL/L (ref 95–110)
CHLORIDE SERPL-SCNC: 112 MMOL/L (ref 95–110)
CHLORIDE SERPL-SCNC: 91 MMOL/L (ref 95–110)
CHLORIDE SERPL-SCNC: 91 MMOL/L (ref 95–110)
CHLORIDE SERPL-SCNC: 92 MMOL/L (ref 95–110)
CHLORIDE SERPL-SCNC: 94 MMOL/L (ref 95–110)
CHLORIDE SERPL-SCNC: 95 MMOL/L (ref 95–110)
CHOLEST SERPL-MCNC: 196 MG/DL (ref 120–199)
CHOLEST/HDLC SERPL: 4.8 {RATIO} (ref 2–5)
CLARITY UR REFRACT.AUTO: ABNORMAL
CLARITY UR REFRACT.AUTO: ABNORMAL
CO2 SERPL-SCNC: 20 MMOL/L (ref 23–29)
CO2 SERPL-SCNC: 22 MMOL/L (ref 23–29)
CO2 SERPL-SCNC: 22 MMOL/L (ref 23–29)
CO2 SERPL-SCNC: 23 MMOL/L (ref 23–29)
CO2 SERPL-SCNC: 24 MMOL/L (ref 23–29)
CO2 SERPL-SCNC: 24 MMOL/L (ref 23–29)
CO2 SERPL-SCNC: 25 MMOL/L (ref 23–29)
CO2 SERPL-SCNC: 26 MMOL/L (ref 23–29)
CO2 SERPL-SCNC: 27 MMOL/L (ref 23–29)
CO2 SERPL-SCNC: 28 MMOL/L (ref 23–29)
CO2 SERPL-SCNC: 30 MMOL/L (ref 23–29)
CO2 SERPL-SCNC: 31 MMOL/L (ref 23–29)
CO2 SERPL-SCNC: 31 MMOL/L (ref 23–29)
CO2 SERPL-SCNC: 33 MMOL/L (ref 23–29)
CO2 SERPL-SCNC: 33 MMOL/L (ref 23–29)
CO2 SERPL-SCNC: 34 MMOL/L (ref 23–29)
CO2 SERPL-SCNC: 34 MMOL/L (ref 23–29)
CO2 SERPL-SCNC: 36 MMOL/L (ref 23–29)
CO2 SERPL-SCNC: 38 MMOL/L (ref 23–29)
CO2 SERPL-SCNC: 40 MMOL/L (ref 23–29)
COLOR UR AUTO: ABNORMAL
COLOR UR AUTO: YELLOW
CREAT SERPL-MCNC: 1.2 MG/DL (ref 0.5–1.4)
CREAT SERPL-MCNC: 1.4 MG/DL (ref 0.5–1.4)
CREAT SERPL-MCNC: 1.5 MG/DL (ref 0.5–1.4)
CREAT SERPL-MCNC: 1.6 MG/DL (ref 0.5–1.4)
CREAT SERPL-MCNC: 1.6 MG/DL (ref 0.5–1.4)
CREAT SERPL-MCNC: 1.7 MG/DL (ref 0.5–1.4)
CREAT SERPL-MCNC: 1.9 MG/DL (ref 0.5–1.4)
CREAT SERPL-MCNC: 2 MG/DL (ref 0.5–1.4)
CREAT SERPL-MCNC: 2 MG/DL (ref 0.5–1.4)
CREAT SERPL-MCNC: 2.1 MG/DL (ref 0.5–1.4)
CREAT SERPL-MCNC: 2.1 MG/DL (ref 0.5–1.4)
CREAT SERPL-MCNC: 2.2 MG/DL (ref 0.5–1.4)
CREAT SERPL-MCNC: 2.3 MG/DL (ref 0.5–1.4)
CREAT SERPL-MCNC: 2.4 MG/DL (ref 0.5–1.4)
CREAT SERPL-MCNC: 2.5 MG/DL (ref 0.5–1.4)
CREAT SERPL-MCNC: 2.8 MG/DL (ref 0.5–1.4)
CREAT SERPL-MCNC: 3.1 MG/DL (ref 0.5–1.4)
CRP SERPL-MCNC: 276.6 MG/L (ref 0–8.2)
CTP QC/QA: YES
CTP QC/QA: YES
CV ECHO LV RWT: 0.34 CM
DACRYOCYTES BLD QL SMEAR: ABNORMAL
DACRYOCYTES BLD QL SMEAR: ABNORMAL
DELSYS: ABNORMAL
DIFFERENTIAL METHOD: ABNORMAL
DOHLE BOD BLD QL SMEAR: ABNORMAL
DOHLE BOD BLD QL SMEAR: PRESENT
DOP CALC AO PEAK VEL: 2.35 M/S
DOP CALC AO VTI: 41.46 CM
DOP CALC LVOT AREA: 3.7 CM2
DOP CALC LVOT DIAMETER: 2.16 CM
DOP CALC LVOT PEAK VEL: 0.89 M/S
DOP CALC LVOT STROKE VOLUME: 61.46 CM3
DOP CALCLVOT PEAK VEL VTI: 16.78 CM
E WAVE DECELERATION TIME: 185.23 MSEC
E/A RATIO: 1.05
E/E' RATIO: 35 M/S
ECHO LV POSTERIOR WALL: 0.74 CM (ref 0.6–1.1)
EJECTION FRACTION: 50 %
EOSINOPHIL # BLD AUTO: 0 K/UL (ref 0–0.5)
EOSINOPHIL # BLD AUTO: 0.1 K/UL (ref 0–0.5)
EOSINOPHIL # BLD AUTO: 0.2 K/UL (ref 0–0.5)
EOSINOPHIL NFR BLD: 0 % (ref 0–8)
EOSINOPHIL NFR BLD: 0.1 % (ref 0–8)
EOSINOPHIL NFR BLD: 0.4 % (ref 0–8)
EOSINOPHIL NFR BLD: 0.4 % (ref 0–8)
EOSINOPHIL NFR BLD: 0.7 % (ref 0–8)
EOSINOPHIL NFR BLD: 1.6 % (ref 0–8)
EOSINOPHIL NFR BLD: 1.8 % (ref 0–8)
EOSINOPHIL NFR BLD: 1.9 % (ref 0–8)
EOSINOPHIL NFR BLD: 2.1 % (ref 0–8)
EOSINOPHIL NFR BLD: 2.6 % (ref 0–8)
EOSINOPHIL NFR BLD: 3.4 % (ref 0–8)
EOSINOPHIL NFR BLD: 3.7 % (ref 0–8)
EOSINOPHIL NFR BLD: 3.7 % (ref 0–8)
ERYTHROCYTE [DISTWIDTH] IN BLOOD BY AUTOMATED COUNT: 15.3 % (ref 11.5–14.5)
ERYTHROCYTE [DISTWIDTH] IN BLOOD BY AUTOMATED COUNT: 15.4 % (ref 11.5–14.5)
ERYTHROCYTE [DISTWIDTH] IN BLOOD BY AUTOMATED COUNT: 15.6 % (ref 11.5–14.5)
ERYTHROCYTE [DISTWIDTH] IN BLOOD BY AUTOMATED COUNT: 15.7 % (ref 11.5–14.5)
ERYTHROCYTE [DISTWIDTH] IN BLOOD BY AUTOMATED COUNT: 15.7 % (ref 11.5–14.5)
ERYTHROCYTE [DISTWIDTH] IN BLOOD BY AUTOMATED COUNT: 15.8 % (ref 11.5–14.5)
ERYTHROCYTE [DISTWIDTH] IN BLOOD BY AUTOMATED COUNT: 15.9 % (ref 11.5–14.5)
ERYTHROCYTE [DISTWIDTH] IN BLOOD BY AUTOMATED COUNT: 16 % (ref 11.5–14.5)
ERYTHROCYTE [DISTWIDTH] IN BLOOD BY AUTOMATED COUNT: 16 % (ref 11.5–14.5)
ERYTHROCYTE [DISTWIDTH] IN BLOOD BY AUTOMATED COUNT: 16.3 % (ref 11.5–14.5)
ERYTHROCYTE [DISTWIDTH] IN BLOOD BY AUTOMATED COUNT: 16.6 % (ref 11.5–14.5)
ERYTHROCYTE [DISTWIDTH] IN BLOOD BY AUTOMATED COUNT: 16.7 % (ref 11.5–14.5)
ERYTHROCYTE [DISTWIDTH] IN BLOOD BY AUTOMATED COUNT: 17 % (ref 11.5–14.5)
EST. GFR  (AFRICAN AMERICAN): 14.5 ML/MIN/1.73 M^2
EST. GFR  (AFRICAN AMERICAN): 16.4 ML/MIN/1.73 M^2
EST. GFR  (AFRICAN AMERICAN): 18.8 ML/MIN/1.73 M^2
EST. GFR  (AFRICAN AMERICAN): 19.7 ML/MIN/1.73 M^2
EST. GFR  (AFRICAN AMERICAN): 20.8 ML/MIN/1.73 M^2
EST. GFR  (AFRICAN AMERICAN): 21.9 ML/MIN/1.73 M^2
EST. GFR  (AFRICAN AMERICAN): 23.2 ML/MIN/1.73 M^2
EST. GFR  (AFRICAN AMERICAN): 23.2 ML/MIN/1.73 M^2
EST. GFR  (AFRICAN AMERICAN): 24.6 ML/MIN/1.73 M^2
EST. GFR  (AFRICAN AMERICAN): 24.6 ML/MIN/1.73 M^2
EST. GFR  (AFRICAN AMERICAN): 26.2 ML/MIN/1.73 M^2
EST. GFR  (AFRICAN AMERICAN): 30 ML/MIN/1.73 M^2
EST. GFR  (AFRICAN AMERICAN): 32.2 ML/MIN/1.73 M^2
EST. GFR  (AFRICAN AMERICAN): 32.2 ML/MIN/1.73 M^2
EST. GFR  (AFRICAN AMERICAN): 34.9 ML/MIN/1.73 M^2
EST. GFR  (AFRICAN AMERICAN): 37.9 ML/MIN/1.73 M^2
EST. GFR  (AFRICAN AMERICAN): 45.7 ML/MIN/1.73 M^2
EST. GFR  (NON AFRICAN AMERICAN): 12.6 ML/MIN/1.73 M^2
EST. GFR  (NON AFRICAN AMERICAN): 14.2 ML/MIN/1.73 M^2
EST. GFR  (NON AFRICAN AMERICAN): 16.3 ML/MIN/1.73 M^2
EST. GFR  (NON AFRICAN AMERICAN): 17.1 ML/MIN/1.73 M^2
EST. GFR  (NON AFRICAN AMERICAN): 18 ML/MIN/1.73 M^2
EST. GFR  (NON AFRICAN AMERICAN): 19 ML/MIN/1.73 M^2
EST. GFR  (NON AFRICAN AMERICAN): 20.1 ML/MIN/1.73 M^2
EST. GFR  (NON AFRICAN AMERICAN): 20.1 ML/MIN/1.73 M^2
EST. GFR  (NON AFRICAN AMERICAN): 21.4 ML/MIN/1.73 M^2
EST. GFR  (NON AFRICAN AMERICAN): 21.4 ML/MIN/1.73 M^2
EST. GFR  (NON AFRICAN AMERICAN): 22.7 ML/MIN/1.73 M^2
EST. GFR  (NON AFRICAN AMERICAN): 26 ML/MIN/1.73 M^2
EST. GFR  (NON AFRICAN AMERICAN): 28 ML/MIN/1.73 M^2
EST. GFR  (NON AFRICAN AMERICAN): 28 ML/MIN/1.73 M^2
EST. GFR  (NON AFRICAN AMERICAN): 30.2 ML/MIN/1.73 M^2
EST. GFR  (NON AFRICAN AMERICAN): 32.9 ML/MIN/1.73 M^2
EST. GFR  (NON AFRICAN AMERICAN): 39.6 ML/MIN/1.73 M^2
ESTIMATED AVG GLUCOSE: 94 MG/DL (ref 68–131)
FERRITIN SERPL-MCNC: 182 NG/ML (ref 20–300)
FRACTIONAL SHORTENING: 30 % (ref 28–44)
GLUCOSE SERPL-MCNC: 102 MG/DL (ref 70–110)
GLUCOSE SERPL-MCNC: 111 MG/DL (ref 70–110)
GLUCOSE SERPL-MCNC: 120 MG/DL (ref 70–110)
GLUCOSE SERPL-MCNC: 123 MG/DL (ref 70–110)
GLUCOSE SERPL-MCNC: 64 MG/DL (ref 70–110)
GLUCOSE SERPL-MCNC: 71 MG/DL (ref 70–110)
GLUCOSE SERPL-MCNC: 72 MG/DL (ref 70–110)
GLUCOSE SERPL-MCNC: 73 MG/DL (ref 70–110)
GLUCOSE SERPL-MCNC: 74 MG/DL (ref 70–110)
GLUCOSE SERPL-MCNC: 75 MG/DL (ref 70–110)
GLUCOSE SERPL-MCNC: 76 MG/DL (ref 70–110)
GLUCOSE SERPL-MCNC: 79 MG/DL (ref 70–110)
GLUCOSE SERPL-MCNC: 79 MG/DL (ref 70–110)
GLUCOSE SERPL-MCNC: 80 MG/DL (ref 70–110)
GLUCOSE SERPL-MCNC: 80 MG/DL (ref 70–110)
GLUCOSE SERPL-MCNC: 81 MG/DL (ref 70–110)
GLUCOSE SERPL-MCNC: 84 MG/DL (ref 70–110)
GLUCOSE SERPL-MCNC: 85 MG/DL (ref 70–110)
GLUCOSE SERPL-MCNC: 86 MG/DL (ref 70–110)
GLUCOSE SERPL-MCNC: 88 MG/DL (ref 70–110)
GLUCOSE SERPL-MCNC: 93 MG/DL (ref 70–110)
GLUCOSE SERPL-MCNC: 96 MG/DL (ref 70–110)
GLUCOSE SERPL-MCNC: 99 MG/DL (ref 70–110)
GLUCOSE UR QL STRIP: NEGATIVE
GLUCOSE UR QL STRIP: NEGATIVE
HBA1C MFR BLD: 4.9 % (ref 4–5.6)
HCO3 UR-SCNC: 32.7 MMOL/L (ref 24–28)
HCT VFR BLD AUTO: 28 % (ref 37–48.5)
HCT VFR BLD AUTO: 28.2 % (ref 37–48.5)
HCT VFR BLD AUTO: 31.3 % (ref 37–48.5)
HCT VFR BLD AUTO: 32 % (ref 37–48.5)
HCT VFR BLD AUTO: 32 % (ref 37–48.5)
HCT VFR BLD AUTO: 32.2 % (ref 37–48.5)
HCT VFR BLD AUTO: 33.4 % (ref 37–48.5)
HCT VFR BLD AUTO: 33.6 % (ref 37–48.5)
HCT VFR BLD AUTO: 34 % (ref 37–48.5)
HCT VFR BLD AUTO: 34.2 % (ref 37–48.5)
HCT VFR BLD AUTO: 34.5 % (ref 37–48.5)
HCT VFR BLD AUTO: 34.5 % (ref 37–48.5)
HCT VFR BLD AUTO: 35.4 % (ref 37–48.5)
HCT VFR BLD AUTO: 35.5 % (ref 37–48.5)
HCT VFR BLD AUTO: 36.1 % (ref 37–48.5)
HCT VFR BLD AUTO: 38.6 % (ref 37–48.5)
HCT VFR BLD AUTO: 38.7 % (ref 37–48.5)
HCT VFR BLD AUTO: 38.9 % (ref 37–48.5)
HDLC SERPL-MCNC: 41 MG/DL (ref 40–75)
HDLC SERPL: 20.9 % (ref 20–50)
HGB BLD-MCNC: 10 G/DL (ref 12–16)
HGB BLD-MCNC: 10.2 G/DL (ref 12–16)
HGB BLD-MCNC: 10.2 G/DL (ref 12–16)
HGB BLD-MCNC: 10.3 G/DL (ref 12–16)
HGB BLD-MCNC: 10.3 G/DL (ref 12–16)
HGB BLD-MCNC: 10.5 G/DL (ref 12–16)
HGB BLD-MCNC: 10.7 G/DL (ref 12–16)
HGB BLD-MCNC: 10.8 G/DL (ref 12–16)
HGB BLD-MCNC: 10.9 G/DL (ref 12–16)
HGB BLD-MCNC: 11 G/DL (ref 12–16)
HGB BLD-MCNC: 11.1 G/DL (ref 12–16)
HGB BLD-MCNC: 11.2 G/DL (ref 12–16)
HGB BLD-MCNC: 11.5 G/DL (ref 12–16)
HGB BLD-MCNC: 11.9 G/DL (ref 12–16)
HGB BLD-MCNC: 12.1 G/DL (ref 12–16)
HGB BLD-MCNC: 8.9 G/DL (ref 12–16)
HGB BLD-MCNC: 9.2 G/DL (ref 12–16)
HGB BLD-MCNC: 9.7 G/DL (ref 12–16)
HGB UR QL STRIP: ABNORMAL
HGB UR QL STRIP: ABNORMAL
HYALINE CASTS UR QL AUTO: 0 /LPF
HYALINE CASTS UR QL AUTO: 0 /LPF
HYPOCHROMIA BLD QL SMEAR: ABNORMAL
IMM GRANULOCYTES # BLD AUTO: 0.01 K/UL (ref 0–0.04)
IMM GRANULOCYTES # BLD AUTO: 0.01 K/UL (ref 0–0.04)
IMM GRANULOCYTES # BLD AUTO: 0.02 K/UL (ref 0–0.04)
IMM GRANULOCYTES # BLD AUTO: 0.03 K/UL (ref 0–0.04)
IMM GRANULOCYTES # BLD AUTO: 0.03 K/UL (ref 0–0.04)
IMM GRANULOCYTES # BLD AUTO: 0.21 K/UL (ref 0–0.04)
IMM GRANULOCYTES # BLD AUTO: 0.21 K/UL (ref 0–0.04)
IMM GRANULOCYTES # BLD AUTO: 0.33 K/UL (ref 0–0.04)
IMM GRANULOCYTES # BLD AUTO: 0.84 K/UL (ref 0–0.04)
IMM GRANULOCYTES # BLD AUTO: ABNORMAL K/UL (ref 0–0.04)
IMM GRANULOCYTES NFR BLD AUTO: 0.2 % (ref 0–0.5)
IMM GRANULOCYTES NFR BLD AUTO: 0.2 % (ref 0–0.5)
IMM GRANULOCYTES NFR BLD AUTO: 0.3 % (ref 0–0.5)
IMM GRANULOCYTES NFR BLD AUTO: 0.4 % (ref 0–0.5)
IMM GRANULOCYTES NFR BLD AUTO: 0.7 % (ref 0–0.5)
IMM GRANULOCYTES NFR BLD AUTO: 0.8 % (ref 0–0.5)
IMM GRANULOCYTES NFR BLD AUTO: 1 % (ref 0–0.5)
IMM GRANULOCYTES NFR BLD AUTO: 2.5 % (ref 0–0.5)
IMM GRANULOCYTES NFR BLD AUTO: ABNORMAL % (ref 0–0.5)
INTERVENTRICULAR SEPTUM: 0.9 CM (ref 0.6–1.1)
IRON SERPL-MCNC: 29 UG/DL (ref 30–160)
IVC PROX: 1.8 CM
KETONES UR QL STRIP: ABNORMAL
KETONES UR QL STRIP: NEGATIVE
LA MAJOR: 6.4 CM
LA MINOR: 6.23 CM
LA WIDTH: 4.86 CM
LACTATE SERPL-SCNC: 1 MMOL/L (ref 0.5–2.2)
LDLC SERPL CALC-MCNC: 131.2 MG/DL (ref 63–159)
LEFT ATRIUM SIZE: 3.94 CM
LEFT ATRIUM VOLUME INDEX MOD: 54.5 ML/M2
LEFT ATRIUM VOLUME INDEX: 61.2 ML/M2
LEFT ATRIUM VOLUME MOD: 91.5 CM3
LEFT ATRIUM VOLUME: 102.77 CM3
LEFT INTERNAL DIMENSION IN SYSTOLE: 3.1 CM (ref 2.1–4)
LEFT VENTRICLE DIASTOLIC VOLUME INDEX: 52.58 ML/M2
LEFT VENTRICLE DIASTOLIC VOLUME: 88.34 ML
LEFT VENTRICLE MASS INDEX: 68 G/M2
LEFT VENTRICLE SYSTOLIC VOLUME INDEX: 33.5 ML/M2
LEFT VENTRICLE SYSTOLIC VOLUME: 56.33 ML
LEFT VENTRICULAR INTERNAL DIMENSION IN DIASTOLE: 4.41 CM (ref 3.5–6)
LEFT VENTRICULAR MASS: 113.48 G
LEUKOCYTE ESTERASE UR QL STRIP: ABNORMAL
LEUKOCYTE ESTERASE UR QL STRIP: ABNORMAL
LV LATERAL E/E' RATIO: 35 M/S
LV SEPTAL E/E' RATIO: 35 M/S
LYMPHOCYTES # BLD AUTO: 0.8 K/UL (ref 1–4.8)
LYMPHOCYTES # BLD AUTO: 1.2 K/UL (ref 1–4.8)
LYMPHOCYTES # BLD AUTO: 1.3 K/UL (ref 1–4.8)
LYMPHOCYTES # BLD AUTO: 1.4 K/UL (ref 1–4.8)
LYMPHOCYTES # BLD AUTO: 1.5 K/UL (ref 1–4.8)
LYMPHOCYTES # BLD AUTO: 1.6 K/UL (ref 1–4.8)
LYMPHOCYTES # BLD AUTO: 1.7 K/UL (ref 1–4.8)
LYMPHOCYTES # BLD AUTO: 1.7 K/UL (ref 1–4.8)
LYMPHOCYTES # BLD AUTO: 1.8 K/UL (ref 1–4.8)
LYMPHOCYTES NFR BLD: 10.1 % (ref 18–48)
LYMPHOCYTES NFR BLD: 16.4 % (ref 18–48)
LYMPHOCYTES NFR BLD: 18.3 % (ref 18–48)
LYMPHOCYTES NFR BLD: 2 % (ref 18–48)
LYMPHOCYTES NFR BLD: 20.1 % (ref 18–48)
LYMPHOCYTES NFR BLD: 21.4 % (ref 18–48)
LYMPHOCYTES NFR BLD: 24.8 % (ref 18–48)
LYMPHOCYTES NFR BLD: 25.9 % (ref 18–48)
LYMPHOCYTES NFR BLD: 3 % (ref 18–48)
LYMPHOCYTES NFR BLD: 32.9 % (ref 18–48)
LYMPHOCYTES NFR BLD: 32.9 % (ref 18–48)
LYMPHOCYTES NFR BLD: 34.3 % (ref 18–48)
LYMPHOCYTES NFR BLD: 37.1 % (ref 18–48)
LYMPHOCYTES NFR BLD: 4.2 % (ref 18–48)
LYMPHOCYTES NFR BLD: 4.7 % (ref 18–48)
LYMPHOCYTES NFR BLD: 4.8 % (ref 18–48)
LYMPHOCYTES NFR BLD: 5.3 % (ref 18–48)
LYMPHOCYTES NFR BLD: 6 % (ref 18–48)
MAGNESIUM SERPL-MCNC: 1.8 MG/DL (ref 1.6–2.6)
MAGNESIUM SERPL-MCNC: 1.9 MG/DL (ref 1.6–2.6)
MAGNESIUM SERPL-MCNC: 2 MG/DL (ref 1.6–2.6)
MAGNESIUM SERPL-MCNC: 2 MG/DL (ref 1.6–2.6)
MAGNESIUM SERPL-MCNC: 2.1 MG/DL (ref 1.6–2.6)
MAGNESIUM SERPL-MCNC: 2.2 MG/DL (ref 1.6–2.6)
MAGNESIUM SERPL-MCNC: 2.3 MG/DL (ref 1.6–2.6)
MAGNESIUM SERPL-MCNC: 2.4 MG/DL (ref 1.6–2.6)
MCH RBC QN AUTO: 27.3 PG (ref 27–31)
MCH RBC QN AUTO: 27.4 PG (ref 27–31)
MCH RBC QN AUTO: 27.8 PG (ref 27–31)
MCH RBC QN AUTO: 27.8 PG (ref 27–31)
MCH RBC QN AUTO: 28 PG (ref 27–31)
MCH RBC QN AUTO: 28.1 PG (ref 27–31)
MCH RBC QN AUTO: 28.1 PG (ref 27–31)
MCH RBC QN AUTO: 28.2 PG (ref 27–31)
MCH RBC QN AUTO: 28.3 PG (ref 27–31)
MCH RBC QN AUTO: 28.6 PG (ref 27–31)
MCH RBC QN AUTO: 28.8 PG (ref 27–31)
MCH RBC QN AUTO: 28.8 PG (ref 27–31)
MCH RBC QN AUTO: 29 PG (ref 27–31)
MCH RBC QN AUTO: 29 PG (ref 27–31)
MCH RBC QN AUTO: 29.1 PG (ref 27–31)
MCH RBC QN AUTO: 29.3 PG (ref 27–31)
MCHC RBC AUTO-ENTMCNC: 29.7 G/DL (ref 32–36)
MCHC RBC AUTO-ENTMCNC: 29.9 G/DL (ref 32–36)
MCHC RBC AUTO-ENTMCNC: 30.3 G/DL (ref 32–36)
MCHC RBC AUTO-ENTMCNC: 30.5 G/DL (ref 32–36)
MCHC RBC AUTO-ENTMCNC: 30.6 G/DL (ref 32–36)
MCHC RBC AUTO-ENTMCNC: 30.7 G/DL (ref 32–36)
MCHC RBC AUTO-ENTMCNC: 31.3 G/DL (ref 32–36)
MCHC RBC AUTO-ENTMCNC: 31.4 G/DL (ref 32–36)
MCHC RBC AUTO-ENTMCNC: 31.4 G/DL (ref 32–36)
MCHC RBC AUTO-ENTMCNC: 31.5 G/DL (ref 32–36)
MCHC RBC AUTO-ENTMCNC: 31.7 G/DL (ref 32–36)
MCHC RBC AUTO-ENTMCNC: 31.8 G/DL (ref 32–36)
MCHC RBC AUTO-ENTMCNC: 31.9 G/DL (ref 32–36)
MCHC RBC AUTO-ENTMCNC: 31.9 G/DL (ref 32–36)
MCHC RBC AUTO-ENTMCNC: 32.1 G/DL (ref 32–36)
MCHC RBC AUTO-ENTMCNC: 32.6 G/DL (ref 32–36)
MCV RBC AUTO: 84 FL (ref 82–98)
MCV RBC AUTO: 86 FL (ref 82–98)
MCV RBC AUTO: 88 FL (ref 82–98)
MCV RBC AUTO: 88 FL (ref 82–98)
MCV RBC AUTO: 91 FL (ref 82–98)
MCV RBC AUTO: 92 FL (ref 82–98)
MCV RBC AUTO: 93 FL (ref 82–98)
MCV RBC AUTO: 94 FL (ref 82–98)
MCV RBC AUTO: 97 FL (ref 82–98)
METAMYELOCYTES NFR BLD MANUAL: 1 %
METAMYELOCYTES NFR BLD MANUAL: 3 %
MICROSCOPIC COMMENT: ABNORMAL
MICROSCOPIC COMMENT: ABNORMAL
MONOCYTES # BLD AUTO: 0.5 K/UL (ref 0.3–1)
MONOCYTES # BLD AUTO: 0.6 K/UL (ref 0.3–1)
MONOCYTES # BLD AUTO: 0.6 K/UL (ref 0.3–1)
MONOCYTES # BLD AUTO: 0.7 K/UL (ref 0.3–1)
MONOCYTES # BLD AUTO: 0.8 K/UL (ref 0.3–1)
MONOCYTES # BLD AUTO: 0.9 K/UL (ref 0.3–1)
MONOCYTES # BLD AUTO: 1 K/UL (ref 0.3–1)
MONOCYTES # BLD AUTO: 1.1 K/UL (ref 0.3–1)
MONOCYTES # BLD AUTO: 1.4 K/UL (ref 0.3–1)
MONOCYTES # BLD AUTO: 1.5 K/UL (ref 0.3–1)
MONOCYTES NFR BLD: 1 % (ref 4–15)
MONOCYTES NFR BLD: 10.2 % (ref 4–15)
MONOCYTES NFR BLD: 10.3 % (ref 4–15)
MONOCYTES NFR BLD: 10.5 % (ref 4–15)
MONOCYTES NFR BLD: 10.5 % (ref 4–15)
MONOCYTES NFR BLD: 10.6 % (ref 4–15)
MONOCYTES NFR BLD: 10.7 % (ref 4–15)
MONOCYTES NFR BLD: 13.1 % (ref 4–15)
MONOCYTES NFR BLD: 14.3 % (ref 4–15)
MONOCYTES NFR BLD: 3.2 % (ref 4–15)
MONOCYTES NFR BLD: 3.5 % (ref 4–15)
MONOCYTES NFR BLD: 4 % (ref 4–15)
MONOCYTES NFR BLD: 4.1 % (ref 4–15)
MONOCYTES NFR BLD: 5 % (ref 4–15)
MONOCYTES NFR BLD: 5 % (ref 4–15)
MONOCYTES NFR BLD: 8.2 % (ref 4–15)
MONOCYTES NFR BLD: 8.9 % (ref 4–15)
MONOCYTES NFR BLD: 9.3 % (ref 4–15)
MV PEAK A VEL: 1 M/S
MV PEAK E VEL: 1.05 M/S
MV STENOSIS PRESSURE HALF TIME: 53.72 MS
MV VALVE AREA P 1/2 METHOD: 4.1 CM2
MYELOCYTES NFR BLD MANUAL: 1 %
NEUTROPHILS # BLD AUTO: 2.1 K/UL (ref 1.8–7.7)
NEUTROPHILS # BLD AUTO: 2.5 K/UL (ref 1.8–7.7)
NEUTROPHILS # BLD AUTO: 2.5 K/UL (ref 1.8–7.7)
NEUTROPHILS # BLD AUTO: 2.6 K/UL (ref 1.8–7.7)
NEUTROPHILS # BLD AUTO: 2.9 K/UL (ref 1.8–7.7)
NEUTROPHILS # BLD AUTO: 25.2 K/UL (ref 1.8–7.7)
NEUTROPHILS # BLD AUTO: 25.7 K/UL (ref 1.8–7.7)
NEUTROPHILS # BLD AUTO: 29 K/UL (ref 1.8–7.7)
NEUTROPHILS # BLD AUTO: 29.5 K/UL (ref 1.8–7.7)
NEUTROPHILS # BLD AUTO: 3.2 K/UL (ref 1.8–7.7)
NEUTROPHILS # BLD AUTO: 3.7 K/UL (ref 1.8–7.7)
NEUTROPHILS # BLD AUTO: 4 K/UL (ref 1.8–7.7)
NEUTROPHILS # BLD AUTO: 4.6 K/UL (ref 1.8–7.7)
NEUTROPHILS # BLD AUTO: 5.5 K/UL (ref 1.8–7.7)
NEUTROPHILS # BLD AUTO: 6.1 K/UL (ref 1.8–7.7)
NEUTROPHILS NFR BLD: 48.1 % (ref 38–73)
NEUTROPHILS NFR BLD: 50.9 % (ref 38–73)
NEUTROPHILS NFR BLD: 52.4 % (ref 38–73)
NEUTROPHILS NFR BLD: 53.2 % (ref 38–73)
NEUTROPHILS NFR BLD: 61.6 % (ref 38–73)
NEUTROPHILS NFR BLD: 62 % (ref 38–73)
NEUTROPHILS NFR BLD: 64.4 % (ref 38–73)
NEUTROPHILS NFR BLD: 65.7 % (ref 38–73)
NEUTROPHILS NFR BLD: 66 % (ref 38–73)
NEUTROPHILS NFR BLD: 74.3 % (ref 38–73)
NEUTROPHILS NFR BLD: 80 % (ref 38–73)
NEUTROPHILS NFR BLD: 87.8 % (ref 38–73)
NEUTROPHILS NFR BLD: 88 % (ref 38–73)
NEUTROPHILS NFR BLD: 89 % (ref 38–73)
NEUTROPHILS NFR BLD: 89.4 % (ref 38–73)
NEUTROPHILS NFR BLD: 90.9 % (ref 38–73)
NEUTROPHILS NFR BLD: 91 % (ref 38–73)
NEUTROPHILS NFR BLD: 93 % (ref 38–73)
NEUTS BAND NFR BLD MANUAL: 1 %
NEUTS BAND NFR BLD MANUAL: 2 %
NITRITE UR QL STRIP: NEGATIVE
NITRITE UR QL STRIP: POSITIVE
NONHDLC SERPL-MCNC: 155 MG/DL
NRBC BLD-RTO: 0 /100 WBC
OVALOCYTES BLD QL SMEAR: ABNORMAL
PCO2 BLDA: 62.8 MMHG (ref 35–45)
PH SMN: 7.33 [PH] (ref 7.35–7.45)
PH UR STRIP: 5 [PH] (ref 5–8)
PH UR STRIP: 5 [PH] (ref 5–8)
PHOSPHATE SERPL-MCNC: 1.9 MG/DL (ref 2.7–4.5)
PHOSPHATE SERPL-MCNC: 2.1 MG/DL (ref 2.7–4.5)
PHOSPHATE SERPL-MCNC: 2.1 MG/DL (ref 2.7–4.5)
PHOSPHATE SERPL-MCNC: 2.2 MG/DL (ref 2.7–4.5)
PHOSPHATE SERPL-MCNC: 2.5 MG/DL (ref 2.7–4.5)
PHOSPHATE SERPL-MCNC: 2.8 MG/DL (ref 2.7–4.5)
PHOSPHATE SERPL-MCNC: 2.9 MG/DL (ref 2.7–4.5)
PHOSPHATE SERPL-MCNC: 3.1 MG/DL (ref 2.7–4.5)
PHOSPHATE SERPL-MCNC: 3.1 MG/DL (ref 2.7–4.5)
PHOSPHATE SERPL-MCNC: 3.2 MG/DL (ref 2.7–4.5)
PHOSPHATE SERPL-MCNC: 3.4 MG/DL (ref 2.7–4.5)
PHOSPHATE SERPL-MCNC: 3.9 MG/DL (ref 2.7–4.5)
PISA MRMAX VEL: 0.06 M/S
PISA TR MAX VEL: 2.53 M/S
PLATELET # BLD AUTO: 211 K/UL (ref 150–450)
PLATELET # BLD AUTO: 230 K/UL (ref 150–450)
PLATELET # BLD AUTO: 235 K/UL (ref 150–450)
PLATELET # BLD AUTO: 241 K/UL (ref 150–450)
PLATELET # BLD AUTO: 247 K/UL (ref 150–450)
PLATELET # BLD AUTO: 252 K/UL (ref 150–450)
PLATELET # BLD AUTO: 258 K/UL (ref 150–450)
PLATELET # BLD AUTO: 283 K/UL (ref 150–450)
PLATELET # BLD AUTO: 288 K/UL (ref 150–450)
PLATELET # BLD AUTO: 308 K/UL (ref 150–450)
PLATELET # BLD AUTO: 337 K/UL (ref 150–450)
PLATELET # BLD AUTO: 371 K/UL (ref 150–450)
PLATELET # BLD AUTO: 435 K/UL (ref 150–450)
PLATELET # BLD AUTO: 464 K/UL (ref 150–450)
PLATELET # BLD AUTO: 464 K/UL (ref 150–450)
PLATELET # BLD AUTO: 484 K/UL (ref 150–450)
PLATELET # BLD AUTO: 514 K/UL (ref 150–450)
PLATELET # BLD AUTO: 515 K/UL (ref 150–450)
PLATELET BLD QL SMEAR: ABNORMAL
PMV BLD AUTO: 10 FL (ref 9.2–12.9)
PMV BLD AUTO: 10.3 FL (ref 9.2–12.9)
PMV BLD AUTO: 10.4 FL (ref 9.2–12.9)
PMV BLD AUTO: 10.5 FL (ref 9.2–12.9)
PMV BLD AUTO: 10.5 FL (ref 9.2–12.9)
PMV BLD AUTO: 10.9 FL (ref 9.2–12.9)
PMV BLD AUTO: 11.1 FL (ref 9.2–12.9)
PMV BLD AUTO: 11.1 FL (ref 9.2–12.9)
PMV BLD AUTO: 11.6 FL (ref 9.2–12.9)
PO2 BLDA: 101 MMHG (ref 80–100)
POC BE: 7 MMOL/L
POC SATURATED O2: 97 % (ref 95–100)
POC TCO2: 35 MMOL/L (ref 23–27)
POCT GLUCOSE: 69 MG/DL (ref 70–110)
POCT GLUCOSE: 82 MG/DL (ref 70–110)
POCT GLUCOSE: 95 MG/DL (ref 70–110)
POIKILOCYTOSIS BLD QL SMEAR: SLIGHT
POLYCHROMASIA BLD QL SMEAR: ABNORMAL
POTASSIUM SERPL-SCNC: 3.3 MMOL/L (ref 3.5–5.1)
POTASSIUM SERPL-SCNC: 3.3 MMOL/L (ref 3.5–5.1)
POTASSIUM SERPL-SCNC: 3.4 MMOL/L (ref 3.5–5.1)
POTASSIUM SERPL-SCNC: 3.5 MMOL/L (ref 3.5–5.1)
POTASSIUM SERPL-SCNC: 3.6 MMOL/L (ref 3.5–5.1)
POTASSIUM SERPL-SCNC: 3.9 MMOL/L (ref 3.5–5.1)
POTASSIUM SERPL-SCNC: 4 MMOL/L (ref 3.5–5.1)
POTASSIUM SERPL-SCNC: 4 MMOL/L (ref 3.5–5.1)
POTASSIUM SERPL-SCNC: 4.4 MMOL/L (ref 3.5–5.1)
POTASSIUM SERPL-SCNC: 4.5 MMOL/L (ref 3.5–5.1)
POTASSIUM SERPL-SCNC: 4.6 MMOL/L (ref 3.5–5.1)
POTASSIUM SERPL-SCNC: 4.6 MMOL/L (ref 3.5–5.1)
POTASSIUM SERPL-SCNC: 4.7 MMOL/L (ref 3.5–5.1)
POTASSIUM SERPL-SCNC: 4.9 MMOL/L (ref 3.5–5.1)
POTASSIUM SERPL-SCNC: 5 MMOL/L (ref 3.5–5.1)
POTASSIUM SERPL-SCNC: 5 MMOL/L (ref 3.5–5.1)
PROCALCITONIN SERPL IA-MCNC: 0.45 NG/ML
PROT SERPL-MCNC: 7 G/DL (ref 6–8.4)
PROT SERPL-MCNC: 7.3 G/DL (ref 6–8.4)
PROT SERPL-MCNC: 7.3 G/DL (ref 6–8.4)
PROT UR QL STRIP: ABNORMAL
PROT UR QL STRIP: ABNORMAL
PTH-INTACT SERPL-MCNC: 319.6 PG/ML (ref 9–77)
RA MAJOR: 4.89 CM
RA PRESSURE: 8 MMHG
RA WIDTH: 3.62 CM
RBC # BLD AUTO: 3.2 M/UL (ref 4–5.4)
RBC # BLD AUTO: 3.36 M/UL (ref 4–5.4)
RBC # BLD AUTO: 3.47 M/UL (ref 4–5.4)
RBC # BLD AUTO: 3.65 M/UL (ref 4–5.4)
RBC # BLD AUTO: 3.65 M/UL (ref 4–5.4)
RBC # BLD AUTO: 3.66 M/UL (ref 4–5.4)
RBC # BLD AUTO: 3.69 M/UL (ref 4–5.4)
RBC # BLD AUTO: 3.69 M/UL (ref 4–5.4)
RBC # BLD AUTO: 3.71 M/UL (ref 4–5.4)
RBC # BLD AUTO: 3.72 M/UL (ref 4–5.4)
RBC # BLD AUTO: 3.74 M/UL (ref 4–5.4)
RBC # BLD AUTO: 3.85 M/UL (ref 4–5.4)
RBC # BLD AUTO: 3.86 M/UL (ref 4–5.4)
RBC # BLD AUTO: 3.88 M/UL (ref 4–5.4)
RBC # BLD AUTO: 3.9 M/UL (ref 4–5.4)
RBC # BLD AUTO: 3.99 M/UL (ref 4–5.4)
RBC # BLD AUTO: 4.16 M/UL (ref 4–5.4)
RBC # BLD AUTO: 4.23 M/UL (ref 4–5.4)
RBC #/AREA URNS AUTO: 19 /HPF (ref 0–4)
RBC #/AREA URNS AUTO: 8 /HPF (ref 0–4)
RIGHT VENTRICULAR END-DIASTOLIC DIMENSION: 3.39 CM
RV TISSUE DOPPLER FREE WALL SYSTOLIC VELOCITY 1 (APICAL 4 CHAMBER VIEW): 8 CM/S
SAMPLE: ABNORMAL
SARS-COV-2 RDRP RESP QL NAA+PROBE: NEGATIVE
SARS-COV-2 RDRP RESP QL NAA+PROBE: NEGATIVE
SARS-COV-2 RNA RESP QL NAA+PROBE: NOT DETECTED
SARS-COV-2- CYCLE NUMBER: NORMAL
SATURATED IRON: 10 % (ref 20–50)
SCHISTOCYTES BLD QL SMEAR: ABNORMAL
SINUS: 2.61 CM
SITE: ABNORMAL
SODIUM SERPL-SCNC: 128 MMOL/L (ref 136–145)
SODIUM SERPL-SCNC: 131 MMOL/L (ref 136–145)
SODIUM SERPL-SCNC: 132 MMOL/L (ref 136–145)
SODIUM SERPL-SCNC: 135 MMOL/L (ref 136–145)
SODIUM SERPL-SCNC: 136 MMOL/L (ref 136–145)
SODIUM SERPL-SCNC: 137 MMOL/L (ref 136–145)
SODIUM SERPL-SCNC: 138 MMOL/L (ref 136–145)
SODIUM SERPL-SCNC: 138 MMOL/L (ref 136–145)
SODIUM SERPL-SCNC: 139 MMOL/L (ref 136–145)
SODIUM SERPL-SCNC: 140 MMOL/L (ref 136–145)
SODIUM SERPL-SCNC: 140 MMOL/L (ref 136–145)
SODIUM SERPL-SCNC: 141 MMOL/L (ref 136–145)
SODIUM SERPL-SCNC: 142 MMOL/L (ref 136–145)
SODIUM SERPL-SCNC: 142 MMOL/L (ref 136–145)
SODIUM SERPL-SCNC: 143 MMOL/L (ref 136–145)
SODIUM SERPL-SCNC: 145 MMOL/L (ref 136–145)
SODIUM SERPL-SCNC: 145 MMOL/L (ref 136–145)
SODIUM SERPL-SCNC: 146 MMOL/L (ref 136–145)
SODIUM SERPL-SCNC: 147 MMOL/L (ref 136–145)
SP GR UR STRIP: 1.02 (ref 1–1.03)
SP GR UR STRIP: 1.02 (ref 1–1.03)
SPHEROCYTES BLD QL SMEAR: ABNORMAL
SQUAMOUS #/AREA URNS AUTO: 1 /HPF
SQUAMOUS #/AREA URNS AUTO: 7 /HPF
STJ: 2.25 CM
TARGETS BLD QL SMEAR: ABNORMAL
TDI LATERAL: 0.03 M/S
TDI SEPTAL: 0.03 M/S
TDI: 0.03 M/S
TOTAL IRON BINDING CAPACITY: 292 UG/DL (ref 250–450)
TOXIC GRANULES BLD QL SMEAR: ABNORMAL
TOXIC GRANULES BLD QL SMEAR: PRESENT
TR MAX PG: 26 MMHG
TRANSFERRIN SERPL-MCNC: 197 MG/DL (ref 200–375)
TRICUSPID ANNULAR PLANE SYSTOLIC EXCURSION: 1.49 CM
TRIGL SERPL-MCNC: 119 MG/DL (ref 30–150)
TROPONIN I SERPL DL<=0.01 NG/ML-MCNC: 0.15 NG/ML (ref 0–0.03)
TROPONIN I SERPL DL<=0.01 NG/ML-MCNC: 0.15 NG/ML (ref 0–0.03)
TSH SERPL DL<=0.005 MIU/L-ACNC: 3.6 UIU/ML (ref 0.4–4)
TV REST PULMONARY ARTERY PRESSURE: 34 MMHG
URATE SERPL-MCNC: 8.6 MG/DL (ref 2.4–5.7)
URN SPEC COLLECT METH UR: ABNORMAL
URN SPEC COLLECT METH UR: ABNORMAL
VANCOMYCIN SERPL-MCNC: 14.3 UG/ML
VANCOMYCIN SERPL-MCNC: 15.1 UG/ML
VANCOMYCIN SERPL-MCNC: 16 UG/ML
VANCOMYCIN SERPL-MCNC: 17.2 UG/ML
VANCOMYCIN SERPL-MCNC: 18 UG/ML
VANCOMYCIN SERPL-MCNC: 19.7 UG/ML
VANCOMYCIN SERPL-MCNC: 20 UG/ML
VANCOMYCIN SERPL-MCNC: 20.4 UG/ML
WBC # BLD AUTO: 24.17 K/UL (ref 3.9–12.7)
WBC # BLD AUTO: 25.03 K/UL (ref 3.9–12.7)
WBC # BLD AUTO: 27.36 K/UL (ref 3.9–12.7)
WBC # BLD AUTO: 27.73 K/UL (ref 3.9–12.7)
WBC # BLD AUTO: 28.79 K/UL (ref 3.9–12.7)
WBC # BLD AUTO: 32.39 K/UL (ref 3.9–12.7)
WBC # BLD AUTO: 33.1 K/UL (ref 3.9–12.7)
WBC # BLD AUTO: 4.45 K/UL (ref 3.9–12.7)
WBC # BLD AUTO: 4.68 K/UL (ref 3.9–12.7)
WBC # BLD AUTO: 4.75 K/UL (ref 3.9–12.7)
WBC # BLD AUTO: 4.84 K/UL (ref 3.9–12.7)
WBC # BLD AUTO: 4.89 K/UL (ref 3.9–12.7)
WBC # BLD AUTO: 5.14 K/UL (ref 3.9–12.7)
WBC # BLD AUTO: 5.61 K/UL (ref 3.9–12.7)
WBC # BLD AUTO: 6.16 K/UL (ref 3.9–12.7)
WBC # BLD AUTO: 7 K/UL (ref 3.9–12.7)
WBC # BLD AUTO: 7.33 K/UL (ref 3.9–12.7)
WBC # BLD AUTO: 7.63 K/UL (ref 3.9–12.7)
WBC #/AREA URNS AUTO: 35 /HPF (ref 0–5)
WBC #/AREA URNS AUTO: >100 /HPF (ref 0–5)
WBC CLUMPS UR QL AUTO: ABNORMAL
YEAST UR QL AUTO: ABNORMAL

## 2022-01-01 PROCEDURE — 83735 ASSAY OF MAGNESIUM: CPT | Mod: HCNC | Performed by: INTERNAL MEDICINE

## 2022-01-01 PROCEDURE — 25000003 PHARM REV CODE 250: Performed by: HOSPITALIST

## 2022-01-01 PROCEDURE — 99900035 HC TECH TIME PER 15 MIN (STAT): Mod: HCNC

## 2022-01-01 PROCEDURE — 99999 PR PBB SHADOW E&M-EST. PATIENT-LVL IV: ICD-10-PCS | Mod: PBBFAC,HCNC,GC, | Performed by: STUDENT IN AN ORGANIZED HEALTH CARE EDUCATION/TRAINING PROGRAM

## 2022-01-01 PROCEDURE — 27000221 HC OXYGEN, UP TO 24 HOURS: Mod: HCNC

## 2022-01-01 PROCEDURE — 11000004 HC SNF PRIVATE: Mod: HCNC

## 2022-01-01 PROCEDURE — 87040 BLOOD CULTURE FOR BACTERIA: CPT | Mod: HCNC | Performed by: STUDENT IN AN ORGANIZED HEALTH CARE EDUCATION/TRAINING PROGRAM

## 2022-01-01 PROCEDURE — 99305 1ST NF CARE MODERATE MDM 35: CPT | Mod: HCNC,,, | Performed by: HOSPITALIST

## 2022-01-01 PROCEDURE — 63600175 PHARM REV CODE 636 W HCPCS: Mod: HCNC

## 2022-01-01 PROCEDURE — 97530 THERAPEUTIC ACTIVITIES: CPT | Mod: HCNC

## 2022-01-01 PROCEDURE — G0180 MD CERTIFICATION HHA PATIENT: HCPCS | Mod: ,,, | Performed by: HOSPITALIST

## 2022-01-01 PROCEDURE — 99233 SBSQ HOSP IP/OBS HIGH 50: CPT | Mod: ,,, | Performed by: PODIATRIST

## 2022-01-01 PROCEDURE — 92526 ORAL FUNCTION THERAPY: CPT

## 2022-01-01 PROCEDURE — 25000003 PHARM REV CODE 250: Mod: HCNC | Performed by: STUDENT IN AN ORGANIZED HEALTH CARE EDUCATION/TRAINING PROGRAM

## 2022-01-01 PROCEDURE — 83735 ASSAY OF MAGNESIUM: CPT | Mod: HCNC | Performed by: STUDENT IN AN ORGANIZED HEALTH CARE EDUCATION/TRAINING PROGRAM

## 2022-01-01 PROCEDURE — 96366 THER/PROPH/DIAG IV INF ADDON: CPT

## 2022-01-01 PROCEDURE — 97530 THERAPEUTIC ACTIVITIES: CPT | Mod: HCNC,CQ

## 2022-01-01 PROCEDURE — 92610 EVALUATE SWALLOWING FUNCTION: CPT | Mod: HCNC

## 2022-01-01 PROCEDURE — 86140 C-REACTIVE PROTEIN: CPT | Performed by: HOSPITALIST

## 2022-01-01 PROCEDURE — 93005 ELECTROCARDIOGRAM TRACING: CPT

## 2022-01-01 PROCEDURE — 63600175 PHARM REV CODE 636 W HCPCS: Mod: HCNC | Performed by: NURSE PRACTITIONER

## 2022-01-01 PROCEDURE — 20000000 HC ICU ROOM: Mod: HCNC

## 2022-01-01 PROCEDURE — 85025 COMPLETE CBC W/AUTO DIFF WBC: CPT | Mod: HCNC | Performed by: INTERNAL MEDICINE

## 2022-01-01 PROCEDURE — 92610 EVALUATE SWALLOWING FUNCTION: CPT

## 2022-01-01 PROCEDURE — 63600175 PHARM REV CODE 636 W HCPCS: Performed by: STUDENT IN AN ORGANIZED HEALTH CARE EDUCATION/TRAINING PROGRAM

## 2022-01-01 PROCEDURE — 25000003 PHARM REV CODE 250: Mod: HCNC | Performed by: NURSE PRACTITIONER

## 2022-01-01 PROCEDURE — 99233 SBSQ HOSP IP/OBS HIGH 50: CPT | Mod: ,,, | Performed by: STUDENT IN AN ORGANIZED HEALTH CARE EDUCATION/TRAINING PROGRAM

## 2022-01-01 PROCEDURE — 36415 COLL VENOUS BLD VENIPUNCTURE: CPT | Mod: HCNC

## 2022-01-01 PROCEDURE — G0179 MD RECERTIFICATION HHA PT: HCPCS | Mod: ,,, | Performed by: HOSPITALIST

## 2022-01-01 PROCEDURE — 97535 SELF CARE MNGMENT TRAINING: CPT | Mod: HCNC

## 2022-01-01 PROCEDURE — 99499 UNLISTED E&M SERVICE: CPT | Mod: 95,,, | Performed by: INTERNAL MEDICINE

## 2022-01-01 PROCEDURE — 99232 SBSQ HOSP IP/OBS MODERATE 35: CPT | Mod: HCNC,,, | Performed by: INTERNAL MEDICINE

## 2022-01-01 PROCEDURE — 99214 OFFICE O/P EST MOD 30 MIN: CPT | Mod: 95,,, | Performed by: INTERNAL MEDICINE

## 2022-01-01 PROCEDURE — 87077 CULTURE AEROBIC IDENTIFY: CPT | Mod: 59,HCNC | Performed by: STUDENT IN AN ORGANIZED HEALTH CARE EDUCATION/TRAINING PROGRAM

## 2022-01-01 PROCEDURE — 84466 ASSAY OF TRANSFERRIN: CPT | Mod: HCNC | Performed by: STUDENT IN AN ORGANIZED HEALTH CARE EDUCATION/TRAINING PROGRAM

## 2022-01-01 PROCEDURE — 99239 PR HOSPITAL DISCHARGE DAY,>30 MIN: ICD-10-PCS | Mod: HCNC,,, | Performed by: INTERNAL MEDICINE

## 2022-01-01 PROCEDURE — 94660 CPAP INITIATION&MGMT: CPT | Mod: HCNC

## 2022-01-01 PROCEDURE — 83735 ASSAY OF MAGNESIUM: CPT | Performed by: HOSPITALIST

## 2022-01-01 PROCEDURE — 80202 ASSAY OF VANCOMYCIN: CPT | Performed by: STUDENT IN AN ORGANIZED HEALTH CARE EDUCATION/TRAINING PROGRAM

## 2022-01-01 PROCEDURE — 36415 COLL VENOUS BLD VENIPUNCTURE: CPT | Mod: HCNC | Performed by: STUDENT IN AN ORGANIZED HEALTH CARE EDUCATION/TRAINING PROGRAM

## 2022-01-01 PROCEDURE — 94761 N-INVAS EAR/PLS OXIMETRY MLT: CPT | Mod: HCNC

## 2022-01-01 PROCEDURE — 99223 PR INITIAL HOSPITAL CARE,LEVL III: ICD-10-PCS | Mod: ,,, | Performed by: NURSE PRACTITIONER

## 2022-01-01 PROCEDURE — 80048 BASIC METABOLIC PNL TOTAL CA: CPT | Performed by: HOSPITALIST

## 2022-01-01 PROCEDURE — 80048 BASIC METABOLIC PNL TOTAL CA: CPT | Performed by: STUDENT IN AN ORGANIZED HEALTH CARE EDUCATION/TRAINING PROGRAM

## 2022-01-01 PROCEDURE — 84145 PROCALCITONIN (PCT): CPT | Performed by: HOSPITALIST

## 2022-01-01 PROCEDURE — 80048 BASIC METABOLIC PNL TOTAL CA: CPT | Mod: HCNC

## 2022-01-01 PROCEDURE — 80053 COMPREHEN METABOLIC PANEL: CPT | Mod: HCNC | Performed by: STUDENT IN AN ORGANIZED HEALTH CARE EDUCATION/TRAINING PROGRAM

## 2022-01-01 PROCEDURE — 36415 COLL VENOUS BLD VENIPUNCTURE: CPT | Performed by: STUDENT IN AN ORGANIZED HEALTH CARE EDUCATION/TRAINING PROGRAM

## 2022-01-01 PROCEDURE — 63600175 PHARM REV CODE 636 W HCPCS: Performed by: HOSPITALIST

## 2022-01-01 PROCEDURE — 99232 PR SUBSEQUENT HOSPITAL CARE,LEVL II: ICD-10-PCS | Mod: ,,, | Performed by: HOSPITALIST

## 2022-01-01 PROCEDURE — 84100 ASSAY OF PHOSPHORUS: CPT | Mod: HCNC | Performed by: NURSE PRACTITIONER

## 2022-01-01 PROCEDURE — 97165 OT EVAL LOW COMPLEX 30 MIN: CPT | Mod: HCNC

## 2022-01-01 PROCEDURE — 99499 RISK ADDL DX/OHS AUDIT: ICD-10-PCS | Mod: 95,,, | Performed by: INTERNAL MEDICINE

## 2022-01-01 PROCEDURE — 25000003 PHARM REV CODE 250: Performed by: STUDENT IN AN ORGANIZED HEALTH CARE EDUCATION/TRAINING PROGRAM

## 2022-01-01 PROCEDURE — 99233 PR SUBSEQUENT HOSPITAL CARE,LEVL III: ICD-10-PCS | Mod: ,,, | Performed by: PHYSICIAN ASSISTANT

## 2022-01-01 PROCEDURE — 36415 COLL VENOUS BLD VENIPUNCTURE: CPT | Mod: S$GLB,,, | Performed by: INTERNAL MEDICINE

## 2022-01-01 PROCEDURE — 87040 BLOOD CULTURE FOR BACTERIA: CPT | Performed by: STUDENT IN AN ORGANIZED HEALTH CARE EDUCATION/TRAINING PROGRAM

## 2022-01-01 PROCEDURE — C1751 CATH, INF, PER/CENT/MIDLINE: HCPCS

## 2022-01-01 PROCEDURE — 80061 LIPID PANEL: CPT | Mod: HCNC | Performed by: STUDENT IN AN ORGANIZED HEALTH CARE EDUCATION/TRAINING PROGRAM

## 2022-01-01 PROCEDURE — G0179 PR HOME HEALTH MD RECERTIFICATION: ICD-10-PCS | Mod: ,,, | Performed by: HOSPITALIST

## 2022-01-01 PROCEDURE — 1111F PR DISCHARGE MEDS RECONCILED W/ CURRENT OUTPATIENT MED LIST: ICD-10-PCS | Mod: CPTII,,, | Performed by: HOSPITALIST

## 2022-01-01 PROCEDURE — 84100 ASSAY OF PHOSPHORUS: CPT | Performed by: HOSPITALIST

## 2022-01-01 PROCEDURE — 11000001 HC ACUTE MED/SURG PRIVATE ROOM

## 2022-01-01 PROCEDURE — 84550 ASSAY OF BLOOD/URIC ACID: CPT

## 2022-01-01 PROCEDURE — 1111F DSCHRG MED/CURRENT MED MERGE: CPT | Mod: CPTII,95,, | Performed by: INTERNAL MEDICINE

## 2022-01-01 PROCEDURE — 96365 THER/PROPH/DIAG IV INF INIT: CPT

## 2022-01-01 PROCEDURE — 96368 THER/DIAG CONCURRENT INF: CPT

## 2022-01-01 PROCEDURE — 97116 GAIT TRAINING THERAPY: CPT | Mod: HCNC

## 2022-01-01 PROCEDURE — 84100 ASSAY OF PHOSPHORUS: CPT | Mod: HCNC | Performed by: STUDENT IN AN ORGANIZED HEALTH CARE EDUCATION/TRAINING PROGRAM

## 2022-01-01 PROCEDURE — 1160F RVW MEDS BY RX/DR IN RCRD: CPT | Mod: CPTII,95,, | Performed by: INTERNAL MEDICINE

## 2022-01-01 PROCEDURE — 83735 ASSAY OF MAGNESIUM: CPT | Mod: HCNC | Performed by: NURSE PRACTITIONER

## 2022-01-01 PROCEDURE — 93010 ELECTROCARDIOGRAM REPORT: CPT | Mod: HCNC,,, | Performed by: INTERNAL MEDICINE

## 2022-01-01 PROCEDURE — 99497 PR ADVNCD CARE PLAN 30 MIN: ICD-10-PCS | Mod: S$GLB,,, | Performed by: NURSE PRACTITIONER

## 2022-01-01 PROCEDURE — 99495 TCM SERVICES (MODERATE COMPLEXITY): ICD-10-PCS | Mod: 95,,, | Performed by: INTERNAL MEDICINE

## 2022-01-01 PROCEDURE — 51798 US URINE CAPACITY MEASURE: CPT

## 2022-01-01 PROCEDURE — 87086 URINE CULTURE/COLONY COUNT: CPT | Mod: HCNC | Performed by: STUDENT IN AN ORGANIZED HEALTH CARE EDUCATION/TRAINING PROGRAM

## 2022-01-01 PROCEDURE — 85027 COMPLETE CBC AUTOMATED: CPT | Performed by: STUDENT IN AN ORGANIZED HEALTH CARE EDUCATION/TRAINING PROGRAM

## 2022-01-01 PROCEDURE — 94761 N-INVAS EAR/PLS OXIMETRY MLT: CPT

## 2022-01-01 PROCEDURE — 80048 BASIC METABOLIC PNL TOTAL CA: CPT | Mod: HCNC | Performed by: NURSE PRACTITIONER

## 2022-01-01 PROCEDURE — 83735 ASSAY OF MAGNESIUM: CPT | Performed by: STUDENT IN AN ORGANIZED HEALTH CARE EDUCATION/TRAINING PROGRAM

## 2022-01-01 PROCEDURE — 83970 ASSAY OF PARATHORMONE: CPT | Mod: HCNC | Performed by: STUDENT IN AN ORGANIZED HEALTH CARE EDUCATION/TRAINING PROGRAM

## 2022-01-01 PROCEDURE — 87086 URINE CULTURE/COLONY COUNT: CPT | Performed by: STUDENT IN AN ORGANIZED HEALTH CARE EDUCATION/TRAINING PROGRAM

## 2022-01-01 PROCEDURE — 84100 ASSAY OF PHOSPHORUS: CPT | Mod: HCNC | Performed by: INTERNAL MEDICINE

## 2022-01-01 PROCEDURE — 1111F PR DISCHARGE MEDS RECONCILED W/ CURRENT OUTPATIENT MED LIST: ICD-10-PCS | Mod: CPTII,95,, | Performed by: INTERNAL MEDICINE

## 2022-01-01 PROCEDURE — 99499 NO LOS: ICD-10-PCS | Mod: ,,, | Performed by: PHYSICIAN ASSISTANT

## 2022-01-01 PROCEDURE — 85027 COMPLETE CBC AUTOMATED: CPT | Performed by: HOSPITALIST

## 2022-01-01 PROCEDURE — 1157F PR ADVANCE CARE PLAN OR EQUIV PRESENT IN MEDICAL RECORD: ICD-10-PCS | Mod: CPTII,,, | Performed by: HOSPITALIST

## 2022-01-01 PROCEDURE — U0003 INFECTIOUS AGENT DETECTION BY NUCLEIC ACID (DNA OR RNA); SEVERE ACUTE RESPIRATORY SYNDROME CORONAVIRUS 2 (SARS-COV-2) (CORONAVIRUS DISEASE [COVID-19]), AMPLIFIED PROBE TECHNIQUE, MAKING USE OF HIGH THROUGHPUT TECHNOLOGIES AS DESCRIBED BY CMS-2020-01-R: HCPCS | Mod: HCNC | Performed by: EMERGENCY MEDICINE

## 2022-01-01 PROCEDURE — 1159F MED LIST DOCD IN RCRD: CPT | Mod: CPTII,95,, | Performed by: INTERNAL MEDICINE

## 2022-01-01 PROCEDURE — 63600175 PHARM REV CODE 636 W HCPCS: Mod: HCNC | Performed by: STUDENT IN AN ORGANIZED HEALTH CARE EDUCATION/TRAINING PROGRAM

## 2022-01-01 PROCEDURE — 99285 EMERGENCY DEPT VISIT HI MDM: CPT | Mod: 25

## 2022-01-01 PROCEDURE — 93010 EKG 12-LEAD: ICD-10-PCS | Mod: ,,, | Performed by: INTERNAL MEDICINE

## 2022-01-01 PROCEDURE — 99285 EMERGENCY DEPT VISIT HI MDM: CPT | Mod: 25,HCNC

## 2022-01-01 PROCEDURE — 99285 EMERGENCY DEPT VISIT HI MDM: CPT | Mod: HCNC,CS,, | Performed by: EMERGENCY MEDICINE

## 2022-01-01 PROCEDURE — 25000003 PHARM REV CODE 250: Mod: HCNC

## 2022-01-01 PROCEDURE — 36415 COLL VENOUS BLD VENIPUNCTURE: CPT | Performed by: HOSPITALIST

## 2022-01-01 PROCEDURE — 97162 PT EVAL MOD COMPLEX 30 MIN: CPT

## 2022-01-01 PROCEDURE — 85025 COMPLETE CBC W/AUTO DIFF WBC: CPT | Performed by: HOSPITALIST

## 2022-01-01 PROCEDURE — 97116 GAIT TRAINING THERAPY: CPT | Mod: HCNC,CQ

## 2022-01-01 PROCEDURE — 99285 EMERGENCY DEPT VISIT HI MDM: CPT | Mod: CS,,, | Performed by: EMERGENCY MEDICINE

## 2022-01-01 PROCEDURE — 99233 PR SUBSEQUENT HOSPITAL CARE,LEVL III: ICD-10-PCS | Mod: ,,, | Performed by: STUDENT IN AN ORGANIZED HEALTH CARE EDUCATION/TRAINING PROGRAM

## 2022-01-01 PROCEDURE — 93005 ELECTROCARDIOGRAM TRACING: CPT | Mod: HCNC | Performed by: INTERNAL MEDICINE

## 2022-01-01 PROCEDURE — 83605 ASSAY OF LACTIC ACID: CPT | Performed by: STUDENT IN AN ORGANIZED HEALTH CARE EDUCATION/TRAINING PROGRAM

## 2022-01-01 PROCEDURE — 51702 INSERT TEMP BLADDER CATH: CPT | Mod: HCNC

## 2022-01-01 PROCEDURE — 1157F PR ADVANCE CARE PLAN OR EQUIV PRESENT IN MEDICAL RECORD: ICD-10-PCS | Mod: CPTII,95,, | Performed by: INTERNAL MEDICINE

## 2022-01-01 PROCEDURE — 36415 PR COLLECTION VENOUS BLOOD,VENIPUNCTURE: ICD-10-PCS | Mod: S$GLB,,, | Performed by: HOSPITALIST

## 2022-01-01 PROCEDURE — 99223 PR INITIAL HOSPITAL CARE,LEVL III: ICD-10-PCS | Mod: ,,, | Performed by: INTERNAL MEDICINE

## 2022-01-01 PROCEDURE — 97535 SELF CARE MNGMENT TRAINING: CPT | Mod: HCNC,CO

## 2022-01-01 PROCEDURE — 36415 COLL VENOUS BLD VENIPUNCTURE: CPT | Mod: HCNC | Performed by: NURSE PRACTITIONER

## 2022-01-01 PROCEDURE — 63600175 PHARM REV CODE 636 W HCPCS: Mod: HCNC | Performed by: EMERGENCY MEDICINE

## 2022-01-01 PROCEDURE — 20600001 HC STEP DOWN PRIVATE ROOM: Mod: HCNC

## 2022-01-01 PROCEDURE — 80202 ASSAY OF VANCOMYCIN: CPT | Performed by: HOSPITALIST

## 2022-01-01 PROCEDURE — 81001 URINALYSIS AUTO W/SCOPE: CPT | Mod: HCNC | Performed by: STUDENT IN AN ORGANIZED HEALTH CARE EDUCATION/TRAINING PROGRAM

## 2022-01-01 PROCEDURE — U0002 COVID-19 LAB TEST NON-CDC: HCPCS | Mod: HCNC | Performed by: EMERGENCY MEDICINE

## 2022-01-01 PROCEDURE — 99239 HOSP IP/OBS DSCHRG MGMT >30: CPT | Mod: HCNC,,, | Performed by: INTERNAL MEDICINE

## 2022-01-01 PROCEDURE — 1160F PR REVIEW ALL MEDS BY PRESCRIBER/CLIN PHARMACIST DOCUMENTED: ICD-10-PCS | Mod: CPTII,95,, | Performed by: INTERNAL MEDICINE

## 2022-01-01 PROCEDURE — 99350 HOME/RES VST EST HIGH MDM 60: CPT | Mod: S$GLB,,, | Performed by: NURSE PRACTITIONER

## 2022-01-01 PROCEDURE — 27000207 HC ISOLATION: Mod: HCNC

## 2022-01-01 PROCEDURE — 81001 URINALYSIS AUTO W/SCOPE: CPT | Performed by: STUDENT IN AN ORGANIZED HEALTH CARE EDUCATION/TRAINING PROGRAM

## 2022-01-01 PROCEDURE — 99350 PR HOME VISIT,ESTAB PATIENT,LEVEL IV: ICD-10-PCS | Mod: S$GLB,,, | Performed by: NURSE PRACTITIONER

## 2022-01-01 PROCEDURE — 84484 ASSAY OF TROPONIN QUANT: CPT | Mod: 91,HCNC | Performed by: EMERGENCY MEDICINE

## 2022-01-01 PROCEDURE — 83036 HEMOGLOBIN GLYCOSYLATED A1C: CPT | Mod: HCNC | Performed by: STUDENT IN AN ORGANIZED HEALTH CARE EDUCATION/TRAINING PROGRAM

## 2022-01-01 PROCEDURE — G0180 PR HOME HEALTH MD CERTIFICATION: ICD-10-PCS | Mod: ,,, | Performed by: HOSPITALIST

## 2022-01-01 PROCEDURE — 85007 BL SMEAR W/DIFF WBC COUNT: CPT | Performed by: HOSPITALIST

## 2022-01-01 PROCEDURE — 99233 SBSQ HOSP IP/OBS HIGH 50: CPT | Mod: ,,, | Performed by: PHYSICIAN ASSISTANT

## 2022-01-01 PROCEDURE — 87088 URINE BACTERIA CULTURE: CPT | Mod: HCNC | Performed by: STUDENT IN AN ORGANIZED HEALTH CARE EDUCATION/TRAINING PROGRAM

## 2022-01-01 PROCEDURE — 1157F ADVNC CARE PLAN IN RCRD: CPT | Mod: CPTII,95,, | Performed by: INTERNAL MEDICINE

## 2022-01-01 PROCEDURE — 1159F PR MEDICATION LIST DOCUMENTED IN MEDICAL RECORD: ICD-10-PCS | Mod: CPTII,95,, | Performed by: INTERNAL MEDICINE

## 2022-01-01 PROCEDURE — 93010 ELECTROCARDIOGRAM REPORT: CPT | Mod: ,,, | Performed by: INTERNAL MEDICINE

## 2022-01-01 PROCEDURE — U0005 INFEC AGEN DETEC AMPLI PROBE: HCPCS | Performed by: STUDENT IN AN ORGANIZED HEALTH CARE EDUCATION/TRAINING PROGRAM

## 2022-01-01 PROCEDURE — 97110 THERAPEUTIC EXERCISES: CPT | Mod: HCNC,CQ

## 2022-01-01 PROCEDURE — 92526 ORAL FUNCTION THERAPY: CPT | Mod: HCNC

## 2022-01-01 PROCEDURE — 97110 THERAPEUTIC EXERCISES: CPT | Mod: HCNC

## 2022-01-01 PROCEDURE — 12000002 HC ACUTE/MED SURGE SEMI-PRIVATE ROOM

## 2022-01-01 PROCEDURE — 36410 VNPNXR 3YR/> PHY/QHP DX/THER: CPT

## 2022-01-01 PROCEDURE — 27000190 HC CPAP FULL FACE MASK W/VALVE: Mod: HCNC

## 2022-01-01 PROCEDURE — 99232 SBSQ HOSP IP/OBS MODERATE 35: CPT | Mod: ,,, | Performed by: HOSPITALIST

## 2022-01-01 PROCEDURE — 99233 PR SUBSEQUENT HOSPITAL CARE,LEVL III: ICD-10-PCS | Mod: ,,, | Performed by: PODIATRIST

## 2022-01-01 PROCEDURE — 1111F DSCHRG MED/CURRENT MED MERGE: CPT | Mod: CPTII,S$GLB,, | Performed by: NURSE PRACTITIONER

## 2022-01-01 PROCEDURE — 85025 COMPLETE CBC W/AUTO DIFF WBC: CPT | Mod: HCNC | Performed by: EMERGENCY MEDICINE

## 2022-01-01 PROCEDURE — 36415 PR COLLECTION VENOUS BLOOD,VENIPUNCTURE: ICD-10-PCS | Mod: S$GLB,,, | Performed by: INTERNAL MEDICINE

## 2022-01-01 PROCEDURE — 87186 SC STD MICRODIL/AGAR DIL: CPT | Mod: 59,HCNC | Performed by: STUDENT IN AN ORGANIZED HEALTH CARE EDUCATION/TRAINING PROGRAM

## 2022-01-01 PROCEDURE — 99239 PR HOSPITAL DISCHARGE DAY,>30 MIN: ICD-10-PCS | Mod: ,,, | Performed by: HOSPITALIST

## 2022-01-01 PROCEDURE — 83880 ASSAY OF NATRIURETIC PEPTIDE: CPT | Mod: HCNC | Performed by: EMERGENCY MEDICINE

## 2022-01-01 PROCEDURE — 85025 COMPLETE CBC W/AUTO DIFF WBC: CPT | Mod: HCNC | Performed by: NURSE PRACTITIONER

## 2022-01-01 PROCEDURE — 96361 HYDRATE IV INFUSION ADD-ON: CPT

## 2022-01-01 PROCEDURE — 85007 BL SMEAR W/DIFF WBC COUNT: CPT | Performed by: STUDENT IN AN ORGANIZED HEALTH CARE EDUCATION/TRAINING PROGRAM

## 2022-01-01 PROCEDURE — 1111F DSCHRG MED/CURRENT MED MERGE: CPT | Mod: CPTII,,, | Performed by: HOSPITALIST

## 2022-01-01 PROCEDURE — 84100 ASSAY OF PHOSPHORUS: CPT | Performed by: STUDENT IN AN ORGANIZED HEALTH CARE EDUCATION/TRAINING PROGRAM

## 2022-01-01 PROCEDURE — 87186 SC STD MICRODIL/AGAR DIL: CPT | Performed by: STUDENT IN AN ORGANIZED HEALTH CARE EDUCATION/TRAINING PROGRAM

## 2022-01-01 PROCEDURE — 96375 TX/PRO/DX INJ NEW DRUG ADDON: CPT | Mod: HCNC

## 2022-01-01 PROCEDURE — 99496 TRANSJ CARE MGMT HIGH F2F 7D: CPT | Mod: S$GLB,,, | Performed by: NURSE PRACTITIONER

## 2022-01-01 PROCEDURE — 99213 OFFICE O/P EST LOW 20 MIN: CPT | Mod: HCNC,GC,S$GLB, | Performed by: STUDENT IN AN ORGANIZED HEALTH CARE EDUCATION/TRAINING PROGRAM

## 2022-01-01 PROCEDURE — 97535 SELF CARE MNGMENT TRAINING: CPT

## 2022-01-01 PROCEDURE — 99497 ADVNCD CARE PLAN 30 MIN: CPT | Mod: S$GLB,,, | Performed by: NURSE PRACTITIONER

## 2022-01-01 PROCEDURE — 85025 COMPLETE CBC W/AUTO DIFF WBC: CPT | Mod: HCNC | Performed by: STUDENT IN AN ORGANIZED HEALTH CARE EDUCATION/TRAINING PROGRAM

## 2022-01-01 PROCEDURE — 84443 ASSAY THYROID STIM HORMONE: CPT | Mod: HCNC | Performed by: STUDENT IN AN ORGANIZED HEALTH CARE EDUCATION/TRAINING PROGRAM

## 2022-01-01 PROCEDURE — 99495 TRANSJ CARE MGMT MOD F2F 14D: CPT | Mod: 95,,, | Performed by: INTERNAL MEDICINE

## 2022-01-01 PROCEDURE — 76937 US GUIDE VASCULAR ACCESS: CPT

## 2022-01-01 PROCEDURE — 80053 COMPREHEN METABOLIC PANEL: CPT | Mod: HCNC | Performed by: EMERGENCY MEDICINE

## 2022-01-01 PROCEDURE — 93010 EKG 12-LEAD: ICD-10-PCS | Mod: HCNC,,, | Performed by: INTERNAL MEDICINE

## 2022-01-01 PROCEDURE — 97165 OT EVAL LOW COMPLEX 30 MIN: CPT

## 2022-01-01 PROCEDURE — 1111F PR DISCHARGE MEDS RECONCILED W/ CURRENT OUTPATIENT MED LIST: ICD-10-PCS | Mod: CPTII,S$GLB,, | Performed by: NURSE PRACTITIONER

## 2022-01-01 PROCEDURE — U0002 COVID-19 LAB TEST NON-CDC: HCPCS | Performed by: STUDENT IN AN ORGANIZED HEALTH CARE EDUCATION/TRAINING PROGRAM

## 2022-01-01 PROCEDURE — U0003 INFECTIOUS AGENT DETECTION BY NUCLEIC ACID (DNA OR RNA); SEVERE ACUTE RESPIRATORY SYNDROME CORONAVIRUS 2 (SARS-COV-2) (CORONAVIRUS DISEASE [COVID-19]), AMPLIFIED PROBE TECHNIQUE, MAKING USE OF HIGH THROUGHPUT TECHNOLOGIES AS DESCRIBED BY CMS-2020-01-R: HCPCS | Mod: HCNC | Performed by: STUDENT IN AN ORGANIZED HEALTH CARE EDUCATION/TRAINING PROGRAM

## 2022-01-01 PROCEDURE — 99222 PR INITIAL HOSPITAL CARE,LEVL II: ICD-10-PCS | Mod: ,,, | Performed by: HOSPITALIST

## 2022-01-01 PROCEDURE — 99223 1ST HOSP IP/OBS HIGH 75: CPT | Mod: ,,, | Performed by: INTERNAL MEDICINE

## 2022-01-01 PROCEDURE — 84484 ASSAY OF TROPONIN QUANT: CPT | Mod: HCNC | Performed by: EMERGENCY MEDICINE

## 2022-01-01 PROCEDURE — 99223 1ST HOSP IP/OBS HIGH 75: CPT | Mod: ,,, | Performed by: NURSE PRACTITIONER

## 2022-01-01 PROCEDURE — 99496 TRANSITIONAL CARE MANAGE SERVICE 7 DAY DISCHARGE: ICD-10-PCS | Mod: S$GLB,,, | Performed by: NURSE PRACTITIONER

## 2022-01-01 PROCEDURE — 99239 HOSP IP/OBS DSCHRG MGMT >30: CPT | Mod: ,,, | Performed by: HOSPITALIST

## 2022-01-01 PROCEDURE — 96365 THER/PROPH/DIAG IV INF INIT: CPT | Mod: HCNC

## 2022-01-01 PROCEDURE — 87040 BLOOD CULTURE FOR BACTERIA: CPT | Mod: 59,HCNC | Performed by: INTERNAL MEDICINE

## 2022-01-01 PROCEDURE — 99232 PR SUBSEQUENT HOSPITAL CARE,LEVL II: ICD-10-PCS | Mod: HCNC,,, | Performed by: INTERNAL MEDICINE

## 2022-01-01 PROCEDURE — 99222 1ST HOSP IP/OBS MODERATE 55: CPT | Mod: ,,, | Performed by: HOSPITALIST

## 2022-01-01 PROCEDURE — 97110 THERAPEUTIC EXERCISES: CPT | Mod: HCNC,CO

## 2022-01-01 PROCEDURE — 97161 PT EVAL LOW COMPLEX 20 MIN: CPT | Mod: HCNC

## 2022-01-01 PROCEDURE — 25000003 PHARM REV CODE 250: Mod: HCNC | Performed by: EMERGENCY MEDICINE

## 2022-01-01 PROCEDURE — 82728 ASSAY OF FERRITIN: CPT | Mod: HCNC | Performed by: STUDENT IN AN ORGANIZED HEALTH CARE EDUCATION/TRAINING PROGRAM

## 2022-01-01 PROCEDURE — 87077 CULTURE AEROBIC IDENTIFY: CPT | Mod: 59 | Performed by: STUDENT IN AN ORGANIZED HEALTH CARE EDUCATION/TRAINING PROGRAM

## 2022-01-01 PROCEDURE — 80048 BASIC METABOLIC PNL TOTAL CA: CPT | Mod: 91,HCNC

## 2022-01-01 PROCEDURE — 99305 PR NURSING FACILITY CARE, INIT, MOD SEVERITY: ICD-10-PCS | Mod: HCNC,,, | Performed by: HOSPITALIST

## 2022-01-01 PROCEDURE — 99213 PR OFFICE/OUTPT VISIT, EST, LEVL III, 20-29 MIN: ICD-10-PCS | Mod: HCNC,GC,S$GLB, | Performed by: STUDENT IN AN ORGANIZED HEALTH CARE EDUCATION/TRAINING PROGRAM

## 2022-01-01 PROCEDURE — 83880 ASSAY OF NATRIURETIC PEPTIDE: CPT | Mod: HCNC | Performed by: STUDENT IN AN ORGANIZED HEALTH CARE EDUCATION/TRAINING PROGRAM

## 2022-01-01 PROCEDURE — 99214 PR OFFICE/OUTPT VISIT, EST, LEVL IV, 30-39 MIN: ICD-10-PCS | Mod: 95,,, | Performed by: INTERNAL MEDICINE

## 2022-01-01 PROCEDURE — 80053 COMPREHEN METABOLIC PANEL: CPT | Performed by: STUDENT IN AN ORGANIZED HEALTH CARE EDUCATION/TRAINING PROGRAM

## 2022-01-01 PROCEDURE — 99285 PR EMERGENCY DEPT VISIT,LEVEL V: ICD-10-PCS | Mod: CS,,, | Performed by: EMERGENCY MEDICINE

## 2022-01-01 PROCEDURE — 99999 PR PBB SHADOW E&M-EST. PATIENT-LVL IV: CPT | Mod: PBBFAC,HCNC,GC, | Performed by: STUDENT IN AN ORGANIZED HEALTH CARE EDUCATION/TRAINING PROGRAM

## 2022-01-01 PROCEDURE — 1157F ADVNC CARE PLAN IN RCRD: CPT | Mod: CPTII,,, | Performed by: HOSPITALIST

## 2022-01-01 PROCEDURE — 85025 COMPLETE CBC W/AUTO DIFF WBC: CPT | Performed by: STUDENT IN AN ORGANIZED HEALTH CARE EDUCATION/TRAINING PROGRAM

## 2022-01-01 PROCEDURE — 80048 BASIC METABOLIC PNL TOTAL CA: CPT | Mod: HCNC | Performed by: INTERNAL MEDICINE

## 2022-01-01 PROCEDURE — 99285 PR EMERGENCY DEPT VISIT,LEVEL V: ICD-10-PCS | Mod: HCNC,CS,, | Performed by: EMERGENCY MEDICINE

## 2022-01-01 PROCEDURE — 36415 COLL VENOUS BLD VENIPUNCTURE: CPT | Mod: S$GLB,,, | Performed by: HOSPITALIST

## 2022-01-01 PROCEDURE — 99499 UNLISTED E&M SERVICE: CPT | Mod: ,,, | Performed by: PHYSICIAN ASSISTANT

## 2022-01-01 RX ORDER — FUROSEMIDE 40 MG/1
40 TABLET ORAL DAILY
Status: CANCELLED | OUTPATIENT
Start: 2022-01-01

## 2022-01-01 RX ORDER — NAPROXEN SODIUM 220 MG/1
162 TABLET, FILM COATED ORAL
Status: COMPLETED | OUTPATIENT
Start: 2022-01-01 | End: 2022-01-01

## 2022-01-01 RX ORDER — SODIUM CHLORIDE 9 MG/ML
INJECTION, SOLUTION INTRAVENOUS CONTINUOUS
Status: ACTIVE | OUTPATIENT
Start: 2022-01-01 | End: 2022-01-01

## 2022-01-01 RX ORDER — FERROUS GLUCONATE 324(37.5)
324 TABLET ORAL
Status: DISCONTINUED | OUTPATIENT
Start: 2022-01-01 | End: 2022-01-01

## 2022-01-01 RX ORDER — ACETAMINOPHEN 325 MG/1
650 TABLET ORAL EVERY 6 HOURS PRN
Status: CANCELLED | OUTPATIENT
Start: 2022-01-01

## 2022-01-01 RX ORDER — IBUPROFEN 200 MG
16 TABLET ORAL
Status: DISCONTINUED | OUTPATIENT
Start: 2022-01-01 | End: 2022-01-01 | Stop reason: HOSPADM

## 2022-01-01 RX ORDER — LEVOTHYROXINE SODIUM 88 UG/1
88 TABLET ORAL
Status: DISCONTINUED | OUTPATIENT
Start: 2022-01-01 | End: 2022-01-01 | Stop reason: HOSPADM

## 2022-01-01 RX ORDER — SODIUM CHLORIDE 0.9 % (FLUSH) 0.9 %
10 SYRINGE (ML) INJECTION
Status: DISCONTINUED | OUTPATIENT
Start: 2022-01-01 | End: 2022-01-01 | Stop reason: HOSPADM

## 2022-01-01 RX ORDER — LANOLIN ALCOHOL/MO/W.PET/CERES
1 CREAM (GRAM) TOPICAL DAILY
Refills: 6 | Status: DISCONTINUED | OUTPATIENT
Start: 2022-01-01 | End: 2022-01-01 | Stop reason: HOSPADM

## 2022-01-01 RX ORDER — NIFEDIPINE 60 MG/1
60 TABLET, EXTENDED RELEASE ORAL DAILY
Qty: 30 TABLET | Refills: 3 | Status: SHIPPED | OUTPATIENT
Start: 2022-01-01 | End: 2023-01-27

## 2022-01-01 RX ORDER — FUROSEMIDE 10 MG/ML
40 INJECTION INTRAMUSCULAR; INTRAVENOUS
Status: COMPLETED | OUTPATIENT
Start: 2022-01-01 | End: 2022-01-01

## 2022-01-01 RX ORDER — NIFEDIPINE 30 MG/1
30 TABLET, EXTENDED RELEASE ORAL DAILY
Qty: 30 TABLET | Refills: 11 | OUTPATIENT
Start: 2022-01-01 | End: 2023-02-03

## 2022-01-01 RX ORDER — FUROSEMIDE 10 MG/ML
40 INJECTION INTRAMUSCULAR; INTRAVENOUS 3 TIMES DAILY
Status: DISCONTINUED | OUTPATIENT
Start: 2022-01-01 | End: 2022-01-01

## 2022-01-01 RX ORDER — ACETAMINOPHEN 325 MG/1
650 TABLET ORAL EVERY 6 HOURS PRN
Status: DISCONTINUED | OUTPATIENT
Start: 2022-01-01 | End: 2022-01-01 | Stop reason: HOSPADM

## 2022-01-01 RX ORDER — FUROSEMIDE 10 MG/ML
60 INJECTION INTRAMUSCULAR; INTRAVENOUS ONCE
Status: COMPLETED | OUTPATIENT
Start: 2022-01-01 | End: 2022-01-01

## 2022-01-01 RX ORDER — TALC
6 POWDER (GRAM) TOPICAL NIGHTLY PRN
Status: DISCONTINUED | OUTPATIENT
Start: 2022-01-01 | End: 2022-01-01 | Stop reason: HOSPADM

## 2022-01-01 RX ORDER — ACETAMINOPHEN 160 MG/5ML
500 LIQUID ORAL EVERY 6 HOURS PRN
Qty: 1 EACH | Refills: 0 | Status: SHIPPED | OUTPATIENT
Start: 2022-01-01

## 2022-01-01 RX ORDER — NIFEDIPINE 60 MG/1
60 TABLET, EXTENDED RELEASE ORAL DAILY
Status: DISCONTINUED | OUTPATIENT
Start: 2022-01-01 | End: 2022-01-01 | Stop reason: HOSPADM

## 2022-01-01 RX ORDER — NIFEDIPINE 30 MG/1
30 TABLET, EXTENDED RELEASE ORAL DAILY
Status: CANCELLED | OUTPATIENT
Start: 2022-01-01

## 2022-01-01 RX ORDER — ENOXAPARIN SODIUM 100 MG/ML
30 INJECTION SUBCUTANEOUS EVERY 24 HOURS
Status: DISCONTINUED | OUTPATIENT
Start: 2022-01-01 | End: 2022-01-01 | Stop reason: HOSPADM

## 2022-01-01 RX ORDER — FUROSEMIDE 40 MG/1
40 TABLET ORAL DAILY
Status: DISCONTINUED | OUTPATIENT
Start: 2022-01-01 | End: 2022-01-01

## 2022-01-01 RX ORDER — CALCITRIOL 0.25 UG/1
0.25 CAPSULE ORAL
Status: CANCELLED | OUTPATIENT
Start: 2022-01-01

## 2022-01-01 RX ORDER — POTASSIUM CHLORIDE 750 MG/1
10 CAPSULE, EXTENDED RELEASE ORAL ONCE
Status: COMPLETED | OUTPATIENT
Start: 2022-01-01 | End: 2022-01-01

## 2022-01-01 RX ORDER — LEVOTHYROXINE SODIUM 88 UG/1
88 TABLET ORAL DAILY
Status: CANCELLED | OUTPATIENT
Start: 2022-01-01

## 2022-01-01 RX ORDER — LANOLIN ALCOHOL/MO/W.PET/CERES
1 CREAM (GRAM) TOPICAL DAILY
Status: CANCELLED | OUTPATIENT
Start: 2022-01-01

## 2022-01-01 RX ORDER — CALCITRIOL 0.25 UG/1
0.25 CAPSULE ORAL
Status: DISCONTINUED | OUTPATIENT
Start: 2022-01-01 | End: 2022-01-01

## 2022-01-01 RX ORDER — NIFEDIPINE 30 MG/1
30 TABLET, EXTENDED RELEASE ORAL DAILY
Status: DISCONTINUED | OUTPATIENT
Start: 2022-01-01 | End: 2022-01-01 | Stop reason: HOSPADM

## 2022-01-01 RX ORDER — FERROUS GLUCONATE 324(37.5)
324 TABLET ORAL
Status: DISCONTINUED | OUTPATIENT
Start: 2022-01-01 | End: 2022-01-01 | Stop reason: HOSPADM

## 2022-01-01 RX ORDER — LEVOTHYROXINE SODIUM 88 UG/1
88 TABLET ORAL
Status: DISCONTINUED | OUTPATIENT
Start: 2022-01-01 | End: 2022-01-01

## 2022-01-01 RX ORDER — CALCIUM CARBONATE 200(500)MG
500 TABLET,CHEWABLE ORAL 2 TIMES DAILY PRN
Status: CANCELLED | OUTPATIENT
Start: 2022-01-01

## 2022-01-01 RX ORDER — ACETAMINOPHEN 500 MG
1000 TABLET ORAL
Status: COMPLETED | OUTPATIENT
Start: 2022-01-01 | End: 2022-01-01

## 2022-01-01 RX ORDER — TALC
6 POWDER (GRAM) TOPICAL NIGHTLY PRN
Status: CANCELLED | OUTPATIENT
Start: 2022-01-01

## 2022-01-01 RX ORDER — AMOXICILLIN 250 MG
1 CAPSULE ORAL 2 TIMES DAILY
Status: CANCELLED | OUTPATIENT
Start: 2022-01-01

## 2022-01-01 RX ORDER — METOPROLOL TARTRATE 1 MG/ML
5 INJECTION, SOLUTION INTRAVENOUS EVERY 5 MIN PRN
Status: DISCONTINUED | OUTPATIENT
Start: 2022-01-01 | End: 2022-01-01 | Stop reason: HOSPADM

## 2022-01-01 RX ORDER — ALLOPURINOL 100 MG/1
100 TABLET ORAL DAILY
Qty: 30 TABLET | Refills: 1 | Status: SHIPPED | OUTPATIENT
Start: 2022-01-01 | End: 2022-01-01

## 2022-01-01 RX ORDER — AMOXICILLIN 250 MG
1 CAPSULE ORAL 2 TIMES DAILY PRN
Status: DISCONTINUED | OUTPATIENT
Start: 2022-01-01 | End: 2022-01-01 | Stop reason: HOSPADM

## 2022-01-01 RX ORDER — HEPARIN SODIUM 5000 [USP'U]/ML
5000 INJECTION, SOLUTION INTRAVENOUS; SUBCUTANEOUS EVERY 8 HOURS
Status: DISCONTINUED | OUTPATIENT
Start: 2022-01-01 | End: 2022-01-01 | Stop reason: HOSPADM

## 2022-01-01 RX ORDER — LANOLIN ALCOHOL/MO/W.PET/CERES
1 CREAM (GRAM) TOPICAL DAILY
Status: DISCONTINUED | OUTPATIENT
Start: 2022-01-01 | End: 2022-01-01 | Stop reason: HOSPADM

## 2022-01-01 RX ORDER — LEVOTHYROXINE SODIUM 88 UG/1
TABLET ORAL
Qty: 90 TABLET | Refills: 1 | Status: SHIPPED | OUTPATIENT
Start: 2022-01-01

## 2022-01-01 RX ORDER — GLUCAGON 1 MG
1 KIT INJECTION
Status: DISCONTINUED | OUTPATIENT
Start: 2022-01-01 | End: 2022-01-01 | Stop reason: HOSPADM

## 2022-01-01 RX ORDER — SODIUM CHLORIDE 0.9 % (FLUSH) 0.9 %
10 SYRINGE (ML) INJECTION EVERY 12 HOURS PRN
Status: DISCONTINUED | OUTPATIENT
Start: 2022-01-01 | End: 2022-01-01 | Stop reason: HOSPADM

## 2022-01-01 RX ORDER — FUROSEMIDE 40 MG/1
40 TABLET ORAL DAILY
Status: DISCONTINUED | OUTPATIENT
Start: 2022-01-01 | End: 2022-01-01 | Stop reason: HOSPADM

## 2022-01-01 RX ORDER — SODIUM,POTASSIUM PHOSPHATES 280-250MG
2 POWDER IN PACKET (EA) ORAL ONCE
Status: COMPLETED | OUTPATIENT
Start: 2022-01-01 | End: 2022-01-01

## 2022-01-01 RX ORDER — NAPROXEN SODIUM 220 MG/1
81 TABLET, FILM COATED ORAL DAILY
Status: DISCONTINUED | OUTPATIENT
Start: 2022-01-01 | End: 2022-01-01 | Stop reason: HOSPADM

## 2022-01-01 RX ORDER — DEXTROSE MONOHYDRATE 50 MG/ML
INJECTION, SOLUTION INTRAVENOUS CONTINUOUS
Status: ACTIVE | OUTPATIENT
Start: 2022-01-01 | End: 2022-01-01

## 2022-01-01 RX ORDER — CALCIUM CARBONATE 200(500)MG
500 TABLET,CHEWABLE ORAL 2 TIMES DAILY PRN
Status: DISCONTINUED | OUTPATIENT
Start: 2022-01-01 | End: 2022-01-01 | Stop reason: HOSPADM

## 2022-01-01 RX ORDER — AMOXICILLIN 250 MG
1 CAPSULE ORAL 2 TIMES DAILY
Status: DISCONTINUED | OUTPATIENT
Start: 2022-01-01 | End: 2022-01-01 | Stop reason: HOSPADM

## 2022-01-01 RX ORDER — NIFEDIPINE 30 MG/1
60 TABLET, EXTENDED RELEASE ORAL DAILY
Status: DISCONTINUED | OUTPATIENT
Start: 2022-01-01 | End: 2022-01-01

## 2022-01-01 RX ORDER — ERGOCALCIFEROL 1.25 MG/1
50000 CAPSULE ORAL
Status: DISCONTINUED | OUTPATIENT
Start: 2022-01-01 | End: 2022-01-01 | Stop reason: HOSPADM

## 2022-01-01 RX ORDER — FUROSEMIDE 40 MG/1
40 TABLET ORAL 2 TIMES DAILY
Status: DISCONTINUED | OUTPATIENT
Start: 2022-01-01 | End: 2022-01-01

## 2022-01-01 RX ORDER — AMOXICILLIN AND CLAVULANATE POTASSIUM 400; 57 MG/5ML; MG/5ML
POWDER, FOR SUSPENSION ORAL
Qty: 150 ML | Refills: 0 | Status: SHIPPED | OUTPATIENT
Start: 2022-01-01 | End: 2022-01-01 | Stop reason: ALTCHOICE

## 2022-01-01 RX ORDER — LEVOTHYROXINE SODIUM 88 UG/1
88 TABLET ORAL DAILY
Qty: 90 TABLET | Refills: 0 | Status: ON HOLD | OUTPATIENT
Start: 2022-01-01 | End: 2022-01-01

## 2022-01-01 RX ORDER — ALLOPURINOL 100 MG/1
100 TABLET ORAL DAILY
Status: DISCONTINUED | OUTPATIENT
Start: 2022-01-01 | End: 2022-01-01 | Stop reason: HOSPADM

## 2022-01-01 RX ORDER — LEVOTHYROXINE SODIUM 88 UG/1
88 TABLET ORAL DAILY
Status: DISCONTINUED | OUTPATIENT
Start: 2022-01-01 | End: 2022-01-01 | Stop reason: HOSPADM

## 2022-01-01 RX ORDER — POTASSIUM CHLORIDE 20 MEQ/1
40 TABLET, EXTENDED RELEASE ORAL ONCE
Status: COMPLETED | OUTPATIENT
Start: 2022-01-01 | End: 2022-01-01

## 2022-01-01 RX ORDER — POTASSIUM CHLORIDE 20 MEQ/1
40 TABLET, EXTENDED RELEASE ORAL ONCE
Status: DISCONTINUED | OUTPATIENT
Start: 2022-01-01 | End: 2022-01-01

## 2022-01-01 RX ORDER — FUROSEMIDE 40 MG/1
40 TABLET ORAL DAILY
Qty: 30 TABLET | Refills: 11 | OUTPATIENT
Start: 2022-01-01 | End: 2023-02-02

## 2022-01-01 RX ORDER — SODIUM CHLORIDE 9 MG/ML
INJECTION, SOLUTION INTRAVENOUS CONTINUOUS
Status: DISCONTINUED | OUTPATIENT
Start: 2022-01-01 | End: 2022-01-01

## 2022-01-01 RX ORDER — IBUPROFEN 200 MG
24 TABLET ORAL
Status: DISCONTINUED | OUTPATIENT
Start: 2022-01-01 | End: 2022-01-01 | Stop reason: HOSPADM

## 2022-01-01 RX ORDER — AMOXICILLIN 250 MG
2 CAPSULE ORAL DAILY
Status: DISCONTINUED | OUTPATIENT
Start: 2022-01-01 | End: 2022-01-01

## 2022-01-01 RX ORDER — NAPROXEN SODIUM 220 MG/1
81 TABLET, FILM COATED ORAL DAILY
Status: CANCELLED | OUTPATIENT
Start: 2022-01-01

## 2022-01-01 RX ADMIN — NIFEDIPINE 60 MG: 60 TABLET, FILM COATED, EXTENDED RELEASE ORAL at 09:04

## 2022-01-01 RX ADMIN — HEPARIN SODIUM 5000 UNITS: 5000 INJECTION INTRAVENOUS; SUBCUTANEOUS at 09:02

## 2022-01-01 RX ADMIN — ALLOPURINOL 100 MG: 100 TABLET ORAL at 08:04

## 2022-01-01 RX ADMIN — Medication 6 MG: at 09:02

## 2022-01-01 RX ADMIN — LEVOTHYROXINE SODIUM 88 MCG: 88 TABLET ORAL at 09:02

## 2022-01-01 RX ADMIN — SODIUM CHLORIDE 500 ML: 9 INJECTION, SOLUTION INTRAVENOUS at 04:04

## 2022-01-01 RX ADMIN — HEPARIN SODIUM 5000 UNITS: 5000 INJECTION INTRAVENOUS; SUBCUTANEOUS at 06:02

## 2022-01-01 RX ADMIN — Medication 1 TABLET: at 08:02

## 2022-01-01 RX ADMIN — HEPARIN SODIUM 5000 UNITS: 5000 INJECTION INTRAVENOUS; SUBCUTANEOUS at 05:02

## 2022-01-01 RX ADMIN — ACETAMINOPHEN 650 MG: 325 TABLET ORAL at 09:04

## 2022-01-01 RX ADMIN — LEVOTHYROXINE SODIUM 88 MCG: 88 TABLET ORAL at 05:01

## 2022-01-01 RX ADMIN — SENNOSIDES AND DOCUSATE SODIUM 1 TABLET: 50; 8.6 TABLET ORAL at 09:02

## 2022-01-01 RX ADMIN — HEPARIN SODIUM 5000 UNITS: 5000 INJECTION INTRAVENOUS; SUBCUTANEOUS at 02:02

## 2022-01-01 RX ADMIN — SENNOSIDES AND DOCUSATE SODIUM 1 TABLET: 50; 8.6 TABLET ORAL at 10:02

## 2022-01-01 RX ADMIN — FERROUS SULFATE TAB 325 MG (65 MG ELEMENTAL FE) 1 EACH: 325 (65 FE) TAB at 09:02

## 2022-01-01 RX ADMIN — FERROUS GLUCONATE TAB 324 MG (37.5 MG ELEMENTAL IRON) 324 MG: 324 (37.5 FE) TAB at 08:02

## 2022-01-01 RX ADMIN — ACETAMINOPHEN 650 MG: 325 TABLET ORAL at 08:04

## 2022-01-01 RX ADMIN — FERROUS SULFATE TAB 325 MG (65 MG ELEMENTAL FE) 1 EACH: 325 (65 FE) TAB at 08:02

## 2022-01-01 RX ADMIN — FERROUS SULFATE TAB 325 MG (65 MG ELEMENTAL FE) 1 EACH: 325 (65 FE) TAB at 08:04

## 2022-01-01 RX ADMIN — DOCUSATE SODIUM 50 MG AND SENNOSIDES 8.6 MG 2 TABLET: 8.6; 5 TABLET, FILM COATED ORAL at 09:01

## 2022-01-01 RX ADMIN — Medication 1 TABLET: at 09:02

## 2022-01-01 RX ADMIN — LEVOTHYROXINE SODIUM 88 MCG: 88 TABLET ORAL at 06:04

## 2022-01-01 RX ADMIN — ENOXAPARIN SODIUM 30 MG: 100 INJECTION SUBCUTANEOUS at 05:04

## 2022-01-01 RX ADMIN — ASPIRIN 81 MG CHEWABLE TABLET 162 MG: 81 TABLET CHEWABLE at 03:01

## 2022-01-01 RX ADMIN — LEVOTHYROXINE SODIUM 88 MCG: 88 TABLET ORAL at 05:02

## 2022-01-01 RX ADMIN — Medication 324 MG: at 09:01

## 2022-01-01 RX ADMIN — CALCITRIOL CAPSULES 0.25 MCG 0.25 MCG: 0.25 CAPSULE ORAL at 05:02

## 2022-01-01 RX ADMIN — SODIUM CHLORIDE: 0.9 INJECTION, SOLUTION INTRAVENOUS at 02:04

## 2022-01-01 RX ADMIN — ASPIRIN 81 MG CHEWABLE TABLET 81 MG: 81 TABLET CHEWABLE at 08:02

## 2022-01-01 RX ADMIN — NIFEDIPINE 30 MG: 30 TABLET, FILM COATED, EXTENDED RELEASE ORAL at 08:02

## 2022-01-01 RX ADMIN — DOCUSATE SODIUM 50 MG AND SENNOSIDES 8.6 MG 2 TABLET: 8.6; 5 TABLET, FILM COATED ORAL at 08:02

## 2022-01-01 RX ADMIN — HEPARIN SODIUM 5000 UNITS: 5000 INJECTION INTRAVENOUS; SUBCUTANEOUS at 10:02

## 2022-01-01 RX ADMIN — ASPIRIN 81 MG CHEWABLE TABLET 81 MG: 81 TABLET CHEWABLE at 09:04

## 2022-01-01 RX ADMIN — CALCIUM CARBONATE (ANTACID) CHEW TAB 500 MG 500 MG: 500 CHEW TAB at 01:02

## 2022-01-01 RX ADMIN — HEPARIN SODIUM 5000 UNITS: 5000 INJECTION INTRAVENOUS; SUBCUTANEOUS at 01:02

## 2022-01-01 RX ADMIN — HEPARIN SODIUM 5000 UNITS: 5000 INJECTION INTRAVENOUS; SUBCUTANEOUS at 08:02

## 2022-01-01 RX ADMIN — FUROSEMIDE 40 MG: 10 INJECTION, SOLUTION INTRAMUSCULAR; INTRAVENOUS at 03:01

## 2022-01-01 RX ADMIN — POTASSIUM CHLORIDE 40 MEQ: 1500 TABLET, EXTENDED RELEASE ORAL at 09:04

## 2022-01-01 RX ADMIN — SENNOSIDES AND DOCUSATE SODIUM 1 TABLET: 50; 8.6 TABLET ORAL at 08:02

## 2022-01-01 RX ADMIN — FUROSEMIDE 40 MG: 40 TABLET ORAL at 05:02

## 2022-01-01 RX ADMIN — HEPARIN SODIUM 5000 UNITS: 5000 INJECTION INTRAVENOUS; SUBCUTANEOUS at 10:01

## 2022-01-01 RX ADMIN — VANCOMYCIN HYDROCHLORIDE 750 MG: 750 INJECTION, POWDER, LYOPHILIZED, FOR SOLUTION INTRAVENOUS at 09:04

## 2022-01-01 RX ADMIN — ACETAMINOPHEN 650 MG: 325 TABLET ORAL at 09:02

## 2022-01-01 RX ADMIN — ACETAMINOPHEN 650 MG: 325 TABLET ORAL at 06:02

## 2022-01-01 RX ADMIN — FUROSEMIDE 40 MG: 40 TABLET ORAL at 09:02

## 2022-01-01 RX ADMIN — LEVOTHYROXINE SODIUM 88 MCG: 88 TABLET ORAL at 08:02

## 2022-01-01 RX ADMIN — FUROSEMIDE 40 MG: 40 TABLET ORAL at 08:02

## 2022-01-01 RX ADMIN — LEVOTHYROXINE SODIUM 88 MCG: 88 TABLET ORAL at 05:04

## 2022-01-01 RX ADMIN — HEPARIN SODIUM 5000 UNITS: 5000 INJECTION INTRAVENOUS; SUBCUTANEOUS at 03:01

## 2022-01-01 RX ADMIN — LEVOTHYROXINE SODIUM 88 MCG: 88 TABLET ORAL at 06:02

## 2022-01-01 RX ADMIN — NIFEDIPINE 30 MG: 30 TABLET, FILM COATED, EXTENDED RELEASE ORAL at 09:01

## 2022-01-01 RX ADMIN — SODIUM CHLORIDE: 0.9 INJECTION, SOLUTION INTRAVENOUS at 11:04

## 2022-01-01 RX ADMIN — NIFEDIPINE 60 MG: 60 TABLET, FILM COATED, EXTENDED RELEASE ORAL at 08:04

## 2022-01-01 RX ADMIN — FERROUS SULFATE TAB 325 MG (65 MG ELEMENTAL FE) 1 EACH: 325 (65 FE) TAB at 09:04

## 2022-01-01 RX ADMIN — CEFTRIAXONE 1 G: 1 INJECTION, SOLUTION INTRAVENOUS at 02:01

## 2022-01-01 RX ADMIN — NIFEDIPINE 30 MG: 30 TABLET, FILM COATED, EXTENDED RELEASE ORAL at 09:02

## 2022-01-01 RX ADMIN — LEVOTHYROXINE SODIUM 88 MCG: 88 TABLET ORAL at 02:01

## 2022-01-01 RX ADMIN — Medication 324 MG: at 02:01

## 2022-01-01 RX ADMIN — Medication 6 MG: at 09:04

## 2022-01-01 RX ADMIN — POTASSIUM BICARBONATE 25 MEQ: 978 TABLET, EFFERVESCENT ORAL at 05:02

## 2022-01-01 RX ADMIN — VANCOMYCIN HYDROCHLORIDE 500 MG: 500 INJECTION, POWDER, LYOPHILIZED, FOR SOLUTION INTRAVENOUS at 08:04

## 2022-01-01 RX ADMIN — FUROSEMIDE 60 MG: 10 INJECTION, SOLUTION INTRAVENOUS at 05:01

## 2022-01-01 RX ADMIN — ASPIRIN 81 MG CHEWABLE TABLET 81 MG: 81 TABLET CHEWABLE at 09:02

## 2022-01-01 RX ADMIN — ASPIRIN 81 MG CHEWABLE TABLET 81 MG: 81 TABLET CHEWABLE at 08:04

## 2022-01-01 RX ADMIN — ENOXAPARIN SODIUM 30 MG: 100 INJECTION SUBCUTANEOUS at 06:04

## 2022-01-01 RX ADMIN — HEPARIN SODIUM 5000 UNITS: 5000 INJECTION INTRAVENOUS; SUBCUTANEOUS at 09:01

## 2022-01-01 RX ADMIN — DOCUSATE SODIUM 50 MG AND SENNOSIDES 8.6 MG 2 TABLET: 8.6; 5 TABLET, FILM COATED ORAL at 02:01

## 2022-01-01 RX ADMIN — ACETAMINOPHEN 650 MG: 325 TABLET ORAL at 02:02

## 2022-01-01 RX ADMIN — Medication 6 MG: at 07:02

## 2022-01-01 RX ADMIN — SODIUM CHLORIDE: 0.9 INJECTION, SOLUTION INTRAVENOUS at 03:02

## 2022-01-01 RX ADMIN — HEPARIN SODIUM 5000 UNITS: 5000 INJECTION INTRAVENOUS; SUBCUTANEOUS at 08:01

## 2022-01-01 RX ADMIN — DEXTROSE: 5 SOLUTION INTRAVENOUS at 09:04

## 2022-01-01 RX ADMIN — ALLOPURINOL 100 MG: 100 TABLET ORAL at 09:04

## 2022-01-01 RX ADMIN — Medication 6 MG: at 08:02

## 2022-01-01 RX ADMIN — ACETAMINOPHEN 1000 MG: 500 TABLET ORAL at 03:04

## 2022-01-01 RX ADMIN — ERGOCALCIFEROL 50000 UNITS: 1.25 CAPSULE ORAL at 02:01

## 2022-01-01 RX ADMIN — VANCOMYCIN HYDROCHLORIDE 1500 MG: 1.5 INJECTION, POWDER, LYOPHILIZED, FOR SOLUTION INTRAVENOUS at 04:04

## 2022-01-01 RX ADMIN — ALLOPURINOL 100 MG: 100 TABLET ORAL at 10:04

## 2022-01-01 RX ADMIN — ACETAMINOPHEN 650 MG: 325 TABLET ORAL at 02:04

## 2022-01-01 RX ADMIN — FUROSEMIDE 40 MG: 10 INJECTION, SOLUTION INTRAVENOUS at 03:01

## 2022-01-01 RX ADMIN — ENOXAPARIN SODIUM 30 MG: 100 INJECTION SUBCUTANEOUS at 04:04

## 2022-01-01 RX ADMIN — FUROSEMIDE 10 MG/HR: 10 INJECTION, SOLUTION INTRAMUSCULAR; INTRAVENOUS at 04:01

## 2022-01-01 RX ADMIN — POTASSIUM BICARBONATE 25 MEQ: 978 TABLET, EFFERVESCENT ORAL at 12:02

## 2022-01-01 RX ADMIN — NIFEDIPINE 60 MG: 60 TABLET, FILM COATED, EXTENDED RELEASE ORAL at 03:04

## 2022-01-01 RX ADMIN — HEPARIN SODIUM 5000 UNITS: 5000 INJECTION INTRAVENOUS; SUBCUTANEOUS at 11:02

## 2022-01-01 RX ADMIN — POTASSIUM BICARBONATE 20 MEQ: 391 TABLET, EFFERVESCENT ORAL at 09:01

## 2022-01-01 RX ADMIN — CEFTRIAXONE 1 G: 1 INJECTION, SOLUTION INTRAVENOUS at 08:04

## 2022-01-01 RX ADMIN — CEFTRIAXONE 1 G: 1 INJECTION, SOLUTION INTRAVENOUS at 12:04

## 2022-01-01 RX ADMIN — LEVOTHYROXINE SODIUM 88 MCG: 88 TABLET ORAL at 06:01

## 2022-01-01 RX ADMIN — HEPARIN SODIUM 5000 UNITS: 5000 INJECTION INTRAVENOUS; SUBCUTANEOUS at 03:02

## 2022-01-01 RX ADMIN — NIFEDIPINE 30 MG: 30 TABLET, FILM COATED, EXTENDED RELEASE ORAL at 02:01

## 2022-01-01 RX ADMIN — FUROSEMIDE 10 MG/HR: 10 INJECTION, SOLUTION INTRAMUSCULAR; INTRAVENOUS at 06:01

## 2022-01-01 RX ADMIN — HEPARIN SODIUM 5000 UNITS: 5000 INJECTION INTRAVENOUS; SUBCUTANEOUS at 05:01

## 2022-01-01 RX ADMIN — NIFEDIPINE 60 MG: 60 TABLET, FILM COATED, EXTENDED RELEASE ORAL at 10:04

## 2022-01-01 RX ADMIN — ACETAMINOPHEN 650 MG: 325 TABLET ORAL at 10:04

## 2022-01-01 RX ADMIN — FERROUS SULFATE TAB 325 MG (65 MG ELEMENTAL FE) 1 EACH: 325 (65 FE) TAB at 10:04

## 2022-01-01 RX ADMIN — PIPERACILLIN SODIUM AND TAZOBACTAM SODIUM 4.5 G: 4; .5 INJECTION, POWDER, FOR SOLUTION INTRAVENOUS at 04:04

## 2022-01-01 RX ADMIN — VANCOMYCIN HYDROCHLORIDE 500 MG: 500 INJECTION, POWDER, LYOPHILIZED, FOR SOLUTION INTRAVENOUS at 10:04

## 2022-01-01 RX ADMIN — POTASSIUM & SODIUM PHOSPHATES POWDER PACK 280-160-250 MG 2 PACKET: 280-160-250 PACK at 08:04

## 2022-01-01 RX ADMIN — POTASSIUM CHLORIDE 10 MEQ: 10 CAPSULE, COATED, EXTENDED RELEASE ORAL at 08:02

## 2022-01-01 RX ADMIN — ACETAMINOPHEN 650 MG: 325 TABLET ORAL at 08:02

## 2022-01-01 RX ADMIN — SODIUM CHLORIDE 500 ML: 9 INJECTION, SOLUTION INTRAVENOUS at 03:04

## 2022-01-01 RX ADMIN — ASPIRIN 81 MG CHEWABLE TABLET 81 MG: 81 TABLET CHEWABLE at 10:04

## 2022-01-01 RX ADMIN — ACETAMINOPHEN 650 MG: 325 TABLET ORAL at 01:02

## 2022-01-01 RX ADMIN — Medication 324 MG: at 08:01

## 2022-01-01 RX ADMIN — AZITHROMYCIN 500 MG: 500 INJECTION, POWDER, LYOPHILIZED, FOR SOLUTION INTRAVENOUS at 08:04

## 2022-01-01 RX ADMIN — FUROSEMIDE 10 MG/HR: 10 INJECTION, SOLUTION INTRAMUSCULAR; INTRAVENOUS at 05:01

## 2022-01-01 RX ADMIN — DIBASIC SODIUM PHOSPHATE, MONOBASIC POTASSIUM PHOSPHATE AND MONOBASIC SODIUM PHOSPHATE 2 TABLET: 852; 155; 130 TABLET ORAL at 04:02

## 2022-01-01 RX ADMIN — VANCOMYCIN HYDROCHLORIDE 500 MG: 500 INJECTION, POWDER, LYOPHILIZED, FOR SOLUTION INTRAVENOUS at 11:04

## 2022-01-01 RX ADMIN — FUROSEMIDE 40 MG: 10 INJECTION, SOLUTION INTRAMUSCULAR; INTRAVENOUS at 09:01

## 2022-01-01 RX ADMIN — AZITHROMYCIN 500 MG: 500 INJECTION, POWDER, LYOPHILIZED, FOR SOLUTION INTRAVENOUS at 12:04

## 2022-01-26 NOTE — PROGRESS NOTES
Clinic Note  1/27/2022      Subjective:       Patient ID:  Johanny is a 89 y.o. female being seen for an established visit.    Chief Complaint: Hypertension    HPI  Johanny Curtis is a 89 years old female with medical problems of Hypertension, aortic aneurysm, hypothyroidism, peripheral vascular disease,   hyperlipidemia, osteopenia, ankle fracture, osteoarthritis, gout, peripheral neuropathy who presented to urgent visit for Bryce blood pressure reading at home and feeling tired. She is with her daughter who provided most of the history. She is in wheelchair, uses walker at home. Recently she has less appetite but the daughter tries to feed her slowly and healthy diet. She is taking her medications except nifedipine because she had not refill for 2 months she didn't know she was suppose to take. This morning her daughter checked her BP because she said she' tired found it 176/100 for which they made the appt. She denied having any pain, ROS negative, she has no other complains.     Review of Systems   Constitutional: Negative for chills and fever.   HENT: Negative for sore throat.    Eyes: Negative for pain.   Respiratory: Negative for cough and shortness of breath.    Cardiovascular: Negative for chest pain and leg swelling.   Gastrointestinal: Negative for abdominal pain, constipation, diarrhea, nausea and vomiting.   Genitourinary: Positive for frequency. Negative for dysuria and urgency.   Neurological: Negative for dizziness, loss of consciousness, weakness and headaches.       Past Medical History:   Diagnosis Date    AAA (abdominal aortic aneurysm)     Anemia in CKD (chronic kidney disease)     Arthritis     Bacteremia due to Escherichia coli 9/24/2020    Cataract     Class 1 obesity due to excess calories with serious comorbidity in adult 7/6/2017    Gout, chronic     High cholesterol     Hypertension     Hypothyroidism     Obesity     Plantar fasciitis     PVD (peripheral vascular disease)      Thyroid trouble        Family History   Problem Relation Age of Onset    Breast cancer Sister     Cataracts Son     Glaucoma Son     Mental illness Son     Diabetes Son     Glaucoma Daughter     Lupus Daughter     Meningitis Son     Heart disease Brother     Cancer Sister         leukemia    Cancer Sister         pancreatic    No Known Problems Brother     Cancer Brother         unknown    Diabetes Son     Blindness Neg Hx     Amblyopia Neg Hx     Hypertension Neg Hx     Macular degeneration Neg Hx     Retinal detachment Neg Hx     Strabismus Neg Hx     Stroke Neg Hx     Thyroid disease Neg Hx         reports that she quit smoking about 20 years ago. She has a 30.00 pack-year smoking history. She has never used smokeless tobacco. She reports that she does not drink alcohol and does not use drugs.    Medication List with Changes/Refills   Current Medications    ASPIRIN 81 MG CHEW    Take 1 tablet (81 mg total) by mouth once daily.    CALCITRIOL (ROCALTROL) 0.25 MCG CAP    Take 1 capsule (0.25 mcg total) by mouth every Monday and Friday.    FERROUS SULFATE 325 (65 FE) MG EC TABLET    Take 1 tablet (325 mg total) by mouth once daily.    LEVOTHYROXINE (SYNTHROID) 88 MCG TABLET    TAKE 1 TABLET (88 MCG TOTAL) BY MOUTH ONCE DAILY.    MULTIVIT-IRON-MIN-FOLIC ACID 3,500-18-0.4 UNIT-MG-MG ORAL CHEW    Take 1 capsule by mouth once daily.     SODIUM BICARBONATE 650 MG TABLET    TAKE 2 TABLETS DAILY    Changed and/or Refilled Medications    Modified Medication Previous Medication    NIFEDIPINE (PROCARDIA-XL) 60 MG (OSM) 24 HR TABLET NIFEdipine (PROCARDIA-XL) 60 MG (OSM) 24 hr tablet       Take 1 tablet (60 mg total) by mouth once daily.    Take 1 tablet (60 mg total) by mouth once daily.     Review of patient's allergies indicates:  No Known Allergies    Patient Active Problem List   Diagnosis    Essential hypertension    Hereditary and idiopathic peripheral neuropathy    Gout, chronic    Thyroid  "nodule    Hypothyroidism    Renal osteodystrophy    Anemia in CKD (chronic kidney disease)    CKD (chronic kidney disease), stage IV    AAA (abdominal aortic aneurysm) without rupture    Peripheral arterial disease    Aortic atherosclerosis    Osteopenia    Hyperlipidemia     Biliary obstruction    Sepsis    Acute cholangitis    Choledocholithiasis    Need for influenza vaccination    THAD (acute kidney injury)    Orthostasis    Episode of dizziness    Vasovagal near syncope    Secondary hyperparathyroidism of renal origin           Objective:      BP (!) 164/116 (BP Location: Left arm, Patient Position: Sitting, BP Method: Medium (Manual))   Pulse (!) 111   Ht 4' 11" (1.499 m)   SpO2 97%   BMI 32.32 kg/m²   Estimated body mass index is 32.32 kg/m² as calculated from the following:    Height as of this encounter: 4' 11" (1.499 m).    Weight as of 6/22/21: 72.6 kg (160 lb).  Physical Exam  Constitutional:       Comments: On wheelchair    HENT:      Head: Normocephalic.      Nose: Nose normal.      Mouth/Throat:      Mouth: Mucous membranes are moist.   Eyes:      Pupils: Pupils are equal, round, and reactive to light.   Cardiovascular:      Rate and Rhythm: Normal rate and regular rhythm.   Pulmonary:      Effort: Pulmonary effort is normal. No respiratory distress.      Breath sounds: No wheezing or rales.   Abdominal:      General: There is no distension.      Tenderness: There is no abdominal tenderness.   Musculoskeletal:      Right lower leg: No edema.      Left lower leg: No edema.   Skin:     Findings: No lesion or rash.   Neurological:      General: No focal deficit present.      Mental Status: She is alert and oriented to person, place, and time.   Psychiatric:         Mood and Affect: Mood normal.           Assessment and Plan:         Johanny was seen today for hypertension.    Diagnoses and all orders for this visit:    Hypertension, unspecified type  -     CBC Auto Differential; " Future  -     Comprehensive Metabolic Panel; Future  -     TSH; Future  -     NIFEdipine (PROCARDIA-XL) 60 MG (OSM) 24 hr tablet; Take 1 tablet (60 mg total) by mouth once daily.    Chronic renal impairment, unspecified CKD stage  -     Cancel: Urinalysis, Reflex to Urine Culture Urine, Clean Catch  -     Magnesium; Future  -     PHOSPHORUS; Future  -     Urinalysis, Reflex to Urine Culture Urine, Clean Catch; Future    I called the daughter when they arrived home and she confirmed the nifedipine bottles are empty for about 2 months. I see she has a refills until May 2022 via mail delivery but they don't have it at home. I sent a refill to their pharmacy to be able to get her the meds today.     Other Orders Placed This Visit:  Orders Placed This Encounter   Procedures    CBC Auto Differential    Comprehensive Metabolic Panel    Magnesium    PHOSPHORUS    TSH    Urinalysis, Reflex to Urine Culture Urine, Clean Catch         Albert Babb MD  Internal Medicine - PGYIII  [facility]  [address] [place] [id number]  [phone]

## 2022-01-26 NOTE — TELEPHONE ENCOUNTER
Called and spoke to daughter Katia. Pt confused with poor appetite with some weakness and very tired... No temp 96. BP to right arm was 173/100 with a pulse of 104. BP in left arm 174/97. Pt complains of being tired.Pt scheduled for this afternoon.

## 2022-01-26 NOTE — TELEPHONE ENCOUNTER
----- Message from García Colmenares sent at 1/26/2022 12:06 PM CST -----  Pt daughter calling to report blood pressure right arm pressure 170/104 pulse 107.Please advise

## 2022-01-28 PROBLEM — J96.92 HYPERCAPNIC RESPIRATORY FAILURE: Status: ACTIVE | Noted: 2022-01-01

## 2022-01-28 NOTE — HPI
89 y/o F history HTN, CKD (b/l Cr 2-2.5), HLD presenting with somnolence, oliguria and THAD on CKD. No known cardiac disease at baseline. Presenting with a week per daughter of fatigue and decreased urine output. No known chest pain or decreased exercise tolerance though functional capacity sounds limited at baseline. Went to urgent care and then encouraged to come to ED when Cr elevated to 3.2. BNP 4130. JVD elevated on exam w lower extremity edema. BP elevated to 175/80. Bedside TTE with reduced LVEF ~40% with no segmental WMA. Was given IV Lasix 40 x 1 with limited UOP before transitioning to lasix drip with increased urinary output since.

## 2022-01-28 NOTE — ASSESSMENT & PLAN NOTE
- THAD on CKD  - continue HTN mgmt and diuresis   - avoid nephrotoxic meds  - family to further discuss goals of care, would not recommend initiation of dialysis

## 2022-01-28 NOTE — ED TRIAGE NOTES
Patient presents to the ER after being seen by PCP yesterday and having blood work drawn. PCP sent her here for an elevated creatnine. Patient has been having fatigue and generalized weakness. Patient hasn't had a bowel movement in two days and has been having the urge to urinate. Patient denies any pain at this time.

## 2022-01-28 NOTE — ASSESSMENT & PLAN NOTE
89 y/o F history HTN, CKD (b/l Cr 2-2.5), HLD presenting with somnolence, oliguria and THAD on CKD and hypercapneic respiratory failure.    - Admit to ICU  - Continue Bipap  - Diuresis  - CXR with right sided opacity that may not be consistent with volume overload alone, consider CT if respiratory function fails to improve w diuresis alone  - Keep NPO

## 2022-01-28 NOTE — ED PROVIDER NOTES
Encounter Date: 1/28/2022       History     Chief Complaint   Patient presents with    Abnormal Lab     Sent by [facility] for elevated CR 3.1 from lab work taken yesterday. Pt reports + increased generalized weakness and fatigue x several days.      HPI     This is a 89-year-old woman with a history of hypertension, hypothyroidism, AAA, who is brought in by family with report of abnormal labs.  She was seen by her primary doctor yesterday for fatigue and weakness for the past several days, in the setting of decreased p.o. intake, and her lab work showed a creatinine of 3.1 up from 1.8-2 baseline.  She also had a urinalysis that was concerning for infection, culture pending at this time.  Per the patient's daughter at bedside, she has noticed her mom having increased work of breathing over the past couple of days with occasional grunting.  The patient has not yet urinated today.  Review of patient's allergies indicates:  No Known Allergies  Past Medical History:   Diagnosis Date    AAA (abdominal aortic aneurysm)     Anemia in CKD (chronic kidney disease)     Arthritis     Bacteremia due to Escherichia coli 9/24/2020    Cataract     Class 1 obesity due to excess calories with serious comorbidity in adult 7/6/2017    Gout, chronic     High cholesterol     Hypertension     Hypothyroidism     Obesity     Plantar fasciitis     PVD (peripheral vascular disease)     Thyroid trouble      Past Surgical History:   Procedure Laterality Date    BREAST BIOPSY Left     BREAST SURGERY Left 1972    benign breast lump    CATARACT EXTRACTION  3/18/13    both eyes -     CHOLECYSTECTOMY      ENDOSCOPIC ULTRASOUND OF UPPER GASTROINTESTINAL TRACT N/A 9/8/2020    Procedure: ULTRASOUND, UPPER GI TRACT, ENDOSCOPIC;  Surgeon: Rasheed Balbuena MD;  Location: [address]);  Service: Endoscopy;  Laterality: N/A;    ERCP N/A 9/8/2020    Procedure: ERCP (ENDOSCOPIC RETROGRADE CHOLANGIOPANCREATOGRAPHY);  Surgeon:  Rasheed Balbuena MD;  Location: [facility] ([address] [place] [address]);  Service: Endoscopy;  Laterality: N/A;    EYE SURGERY      HYSTERECTOMY      SKIN BIOPSY      Left breast     Family History   Problem Relation Age of Onset    Breast cancer Sister     Cataracts Son     Glaucoma Son     Mental illness Son     Diabetes Son     Glaucoma Daughter     Lupus Daughter     Meningitis Son     Heart disease Brother     Cancer Sister         leukemia    Cancer Sister         pancreatic    No Known Problems Brother     Cancer Brother         unknown    Diabetes Son     Blindness Neg Hx     Amblyopia Neg Hx     Hypertension Neg Hx     Macular degeneration Neg Hx     Retinal detachment Neg Hx     Strabismus Neg Hx     Stroke Neg Hx     Thyroid disease Neg Hx      Social History     Tobacco Use    Smoking status: Former Smoker     Packs/day: 0.50     Years: 60.00     Pack years: 30.00     Quit date: 2001     Years since quittin.5    Smokeless tobacco: Never Used    Tobacco comment: quit in the    Substance Use Topics    Alcohol use: No    Drug use: No     Review of Systems   Constitutional: Negative for chills, diaphoresis, fatigue and fever.   HENT: Negative for congestion, rhinorrhea and sore throat.    Eyes: Negative for visual disturbance.   Respiratory: Positive for shortness of breath. Negative for cough and chest tightness.    Cardiovascular: Negative for chest pain.   Gastrointestinal: Negative for abdominal pain, blood in stool, constipation, diarrhea and vomiting.   Genitourinary: Negative for dysuria, hematuria and urgency.   Musculoskeletal: Negative for back pain.   Skin: Negative for rash.   Neurological: Negative for seizures and syncope.   Hematological: Does not bruise/bleed easily.   Psychiatric/Behavioral: Negative for agitation and hallucinations.       Physical Exam     Initial Vitals   BP Pulse Resp Temp SpO2   22 1159 22 1159 22 1159 22 1159 22  1209   (!) 172/98 109 20 98.5 °F (36.9 °C) 95 %      MAP       --                Physical Exam  Gen: AxOx3, NAD, well nourished, appears stated age  Eye: EOMI, no scleral icterus, no periorbital edema or ecchymosis  Head: normocephalic, atraumatic, no lesions, scalp appears normal  ENT: neck supple, no stridor, no masses, no drooling or voice changes  CVS: RRR, no m/r/g, distal pulses intact/symmetric  Pulm:  Increased work of breathing, CTAB, no wheezes, rales or rhonchi, hypoxic  Abd: soft, nontender, nondistended, no organomegaly, no CVAT  Ext:  Trace pretibial bilateral lower extremity edema, no lesions, rashes, or deformity  Neuro: GCS15, moving all extremities, gait not assessed, face grossly symmetric  Psych: normal affect, cooperative, well groomed, makes good eye contact  ED Course   Procedures  Labs Reviewed   CBC W/ AUTO DIFFERENTIAL   COMPREHENSIVE METABOLIC PANEL   TROPONIN I   B-TYPE NATRIURETIC PEPTIDE   SARS-COV-2 RDRP GENE          Imaging Results    None          Medications   cefTRIAXone (ROCEPHIN) 1 g/50 mL D5W IVPB (has no administration in time range)     Medical Decision Making:   History:   Old Medical Records: I decided to obtain old medical records.  Old Records Summarized: records from clinic visits.  Initial Assessment:   This is a 89-year-old woman who presents with acute onset hypoxemic respiratory failure, of unclear etiology, in the setting of some lower extremity edema and recent acute kidney injury.  I have some concern for new heart failure, and will send troponin BNP, will also obtain a chest x-ray, COVID swab given ongoing pandemic.  Plan for repeat labs to assess for any interval change from yesterday, as well as a dose of ceftriaxone for the UTI that was present yesterday, which will also have good pulmonary coverage.  Labs and x-ray are pending at time of sign out to the afternoon attending, anticipate observation in the hospital after completion of her emergency department  workup.  Clinical Tests:   Lab Tests: Ordered  Radiological Study: Ordered                      Clinical Impression:   Final diagnoses:  [R09.02] Hypoxia  [N17.9] THAD (acute kidney injury) (Primary)          ED Disposition Condition    Admit               Patricia Mancilla MD  01/28/22 [id number]

## 2022-01-28 NOTE — H&P
[facility] Dept  Cardiology  History and Physical     Patient Name: Johanny Curtis  MRN: [id number]  Admission Date: 1/28/2022  Code Status: Prior   Attending Provider: Patricia Mancilla MD   Primary Care Physician: Leticia Weems MD  Principal Problem:<principal problem not specified>    Patient information was obtained from relative(s), past medical records and ER records.     Subjective:     Chief Complaint:  Fatigue, shortness of breath     HPI:  89 y/o F history HTN, CKD (b/l Cr 2-2.5), HLD presenting with somnolence, oliguria and THAD on CKD. No known cardiac disease at baseline. Presenting with a week per daughter of fatigue and decreased urine output. No known chest pain or decreased exercise tolerance though functional capcity sounds limited at baseline. Went to urgent care and then encouraged to come to ED when Cr elevated to 3.2. BNP 4130. JVD elevated on exam w lower extremity edema. BP elevated to 175/80. Bedside TTE with reduced LVEF ~40% with no segmental WMA. Has been given IV Lasix 40 x 1 with limited UOP since.      Past Medical History:   Diagnosis Date    AAA (abdominal aortic aneurysm)     Anemia in CKD (chronic kidney disease)     Arthritis     Bacteremia due to Escherichia coli 9/24/2020    Cataract     Class 1 obesity due to excess calories with serious comorbidity in adult 7/6/2017    Gout, chronic     High cholesterol     Hypertension     Hypothyroidism     Obesity     Plantar fasciitis     PVD (peripheral vascular disease)     Thyroid trouble        Past Surgical History:   Procedure Laterality Date    BREAST BIOPSY Left     BREAST SURGERY Left 1972    benign breast lump    CATARACT EXTRACTION  3/18/13    both eyes -     CHOLECYSTECTOMY      ENDOSCOPIC ULTRASOUND OF UPPER GASTROINTESTINAL TRACT N/A 9/8/2020    Procedure: ULTRASOUND, UPPER GI TRACT, ENDOSCOPIC;  Surgeon: Rasheed Balbuena MD;  Location: [facility] ([address] [place] [address]);  Service: Endoscopy;   Laterality: N/A;    ERCP N/A 2020    Procedure: ERCP (ENDOSCOPIC RETROGRADE CHOLANGIOPANCREATOGRAPHY);  Surgeon: Rasheed Balbuena MD;  Location: [facility] ([address] [place] [address]);  Service: Endoscopy;  Laterality: N/A;    EYE SURGERY      HYSTERECTOMY      SKIN BIOPSY      Left breast       Review of patient's allergies indicates:  No Known Allergies    No current facility-administered medications on file prior to encounter.     Current Outpatient Medications on File Prior to Encounter   Medication Sig    aspirin 81 MG Chew Take 1 tablet (81 mg total) by mouth once daily.    calcitRIOL (ROCALTROL) 0.25 MCG Cap Take 1 capsule (0.25 mcg total) by mouth every Monday and Friday.    ferrous sulfate 325 (65 FE) MG EC tablet Take 1 tablet (325 mg total) by mouth once daily.    levothyroxine (SYNTHROID) 88 MCG tablet TAKE 1 TABLET (88 MCG TOTAL) BY MOUTH ONCE DAILY.    MULTIVIT-IRON-MIN-FOLIC ACID 3,500-18-0.4 UNIT-MG-MG ORAL CHEW Take 1 capsule by mouth once daily.     NIFEdipine (PROCARDIA-XL) 60 MG (OSM) 24 hr tablet Take 1 tablet (60 mg total) by mouth once daily.    sodium bicarbonate 650 MG tablet TAKE 2 TABLETS DAILY      Family History     Problem Relation (Age of Onset)    Breast cancer Sister    Cancer Sister, Sister, Brother    Cataracts Son    Diabetes Son, Son    Glaucoma Son, Daughter    Heart disease Brother    Lupus Daughter    Meningitis Son    Mental illness Son    No Known Problems Brother        Tobacco Use    Smoking status: Former Smoker     Packs/day: 0.50     Years: 60.00     Pack years: 30.00     Quit date: 2001     Years since quittin.5    Smokeless tobacco: Never Used    Tobacco comment: quit in the    Substance and Sexual Activity    Alcohol use: No    Drug use: No    Sexual activity: Not Currently     Review of Systems   Unable to perform ROS: mental status change     Objective:     Vital Signs (Most Recent):  Temp: 98.5 °F (36.9 °C) (22 1437)  Pulse: 100 (22  1623)  Resp: (!) 37 (01/28/22 1623)  BP: (!) 173/95 (01/28/22 1615)  SpO2: (!) 94 % (01/28/22 1623) Vital Signs (24h Range):  Temp:  [98.5 °F (36.9 °C)] 98.5 °F (36.9 °C)  Pulse:  [] 100  Resp:  [20-37] 37  SpO2:  [88 %-96 %] 94 %  BP: (172-180)/(86-98) 173/95     Weight: 77.1 kg (170 lb)  Body mass index is 35.53 kg/m².    SpO2: (!) 94 %  O2 Device (Oxygen Therapy): BiPAP    No intake or output data in the 24 hours ending 01/28/22 1741    Lines/Drains/Airways     Drain            Female External Urinary Catheter 09/10/20 2000 504 days    Female External Urinary Catheter 01/28/22 1722 <1 day          Peripheral Intravenous Line                 Midline Catheter Insertion/Assessment  - Single Lumen 09/10/20 1520 Left brachial vein 18g x 10cm 505 days         Peripheral IV - Single Lumen 01/28/22 1353 22 G Right Hand <1 day         Peripheral IV - Single Lumen 01/28/22 1720 22 G Left Forearm <1 day                Physical Exam  Constitutional:       Appearance: She is well-developed and well-nourished.   HENT:      Head: Normocephalic and atraumatic.   Eyes:      Extraocular Movements: EOM normal.      Conjunctiva/sclera: Conjunctivae normal.      Pupils: Pupils are equal, round, and reactive to light.   Neck:      Vascular: No JVD.   Cardiovascular:      Rate and Rhythm: Normal rate and regular rhythm.      Pulses: Intact distal pulses.      Heart sounds: Normal heart sounds. No murmur heard.  No friction rub. No gallop.       Comments: Elevated JVD  Pulmonary:      Effort: Pulmonary effort is normal.      Breath sounds: No stridor. No wheezing or rales.   Chest:      Chest wall: No tenderness.   Abdominal:      General: Bowel sounds are normal. There is no distension.      Palpations: Abdomen is soft. There is no mass.      Tenderness: There is no abdominal tenderness. There is no guarding.   Musculoskeletal:         General: No tenderness or deformity.      Right lower leg: Edema present.      Left lower leg:  Edema present.   Skin:     General: Skin is warm and dry.      Findings: No erythema or rash.   Neurological:      Mental Status: She is alert and oriented to person, place, and time.   Psychiatric:         Mood and Affect: Mood and affect normal.         Significant Labs:   CMP   Recent Labs   Lab 01/27/22  1323 01/28/22  1350   * 128*   K 5.0 4.9   CL 92* 92*   CO2 23 26   * 111*   BUN 50* 48*   CREATININE 3.1* 2.8*   CALCIUM 10.2 10.5   PROT 7.0 7.3   ALBUMIN 3.7 3.4*   BILITOT 0.5 0.4   ALKPHOS 104 100   AST 42* 41*   ALT 38 41   ANIONGAP 17* 10   ESTGFRAFRICA 14.5* 16.4*   EGFRNONAA 12.6* 14.2*    and CBC   Recent Labs   Lab 01/27/22  1323 01/27/22  1323 01/28/22  1350   WBC 7.33  --  7.63   HGB 11.1*  --  12.1   HCT 35.4*   < > 38.6     --  283    < > = values in this interval not displayed.       Significant Imaging: Echocardiogram:   Transthoracic echo (TTE) complete (Cupid Only):   Results for orders placed or performed during the hospital encounter of 02/26/21   Echo Color Flow Doppler? Yes; Bubble Contrast? No   Result Value Ref Range    Ascending aorta 2.74 cm    STJ 2.51 cm    AV mean gradient 7 mmHg    Ao peak michael 1.76 m/s    Ao VTI 36.03 cm    IVS 0.95 0.6 - 1.1 cm    LA size 3.37 cm    Left Atrium Major Axis 5.30 cm    Left Atrium Minor Axis 5.00 cm    LVIDd 3.06 (A) 3.5 - 6.0 cm    LVIDs 2.13 2.1 - 4.0 cm    LVOT diameter 2.02 cm    LVOT peak VTI 18.64 cm    Posterior Wall 0.96 0.6 - 1.1 cm    MV Peak A Michael 1.05 m/s    E wave deceleration time 248.79 msec    MV Peak E Michael 0.76 m/s    PV Peak D Michael 0.28 m/s    PV Peak S Michael 0.44 m/s    RA Major Axis 3.54 cm    RA Width 2.71 cm    RVDD 2.80 cm    Sinus 2.63 cm    TAPSE 1.52 cm    TR Max Michael 2.49 m/s    TDI LATERAL 0.04 m/s    TDI SEPTAL 0.04 m/s    LA WIDTH 3.60 cm    MV stenosis pressure 1/2 time 72.15 ms    LV Diastolic Volume 36.74 mL    LV Systolic Volume 14.93 mL    RV S' 9.83 cm/s    LVOT peak michael 0.96 m/s    LA volume (mod)  "30.13 cm3    MV "A" wave duration 14.84 msec    LV LATERAL E/E' RATIO 19.00 m/s    LV SEPTAL E/E' RATIO 19.00 m/s    FS 30 %    LA volume 53.06 cm3    LV mass 78.90 g    Left Ventricle Relative Wall Thickness 0.63 cm    AV valve area 1.66 cm2    AV Velocity Ratio 0.55     AV index (prosthetic) 0.52     MV valve area p 1/2 method 3.05 cm2    E/A ratio 0.72     Mean e' 0.04 m/s    Pulm vein S/D ratio 1.57     LVOT area 3.2 cm2    LVOT stroke volume 59.71 cm3    AV peak gradient 12 mmHg    E/E' ratio 19.00 m/s    Triscuspid Valve Regurgitation Peak Gradient 25 mmHg    BSA 1.78 m2    LV Systolic Volume Index 8.7 mL/m2    LV Diastolic Volume Index 21.49 mL/m2    LA Volume Index 31.0 mL/m2    LV Mass Index 46 g/m2    LA Volume Index (Mod) 17.6 mL/m2    Right Atrial Pressure (from IVC) 3 mmHg    TV rest pulmonary artery pressure 28 mmHg    Narrative    · The left ventricle is normal in size with concentric remodeling and   normal systolic function. The estimated ejection fraction is 60%.  · Normal right ventricular size with low normal right ventricular systolic   function.  · Indeterminate left ventricular diastolic function.  · The estimated PA systolic pressure is 28 mmHg.  · Normal central venous pressure (3 mmHg).        Assessment and Plan:     Hypercapnic respiratory failure  89 y/o F history HTN, CKD (b/l Cr 2-2.5), HLD presenting with somnolence, oliguria and THAD on CKD and hypercapneic respiratory failure.    - Admit to ICU  - Continue Bipap  - Diuresis, s/p IV lasix 100mg, start lasix gtt 10mg/hr and uptitrate as needed  - CXR with right sided opacity that may not be consistent with volume overload alone, consider CT if respiratory function fails to improve w diuresis alone  - Keep NPO    Newly diagnosed CHF  - TTE (ordered)  - likely in setting of volume overload 2/2 THAD on CKD  - diuresis and HTN mgmt as noted  - continue bipap  - would not continue trop trend in absence of clinical suspicion of ACS and " inabtility to take PO DAPT     Essential hypertension  - keep NPO  - diuresis  - consider parenteral meds if fails to diuresis (hypertensive to just SBP 170s but NPO and LVEF reduced to 40s)      CKD (chronic kidney disease), stage IV  - THAD on CKD  - continue HTN mgmt and diuresis   - avoid nephrotoxic meds  - family to further discuss goals of care, would not recommend initiation of dialysis      AAA (abdominal aortic aneurysm)  - htn mgmt as above      VTE Risk Mitigation (From admission, onward)    None          Reji Knott MD  Cardiology   [facility] Dept

## 2022-01-28 NOTE — PROVIDER PROGRESS NOTES - EMERGENCY DEPT.
Encounter Date: 1/28/2022    ED Physician Progress Notes           Pt signed out to me by Dr. Mancilla.  She is a 89-year-old woman with a history of hypertension, hypothyroidism, AAA brought in by family for elevated creatinine as an outpatient as well as UTI and increased work of breathing.  Pending initial workup here but will likely need admission.  Requiring 2L O2 via NC.      Update.  Found to have large pleural effusion, elevated BNP.  Likely CHF exacerbation.  Troponin slightly higher than normal but may be due to THAD.  Cardiology consulted.  Placed on BIPAP given CO2 retention on VBG.  She seems sleepy but is following commands and seems to be protecting airway.  VBG shows relatively compensated respiratory acidosis.  Lasix ordered.  Cardiology is admitting to CCU.    EKG interpretation: Sinus tachycardia, leftward axis, normal intervals, Q waves in septal leads but otherwise no acute ischemic ST segment changes.

## 2022-01-28 NOTE — SUBJECTIVE & OBJECTIVE
Past Medical History:   Diagnosis Date    AAA (abdominal aortic aneurysm)     Anemia in CKD (chronic kidney disease)     Arthritis     Bacteremia due to Escherichia coli 9/24/2020    Cataract     Class 1 obesity due to excess calories with serious comorbidity in adult 7/6/2017    Gout, chronic     High cholesterol     Hypertension     Hypothyroidism     Obesity     Plantar fasciitis     PVD (peripheral vascular disease)     Thyroid trouble        Past Surgical History:   Procedure Laterality Date    BREAST BIOPSY Left     BREAST SURGERY Left 1972    benign breast lump    CATARACT EXTRACTION  3/18/13    both eyes -     CHOLECYSTECTOMY      ENDOSCOPIC ULTRASOUND OF UPPER GASTROINTESTINAL TRACT N/A 9/8/2020    Procedure: ULTRASOUND, UPPER GI TRACT, ENDOSCOPIC;  Surgeon: Rasheed Balbuena MD;  Location: [facility] ([address] [place] [address]);  Service: Endoscopy;  Laterality: N/A;    ERCP N/A 9/8/2020    Procedure: ERCP (ENDOSCOPIC RETROGRADE CHOLANGIOPANCREATOGRAPHY);  Surgeon: Rasheed Balbuena MD;  Location: [facility] ([address] [place] [address]);  Service: Endoscopy;  Laterality: N/A;    EYE SURGERY      HYSTERECTOMY      SKIN BIOPSY      Left breast       Review of patient's allergies indicates:  No Known Allergies    No current facility-administered medications on file prior to encounter.     Current Outpatient Medications on File Prior to Encounter   Medication Sig    aspirin 81 MG Chew Take 1 tablet (81 mg total) by mouth once daily.    calcitRIOL (ROCALTROL) 0.25 MCG Cap Take 1 capsule (0.25 mcg total) by mouth every Monday and Friday.    ferrous sulfate 325 (65 FE) MG EC tablet Take 1 tablet (325 mg total) by mouth once daily.    levothyroxine (SYNTHROID) 88 MCG tablet TAKE 1 TABLET (88 MCG TOTAL) BY MOUTH ONCE DAILY.    MULTIVIT-IRON-MIN-FOLIC ACID 3,500-18-0.4 UNIT-MG-MG ORAL CHEW Take 1 capsule by mouth once daily.     NIFEdipine (PROCARDIA-XL) 60 MG (OSM) 24 hr tablet Take 1 tablet (60 mg total) by  mouth once daily.    sodium bicarbonate 650 MG tablet TAKE 2 TABLETS DAILY      Family History     Problem Relation (Age of Onset)    Breast cancer Sister    Cancer Sister, Sister, Brother    Cataracts Son    Diabetes Son, Son    Glaucoma Son, Daughter    Heart disease Brother    Lupus Daughter    Meningitis Son    Mental illness Son    No Known Problems Brother        Tobacco Use    Smoking status: Former Smoker     Packs/day: 0.50     Years: 60.00     Pack years: 30.00     Quit date: 2001     Years since quittin.5    Smokeless tobacco: Never Used    Tobacco comment: quit in the    Substance and Sexual Activity    Alcohol use: No    Drug use: No    Sexual activity: Not Currently     Review of Systems   Unable to perform ROS: mental status change     Objective:     Vital Signs (Most Recent):  Temp: 98.5 °F (36.9 °C) (22 1437)  Pulse: 100 (22 1623)  Resp: (!) 37 (22 1623)  BP: (!) 173/95 (22 1615)  SpO2: (!) 94 % (22 1623) Vital Signs (24h Range):  Temp:  [98.5 °F (36.9 °C)] 98.5 °F (36.9 °C)  Pulse:  [] 100  Resp:  [20-37] 37  SpO2:  [88 %-96 %] 94 %  BP: (172-180)/(86-98) 173/95     Weight: 77.1 kg (170 lb)  Body mass index is 35.53 kg/m².    SpO2: (!) 94 %  O2 Device (Oxygen Therapy): BiPAP    No intake or output data in the 24 hours ending 22 1741    Lines/Drains/Airways     Drain            Female External Urinary Catheter 09/10/20 2000 504 days    Female External Urinary Catheter 22 1722 <1 day          Peripheral Intravenous Line                 Midline Catheter Insertion/Assessment  - Single Lumen 09/10/20 1520 Left brachial vein 18g x 10cm 505 days         Peripheral IV - Single Lumen 22 1353 22 G Right Hand <1 day         Peripheral IV - Single Lumen 22 1720 22 G Left Forearm <1 day                Physical Exam  Constitutional:       Appearance: She is well-developed and well-nourished.   HENT:      Head: Normocephalic and  atraumatic.   Eyes:      Extraocular Movements: EOM normal.      Conjunctiva/sclera: Conjunctivae normal.      Pupils: Pupils are equal, round, and reactive to light.   Neck:      Vascular: No JVD.   Cardiovascular:      Rate and Rhythm: Normal rate and regular rhythm.      Pulses: Intact distal pulses.      Heart sounds: Normal heart sounds. No murmur heard.  No friction rub. No gallop.       Comments: Elevated JVD  Pulmonary:      Effort: Pulmonary effort is normal.      Breath sounds: No stridor. No wheezing or rales.   Chest:      Chest wall: No tenderness.   Abdominal:      General: Bowel sounds are normal. There is no distension.      Palpations: Abdomen is soft. There is no mass.      Tenderness: There is no abdominal tenderness. There is no guarding.   Musculoskeletal:         General: No tenderness or deformity.      Right lower leg: Edema present.      Left lower leg: Edema present.   Skin:     General: Skin is warm and dry.      Findings: No erythema or rash.   Neurological:      Mental Status: She is alert and oriented to person, place, and time.   Psychiatric:         Mood and Affect: Mood and affect normal.         Significant Labs:   CMP   Recent Labs   Lab 01/27/22  1323 01/28/22  1350   * 128*   K 5.0 4.9   CL 92* 92*   CO2 23 26   * 111*   BUN 50* 48*   CREATININE 3.1* 2.8*   CALCIUM 10.2 10.5   PROT 7.0 7.3   ALBUMIN 3.7 3.4*   BILITOT 0.5 0.4   ALKPHOS 104 100   AST 42* 41*   ALT 38 41   ANIONGAP 17* 10   ESTGFRAFRICA 14.5* 16.4*   EGFRNONAA 12.6* 14.2*    and CBC   Recent Labs   Lab 01/27/22  1323 01/27/22  1323 01/28/22  1350   WBC 7.33  --  7.63   HGB 11.1*  --  12.1   HCT 35.4*   < > 38.6     --  283    < > = values in this interval not displayed.       Significant Imaging: Echocardiogram:   Transthoracic echo (TTE) complete (Cupid Only):   Results for orders placed or performed during the hospital encounter of 02/26/21   Echo Color Flow Doppler? Yes; Bubble Contrast? No  "  Result Value Ref Range    Ascending aorta 2.74 cm    STJ 2.51 cm    AV mean gradient 7 mmHg    Ao peak michael 1.76 m/s    Ao VTI 36.03 cm    IVS 0.95 0.6 - 1.1 cm    LA size 3.37 cm    Left Atrium Major Axis 5.30 cm    Left Atrium Minor Axis 5.00 cm    LVIDd 3.06 (A) 3.5 - 6.0 cm    LVIDs 2.13 2.1 - 4.0 cm    LVOT diameter 2.02 cm    LVOT peak VTI 18.64 cm    Posterior Wall 0.96 0.6 - 1.1 cm    MV Peak A Michael 1.05 m/s    E wave deceleration time 248.79 msec    MV Peak E Michael 0.76 m/s    PV Peak D Michael 0.28 m/s    PV Peak S Michael 0.44 m/s    RA Major Axis 3.54 cm    RA Width 2.71 cm    RVDD 2.80 cm    Sinus 2.63 cm    TAPSE 1.52 cm    TR Max Michael 2.49 m/s    TDI LATERAL 0.04 m/s    TDI SEPTAL 0.04 m/s    LA WIDTH 3.60 cm    MV stenosis pressure 1/2 time 72.15 ms    LV Diastolic Volume 36.74 mL    LV Systolic Volume 14.93 mL    RV S' 9.83 cm/s    LVOT peak michael 0.96 m/s    LA volume (mod) 30.13 cm3    MV "A" wave duration 14.84 msec    LV LATERAL E/E' RATIO 19.00 m/s    LV SEPTAL E/E' RATIO 19.00 m/s    FS 30 %    LA volume 53.06 cm3    LV mass 78.90 g    Left Ventricle Relative Wall Thickness 0.63 cm    AV valve area 1.66 cm2    AV Velocity Ratio 0.55     AV index (prosthetic) 0.52     MV valve area p 1/2 method 3.05 cm2    E/A ratio 0.72     Mean e' 0.04 m/s    Pulm vein S/D ratio 1.57     LVOT area 3.2 cm2    LVOT stroke volume 59.71 cm3    AV peak gradient 12 mmHg    E/E' ratio 19.00 m/s    Triscuspid Valve Regurgitation Peak Gradient 25 mmHg    BSA 1.78 m2    LV Systolic Volume Index 8.7 mL/m2    LV Diastolic Volume Index 21.49 mL/m2    LA Volume Index 31.0 mL/m2    LV Mass Index 46 g/m2    LA Volume Index (Mod) 17.6 mL/m2    Right Atrial Pressure (from IVC) 3 mmHg    TV rest pulmonary artery pressure 28 mmHg    Narrative    · The left ventricle is normal in size with concentric remodeling and   normal systolic function. The estimated ejection fraction is 60%.  · Normal right ventricular size with low normal right " ventricular systolic   function.  · Indeterminate left ventricular diastolic function.  · The estimated PA systolic pressure is 28 mmHg.  · Normal central venous pressure (3 mmHg).

## 2022-01-28 NOTE — ED NOTES
Patient placed in hospital gown, on cardiac monitor, bp cuff, and continuous pulse ox. Call light within reach. Family at bedside.

## 2022-01-29 PROBLEM — I50.20 HEART FAILURE WITH REDUCED EJECTION FRACTION: Status: ACTIVE | Noted: 2022-01-01

## 2022-01-29 NOTE — SUBJECTIVE & OBJECTIVE
Interval History: Patient on furosemide drip 10mg/hr, producing 130-150 ml / hr, with goal of 100-300 cc/hr in the setting of volume overload. Patient able to transition from BiPap 12/6 to NC, satting 100% on 5 L with mild residual tachypnea (24-28).    Review of Systems   Constitutional: Negative for fever and malaise/fatigue.   HENT: Negative for sore throat.    Eyes: Negative for visual disturbance.   Cardiovascular: Negative for chest pain, dyspnea on exertion, irregular heartbeat, leg swelling, near-syncope, palpitations and syncope.   Respiratory: Positive for shortness of breath.    Endocrine: Negative.    Hematologic/Lymphatic: Negative.    Skin: Negative.    Musculoskeletal: Negative for arthritis, back pain, joint pain and myalgias.   Gastrointestinal: Negative for abdominal pain and hematemesis.   Genitourinary: Negative.    Neurological: Negative for light-headedness and loss of balance.   Psychiatric/Behavioral: Negative.      Objective:     Vital Signs (Most Recent):  Temp: 98.4 °F (36.9 °C) (01/29/22 1200)  Pulse: 94 (01/29/22 1300)  Resp: 16 (01/29/22 1300)  BP: (!) 153/71 (01/29/22 1300)  SpO2: 100 % (01/29/22 1300) Vital Signs (24h Range):  Temp:  [98 °F (36.7 °C)-98.6 °F (37 °C)] 98.4 °F (36.9 °C)  Pulse:  [] 94  Resp:  [16-37] 16  SpO2:  [88 %-100 %] 100 %  BP: (121-170)/(60-88) 153/71     Weight: 75 kg (165 lb 5.5 oz)  Body mass index is 34.56 kg/m².     SpO2: 100 %  O2 Device (Oxygen Therapy): nasal cannula w/ humidification      Intake/Output Summary (Last 24 hours) at 1/29/2022 1617  Last data filed at 1/29/2022 1200  Gross per 24 hour   Intake 58.14 ml   Output 2855 ml   Net -2796.86 ml       Lines/Drains/Airways     Drain            Female External Urinary Catheter 09/10/20 2000 505 days         Urethral Catheter 01/28/22 1825 <1 day          Peripheral Intravenous Line                 Midline Catheter Insertion/Assessment  - Single Lumen 09/10/20 1520 Left brachial vein 18g x 10cm  506 days         Peripheral IV - Single Lumen 01/28/22 1353 22 G Right Hand 1 day         Peripheral IV - Single Lumen 01/28/22 1720 22 G Left Forearm <1 day                Physical Exam  Constitutional:       Appearance: She is well-developed.   HENT:      Head: Normocephalic and atraumatic.   Eyes:      Conjunctiva/sclera: Conjunctivae normal.      Pupils: Pupils are equal, round, and reactive to light.   Neck:      Vascular: No JVD.   Cardiovascular:      Rate and Rhythm: Normal rate and regular rhythm.      Pulses: Intact distal pulses.      Heart sounds: Normal heart sounds. No murmur heard.  No friction rub. No gallop.       Comments: Elevated JVD  Pulmonary:      Effort: Pulmonary effort is normal.      Breath sounds: No stridor. No wheezing or rales.   Chest:      Chest wall: No tenderness.   Abdominal:      General: Bowel sounds are normal. There is no distension.      Palpations: Abdomen is soft. There is no mass.      Tenderness: There is no abdominal tenderness. There is no guarding.   Musculoskeletal:         General: No tenderness or deformity.      Right lower leg: Edema (3+ pitting) present.      Left lower leg: Edema (3+ pitting) present.   Skin:     General: Skin is warm and dry.      Findings: No erythema or rash.   Neurological:      General: No focal deficit present.      Mental Status: She is alert and oriented to person, place, and time.   Psychiatric:         Mood and Affect: Mood normal.         Significant Labs:   CMP   Recent Labs   Lab 01/28/22  1350 01/29/22  0313 01/29/22  1436   * 131* 135*   K 4.9 4.4 4.5   CL 92* 91* 94*   CO2 26 30* 31*   * 80 102   BUN 48* 46* 44*   CREATININE 2.8* 2.5* 2.5*   CALCIUM 10.5 9.7 9.0   PROT 7.3  --   --    ALBUMIN 3.4*  --   --    BILITOT 0.4  --   --    ALKPHOS 100  --   --    AST 41*  --   --    ALT 41  --   --    ANIONGAP 10 10 10   ESTGFRAFRICA 16.4* 18.8* 18.8*   EGFRNONAA 14.2* 16.3* 16.3*    and CBC   Recent Labs   Lab  01/28/22  1350 01/28/22  1350 01/29/22  0313   WBC 7.63  --  5.14   HGB 12.1  --  11.2*   HCT 38.6   < > 35.5*     --  252    < > = values in this interval not displayed.       Significant Imaging:   CXR 1/28/2022

## 2022-01-29 NOTE — ASSESSMENT & PLAN NOTE
Bedside echo revealed LVEF ~ 40% with no segmental wall motion abnormalities. Formal echo pending. Prior echo in 02/2021 showing EF 60% with signs of pulmonary hypertension 28 mmHg.    -- f/u formal echo  -- treat as appropriate if heart failure demonstrated

## 2022-01-29 NOTE — PROGRESS NOTES
[facility] - [facility]iac [facility]ensive [facility]  Cardiology  Progress Note    Patient Name: Johanny Curtis  MRN: [id number]  Admission Date: 1/28/2022  Hospital Length of Stay: 1 days  Code Status: Full Code   Attending Physician: Paolo Hubbard MD   Primary Care Physician: Leticia Weems MD  Expected Discharge Date:   Principal Problem:<principal problem not specified>    Subjective:     Hospital Course:   Patient on furosemide drip 10mg/hr, producing 130-150 ml / hr, transitioning from BiPap to NC. Ceftriaxone was discontinued as patient has no signs of UTI.      Interval History: Patient on furosemide drip 10mg/hr, producing 130-150 ml / hr, with goal of 100-300 cc/hr in the setting of volume overload. Patient able to transition from BiPap 12/6 to NC, satting 100% on 5 L with mild residual tachypnea (24-28).    Review of Systems   Constitutional: Negative for fever and malaise/fatigue.   HENT: Negative for sore throat.    Eyes: Negative for visual disturbance.   Cardiovascular: Negative for chest pain, dyspnea on exertion, irregular heartbeat, leg swelling, near-syncope, palpitations and syncope.   Respiratory: Positive for shortness of breath.    Endocrine: Negative.    Hematologic/Lymphatic: Negative.    Skin: Negative.    Musculoskeletal: Negative for arthritis, back pain, joint pain and myalgias.   Gastrointestinal: Negative for abdominal pain and hematemesis.   Genitourinary: Negative.    Neurological: Negative for light-headedness and loss of balance.   Psychiatric/Behavioral: Negative.      Objective:     Vital Signs (Most Recent):  Temp: 98.4 °F (36.9 °C) (01/29/22 1200)  Pulse: 94 (01/29/22 1300)  Resp: 16 (01/29/22 1300)  BP: (!) 153/71 (01/29/22 1300)  SpO2: 100 % (01/29/22 1300) Vital Signs (24h Range):  Temp:  [98 °F (36.7 °C)-98.6 °F (37 °C)] 98.4 °F (36.9 °C)  Pulse:  [] 94  Resp:  [16-37] 16  SpO2:  [88 %-100 %] 100 %  BP: (121-170)/(60-88) 153/71     Weight: 75 kg (165 lb 5.5 oz)  Body mass index is  34.56 kg/m².     SpO2: 100 %  O2 Device (Oxygen Therapy): nasal cannula w/ humidification      Intake/Output Summary (Last 24 hours) at 1/29/2022 1617  Last data filed at 1/29/2022 1200  Gross per 24 hour   Intake 58.14 ml   Output 2855 ml   Net -2796.86 ml       Lines/Drains/Airways     Drain            Female External Urinary Catheter 09/10/20 2000 505 days         Urethral Catheter 01/28/22 1825 <1 day          Peripheral Intravenous Line                 Midline Catheter Insertion/Assessment  - Single Lumen 09/10/20 1520 Left brachial vein 18g x 10cm 506 days         Peripheral IV - Single Lumen 01/28/22 1353 22 G Right Hand 1 day         Peripheral IV - Single Lumen 01/28/22 1720 22 G Left Forearm <1 day                Physical Exam  Constitutional:       Appearance: She is well-developed.   HENT:      Head: Normocephalic and atraumatic.   Eyes:      Conjunctiva/sclera: Conjunctivae normal.      Pupils: Pupils are equal, round, and reactive to light.   Neck:      Vascular: No JVD.   Cardiovascular:      Rate and Rhythm: Normal rate and regular rhythm.      Pulses: Intact distal pulses.      Heart sounds: Normal heart sounds. No murmur heard.  No friction rub. No gallop.       Comments: Elevated JVD  Pulmonary:      Effort: Pulmonary effort is normal.      Breath sounds: No stridor. No wheezing or rales.   Chest:      Chest wall: No tenderness.   Abdominal:      General: Bowel sounds are normal. There is no distension.      Palpations: Abdomen is soft. There is no mass.      Tenderness: There is no abdominal tenderness. There is no guarding.   Musculoskeletal:         General: No tenderness or deformity.      Right lower leg: Edema (3+ pitting) present.      Left lower leg: Edema (3+ pitting) present.   Skin:     General: Skin is warm and dry.      Findings: No erythema or rash.   Neurological:      General: No focal deficit present.      Mental Status: She is alert and oriented to person, place, and time.  "  Psychiatric:         Mood and Affect: Mood normal.         Significant Labs:   CMP   Recent Labs   Lab 01/28/22  1350 01/29/22  0313 01/29/22  1436   * 131* 135*   K 4.9 4.4 4.5   CL 92* 91* 94*   CO2 26 30* 31*   * 80 102   BUN 48* 46* 44*   CREATININE 2.8* 2.5* 2.5*   CALCIUM 10.5 9.7 9.0   PROT 7.3  --   --    ALBUMIN 3.4*  --   --    BILITOT 0.4  --   --    ALKPHOS 100  --   --    AST 41*  --   --    ALT 41  --   --    ANIONGAP 10 10 10   ESTGFRAFRICA 16.4* 18.8* 18.8*   EGFRNONAA 14.2* 16.3* 16.3*    and CBC   Recent Labs   Lab 01/28/22  1350 01/28/22  1350 01/29/22  0313   WBC 7.63  --  5.14   HGB 12.1  --  11.2*   HCT 38.6   < > 35.5*     --  252    < > = values in this interval not displayed.       Significant Imaging:   CXR 1/28/2022       Assessment and Plan:     Heart failure with reduced ejection fraction  Bedside echo revealed LVEF ~ 40% with no segmental wall motion abnormalities. Formal echo pending. Prior echo in 02/2021 showing EF 60% with signs of pulmonary hypertension 28 mmHg.    -- f/u formal echo  -- treat as appropriate if heart failure demonstrated    Hypercapnic respiratory failure  89 y/o F history HTN, CKD (b/l Cr 2-2.5), HLD presenting with somnolence, oliguria and THAD on CKD and hypercapneic respiratory failure. CXR with right sided opacity that may not be consistent with volume overload alone, consider CT if respiratory function fails to improve w diuresis alone.    - Transition from BiPAP to NC O2  - Continue diuresis  - BMP q12h; close monitoring of electrolytes to avoid diuresis    Secondary hyperparathyroidism of renal origin  Plan  -- f/u PTH  -- ergocalciferol 50,000 U    AAA (abdominal aortic aneurysm)  See "Essential hypertension"    CKD (chronic kidney disease), stage IV  THAD on CKD; Baseline appears to be in the 1.7-2.0 range.    Recent Labs     01/28/22  1350 01/29/22  0313 01/29/22  1436   CREATININE 2.8* 2.5* 2.5*     - continue HTN management  - " furosemide 10mg/hr with goal of 100-300 cc/hr  - BMP q12h and close electrolyte monitoring  - avoid nephrotoxic meds  - family to further discuss goals of care, would not recommend initiation of dialysis      Anemia in CKD (chronic kidney disease)  -- f/u Iron studies  -- ferrous gluconate 324 mg    Hypothyroidism  Normal tsh; fT4 [id number]    - Continue levothyroxine 88 mcg daily    Essential hypertension  - Continue diuresis with furosemide 10mg/hr  - Nifedipine 30mg QD      VTE Risk Mitigation (From admission, onward)         Ordered     heparin (porcine) injection 5,000 Units  Every 8 hours         01/29/22 0814     IP VTE HIGH RISK PATIENT  Once         01/29/22 0814                Hernan Potts MD  Cardiology  [facility] - [facility]

## 2022-01-29 NOTE — ASSESSMENT & PLAN NOTE
THAD on CKD; Baseline appears to be in the 1.7-2.0 range.    Recent Labs     01/28/22  1350 01/29/22  0313 01/29/22  1436   CREATININE 2.8* 2.5* 2.5*     - continue HTN management  - furosemide 10mg/hr with goal of 100-300 cc/hr  - BMP q12h and close electrolyte monitoring  - avoid nephrotoxic meds  - family to further discuss goals of care, would not recommend initiation of dialysis

## 2022-01-29 NOTE — ASSESSMENT & PLAN NOTE
89 y/o F history HTN, CKD (b/l Cr 2-2.5), HLD presenting with somnolence, oliguria and THAD on CKD and hypercapneic respiratory failure. CXR with right sided opacity that may not be consistent with volume overload alone, consider CT if respiratory function fails to improve w diuresis alone.    - Transition from BiPAP to NC O2  - Continue diuresis  - BMP q12h; close monitoring of electrolytes to avoid diuresis

## 2022-01-29 NOTE — PROGRESS NOTES
Patient transferred from ED to CICU room 3091 on ED bed stretcher. Patient transported on NC with portable monitor, pulse ox, lasix IV drip and patient belongings (clothes). Patient daughter and granddaughter are with patient and are taking the patient's personal belongings with them. Patient appears to be in no apparent distress and patient immediately switched over to BiPAP from NC per MD orders. Temp 98 oral, but all extremities cold to touch and unable to  a peripheral O2 sat, bear hugger applied, O2 sat 94. WCTM and send off blood cultures per MD order. Patient family members given patient room number and unit phone number to call for updates. Current visitation policy reviewed with family

## 2022-01-29 NOTE — PLAN OF CARE
POC reviewed with Johanny Curtis and family at 0300. Pt verbalized understanding. Carmona in place. BiPAP continued over night. Questions and concerns addressed. No acute events overnight. Pt progressing toward goals. Will continue to monitor. See below and flowsheets for full assessment and VS info.       Neuro:  Mya Coma Scale  Best Eye Response: 4-->(E4) spontaneous  Best Motor Response: 6-->(M6) obeys commands  Best Verbal Response: 4-->(V4) confused  Mya Coma Scale Score: 14  Assessment Qualifiers: no eye obstruction present,patient not sedated/intubated        24hr Temp:  [98 °F (36.7 °C)-98.5 °F (36.9 °C)]     CV:   Rhythm: normal sinus rhythm  BP goals:   MAP > 65    Resp:   O2 Device (Oxygen Therapy): BiPAP  Oxygen Concentration (%): 30    Plan: BiPAP    GI/:     Diet/Nutrition Received: NPO  Last Bowel Movement: 01/26/22  Voiding Characteristics: urethral catheter (bladder)    Intake/Output Summary (Last 24 hours) at 1/29/2022 0425  Last data filed at 1/29/2022 0401  Gross per 24 hour   Intake 56.14 ml   Output 1775 ml   Net -1718.86 ml          Labs/Accuchecks:  Recent Labs   Lab 01/29/22  0313   WBC 5.14   RBC 3.86*   HGB 11.2*   HCT 35.5*         Recent Labs   Lab 01/28/22  1350   *   K 4.9   CO2 26   CL 92*   BUN 48*   CREATININE 2.8*   ALKPHOS 100   ALT 41   AST 41*   BILITOT 0.4    No results for input(s): PROTIME, INR, APTT, HEPANTIXA in the last 168 hours.   Recent Labs   Lab 01/28/22  1706   TROPONINI 0.149*       Electrolytes: No replacement orders  Accuchecks: none    Gtts:   furosemide (LASIX) 10 mg/mL infusion (non-titrating) 10 mg/hr (01/29/22 0100)       LDA/Wounds:  Lines/Drains/Airways       Drain              Female External Urinary Catheter 09/10/20 2000 505 days         Urethral Catheter 01/28/22 1825 <1 day              Peripheral Intravenous Line                   Midline Catheter Insertion/Assessment  - Single Lumen 09/10/20 1520 Left brachial vein 18g x 10cm  505 days         Peripheral IV - Single Lumen 01/28/22 1353 22 G Right Hand <1 day         Peripheral IV - Single Lumen 01/28/22 1720 22 G Left Forearm <1 day                  Wounds: No  Wound care consulted: No

## 2022-01-29 NOTE — HOSPITAL COURSE
Patient on furosemide drip 10mg/hr, producing 150-200 ml / hr, transitioning from BiPap to NC as tolerated. Ceftriaxone was discontinued as patient has no symptoms of UTI or systemic infection, she has been AAOx4, with stable vitals, denied urinary symptoms and afebrile. Improving urine output on Furosemide drip, transitioned to Furosemide 40mg IV TID. Formal ECHO showed estimated ejection fraction around 50%, normal LV size with mildly decreased systolic function, and grade II left ventricular diastolic dysfunction. Furosemide IV transitioned to Furosemide 40 BID. Repeat BNP showed improvement to 793. Creatinine improved to 2.3(around baseline).

## 2022-01-30 NOTE — PT/OT/SLP EVAL
Speech Language Pathology Evaluation  Bedside Swallow    Patient Name:  Johanny Curtis   MRN:  [id number]  Admitting Diagnosis: <principal problem not specified>    Recommendations:                 General Recommendations:  Dysphagia therapy  Diet recommendations:  Regular, Thin   Aspiration Precautions: No straws, 1 bite/sip at a time, Alternating bites/sips, Assistance with meals, Avoid talking while eating, Eliminate distractions, Feed only when awake/alert, HOB to 90 degrees, Meds whole 1 at a time, Monitor for s/s of aspiration, Small bites/sips and Strict aspiration precautions   General Precautions: Standard, aspiration,fall  Communication strategies:  provide increased time to answer    History:     Past Medical History:   Diagnosis Date    AAA (abdominal aortic aneurysm)     Anemia in CKD (chronic kidney disease)     Arthritis     Bacteremia due to Escherichia coli 9/24/2020    Cataract     Class 1 obesity due to excess calories with serious comorbidity in adult 7/6/2017    Gout, chronic     Heart failure with reduced ejection fraction 1/29/2022    High cholesterol     Hypercapnic respiratory failure 1/28/2022    Hypertension     Hypothyroidism     Obesity     Plantar fasciitis     PVD (peripheral vascular disease)     Thyroid trouble        Past Surgical History:   Procedure Laterality Date    BREAST BIOPSY Left     BREAST SURGERY Left 1972    benign breast lump    CATARACT EXTRACTION  3/18/13    both eyes -     CHOLECYSTECTOMY      ENDOSCOPIC ULTRASOUND OF UPPER GASTROINTESTINAL TRACT N/A 9/8/2020    Procedure: ULTRASOUND, UPPER GI TRACT, ENDOSCOPIC;  Surgeon: Rasheed Balbuena MD;  Location: [address]);  Service: Endoscopy;  Laterality: N/A;    ERCP N/A 9/8/2020    Procedure: ERCP (ENDOSCOPIC RETROGRADE CHOLANGIOPANCREATOGRAPHY);  Surgeon: Rasheed Balbuena MD;  Location: [address]);  Service: Endoscopy;  Laterality: N/A;    EYE SURGERY       "HYSTERECTOMY      SKIN BIOPSY      Left breast       Social History: Patient lives with family.  She denied difficulty swallowing at baseline, states she eats regular solids without dentures.  Also stated spontaneous habitual throat clear at baseline.    Prior Intubation HX:  None this admission     Modified Barium Swallow: none reported     Chest X-Rays: 1/28: Dense opacification of right hemithorax may relate to pleural effusion and atelectasis with possible superimposed infectious or noninfectious inflammatory airspace consolidation.     Obstructing neoplastic lesion would be difficult to exclude and as such, attention follow-up would be recommended.     Ill-defined opacity in the retrocardiac region and left lung base could relate to atelectasis, noting that aspiration or pneumonia would additionally be difficult to exclude.    Subjective     "It feels fine."     Nurse at bedside cleared for evaluation.  Pt on BiPAP overnight.       Pain/Comfort:  · Pain Rating 1: 0/10  · Pain Rating Post-Intervention 1: 0/10    Respiratory Status: Nasal cannula    Objective:     Oral Musculature Evaluation  · Oral Musculature: WFL  · Dentition: edentulous  · Secretion Management: adequate  · Mucosal Quality: adequate  · Mandibular Strength and Mobility: WFL  · Oral Labial Strength and Mobility: WFL  · Lingual Strength and Mobility: WFL  · Volitional Cough: WFL, dry  · Volitional Swallow: WFL  · Voice Prior to PO Intake: clear, mildly strained    Bedside Swallow Eval:   Consistencies Assessed:  · Thin liquids via cup, straw  · Puree via tsp x5  · Solids cracker portion x2     Oral Phase:   · WFL    Pharyngeal Phase:   · timely initiation of swallow   · Delayed throat clear x2 with thin via straw  · Clear vocal quality t/o assessment  · Multiple swallows    Compensatory Strategies  · small single sips from cup    Treatment: Education provided re: role of SLP, diet recs, swallow precs, s/s aspiration, and POC.  Pt verbalized " understanding and agreement.       Assessment:     Johanny Curtis is a 89 y.o. female with an SLP diagnosis of mild Dysphagia, increased aspiration risk.     Goals:   Multidisciplinary Problems     SLP Goals        Problem: SLP Goal    Goal Priority Disciplines Outcome   SLP Goal     SLP Ongoing, Progressing   Description: Speech Language Pathology Goals  Goals expected to be met by 2/6  1. Pt will tolerate regular diet with thin liquids without overt s/s aspiration.                            Plan:     · Patient to be seen:  4 x/week   · Plan of Care expires:  03/01/22  · Plan of Care reviewed with:  patient   · SLP Follow-Up:  Yes       Discharge recommendations:   (tbd, pending ongoing assessment)     Time Tracking:     SLP Treatment Date:   01/30/22  Speech Start Time:  0802  Speech Stop Time:  0815     Speech Total Time (min):  13 min    Billable Minutes: Eval Swallow and Oral Function 13    01/30/2022

## 2022-01-30 NOTE — PLAN OF CARE
Problem: Physical Therapy Goal  Goal: Physical Therapy Goal  Description: Goals to be met by: 2022      Patient will increase functional independence with mobility by performin. Supine to sit with Stand-by Assistance  2. Sit to stand transfer with Stand-by Assistance  3. Bed to chair transfer with Stand-by Assistance using the least restrictive device.   4. Gait  x 75 feet with Stand-by Assistance using the least restrictive device.      Outcome: Ongoing, Progressing

## 2022-01-30 NOTE — ASSESSMENT & PLAN NOTE
THAD on CKD; Baseline appears to be in the 1.7-2.0 range.    Recent Labs     01/29/22  0313 01/29/22  1436 01/30/22  0450   CREATININE 2.5* 2.5* 2.5*     - continue HTN management  - furosemide 10mg/hr with goal of 100-300 cc/hr  - BMP q12h and close electrolyte monitoring  - avoid nephrotoxic meds  - family to further discuss goals of care, would not recommend initiation of dialysis

## 2022-01-30 NOTE — SUBJECTIVE & OBJECTIVE
Interval History: Patient tolerating transition to nasal canula. She briefly required BiPAP with tachypnea during the day, which may have been anxiety-induced per report from nursing staff, but was quickly transitioned back to NC, comfortably satting 100% on 3L O2. Continuing to produce urine.    Review of Systems   Constitutional: Negative for fever and malaise/fatigue.   HENT: Negative for sore throat.    Eyes: Negative for visual disturbance.   Cardiovascular: Negative for chest pain, dyspnea on exertion, irregular heartbeat, leg swelling, near-syncope, palpitations and syncope.   Respiratory: Positive for shortness of breath.    Endocrine: Negative.    Hematologic/Lymphatic: Negative.    Skin: Negative.    Musculoskeletal: Negative for arthritis, back pain, joint pain and myalgias.   Gastrointestinal: Negative for abdominal pain and hematemesis.   Genitourinary: Negative.  Negative for dysuria and urgency.   Neurological: Negative for light-headedness and loss of balance.   Psychiatric/Behavioral: Negative.      Objective:     Vital Signs (Most Recent):  Temp: 98.2 °F (36.8 °C) (01/30/22 0800)  Pulse: 73 (01/30/22 1201)  Resp: (!) 40 (01/30/22 1201)  BP: 134/61 (01/30/22 1100)  SpO2: 100 % (01/30/22 1201) Vital Signs (24h Range):  Temp:  [97.4 °F (36.3 °C)-99 °F (37.2 °C)] 98.2 °F (36.8 °C)  Pulse:  [73-96] 73  Resp:  [10-40] 40  SpO2:  [77 %-100 %] 100 %  BP: (118-174)/() 134/61     Weight: 75 kg (165 lb 5.5 oz)  Body mass index is 34.56 kg/m².     SpO2: 100 %  O2 Device (Oxygen Therapy): BiPAP      Intake/Output Summary (Last 24 hours) at 1/30/2022 1422  Last data filed at 1/30/2022 1100  Gross per 24 hour   Intake 22.95 ml   Output 3040 ml   Net -3017.05 ml       Lines/Drains/Airways     Drain            Female External Urinary Catheter 09/10/20 2000 506 days         Urethral Catheter 01/28/22 1825 1 day          Peripheral Intravenous Line                 Midline Catheter Insertion/Assessment  - Single  Lumen 09/10/20 1520 Left brachial vein 18g x 10cm 507 days         Peripheral IV - Single Lumen 01/28/22 1353 22 G Right Hand 2 days         Peripheral IV - Single Lumen 01/28/22 1720 22 G Left Forearm 1 day                Physical Exam  Constitutional:       Appearance: She is well-developed.   HENT:      Head: Normocephalic and atraumatic.   Eyes:      Conjunctiva/sclera: Conjunctivae normal.      Pupils: Pupils are equal, round, and reactive to light.   Neck:      Vascular: No JVD.   Cardiovascular:      Rate and Rhythm: Normal rate and regular rhythm.      Pulses: Intact distal pulses.      Heart sounds: Normal heart sounds. No murmur heard.  No friction rub. No gallop.       Comments: Elevated JVD  Pulmonary:      Effort: Pulmonary effort is normal.      Breath sounds: No stridor. No wheezing or rales.   Chest:      Chest wall: No tenderness.   Abdominal:      General: Bowel sounds are normal. There is no distension.      Palpations: Abdomen is soft. There is no mass.      Tenderness: There is no abdominal tenderness. There is no guarding.   Musculoskeletal:         General: No tenderness or deformity.      Right lower leg: Edema (3+ pitting) present.      Left lower leg: Edema (3+ pitting) present.   Skin:     General: Skin is warm and dry.      Findings: No erythema or rash.   Neurological:      General: No focal deficit present.      Mental Status: She is alert and oriented to person, place, and time.   Psychiatric:         Mood and Affect: Mood normal.         Significant Labs:   CMP   Recent Labs   Lab 01/29/22  0313 01/29/22  1436 01/30/22  0450   * 135* 136   K 4.4 4.5 3.9   CL 91* 94* 92*   CO2 30* 31* 34*   GLU 80 102 74   BUN 46* 44* 44*   CREATININE 2.5* 2.5* 2.5*   CALCIUM 9.7 9.0 9.3   ANIONGAP 10 10 10   ESTGFRAFRICA 18.8* 18.8* 18.8*   EGFRNONAA 16.3* 16.3* 16.3*    and CBC   Recent Labs   Lab 01/29/22  0313 01/29/22  0313 01/30/22  0450   WBC 5.14  --  4.75   HGB 11.2*  --  10.7*   HCT  35.5*   < > 34.2*     --  258    < > = values in this interval not displayed.

## 2022-01-30 NOTE — PROGRESS NOTES
[facility]  Cardiology  Progress Note    Patient Name: Johanny Curtis  MRN: [id number]  Admission Date: 1/28/2022  Hospital Length of Stay: 2 days  Code Status: Full Code   Attending Physician: Paolo Hubbard MD   Primary Care Physician: Leticia Weems MD  Expected Discharge Date:     Subjective:     Hospital Course:   Patient on furosemide drip 10mg/hr, producing 150-200 ml / hr, transitioning from BiPap to NC as tolerated. Ceftriaxone was discontinued as patient has no symptoms of UTI or systemic infection, she has been AAOx4, with stable vitals, denied urinary symptoms and afebrile.      Interval History: Patient tolerating transition to nasal canula. She briefly required BiPAP with tachypnea during the day, which may have been anxiety-induced per report from nursing staff, but was quickly transitioned back to NC, comfortably satting 100% on 3L O2. Continuing to produce urine.    Review of Systems   Constitutional: Negative for fever and malaise/fatigue.   HENT: Negative for sore throat.    Eyes: Negative for visual disturbance.   Cardiovascular: Negative for chest pain, dyspnea on exertion, irregular heartbeat, leg swelling, near-syncope, palpitations and syncope.   Respiratory: Positive for shortness of breath.    Endocrine: Negative.    Hematologic/Lymphatic: Negative.    Skin: Negative.    Musculoskeletal: Negative for arthritis, back pain, joint pain and myalgias.   Gastrointestinal: Negative for abdominal pain and hematemesis.   Genitourinary: Negative.  Negative for dysuria and urgency.   Neurological: Negative for light-headedness and loss of balance.   Psychiatric/Behavioral: Negative.      Objective:     Vital Signs (Most Recent):  Temp: 98.2 °F (36.8 °C) (01/30/22 0800)  Pulse: 73 (01/30/22 1201)  Resp: (!) 40 (01/30/22 1201)  BP: 134/61 (01/30/22 1100)  SpO2: 100 % (01/30/22 1201) Vital Signs (24h Range):  Temp:  [97.4 °F (36.3 °C)-99 °F (37.2 °C)] 98.2 °F (36.8 °C)  Pulse:   [73-96] 73  Resp:  [10-40] 40  SpO2:  [77 %-100 %] 100 %  BP: (118-174)/() 134/61     Weight: 75 kg (165 lb 5.5 oz)  Body mass index is 34.56 kg/m².     SpO2: 100 %  O2 Device (Oxygen Therapy): BiPAP      Intake/Output Summary (Last 24 hours) at 1/30/2022 1422  Last data filed at 1/30/2022 1100  Gross per 24 hour   Intake 22.95 ml   Output 3040 ml   Net -3017.05 ml       Lines/Drains/Airways     Drain            Female External Urinary Catheter 09/10/20 2000 506 days         Urethral Catheter 01/28/22 1825 1 day          Peripheral Intravenous Line                 Midline Catheter Insertion/Assessment  - Single Lumen 09/10/20 1520 Left brachial vein 18g x 10cm 507 days         Peripheral IV - Single Lumen 01/28/22 1353 22 G Right Hand 2 days         Peripheral IV - Single Lumen 01/28/22 1720 22 G Left Forearm 1 day                Physical Exam  Constitutional:       Appearance: She is well-developed.   HENT:      Head: Normocephalic and atraumatic.   Eyes:      Conjunctiva/sclera: Conjunctivae normal.      Pupils: Pupils are equal, round, and reactive to light.   Neck:      Vascular: No JVD.   Cardiovascular:      Rate and Rhythm: Normal rate and regular rhythm.      Pulses: Intact distal pulses.      Heart sounds: Normal heart sounds. No murmur heard.  No friction rub. No gallop.       Comments: Elevated JVD  Pulmonary:      Effort: Pulmonary effort is normal.      Breath sounds: No stridor. No wheezing or rales.   Chest:      Chest wall: No tenderness.   Abdominal:      General: Bowel sounds are normal. There is no distension.      Palpations: Abdomen is soft. There is no mass.      Tenderness: There is no abdominal tenderness. There is no guarding.   Musculoskeletal:         General: No tenderness or deformity.      Right lower leg: Edema (3+ pitting) present.      Left lower leg: Edema (3+ pitting) present.   Skin:     General: Skin is warm and dry.      Findings: No erythema or rash.   Neurological:       "General: No focal deficit present.      Mental Status: She is alert and oriented to person, place, and time.   Psychiatric:         Mood and Affect: Mood normal.         Significant Labs:   CMP   Recent Labs   Lab 01/29/22 0313 01/29/22  1436 01/30/22  0450   * 135* 136   K 4.4 4.5 3.9   CL 91* 94* 92*   CO2 30* 31* 34*   GLU 80 102 74   BUN 46* 44* 44*   CREATININE 2.5* 2.5* 2.5*   CALCIUM 9.7 9.0 9.3   ANIONGAP 10 10 10   ESTGFRAFRICA 18.8* 18.8* 18.8*   EGFRNONAA 16.3* 16.3* 16.3*    and CBC   Recent Labs   Lab 01/29/22 0313 01/29/22 0313 01/30/22  0450   WBC 5.14  --  4.75   HGB 11.2*  --  10.7*   HCT 35.5*   < > 34.2*     --  258    < > = values in this interval not displayed.     Assessment and Plan:     Heart failure with reduced ejection fraction  Bedside echo revealed LVEF ~ 40% with no segmental wall motion abnormalities. Formal echo pending. Prior echo in 02/2021 showing EF 60% with signs of pulmonary hypertension 28 mmHg.    -- f/u formal echo  -- treat as appropriate if heart failure demonstrated    Hypercapnic respiratory failure  89 y/o F history HTN, CKD (b/l Cr 2-2.5), HLD presenting with somnolence, oliguria and THAD on CKD and hypercapneic respiratory failure. CXR with right sided opacity that may not be consistent with volume overload alone, consider CT if respiratory function fails to improve w diuresis alone.    - Transition from BiPAP to NC O2  - Continue diuresis  - BMP q12h; close monitoring of electrolytes to avoid diuresis    Secondary hyperparathyroidism of renal origin  Plan  -- f/u PTH  -- ergocalciferol 50,000 U    AAA (abdominal aortic aneurysm)  See "Essential hypertension"    CKD (chronic kidney disease), stage IV  THAD on CKD; Baseline appears to be in the 1.7-2.0 range.    Recent Labs     01/29/22 0313 01/29/22 1436 01/30/22  0450   CREATININE 2.5* 2.5* 2.5*     - continue HTN management  - furosemide 10mg/hr with goal of 100-300 cc/hr  - BMP q12h and close " electrolyte monitoring  - avoid nephrotoxic meds  - family to further discuss goals of care, would not recommend initiation of dialysis      Anemia in CKD (chronic kidney disease)  -- f/u Iron studies  -- ferrous gluconate 324 mg    Hypothyroidism  Normal tsh; fT4 unknown    - Continue levothyroxine 88 mcg daily    Essential hypertension  - Continue diuresis with furosemide 10mg/hr  - Nifedipine 30mg QD        VTE Risk Mitigation (From admission, onward)         Ordered     heparin (porcine) injection 5,000 Units  Every 8 hours         01/29/22 0814     IP VTE HIGH RISK PATIENT  Once         01/29/22 0814                Hernan Potts MD  Cardiology  [facility]

## 2022-01-30 NOTE — PT/OT/SLP EVAL
Occupational Therapy   Co-Evaluation w PT  Co-treat performed due to acuity and complexity of pt's medical status with 2 skilled disciplines needed to optimize pts functional performance in ICU setting.    Name: Johanny Curtis  MRN: [id number]  Admitting Diagnosis:  <principal problem not specified>    Length of Stay: 2 days    Recommendations:     Discharge Recommendations: nursing facility, skilled  Discharge Equipment Recommendations:  other (see comments) (TBD pending progress)  Barriers to discharge:  Inaccessible home environment,Decreased caregiver support    Plan:     Patient to be seen 3 x/week to address the above listed problems via self-care/home management,therapeutic activities,therapeutic exercises  · Plan of Care Expires: 03/01/22  · Plan of Care Reviewed with: patient,family (daughter via phone)    Assessment:     Johanny Curtis is a 89 y.o. female with a medical diagnosis of <principal problem not specified>.  She presents with the following performance deficits affecting function: weakness,impaired endurance,impaired self care skills,impaired functional mobilty,gait instability,impaired balance,decreased coordination,decreased upper extremity function,decreased lower extremity function,decreased safety awareness,impaired cardiopulmonary response to activity.      Today's session limited by increased respiratory rate (40-41) seated EOB, pt with visible and audible increased demand for work of breathing, unsafe to trial standing this date. Per patient's family at bedside, pt has experienced increased need for physical assist, family unable to safely provide assist required at this time and is interested in SNF placement.     Occupational Therapy recommends a Skilled Nursing Facility upon discharge to maximize return to PLOF and address deficits listed above.       Rehab Prognosis: Fair; patient would benefit from acute skilled OT services to address these deficits and reach maximum level of  "function.       Subjective   Communicated with: RN prior to session.  Patient found HOB elevated with bed alarm,blood pressure cuff,cummins catheter,oxygen,peripheral IV,pulse ox (continuous),telemetry upon OT entry to room.    Chief Complaint: Abnormal Lab (Sent by [facility] for elevated CR 3.1 from lab work taken yesterday. Pt reports + increased generalized weakness and fatigue x several days. )    Patient/Family Comments/goals: "I don't know, but I can try" referring to participating in today's session  Patient's daughter: "I don't have the skills that I think are necessary to help her safely move"     Pain/Comfort:  · Pain Rating 1: 0/10  · Pain Rating Post-Intervention 1: 0/10    Patients cultural, spiritual, Restorationism conflicts given the current situation: no    Occupational Profile:  Living Environment: pt lives with adult daughter in single story home, 1 step entrance, 0 hand rail. tubshower w shower chair.   Prior Level of Function: Patient reports being Mod I with RW with mobility & requiring A with bathing/dressing. Pt able to feed herself.  Per family, pt experienced significant decline in mobility and endurance in past week. No falls.   Patient uses DME as follows: shower chair,walker, rolling.   DME owned (not currently used): none.  Roles/Repsonsibilities:   Hand Dominance: right   Work: no.   Drive: no, family provides.   Managing Medicines/Managing Home: supervised by family.   Hobbies: spending time with family.  Equipment Used at Home:  shower chair,walker, rolling    Patient reports they will have LIMITED PHYSICAL assistance from family upon discharge.  Note: family interested in SNF placement with eventual return to home setting.       Objective:     Patient found with: bed alarm,blood pressure cuff,cummins catheter,oxygen,peripheral IV,pulse ox (continuous),telemetry   General Precautions: Standard, Cardiac fall,respiratory   Orthopedic Precautions:N/A   Braces: N/A   Respiratory Status:    Nasal " "cannula, flow 5 L/min  Vitals: BP (!) 142/67 (BP Location: Right arm, Patient Position: Lying)   Pulse 80   Temp 98.7 °F (37.1 °C) (Oral)   Resp (!) 26   Ht 4' 10" (1.473 m)   Wt 75 kg (165 lb 5.5 oz)   SpO2 99%   BMI 34.56 kg/m²     Cognitive and Psychosocial Function:   · AxOx2 -- Person, Place and confusion regarding time and situation   · Follows Commands/attention:easily distracted by increased WOB with exertion and follows one-step commands  · Communication:  clear/fluent  · Memory: Poor immediate recall  · Safety awareness/insight to disability: impaired   · Mood/Affect/Coping skills/emotional control: Cooperative, Pleasant and lethargic with exertion    Hearing: Impaired: [place]    Vision:  Intact visual fields and wears glasses     Physical Exam:  Postural examination/scapula alignment:    -       Rounded shoulders  -       Forward head  -       Posterior pelvic tilt  Skin integrity: Visible skin intact and Thin    BUE WFL, limited by fatigue and impaired functional endurance for strength. WFL sensation, GM/FM for use during functional tasks.  Pt is R handed.      Occupational Performance:  Bed Mobility:    · Patient completed Rolling/Turning to Left with  maximal assistance  · Patient completed Rolling/Turning to Right with maximal assistance  · Scooting to HOB in supine: total assistance and 2 persons  · Patient completed Supine to Sit with maximal assistance and 2 persons on L side of bed  · Scooting anteriorly to EOB to have both feet planted on floor: maximal assistance  · Patient completed Sit to Supine with maximal assistance and 2 persons on L side of bed    Functional Mobility/Transfers:   Static Sitting EOB: CGA-Arpit, increased A with BLE activity and with fatigue   Dynamic Sitting EOB: Arpit  Further mobility deferred 2* impaired truncal control and impaired arousal as patient fatigued sitting EOB with staff assistance.       Activities of Daily Living:  · Feeding:   NPO  · Lower Body " Dressing: total assistance donning socks  · Toileting:  [place] in place      AMPA 6 Click ADL:  AMPAC Total Score: 10    Treatment & Education:  -OT POC, safety during ADLs and mobility - family verbalized understanding  -Education on energy conservation and task modification to maximize safety and independence  -Questions answered within OT scope of practice.      Patient left HOB elevated with all lines intact, call button in reach, bed alarm on, RN notified and family present    GOALS:   Multidisciplinary Problems     Occupational Therapy Goals        Problem: Occupational Therapy Goal    Goal Priority Disciplines Outcome Interventions   Occupational Therapy Goal     OT, PT/OT Ongoing, Progressing    Description: Goals set on 1/30 with expiration date 2/13:  Patient will increase functional independence with ADLs by performing:    Supine <> Sit with Min A.   Feeding while seated EOB/bedside chair with  Min A.   Grooming while standing EOB with  Min A.   UB Dressing with Min A.   LB Dressing with Min A.   Stand pivot transfer with  Min A with DME as needed.  Pt will demonstrate understanding of education provided regarding energy conservation and task modification through teach-back method.   Patient and/or patient's family will verbalize understanding of POC and post d/c support needs, and personal risk factors for fall risk reduction.                      History:     Past Medical History:   Diagnosis Date    AAA (abdominal aortic aneurysm)     Anemia in CKD (chronic kidney disease)     Arthritis     Bacteremia due to Escherichia coli 9/24/2020    Cataract     Class 1 obesity due to excess calories with serious comorbidity in adult 7/6/2017    Gout, chronic     Heart failure with reduced ejection fraction 1/29/2022    High cholesterol     Hypercapnic respiratory failure 1/28/2022    Hypertension     Hypothyroidism     Obesity     Plantar fasciitis     PVD (peripheral vascular disease)     Thyroid  trouble        Past Surgical History:   Procedure Laterality Date    BREAST BIOPSY Left     BREAST SURGERY Left 1972    benign breast lump    CATARACT EXTRACTION  3/18/13    both eyes -     CHOLECYSTECTOMY      ENDOSCOPIC ULTRASOUND OF UPPER GASTROINTESTINAL TRACT N/A 9/8/2020    Procedure: ULTRASOUND, UPPER GI TRACT, ENDOSCOPIC;  Surgeon: Rasheed Balbuena MD;  Location: [address]);  Service: Endoscopy;  Laterality: N/A;    ERCP N/A 9/8/2020    Procedure: ERCP (ENDOSCOPIC RETROGRADE CHOLANGIOPANCREATOGRAPHY);  Surgeon: Rasheed Balbuena MD;  Location: [address]);  Service: Endoscopy;  Laterality: N/A;    EYE SURGERY      HYSTERECTOMY      SKIN BIOPSY      Left breast       Time Tracking:       OT Date of Treatment: 01/30/22  OT Start Time: 1100  OT Stop Time: 1125  OT Total Time (min): 25 min  Additional staff present: PT      Billable Minutes:Evaluation 10  Self Care/Home Management 15      1/30/2022

## 2022-01-30 NOTE — PLAN OF CARE
Problem: SLP Goal  Goal: SLP Goal  Description: Speech Language Pathology Goals  Goals expected to be met by 2/6  1. Pt will tolerate regular diet with thin liquids without overt s/s aspiration.     Outcome: Ongoing, Progressing    Bedside swallow assessment completed.  Rec regular diet with thin liquids, strict aspiration precautions to include small bites/sips and no straws.  Monitor closely for overt s/s aspiration. SLP to cont to follow.

## 2022-01-30 NOTE — ASSESSMENT & PLAN NOTE
89 y/o F history HTN, CKD (b/l Cr 2-2.5), HLD presenting with somnolence, oliguria and THAD on CKD and hypercapneic respiratory failure. CXR with right sided opacity that may not be consistent with volume overload alone, consider CT if respiratory function fails to improve w diuresis alone.    - Transition from BiPAP to NC O2  - Continue diuresis  - BMP q12h; close monitoring of electrolytes to avoid diuresis

## 2022-01-30 NOTE — PT/OT/SLP EVAL
"Physical Therapy Evaluation    Patient Name:  Johanny Curtis   MRN:  [id number]    Recommendations:     Discharge Recommendations:  nursing facility, skilled   Discharge Equipment Recommendations:  (TBD)   Barriers to discharge: None    Assessment:     Johanny Curtis is a 89 y.o. female admitted with a medical diagnosis of <principal problem not specified>.  She presents with the following impairments/functional limitations:  weakness,impaired endurance,decreased coordination,impaired coordination,impaired self care skills,decreased upper extremity function,impaired functional mobilty,decreased lower extremity function,gait instability,impaired balance .    Patient motivated to improve functional mobility.  Patient activity limited by the "work" of breathing -- very high respiratory rate (30's -40) required to maintain SpO2 at adequate levels. RN notified during evaluation that patient was demonstrating some level of breathing difficulty.  Patient requiring supplemental ventilatory support (CPAP) at the end of the evaluation.     Rehab Prognosis: Fair;   Recent Surgery: * No surgery found *      Plan:     During this hospitalization, patient to be seen 3 x/week to address the identified rehab impairments via gait training,therapeutic activities,therapeutic exercises,neuromuscular re-education and progress toward the following goals:    · Plan of Care Expires:  02/13/22    Subjective     Chief Complaint: none   Patient/Family Comments/goals: none  Pain/Comfort:  · Pain Rating 1: 0/10    Patients cultural, spiritual, Denominational conflicts given the current situation: no    Living Environment:  Patient lives with daughter in a single story home with 1 step to enter.  Prior to admission, patients level of function was dependent.  Equipment used at home: walker, rolling.  DME owned (not currently used): none.  Upon discharge, patient will have assistance from family .    Objective:     Communicated with RN prior to " session.  Patient found supine with blood pressure cuff,cummins catheter,oxygen,peripheral IV,pulse ox (continuous)  upon PT entry to room.    General Precautions: Standard, fall   Orthopedic Precautions:N/A   Braces: N/A  Respiratory Status: Room air    Exams:  · Cognitive Exam:  Patient is oriented to Person, Place, Time and Situation  · RUE ROM: WFL  · RUE Strength: WFL  · LUE ROM: WFL  · LUE Strength: WFL  · RLE ROM: WFL  · RLE Strength: WFL  · LLE ROM: WFL  · LLE Strength: WFL    Functional Mobility:  · Bed Mobility:     · Supine to Sit: maximal assistance  · Transfers:     · Sit to Stand:  deferred  with no AD  · Gait: deferred   · Balance: Dynamic sitting; min A -  limited reactive postural control - unable to respond to unexpected perturbation       AM-PAC 6 CLICK MOBILITY  Total Score:9     Patient left supine with all lines intact, call button in reach and RN  notified.    GOALS:   Multidisciplinary Problems     Physical Therapy Goals        Problem: Physical Therapy Goal    Goal Priority Disciplines Outcome Goal Variances Interventions   Physical Therapy Goal     PT, PT/OT Ongoing, Progressing     Description: Goals to be met by: 2022      Patient will increase functional independence with mobility by performin. Supine to sit with Stand-by Assistance  2. Sit to stand transfer with Stand-by Assistance  3. Bed to chair transfer with Stand-by Assistance using the least restrictive device.   4. Gait  x 75 feet with Stand-by Assistance using the least restrictive device.                       History:     Past Medical History:   Diagnosis Date    AAA (abdominal aortic aneurysm)     Anemia in CKD (chronic kidney disease)     Arthritis     Bacteremia due to Escherichia coli 2020    Cataract     Class 1 obesity due to excess calories with serious comorbidity in adult 2017    Gout, chronic     Heart failure with reduced ejection fraction 2022    High cholesterol     Hypercapnic  respiratory failure 1/28/2022    Hypertension     Hypothyroidism     Obesity     Plantar fasciitis     PVD (peripheral vascular disease)     Thyroid trouble        Past Surgical History:   Procedure Laterality Date    BREAST BIOPSY Left     BREAST SURGERY Left 1972    benign breast lump    CATARACT EXTRACTION  3/18/13    both eyes -     CHOLECYSTECTOMY      ENDOSCOPIC ULTRASOUND OF UPPER GASTROINTESTINAL TRACT N/A 9/8/2020    Procedure: ULTRASOUND, UPPER GI TRACT, ENDOSCOPIC;  Surgeon: Rasheed Balbuena MD;  Location: [facility] ([address] [place] [address]);  Service: Endoscopy;  Laterality: N/A;    ERCP N/A 9/8/2020    Procedure: ERCP (ENDOSCOPIC RETROGRADE CHOLANGIOPANCREATOGRAPHY);  Surgeon: Rasheed Balbuena MD;  Location: [facility] ([address] [place] [address]);  Service: Endoscopy;  Laterality: N/A;    EYE SURGERY      HYSTERECTOMY      SKIN BIOPSY      Left breast       Time Tracking:     PT Received On: 01/30/22  PT Start Time: 1100     PT Stop Time: 1125  PT Total Time (min): 25 min     Billable Minutes: Evaluation 10 and Therapeutic Activity 15     José Miguel Correa PhD, DPT       01/30/2022

## 2022-01-30 NOTE — PLAN OF CARE
Problem: Occupational Therapy Goal  Goal: Occupational Therapy Goal  Description: Goals set on 1/30 with expiration date 2/13:  Patient will increase functional independence with ADLs by performing:    Supine <> Sit with Min A.   Feeding while seated EOB/bedside chair with  Min A.   Grooming while standing EOB with  Min A.   UB Dressing with Min A.   LB Dressing with Min A.   Stand pivot transfer with  Min A with DME as needed.  Pt will demonstrate understanding of education provided regarding energy conservation and task modification through teach-back method.   Patient and/or patient's family will verbalize understanding of POC and post d/c support needs, and personal risk factors for fall risk reduction.     Outcome: Ongoing, Progressing       Occupational Therapy recommends a Skilled Nursing Facility upon discharge to maximize return to PLOF and address deficits listed above.   01/30/2022

## 2022-01-31 NOTE — ASSESSMENT & PLAN NOTE
Lab Results   Component Value Date    IRON 29 (L) 01/30/2022    TIBC 292 01/30/2022    FERRITIN 182 01/30/2022    FESATURATED 10 (L) 01/30/2022       PLAN  -- ferrous gluconate 324 mg

## 2022-01-31 NOTE — PROGRESS NOTES
[facility]  Cardiology  Progress Note    Patient Name: Johanny Curtis  MRN: [id number]  Admission Date: 1/28/2022  Hospital Length of Stay: 3 days  Code Status: Full Code   Attending Physician: Paolo Hubbard MD   Primary Care Physician: Leticia Weems MD  Expected Discharge Date:   Principal Problem:Hypercapnic respiratory failure    Subjective:     Hospital Course:   Patient on furosemide drip 10mg/hr, producing 150-200 ml / hr, transitioning from BiPap to NC as tolerated. Ceftriaxone was discontinued as patient has no symptoms of UTI or systemic infection, she has been AAOx4, with stable vitals, denied urinary symptoms and afebrile. Improving urine output on Furosemide drip, transitioned to Furosemide 40mg IV TID.      Interval History: Patient seen and examined. No acute events overnight, afebrile, vital signs stable except for mild hypertension. Tolerated breathing well on 3L NC overnight. Continues to have good urine output on Lasix 10mg/hr, plan to transition to Lasix IV 40mg TID. Possible stepdown today. Pending SNF placement    Review of Systems   Constitutional: Negative for fever and malaise/fatigue.   HENT: Negative for sore throat.    Eyes: Negative for visual disturbance.   Cardiovascular: Negative for chest pain, dyspnea on exertion, irregular heartbeat, leg swelling, near-syncope, palpitations and syncope.   Respiratory: Positive for shortness of breath.    Endocrine: Negative.    Hematologic/Lymphatic: Negative.    Skin: Negative.    Musculoskeletal: Negative for arthritis, back pain, joint pain and myalgias.   Gastrointestinal: Negative for bloating, abdominal pain and hematemesis.   Genitourinary: Negative.  Negative for dysuria and urgency.   Neurological: Negative for light-headedness and loss of balance.   Psychiatric/Behavioral: Negative.      Objective:     Vital Signs (Most Recent):  Temp: 98.5 °F (36.9 °C) (01/31/22 0305)  Pulse: 95 (01/31/22 0605)  Resp: 16  (01/31/22 0605)  BP: (!) 140/83 (01/31/22 0605)  SpO2: 100 % (01/31/22 0605) Vital Signs (24h Range):  Temp:  [98.2 °F (36.8 °C)-99.2 °F (37.3 °C)] 98.5 °F (36.9 °C)  Pulse:  [] 95  Resp:  [15-41] 16  SpO2:  [87 %-100 %] 100 %  BP: (105-178)/(60-84) 140/83     Weight: 75 kg (165 lb 5.5 oz)  Body mass index is 34.56 kg/m².     SpO2: 100 %  O2 Device (Oxygen Therapy): nasal cannula      Intake/Output Summary (Last 24 hours) at 1/31/2022 0907  Last data filed at 1/31/2022 0605  Gross per 24 hour   Intake --   Output 2305 ml   Net -2305 ml       Lines/Drains/Airways     Drain            Female External Urinary Catheter 09/10/20 2000 507 days         Urethral Catheter 01/28/22 1825 2 days          Peripheral Intravenous Line                 Midline Catheter Insertion/Assessment  - Single Lumen 09/10/20 1520 Left brachial vein 18g x 10cm 507 days         Peripheral IV - Single Lumen 01/28/22 1353 22 G Right Hand 2 days         Peripheral IV - Single Lumen 01/28/22 1720 22 G Left Forearm 2 days                Physical Exam  Vitals and nursing note reviewed.   Constitutional:       Appearance: Normal appearance.   HENT:      Head: Normocephalic and atraumatic.   Eyes:      General: No scleral icterus.     Extraocular Movements: Extraocular movements intact.   Cardiovascular:      Rate and Rhythm: Normal rate and regular rhythm.      Pulses: Normal pulses.      Heart sounds: Normal heart sounds.   Pulmonary:      Effort: Pulmonary effort is normal.      Breath sounds: Normal breath sounds.   Abdominal:      General: Abdomen is flat. Bowel sounds are normal. There is no distension.      Palpations: Abdomen is soft.      Tenderness: There is no abdominal tenderness.   Musculoskeletal:         General: No tenderness. Normal range of motion.      Cervical back: Normal range of motion and neck supple.      Right lower leg: Edema present.      Left lower leg: Edema present.   Skin:     General: Skin is warm and dry.       Capillary Refill: Capillary refill takes less than 2 seconds.   Neurological:      General: No focal deficit present.      Mental Status: She is alert. Mental status is at baseline.   Psychiatric:         Mood and Affect: Mood normal.         Behavior: Behavior normal.         Significant Labs:   CMP   Recent Labs   Lab 01/29/22  1436 01/30/22  0450 01/31/22  0125   * 136 141   K 4.5 3.9 3.6   CL 94* 92* 94*   CO2 31* 34* 38*    74 85   BUN 44* 44* 46*   CREATININE 2.5* 2.5* 2.5*   CALCIUM 9.0 9.3 9.4   ANIONGAP 10 10 9   ESTGFRAFRICA 18.8* 18.8* 18.8*   EGFRNONAA 16.3* 16.3* 16.3*   , CBC   Recent Labs   Lab 01/30/22  0450 01/30/22  0450 01/31/22  0125   WBC 4.75  --  5.61   HGB 10.7*  --  10.5*   HCT 34.2*   < > 33.4*     --  247    < > = values in this interval not displayed.         Assessment and Plan:     * Hypercapnic respiratory failure  89 y/o F history HTN, CKD (b/l Cr 2-2.5), HLD presenting with somnolence, oliguria and THAD on CKD and hypercapneic respiratory failure. CXR with right sided opacity that may not be consistent with volume overload alone, consider CT if respiratory function fails to improve w diuresis alone.    - Tolerating well on 2L via NC O2  - Continue diuresis with IV lasix  - BMP q12h; close monitoring of electrolytes to avoid diuresis    Heart failure with reduced ejection fraction  Bedside echo revealed LVEF ~ 40% with no segmental wall motion abnormalities. Formal echo pending. Prior echo in 02/2021 showing EF 60% with signs of pulmonary hypertension 28 mmHg.    -- Plan for  formal echo today  -- treat as appropriate if heart failure demonstrated    CKD (chronic kidney disease), stage IV  THAD on CKD; Baseline appears to be in the 1.7-2.0 range.    Recent Labs     01/29/22  1436 01/30/22 0450 01/31/22  0125   CREATININE 2.5* 2.5* 2.5*     - continue HTN management   - furosemide 40mg IV TID  - BMP q12h and close electrolyte monitoring  - avoid nephrotoxic meds  -  "family to further discuss goals of care, would not recommend initiation of dialysis      Essential hypertension  Vitals:    01/31/22 0305 01/31/22 0405 01/31/22 0505 01/31/22 0605   BP: (!) 178/66  (!) 169/75 (!) 140/83   Pulse: 89 96 96 95   Resp: 18 (!) 22 (!) 28 16   Temp: 98.5 °F (36.9 °C)      TempSrc: Axillary      SpO2: 100% 100% 100% 100%   Weight:       Height:         PLAN  -Inpatient BP have been elevated, likely secondary to Nifedipine 30 vs home dose of Nifedipine 60.  - Continue diuresis with furosemide IV  - Nifedipine 30mg QD, transition to home dose with THAD resolution    Secondary hyperparathyroidism of renal origin    Lab Results   Component Value Date    .6 (H) 01/30/2022    .0 (H) 06/01/2021    .0 (H) 02/10/2020     PTH at baseline    Plan  -- ergocalciferol 50,000 U    Hypothyroidism  Normal tsh; fT4 unknown    - Continue levothyroxine 88 mcg daily    AAA (abdominal aortic aneurysm)  See "Essential hypertension"    Anemia in CKD (chronic kidney disease)  Lab Results   Component Value Date    IRON 29 (L) 01/30/2022    TIBC 292 01/30/2022    FERRITIN 182 01/30/2022    FESATURATED 10 (L) 01/30/2022       PLAN  -- ferrous gluconate 324 mg      VTE Risk Mitigation (From admission, onward)         Ordered     heparin (porcine) injection 5,000 Units  Every 8 hours         01/29/22 0814     IP VTE HIGH RISK PATIENT  Once         01/29/22 0814                Liu Umaña DO  Cardiology  [facility]  "

## 2022-01-31 NOTE — ASSESSMENT & PLAN NOTE
THAD on CKD; Baseline appears to be in the 1.7-2.0 range.    Recent Labs     01/29/22  1436 01/30/22  0450 01/31/22  0125   CREATININE 2.5* 2.5* 2.5*     - continue HTN management   - furosemide 40mg IV TID  - BMP q12h and close electrolyte monitoring  - avoid nephrotoxic meds  - family to further discuss goals of care, would not recommend initiation of dialysis

## 2022-01-31 NOTE — ASSESSMENT & PLAN NOTE
Lab Results   Component Value Date    .6 (H) 01/30/2022    .0 (H) 06/01/2021    .0 (H) 02/10/2020     PTH at baseline    Plan  -- ergocalciferol 50,000 U   dr coleman 10/29 @ 9:30 am    please call dr cook for appt in 2 weeks

## 2022-01-31 NOTE — PLAN OF CARE
Problem: SLP Goal  Goal: SLP Goal  Description: Speech Language Pathology Goals  Goals expected to be met by 2/6  1. Pt will tolerate regular diet with thin liquids without overt s/s aspiration.     Outcome: Met   Patient tolerating regular solids and thin liquids with aspiration precautions in place. No further skilled acute Speech Therapy services warranted at this time. Please re-consult as needed.

## 2022-01-31 NOTE — ASSESSMENT & PLAN NOTE
89 y/o F history HTN, CKD (b/l Cr 2-2.5), HLD presenting with somnolence, oliguria and THAD on CKD and hypercapneic respiratory failure. CXR with right sided opacity that may not be consistent with volume overload alone, consider CT if respiratory function fails to improve w diuresis alone.    - Tolerating well on 2L via NC O2  - Continue diuresis with IV lasix  - BMP q12h; close monitoring of electrolytes to avoid diuresis

## 2022-01-31 NOTE — PLAN OF CARE
[facility]  Initial Discharge Assessment       Primary Care Provider: Leticia Weems MD    Admission Diagnosis: Shortness of breath [R06.02]  Leg swelling [M79.89]  Hypoxia [R09.02]  THAD (acute kidney injury) [N17.9]    Admission Date: 1/28/2022  Expected Discharge Date: 2/4/2022    Discharge Barriers Identified: None    Payor: [employer] MANAGED MEDICARE / Plan: [employer] MEDICARE HMO / Product Type: Capitation /     Extended Emergency Contact Information  Primary Emergency Contact: Lombard,Annette   [place]  Mobile Phone: [phone]  Relation: Daughter    Discharge Plan A: Skilled Nursing Facility  Discharge Plan B: Home with family,Home Health      [facility] - [facility] [address] [facility] [address]  [address] [facility] [address]  Phone: [phone] Fax: [phone]    [id number] - [place], [place] - [address] AT [address] OF [address] [place] [address]  Phone: [phone] Fax: [phone]    [facility][id number] [address] AT [address] [address]  Phone: [phone] Fax: [phone]      Initial Assessment (most recent)     Adult Discharge Assessment - 01/31/22 1605        Discharge Assessment    Assessment Type Discharge Planning Assessment     Confirmed/corrected address, phone number and insurance Yes     Confirmed Demographics Correct on Facesheet     Source of Information patient;family     Communicated ION with patient/caregiver Date not available/Unable to determine     Lives With child(shani), adult     Do you expect to return to your current living situation? Yes     Do you have help at home or someone to help you manage your care at home? Yes     Who are your caregiver(s) and their phone number(s)? Annette Lombard (daughter) [phone]     Prior to hospitilization  cognitive status: Alert/Oriented     Current cognitive status: Alert/Oriented     Walking or Climbing Stairs Difficulty ambulation difficulty, requires equipment     Mobility Management Rolling walker     Dressing/Bathing Difficulty bathing difficulty, assistance 1 person     Dressing/Bathing Management Granddaughter assits with bathing     Equipment Currently Used at Home walker, rolling;shower chair     Readmission within 30 days? No     Patient currently being followed by outpatient case management? No     Do you currently have service(s) that help you manage your care at home? No     Do you have prescription coverage? Yes     Coverage [employer]/Medicaid     Do you have any problems affording any of your prescribed medications? TBD     Who is going to help you get home at discharge? daughter     How do you get to doctors appointments? family or friend will provide     Are you on dialysis? No     Do you take coumadin? No     Discharge Plan A Skilled Nursing Facility     Discharge Plan B Home with family;Home Health     DME Needed Upon Discharge  none     Discharge Plan discussed with: Patient;Adult children     Discharge Barriers Identified None                  Pt admitted 1/28/2022 12:49 PM    For Shortness of breath [R06.02]  Leg swelling [M79.89]  Hypoxia [R09.02]  THAD (acute kidney injury) [N17.9]       CM to bedside for assessment. Information obtained from patient and her daughter, Katia.  Pt lives with daughter and ORIANA in house.  Daughter will bring patient home at discharge. Will have support from family at d/c. Anticipate d/c to SNF.    Pt is followed by Dr Stanton for cardiology at [facility].    Discussed SNF with patient and her daughter, Katia. Both are in agreement with plan to d/c to SNF. First choice is [facility]. Requested a list from . Notified  who will bring list.    Prior to hospital stay, pt walked around home on walker. Her granddaughter assisted with bathing. Other than that, patient was  independent with ADLs. Pt does not wear dentures to eat.     PCP is Leticia Weems MD  [phone] (p)  [phone] (f)      Payor: [employer] MANAGED MEDICARE / Plan: [employer] MEDICARE HMO / Product Type: Capitation /       [facility] - [place], [place] - [address]  [address] [facility] [address]  Phone: [phone] Fax: [phone]    [id number] [place] [address] AT [facility] [address] [facility]  [address] [place] [address]  Phone: [phone] Fax: [phone]    [facility] #[id number] [address] AT [facility] OF [address] & [address] [address]  Phone: [phone] Fax: [phone]      Extended Emergency Contact Information  Primary Emergency Contact: Lombard,Annette   [place]  Mobile Phone: [phone]  Relation: Daughter    No future appointments.      Julie Haase RN  Case Management [phone]

## 2022-01-31 NOTE — ASSESSMENT & PLAN NOTE
Bedside echo revealed LVEF ~ 40% with no segmental wall motion abnormalities. Formal echo pending. Prior echo in 02/2021 showing EF 60% with signs of pulmonary hypertension 28 mmHg.    -- Plan for  formal echo today  -- treat as appropriate if heart failure demonstrated

## 2022-01-31 NOTE — ASSESSMENT & PLAN NOTE
Vitals:    01/31/22 0305 01/31/22 0405 01/31/22 0505 01/31/22 0605   BP: (!) 178/66  (!) 169/75 (!) 140/83   Pulse: 89 96 96 95   Resp: 18 (!) 22 (!) 28 16   Temp: 98.5 °F (36.9 °C)      TempSrc: Axillary      SpO2: 100% 100% 100% 100%   Weight:       Height:         PLAN  -Inpatient BP have been elevated, likely secondary to Nifedipine 30 vs home dose of Nifedipine 60.  - Continue diuresis with furosemide IV  - Nifedipine 30mg QD, transition to home dose with THAD resolution

## 2022-01-31 NOTE — SUBJECTIVE & OBJECTIVE
Interval History: Patient seen and examined. No acute events overnight, afebrile, vital signs stable except for mild hypertension. Tolerated breathing well on 3L NC overnight. Continues to have good urine output on Lasix 10mg/hr, plan to transition to Lasix IV 40mg TID. Possible stepdown today. Pending SNF placement    Review of Systems   Constitutional: Negative for fever and malaise/fatigue.   HENT: Negative for sore throat.    Eyes: Negative for visual disturbance.   Cardiovascular: Negative for chest pain, dyspnea on exertion, irregular heartbeat, leg swelling, near-syncope, palpitations and syncope.   Respiratory: Positive for shortness of breath.    Endocrine: Negative.    Hematologic/Lymphatic: Negative.    Skin: Negative.    Musculoskeletal: Negative for arthritis, back pain, joint pain and myalgias.   Gastrointestinal: Negative for bloating, abdominal pain and hematemesis.   Genitourinary: Negative.  Negative for dysuria and urgency.   Neurological: Negative for light-headedness and loss of balance.   Psychiatric/Behavioral: Negative.      Objective:     Vital Signs (Most Recent):  Temp: 98.5 °F (36.9 °C) (01/31/22 0305)  Pulse: 95 (01/31/22 0605)  Resp: 16 (01/31/22 0605)  BP: (!) 140/83 (01/31/22 0605)  SpO2: 100 % (01/31/22 0605) Vital Signs (24h Range):  Temp:  [98.2 °F (36.8 °C)-99.2 °F (37.3 °C)] 98.5 °F (36.9 °C)  Pulse:  [] 95  Resp:  [15-41] 16  SpO2:  [87 %-100 %] 100 %  BP: (105-178)/(60-84) 140/83     Weight: 75 kg (165 lb 5.5 oz)  Body mass index is 34.56 kg/m².     SpO2: 100 %  O2 Device (Oxygen Therapy): nasal cannula      Intake/Output Summary (Last 24 hours) at 1/31/2022 0907  Last data filed at 1/31/2022 0605  Gross per 24 hour   Intake --   Output 2305 ml   Net -2305 ml       Lines/Drains/Airways     Drain            Female External Urinary Catheter 09/10/20 2000 507 days         Urethral Catheter 01/28/22 1825 2 days          Peripheral Intravenous Line                 Midline  Catheter Insertion/Assessment  - Single Lumen 09/10/20 1520 Left brachial vein 18g x 10cm 507 days         Peripheral IV - Single Lumen 01/28/22 1353 22 G Right Hand 2 days         Peripheral IV - Single Lumen 01/28/22 1720 22 G Left Forearm 2 days                Physical Exam  Vitals and nursing note reviewed.   Constitutional:       Appearance: Normal appearance.   HENT:      Head: Normocephalic and atraumatic.   Eyes:      General: No scleral icterus.     Extraocular Movements: Extraocular movements intact.   Cardiovascular:      Rate and Rhythm: Normal rate and regular rhythm.      Pulses: Normal pulses.      Heart sounds: Normal heart sounds.   Pulmonary:      Effort: Pulmonary effort is normal.      Breath sounds: Normal breath sounds.   Abdominal:      General: Abdomen is flat. Bowel sounds are normal. There is no distension.      Palpations: Abdomen is soft.      Tenderness: There is no abdominal tenderness.   Musculoskeletal:         General: No tenderness. Normal range of motion.      Cervical back: Normal range of motion and neck supple.      Right lower leg: Edema present.      Left lower leg: Edema present.   Skin:     General: Skin is warm and dry.      Capillary Refill: Capillary refill takes less than 2 seconds.   Neurological:      General: No focal deficit present.      Mental Status: She is alert. Mental status is at baseline.   Psychiatric:         Mood and Affect: Mood normal.         Behavior: Behavior normal.         Significant Labs:   CMP   Recent Labs   Lab 01/29/22  1436 01/30/22  0450 01/31/22  0125   * 136 141   K 4.5 3.9 3.6   CL 94* 92* 94*   CO2 31* 34* 38*    74 85   BUN 44* 44* 46*   CREATININE 2.5* 2.5* 2.5*   CALCIUM 9.0 9.3 9.4   ANIONGAP 10 10 9   ESTGFRAFRICA 18.8* 18.8* 18.8*   EGFRNONAA 16.3* 16.3* 16.3*   , CBC   Recent Labs   Lab 01/30/22  0450 01/30/22  0450 01/31/22  0125   WBC 4.75  --  5.61   HGB 10.7*  --  10.5*   HCT 34.2*   < > 33.4*     --  247     < > = values in this interval not displayed.

## 2022-01-31 NOTE — PT/OT/SLP PROGRESS
"Speech Language Pathology Treatment  Discharge    Patient Name:  Johanny Curtis   MRN:  [id number]   [id number]    Admitting Diagnosis: Hypercapnic respiratory failure    Recommendations:                 General Recommendations:  Follow-up not indicated  Diet recommendations:  Regular, Liquid Diet Level: Thin   Aspiration Precautions: Assistance with meals, Feed only when awake/alert, HOB to 90 degrees and Strict aspiration precautions   General Precautions: Standard, fall,respiratory  Communication strategies:  none    Subjective     Communicated with RN prior to entry.   Patient awake and cooperative.   She states, "I can't think of it right now. I'm sorry just not being home and being in the hospital gets me mixed up sometimes." -in reference to not knowing what time [place] of [employer] comes on  Patient goals: breakfast    Pain/Comfort:  · Pain Rating 1: 0/10    Respiratory Status:  Nasal cannula    Objective:     Has the patient been evaluated by SLP for swallowing?   Yes  Keep patient NPO? No   Current Respiratory Status:   nasal cannula    Patient seen to assess tolerance of diet. SLP assisted patient with bites of sausage, eggs, and grits and sips apple juice via straw. She presented with mildly slow oral transit time 2/2 dentition, however, adequate oral clearance appreciated. No overt s/s of aspiration noted. Patient presented with an intermittent throat clear which she reports to be habitual at baseline for years and not related to swallowing. SLP provided education on SLP recommendations, SLP role, s/s and risks of aspiration, safe swallow precautions, and d/c from ST services. Patient verbalized understanding of all discussed and is in agreement with d/c from ST services. SLP communicated with RN.     Assessment:     Johanny Curtis is a 89 y.o. female with adequate tolerance of regular solids and thin liquids. No further skilled acute Speech Therapy services warranted at this time. Please " re-consult as needed.     Goals:   Multidisciplinary Problems     SLP Goals     Not on file          Multidisciplinary Problems (Resolved)        Problem: SLP Goal    Goal Priority Disciplines Outcome   SLP Goal   (Resolved)     SLP Met   Description: Speech Language Pathology Goals  Goals expected to be met by 2/6  1. Pt will tolerate regular diet with thin liquids without overt s/s aspiration.                            Plan:     · Patient to be seen:  4 x/week   · Plan of Care expires:  03/01/22  · Plan of Care reviewed with:  patient   · SLP Follow-Up:  No        Discharge recommendations:  nursing facility, skilled   Barriers to Discharge:  None    Time Tracking:     SLP Treatment Date:   01/31/22  Speech Start Time:  0832  Speech Stop Time:  0859     Speech Total Time (min):  27 min    Billable Minutes: Treatment Swallowing Dysfunction 17 and Self Care/Home Management Training 10    01/31/2022

## 2022-02-01 NOTE — SUBJECTIVE & OBJECTIVE
Interval History: Patient seen and examined. No acute events overnight, afebrile, vital signs stable. Improving HTN. She underwent ECHO yesterday showing EF around 50%. Lower extremity edema improving. Pending SNF placement       Review of Systems   Constitutional: Negative for fever and malaise/fatigue.   HENT: Negative for sore throat.    Eyes: Negative for visual disturbance.   Cardiovascular: Positive for leg swelling. Negative for chest pain, dyspnea on exertion, irregular heartbeat, near-syncope, palpitations and syncope.   Respiratory: Negative for cough and shortness of breath.    Endocrine: Negative.    Hematologic/Lymphatic: Negative.    Skin: Negative.    Musculoskeletal: Negative for arthritis, back pain, joint pain and myalgias.   Gastrointestinal: Negative for bloating, abdominal pain and hematemesis.   Genitourinary: Negative.  Negative for dysuria and urgency.   Neurological: Negative for light-headedness and loss of balance.   Psychiatric/Behavioral: Negative.      Objective:     Vital Signs (Most Recent):  Temp: 97.7 °F (36.5 °C) (02/01/22 0944)  Pulse: 85 (02/01/22 0944)  Resp: 18 (02/01/22 0944)  BP: (!) 142/67 (02/01/22 0944)  SpO2: 98 % (02/01/22 0944) Vital Signs (24h Range):  Temp:  [97.7 °F (36.5 °C)-99.2 °F (37.3 °C)] 97.7 °F (36.5 °C)  Pulse:  [71-93] 85  Resp:  [16-49] 18  SpO2:  [94 %-100 %] 98 %  BP: (124-193)/() 142/67     Weight: 74.8 kg (165 lb)  Body mass index is 34.49 kg/m².     SpO2: 98 %  O2 Device (Oxygen Therapy): room air      Intake/Output Summary (Last 24 hours) at 2/1/2022 1059  Last data filed at 2/1/2022 0905  Gross per 24 hour   Intake 1180 ml   Output 1760 ml   Net -580 ml       Lines/Drains/Airways     Drain            Female External Urinary Catheter 02/01/22 0400 <1 day          Peripheral Intravenous Line                 Midline Catheter Insertion/Assessment  - Single Lumen 09/10/20 1520 Left brachial vein 18g x 10cm 508 days         Peripheral IV - Single  Lumen 01/28/22 1720 22 G Left Forearm 3 days                Physical Exam  Vitals and nursing note reviewed.   Constitutional:       Appearance: Normal appearance.   HENT:      Head: Normocephalic and atraumatic.   Eyes:      General: No scleral icterus.     Extraocular Movements: Extraocular movements intact.   Cardiovascular:      Rate and Rhythm: Normal rate and regular rhythm.      Pulses: Normal pulses.      Heart sounds: Normal heart sounds.   Pulmonary:      Effort: Pulmonary effort is normal.      Breath sounds: Normal breath sounds.   Abdominal:      General: Abdomen is flat. Bowel sounds are normal. There is no distension.      Palpations: Abdomen is soft.      Tenderness: There is no abdominal tenderness.   Musculoskeletal:         General: No tenderness. Normal range of motion.      Cervical back: Normal range of motion and neck supple.      Right lower leg: Edema present.      Left lower leg: Edema present.   Skin:     General: Skin is warm and dry.      Capillary Refill: Capillary refill takes less than 2 seconds.   Neurological:      General: No focal deficit present.      Mental Status: She is alert. Mental status is at baseline.   Psychiatric:         Mood and Affect: Mood normal.         Behavior: Behavior normal.         Significant Labs:   CMP   Recent Labs   Lab 01/31/22  0125 02/01/22  0324    141   K 3.6 3.9   CL 94* 92*   CO2 38* 36*   GLU 85 76   BUN 46* 42*   CREATININE 2.5* 2.5*   CALCIUM 9.4 9.9   ANIONGAP 9 13   ESTGFRAFRICA 18.8* 18.8*   EGFRNONAA 16.3* 16.3*    and CBC   Recent Labs   Lab 01/31/22  0125 01/31/22  0125 02/01/22  0324   WBC 5.61  --  6.16   HGB 10.5*  --  11.0*   HCT 33.4*   < > 36.1*     --  230    < > = values in this interval not displayed.       Significant Imaging: Echocardiogram:   Transthoracic echo (TTE) complete (Cupid Only):   Results for orders placed or performed during the hospital encounter of 01/28/22   Echo   Result Value Ref Range     Ascending aorta 3.12 cm    STJ 2.25 cm    AV mean gradient 11 mmHg    Ao peak michael 2.35 m/s    Ao VTI 41.46 cm    IVS 0.90 0.6 - 1.1 cm    LA size 3.94 cm    Left Atrium Major Axis 6.40 cm    Left Atrium Minor Axis 6.23 cm    LVIDd 4.41 3.5 - 6.0 cm    LVIDs 3.10 2.1 - 4.0 cm    LVOT diameter 2.16 cm    LVOT peak VTI 16.78 cm    Posterior Wall 0.74 0.6 - 1.1 cm    MV Peak A Michael 1.00 m/s    E wave deceleration time 185.23 msec    MV Peak E Michael 1.05 m/s    RA Major Axis 4.89 cm    RA Width 3.62 cm    RVDD 3.39 cm    Sinus 2.61 cm    TAPSE 1.49 cm    TR Max Michael 2.53 m/s    TDI LATERAL 0.03 m/s    TDI SEPTAL 0.03 m/s    LA WIDTH 4.86 cm    MV stenosis pressure 1/2 time 53.72 ms    LV Diastolic Volume 88.34 mL    LV Systolic Volume 56.33 mL    LVOT peak michael 0.89 m/s    Mr max michael 0.06 m/s    LA volume (mod) 91.50 cm3    LV LATERAL E/E' RATIO 35.00 m/s    LV SEPTAL E/E' RATIO 35.00 m/s    FS 30 %    LA volume 102.77 cm3    LV mass 113.48 g    Left Ventricle Relative Wall Thickness 0.34 cm    AV valve area 1.48 cm2    AV Velocity Ratio 0.38     AV index (prosthetic) 0.40     MV valve area p 1/2 method 4.10 cm2    E/A ratio 1.05     Mean e' 0.03 m/s    LVOT area 3.7 cm2    LVOT stroke volume 61.46 cm3    AV peak gradient 22 mmHg    E/E' ratio 35.00 m/s    LV Systolic Volume Index 33.5 mL/m2    LV Diastolic Volume Index 52.58 mL/m2    LA Volume Index 61.2 mL/m2    LV Mass Index 68 g/m2    Triscuspid Valve Regurgitation Peak Gradient 26 mmHg    LA Volume Index (Mod) 54.5 mL/m2    BSA 1.75 m2    Right Atrial Pressure (from IVC) 8 mmHg    EF 50 %    IVC proximal 1.8 cm    RV S' 8 cm/s    TV rest pulmonary artery pressure 34 mmHg    Narrative    · The left ventricle is normal in size with mildly decreased systolic   function.  · The estimated ejection fraction is 50% or slightly lower.  · Grade II left ventricular diastolic dysfunction.  · Normal right ventricular size with mildly reduced right ventricular   systolic function.  ·  Severe left atrial enlargement.  · The MR appears mild to mild-moderate ( 1-2+).  · Intermediate central venous pressure (8 mmHg).  · The estimated PA systolic pressure is 34 mmHg.  · Trivial posterolateral pericardial effusion. There is moderate under the   RA.  · There is abnormal septal wall motion.  · There are segmental left ventricular wall motion abnormalities.

## 2022-02-01 NOTE — PT/OT/SLP PROGRESS
"Occupational Therapy   Treatment    Name: Johanny Curtis  MRN: [id number]  Admitting Diagnosis:  Hypercapnic respiratory failure       Recommendations:     Discharge Recommendations: nursing facility, skilled  Discharge Equipment Recommendations:   (TBD)  Barriers to discharge:  Decreased caregiver support,Inaccessible home environment    Assessment:     Johanny Curtis is a 89 y.o. female with a medical diagnosis of Hypercapnic respiratory failure.  She presents with deficits in self-care tasks as well as functional mobility and endurance. Pt. Required Max A on this date for bed mobility and Mod A for sit to stand transfer and take 2 side steps along EOB. Pt. Alert throughout session and following commands. Pt. Sat EOB with SBA to perform grooming tasks.  Performance deficits affecting function are weakness,impaired endurance,impaired self care skills,impaired functional mobilty,impaired balance,decreased upper extremity function,decreased lower extremity function. Pt. Would benefit from continued OT services to maximize safety and I with ADL tasks. Pt. Is recommended to d/c to SNF when medically stable.    Rehab Prognosis:  Good; patient would benefit from acute skilled OT services to address these deficits and reach maximum level of function.       Plan:     Patient to be seen 3 x/week to address the above listed problems via self-care/home management,therapeutic activities,therapeutic exercises  · Plan of Care Expires: 03/01/22  · Plan of Care Reviewed with: patient    Subjective   " I am cold"  (notified nursing to assess temperature in room as attempted to adjust but not certain it is working correctly)  Pain/Comfort:  · Pain Rating 1: 0/10  · Pain Rating Post-Intervention 1: 0/10    Objective:     Communicated with: nurse prior to session.  Patient found supine with bed alarm,telemetry,oxygen,pulse ox (continuous) upon OT entry to room.    General Precautions: Standard, fall,respiratory   Orthopedic " Precautions:N/A   Braces: N/A  Respiratory Status: Nasal cannula, flow 2.5 L/min     Occupational Performance:     Bed Mobility:    · Patient completed Supine to Sit with maximal assistance  · Patient completed Sit to Supine with maximal assistance     Functional Mobility/Transfers:  · Patient completed Sit <> Stand Transfer with moderate assistance  with  no assistive device  from bed x 2 trials  · Functional Mobility: Pt. Performed ~ 3 side steps to HOB     Activities of Daily Living:  · Grooming: stand by assistance seated EOB to wash face  · Upper Body Dressing: maximal assistance to don gown like roxana      [facility] 6 Click ADL: 14    Treatment & Education:  Pt. Educated on importance of OOB/therapy      Patient left supine with all lines intact, call button in reach, bed alarm on and nurse notifiedEducation:      GOALS:   Multidisciplinary Problems     Occupational Therapy Goals        Problem: Occupational Therapy Goal    Goal Priority Disciplines Outcome Interventions   Occupational Therapy Goal     OT, PT/OT Ongoing, Progressing    Description: Goals set on 1/30 with expiration date 2/13:  Patient will increase functional independence with ADLs by performing:    Supine <> Sit with Min A.   Feeding while seated EOB/bedside chair with  Min A.   Grooming while standing EOB with  Min A.   UB Dressing with Min A.   LB Dressing with Min A.   Stand pivot transfer with  Min A with DME as needed.  Pt will demonstrate understanding of education provided regarding energy conservation and task modification through teach-back method.   Patient and/or patient's family will verbalize understanding of POC and post d/c support needs, and personal risk factors for fall risk reduction.                      Time Tracking:     OT Date of Treatment: 02/01/22  OT Start Time: 1043  OT Stop Time: 1108  OT Total Time (min): 25 min    Billable Minutes:Self Care/Home Management 15  Therapeutic Activity 10    OT/JEFF: OT           2/1/2022

## 2022-02-01 NOTE — ASSESSMENT & PLAN NOTE
THAD on CKD; Baseline appears to be in the 1.7-2.0 range.    Recent Labs     01/30/22  0450 01/31/22  0125 02/01/22  0324   CREATININE 2.5* 2.5* 2.5*     - continue HTN management   - Furosemide IV transitioned to Furosemide 40 BID. Repeat BNP pending to assess further escalation in diuretic requirements.  - BMP q12h and close electrolyte monitoring  - avoid nephrotoxic meds  - family to further discuss goals of care, would not recommend initiation of dialysis

## 2022-02-01 NOTE — PROGRESS NOTES
[facility] - Cardiac Intensive Care  Cardiology  Progress Note    Patient Name: Johanny Curtis  MRN: [id number]  Admission Date: 1/28/2022  Hospital Length of Stay: 4 days  Code Status: Full Code   Attending Physician: Paolo Hubbard MD   Primary Care Physician: Leticia Weems MD  Expected Discharge Date: 2/4/2022  Principal Problem:Hypercapnic respiratory failure    Subjective:     Hospital Course:   Patient on furosemide drip 10mg/hr, producing 150-200 ml / hr, transitioning from BiPap to NC as tolerated. Ceftriaxone was discontinued as patient has no symptoms of UTI or systemic infection, she has been AAOx4, with stable vitals, denied urinary symptoms and afebrile. Improving urine output on Furosemide drip, transitioned to Furosemide 40mg IV TID. Formal ECHO showed estimated ejection fraction around 50%, normal LV size with mildly decreased systolic function, and grade II left ventricular diastolic dysfunction. Furosemide IV transitioned to Furosemide 40 BID. Repeat BNP pending to assess further escalation in diuretic requirements.          Interval History: Patient seen and examined. No acute events overnight, afebrile, vital signs stable. Improving HTN. She underwent ECHO yesterday showing EF around 50%. Lower extremity edema improving. Pending SNF placement       Review of Systems   Constitutional: Negative for fever and malaise/fatigue.   HENT: Negative for sore throat.    Eyes: Negative for visual disturbance.   Cardiovascular: Positive for leg swelling. Negative for chest pain, dyspnea on exertion, irregular heartbeat, near-syncope, palpitations and syncope.   Respiratory: Negative for cough and shortness of breath.    Endocrine: Negative.    Hematologic/Lymphatic: Negative.    Skin: Negative.    Musculoskeletal: Negative for arthritis, back pain, joint pain and myalgias.   Gastrointestinal: Negative for bloating, abdominal pain and hematemesis.   Genitourinary: Negative.  Negative for dysuria and urgency.    Neurological: Negative for light-headedness and loss of balance.   Psychiatric/Behavioral: Negative.      Objective:     Vital Signs (Most Recent):  Temp: 97.7 °F (36.5 °C) (02/01/22 0944)  Pulse: 85 (02/01/22 0944)  Resp: 18 (02/01/22 0944)  BP: (!) 142/67 (02/01/22 0944)  SpO2: 98 % (02/01/22 0944) Vital Signs (24h Range):  Temp:  [97.7 °F (36.5 °C)-99.2 °F (37.3 °C)] 97.7 °F (36.5 °C)  Pulse:  [71-93] 85  Resp:  [16-49] 18  SpO2:  [94 %-100 %] 98 %  BP: (124-193)/() 142/67     Weight: 74.8 kg (165 lb)  Body mass index is 34.49 kg/m².     SpO2: 98 %  O2 Device (Oxygen Therapy): room air      Intake/Output Summary (Last 24 hours) at 2/1/2022 1059  Last data filed at 2/1/2022 0905  Gross per 24 hour   Intake 1180 ml   Output 1760 ml   Net -580 ml       Lines/Drains/Airways     Drain            Female External Urinary Catheter 02/01/22 0400 <1 day          Peripheral Intravenous Line                 Midline Catheter Insertion/Assessment  - Single Lumen 09/10/20 1520 Left brachial vein 18g x 10cm 508 days         Peripheral IV - Single Lumen 01/28/22 1720 22 G Left Forearm 3 days                Physical Exam  Vitals and nursing note reviewed.   Constitutional:       Appearance: Normal appearance.   HENT:      Head: Normocephalic and atraumatic.   Eyes:      General: No scleral icterus.     Extraocular Movements: Extraocular movements intact.   Cardiovascular:      Rate and Rhythm: Normal rate and regular rhythm.      Pulses: Normal pulses.      Heart sounds: Normal heart sounds.   Pulmonary:      Effort: Pulmonary effort is normal.      Breath sounds: Normal breath sounds.   Abdominal:      General: Abdomen is flat. Bowel sounds are normal. There is no distension.      Palpations: Abdomen is soft.      Tenderness: There is no abdominal tenderness.   Musculoskeletal:         General: No tenderness. Normal range of motion.      Cervical back: Normal range of motion and neck supple.      Right lower leg: Edema  present.      Left lower leg: Edema present.   Skin:     General: Skin is warm and dry.      Capillary Refill: Capillary refill takes less than 2 seconds.   Neurological:      General: No focal deficit present.      Mental Status: She is alert. Mental status is at baseline.   Psychiatric:         Mood and Affect: Mood normal.         Behavior: Behavior normal.         Significant Labs:   CMP   Recent Labs   Lab 01/31/22 0125 02/01/22  0324    141   K 3.6 3.9   CL 94* 92*   CO2 38* 36*   GLU 85 76   BUN 46* 42*   CREATININE 2.5* 2.5*   CALCIUM 9.4 9.9   ANIONGAP 9 13   ESTGFRAFRICA 18.8* 18.8*   EGFRNONAA 16.3* 16.3*    and CBC   Recent Labs   Lab 01/31/22 0125 01/31/22 0125 02/01/22 0324   WBC 5.61  --  6.16   HGB 10.5*  --  11.0*   HCT 33.4*   < > 36.1*     --  230    < > = values in this interval not displayed.       Significant Imaging: Echocardiogram:   Transthoracic echo (TTE) complete (Cupid Only):   Results for orders placed or performed during the hospital encounter of 01/28/22   Echo   Result Value Ref Range    Ascending aorta 3.12 cm    STJ 2.25 cm    AV mean gradient 11 mmHg    Ao peak michael 2.35 m/s    Ao VTI 41.46 cm    IVS 0.90 0.6 - 1.1 cm    LA size 3.94 cm    Left Atrium Major Axis 6.40 cm    Left Atrium Minor Axis 6.23 cm    LVIDd 4.41 3.5 - 6.0 cm    LVIDs 3.10 2.1 - 4.0 cm    LVOT diameter 2.16 cm    LVOT peak VTI 16.78 cm    Posterior Wall 0.74 0.6 - 1.1 cm    MV Peak A Michael 1.00 m/s    E wave deceleration time 185.23 msec    MV Peak E Michael 1.05 m/s    RA Major Axis 4.89 cm    RA Width 3.62 cm    RVDD 3.39 cm    Sinus 2.61 cm    TAPSE 1.49 cm    TR Max Michael 2.53 m/s    TDI LATERAL 0.03 m/s    TDI SEPTAL 0.03 m/s    LA WIDTH 4.86 cm    MV stenosis pressure 1/2 time 53.72 ms    LV Diastolic Volume 88.34 mL    LV Systolic Volume 56.33 mL    LVOT peak mcihael 0.89 m/s    Mr max michael 0.06 m/s    LA volume (mod) 91.50 cm3    LV LATERAL E/E' RATIO 35.00 m/s    LV SEPTAL E/E' RATIO 35.00 m/s     FS 30 %    LA volume 102.77 cm3    LV mass 113.48 g    Left Ventricle Relative Wall Thickness 0.34 cm    AV valve area 1.48 cm2    AV Velocity Ratio 0.38     AV index (prosthetic) 0.40     MV valve area p 1/2 method 4.10 cm2    E/A ratio 1.05     Mean e' 0.03 m/s    LVOT area 3.7 cm2    LVOT stroke volume 61.46 cm3    AV peak gradient 22 mmHg    E/E' ratio 35.00 m/s    LV Systolic Volume Index 33.5 mL/m2    LV Diastolic Volume Index 52.58 mL/m2    LA Volume Index 61.2 mL/m2    LV Mass Index 68 g/m2    Triscuspid Valve Regurgitation Peak Gradient 26 mmHg    LA Volume Index (Mod) 54.5 mL/m2    BSA 1.75 m2    Right Atrial Pressure (from IVC) 8 mmHg    EF 50 %    IVC proximal 1.8 cm    RV S' 8 cm/s    TV rest pulmonary artery pressure 34 mmHg    Narrative    · The left ventricle is normal in size with mildly decreased systolic   function.  · The estimated ejection fraction is 50% or slightly lower.  · Grade II left ventricular diastolic dysfunction.  · Normal right ventricular size with mildly reduced right ventricular   systolic function.  · Severe left atrial enlargement.  · The MR appears mild to mild-moderate ( 1-2+).  · Intermediate central venous pressure (8 mmHg).  · The estimated PA systolic pressure is 34 mmHg.  · Trivial posterolateral pericardial effusion. There is moderate under the   RA.  · There is abnormal septal wall motion.  · There are segmental left ventricular wall motion abnormalities.        Assessment and Plan:     * Hypercapnic respiratory failure  89 y/o F history HTN, CKD (b/l Cr 2-2.5), HLD presenting with somnolence, oliguria and THAD on CKD and hypercapneic respiratory failure. CXR with right sided opacity that may not be consistent with volume overload alone, consider CT if respiratory function fails to improve w diuresis alone.  Initially required 2L via NC O2, now tolerating well on room air.      - Continue diuresis with lasix  - BMP q12h; close monitoring of electrolytes to avoid  overdiuresis    Heart failure with reduced ejection fraction  Bedside echo revealed LVEF ~ 40% with no segmental wall motion abnormalities. Formal echo pending. Prior echo in 02/2021 showing EF 60% with signs of pulmonary hypertension 28 mmHg.    [ECHO 01/31/22]  - The left ventricle is normal in size with mildly decreased systolic   function.  · The estimated ejection fraction is 50% or slightly lower.  · Grade II left ventricular diastolic dysfunction.  · Normal right ventricular size with mildly reduced right ventricular   systolic function.  · Severe left atrial enlargement.  · The MR appears mild to mild-moderate ( 1-2+).  · Intermediate central venous pressure (8 mmHg).  · The estimated PA systolic pressure is 34 mmHg.  · Trivial posterolateral pericardial effusion. There is moderate under the   RA.  · There is abnormal septal wall motion.  · There are segmental left ventricular wall motion abnormalities.       PLAN  -Unable to optimize GDMT with aldosterone antagonist or ANRI due to low GFR      CKD (chronic kidney disease), stage IV  THAD on CKD; Baseline appears to be in the 1.7-2.0 range.    Recent Labs     01/30/22  0450 01/31/22  0125 02/01/22  0324   CREATININE 2.5* 2.5* 2.5*     - continue HTN management   - Furosemide IV transitioned to Furosemide 40 BID. Repeat BNP pending to assess further escalation in diuretic requirements.  - BMP q12h and close electrolyte monitoring  - avoid nephrotoxic meds  - family to further discuss goals of care, would not recommend initiation of dialysis      Essential hypertension  Vitals:    02/01/22 0300 02/01/22 0400 02/01/22 0500 02/01/22 0600   BP: (!) 145/71 (!) 147/67 124/60 (!) 144/63   BP Location:       Patient Position:       Pulse: 86 85 85 85   Resp: (!) 49 (!) 25 (!) 40 (!) 35   Temp:       TempSrc:       SpO2: 97% 100% 100% 99%   Weight:       Height:         PLAN  -Inpatient BP have been elevated, likely secondary to Nifedipine 30 vs home dose of Nifedipine  "60.  - Continue diuresis with furosemide IV  - Nifedipine 30mg QD, transition to home dose with THAD resolution    Secondary hyperparathyroidism of renal origin    Lab Results   Component Value Date    .6 (H) 01/30/2022    .0 (H) 06/01/2021    .0 (H) 02/10/2020     PTH at baseline    Plan  -- ergocalciferol 50,000 U    Hypothyroidism  Normal tsh; fT4 unknown    - Continue levothyroxine 88 mcg daily    AAA (abdominal aortic aneurysm)  See "Essential hypertension"    Anemia in CKD (chronic kidney disease)  Lab Results   Component Value Date    IRON 29 (L) 01/30/2022    TIBC 292 01/30/2022    FERRITIN 182 01/30/2022    FESATURATED 10 (L) 01/30/2022       PLAN  -- ferrous gluconate 324 mg      VTE Risk Mitigation (From admission, onward)         Ordered     heparin (porcine) injection 5,000 Units  Every 8 hours         01/29/22 0814     IP VTE HIGH RISK PATIENT  Once         01/29/22 0814                Liu Umaña DO  Cardiology  [facility] - Cardiac Intensive Care  "

## 2022-02-01 NOTE — PLAN OF CARE
02/01/22 1033   Post-Acute Status   Post-Acute Authorization Placement   Post-Acute Placement Status Pending post-acute provider review/more information requested     Under review with OSNF per liaison Mariajose pending updated notes from therapy.  PT Erik and OT Pilar notified of the above.  Will follow.    Syl Durand, CLAUDIA  [facility]  [phone]

## 2022-02-01 NOTE — ASSESSMENT & PLAN NOTE
89 y/o F history HTN, CKD (b/l Cr 2-2.5), HLD presenting with somnolence, oliguria and THAD on CKD and hypercapneic respiratory failure. CXR with right sided opacity that may not be consistent with volume overload alone, consider CT if respiratory function fails to improve w diuresis alone.  Initially required 2L via NC O2, now tolerating well on room air.      - Continue diuresis with lasix  - BMP q12h; close monitoring of electrolytes to avoid overdiuresis

## 2022-02-01 NOTE — PT/OT/SLP PROGRESS
Physical Therapy Treatment    Patient Name:  Johanny Curtis   MRN:  [id number]    Recommendations:     Discharge Recommendations:  nursing facility, skilled   Discharge Equipment Recommendations:  (TBD)   Barriers to discharge: None    Assessment:     Johanny Curtis is a 89 y.o. female admitted with a medical diagnosis of Hypercapnic respiratory failure.  She presents with the following impairments/functional limitations:  weakness,impaired endurance,impaired self care skills,impaired functional mobilty,gait instability,impaired balance Pt. cooperative and tolerated treatment well. Pt. progressing with mobility.    Rehab Prognosis: Fair; patient would benefit from acute skilled PT services to address these deficits and reach maximum level of function.    Recent Surgery: * No surgery found *      Plan:     During this hospitalization, patient to be seen 3 x/week to address the identified rehab impairments via gait training,therapeutic activities,therapeutic exercises and progress toward the following goals:    · Plan of Care Expires:  02/13/22    Subjective     Chief Complaint: none  Patient/Family Comments/goals: pt. Agreeable to PT  Pain/Comfort:  · Pain Rating 1: 0/10  · Pain Rating Post-Intervention 1: 0/10      Objective:     Communicated with nursing prior to session.  Patient found supine with bed alarm,telemetry,pulse ox (continuous),peripheral IV,PureWick,oxygen upon PT entry to room.     General Precautions: Standard, fall   Orthopedic Precautions:N/A   Braces: N/A  Respiratory Status: Nasal cannula, flow 2 L/min     Functional Mobility:  · Bed Mobility:     · Rolling Right: moderate assistance  · Scooting: maximal assistance  · Supine to Sit: maximal assistance  · Sit to Supine: maximal assistance  · Transfers:     · Sit to Stand:  moderate assistance with rolling walker  · Gait: 8 steps with RW and Mod A for balance/support/guidance. Pt. amb. with decreased step length/bonny/floor clearance  · Balance:  fair sitting and poor standing      AM-PAC 6 CLICK MOBILITY  Turning over in bed (including adjusting bedclothes, sheets and blankets)?: 2  Sitting down on and standing up from a chair with arms (e.g., wheelchair, bedside commode, etc.): 2  Moving from lying on back to sitting on the side of the bed?: 2  Moving to and from a bed to a chair (including a wheelchair)?: 2  Need to walk in hospital room?: 2  Climbing 3-5 steps with a railing?: 2  Basic Mobility Total Score: 12       Therapeutic Activities and Exercises:   Discussed pt.'s progress, goals, and POC.    Patient left supine with all lines intact and call button in reach..    GOALS:   Multidisciplinary Problems     Physical Therapy Goals        Problem: Physical Therapy Goal    Goal Priority Disciplines Outcome Goal Variances Interventions   Physical Therapy Goal     PT, PT/OT Ongoing, Progressing     Description: Goals to be met by: 2022      Patient will increase functional independence with mobility by performin. Supine to sit with Stand-by Assistance  2. Sit to stand transfer with Stand-by Assistance  3. Bed to chair transfer with Stand-by Assistance using the least restrictive device.   4. Gait  x 75 feet with Stand-by Assistance using the least restrictive device.                       Time Tracking:     PT Received On: 22  PT Start Time: 1404     PT Stop Time: 1418  PT Total Time (min): 14 min     Billable Minutes: Gait Training 14    Treatment Type: Treatment  PT/PTA: PT           2022

## 2022-02-01 NOTE — ASSESSMENT & PLAN NOTE
Bedside echo revealed LVEF ~ 40% with no segmental wall motion abnormalities. Formal echo pending. Prior echo in 02/2021 showing EF 60% with signs of pulmonary hypertension 28 mmHg.    [ECHO 01/31/22]  - The left ventricle is normal in size with mildly decreased systolic   function.  · The estimated ejection fraction is 50% or slightly lower.  · Grade II left ventricular diastolic dysfunction.  · Normal right ventricular size with mildly reduced right ventricular   systolic function.  · Severe left atrial enlargement.  · The MR appears mild to mild-moderate ( 1-2+).  · Intermediate central venous pressure (8 mmHg).  · The estimated PA systolic pressure is 34 mmHg.  · Trivial posterolateral pericardial effusion. There is moderate under the   RA.  · There is abnormal septal wall motion.  · There are segmental left ventricular wall motion abnormalities.       PLAN  -Unable to optimize GDMT with aldosterone antagonist or ANRI due to low GFR

## 2022-02-01 NOTE — ASSESSMENT & PLAN NOTE
Vitals:    02/01/22 0300 02/01/22 0400 02/01/22 0500 02/01/22 0600   BP: (!) 145/71 (!) 147/67 124/60 (!) 144/63   BP Location:       Patient Position:       Pulse: 86 85 85 85   Resp: (!) 49 (!) 25 (!) 40 (!) 35   Temp:       TempSrc:       SpO2: 97% 100% 100% 99%   Weight:       Height:         PLAN  -Inpatient BP have been elevated, likely secondary to Nifedipine 30 vs home dose of Nifedipine 60.  - Continue diuresis with furosemide IV  - Nifedipine 30mg QD, transition to home dose with THAD resolution

## 2022-02-01 NOTE — PLAN OF CARE
Problem: Occupational Therapy Goal  Goal: Occupational Therapy Goal  Description: Goals set on 1/30 with expiration date 2/13:  Patient will increase functional independence with ADLs by performing:    Supine <> Sit with Min A.   Feeding while seated EOB/bedside chair with  Min A.   Grooming while standing EOB with  Min A.   UB Dressing with Min A.   LB Dressing with Min A.   Stand pivot transfer with  Min A with DME as needed.  Pt will demonstrate understanding of education provided regarding energy conservation and task modification through teach-back method.   Patient and/or patient's family will verbalize understanding of POC and post d/c support needs, and personal risk factors for fall risk reduction.     Outcome: Ongoing, Progressing

## 2022-02-02 NOTE — ASSESSMENT & PLAN NOTE
Lab Results   Component Value Date    .6 (H) 01/30/2022    .0 (H) 06/01/2021    .0 (H) 02/10/2020     PTH at baseline    Plan  -- ergocalciferol 50,000 U

## 2022-02-02 NOTE — PROGRESS NOTES
[facility] - [facility]iology Stepdown  Cardiology  Progress Note    Patient Name: Johanny Curtis  MRN: [id number]  Admission Date: 1/28/2022  Hospital Length of Stay: 5 days  Code Status: Full Code   Attending Physician: Paolo Hubbard MD   Primary Care Physician: Leticia Weems MD  Expected Discharge Date: 2/4/2022  Principal Problem:Hypercapnic respiratory failure    Subjective:     Hospital Course:   Patient on furosemide drip 10mg/hr, producing 150-200 ml / hr, transitioning from BiPap to NC as tolerated. Ceftriaxone was discontinued as patient has no symptoms of UTI or systemic infection, she has been AAOx4, with stable vitals, denied urinary symptoms and afebrile. Improving urine output on Furosemide drip, transitioned to Furosemide 40mg IV TID. Formal ECHO showed estimated ejection fraction around 50%, normal LV size with mildly decreased systolic function, and grade II left ventricular diastolic dysfunction. Furosemide IV transitioned to Furosemide 40 BID. Repeat BNP showed improvement to 793. Creatinine improved to 2.3(around baseline).      Interval History: Patient seen and examined. No acute events overnight, afebrile, vital signs stable. Good urine output overnight after transition to oral furosemide. Lower extremity edema improving, patient appears euvolemic. Hypokalemia on morning labs, repleted. Pending SNF placement.      Review of Systems   Constitutional: Negative for fever and malaise/fatigue.   HENT: Negative for sore throat.    Eyes: Negative for visual disturbance.   Cardiovascular: Negative for chest pain, leg swelling and palpitations.   Respiratory: Negative for cough and shortness of breath.    Endocrine: Negative.    Hematologic/Lymphatic: Negative.    Skin: Negative.    Musculoskeletal: Negative for arthritis, back pain, joint pain and myalgias.   Gastrointestinal: Negative for bloating, abdominal pain and hematemesis.   Genitourinary: Negative.  Negative for dysuria and urgency.    Neurological: Negative for headaches and light-headedness.   Psychiatric/Behavioral: Negative.      Objective:     Vital Signs (Most Recent):  Temp: 98.5 °F (36.9 °C) (02/02/22 0744)  Pulse: 91 (02/02/22 0829)  Resp: (!) 22 (02/02/22 0829)  BP: 134/63 (02/02/22 0744)  SpO2: 95 % (02/02/22 0829) Vital Signs (24h Range):  Temp:  [97.6 °F (36.4 °C)-98.5 °F (36.9 °C)] 98.5 °F (36.9 °C)  Pulse:  [48-94] 91  Resp:  [16-44] 22  SpO2:  [95 %-100 %] 95 %  BP: (123-179)/(58-86) 134/63     Weight: 74.7 kg (164 lb 10.9 oz)  Body mass index is 34.42 kg/m².     SpO2: 95 %  O2 Device (Oxygen Therapy): room air      Intake/Output Summary (Last 24 hours) at 2/2/2022 1106  Last data filed at 2/2/2022 0500  Gross per 24 hour   Intake 700 ml   Output 400 ml   Net 300 ml       Lines/Drains/Airways     Drain            Female External Urinary Catheter 02/01/22 0400 1 day          Peripheral Intravenous Line                 Midline Catheter Insertion/Assessment  - Single Lumen 09/10/20 1520 Left brachial vein 18g x 10cm 509 days         Peripheral IV - Single Lumen 01/28/22 1720 22 G Left Forearm 4 days                Physical Exam  Vitals and nursing note reviewed.   Constitutional:       Appearance: Normal appearance.   HENT:      Head: Normocephalic and atraumatic.   Eyes:      General: No scleral icterus.     Extraocular Movements: Extraocular movements intact.   Cardiovascular:      Rate and Rhythm: Normal rate and regular rhythm.      Pulses: Normal pulses.      Heart sounds: Normal heart sounds.   Pulmonary:      Effort: Pulmonary effort is normal.      Breath sounds: Normal breath sounds.   Abdominal:      General: Abdomen is flat. Bowel sounds are normal. There is no distension.      Palpations: Abdomen is soft.      Tenderness: There is no abdominal tenderness.   Musculoskeletal:         General: No tenderness. Normal range of motion.      Cervical back: Normal range of motion and neck supple.      Right lower leg: No edema.       Left lower leg: No edema.   Skin:     General: Skin is warm and dry.      Capillary Refill: Capillary refill takes less than 2 seconds.   Neurological:      General: No focal deficit present.      Mental Status: She is alert. Mental status is at baseline.   Psychiatric:         Mood and Affect: Mood normal.         Behavior: Behavior normal.         Significant Labs:     CMP   Recent Labs   Lab 02/01/22  0324 02/01/22  1521 02/02/22  0439    139 140   K 3.9 4.6 3.4*   CL 92* 94* 91*   CO2 36* 33* 40*   GLU 76 86 84   BUN 42* 40* 47*   CREATININE 2.5* 2.0* 2.3*   CALCIUM 9.9 9.9 9.8   ANIONGAP 13 12 9   ESTGFRAFRICA 18.8* 24.6* 20.8*   EGFRNONAA 16.3* 21.4* 18.0*   CBC   Recent Labs   Lab 02/01/22  0324 02/01/22  0324 02/02/22  0439   WBC 6.16  --  7.00   HGB 11.0*  --  10.8*   HCT 36.1*   < > 34.5*     --  211    < > = values in this interval not displayed.         Assessment and Plan:       * Hypercapnic respiratory failure  89 y/o F history HTN, CKD (b/l Cr 2-2.5), HLD presenting with somnolence, oliguria and THAD on CKD and hypercapneic respiratory failure. CXR with right sided opacity that may not be consistent with volume overload alone, consider CT if respiratory function fails to improve w diuresis alone.  Initially required 2L via NC O2, now tolerating well on room air.      - Continue diuresis with lasix  - CMP q24h; close monitoring of electrolytes to avoid overdiuresis    Heart failure with reduced ejection fraction  Bedside echo revealed LVEF ~ 40% with no segmental wall motion abnormalities.Prior echo in 02/2021 showing EF 60% with signs of pulmonary hypertension 28 mmHg.    [ECHO 01/31/22]  - The left ventricle is normal in size with mildly decreased systolic   function.  · The estimated ejection fraction is 50% or slightly lower.  · Grade II left ventricular diastolic dysfunction.  · Normal right ventricular size with mildly reduced right ventricular   systolic function.  · Severe  left atrial enlargement.  · The MR appears mild to mild-moderate ( 1-2+).  · Intermediate central venous pressure (8 mmHg).  · The estimated PA systolic pressure is 34 mmHg.  · Trivial posterolateral pericardial effusion. There is moderate under the   RA.  · There is abnormal septal wall motion.  · There are segmental left ventricular wall motion abnormalities.     Lab Results   Component Value Date     (H) 02/01/2022    BNP 4,129 (H) 01/28/2022     -After diuresis, improved volume status on physical exam.      PLAN  -Unable to optimize GDMT with aldosterone antagonist or ANRI due to low GFR  -Continue furosemide 40mg PO, will discharge with same dose    CKD (chronic kidney disease), stage IV  THAD on CKD; Baseline appears to be in the 1.7-2.0 range. Likelly new baseline of 2.0-2.5    Recent Labs     02/01/22  0324 02/01/22  1521 02/02/22  0439   CREATININE 2.5* 2.0* 2.3*       -Renal diet  - continue HTN management   - Furosemide 40 PO for diuresis  -CMP q24h and close electrolyte monitoring  - avoid nephrotoxic meds      Essential hypertension  Vitals:    02/02/22 0711 02/02/22 0744 02/02/22 0749 02/02/22 0829   BP: (!) 141/70 134/63     BP Location: Left arm Left arm     Patient Position: Lying Lying     Pulse:  89 85 91   Resp: 16   (!) 22   Temp: 98.2 °F (36.8 °C) 98.5 °F (36.9 °C)     TempSrc: Oral Oral     SpO2: 96% 98%  95%   Weight:       Height:         PLAN  -Inpatient BP have been elevated, likely secondary to Nifedipine 30 vs home dose of Nifedipine 60.  - Continue furosemide PO  - Nifedipine 30mg QD, transition to home dose with THAD resolution    Secondary hyperparathyroidism of renal origin    Lab Results   Component Value Date    .6 (H) 01/30/2022    .0 (H) 06/01/2021    .0 (H) 02/10/2020     PTH at baseline    Plan  -- ergocalciferol 50,000 U    Hypothyroidism  Normal tsh; fT4 unknown    - Continue levothyroxine 88 mcg daily    AAA (abdominal aortic aneurysm)  See  ""Essential hypertension"    Anemia in CKD (chronic kidney disease)  Lab Results   Component Value Date    IRON 29 (L) 01/30/2022    TIBC 292 01/30/2022    FERRITIN 182 01/30/2022    FESATURATED 10 (L) 01/30/2022       PLAN  -- ferrous gluconate 324 mg      VTE Risk Mitigation (From admission, onward)         Ordered     heparin (porcine) injection 5,000 Units  Every 8 hours         01/29/22 0814     IP VTE HIGH RISK PATIENT  Once         01/29/22 0814                Liu Umaña DO  Cardiology  [facility] - Cardiology Stepdown  "

## 2022-02-02 NOTE — PLAN OF CARE
Plan of care discussed with pt. VSS. A&Ox3; disoriented to situation. Denies c/o of CP or SOB. Telemetry monitor showing SR/SB. 1L NC; continuous pulse ox monitored throughout shift. Pt remains free of injury or falls. All questions addressed.

## 2022-02-02 NOTE — SUBJECTIVE & OBJECTIVE
Interval History: Patient seen and examined. No acute events overnight, afebrile, vital signs stable. Good urine output overnight after transition to oral furosemide. Lower extremity edema improving, patient appears euvolemic. Hypokalemia on morning labs, repleted. Pending SNF placement.      Review of Systems   Constitutional: Negative for fever and malaise/fatigue.   HENT: Negative for sore throat.    Eyes: Negative for visual disturbance.   Cardiovascular: Negative for chest pain, leg swelling and palpitations.   Respiratory: Negative for cough and shortness of breath.    Endocrine: Negative.    Hematologic/Lymphatic: Negative.    Skin: Negative.    Musculoskeletal: Negative for arthritis, back pain, joint pain and myalgias.   Gastrointestinal: Negative for bloating, abdominal pain and hematemesis.   Genitourinary: Negative.  Negative for dysuria and urgency.   Neurological: Negative for headaches and light-headedness.   Psychiatric/Behavioral: Negative.      Objective:     Vital Signs (Most Recent):  Temp: 98.5 °F (36.9 °C) (02/02/22 0744)  Pulse: 91 (02/02/22 0829)  Resp: (!) 22 (02/02/22 0829)  BP: 134/63 (02/02/22 0744)  SpO2: 95 % (02/02/22 0829) Vital Signs (24h Range):  Temp:  [97.6 °F (36.4 °C)-98.5 °F (36.9 °C)] 98.5 °F (36.9 °C)  Pulse:  [48-94] 91  Resp:  [16-44] 22  SpO2:  [95 %-100 %] 95 %  BP: (123-179)/(58-86) 134/63     Weight: 74.7 kg (164 lb 10.9 oz)  Body mass index is 34.42 kg/m².     SpO2: 95 %  O2 Device (Oxygen Therapy): room air      Intake/Output Summary (Last 24 hours) at 2/2/2022 1106  Last data filed at 2/2/2022 0500  Gross per 24 hour   Intake 700 ml   Output 400 ml   Net 300 ml       Lines/Drains/Airways     Drain            Female External Urinary Catheter 02/01/22 0400 1 day          Peripheral Intravenous Line                 Midline Catheter Insertion/Assessment  - Single Lumen 09/10/20 1520 Left brachial vein 18g x 10cm 509 days         Peripheral IV - Single Lumen 01/28/22 1720  22 G Left Forearm 4 days                Physical Exam  Vitals and nursing note reviewed.   Constitutional:       Appearance: Normal appearance.   HENT:      Head: Normocephalic and atraumatic.   Eyes:      General: No scleral icterus.     Extraocular Movements: Extraocular movements intact.   Cardiovascular:      Rate and Rhythm: Normal rate and regular rhythm.      Pulses: Normal pulses.      Heart sounds: Normal heart sounds.   Pulmonary:      Effort: Pulmonary effort is normal.      Breath sounds: Normal breath sounds.   Abdominal:      General: Abdomen is flat. Bowel sounds are normal. There is no distension.      Palpations: Abdomen is soft.      Tenderness: There is no abdominal tenderness.   Musculoskeletal:         General: No tenderness. Normal range of motion.      Cervical back: Normal range of motion and neck supple.      Right lower leg: No edema.      Left lower leg: No edema.   Skin:     General: Skin is warm and dry.      Capillary Refill: Capillary refill takes less than 2 seconds.   Neurological:      General: No focal deficit present.      Mental Status: She is alert. Mental status is at baseline.   Psychiatric:         Mood and Affect: Mood normal.         Behavior: Behavior normal.         Significant Labs:     CMP   Recent Labs   Lab 02/01/22  0324 02/01/22  1521 02/02/22  0439    139 140   K 3.9 4.6 3.4*   CL 92* 94* 91*   CO2 36* 33* 40*   GLU 76 86 84   BUN 42* 40* 47*   CREATININE 2.5* 2.0* 2.3*   CALCIUM 9.9 9.9 9.8   ANIONGAP 13 12 9   ESTGFRAFRICA 18.8* 24.6* 20.8*   EGFRNONAA 16.3* 21.4* 18.0*   CBC   Recent Labs   Lab 02/01/22  0324 02/01/22  0324 02/02/22  0439   WBC 6.16  --  7.00   HGB 11.0*  --  10.8*   HCT 36.1*   < > 34.5*     --  211    < > = values in this interval not displayed.

## 2022-02-02 NOTE — PLAN OF CARE
02/02/22 1202   Post-Acute Status   Post-Acute Authorization Placement   Post-Acute Placement Status Pending payor review/awaiting authorization (if required)     Per [facility] liaison Mariajose Fung auth#[id number] done at 9:30 am.  [facility] escalated auth request to  leadership.    Syl Durand LMSW  [facility]  [id number]

## 2022-02-02 NOTE — PLAN OF CARE
02/02/22 1425   Post-Acute Status   Post-Acute Authorization Placement   Post-Acute Placement Status Set-up Complete/Auth obtained     Per Mariajose with [facility] auth received.  Transportation scheduled via stretcher for 3:00pm to transfer to [facility].  ESTHER Ríos to call report to [phone].  ESTHER Ríos was notified of the above information.  SW also called and notified pt's daughter Katia of transfer and provided her with the phone number for [facility].    Syl Durand LMSW  [facility]  [phone]

## 2022-02-02 NOTE — PLAN OF CARE
Plan of care discussed with patient and family. Patient stepped down today. Patient is free of fall/trauma/injury, fall precautions in place. All questions addressed. Diuresing on PO Lasix. O2 weaned down to 1L NC. Will continue to monitor.

## 2022-02-02 NOTE — NURSING
Plan of care discussed with patient. Patient is free of fall/trauma/injury. Denies CP, SOB, or pain/discomfort. Pt able to remain sating in 90s on room air for a few minutes before desating down to 85%. RN has kept Pt on 1 L NC. Pt scheduled to go to [facility] this evening. Report called to receiving SNF nurse. Awaiting arrival of transport. All questions addressed. Will continue to monitor

## 2022-02-02 NOTE — ASSESSMENT & PLAN NOTE
Bedside echo revealed LVEF ~ 40% with no segmental wall motion abnormalities.Prior echo in 02/2021 showing EF 60% with signs of pulmonary hypertension 28 mmHg.    [ECHO 01/31/22]  - The left ventricle is normal in size with mildly decreased systolic   function.  · The estimated ejection fraction is 50% or slightly lower.  · Grade II left ventricular diastolic dysfunction.  · Normal right ventricular size with mildly reduced right ventricular   systolic function.  · Severe left atrial enlargement.  · The MR appears mild to mild-moderate ( 1-2+).  · Intermediate central venous pressure (8 mmHg).  · The estimated PA systolic pressure is 34 mmHg.  · Trivial posterolateral pericardial effusion. There is moderate under the   RA.  · There is abnormal septal wall motion.  · There are segmental left ventricular wall motion abnormalities.     Lab Results   Component Value Date     (H) 02/01/2022    BNP 4,129 (H) 01/28/2022     -After diuresis, improved volume status on physical exam.      PLAN  -Unable to optimize GDMT with aldosterone antagonist or ANRI due to low GFR  -Continue furosemide 40mg PO, will discharge with same dose

## 2022-02-02 NOTE — ASSESSMENT & PLAN NOTE
Vitals:    02/02/22 0711 02/02/22 0744 02/02/22 0749 02/02/22 0829   BP: (!) 141/70 134/63     BP Location: Left arm Left arm     Patient Position: Lying Lying     Pulse:  89 85 91   Resp: 16   (!) 22   Temp: 98.2 °F (36.8 °C) 98.5 °F (36.9 °C)     TempSrc: Oral Oral     SpO2: 96% 98%  95%   Weight:       Height:         PLAN  -Inpatient BP have been elevated, likely secondary to Nifedipine 30 vs home dose of Nifedipine 60.  - Continue furosemide PO  - Nifedipine 30mg QD, transition to home dose with THAD resolution

## 2022-02-02 NOTE — DISCHARGE SUMMARY
[facility] - Cardiology Stepdown  Cardiology  Discharge Summary      Patient Name: Johanny Curtis  MRN: [id number]  Admission Date: 1/28/2022  Hospital Length of Stay: 5 days  Discharge Date and Time:  02/02/2022 2:30 PM  Attending Physician: Paolo Hubbard MD    Discharging Provider: Liu Umaña DO  Primary Care Physician: Leticia Weems MD    HPI:   89 y/o F history HTN, CKD (b/l Cr 2-2.5), HLD presenting with somnolence, oliguria and THAD on CKD. No known cardiac disease at baseline. Presenting with a week per daughter of fatigue and decreased urine output. No known chest pain or decreased exercise tolerance though functional capacity sounds limited at baseline. Went to urgent care and then encouraged to come to ED when Cr elevated to 3.2. BNP 4130. JVD elevated on exam w lower extremity edema. BP elevated to 175/80. Bedside TTE with reduced LVEF ~40% with no segmental WMA. Was given IV Lasix 40 x 1 with limited UOP before transitioning to lasix drip with increased urinary output since.      * No surgery found *     Indwelling Lines/Drains at time of discharge:  Lines/Drains/Airways     Drain            Female External Urinary Catheter 02/01/22 0400 1 day                Hospital Course:  Patient on furosemide drip 10mg/hr, producing 150-200 ml / hr, transitioning from BiPap to NC as tolerated. Ceftriaxone was discontinued as patient has no symptoms of UTI or systemic infection, she has been AAOx4, with stable vitals, denied urinary symptoms and afebrile. Improving urine output on Furosemide drip, transitioned to Furosemide 40mg IV TID. Formal ECHO showed estimated ejection fraction around 50%, normal LV size with mildly decreased systolic function, and grade II left ventricular diastolic dysfunction. Furosemide IV transitioned to Furosemide 40 BID. Repeat BNP showed improvement to 793. Creatinine improved to 2.3(around baseline).      Goals of Care Treatment Preferences:  Code Status: Full Code      Consults:  "  Consults (From admission, onward)        Status Ordering Provider     Inpatient consult to Registered Dietitian/Nutritionist  Once        Provider:  (Not yet assigned)    Acknowledged JORDIN CANALES        BP (!) 147/70 (BP Location: Left arm, Patient Position: Lying)   Pulse 92   Temp 97.7 °F (36.5 °C) (Oral)   Resp 16   Ht 4' 10" (1.473 m)   Wt 74.7 kg (164 lb 10.9 oz)   SpO2 (!) 91%   BMI 34.42 kg/m²          Physical Exam  Vitals and nursing note reviewed.   Constitutional:       Appearance: Normal appearance.   HENT:      Head: Normocephalic and atraumatic.   Eyes:      General: No scleral icterus.     Extraocular Movements: Extraocular movements intact.   Cardiovascular:      Rate and Rhythm: Normal rate and regular rhythm.      Pulses: Normal pulses.      Heart sounds: Normal heart sounds.   Pulmonary:      Effort: Pulmonary effort is normal.      Breath sounds: Normal breath sounds.   Abdominal:      General: Abdomen is flat. Bowel sounds are normal. There is no distension.      Palpations: Abdomen is soft.      Tenderness: There is no abdominal tenderness.   Musculoskeletal:         General: No tenderness. Normal range of motion.      Cervical back: Normal range of motion and neck supple.      Right lower leg: No edema.      Left lower leg: No edema.   Skin:     General: Skin is warm and dry.      Capillary Refill: Capillary refill takes less than 2 seconds.   Neurological:      General: No focal deficit present.      Mental Status: She is alert. Mental status is at baseline.   Psychiatric:         Mood and Affect: Mood normal.         Behavior: Behavior normal.        Significant Diagnostic Studies: Labs:   CMP   Recent Labs   Lab 02/01/22  0324 02/01/22  1521 02/02/22  0439    139 140   K 3.9 4.6 3.4*   CL 92* 94* 91*   CO2 36* 33* 40*   GLU 76 86 84   BUN 42* 40* 47*   CREATININE 2.5* 2.0* 2.3*   CALCIUM 9.9 9.9 9.8   ANIONGAP 13 12 9   ESTGFRAFRICA 18.8* 24.6* 20.8*   EGFRNONAA 16.3* " 21.4* 18.0*    and CBC   Recent Labs   Lab 02/01/22  0324 02/01/22  0324 02/02/22  0439   WBC 6.16  --  7.00   HGB 11.0*  --  10.8*   HCT 36.1*   < > 34.5*     --  211    < > = values in this interval not displayed.       Pending Diagnostic Studies:     Procedure Component Value Units Date/Time    Basic metabolic panel [[id number]] Collected: 01/31/22 1830    Order Status: Sent Lab Status: In process Updated: 01/31/22 1837    Specimen: Blood           Final Active Diagnoses:    Diagnosis Date Noted POA    PRINCIPAL PROBLEM:  Hypercapnic respiratory failure [J96.92] 01/28/2022 Yes    Heart failure with reduced ejection fraction [I50.20] 01/29/2022 Yes    CKD (chronic kidney disease), stage IV [N18.4] 06/29/2016 Yes    Essential hypertension [I10] 09/04/2012 Yes    Secondary hyperparathyroidism of renal origin [N25.81] 06/23/2021 Yes    Hypothyroidism [E03.9] 08/23/2013 Yes    AAA (abdominal aortic aneurysm) [I71.4]  Unknown    Anemia in CKD (chronic kidney disease) [N18.9, D63.1] 10/22/2015 Yes      Problems Resolved During this Admission:     No new Assessment & Plan notes have been filed under this hospital service since the last note was generated.  Service: Cardiology      Discharged Condition: good    Disposition: Nursing Facility    Follow Up:    Patient Instructions:   No discharge procedures on file.  Medications:  Transfer Medications (for Discharge Readmit only):   Current Facility-Administered Medications   Medication Dose Route Frequency Provider Last Rate Last Admin    acetaminophen tablet 650 mg  650 mg Oral Q6H PRN Liu R Leeroy, DO        calcium carbonate 200 mg calcium (500 mg) chewable tablet 500 mg  500 mg Oral BID PRN Liu R Leeroy, DO        ergocalciferol capsule 50,000 Units  50,000 Units Oral Q7 Days Mckenzie Andres MD   50,000 Units at 01/29/22 1436    ferrous gluconate tablet 324 mg  324 mg Oral Daily with breakfast Liu R Leeroy, DO   324 mg at 02/02/22 0816     [START ON 2/3/2022] furosemide tablet 40 mg  40 mg Oral Daily Liu Umaña DO        heparin (porcine) injection 5,000 Units  5,000 Units Subcutaneous Q8H Hernan Potts MD   5,000 Units at 02/02/22 0530    levothyroxine tablet 88 mcg  88 mcg Oral Before breakfast Mckenzie Andres MD   88 mcg at 02/02/22 0530    melatonin tablet 6 mg  6 mg Oral Nightly PRN Liu Umaña DO        NIFEdipine 24 hr tablet 30 mg  30 mg Oral Daily Mckenzie Andres MD   30 mg at 02/02/22 0816    senna-docusate 8.6-50 mg per tablet 1 tablet  1 tablet Oral BID Liu Umaña DO        sodium chloride 0.9% flush 10 mL  10 mL Intravenous PRN Hernan Potts MD         .Time spent on the discharge of patient: 45 minutes    Liu Umaña DO  Cardiology  [facility] - Cardiology Stepdown

## 2022-02-02 NOTE — PHYSICIAN QUERY
PT Name: Johanny Curtis  MR #: [id number]     DOCUMENTATION CLARIFICATION     CDS/: Edelmira Jauregui RN              Contact information: [email or web address]  This form is a permanent document in the medical record.     Query Date: February 2, 2022    By submitting this query, we are merely seeking further clarification of documentation.  Please utilize your independent clinical judgment when addressing the question(s) below.    The Medical Record contains the following   Indicators Supporting Clinical Findings Location in Medical Record   x Heart Failure documented Newly diagnosed CHF  - TTE (ordered)  - likely in setting of volume overload 2/2 THAD on CKD  - diuresis and HTN mgmt as noted  - continue bipap      Heart failure with reduced ejection fraction  Bedside echo revealed LVEF ~ 40% with no segmental wall motion abnormalities. Formal echo pending. Prior echo in 02/2021 showing EF 60% with signs of pulmonary hypertension 28 mmHg.      H&P 1/25       Cardiology            Progress Note 2/1       Cardiology   x BNP BNP 4,129 (H) 793 (H)         Lab 1/28, 2/1   x EF/Echo The left ventricle is normal in size with mildly decreased systolic function.  The estimated ejection fraction is 50% or slightly lower.  Grade II left ventricular diastolic dysfunction.  Normal right ventricular size with mildly reduced right ventricular systolic function.  Severe left atrial enlargement.  The MR appears mild to mild-moderate ( 1-2+).  Intermediate central venous pressure (8 mmHg).  The estimated PA systolic pressure is 34 mmHg.  Trivial posterolateral pericardial effusion. There is moderate under the RA.  There is abnormal septal wall motion.  There are segmental left ventricular wall motion abnormalities.     Echo 1/31   x Radiology findings IMPRESSION:    Dense opacification of right hemithorax may relate to pleural effusion and atelectasis with possible superimposed infectious or noninfectious inflammatory airspace  consolidation.    Obstructing neoplastic lesion would be difficult to exclude and as such, attention follow-up would be recommended.    Ill-defined opacity in the retrocardiac region and left lung base could relate to atelectasis, noting that aspiration or pneumonia would additionally be difficult to exclude.   CXR   x Subjective/Objective Respiratory Conditions 89 y/o F history HTN, CKD (b/l Cr 2-2.5), HLD presenting with somnolence, oliguria and THAD on CKD and hypercapneic respiratory failure.     - Admit to ICU  - Continue Bipap H&P 1/28       Cardiology    Recent/Current MI      Heart Transplant, LVAD     x Edema, JVD  JVD elevated on exam w lower extremity edema H&P 1/28       Cardiology    Ascites     x Diuretics/Meds furosemide (LASIX) 500 mg infusion    furosemide injection 40 mg    furosemide injection 60 mg    furosemide injection 40 mg   3 times daily      furosemide tablet 40 mg     MAR 1/28- 1/31  MAR 1/28  MAR 1/28  MAR 1/31 - 2/1  MAR 2/1- present      Other Treatment      Other       Heart failure is a clinical diagnosis which includes symptomatic fluid retention, elevated intracardiac pressures, and/or the inability of the heart to deliver adequate blood flow.    Heart Failure with reduced Ejection Fraction (HFrEF) or Systolic Heart Failure (loses ability to contract normally, EF is <40%)    Heart Failure with preserved Ejection Fraction (HFpEF) or Diastolic Heart Failure (stiff ventricles, does not relax properly, EF is >50%)     Heart Failure with Combined Systolic and Diastolic Failure (stiff ventricles, does not relax properly and EF is <50%)    Mid-range or mildly reduced ejection fraction (HFmrEF) is classified as systolic heart failure.   Common clues to acute exacerbation:  Rapidly progressive symptoms (w/in 2 weeks of presentation), using IV diuretics, using supplemental O2, pulmonary edema on Xray, new or worsening pleural effusion, +JVD or other signs of volume overload, MI w/in 4 weeks,  and/or BNP >500  The clinical guidelines noted are only system guidelines, and do not replace the providers clinical judgment.    Provider, please specify the type and acuity of the Congestive Heart Failure diagnosis associated with the above clinical findings.    [   ]  Acute Systolic Heart Failure (HFrEF or HFmrEF) - new diagnosis       [  x ]  Acute Diastolic Heart Failure (HFpEF) - new diagnosis       [   ]  Acute Combined Systolic and Diastolic Heart Failure - new diagnosis       [   ]  Other (please specify): ___________________________________       [  ]  Clinically Undetermined       Please document in your progress notes daily for the duration of treatment until resolved and include in your discharge summary.    References:  American Heart Association editorial staff. (2017, May). Ejection Fraction Heart Failure Measurement. American Heart Association. https://www.heart.org/en/health-topics/heart-failure/diagnosing-heart-failure/ejection-fraction-heart-failure-measurement#:~:text=Ejection%20fraction%20(EF)%20is%20a,pushed%20out%20with%20each%20heartbeat  JOSHUA Monterroso (2020, December 15). Heart failure with preserved ejection fraction: Clinical manifestations and diagnosis. [employer]. https://www.[employer]/contents/heart-failure-with-preserved-ejection-fraction-clinical-manifestations-and-diagnosis.  ICD-10-CM/PCS Coding Clinic Third Quarter ICD-10, Effective with discharges: September 8, 2020 [facility] § Heart failure with mid-range or mildly reduced ejection fraction (2020).  Form No. [id number]

## 2022-02-02 NOTE — ASSESSMENT & PLAN NOTE
89 y/o F history HTN, CKD (b/l Cr 2-2.5), HLD presenting with somnolence, oliguria and THAD on CKD and hypercapneic respiratory failure. CXR with right sided opacity that may not be consistent with volume overload alone, consider CT if respiratory function fails to improve w diuresis alone.  Initially required 2L via NC O2, now tolerating well on room air.      - Continue diuresis with lasix  - CMP q24h; close monitoring of electrolytes to avoid overdiuresis

## 2022-02-02 NOTE — PHYSICIAN QUERY
PT Name: Johanny Curtis  MR #: [id number]     DOCUMENTATION CLARIFICATION     CDS/: Edelmira Jauregui RN              Contact information:[email or web address]  This form is a permanent document in the medical record.     Query Date: February 2, 2022    By submitting this query, we are merely seeking further clarification of documentation.  Please utilize your independent clinical judgment when addressing the question(s) below.  The Medical Record contains the following   Indicators   Supporting Clinical Findings Location in Medical Record   x SOB, ROQUE, Wheezing, Productive Cough, Use of Accessory Muscles, etc. Respiratory: Positive for shortness of breath. Negative for cough and chest tightness.     Pulm:  Increased work of breathing  ED Provider Note 1/28       ED Provider Note 1/28     x RR         ABGs         O2 sat Resp:  [20-37] 37  SpO2:  [88 %-96 %] 94 %      POC PH 7.325 (L)   POC PCO2 62.8 (HH)   POC PO2 101 (H)   POC BE 7   POC HCO3 32.7 (H)   POC SATURATED O2 97   POC TCO2 35 (H)   Sample ARTERIAL      H&P 1/28       Cardiology      Lab 1/28     x Hypoxia/Hypercapnia CTAB, no wheezes, rales or rhonchi, hypoxic    Hypercapnic respiratory failure   ED Provider Note 1/28    H&P 1/28       Cardiology   x BiPAP/Intubation/  Mechanical Ventilation Placed on BIPAP given CO2 retention on VBG.  ED Provider Note 1/28   x Supplemental O2 - Continue Bipap      - Transition from BiPAP to NC O2      - Tolerating well on 2L via NC O2 H&P 1/28       Cardiology    Progress Note 1/29       Cardiology    Progress Note 1/31       Cardiology    Home O2, Oxygen Dependence     x Respiratory distress or failure presents with acute onset hypoxemic respiratory failure      Hypercapnic respiratory failure  89 y/o F history HTN, CKD (b/l Cr 2-2.5), HLD presenting with somnolence, oliguria and THAD on CKD and hypercapneic respiratory failure.     ED Provider Note 1/28      H&P 1/28       Cardiology           Radiology findings       Acute/Chronic Illness     x Treatment - Admit to ICU  - Continue Bipap  - Diuresis, s/p IV lasix 100mg, start lasix gtt 10mg/hr and uptitrate as needed  - CXR with right sided opacity that may not be consistent with volume overload alone, consider CT if respiratory function fails to improve w diuresis alone  - Keep NPO H&P 1/28       Cardiology             Other         The noted clinical guidelines are only system guidelines and do not replace the providers clinical judgment.    Provider, please specify the type and acuity of the Respiratory Failure diagnosis or diagnoses associated with above clinical findings.     [    ] Acute Respiratory Failure with Hypoxia - ABG pO2 < 60 mmHg or O2 sat of <91% on room air and respiratory symptoms documented     [  x  ] Acute Respiratory Failure with Hypercapnia - pCO2 > 50 mmHg with pH < 7.35 and respiratory symptoms documented     [    ] Acute Respiratory Failure with Hypoxia and Hypercapnia - Hypoxia: ABG pO2 < 60 mmHg or O2 sat of <91% on room air and Hypercapnia: pCO2 > 50 mmHg with pH < 7.35 and respiratory symptoms documented     [    ] Other Respiratory Diagnosis (please specify): _________________       [   ] Clinically Undetermined       Please document in your progress notes daily for the duration of treatment until resolved and include in your discharge summary.       Form No. [id number]

## 2022-02-03 NOTE — NURSING
Patient is ready for discharge. Patient stable alert and oriented. IVs removed. No complaints of pain. Discussed discharge plan. Reviewed medications and side effects, appointments, and answered questions with patient and family. Pt left for [facility] per stretcher with [facility] transport.

## 2022-02-03 NOTE — PLAN OF CARE
Problem: Adult Inpatient Plan of Care  Goal: Plan of Care Review  Outcome: Ongoing, Progressing  Goal: Patient-Specific Goal (Individualized)  Outcome: Ongoing, Progressing     Problem: Glycemic Control Impaired (Sepsis/Septic Shock)  Goal: Blood Glucose Level Within Desired Range  Outcome: Ongoing, Progressing     Problem: Infection Progression (Sepsis/Septic Shock)  Goal: Absence of Infection Signs and Symptoms  Outcome: Ongoing, Progressing     Problem: Nutrition Impaired (Sepsis/Septic Shock)  Goal: Optimal Nutrition Intake  Outcome: Ongoing, Progressing     Problem: Fluid and Electrolyte Imbalance (Acute Kidney Injury/Impairment)  Goal: Fluid and Electrolyte Balance  Outcome: Ongoing, Progressing     Problem: Infection  Goal: Absence of Infection Signs and Symptoms  Outcome: Ongoing, Progressing

## 2022-02-03 NOTE — PLAN OF CARE
PT eval completed. Pt will begin PT POC.  Katerina Pichardo, PT  2/3/2022    Problem: Physical Therapy Goal  Goal: Physical Therapy Goal  Description: Goals to be met by: 22     Patient will increase functional independence with mobility by performin. Supine to sit with MInimal Assistance  2. Sit to supine with MInimal Assistance  3. Rolling to Left and Right with Minimal Assistance.  4. Sit to stand transfer with Minimal Assistance  5. Bed to chair transfer with Minimal Assistance using Rolling Walker  6. Gait  x 30 feet with Minimal Assistance using Rolling Walker.   7. Wheelchair propulsion x50 feet with Stand-by Assistance using bilateral uppper extremities    Outcome: Ongoing, Progressing

## 2022-02-03 NOTE — PLAN OF CARE
Problem: Occupational Therapy Goal  Goal: Occupational Therapy Goal  Description: Goals to be met by: 2/24/22     Patient will increase functional independence with ADLs by performing:    Feeding with Set-up Assistance.  UE Dressing with Minimal Assistance.  LE Dressing with Moderate Assistance.  Grooming while seated at sink with Minimal Assistance.  Toileting from toilet or BSC with Moderate Assistance for hygiene and clothing management.   Bathing from  sitting at sink with Moderate Assistance.  Supine to sit with Stand-by Assistance.  Stand pivot transfers with Minimal Assistance with RW to steady.  Toilet transfer to toilet or BSC with Minimal Assistance using RW.  Upper extremity exercise with assistance as needed.  Caregiver will be educated on level of assist required to safely perform self care tasks and functional transfers..   Outcome: Ongoing, Progressing

## 2022-02-03 NOTE — CLINICAL REVIEW
Clinical Pharmacy Chart Review Note      Admit Date: 2/2/2022   LOS: 1 day       Johanny Curtis is a 89 y.o. female admitted to SNF for PT/OT after hospitalization for hypercapnic respiratory failure.    Review of patient's allergies indicates:  No Known Allergies  Patient Active Problem List    Diagnosis Date Noted    Heart failure with reduced ejection fraction 01/29/2022    Hypercapnic respiratory failure 01/28/2022    Secondary hyperparathyroidism of renal origin 06/23/2021    Orthostasis 04/16/2021    Episode of dizziness 04/16/2021    Vasovagal near syncope 04/16/2021    THAD (acute kidney injury) 04/15/2021    Choledocholithiasis 09/24/2020    Need for influenza vaccination 09/24/2020    Biliary obstruction 09/08/2020    Sepsis     Acute cholangitis     Hyperlipidemia  08/26/2019    Aortic atherosclerosis 08/12/2019    Osteopenia 08/12/2019    Peripheral arterial disease     AAA (abdominal aortic aneurysm)     CKD (chronic kidney disease), stage IV 06/29/2016    Anemia in CKD (chronic kidney disease) 10/22/2015    Renal osteodystrophy 01/28/2015    Thyroid nodule 08/23/2013    Hypothyroidism 08/23/2013    Gout, chronic     Hereditary and idiopathic peripheral neuropathy 12/10/2012    Essential hypertension 09/04/2012       Scheduled Meds:    aspirin  81 mg Oral Daily    [START ON 2/4/2022] calcitRIOL  0.25 mcg Oral Every Mon, Fri    ferrous sulfate  1 tablet Oral Daily    furosemide  40 mg Oral Daily    levothyroxine  88 mcg Oral Daily    multivitamin  1 tablet Oral Daily    NIFEdipine  30 mg Oral Daily    senna-docusate 8.6-50 mg  1 tablet Oral BID     Continuous Infusions:   PRN Meds: acetaminophen, calcium carbonate, melatonin    OBJECTIVE:     Vital Signs (Last 24H)  Temp:  [98.6 °F (37 °C)-99.8 °F (37.7 °C)]   Pulse:  [81-92]   Resp:  [18-20]   BP: (137-152)/(67-75)   SpO2:  [94 %-97 %]     Laboratory:  CBC:   Recent Labs   Lab 01/31/22  0125 02/01/22  7248  02/02/22 0439   WBC 5.61 6.16 7.00   RBC 3.65* 3.90* 3.69*   HGB 10.5* 11.0* 10.8*   HCT 33.4* 36.1* 34.5*    230 211   MCV 92 93 94   MCH 28.8 28.2 29.3   MCHC 31.4* 30.5* 31.3*     BMP:   Recent Labs   Lab 01/31/22  0125 01/31/22  0125 02/01/22  0324 02/01/22  1521 02/02/22 0439   GLU 85   < > 76 86 84      < > 141 139 140   K 3.6   < > 3.9 4.6 3.4*   CL 94*   < > 92* 94* 91*   CO2 38*   < > 36* 33* 40*   BUN 46*   < > 42* 40* 47*   CREATININE 2.5*   < > 2.5* 2.0* 2.3*   CALCIUM 9.4   < > 9.9 9.9 9.8   MG 2.1  --  2.0  --  1.8    < > = values in this interval not displayed.     CMP:   Recent Labs   Lab 01/28/22  1350 01/29/22  0313 02/01/22  0324 02/01/22  0324 02/01/22 1521 02/01/22  1521 02/02/22  0439   *   < > 76  --  86  --  84   CALCIUM 10.5   < > 9.9   < > 9.9   < > 9.8   ALBUMIN 3.4*  --   --   --   --   --   --    PROT 7.3  --   --   --   --   --   --    *   < > 141  --  139  --  140   K 4.9   < > 3.9  --  4.6  --  3.4*   CO2 26   < > 36*  --  33*  --  40*   CL 92*   < > 92*  --  94*  --  91*   BUN 48*   < > 42*  --  40*  --  47*   CREATININE 2.8*   < > 2.5*  --  2.0*  --  2.3*   ALKPHOS 100  --   --   --   --   --   --    ALT 41  --   --   --   --   --   --    AST 41*  --   --   --   --   --   --    BILITOT 0.4  --   --   --   --   --   --     < > = values in this interval not displayed.     LFTs:   Recent Labs   Lab 01/28/22  1350   ALT 41   AST 41*   ALKPHOS 100   BILITOT 0.4   PROT 7.3   ALBUMIN 3.4*     Lab Results   Component Value Date    TSH 3.595 01/27/2022    FREET4 1.36 04/17/2021         ASSESSMENT/PLAN:     I have reviewed the medications in compliance with CMS Regulation F756 of the [place]. Based on information gathered, the following items may need to be addressed:    **According to PMH and home medication list, patient takes the following medications at home. These medications are not currently ordered at [facility]:  · Sodium bicarb 1300 mg daily (CO2=40 on  2/2/22)      Medications reviewed by PharmD, please re-consult if needed.      Syl Herrera, Pharm. D.  Clinical Pharmacist  [facility]

## 2022-02-03 NOTE — PT/OT/SLP EVAL
Physical Therapy Evaluation    Patient Name:  Johanny Curtis   MRN:  [id number]  Admit Date: 2/2/2022  Admitting Diagnosis: Hypercapnic Respiratory Failure  Recent Surgeries: none    General Precautions: Standard, fall   Orthopedic Precautions:N/A   Braces: N/A     Recommendations:     Discharge Recommendations:  home with home health   Level of Assistance Recommended: 24 hours significant assistance  Discharge Equipment Recommendations: bedside commode,bath bench,walker, rolling,wheelchair   Barriers to discharge: None    Assessment:     Johanny Curtis is a 89 y.o. female admitted with a medical diagnosis of  Hypercapnic Respiratory Failure.  Performance deficits affecting function  weakness,impaired endurance,impaired self care skills,impaired functional mobilty,gait instability,impaired balance,impaired cognition,decreased lower extremity function,decreased upper extremity function.    Pt robert session well w/ good participation. PTA she was ambulatory but she is currently limited by the above listed deficits requiring TotalA for transfers. She requires cues and encouragement t/o session for safe mobility techniques. Pt is appropriate for skilled PT services and will begin PT POC.    Rehab Potential is good     Activity Tolerance: Good    Plan:     Patient to be seen 6 x/week to address the above listed problems via gait training,therapeutic activities,therapeutic exercises,neuromuscular re-education,wheelchair management/training     Plan of Care Expires: 03/05/22  Plan of Care Reviewed with: patient    Subjective     Chief Complaint: fatigue  Patient/Family Comments/goals: to get stronger  Pain/Comfort:  Pain Rating 1: 0/10  Pain Rating Post-Intervention 1: 0/10    Living Environment: Info needs to be verified as pt is a questionable historian. Pt lives w/ her daughter, son-in-law, and her grandchildren. They live in a 1[address]. She is unsure if there are steps to enter her home.     Prior to admission, patients  level of function was ambulatory w/ a RW. She reports she did not need assistance for dressing or showers.  Equipment used at home: shower chair,walker, rolling.  DME owned (not currently used): none.  Upon discharge, patient will have assistance from her family 24/7.    Patients cultural, spiritual, Restorationist conflicts given the current situation: no    Objective:     Communicated with patient, PCT, and OT prior to session.  Patient found supine with oxygen,PureWick  upon PT entry to room.    Exams:  · Cognitive Exam:  Patient is oriented to Person and Place; partially oriented to time- able to correctly state year but not month  · Fine Motor Coordination:    · -       Intact  · Gross Motor Coordination:  WFL  · Postural Exam:  Patient presented with the following abnormalities:    · -       Rounded shoulders  · -       Forward head  · Sensation:    · -       Intact  light/touch hands/feet  · Skin Integrity/Edema:      · -       Skin integrity: Visible skin intact  · RUE ROM: WFL  · RUE Strength: grossly 3+/5  · LUE ROM: WFL  · LUE Strength: grossly 3+/5  · RLE ROM: WFL  · RLE Strength: HF 2+/5, knee 4/5, ankle 3+/5  · LLE ROM: WFL  · LLE Strength: HF 2+/5, knee 4/5, ankle 3+/5    Functional Mobility:  · Bed Mobility:    · Roll left/right on bed w/ MaxA  · Supine>sit on bed w/ TotalA for trunk and BLE  · inc'd time and cues required  · Transfers:  · Sit<>stand to/from w/c, x2 trials, w/ RW and TotalA to rise  · SQPT EOB>w/c w/o AD w/ TotalA(x1)  · Cues for technique  · Gait:   · A few steps w/ RW and MaxA due to posterior lean and for RW management  · Cues for posture and to remain inside RW  · Close w/c follow for safety  · Balance:   · Static sit at EOB w/ Min/CGA  · Static stand w/ RW and MaxA    Therapeutic Activities and Exercises:   Pt was educated on PT role and POC. She will likely need reinforcement.     AM-PAC 6 CLICK MOBILITY  Total Score:10     Patient left up in chair with call button in  reach.    GOALS:   Multidisciplinary Problems     Physical Therapy Goals        Problem: Physical Therapy Goal    Goal Priority Disciplines Outcome Goal Variances Interventions   Physical Therapy Goal     PT, PT/OT Ongoing, Progressing     Description: Goals to be met by: 22     Patient will increase functional independence with mobility by performin. Supine to sit with MInimal Assistance  2. Sit to supine with MInimal Assistance  3. Rolling to Left and Right with Minimal Assistance.  4. Sit to stand transfer with Minimal Assistance  5. Bed to chair transfer with Minimal Assistance using Rolling Walker  6. Gait  x 30 feet with Minimal Assistance using Rolling Walker.   7. Wheelchair propulsion x50 feet with Stand-by Assistance using bilateral uppper extremities                     History:     Past Medical History:   Diagnosis Date    AAA (abdominal aortic aneurysm)     Anemia in CKD (chronic kidney disease)     Arthritis     Bacteremia due to Escherichia coli 2020    Cataract     Class 1 obesity due to excess calories with serious comorbidity in adult 2017    Gout, chronic     Heart failure with reduced ejection fraction 2022    High cholesterol     Hypercapnic respiratory failure 2022    Hypertension     Hypothyroidism     Obesity     Plantar fasciitis     PVD (peripheral vascular disease)     Thyroid trouble        Past Surgical History:   Procedure Laterality Date    BREAST BIOPSY Left     BREAST SURGERY Left     benign breast lump    CATARACT EXTRACTION  3/18/13    both eyes -     CHOLECYSTECTOMY      ENDOSCOPIC ULTRASOUND OF UPPER GASTROINTESTINAL TRACT N/A 2020    Procedure: ULTRASOUND, UPPER GI TRACT, ENDOSCOPIC;  Surgeon: Rasheed Balbuena MD;  Location: [address];  Service: Endoscopy;  Laterality: N/A;    ERCP N/A 2020    Procedure: ERCP (ENDOSCOPIC RETROGRADE CHOLANGIOPANCREATOGRAPHY);  Surgeon: Rasheed Balbuena MD;   Location[facility]  Service: Endoscopy;  Laterality: N/A;    EYE SURGERY      HYSTERECTOMY      SKIN BIOPSY      Left breast       Time Tracking:     PT Received On: 02/03/22  PT Start Time: 0910     PT Stop Time: 1000  PT Total Time (min): 50 min     Billable Minutes: Evaluation 10, Gait Training 10, Therapeutic Activity 30 and Total Time 40      02/03/2022

## 2022-02-03 NOTE — PLAN OF CARE
[facility] - Skilled Nursing  Discharge Final Note    Primary Care Provider: Leticia Weems MD    Expected Discharge Date:     Final Discharge Note (most recent)     Final Note - 02/03/22 0902        Final Note    Assessment Type Final Discharge Note     Anticipated Discharge Disposition Skilled Nursing Facility     Hospital Resources/Appts/Education Provided Provided patient/caregiver with written discharge plan information                 Important Message from Medicare             Pt d/c to [facility].      Julie Haase RN  Case Management [phone]

## 2022-02-03 NOTE — PROGRESS NOTES
[facility]                                  Skilled Nursing Facility                   Progress Note     Admit Date: 2/2/2022  ION TBD   Principal Problem:  Heart failure with reduced ejection fraction   HPI obtained from patient interview and chart review     Chief Complaint: Establish Care/ Initial Visit     HPI:   Mrs. Curtis is a 89 year old female PMHx of  HTN, CKD (b/l Cr 2-2.5), HLD, hypothyroidism, obesity, PVD who presents to SNF following hospitalization for acute heart failure with reserved EF.  Admission to SNF for secondary weakness and debility.    Patient originally presented to [facility] ED on 01/28 with somnolence, oliguria and THAD on CKD. No known cardiac disease at baseline. Presenting with a week per daughter of fatigue and decreased urine output. No known chest pain or decreased exercise tolerance though functional capacity sounds limited at baseline. Went to urgent care and then encouraged to come to ED when Cr elevated to 3.2. BNP 4130. JVD elevated on exam w lower extremity edema. Bedside TTE with reduced LVEF ~40% with no segmental WMA. Was given IV Lasix 40 x 1 with limited UOP before transitioning to lasix drip with increased urinary output since. Patient on furosemide drip 10mg/hr, producing 150-200 ml / hr, transitioning from BiPap to NC as tolerated. Improving urine output on Furosemide drip, transitioned to Furosemide 40mg IV TID. Formal ECHO showed estimated ejection fraction around 50%, normal LV size with mildly decreased systolic function, and grade II left ventricular diastolic dysfunction. Furosemide IV transitioned to Furosemide 40 BID. Repeat BNP showed improvement to 793. Creatinine improved to 2.3(around baseline).    Today, patient without any major complaints.  She is oriented x3 but was unable to state her age.  Patient denies any pain but moaned when moved from side to side to check skin.  Patient  utilizing 1 L nasal cannula, attempted to remove oxygen and obtain SpO2, could not get reading on portable pulse oximeter.  SpO2 noted to be 95% this morning.    Patient will be treated at [facility] with PT and OT to improve functional status and ability to perform ADLs.     Past Medical History: Patient has a past medical history of AAA (abdominal aortic aneurysm), Anemia in CKD (chronic kidney disease), Arthritis, Bacteremia due to Escherichia coli (9/24/2020), Cataract, Class 1 obesity due to excess calories with serious comorbidity in adult (7/6/2017), Gout, chronic, Heart failure with reduced ejection fraction (1/29/2022), High cholesterol, Hypercapnic respiratory failure (1/28/2022), Hypertension, Hypothyroidism, Obesity, Plantar fasciitis, PVD (peripheral vascular disease), and Thyroid trouble.    Past Surgical History: Patient has a past surgical history that includes Hysterectomy; Skin biopsy; Cholecystectomy; Cataract extraction (3/18/13); Eye surgery; Breast surgery (Left, 1972); Breast biopsy (Left); ERCP (N/A, 9/8/2020); and Endoscopic ultrasound of upper gastrointestinal tract (N/A, 9/8/2020).    Social History: Patient reports that she quit smoking about 20 years ago. She has a 30.00 pack-year smoking history. She has never used smokeless tobacco. She reports that she does not drink alcohol and does not use drugs.    Family History: family history includes Breast cancer in her sister; Cancer in her brother, sister, and sister; Cataracts in her son; Diabetes in her son and son; Glaucoma in her daughter and son; Heart disease in her brother; Lupus in her daughter; Meningitis in her son; Mental illness in her son; No Known Problems in her brother.    Allergies: Patient has No Known Allergies.    ROS  Constitutional: Negative for fever   Eyes: Negative for blurred vision, double vision   Respiratory: Negative for cough, shortness of breath   Cardiovascular: Negative for chest pain, palpitations, and leg  swelling.   Gastrointestinal: Negative for abdominal pain, constipation, diarrhea, nausea, vomiting.   Genitourinary: Negative for dysuria, frequency   Musculoskeletal:  + generalized weakness. Negative for back pain and myalgias.   Skin: Negative for itching and rash.   Neurological: Negative for dizziness, headaches.   Psychiatric/Behavioral: Negative for depression. The patient is not nervous/anxious.      24 hour Vital Sign Range   Temp:  [97.7 °F (36.5 °C)-99.8 °F (37.7 °C)]   Pulse:  []   Resp:  [16-20]   BP: (137-152)/(67-75)   SpO2:  [91 %-97 %]     PEx  Constitutional: Patient appears debilitated.  No distress noted  HENT:   Head: Normocephalic and atraumatic.   Eyes: Pupils are equal, round  Neck: Normal range of motion. Neck supple.   Cardiovascular: Normal rate, regular rhythm and normal heart sounds.    Pulmonary/Chest: Effort normal and breath sounds are clear with diminished bases.  Supplemental O2 in progress.  Abdominal: Soft. Bowel sounds are normal.   Musculoskeletal: Normal range of motion.   Neurological: Alert and oriented to person, place, and time.   Psychiatric: Normal mood and affect. Behavior is normal.   Skin: Skin is warm and dry. Full skin assessment completed.  No skin breakdown noted.    No results for input(s): GLUCOSE, NA, K, CL, CO2, BUN, CREATININE, MG in the last 24 hours.    Invalid input(s):  CALCIUM    No results for input(s): WBC, RBC, HGB, HCT, PLT, MCV, MCH, MCHC in the last 24 hours.      Assessment and Plan:    Heart failure with reduced ejection fraction  - TTE 1/31- EF 50%  - BMP improving  - Unable to optimize GDMT with aldosterone antagonist or ANRI due to low GFR  - Continue furosemide 40mg PO    Hypercapnic respiratory failure  -  CXR with right sided opacity that may not be consistent with volume overload alone, consider CT if respiratory function fails to improve w diuresis alone.  - Initially required 2L via NC O2, then transitioned safely to room air  -  admitted to SNF with 1 L nasal cannula  - unable to obtain SpO2 on my portable pulse oximeter  - continue to wean O2 to room air     Essential hypertension  AAA (abdominal aortic aneurysm)  - continue nifedipine 30 mg daily ( home dose 60mg to be titrated up as renal function improves), Lasix 40 mg daily    CKD (chronic kidney disease), stage IV  - sCr baseline appears to be 1.7-2.0 range. Likelly new baseline of 2.0-2.5  - continue to monitor twice weekly BMPs, avoid nephrotoxic agents, renally dose medications when appropriate    Anemia in CKD (chronic kidney disease)  - continue ferrous gluconate 324 mg  - continue to monitor twice weekly CBCs    Secondary hyperparathyroidism of renal origin  - PTH at baseline  - continue Calcitrol 0.25 mcg twice a week     Hypothyroidism  - Normal TSH  - Continue levothyroxine 88 mcg daily    Debility   - Continue with PT/OT for gait training and strengthening and restoration of ADL's   - Encourage mobility, OOB in chair, and early ambulation as appropriate  - Fall precautions   - Monitor for bowel and bladder dysfunction  - Monitor for and prevent skin breakdown and pressure ulcers  - initiated DVT prophylaxis with  heparin 5 K q.8 hours            Anticipate disposition:  Home with home health      Follow-up needed during SNF stay-    Follow-up needed after discharge from SNF: PCP, Cardiology    No future appointments.      I certify that SNF services are required to be given on an inpatient basis because Johanny Curtis needs for skilled nursing care and/or skilled rehabilitation are required on a daily basis and such services can only practically be provided in a skilled nursing facility setting and are for an ongoing condition for which she received inpatient care in the hospital.     Total time of the visit 115 minutes [id number]   Non physical exam/ non charting time: 67 minutes   Description of non physical exam/non charting time: counseling patient on clinical  conditions and therapies provided regarding heart failure medication regimen/diuretic therapy, antihypertensive medication regimen, importance of proper PO nutrition and hydration, participation with therapy, importance of follow-up appointments and the beginning of discharge planning.  Extensive chart review completed including all consultation notes.  All pertinent laboratory and radiographical images reviewed.        Amanda Dangelo NP  Department of Hospital Medicine   [facility]     DOS: 2/3/2022       Patient note was created using MModal Dictation.  Any errors in syntax or even information may not have been identified and edited on initial review prior to signing this note.

## 2022-02-03 NOTE — CONSULTS
[facility] - Skilled Nursing  Adult Nutrition  Consult Note    SUMMARY     Recommendations    1. Downgrade diet to IDDSI L5 minced & moist for easier chewing.   2. Add Boost Glucose Control TID.  3. Add Boost Pudding daily.    Goals: 1. Pt's intake meals >50% by RD follow up.  Nutrition Goal Status: new  Communication of RD Recs: reviewed with physician    Assessment and Plan    Nutrition Problem  Chewing difficulty    Related to (etiology):   edentulous    Signs and Symptoms (as evidenced by):   Pt unable to tolerate regular texture diet due to difficulty chewing, has about 50% intake meals over last few days depending on how soft the food is.    Interventions(treatment strategy):  Collaboration of care with providers.  Texture-modified diet: minced & moist  Commercial beverage: Boost Glucose Control TID    Nutrition Diagnosis Status:   New       Malnutrition Assessment     Teeth (Micronutrient):  (pt is edentulous)           Orbital Region (Subcutaneous Fat Loss): well nourished  Upper Arm Region (Subcutaneous Fat Loss): well nourished  Thoracic and Lumbar Region: well nourished   [place] Region (Muscle Loss): moderate depletion  Clavicle Bone Region (Muscle Loss): well nourished  Clavicle and Acromion Bone Region (Muscle Loss): well nourished  Scapular Bone Region (Muscle Loss): well nourished  Dorsal Hand (Muscle Loss): mild depletion  Patellar Region (Muscle Loss): well nourished  Anterior Thigh Region (Muscle Loss): well nourished  Posterior Calf Region (Muscle Loss): well nourished   Edema (Fluid Accumulation): 0-->no edema present             Reason for Assessment    Reason For Assessment: consult  Diagnosis:  (THAD)  Relevant Medical History: HTN, CKD, HLD, hypothyroidism, gout  Interdisciplinary Rounds: attended  General Information Comments: Pt with about 50% intake meals over last 3 days in hospital.  lbs, wt appears stable. Pt is edentulous and requires total assistance with meals. Per PCT, pt  "has a good appetite but has difficulty chewing some foods. Completed NFPE today: Pt has temporal wasting but otherwise appears nourished.  Nutrition Discharge Planning: Discharge on low sodium diet, modified as needed for easier chewing.    Nutrition Risk Screen    Nutrition Risk Screen: no indicators present    Nutrition/Diet History    Patient Reported Diet/Restrictions/Preferences: general  Spiritual, Cultural Beliefs, Bahai Practices, Values that Affect Care: no    Anthropometrics    Temp: 98.6 °F (37 °C)  Height Method: Stated  Height: 4' 10" (147.3 cm)  Height (inches): 58 in  Weight Method: Bed Scale  Weight: 74.7 kg (164 lb 10.9 oz)  Weight (lb): 164.69 lb  Ideal Body Weight (IBW), Female: 90 lb  % Ideal Body Weight, Female (lb): 182.99 %  BMI (Calculated): 34.4       Lab/Procedures/Meds    Pertinent Labs Reviewed: reviewed  Pertinent Labs Comments: K+ 3.4, Cl 91, A1c 4.9% (1/30/22), CO2 40, Cre 2.3, BUN 47, eGFR 20.8  Pertinent Medications Reviewed: reviewed  Pertinent Medications Comments: vitamin D, ferrous sulfate, MVI, senna, colace, lasix    Estimated/Assessed Needs    Weight Used For Calorie Calculations: 74.7 kg (164 lb 10.9 oz)  Energy Calorie Requirements (kcal): 1157 kcal  Energy Need Method: [place]-St Jeor (PAL 1.10)  Protein Requirements: 67-82g  Weight Used For Protein Calculations: 74.7 kg (164 lb 10.9 oz)        RDA Method (mL): 1157         Nutrition Prescription Ordered    Current Diet Order: renal    Evaluation of Received Nutrient/Fluid Intake    Comments: last BM 2/02  % Intake of Estimated Energy Needs: 50 - 75 %  % Meal Intake: 25 - 50 %    Nutrition Risk    Level of Risk/Frequency of Follow-up: low       Monitor and Evaluation    Food and Nutrient Intake: energy intake,food and beverage intake  Food and Nutrient Adminstration: diet order  Knowledge/Beliefs/Attitudes: food and nutrition knowledge/skill  Anthropometric Measurements: weight,weight change  Biochemical Data, Medical " Tests and Procedures: electrolyte and renal panel,gastrointestinal profile,glucose/endocrine profile,inflammatory profile,lipid profile  Nutrition-Focused Physical Findings: overall appearance,extremities, muscles and bones,head and eyes,skin       Nutrition Follow-Up    RD Follow-up?: Yes

## 2022-02-03 NOTE — PLAN OF CARE
Problem: Adult Inpatient Plan of Care  Goal: Plan of Care Review  Outcome: Ongoing, Progressing     Recommendations     1. Downgrade diet to IDDSI L5 minced & moist for easier chewing.   2. Add Boost Glucose Control TID.  3. Add Boost Pudding daily.     Goals: 1. Pt's intake meals >50% by RD follow up.  Nutrition Goal Status: new  Communication of RD Recs: reviewed with physician  Assessment and Plan     Nutrition Problem  Chewing difficulty     Related to (etiology):   edentulous     Signs and Symptoms (as evidenced by):   Pt unable to tolerate regular texture diet due to difficulty chewing, has about 50% intake meals over last few days depending on how soft the food is.     Interventions(treatment strategy):  Collaboration of care with providers.  Texture-modified diet: minced & moist  Commercial beverage: Boost Glucose Control TID     Nutrition Diagnosis Status:   New

## 2022-02-03 NOTE — PT/OT/SLP EVAL
Occupational Therapy   Evaluation /Treatment    Name: Johanny Curtis  MRN: [id number]  Admit Date: 2/2/2022  Admitting Diagnosis:  Acute Kidney Injury, CHF  Recent Surgeries: N/A    General Precautions: Standard, fall   Orthopedic Precautions:N/A   Braces: N/A     Recommendations:     Discharge Recommendations: home health OT  Level of Assistance Recommended: 24 hours light assistance  Discharge Equipment Recommendations:   (TBD)  Barriers to discharge:  Inaccessible home environment,Decreased caregiver support    Assessment:     Johanny Curtis is a 89 y.o. female with a medical diagnosis of Acute Kidney Injury, CHF.  She remains limited in performance of self-care , functional mobility and ADLs and currently not performing tasks at OF . Currently presenting with performance deficits including weakness,impaired endurance,impaired self care skills,impaired functional mobilty,gait instability,impaired balance,decreased coordination,decreased upper extremity function,decreased lower extremity function,decreased safety awareness,pain,decreased ROM,impaired cardiopulmonary response to activity.   Pt tolerated Tx without incident and is making progress with self care tasks, functional mobility and functional transfers .  She would continue to benefit from OT intervention to further her functional (I)ce and safety.    Rehab Potential is good    Activity Tolerance: Fair    Plan:     Patient to be seen 6 x/week to address the above listed problems via self-care/home management,therapeutic activities,therapeutic exercises  · Plan of Care Expires: 03/03/22  · Plan of Care Reviewed with: patient    Subjective     Chief Complaint: Pt reporting that her feet and legs hurt.   Patient/Family Comments/goals: Pt wants to be able to return home    Occupational Profile:   Living Environment: Pt lives with her daughter, son-in-law, and her grandchildren in a single story home with 1 BINA. . She has a tub shower with a standard height  toilet.   Prior to admission, patients level of function was ambulatory with a RW. She reports that she didn't need assistance for dressing or showers.    Equipment used at home: shower chair,walker, rolling.    Upon discharge, patient will have assistance from her family 24/7.    Pain/Comfort:  · Pain Rating 1:  (Pt did not rate pain but moaned in pain when performing functional mobility.)  · Location - Side 1: Bilateral  · Location - Orientation 1: generalized  · Location 1:  (feet and legs)  · Pain Rating Post-Intervention 1:  (Same)    Patients cultural, spiritual, Yarsanism conflicts given the current situation: no    Objective:     Communicated with: nurse prior to session.  Patient found up in chair with oxygen upon OT entry to room.    Occupational Performance:       Eating   Assistance Needed Set-up / clean-up   Physical Assistance Level No physical assistance   CARE Score - Eating 5   Oral Hygiene   Assistance Needed Incidental touching   Physical Assistance Level 25% or less  (With (A) to complete grooming seated sinkside.)   CARE Score - Oral Hygiene 3   Toileting Hygiene   Assistance Needed Physical assistance   Physical Assistance Level 76% or more   CARE Score - Toileting Hygiene 2   Shower/Bathe Self   Assistance Needed Physical assistance   Physical Assistance Level 51%-75%   CARE Score - Shower/Bathe Self 2   Upper Body Dressing   Assistance Needed Physical assistance   Physical Assistance Level 51%-75%   CARE Score - Upper Body Dressing 2   Lower Body Dressing   Assistance Needed Physical assistance   Physical Assistance Level Total assistance   CARE Score - Lower Body Dressing 1   Putting On/Taking Off Footwear   Assistance Needed Physical assistance   Physical Assistance Level Total assistance   CARE Score - Putting On/Taking Off Footwear 1   Roll Left and Right   Assistance Needed Physical assistance   Physical Assistance Level 76% or more  (with HOB flat.)   CARE Score - Roll Left and Right  2   Sit to Lying   Assistance Needed Physical assistance   Physical Assistance Level Total assistance  (with HOB flat.)   CARE Score - Sit to Lying 1   Lying to Sitting on Side of Bed   Assistance Needed Physical assistance   Physical Assistance Level Total assistance   CARE Score - Lying to Sitting on Side of Bed 1   Sit to Stand   Assistance Needed Physical assistance   Physical Assistance Level Total assistance   CARE Score - Sit to Stand 1   Chair/Bed-to-Chair Transfer   Assistance Needed Physical assistance   Physical Assistance Level Total assistance   CARE Score - Chair/Bed-to-Chair Transfer 1   Toilet Transfer   Assistance Needed Physical assistance   Physical Assistance Level Total assistance   CARE Score - Toilet Transfer 1       Cognitive/Visual Perceptual:  Cognitive/Psychosocial Skills:     -       Oriented to: Person, Place and Situation   -       Follows Commands/attention:Follows one-step commands  -       Communication: clear/fluent  -       Memory: Pt is a questionable historian.  -       Safety awareness/insight to disability: intact   -       Mood/Affect/Coping skills/emotional control: Appropriate to situation  Visual/Perceptual:      -Intact .    Physical Exam:  Balance: -  Pt demonstrated  Fair  functional sitting balance as noted when performing self care tasks. Poor functional standing with RW and  Max to Total (A) needed to stand.     Postural examination/scapula alignment:    -       Rounded shoulders  -       Forward head  -       Posterior pelvic tilt  Skin integrity: Visible skin intact  Edema:  None noted  Sensation:    -       Intact  Motor Planning: -       WFLs  Dominant hand: -       Right  Upper Extremity Range of Motion:     -       Right Upper Extremity: WFL except Shld Flexion and Abduction with both limited to 0-90 degs AROM and 9-100 degs A/AROM  -       Left Upper Extremity: WFL except Shld Flexion and Abduction with both limited to 0-90 degs AROM and 9-100 degs  A/AROM  Upper Extremity Strength:    -       Right Upper Extremity: Grossly 4-/5 throughout  -       Left Upper Extremity: Grossly 4-/5 throughout   Strength:    -       Right Upper Extremity: WFL  -       Left Upper Extremity: WFL  Fine Motor Coordination:    -      Grossly  Intact    AMPAC 6 Click ADL:  AMPAC Total Score: 12    Treatment & Education:  Pt edu on role of OT, POC, safety when performing self care tasks , benefit of performing OOB activity, and safety when performing functional transfers and mobility.  - White board updated  - Self care tasks completed-- as noted above   Education:    Patient left supine with call button in reach    GOALS:   Multidisciplinary Problems     Occupational Therapy Goals        Problem: Occupational Therapy Goal    Goal Priority Disciplines Outcome Interventions   Occupational Therapy Goal     OT, PT/OT Ongoing, Progressing    Description: Goals to be met by: 2/24/22     Patient will increase functional independence with ADLs by performing:    Feeding with Set-up Assistance.  UE Dressing with Minimal Assistance.  LE Dressing with Moderate Assistance.  Grooming while seated at sink with Minimal Assistance.  Toileting from toilet or BSC with Moderate Assistance for hygiene and clothing management.   Bathing from  sitting at sink with Moderate Assistance.  Supine to sit with Stand-by Assistance.  Stand pivot transfers with Minimal Assistance with RW to steady.  Toilet transfer to toilet or BSC with Minimal Assistance using RW.  Upper extremity exercise with assistance as needed.  Caregiver will be educated on level of assist required to safely perform self care tasks and functional transfers..                    History:     Past Medical History:   Diagnosis Date    AAA (abdominal aortic aneurysm)     Anemia in CKD (chronic kidney disease)     Arthritis     Bacteremia due to Escherichia coli 9/24/2020    Cataract     Class 1 obesity due to excess calories with  serious comorbidity in adult 7/6/2017    Gout, chronic     Heart failure with reduced ejection fraction 1/29/2022    High cholesterol     Hypercapnic respiratory failure 1/28/2022    Hypertension     Hypothyroidism     Obesity     Plantar fasciitis     PVD (peripheral vascular disease)     Thyroid trouble        Past Surgical History:   Procedure Laterality Date    BREAST BIOPSY Left     BREAST SURGERY Left 1972    benign breast lump    CATARACT EXTRACTION  3/18/13    both eyes -     CHOLECYSTECTOMY      ENDOSCOPIC ULTRASOUND OF UPPER GASTROINTESTINAL TRACT N/A 9/8/2020    Procedure: ULTRASOUND, UPPER GI TRACT, ENDOSCOPIC;  Surgeon: Rasheed Balbuena MD;  Location: [facility] ([address] [place] [address]);  Service: Endoscopy;  Laterality: N/A;    ERCP N/A 9/8/2020    Procedure: ERCP (ENDOSCOPIC RETROGRADE CHOLANGIOPANCREATOGRAPHY);  Surgeon: Rasheed Balbuena MD;  Location: [facility] ([address] [place] [address]);  Service: Endoscopy;  Laterality: N/A;    EYE SURGERY      HYSTERECTOMY      SKIN BIOPSY      Left breast       Time Tracking:     OT Date of Treatment: 02/03/22  OT Start Time: 1100  OT Stop Time: 1145  OT Total Time (min): 45 min    Billable Minutes:Evaluation 10  Self Care/Home Management 30    2/3/2022

## 2022-02-04 NOTE — PT/OT/SLP PROGRESS
Occupational Therapy   Treatment    Name: Johanny Curtis  MRN: [id number]  Admit Date: 2/2/2022  Admitting Diagnosis:  Heart failure with reduced ejection fraction    General Precautions: Standard, fall   Orthopedic Precautions:N/A   Braces:       Recommendations:     Discharge Recommendations: home health OT  Level of Assistance Recommended at Discharge: 24 hours physical assistance for all ADL's and home management tasks  Discharge Equipment Recommendations:   (TBD)  Barriers to discharge:  Inaccessible home environment,Decreased caregiver support    Assessment:     Johanny Curtis is a 89 y.o. female with a medical diagnosis of Heart failure with reduced ejection fraction   She presents with Performance deficits affecting function are weakness,impaired endurance,impaired self care skills,impaired functional mobility,gait instability,impaired balance,decreased coordination,decreased upper extremity function,decreased lower extremity function,decreased safety awareness,pain,decreased ROM,impaired cardiopulmonary response to activity.     Pt. Was cooperative  with session on this day.  Pt. Overall Total A for selfare task with ADL's and t/fs. Pt  continues to demonstrate levels of physical deficits with  functional indep with daily management activities tasks, selfcare skills with balance,  functional mobility, UB strength and endurance. Pt. Will continue to benefit from continued OT to progress towards goals   Rehab Potential is fair    Activity tolerance:  Fair    Plan:     Patient to be seen 6 x/week to address the above listed problems via self-care/home management,therapeutic activities,therapeutic exercises    · Plan of Care Expires: 03/03/22  · Plan of Care Reviewed with: patient    Subjective     Communicated with: nsg and Pt  prior to session.I am ok    Pain/Comfort:  Pain Rating 1:  (did not rate only with movement)     Patient's cultural, spiritual, Yarsani conflicts given the current  situation:  no    Objective:     Patient found supine    upon OT entry to room.    Bed Mobility:    · Patient completed Rolling/Turning to Left with  maximal assistance  · Patient completed Rolling/Turning to Right with maximal assistance  · Patient completed Scooting/Bridging with total assistance  · Patient completed Supine to Sit with total assistance     Functional Mobility/Transfers:  · Patient completed Bed <> Chair Transfer using Squat Pivot technique with total assistance with no assistive device    Activities of Daily Living:  · Grooming: stand by assistance to wipe face  · Bathing: maximal assistance wtih bathing aspects bed level  · Upper Body Dressing: total assistance to doff/maverick pull over shirt  · Lower Body Dressing: total assistance to maverick pants bed level and BLE socks  · Toileting: total assistance with cleaning and diaper management bed level    [facility] 6 Click ADL: 12    Treatment & Education:  Pt edu on role of OT, POC, safety when performing self care tasks , benefit of performing OOB activity, and safety when performing functional transfers and mobility management for preparation with goals to progress towards next level of care    Patient left up in chair with all lines intact and call button in reachEducation:      GOALS:   Multidisciplinary Problems     Occupational Therapy Goals        Problem: Occupational Therapy Goal    Goal Priority Disciplines Outcome Interventions   Occupational Therapy Goal     OT, PT/OT Ongoing, Progressing    Description: Goals to be met by: 2/24/22     Patient will increase functional independence with ADLs by performing:    Feeding with Set-up Assistance.  UE Dressing with Minimal Assistance.  LE Dressing with Moderate Assistance.  Grooming while seated at sink with Minimal Assistance.  Toileting from toilet or BSC with Moderate Assistance for hygiene and clothing management.   Bathing from  sitting at sink with Moderate Assistance.  Supine to sit with Stand-by  Assistance.  Stand pivot transfers with Minimal Assistance with RW to steady.  Toilet transfer to toilet or BSC with Minimal Assistance using RW.  Upper extremity exercise with assistance as needed.  Caregiver will be educated on level of assist required to safely perform self care tasks and functional transfers..                    Time Tracking:     OT Date of Treatment: OT Date of Treatment: 02/04/22  OT Total Time (min):      Billable Minutes:Self Care/Home Management 45    2/4/2022  A client care conference was performed between the PADMINIR and SHILO, prior to treatment to discuss the patient's status, treatment plan and established goals.

## 2022-02-04 NOTE — PT/OT/SLP PROGRESS
Physical Therapy Treatment    Patient Name:  Johanny Curtis   MRN:  [id number]  Admit Date: 2/2/2022  Admitting Diagnosis: Heart failure with reduced ejection fraction  Recent Surgeries:     General Precautions: Standard, fall   Orthopedic Precautions:N/A   Braces:       Recommendations:     Discharge Recommendations:  home with home health   Level of Assistance Recommended at Discharge: 24 hours light assistance  Discharge Equipment Recommendations: bedside commode,bath bench,walker, rolling,wheelchair   Barriers to discharge: Inaccessible home environment    Assessment:     Johanny Curtis is a 89 y.o. female admitted with a medical diagnosis of Heart failure with reduced ejection fraction . Pt requires MAX- Total Assist for all functional mobility at this time and needs skilled PT services to address deficits listed below. Cont with current POC.      Performance deficits affecting function:  weakness,impaired endurance,impaired self care skills,impaired functional mobilty,gait instability,decreased lower extremity function,impaired coordination,impaired cardiopulmonary response to activity .    Rehab Potential is fair    Activity Tolerance: Fair    Plan:     Patient to be seen 6 x/week to address the above listed problems via gait training,therapeutic activities,therapeutic exercises,neuromuscular re-education,wheelchair management/training    · Plan of Care Expires: 03/05/22  · Plan of Care Reviewed with: patient    Subjective     Patient is agreeable to PT today and denies any c/o pain.     Pain/Comfort:  · Pain Rating 1: 0/10  · Pain Rating Post-Intervention 1: 0/10    Patient's cultural, spiritual, Denominational conflicts given the current situation:  · no    Objective:     Communicated with pt prior to session.  Patient found up in chair with oxygen upon PT entry to room.     Therapeutic Activities and Exercises:   - AROM Ex's for B LE's while seated : AP, LAQ, Hip Flex 3x10 reps  - Sit to stand transfer  training from bedside chair requiring MAX Assist of one     Functional Mobility:  · Transfers:     · Sit to Stand:  maximal assistance with no AD    AM-PAC 6 CLICK MOBILITY  10    Patient left up in chair with all lines intact, call button in reach and legs elevated.    GOALS:   Multidisciplinary Problems     Physical Therapy Goals        Problem: Physical Therapy Goal    Goal Priority Disciplines Outcome Goal Variances Interventions   Physical Therapy Goal     PT, PT/OT Ongoing, Progressing     Description: Goals to be met by: 22     Patient will increase functional independence with mobility by performin. Supine to sit with MInimal Assistance  2. Sit to supine with MInimal Assistance  3. Rolling to Left and Right with Minimal Assistance.  4. Sit to stand transfer with Minimal Assistance  5. Bed to chair transfer with Minimal Assistance using Rolling Walker  6. Gait  x 30 feet with Minimal Assistance using Rolling Walker.   7. Wheelchair propulsion x50 feet with Stand-by Assistance using bilateral uppper extremities                     Time Tracking:     PT Received On: 22  PT Total Time (min):   27    Billable Minutes: Therapeutic Activity 27    Treatment Type: Treatment  PT/PTA: PTA     PTA Visit Number: 1     2022

## 2022-02-04 NOTE — PLAN OF CARE
[facility] - Skilled Nursing  Initial Discharge Assessment    SW spoke with patient's daughter Katia for Discharge Planning Assessment.Pt lives with daughter and son-in-law. Patient was previously unable to manage her own ADLs, daughter and son-in-law have been attending to her care and will continue to do so. Mrs. Montalvo states her mother was using a walker for ambulation. Patient will have transportation assistance at discharge from daughter. SW is following this Pt for DC planning needs. There are no identified needs at this time.     Patient's preferred pharmacy is [facility]  Case Management Department  [facility]  [email or web address]           Primary Care Provider: Leticia Weems MD    Admission Diagnosis: THAD (acute kidney injury) [N17.9]    Admission Date: 2/2/2022  Expected Discharge Date: 2/24/2022    Discharge Barriers Identified: None    Payor: HU[employer] MANAGED MEDICARE / Plan: HU[employer] MEDICARE HMO / Product Type: Capitation /     Extended Emergency Contact Information  Primary Emergency Contact: Lombard,Annette   [place]  Mobile Phone: [phone]  Relation: Daughter    Discharge Plan A: Home with family,Home Health  Discharge Plan B: Assisted Living,Home Health,New Nursing Home placement - assisted care facility      [facility] - [facility] [address]  [address] [address]  Phone: [phone] Fax: [phone]    [id number] - [facility] [address] AT [facility]  [address] [place] [address]  Phone: [phone] Fax: [phone]    [facility] #[id number] - [facility] [address] [facility] AT [facility] OF [address] [address]  Phone: [phone] Fax: [phone]      Initial Assessment (most recent)     Adult Discharge Assessment - 02/03/22 9003         Discharge Assessment    Assessment Type Discharge Planning Assessment     Confirmed/corrected address, phone number and insurance Yes     Confirmed Demographics Correct on Facesheet     Source of Information family     Communicated ION with patient/caregiver Yes     Lives With child(shani), adult     Do you expect to return to your current living situation? Yes     Do you have help at home or someone to help you manage your care at home? Yes     Who are your caregiver(s) and their phone number(s)? Annette Lombard (daughter) [phone]     Prior to hospitilization cognitive status: Alert/Oriented     Current cognitive status: Alert/Oriented     Walking or Climbing Stairs Difficulty ambulation difficulty, requires equipment     Dressing/Bathing Difficulty bathing difficulty, assistance 1 person     Equipment Currently Used at Home shower chair;walker, rolling     Readmission within 30 days? No     Patient currently being followed by outpatient case management? No     Do you currently have service(s) that help you manage your care at home? No     Do you take prescription medications? Yes     Do you have prescription coverage? Yes     Do you have any problems affording any of your prescribed medications? No     Is the patient taking medications as prescribed? yes     Who is going to help you get home at discharge? Annette Lombard (daughter) [phone]     How do you get to doctors appointments? family or friend will provide     Are you on dialysis? No     Do you take coumadin? No     Discharge Plan A Home with family;Home Health     Discharge Plan B Assisted Living;Home Health;New Nursing Home placement - shelter care facility     DME Needed Upon Discharge  other (see comments)   TBD    Discharge Plan discussed with: Patient     Discharge Barriers Identified None        Relationship/Environment    Name(s) of Who Lives With Patient Annette Lombard (daughter) [phone]

## 2022-02-04 NOTE — PLAN OF CARE
Problem: Adult Inpatient Plan of Care  Goal: Plan of Care Review  Outcome: Ongoing, Progressing  Goal: Patient-Specific Goal (Individualized)  Outcome: Ongoing, Progressing  Goal: Absence of Hospital-Acquired Illness or Injury  Outcome: Ongoing, Progressing  Goal: Optimal Comfort and Wellbeing  Outcome: Ongoing, Progressing  Goal: Readiness for Transition of Care  Outcome: Ongoing, Progressing     Problem: Adjustment to Illness (Sepsis/Septic Shock)  Goal: Optimal Coping  Outcome: Ongoing, Progressing     Problem: Bleeding (Sepsis/Septic Shock)  Goal: Absence of Bleeding  Outcome: Ongoing, Progressing     Problem: Glycemic Control Impaired (Sepsis/Septic Shock)  Goal: Blood Glucose Level Within Desired Range  Outcome: Ongoing, Progressing     Problem: Infection Progression (Sepsis/Septic Shock)  Goal: Absence of Infection Signs and Symptoms  Outcome: Ongoing, Progressing     Problem: Nutrition Impaired (Sepsis/Septic Shock)  Goal: Optimal Nutrition Intake  Outcome: Ongoing, Progressing     Problem: Fluid and Electrolyte Imbalance (Acute Kidney Injury/Impairment)  Goal: Fluid and Electrolyte Balance  Outcome: Ongoing, Progressing     Problem: Oral Intake Inadequate (Acute Kidney Injury/Impairment)  Goal: Optimal Nutrition Intake  Outcome: Ongoing, Progressing     Problem: Renal Function Impairment (Acute Kidney Injury/Impairment)  Goal: Effective Renal Function  Outcome: Ongoing, Progressing     Problem: Infection  Goal: Absence of Infection Signs and Symptoms  Outcome: Ongoing, Progressing     Problem: Fall Injury Risk  Goal: Absence of Fall and Fall-Related Injury  Outcome: Ongoing, Progressing     Problem: Skin Injury Risk Increased  Goal: Skin Health and Integrity  Outcome: Ongoing, Progressing

## 2022-02-05 NOTE — PLAN OF CARE
Problem: Adult Inpatient Plan of Care  Goal: Plan of Care Review  Outcome: Ongoing, Progressing  Goal: Patient-Specific Goal (Individualized)  Outcome: Ongoing, Progressing  Goal: Absence of Hospital-Acquired Illness or Injury  Outcome: Ongoing, Progressing  Goal: Optimal Comfort and Wellbeing  Outcome: Ongoing, Progressing     Problem: Adjustment to Illness (Sepsis/Septic Shock)  Goal: Optimal Coping  Outcome: Ongoing, Progressing     Problem: Bleeding (Sepsis/Septic Shock)  Goal: Absence of Bleeding  Outcome: Ongoing, Progressing     Problem: Glycemic Control Impaired (Sepsis/Septic Shock)  Goal: Blood Glucose Level Within Desired Range  Outcome: Ongoing, Progressing     Problem: Infection Progression (Sepsis/Septic Shock)  Goal: Absence of Infection Signs and Symptoms  Outcome: Ongoing, Progressing     Problem: Nutrition Impaired (Sepsis/Septic Shock)  Goal: Optimal Nutrition Intake  Outcome: Ongoing, Progressing

## 2022-02-05 NOTE — PT/OT/SLP PROGRESS
"Physical Therapy Treatment    Patient Name:  Johanny Curtis   MRN:  [id number]  Admit Date: 2/2/2022  Admitting Diagnosis: Heart failure with reduced ejection fraction  Recent Surgeries:     General Precautions: Standard, fall   Orthopedic Precautions:N/A   Braces: N/A     Recommendations:     Discharge Recommendations:  home with home health   Level of Assistance Recommended at Discharge: 24 hours light assistance  Discharge Equipment Recommendations:  (TBD)   Barriers to discharge: Inaccessible home environment    Assessment:     Johanny Curtis is a 89 y.o. female admitted with a medical diagnosis of Heart failure with reduced ejection fraction . . Patient showed limited overall strength when attempting to mobilize, requiring total assistance to mobilize in bed. Patient also became a little lethargic when sitting at EOB, but was more alert when transferred back to supine.  Patient with a min tendency to lean to the left while sitting and in supine.    Performance deficits affecting function:  weakness,impaired endurance,impaired self care skills,impaired functional mobilty,impaired balance,decreased upper extremity function,decreased lower extremity function,decreased safety awareness,pain,decreased ROM,impaired cardiopulmonary response to activity,impaired joint extensibility .    Rehab Potential is fair    Activity Tolerance: Fair    Plan:     Patient to be seen 6 x/week to address the above listed problems via gait training,therapeutic activities,therapeutic exercises,neuromuscular re-education    · Plan of Care Expires: 03/05/22  · Plan of Care Reviewed with: patient    Subjective     Patient states " I feel very weak".     Pain/Comfort:  · Pain Rating 1:  (Pain only noted when movilizing and B LE PROM)  · Location - Side 1: Bilateral  · Location - Orientation 1: generalized  · Location 1:  (Feet and legs)  · Pain Addressed 1: Reposition,Distraction,Cessation of Activity  · Pain Rating Post-Intervention 1: " 0/10    Patient's cultural, spiritual, Amish conflicts given the current situation:  · no    Objective:     Communicated with NSG prior to session.  Patient found HOB elevated with oxygen upon PT entry to room.     Therapeutic Activities and Exercises: Static sitting balance EOB x 11 minutes with CGA, but progressing to SBA. Scoot pivot to HOB with total assistance. Repositioned in bed with total assistance. B LE gentle PROM x 30 reps on all available planes of motion. Elevated B heels on a pillow to prevent Decubitus.    Functional Mobility:  · Bed Mobility:     · Rolling Right: total assistance  · Scooting: total assistance  · Supine to Sit: total assistance  · Sit to Supine: total assistance  · Transfers:     · Sit to Stand:  total assistance with no AD  · Balance: fair in sitting    AM-PAC 6 CLICK MOBILITY  10    Patient left HOB elevated with all lines intact and call button in reach.    GOALS:   Multidisciplinary Problems     Physical Therapy Goals        Problem: Physical Therapy Goal    Goal Priority Disciplines Outcome Goal Variances Interventions   Physical Therapy Goal     PT, PT/OT Ongoing, Progressing     Description: Goals to be met by: 22     Patient will increase functional independence with mobility by performin. Supine to sit with MInimal Assistance  2. Sit to supine with MInimal Assistance  3. Rolling to Left and Right with Minimal Assistance.  4. Sit to stand transfer with Minimal Assistance  5. Bed to chair transfer with Minimal Assistance using Rolling Walker  6. Gait  x 30 feet with Minimal Assistance using Rolling Walker.   7. Wheelchair propulsion x50 feet with Stand-by Assistance using bilateral uppper extremities                     Time Tracking:     PT Received On: 22  PT Total Time (min):       Billable Minutes: Therapeutic Activity 20 and Therapeutic Exercise 10    Treatment Type: Treatment  PT/PTA: PTA     PTA Visit Number: 2     2022

## 2022-02-06 NOTE — PT/OT/SLP PROGRESS
"Occupational Therapy   Treatment    Name: Johanny Curtis  MRN: [id number]  Admit Date: 2/2/2022  Admitting Diagnosis:  Heart failure with reduced ejection fraction    General Precautions: Standard, fall   Orthopedic Precautions:N/A   Braces: N/A     Recommendations:     Discharge Recommendations: home health OT  Level of Assistance Recommended at Discharge: 24 hours physical assistance for all ADL's and home management tasks  Discharge Equipment Recommendations:   (TBD)  Barriers to discharge:  Inaccessible home environment    Assessment:     Johanny Curtis is a 89 y.o. female with a medical diagnosis of Heart failure with reduced ejection fraction.  She presents with the following performance deficits affecting function: weakness,impaired endurance,impaired sensation,impaired self care skills,impaired functional mobilty,gait instability,impaired balance,impaired cognition,decreased coordination,decreased upper extremity function,decreased lower extremity function,decreased safety awareness,pain,decreased ROM,impaired coordination,impaired cardiopulmonary response to activity. Pt was agreeable to OT and was noted to make progress towards her goals in therapy.  During today's session, pt worked on therex and func mobility.  Pt's goals remain appropriate at this time.  She will continue to benefit from skilled OT services in order to assist her with increasing her safety and level of independence with self care and mobility tasks.     Rehab Potential is fair    Activity tolerance:  Fair    Plan:     Patient to be seen 6 x/week to address the above listed problems via self-care/home management,therapeutic activities,therapeutic exercises,neuromuscular re-education,cognitive retraining    · Plan of Care Expires: 03/03/22  · Plan of Care Reviewed with: patient,son    Subjective     Communicated with: nurse prior to session.   "My back is hurting"  Pt repositioned pt with pillows on LB and raising LEs/HOB at end of " session - pt stated repositioning improved pain .    Pain/Comfort:  Pt groaning/moaning during UE therex and repositioning - when asked if she was in pain, she said no.  Pt only mentioned pain in lower back at end of session which improved with repositioning and pillows.  · Pain Rating 1: 0/10  · Location - Orientation 1: lower  · Location 1: back  · Pain Addressed 1: Reposition (raised HOB and LEs - added pillow to LB)  · Pain Rating Post-Intervention 1: 3/10    Patient's cultural, spiritual, Rastafari conflicts given the current situation:  · no    Objective:     Patient found HOB elevated with oxygen upon OT entry to room.    Bed Mobility:    · Patient completed Rolling/Turning to Left with  minimum assistance and with side rail  · Patient completed Rolling/Turning to Right with moderate assistance and with side rail (reached for bedrail with B UEs, but needed A to move LEs to R)  · Patient completed Supine to Sit with minimum - moderate assistance - able to move B LEs off of bed with min A to get R heel over bed edge - able to push up to sit with min A to complete distance  · Patient completed Sit to Supine with maximal assistance - pt lowered UB but needed A to raise B LEs  · Pt with very slow movements, but responding to cues for repositioning - needed increased time with all transitional movements    Functional Mobility/Transfers:  · N/A      Activities of Daily Living:  · N/A    [facility] 6 Click ADL: 12    OT Exercises: Pt worked on UE strengthening and endurance exercises to improve her safety and independence during functional activities, such as, w/c mobility, transfers, walking with RW, bed mobility and ADLs  · AROM performed to B UEs and LEs while sitting EOB   · Pt completed 1 set of 12 reps of the following: shoulder flex/ext, forward punches, forearm sup/pro, elbow flex/ext, knee flex/ext, and ankle pumps.  · Pt needed A to reach endrange of shoulder flexion during exercises  · Performed exercises one  side at a time with rest breaks as needed (mainly during shoulder exercises)    Treatment & Education:  · Pt completed therex and func mobility activities for tx session as noted above  · Pt able to sit EOB for ~25-30' (R hand on bed during L UE exercises, but able to lay L hand on lap during R UE exercises)  · Sitting with L shoulder low  · Whiteboard updated  · Pt educated on role of OT and POC      Patient left HOB elevated with all lines intact, call button in reach and son presentEducation:      GOALS:   Multidisciplinary Problems     Occupational Therapy Goals        Problem: Occupational Therapy Goal    Goal Priority Disciplines Outcome Interventions   Occupational Therapy Goal     OT, PT/OT Ongoing, Progressing    Description: Goals to be met by: 2/24/22     Patient will increase functional independence with ADLs by performing:    Feeding with Set-up Assistance.  UE Dressing with Minimal Assistance.  LE Dressing with Moderate Assistance.  Grooming while seated at sink with Minimal Assistance.  Toileting from toilet or BSC with Moderate Assistance for hygiene and clothing management.   Bathing from  sitting at sink with Moderate Assistance.  Supine to sit with Stand-by Assistance.  Stand pivot transfers with Minimal Assistance with RW to steady.  Toilet transfer to toilet or BSC with Minimal Assistance using RW.  Upper extremity exercise with assistance as needed.  Caregiver will be educated on level of assist required to safely perform self care tasks and functional transfers..                    Time Tracking:     OT Date of Treatment: OT Date of Treatment: 02/06/22  OT Total Time (min): Total Time (min): 34 min    Billable Minutes:Therapeutic Activity 22  Therapeutic Exercise 12    2/6/2022

## 2022-02-06 NOTE — PLAN OF CARE
Problem: Occupational Therapy Goal  Goal: Occupational Therapy Goal  Description: Goals to be met by: 2/24/22     Patient will increase functional independence with ADLs by performing:    Feeding with Set-up Assistance.  UE Dressing with Minimal Assistance.  LE Dressing with Moderate Assistance.  Grooming while seated at sink with Minimal Assistance.  Toileting from toilet or BSC with Moderate Assistance for hygiene and clothing management.   Bathing from  sitting at sink with Moderate Assistance.  Supine to sit with Stand-by Assistance.  Stand pivot transfers with Minimal Assistance with RW to steady.  Toilet transfer to toilet or BSC with Minimal Assistance using RW.  Upper extremity exercise with assistance as needed.  Caregiver will be educated on level of assist required to safely perform self care tasks and functional transfers..   Outcome: Ongoing, Progressing     Pt was agreeable to OT and was noted to make progress towards her goals in therapy.  During today's session, pt worked on therex and func mobility.  Pt's goals remain appropriate at this time.  She will continue to benefit from skilled OT services in order to assist her with increasing her safety and level of independence with self care and mobility tasks.     Kaylynn Sanabria, OT  2/6/2022

## 2022-02-06 NOTE — PLAN OF CARE
Problem: Adult Inpatient Plan of Care  Goal: Plan of Care Review  Outcome: Ongoing, Progressing  Goal: Patient-Specific Goal (Individualized)  Outcome: Ongoing, Progressing  Goal: Absence of Hospital-Acquired Illness or Injury  Outcome: Ongoing, Progressing  Goal: Optimal Comfort and Wellbeing  Outcome: Ongoing, Progressing     Problem: Adjustment to Illness (Sepsis/Septic Shock)  Goal: Optimal Coping  Outcome: Ongoing, Progressing     Problem: Bleeding (Sepsis/Septic Shock)  Goal: Absence of Bleeding  Outcome: Ongoing, Progressing     Problem: Glycemic Control Impaired (Sepsis/Septic Shock)  Goal: Blood Glucose Level Within Desired Range  Outcome: Ongoing, Progressing

## 2022-02-07 PROBLEM — Z51.5 PALLIATIVE CARE ENCOUNTER: Status: ACTIVE | Noted: 2022-01-01

## 2022-02-07 NOTE — HPI
Johanny Curtis is a 89 y.o. female past medical history of HTN, CKD (b/l Cr 2-2.5), HLD, hypothyroidism, obesity, PVD.  She is s/p hospitalization for acute heart failure with reserved EF.  She was admitted to SNF for secondary weakness and debility. Palliative Medicine team consulted for GOC discussions.

## 2022-02-07 NOTE — ASSESSMENT & PLAN NOTE
Impression:  89 year old female PMHx of  HTN, CKD (b/l Cr 2-2.5), HLD, hypothyroidism, obesity, PVD s/p hospitalization for acute heart failure with reserved EF.  Admission to SNF for secondary weakness and debility. Palliative Medicine team consulted for GOC discussions    1) Symptoms:    Dyspnea, mild- continue with supplemental oxygen as needed  Debility, stable:   - Continue with PT/OT for gait training and strengthening and restoration of ADL's   - Encourage mobility, OOB in chair, and early ambulation as appropriate  - Fall precautions   - Monitor for bowel and bladder dysfunction  Delirium, stable- continue with delirium precautions:  - Avoid antihistamines, anticholinergics, hypnotics, and minimize opiates while controlling for pain as these medications may exacerbate delirium. Cues for day/night will assist with keeping patient calm and oriented - during daytime, please keep adequate light in room (open windows, lights on) and please keep room dim at night-time to encourage normal sleep-wake cycles. Continuing to have nursing and family reorient the patient and encourage family to visit  '     2) Medicolegal:  Patient does NOT have decision making capacity.  Children are surrogate decision maker.   Annette Lombard, daughter, has been designated as HCPOA.     3) Psychosocial:  Support system consists of Katia, daughter, and family     4) Spiritual: Christian    5) Prognostication: 89 year old female PMHx of  HTN, CKD (b/l Cr 2-2.5), HLD, hypothyroidism, obesity, PVD s/p hospitalization for acute heart failure with reserved EF.  Given age, comorbidities and frailty, prognosis is guarded.    6) Understanding of disease and Illness Trajectory: Daughter, Annette Lombard  has  adequate understanding of her illness, they can benefit from continued education on what to expect in the future.      7) Goals of care:    Advance Care Planning   Date: 02/07/2022    Power of   I initiated the process of advance  "care planning today and explained the importance of this process to the patient.  I introduced the concept of advance directives to the patient, as well. Then the patient received detailed information about the importance of designating a Health Care Power of  (HCPOA). She was also instructed to communicate with this person about their wishes for future healthcare, should she become sick and lose decision-making capacity. The patient has not previously appointed a HCPOA. After our discussion, the patient has decided to complete a HCPOA and has appointed her daughter, health care agent: Annette Lombard & health care agent number: [phone].    Code Status  In light of the patients advanced and life limiting illness,I engaged the the daughter, Annette Lombard in a conversation about the patient's preferences for care  at the very end of life. The patient wishes to have a natural, peaceful death and allowing "God to do his will.  If it's her time to go, then it's her time to go.  No need in getting involved and stopping what He intended to do".   Along those lines, the patient does not wish to have CPR or other invasive treatments performed when her heart and/or breathing stops. I communicated to the daughter, Annette Lombard that a DNR order would be placed in her medical record to reflect this preference.     [facility]  I engaged the patient and daughter, Annette Lombard in a conversation about advance care planning and we specifically addressed what the goals of care would be moving forward, in light of the patient's change in clinical status, specifically potential discharge after SNF.  We did specifically address the patient's likely prognosis, which is guarded.  We explored the patient's values and preferences for future care.  The daughter, Annette Lombard endorses that what is most important right now is to focus on remaining as independent as possible and improvement in condition but with limits to " invasive therapies    Accordingly, we have decided that the best plan to meet the patient's goals includes continuing with treatment with the hopes of returning home and returning to prior level of functioning which was sitting in her chair watching her favorite game shows.    I did not explain the role for hospice care at this stage of the patient's illness, including its ability to help the patient live with the best quality of life possible.      Total time of the visit 70 minutes ([id number])  Non physical exam/ non charting time: 55 minutes ( >50% of total time)  Description of non physical exam/non charting time: Counseling patient and her daughter, Katia on clinical conditions, advance care planning, goals of care discussion, emotional support, formulating and communicating prognosis and goals of care, exploring burden/benefit of various approaches of treatment.     8) Code status: DNR per family preference    9) Advance directives: None on file      Summary and recommendations:  Continue ongoing GOC discussions with patient and family

## 2022-02-07 NOTE — ASSESSMENT & PLAN NOTE
Managed by primary team    Echo  Interpretation Summary  · The left ventricle is normal in size with mildly decreased systolic function.  · The estimated ejection fraction is 50% or slightly lower.  · Grade II left ventricular diastolic dysfunction.  · Normal right ventricular size with mildly reduced right ventricular systolic function.  · Severe left atrial enlargement.  · The MR appears mild to mild-moderate ( 1-2+).  · Intermediate central venous pressure (8 mmHg).  · The estimated PA systolic pressure is 34 mmHg.  · Trivial posterolateral pericardial effusion. There is moderate under the RA.  · There is abnormal septal wall motion.  · There are segmental left ventricular wall motion abnormalities.

## 2022-02-07 NOTE — NURSING
Pt awake in bed.  HOB elevated and offered something to drink.  Pt has difficulty swallowing thin liquids noted.  Dietary consult placed.  NADN. Will continue to monitor for safety.

## 2022-02-07 NOTE — PLAN OF CARE
Problem: SLP Goal  Goal: SLP Goal  Description: Speech Language Pathology Goals  Goals expected to be met by 02/14  1. Pt will tolerate puree/thin liquid diet w/o any overt s/s of aspiration.   2. Pt will continue to participate with ongoing po trials to determine appropriateness of further diet advancement.           Outcome: Ongoing, Progressing   Bedside swallow complete  Recommend puree/thin diet  Continue SLP

## 2022-02-07 NOTE — PT/OT/SLP EVAL
Speech Language Pathology  Evaluation    Johanny Curtis   MRN: [id number]   Admitting Diagnosis: Heart failure with reduced ejection fraction    Diet recommendations: Solid Diet Level: Puree  Liquid Diet Level: Thin 1 bite/sip at a time, HOB to 90 degrees, Meds whole 1 at a time, Monitor for s/s of aspiration, Remain upright 30 minutes post meal and Standard aspiration precautions    SLP Treatment Date: 02/07/22  Speech Start Time: 0910     Speech Stop Time: 0928     Speech Total (min): 18 min       TREATMENT BILLABLE MINUTES:  Eval Swallow and Oral Function 9 and Self Care/Home Management Training 9    Diagnosis: Heart failure with reduced ejection fraction    Past Medical History:   Diagnosis Date    AAA (abdominal aortic aneurysm)     Anemia in CKD (chronic kidney disease)     Arthritis     Bacteremia due to Escherichia coli 9/24/2020    Cataract     Class 1 obesity due to excess calories with serious comorbidity in adult 7/6/2017    Gout, chronic     Heart failure with reduced ejection fraction 1/29/2022    High cholesterol     Hypercapnic respiratory failure 1/28/2022    Hypertension     Hypothyroidism     Obesity     Plantar fasciitis     PVD (peripheral vascular disease)     Thyroid trouble      Past Surgical History:   Procedure Laterality Date    BREAST BIOPSY Left     BREAST SURGERY Left 1972    benign breast lump    CATARACT EXTRACTION  3/18/13    both eyes -     CHOLECYSTECTOMY      ENDOSCOPIC ULTRASOUND OF UPPER GASTROINTESTINAL TRACT N/A 9/8/2020    Procedure: ULTRASOUND, UPPER GI TRACT, ENDOSCOPIC;  Surgeon: Rasheed Balbuena MD;  Location: [facility] ([address] [place] [address]);  Service: Endoscopy;  Laterality: N/A;    ERCP N/A 9/8/2020    Procedure: ERCP (ENDOSCOPIC RETROGRADE CHOLANGIOPANCREATOGRAPHY);  Surgeon: Rasheed Balbuena MD;  Location: [facility] ([address] [place] [address]);  Service: Endoscopy;  Laterality: N/A;    EYE SURGERY      HYSTERECTOMY      SKIN BIOPSY      Left breast            " General Precautions: Standard, fall,aspiration        Chief Complaint:  Fatigue, shortness of breath      HPI:  89 y/o F history HTN, CKD (b/l Cr 2-2.5), HLD presenting with somnolence, oliguria and THAD on CKD. No known cardiac disease at baseline. Presenting with a week per daughter of fatigue and decreased urine output. No known chest pain or decreased exercise tolerance though functional capcity sounds limited at baseline. Went to urgent care and then encouraged to come to ED when Cr elevated to 3.2. BNP 4130. JVD elevated on exam w lower extremity edema. BP elevated to 175/80. Bedside TTE with reduced LVEF ~40% with no segmental WMA. Has been given IV Lasix 40 x 1 with limited UOP since.      Prior diet: mechanical soft/thin      Subjective:  "My daughter makes my meals" Pt reported this after SLP asked about her dietary status at home      Pain/Comfort  Pain Rating 1: 0/10 (no indication of pain)    Objective:    Oral Musculature Evaluation  Oral Musculature: general weakness  Dentition: edentulous  Secretion Management: adequate  Mucosal Quality: good  Mandibular Strength and Mobility: WFL  Oral Labial Strength and Mobility: WFL  Lingual Strength and Mobility: WFL  Buccal Strength and Mobility: WFL  Volitional Cough: dry and adequate  Volitional Swallow: sw initiation was delayed  Voice Prior to PO Intake: dry and normal     Bedside Swallow Eval:  Consistencies Assessed: Thin liquids straw sips x 3, cup sips x 1  Puree 1/2 tsp x1, 1 tsp x1  Soft solids egg consistency x 1  Oral Phase: WFL for liquids and purees  Pharyngeal Phase: no overt clinical signs/symptoms of aspiration  no overt clinical signs/symptoms of pharyngeal dysphagia    Additional Treatment:    Pt transitioned to [facility] upright for po trials. Pt showed general weakness but awake and alert. Pt did present w/ a delayed initiation for a volitional sw but not during po trials. W/ thin liquid and puree trials, pt demonstrated a timely sw with adequate " bolus control, bolus containment and strength. SLP noted adequate hyolaryngeal elevation and excursion. Pt did not demonstrate any overt clinical s/s of asp. Pt tolerated containment and control but demonstrated excessive chewing of soft solid trial and SLP provide a liquid wash to initate a sw. Pt received education on role of SLP, po trials, and diet recs. Pt expressed agreement and understanding of diet recs and POC.       Assessment:  Johanny Curtis is a 89 y.o. female with a medical diagnosis of Heart failure with reduced ejection fraction and presents with dysphagia.          Discharge recommendations: Discharge Facility/Level of Care Needs:  (tbd)     Diet recommendations:    Liquid Diet Level: Thin     Goals:   Multidisciplinary Problems     SLP Goals     Not on file                 Plan:   Patient to be seen Therapy Frequency: 3 x/week  Planned Interventions:    Plan of Care expires:    Plan of Care reviewed with: patient  SLP Follow-up?: Yes  SLP - Next Visit Date: 02/08/22          JEANINE Morales      02/07/2022

## 2022-02-07 NOTE — PT/OT/SLP PROGRESS
"Physical Therapy Treatment    Patient Name:  Johanny Curtis   MRN:  [id number]  Admit Date: 2/2/2022  Admitting Diagnosis: Heart failure with reduced ejection fraction  Recent Surgeries:     General Precautions: Standard, aspiration,fall   Orthopedic Precautions:N/A   Braces:       Recommendations:     Discharge Recommendations:  home with home health   Level of Assistance Recommended at Discharge: 24 hours significant assistance  Discharge Equipment Recommendations:  (TBD)   Barriers to discharge: Inaccessible home environment    Assessment:     Johanny Curtis is a 89 y.o. female admitted with a medical diagnosis of Heart failure with reduced ejection fraction .  Patient declined to get OOB and into chair and declined standing with PT this morning, but was agreeable to work on sitting up on EOB unsupported.  Patient still requires significant assistance with all mobility and requires cont skilled PT services. Cont with current POC.     Performance deficits affecting function:  weakness,impaired self care skills,impaired functional mobilty,gait instability,decreased lower extremity function,decreased coordination,decreased ROM,impaired coordination,impaired cardiopulmonary response to activity .    Rehab Potential is fair    Activity Tolerance: Fair    Plan:     Patient to be seen 6 x/week to address the above listed problems via gait training,therapeutic activities,therapeutic exercises,neuromuscular re-education    · Plan of Care Expires: 03/05/22  · Plan of Care Reviewed with: patient    Subjective     "I really don't want to stand, I'm too tired and weak" pt stated.     Pain/Comfort:  · Pain Rating 1: 0/10  · Pain Rating Post-Intervention 1: 0/10    Patient's cultural, spiritual, Mu-ism conflicts given the current situation:  · no    Objective:     Communicated with pt prior to session.  Patient found with bed in chair position with oxygen upon PT entry to room.     Therapeutic Activities and Exercises:   - " Bed Mobility training, sitting tolerance at EOB while unsupported - pt tolerated sitting for 7 mins with SBA while eating some of her lunch.     Functional Mobility:  · Bed Mobility:     · Scooting: maximal assistance  · Supine to Sit: moderate assistance  · Sit to Supine: moderate assistance  · Balance: Static sitting balance at EOB unsupported x 7 mins with SBA, to help promote core strength and stability and for proper positioning  for meals.    AM-PAC 6 CLICK MOBILITY  10    Patient left with bed in chair position with all lines intact, call button in reach and PCT notified.    GOALS:   Multidisciplinary Problems     Physical Therapy Goals        Problem: Physical Therapy Goal    Goal Priority Disciplines Outcome Goal Variances Interventions   Physical Therapy Goal     PT, PT/OT Ongoing, Progressing     Description: Goals to be met by: 22     Patient will increase functional independence with mobility by performin. Supine to sit with MInimal Assistance  2. Sit to supine with MInimal Assistance  3. Rolling to Left and Right with Minimal Assistance.  4. Sit to stand transfer with Minimal Assistance  5. Bed to chair transfer with Minimal Assistance using Rolling Walker  6. Gait  x 30 feet with Minimal Assistance using Rolling Walker.   7. Wheelchair propulsion x50 feet with Stand-by Assistance using bilateral uppper extremities                     Time Tracking:     PT Received On: 22  PT Total Time (min):   18    Billable Minutes: Therapeutic Activity 18    Treatment Type: Treatment  PT/PTA: PTA     PTA Visit Number: 3     2022

## 2022-02-07 NOTE — PLAN OF CARE
Problem: Oral Intake Inadequate (Acute Kidney Injury/Impairment)  Goal: Optimal Nutrition Intake  Outcome: Ongoing, Progressing   Recommendations     1. Continue puree diet with Boost Pudding TID and Boost Glucose Control TID.  Goals: 1. Pt's intake meals >50% by RD follow up.  Nutrition Goal Status: progressing towards goal  Communication of RD Recs: reviewed with physician     Assessment and Plan  Nutrition Problem  Chewing difficulty     Related to (etiology):   edentulous     Signs and Symptoms (as evidenced by):   Pt unable to tolerate regular texture diet due to difficulty chewing, has about 50% intake meals over last few days depending on how soft the food is.     Interventions(treatment strategy):  Collaboration of care with providers.  Texture-modified diet: minced & moist  Commercial beverage: Boost Glucose Control TID     Nutrition Diagnosis Status:   Continues

## 2022-02-07 NOTE — PLAN OF CARE
Daughter, Annette Lombard, contacted [facility] requesting information on her mother's progress. SW read Therapy notes and discussed HME needs. Daughter will attend Thursday's care Planning meeting/    Kaylynn Sesay [facility]  Case Management Department  [facility]  [email or web address]

## 2022-02-07 NOTE — PROGRESS NOTES
[facility]                                  Skilled Nursing Facility                   Progress Note     Admit Date: 2/2/2022  ION TBD 2/24/2022  Principal Problem:  Heart failure with reduced ejection fraction   HPI obtained from patient interview and chart review     Chief Complaint: Re-evaluation of medical treatment and therapy status    HPI:   Mrs. Curtis is a 89 year old female PMHx of  HTN, CKD (b/l Cr 2-2.5), HLD, hypothyroidism, obesity, PVD who presents to SNF following hospitalization for acute heart failure with reserved EF.  Admission to SNF for secondary weakness and debility.    Interval history:  Labs not collected-will reorder for tomorrow.  24 hr vital sign ranges listed below.  Patient resting comfortably in bed during my assessment.  Patient with very low verbal spontaneity and continues to appear very weak/frail and debilitated.  The patient had very poor PO intake currently on pureed diet.  Patient denies shortness of breath, abdominal discomfort, nausea, or vomiting.  Patient denies dysuria.  Patient reports having regular bowel movements.  Patient progessing with PT/OT- requiring moderate/maximal assistance for functional mobility. Continuing to follow and treat all acute and chronic conditions.    Past Medical History: Patient has a past medical history of AAA (abdominal aortic aneurysm), Anemia in CKD (chronic kidney disease), Arthritis, Bacteremia due to Escherichia coli (9/24/2020), Cataract, Class 1 obesity due to excess calories with serious comorbidity in adult (7/6/2017), Gout, chronic, Heart failure with reduced ejection fraction (1/29/2022), High cholesterol, Hypercapnic respiratory failure (1/28/2022), Hypertension, Hypothyroidism, Obesity, Plantar fasciitis, PVD (peripheral vascular disease), and Thyroid trouble.    Past Surgical History: Patient has a past surgical history that includes Hysterectomy; Skin  biopsy; Cholecystectomy; Cataract extraction (3/18/13); Eye surgery; Breast surgery (Left, 1972); Breast biopsy (Left); ERCP (N/A, 9/8/2020); and Endoscopic ultrasound of upper gastrointestinal tract (N/A, 9/8/2020).    Social History: Patient reports that she quit smoking about 20 years ago. She has a 30.00 pack-year smoking history. She has never used smokeless tobacco. She reports that she does not drink alcohol and does not use drugs.    Family History: family history includes Breast cancer in her sister; Cancer in her brother, sister, and sister; Cataracts in her son; Diabetes in her son and son; Glaucoma in her daughter and son; Heart disease in her brother; Lupus in her daughter; Meningitis in her son; Mental illness in her son; No Known Problems in her brother.    Allergies: Patient has No Known Allergies.    ROS  Constitutional: Negative for fever   Eyes: Negative for blurred vision, double vision   Respiratory: Negative for cough, shortness of breath   Cardiovascular: Negative for chest pain, palpitations, and leg swelling.   Gastrointestinal: Negative for abdominal pain, constipation, diarrhea, nausea, vomiting.   Genitourinary: Negative for dysuria, frequency   Musculoskeletal:  + generalized weakness. Negative for back pain and myalgias.   Skin: Negative for itching and rash.   Neurological: Negative for dizziness, headaches.   Psychiatric/Behavioral: Negative for depression. The patient is not nervous/anxious.      24 hour Vital Sign Range   Temp:  [96.7 °F (35.9 °C)-97.6 °F (36.4 °C)]   Pulse:  [80-92]   Resp:  [17-18]   BP: (136)/(64)   SpO2:  [94 %-100 %]     PEx  Constitutional: Patient appears debilitated.  No distress noted  HENT:   Head: Normocephalic and atraumatic.   Eyes: Pupils are equal, round  Neck: Normal range of motion. Neck supple.   Cardiovascular: Normal rate, regular rhythm and normal heart sounds.    Pulmonary/Chest: Effort normal and breath sounds are clear with diminished bases.   Supplemental O2 in progress.  Abdominal: Soft. Bowel sounds are normal.   Musculoskeletal: Normal range of motion.   Neurological: Alert and oriented to person, place, and time.   Psychiatric: Normal mood and affect. Behavior is normal.   Skin: Skin is warm and dry.     No results for input(s): GLUCOSE, NA, K, CL, CO2, BUN, CREATININE, MG in the last 24 hours.    Invalid input(s):  CALCIUM    No results for input(s): WBC, RBC, HGB, HCT, PLT, MCV, MCH, MCHC in the last 24 hours.      Assessment and Plan:    Advanced frailty  At risk for failure to thrive  At risk for malnutrition  Debility   - Continue with PT/OT for gait training and strengthening and restoration of ADL's   - Encourage mobility, OOB in chair, and early ambulation as appropriate  - Fall precautions   - Monitor for bowel and bladder dysfunction  - Monitor for and prevent skin breakdown and pressure ulcers  - continue DVT prophylaxis with  heparin 5 K q.8 hours  - 2/7 initiated palliative care consult      Heart failure with reduced ejection fraction  - TTE 1/31- EF 50%  - BMP improving  - Unable to optimize GDMT with aldosterone antagonist or ANRI due to low GFR  - Continue furosemide 40mg PO    Hypercapnic respiratory failure  -  CXR with right sided opacity that may not be consistent with volume overload alone, consider CT if respiratory function fails to improve w diuresis alone.  - Initially required 2L via NC O2, then transitioned safely to room air  - admitted to SNF with 1 L nasal cannula  - unable to obtain SpO2 on my portable pulse oximeter  - continue to wean O2 to room air     Essential hypertension  AAA (abdominal aortic aneurysm)  - continue nifedipine 30 mg daily ( home dose 60mg to be titrated up as renal function improves), Lasix 40 mg daily    CKD (chronic kidney disease), stage IV  - sCr baseline appears to be 1.7-2.0 range. Likelly new baseline of 2.0-2.5  - continue to monitor twice weekly BMPs, avoid nephrotoxic agents, renally  dose medications when appropriate    Anemia in CKD (chronic kidney disease)  - continue ferrous gluconate 324 mg  - continue to monitor twice weekly CBCs    Secondary hyperparathyroidism of renal origin  - PTH at baseline  - continue Calcitrol 0.25 mcg twice a week     Hypothyroidism  - Normal TSH  - Continue levothyroxine 88 mcg daily            Anticipate disposition:  Home with home health      Follow-up needed during SNF stay-    Follow-up needed after discharge from SNF: PCP, Cardiology    No future appointments.        Amanda Dangelo NP  Department of Hospital Medicine   [facility]     DOS: 2/7/2022       Patient note was created using MModal Dictation.  Any errors in syntax or even information may not have been identified and edited on initial review prior to signing this note.

## 2022-02-07 NOTE — SUBJECTIVE & OBJECTIVE
Interval History:   Patient seen while sitting upright in bed.  She is AAOx3, self, place and time.  Presently with 1LNC in place.  Denies SOB, insomnia, CP, or N/V.  GOC and code status discussions held with both patient at bedside and with daughter, Katia, via telephone, see A&P for details.     Past Medical History:   Diagnosis Date    AAA (abdominal aortic aneurysm)     Anemia in CKD (chronic kidney disease)     Arthritis     Bacteremia due to Escherichia coli 9/24/2020    Cataract     Class 1 obesity due to excess calories with serious comorbidity in adult 7/6/2017    Gout, chronic     Heart failure with reduced ejection fraction 1/29/2022    High cholesterol     Hypercapnic respiratory failure 1/28/2022    Hypertension     Hypothyroidism     Obesity     Plantar fasciitis     PVD (peripheral vascular disease)     Thyroid trouble        Past Surgical History:   Procedure Laterality Date    BREAST BIOPSY Left     BREAST SURGERY Left 1972    benign breast lump    CATARACT EXTRACTION  3/18/13    both eyes -     CHOLECYSTECTOMY      ENDOSCOPIC ULTRASOUND OF UPPER GASTROINTESTINAL TRACT N/A 9/8/2020    Procedure: ULTRASOUND, UPPER GI TRACT, ENDOSCOPIC;  Surgeon: Rasheed Balbuena MD;  Location: [facility] ([address] [place] [address]);  Service: Endoscopy;  Laterality: N/A;    ERCP N/A 9/8/2020    Procedure: ERCP (ENDOSCOPIC RETROGRADE CHOLANGIOPANCREATOGRAPHY);  Surgeon: Rasheed Balbuena MD;  Location: [facility] ([address] [place] [address]);  Service: Endoscopy;  Laterality: N/A;    EYE SURGERY      HYSTERECTOMY      SKIN BIOPSY      Left breast       Review of patient's allergies indicates:  No Known Allergies    Medications:  Continuous Infusions:  Scheduled Meds:   aspirin  81 mg Oral Daily    calcitRIOL  0.25 mcg Oral Every Mon, Fri    ferrous sulfate  1 tablet Oral Daily    furosemide  40 mg Oral Daily    heparin (porcine)  5,000 Units Subcutaneous Q8H    levothyroxine  88 mcg Oral Daily     multivitamin  1 tablet Oral Daily    NIFEdipine  30 mg Oral Daily    senna-docusate 8.6-50 mg  1 tablet Oral BID     PRN Meds:acetaminophen, calcium carbonate, melatonin    Family History     Problem Relation (Age of Onset)    Breast cancer Sister    Cancer Sister, Sister, Brother    Cataracts Son    Diabetes Son, Son    Glaucoma Son, Daughter    Heart disease Brother    Lupus Daughter    Meningitis Son    Mental illness Son    No Known Problems Brother        Tobacco Use    Smoking status: Former Smoker     Packs/day: 0.50     Years: 60.00     Pack years: 30.00     Quit date: 2001     Years since quittin.6    Smokeless tobacco: Never Used    Tobacco comment: quit in the    Substance and Sexual Activity    Alcohol use: No    Drug use: No    Sexual activity: Not Currently       Review of Systems   Constitutional: Positive for activity change and fatigue. Negative for fever.   HENT: Negative for congestion.    Respiratory: Negative for shortness of breath and wheezing.    Cardiovascular: Negative for leg swelling.   Gastrointestinal: Negative for nausea and vomiting.   Genitourinary: Negative for difficulty urinating and dysuria.   Musculoskeletal: Positive for arthralgias (generalized\).   Skin: Negative for wound.   Neurological: Positive for weakness (generalized). Negative for light-headedness and headaches.   Psychiatric/Behavioral: Negative for sleep disturbance. The patient is not nervous/anxious.      Objective:     Vital Signs (Most Recent):  Temp: 97.6 °F (36.4 °C) (22)  Pulse: 92 (22)  Resp: 17 (22)  BP: 136/64 (22)  SpO2: 100 % (22) Vital Signs (24h Range):  Temp:  [96.7 °F (35.9 °C)-97.6 °F (36.4 °C)] 97.6 °F (36.4 °C)  Pulse:  [80-92] 92  Resp:  [17-18] 17  SpO2:  [94 %-100 %] 100 %  BP: (136)/(64) 136/64     Weight: 64.4 kg (141 lb 15.6 oz)  Body mass index is 29.67 kg/m².    Physical Exam  Constitutional:       General: She is  not in acute distress.     Appearance: She is ill-appearing.   HENT:      Head: Normocephalic.      Comments: Bitemporal wasting noted     Nose: Nose normal.      Mouth/Throat:      Mouth: Mucous membranes are moist.   Eyes:      General:         Right eye: Discharge (clear) present.      Conjunctiva/sclera: Conjunctivae normal.   Pulmonary:      Effort: Pulmonary effort is normal. No respiratory distress.   Abdominal:      General: There is no distension.      Palpations: Abdomen is soft.   Musculoskeletal:         General: No swelling.   Skin:     General: Skin is warm and dry.   Neurological:      Mental Status: She is alert and oriented to person, place, and time.      Motor: Weakness (generalized) present.      Comments: AAO to self, time and place   Psychiatric:         Attention and Perception: Attention normal.         Mood and Affect: Mood normal.         Speech: Speech is delayed.         Behavior: Behavior is not agitated. Behavior is cooperative.         Cognition and Memory: Memory is impaired.       Review of Symptoms    Symptom Assessment (ESAS 0-10 Scale)  Pain:  0  Dyspnea:  2  Anxiety:  0  Nausea:  0  Depression:  0  Anorexia:  0  Fatigue:  0  Insomnia:  0  Restlessness:  0  Agitation:  0     CAM / Delirium:  Positive (mild)    Modified Sydnie Scale:  1    Performance Status:  50 (prior to hospitalization)    Functional Assessment Scale (FAST):  6a    Living Arrangements:  Lives with family    Psychosocial/Cultural: Lives with daughterKatia.  Has 4/5 living adult children, 4 male and 1 female.  Prior level of functioning includes being chairbound and watching her favorite game shows.  She did require considerable assistance with bathing and clothing herself.    Spiritual:  F - Isabel and Belief:  Joelle  I - Importance:  High  C - Community:  Stays in touch with her local   A - Address in Care:  Open to  visits        Advance Care Planning   Advance Directives:   Living Will: No         Oral Declaration: No    LaPOST: No    Do Not Resuscitate Status: Yes    Medical Power of : Yes    Agent's Name:  Annette Lombard   Agent's Contact Number:  [phone]    Decision Making:  Patient answered questions and Family answered questions    Significant Labs: All pertinent labs within the past 24 hours have been reviewed.  CBC:   Recent Labs   Lab 02/02/22  0439   WBC 7.00   HGB 10.8*   HCT 34.5*   MCV 94        BMP:  No results for input(s): GLU, NA, K, CL, CO2, BUN, CREATININE, CALCIUM, MG in the last 24 hours.  LFT:  Lab Results   Component Value Date    AST 41 (H) 01/28/2022    ALKPHOS 100 01/28/2022    BILITOT 0.4 01/28/2022     Albumin:   Albumin   Date Value Ref Range Status   01/28/2022 3.4 (L) 3.5 - 5.2 g/dL Final     Protein:   Total Protein   Date Value Ref Range Status   01/28/2022 7.3 6.0 - 8.4 g/dL Final     Lactic acid:   Lab Results   Component Value Date    LACTATE 1.2 09/08/2020    LACTATE 2.9 (H) 09/08/2020       Significant Imaging: I have reviewed all pertinent imaging results/findings within the past 24 hours.     Echo  · The left ventricle is normal in size with mildly decreased systolic   function.  · The estimated ejection fraction is 50% or slightly lower.  · Grade II left ventricular diastolic dysfunction.  · Normal right ventricular size with mildly reduced right ventricular   systolic function.  · Severe left atrial enlargement.  · The MR appears mild to mild-moderate ( 1-2+).  · Intermediate central venous pressure (8 mmHg).  · The estimated PA systolic pressure is 34 mmHg.  · Trivial posterolateral pericardial effusion. There is moderate under the   RA.  · There is abnormal septal wall motion.  · There are segmental left ventricular wall motion abnormalities.

## 2022-02-07 NOTE — CONSULTS
[facility] - Skilled Nursing  Palliative Medicine  Consult Note    Patient Name: Johanny Curtis  MRN: [id number]  Admission Date: 2/2/2022  Hospital Length of Stay: 5 days  Date of Service: 2/7/2022  Code Status: DNR   Attending Provider: Jovany Coyle MD  Consulting Provider: Dafne Barrios NP  Primary Care Physician: Leticia Weems MD  Principal Problem:Heart failure with reduced ejection fraction    Patient information was obtained from relative(s), past medical records and primary team.      Inpatient consult to Palliative Care  Consult performed by: Dafne Barrios NP  Consult ordered by: Amanda Dangelo NP        Assessment/Plan:     * Heart failure with reduced ejection fraction  Managed by primary team    Echo  Interpretation Summary  · The left ventricle is normal in size with mildly decreased systolic function.  · The estimated ejection fraction is 50% or slightly lower.  · Grade II left ventricular diastolic dysfunction.  · Normal right ventricular size with mildly reduced right ventricular systolic function.  · Severe left atrial enlargement.  · The MR appears mild to mild-moderate ( 1-2+).  · Intermediate central venous pressure (8 mmHg).  · The estimated PA systolic pressure is 34 mmHg.  · Trivial posterolateral pericardial effusion. There is moderate under the RA.  · There is abnormal septal wall motion.  · There are segmental left ventricular wall motion abnormalities.      Palliative care encounter  Impression:  89 year old female PMHx of  HTN, CKD (b/l Cr 2-2.5), HLD, hypothyroidism, obesity, PVD s/p hospitalization for acute heart failure with reserved EF.  Admission to SNF for secondary weakness and debility. Palliative Medicine team consulted for GOC discussions    1) Symptoms:    Dyspnea, mild- continue with supplemental oxygen as needed  Debility, stable:   - Continue with PT/OT for gait training and strengthening and restoration of ADL's   - Encourage mobility, OOB in chair,  and early ambulation as appropriate  - Fall precautions   - Monitor for bowel and bladder dysfunction  Delirium, stable- continue with delirium precautions:  - Avoid antihistamines, anticholinergics, hypnotics, and minimize opiates while controlling for pain as these medications may exacerbate delirium. Cues for day/night will assist with keeping patient calm and oriented - during daytime, please keep adequate light in room (open windows, lights on) and please keep room dim at night-time to encourage normal sleep-wake cycles. Continuing to have nursing and family reorient the patient and encourage family to visit  '     2) Medicolegal:  Patient does NOT have decision making capacity.  Children are surrogate decision maker.   Annette Lombard, daughter, has been designated as HCPOA.     3) Psychosocial:  Support system consists of Katia, daughter, and family     4) Spiritual: Zoroastrian    5) Prognostication: 89 year old female PMHx of  HTN, CKD (b/l Cr 2-2.5), HLD, hypothyroidism, obesity, PVD s/p hospitalization for acute heart failure with reserved EF.  Given age, comorbidities and frailty, prognosis is guarded.    6) Understanding of disease and Illness Trajectory: Daughter, Annette Lombard  has  adequate understanding of her illness, they can benefit from continued education on what to expect in the future.      7) Goals of care:    Advance Care Planning   Date: 02/07/2022    Power of   I initiated the process of advance care planning today and explained the importance of this process to the patient.  I introduced the concept of advance directives to the patient, as well. Then the patient received detailed information about the importance of designating a Health Care Power of  (HCPOA). She was also instructed to communicate with this person about their wishes for future healthcare, should she become sick and lose decision-making capacity. The patient has not previously appointed a HCPOA. After our  "discussion, the patient has decided to complete a HCPOA and has appointed her daughter, health care agent: Annette Lombard & health care agent number: [phone].    Code Status  In light of the patients advanced and life limiting illness,I engaged the the daughter, Annette Lombard in a conversation about the patient's preferences for care  at the very end of life. The patient wishes to have a natural, peaceful death and allowing "God to do his will.  If it's her time to go, then it's her time to go.  No need in getting involved and stopping what He intended to do".   Along those lines, the patient does not wish to have CPR or other invasive treatments performed when her heart and/or breathing stops. I communicated to the daughter, Annette Lombard that a DNR order would be placed in her medical record to reflect this preference.     [facility]  I engaged the patient and daughter, Annette Lombard in a conversation about advance care planning and we specifically addressed what the goals of care would be moving forward, in light of the patient's change in clinical status, specifically potential discharge after SNF.  We did specifically address the patient's likely prognosis, which is guarded.  We explored the patient's values and preferences for future care.  The daughter, Annette Lombard endorses that what is most important right now is to focus on remaining as independent as possible and improvement in condition but with limits to invasive therapies    Accordingly, we have decided that the best plan to meet the patient's goals includes continuing with treatment with the hopes of returning home and returning to prior level of functioning which was sitting in her chair watching her favorite game shows.    I did not explain the role for hospice care at this stage of the patient's illness, including its ability to help the patient live with the best quality of life possible.      Total time of the visit 70 minutes " (1411-0760)  55 minutes ( >50% of total time) spent on advance care planning.    8) Code status: DNR per family preference    9) Advance directives: None on file      Summary and recommendations:  Continue ongoing GOC discussions with patient and family    CKD (chronic kidney disease), stage IV  Baseline Cr 2-2.5  Managed by primary team    Essential hypertension  Managed by primary team      Thank you for your consult. I will follow-up with patient. Please contact us if you have any additional questions.    Subjective:     HPI:   Johanny Curtis is a 89 y.o. female past medical history of HTN, CKD (b/l Cr 2-2.5), HLD, hypothyroidism, obesity, PVD.  She is s/p hospitalization for acute heart failure with reserved EF.  She was admitted to SNF for secondary weakness and debility. Palliative Medicine team consulted for GOC discussions.    Hospital Course:  No notes on file    Interval History:   Patient seen while sitting upright in bed.  She is AAOx3, self, place and time.  Presently with 1LNC in place.  Denies SOB, insomnia, CP, or N/V.  GOC and code status discussions held with both patient at bedside and with daughter, Katia, via telephone, see A&P for details.     Past Medical History:   Diagnosis Date    AAA (abdominal aortic aneurysm)     Anemia in CKD (chronic kidney disease)     Arthritis     Bacteremia due to Escherichia coli 9/24/2020    Cataract     Class 1 obesity due to excess calories with serious comorbidity in adult 7/6/2017    Gout, chronic     Heart failure with reduced ejection fraction 1/29/2022    High cholesterol     Hypercapnic respiratory failure 1/28/2022    Hypertension     Hypothyroidism     Obesity     Plantar fasciitis     PVD (peripheral vascular disease)     Thyroid trouble        Past Surgical History:   Procedure Laterality Date    BREAST BIOPSY Left     BREAST SURGERY Left 1972    benign breast lump    CATARACT EXTRACTION  3/18/13    both eyes -      CHOLECYSTECTOMY      ENDOSCOPIC ULTRASOUND OF UPPER GASTROINTESTINAL TRACT N/A 2020    Procedure: ULTRASOUND, UPPER GI TRACT, ENDOSCOPIC;  Surgeon: Rasheed Balbuena MD;  [facility]  Service: Endoscopy;  Laterality: N/A;    ERCP N/A 2020    Procedure: ERCP (ENDOSCOPIC RETROGRADE CHOLANGIOPANCREATOGRAPHY);  Surgeon: Rasheed Balbuena MD;  Location: [facility]  Service: Endoscopy;  Laterality: N/A;    EYE SURGERY      HYSTERECTOMY      SKIN BIOPSY      Left breast       Review of patient's allergies indicates:  No Known Allergies    Medications:  Continuous Infusions:  Scheduled Meds:   aspirin  81 mg Oral Daily    calcitRIOL  0.25 mcg Oral Every Mon, Fri    ferrous sulfate  1 tablet Oral Daily    furosemide  40 mg Oral Daily    heparin (porcine)  5,000 Units Subcutaneous Q8H    levothyroxine  88 mcg Oral Daily    multivitamin  1 tablet Oral Daily    NIFEdipine  30 mg Oral Daily    senna-docusate 8.6-50 mg  1 tablet Oral BID     PRN Meds:acetaminophen, calcium carbonate, melatonin    Family History     Problem Relation (Age of Onset)    Breast cancer Sister    Cancer Sister, Sister, Brother    Cataracts Son    Diabetes Son, Son    Glaucoma Son, Daughter    Heart disease Brother    Lupus Daughter    Meningitis Son    Mental illness Son    No Known Problems Brother        Tobacco Use    Smoking status: Former Smoker     Packs/day: 0.50     Years: 60.00     Pack years: 30.00     Quit date: 2001     Years since quittin.6    Smokeless tobacco: Never Used    Tobacco comment: quit in the 90's   Substance and Sexual Activity    Alcohol use: No    Drug use: No    Sexual activity: Not Currently       Review of Systems   Constitutional: Positive for activity change and fatigue. Negative for fever.   HENT: Negative for congestion.    Respiratory: Negative for shortness of breath and wheezing.    Cardiovascular: Negative for leg swelling.   Gastrointestinal:  Negative for nausea and vomiting.   Genitourinary: Negative for difficulty urinating and dysuria.   Musculoskeletal: Positive for arthralgias (generalized\).   Skin: Negative for wound.   Neurological: Positive for weakness (generalized). Negative for light-headedness and headaches.   Psychiatric/Behavioral: Negative for sleep disturbance. The patient is not nervous/anxious.      Objective:     Vital Signs (Most Recent):  Temp: 97.6 °F (36.4 °C) (02/07/22 0837)  Pulse: 92 (02/07/22 0837)  Resp: 17 (02/07/22 0837)  BP: 136/64 (02/06/22 2000)  SpO2: 100 % (02/07/22 0837) Vital Signs (24h Range):  Temp:  [96.7 °F (35.9 °C)-97.6 °F (36.4 °C)] 97.6 °F (36.4 °C)  Pulse:  [80-92] 92  Resp:  [17-18] 17  SpO2:  [94 %-100 %] 100 %  BP: (136)/(64) 136/64     Weight: 64.4 kg (141 lb 15.6 oz)  Body mass index is 29.67 kg/m².    Physical Exam  Constitutional:       General: She is not in acute distress.     Appearance: She is ill-appearing.   HENT:      Head: Normocephalic.      Comments: Bitemporal wasting noted     Nose: Nose normal.      Mouth/Throat:      Mouth: Mucous membranes are moist.   Eyes:      General:         Right eye: Discharge (clear) present.      Conjunctiva/sclera: Conjunctivae normal.   Pulmonary:      Effort: Pulmonary effort is normal. No respiratory distress.   Abdominal:      General: There is no distension.      Palpations: Abdomen is soft.   Musculoskeletal:         General: No swelling.   Skin:     General: Skin is warm and dry.   Neurological:      Mental Status: She is alert and oriented to person, place, and time.      Motor: Weakness (generalized) present.      Comments: AAO to self, time and place   Psychiatric:         Attention and Perception: Attention normal.         Mood and Affect: Mood normal.         Speech: Speech is delayed.         Behavior: Behavior is not agitated. Behavior is cooperative.         Cognition and Memory: Memory is impaired.       Review of Symptoms    Symptom Assessment  (ESAS 0-10 Scale)  Pain:  0  Dyspnea:  2  Anxiety:  0  Nausea:  0  Depression:  0  Anorexia:  0  Fatigue:  0  Insomnia:  0  Restlessness:  0  Agitation:  0     CAM / Delirium:  Positive (mild)    Modified Sydnie Scale:  1    Performance Status:  50 (prior to hospitalization)    Functional Assessment Scale (FAST):  6a    Living Arrangements:  Lives with family    Psychosocial/Cultural: Lives with daughterCordelia  Has 4/5 living adult children, 4 male and 1 female.  Prior level of functioning includes being chairbound and watching her favorite game shows.  She did require considerable assistance with bathing and clothing herself.    Spiritual:  F - Isabel and Belief:  Joelle  I - Importance:  High  C - Community:  Stays in touch with her local   A - Address in Care:  Open to  visits        Advance Care Planning   Advance Directives:   Living Will: No        Oral Declaration: No    LaPOST: No    Do Not Resuscitate Status: Yes    Medical Power of : Yes    Agent's Name:  Annette Lombard   Agent's Contact Number:  [phone]    Decision Making:  Patient answered questions and Family answered questions    Significant Labs: All pertinent labs within the past 24 hours have been reviewed.  CBC:   Recent Labs   Lab 02/02/22  0439   WBC 7.00   HGB 10.8*   HCT 34.5*   MCV 94        BMP:  No results for input(s): GLU, NA, K, CL, CO2, BUN, CREATININE, CALCIUM, MG in the last 24 hours.  LFT:  Lab Results   Component Value Date    AST 41 (H) 01/28/2022    ALKPHOS 100 01/28/2022    BILITOT 0.4 01/28/2022     Albumin:   Albumin   Date Value Ref Range Status   01/28/2022 3.4 (L) 3.5 - 5.2 g/dL Final     Protein:   Total Protein   Date Value Ref Range Status   01/28/2022 7.3 6.0 - 8.4 g/dL Final     Lactic acid:   Lab Results   Component Value Date    LACTATE 1.2 09/08/2020    LACTATE 2.9 (H) 09/08/2020       Significant Imaging: I have reviewed all pertinent imaging results/findings within the past 24  hours.     Echo  · The left ventricle is normal in size with mildly decreased systolic   function.  · The estimated ejection fraction is 50% or slightly lower.  · Grade II left ventricular diastolic dysfunction.  · Normal right ventricular size with mildly reduced right ventricular   systolic function.  · Severe left atrial enlargement.  · The MR appears mild to mild-moderate ( 1-2+).  · Intermediate central venous pressure (8 mmHg).  · The estimated PA systolic pressure is 34 mmHg.  · Trivial posterolateral pericardial effusion. There is moderate under the   RA.  · There is abnormal septal wall motion.  · There are segmental left ventricular wall motion abnormalities.           Dafne Barrios NP  Palliative Medicine  [facility] - Skilled Nursing

## 2022-02-07 NOTE — PT/OT/SLP PROGRESS
"Occupational Therapy   Treatment    Name: Johanny Curtis  MRN: [id number]  Admit Date: 2/2/2022  Admitting Diagnosis:  Heart failure with reduced ejection fraction    General Precautions: Standard, fall,aspiration   Orthopedic Precautions:N/A   Braces:       Recommendations:     Discharge Recommendations: home health OT  Level of Assistance Recommended at Discharge: 24 hours physical assistance for all ADL's and home management tasks  Discharge Equipment Recommendations:   (TBD)  Barriers to discharge:  Inaccessible home environment    Assessment:     Johanny Curtis is a 89 y.o. female with a medical diagnosis of Heart failure with reduced ejection fraction .  She presents with performance deficits affecting function are weakness,impaired endurance,impaired sensation,impaired self care skills,impaired functional mobility,gait instability,impaired balance,impaired cognition,decreased coordination,decreased upper extremity function,decreased lower extremity function,decreased safety awareness,pain,decreased ROM,impaired coordination,impaired cardiopulmonary response to activity. Pt tolerated tx session poorly on today. Pt tolerated 17 min's EOB with SBA.  Pt made moaning noises throughout session but denied any pain. Pt perform oral and hair care seated at EOB with Min A due to fatigue. Pt perform bed mobility with Mod A on today. Pt decline BED >CHAIR  transfer or Side steps at EOB due to fatigue. Pt educated on the benefits of OOB activities. Pt will continue to benefit from skilled OT to improve towards goals    Rehab Potential is fair    Activity tolerance:  Poor    Plan:     Patient to be seen 6 x/week to address the above listed problems via self-care/home management,therapeutic activities,therapeutic exercises,neuromuscular re-education,cognitive retraining    · Plan of Care Expires: 03/03/22  · Plan of Care Reviewed with: patient    Subjective     Communicated with: Gena prior to session. "Hello". A client " care conference was performed between the PADMINIR and SHILO, prior to treatment to discuss the patient's status, treatment plan and established goals.     Pain/Comfort:  Pain Rating 1: 0/10  Pain Rating Post-Intervention 1: 0/10    Patient's cultural, spiritual, Episcopal conflicts given the current situation:  no    Objective:     Patient found HOB elevated with oxygen upon OT entry to room.    Bed Mobility:    · Patient completed Rolling/Turning to Right with moderate assistance  · Patient completed Scooting/Bridging with moderate assistance with use of draw sheet  · Patient completed Sit to Supine with maximal assistance to guide BLE into bed    Functional Mobility/Transfers:  · N/A    Activities of Daily Living:  · Grooming: minimum assistance to perform combing hair seated at EOB. Pt perform oral care (Mouth Wash Only) seated at EOB with Sup. Pt required increase time with task due to very slow movements.    [facility] 6 Click ADL: 12     Treatment & Education:    · Pt completed ADLs and func mobility activities for tx session as noted above    Pt educated on importance and benefits of OOB activities .    · Pt educated on role of OT and POC    Patient left supine with call button in reach and RN presentEducation:      GOALS:   Multidisciplinary Problems     Occupational Therapy Goals        Problem: Occupational Therapy Goal    Goal Priority Disciplines Outcome Interventions   Occupational Therapy Goal     OT, PT/OT Ongoing, Progressing    Description: Goals to be met by: 2/24/22     Patient will increase functional independence with ADLs by performing:    Feeding with Set-up Assistance.  UE Dressing with Minimal Assistance.  LE Dressing with Moderate Assistance.  Grooming while seated at sink with Minimal Assistance.  Toileting from toilet or BSC with Moderate Assistance for hygiene and clothing management.   Bathing from  sitting at sink with Moderate Assistance.  Supine to sit with Stand-by Assistance.  Stand pivot  transfers with Minimal Assistance with RW to steady.  Toilet transfer to toilet or BSC with Minimal Assistance using RW.  Upper extremity exercise with assistance as needed.  Caregiver will be educated on level of assist required to safely perform self care tasks and functional transfers..                    Time Tracking:     OT Date of Treatment: OT Date of Treatment: 02/07/22  OT Total Time (min): Total Time (min): 23 min    Billable Minutes:Self Care/Home Management 23 2/7/2022

## 2022-02-07 NOTE — CONSULTS
[facility] - Skilled Nursing  Adult Nutrition  Consult Note    SUMMARY     Recommendations    1. Continue puree diet with Boost Pudding TID and Boost Glucose Control TID.  Goals: 1. Pt's intake meals >50% by RD follow up.  Nutrition Goal Status: progressing towards goal  Communication of RD Recs: reviewed with physician    Assessment and Plan  Nutrition Problem  Chewing difficulty     Related to (etiology):   edentulous     Signs and Symptoms (as evidenced by):   Pt unable to tolerate regular texture diet due to difficulty chewing, has about 50% intake meals over last few days depending on how soft the food is.     Interventions(treatment strategy):  Collaboration of care with providers.  Texture-modified diet: minced & moist  Commercial beverage: Boost Glucose Control TID     Nutrition Diagnosis Status:   Continues       Malnutrition Assessment     Teeth (Micronutrient):  (pt is edentulous)           Orbital Region (Subcutaneous Fat Loss): well nourished  Upper Arm Region (Subcutaneous Fat Loss): well nourished  Thoracic and Lumbar Region: well nourished   Mormon Region (Muscle Loss): moderate depletion  Clavicle Bone Region (Muscle Loss): well nourished  Clavicle and Acromion Bone Region (Muscle Loss): well nourished  Scapular Bone Region (Muscle Loss): well nourished  Dorsal Hand (Muscle Loss): mild depletion  Patellar Region (Muscle Loss): well nourished  Anterior Thigh Region (Muscle Loss): well nourished  Posterior Calf Region (Muscle Loss): well nourished   Edema (Fluid Accumulation): 0-->no edema present             Reason for Assessment    Reason For Assessment: consult  Diagnosis:  (THAD)  Relevant Medical History: HTN, CKD, HLD, hypothyroidism, gout  Interdisciplinary Rounds: attended  General Information Comments: Pt with about 50% intake meals over last 5 days. Pt downgraded to minced & moist diet on 2/03 as she is edentulous and having difficulty chewing; today nurse noticed she was also having  "difficulty with thin liquids and SLP consult completed this morning. Pt continues on thin liquids but was downgraded to puree diet. Pt noted with temporal wasting per NFPE 2/03 but wt was stable and she is otherwise nourished. Current wt may be incorrect, requesting reweigh. Toni is considering hospice for pt.  Nutrition Discharge Planning: unable to assess at this time    Nutrition Risk Screen    Nutrition Risk Screen: no indicators present    Nutrition/Diet History    Patient Reported Diet/Restrictions/Preferences: general  Spiritual, Cultural Beliefs, Hoahaoism Practices, Values that Affect Care: no    Anthropometrics    Temp: 97.6 °F (36.4 °C)  Height Method: Stated  Height: 4' 10" (147.3 cm)  Height (inches): 58 in  Weight Method:  (Bed scale)  Weight: 64.4 kg (141 lb 15.6 oz)  Weight (lb): 141.98 lb  Ideal Body Weight (IBW), Female: 90 lb  % Ideal Body Weight, Female (lb): 182.99 %  BMI (Calculated): 29.7       Lab/Procedures/Meds    Pertinent Labs Reviewed: reviewed  Pertinent Labs Comments: A1c 4.9%, K 3.4, Cl 91, CO2 40, BUN 47, Cre 2.3, eGFR 20.8  Pertinent Medications Reviewed: reviewed  Pertinent Medications Comments: lasix, vitamin D, senna, colace, MVI, ferrous sulfate    Estimated/Assessed Needs    Weight Used For Calorie Calculations: 74.7 kg (164 lb 10.9 oz)  Energy Calorie Requirements (kcal): 1157 kcal  Energy Need Method: [place]-St Jeor (PAL 1.10)  Protein Requirements: 67-82g  Weight Used For Protein Calculations: 74.7 kg (164 lb 10.9 oz)        RDA Method (mL): 1157         Nutrition Prescription Ordered    Current Diet Order: puree  Oral Nutrition Supplement: Boost Pudding TID, Boost Glucose Control TID    Evaluation of Received Nutrient/Fluid Intake    Comments: last BM 2/06  % Intake of Estimated Energy Needs: 75 - 100 %  % Meal Intake: 50 - 75 %    Nutrition Risk    Level of Risk/Frequency of Follow-up: high       Monitor and Evaluation    Food and Nutrient Intake: energy intake,food and " beverage intake  Food and Nutrient Adminstration: diet order  Knowledge/Beliefs/Attitudes: food and nutrition knowledge/skill  Anthropometric Measurements: weight,weight change  Biochemical Data, Medical Tests and Procedures: electrolyte and renal panel,gastrointestinal profile,glucose/endocrine profile,inflammatory profile,lipid profile  Nutrition-Focused Physical Findings: overall appearance,extremities, muscles and bones,head and eyes,skin       Nutrition Follow-Up    RD Follow-up?: Yes

## 2022-02-08 NOTE — PT/OT/SLP PROGRESS
"Speech Language Pathology  Treatment    Johanny Curtis   MRN: [id number]   Admitting Diagnosis: Heart failure with reduced ejection fraction    Diet recommendations: Solid Diet Level: Puree  Liquid Diet Level: Thin 1 bite/sip at a time, Alternating bites/sips, Eliminate distractions, HOB to 90 degrees, Meds whole 1 at a time, Monitor for s/s of aspiration, Remain upright 30 minutes post meal, Small bites/sips and Standard aspiration precautions    SLP Treatment Date: 02/08/22  Speech Start Time: 0816     Speech Stop Time: 0835     Speech Total (min): 19 min       TREATMENT BILLABLE MINUTES:  Treatment Swallowing Dysfunction 10 and Self Care/Home Management Training 9           General Precautions: Standard, aspiration,fall,pureed diet,respiratory          Subjective:  "I like that milk" Pt stated this after intake of thin liquids    Pain/Comfort  Pain Rating 1: 0/10    Objective:      Pt was awake, alert and resting upon entry. SLP adjusted HOB upright for po trial. Pt continues to show general weakness but she did fully participated. Pt demonstrated appropriate bolus control, bolus containment, strength, and adequate hyolaryngeal elevation and excursion w/ pureed eggs x 4, pureed grits x 5 w/ all thin liquids. SLP noted adequate timing oral transit and sw initiation, no excessive chewing, or s/s of asp. Pt consumed 4 oz of apple juice w/o any overt s/s of asp. SLP noted x 2 signs of throat clearance w/ 1 oz of whole milk. SLP provided education on role, po trials, diet recs, precautions and ongoing level of diet appropriateness. Pt denied any further q's or comments, and consenting to POC.     Assessment:  Johanny Curtis is a 89 y.o. female with a medical diagnosis of Heart failure with reduced ejection fraction and presents with Dyshagia.      Discharge recommendations: Discharge Facility/Level of Care Needs: other (see comments) (tbd)     Goals:   Multidisciplinary Problems     SLP Goals        Problem: SLP Goal "    Goal Priority Disciplines Outcome   SLP Goal     SLP Ongoing, Progressing   Description: Speech Language Pathology Goals  Goals expected to be met by 02/14  1. Pt will tolerate puree/thin liquid diet w/o any overt s/s of aspiration.   2. Pt will continue to participate with ongoing po trials to determine appropriateness of further diet advancement.                             Plan:   Patient to be seen Therapy Frequency: 3 x/week  Planned Interventions:    Plan of Care expires:    Plan of Care reviewed with: patient  SLP Follow-up?: Yes  SLP - Next Visit Date: 02/10/22        JEANINE Morales        02/08/2022

## 2022-02-08 NOTE — PT/OT/SLP PROGRESS
"Occupational Therapy   Treatment    Name: Johanny Curtis  MRN: [id number]  Admit Date: 2/2/2022  Admitting Diagnosis:  Heart failure with reduced ejection fraction    General Precautions: Standard, aspiration,fall,pureed diet,respiratory   Orthopedic Precautions:N/A   Braces:       Recommendations:     Discharge Recommendations: home health OT  Level of Assistance Recommended at Discharge: 24 hours physical assistance for all ADL's and home management tasks  Discharge Equipment Recommendations:   (TBD)  Barriers to discharge:  Inaccessible home environment    Assessment:     Johanny Curtis is a 89 y.o. female with a medical diagnosis of Heart failure with reduced ejection fraction .  She presents with performance deficits affecting function are weakness,impaired endurance,impaired sensation,impaired self care skills,impaired functional mobility,gait instability,impaired balance,impaired cognition,decreased coordination,decreased upper extremity function,decreased lower extremity function,decreased safety awareness,pain,decreased ROM,impaired coordination,impaired cardiopulmonary response to activity.Pt tolerated tx fair on today. Pt sat at EOB 25 min's with SBA. Pt perform GH with set up/at raise table with SBA. Pt perform hair care seated at EOB with SBA. Pt performed bed mobility with Mod -Max A on today's session. Pt5 will continue to benefit from skilled OT to improve toward goals.      Rehab Potential is good    Activity tolerance:  Good    Plan:     Patient to be seen 6 x/week to address the above listed problems via self-care/home management,therapeutic activities,therapeutic exercises,neuromuscular re-education,cognitive retraining    · Plan of Care Expires: 03/03/22  · Plan of Care Reviewed with: patient    Subjective     Communicated with: Gena prior to session. "Hello" .    Pain/Comfort:  Pain Rating 1: 0/10  Pain Rating Post-Intervention 1: 0/10    Patient's cultural, spiritual, Church conflicts given " the current situation:  no    Objective:     Patient found supine with oxygen upon OT entry to room.    Bed Mobility:    · Patient completed Rolling/Turning to Left with  moderate assistance  · Patient completed Rolling/Turning to Right with moderate assistance  · Patient completed Scooting/Bridging with maximal assistance to scoot and get BLE flat onto ground and Mod A to scoot to HOB in supine.  · Patient completed Supine to Sit with maximal assistance to guide UE in upright position   · Patient completed Sit to Supine with maximal assistance to guide BLE into bed    Functional Mobility/Transfers:  · Functional Mobility: Pt tolerated EOB sitting for 25 min's with SBA     Activities of Daily Living:  · Grooming: stand by assistance to perform oral/hair care seated at EOB    AMPAC 6 Click ADL: 12    Treatment & Education:    · Pt completed ADLs and func mobility activities for tx session as noted above    · Pt educated on role of OT and POC    Patient left supine with call button in reachEducation:      GOALS:   Multidisciplinary Problems     Occupational Therapy Goals        Problem: Occupational Therapy Goal    Goal Priority Disciplines Outcome Interventions   Occupational Therapy Goal     OT, PT/OT Ongoing, Progressing    Description: Goals to be met by: 2/24/22     Patient will increase functional independence with ADLs by performing:    Feeding with Set-up Assistance.  UE Dressing with Minimal Assistance.  LE Dressing with Moderate Assistance.  Grooming while seated at sink with Minimal Assistance.  Toileting from toilet or BSC with Moderate Assistance for hygiene and clothing management.   Bathing from  sitting at sink with Moderate Assistance.  Supine to sit with Stand-by Assistance.  Stand pivot transfers with Minimal Assistance with RW to steady.  Toilet transfer to toilet or BSC with Minimal Assistance using RW.  Upper extremity exercise with assistance as needed.  Caregiver will be educated on level of  assist required to safely perform self care tasks and functional transfers..                    Time Tracking:     OT Date of Treatment: OT Date of Treatment: 02/08/22  OT Total Time (min): Total Time (min): 33 min    Billable Minutes:Self Care/Home Management 33    2/8/2022

## 2022-02-08 NOTE — PT/OT/SLP PROGRESS
"Physical Therapy Treatment    Patient Name:  Johanny Curtis   MRN:  [id number]  Admit Date: 2/2/2022  Admitting Diagnosis: Heart failure with reduced ejection fraction  Recent Surgeries:     General Precautions: Standard, aspiration,fall   Orthopedic Precautions:N/A   Braces:       Recommendations:     Discharge Recommendations:  home with home health   Level of Assistance Recommended at Discharge: 24 hours significant assistance  Discharge Equipment Recommendations:  (TBD)   Barriers to discharge: Inaccessible home environment    Assessment:     Johanny Curtis is a 89 y.o. female admitted with a medical diagnosis of Heart failure with reduced ejection fraction . Pt tolerated fair, significant decrease overall functional strength and endurance, pt would continue to benefit from skilled PT services to improve overall functional mobility, strength and endurance.  .      Performance deficits affecting function:  weakness,impaired self care skills,impaired functional mobilty,gait instability,decreased lower extremity function,decreased coordination,decreased ROM,impaired coordination,impaired cardiopulmonary response to activity .    Rehab Potential is good and fair    Activity Tolerance: Fair and Poor    Plan:     Patient to be seen 6 x/week to address the above listed problems via gait training,therapeutic activities,therapeutic exercises,neuromuscular re-education    · Plan of Care Expires: 03/05/22  · Plan of Care Reviewed with: patient    Subjective     "alright".     Pain/Comfort:  · Pain Rating 1: 0/10  · Pain Rating Post-Intervention 1: 0/10    Patient's cultural, spiritual, Moravian conflicts given the current situation:  · no    Objective:     Communicated with justen Paul prior to session. Clear for therapy, aware pt moaning, no rets of pain or any discomfort  Patient found with oxygen (2L) upon PT entry to room.     Therapeutic Activities and Exercises: sitting EOB CGA x 6 reps LAQ, AP  Supine AA/PROM BLE " hip/flex/ext/abd    Functional Mobility:  · Bed Mobility:     · Supine to Sit: moderate assistance, maximal assistance and of 2 persons  · Sit to Supine: moderate assistance, maximal assistance and of 2 persons  · Balance: sat EOB CGA ~ 7 minutes,  some conversation,occ smile and laugh A to drink, moaning off/on no repts of pain, limited EOB 2* to fatigue wanting to lay down    AM-PAC 6 CLICK MOBILITY  10    Patient left supine with all lines intact, call button in reach and belongings in reach.    GOALS:   Multidisciplinary Problems     Physical Therapy Goals        Problem: Physical Therapy Goal    Goal Priority Disciplines Outcome Goal Variances Interventions   Physical Therapy Goal     PT, PT/OT Ongoing, Progressing     Description: Goals to be met by: 22     Patient will increase functional independence with mobility by performin. Supine to sit with MInimal Assistance  2. Sit to supine with MInimal Assistance  3. Rolling to Left and Right with Minimal Assistance.  4. Sit to stand transfer with Minimal Assistance  5. Bed to chair transfer with Minimal Assistance using Rolling Walker  6. Gait  x 30 feet with Minimal Assistance using Rolling Walker.   7. Wheelchair propulsion x50 feet with Stand-by Assistance using bilateral uppper extremities                     Time Tracking:     PT Received On: 22  PT Total Time (min):       Billable Minutes: Therapeutic Activity 21    Treatment Type: Treatment  PT/PTA: PTA     PTA Visit Number: 4     2022

## 2022-02-08 NOTE — PT/OT/SLP PROGRESS
Physical Therapy      Patient Name:  Johanny Curtis   MRN:  [id number]    Patient POC reduced to 5x/week as pt cannot tolerate 6x/week at this time.    Nesha Pollock, PT  2/8/2022

## 2022-02-09 NOTE — PT/OT/SLP PROGRESS
Occupational Therapy   Treatment    Name: Johanny Curtis  MRN: [id number]  Admit Date: 2/2/2022  Admitting Diagnosis:  Heart failure with reduced ejection fraction    General Precautions: Standard, aspiration,fall   Orthopedic Precautions:N/A   Braces:       Recommendations:     Discharge Recommendations: home health OT  Level of Assistance Recommended at Discharge: 24 hours physical assistance for all ADL's and home management tasks  Discharge Equipment Recommendations:   (TBD)  Barriers to discharge:  Inaccessible home environment    Assessment:     Johanny Curtis is a 89 y.o. female with a medical diagnosis of Heart failure with reduced ejection fraction .  She presents with performance deficits affecting function are    weakness,impaired endurance,impaired sensation,impaired self care skills,impaired functional mobility,gait instability,impaired balance,impaired cognition,decreased coordination,decreased upper extremity function,decreased lower extremity function,decreased safety awareness,pain,decreased ROM,impaired coordination,impaired cardiopulmonary response to activity. Pt participated in tx session on today. Pt tolerated EOB 19 min's with SBA. Pt stated mikhail rear hurting on today , RN notified, Pt required Total A x 2 for sit to stand with 3 trails. Pt unable to straighten legs to adjust posture or take steps backwards to adjust self at EOB. Pt required Max A x2 to perform sit to supine. Pt adjusted in bed to relive pain from rear and seated in bed in a chair position. Pt will continue to benefit from skilled OT to improve towards goals.    Rehab Potential is good    Activity tolerance:  Good    Plan:     Patient to be seen 6 x/week to address the above listed problems via self-care/home management,therapeutic activities,therapeutic exercises,neuromuscular re-education,cognitive retraining    · Plan of Care Expires: 03/03/22  · Plan of Care Reviewed with: patient    Subjective     Communicated with: Gena  "prior to session. "Hello" .    Pain/Comfort:  Pain Rating 1: 0/10  Pain Rating Post-Intervention 1: 0/10    Patient's cultural, spiritual, Restoration conflicts given the current situation:  no    Objective:     Patient found supine with oxygen upon OT entry to room.    Bed Mobility:    · Patient completed Rolling/Turning to Left with  moderate assistance  · Patient completed Rolling/Turning to Right with moderate assistance  · Patient completed Scooting/Bridging with minimum assistance  · Patient completed Supine to Sit with moderate assistance  · Patient completed Sit to Supine with maximal assistance     Functional Mobility/Transfers:  · Patient completed Sit <> Stand Transfer with maximal assistance and of 2 persons  with  hand-held assist   · Functional Mobility: Not tested    AMPAC 6 Click ADL: 12    OT Exercises: AROM . 2# dowel 2 x 10 reps of shd flex, bicep curls horz adb/add and forward flex motion to increase BUE ROM and strength,.     Treatment & Education:    · Pt completed ADLs and func mobility activities for tx session as noted above    · Pt educated on role of OT and POC    Patient left with bed in chair position with call button in reachEducation:      GOALS:   Multidisciplinary Problems     Occupational Therapy Goals        Problem: Occupational Therapy Goal    Goal Priority Disciplines Outcome Interventions   Occupational Therapy Goal     OT, PT/OT Ongoing, Progressing    Description: Goals to be met by: 2/24/22     Patient will increase functional independence with ADLs by performing:    Feeding with Set-up Assistance.  UE Dressing with Minimal Assistance.  LE Dressing with Moderate Assistance.  Grooming while seated at sink with Minimal Assistance.  Toileting from toilet or BSC with Moderate Assistance for hygiene and clothing management.   Bathing from  sitting at sink with Moderate Assistance.  Supine to sit with Stand-by Assistance.  Stand pivot transfers with Minimal Assistance with RW to " steady.  Toilet transfer to toilet or BSC with Minimal Assistance using RW.  Upper extremity exercise with assistance as needed.  Caregiver will be educated on level of assist required to safely perform self care tasks and functional transfers..                    Time Tracking:     OT Date of Treatment: OT Date of Treatment: 02/09/22  OT Total Time (min): Total Time (min): 23 min    Billable Minutes:Self Care/Home Management 10  Therapeutic Exercise 13    2/9/2022

## 2022-02-09 NOTE — TELEPHONE ENCOUNTER
Care Due:                  Date            Visit Type   Department     Provider  --------------------------------------------------------------------------------                                [facility] INTERNAL  Last Visit: 04-      FOLLOW UP    MEDICINE       Leticia Weems  Next Visit: None Scheduled  None         None Found                                                            Last  Test          Frequency    Reason                     Performed    Due Date  --------------------------------------------------------------------------------    Office Visit  12 months..  levothyroxine............  04- 04-    TSH.........  12 months..  levothyroxine............  04- 04-    Powered by [employer]. Reference number: [id number].   1/25/2022 10:14:16 AM CST  
Encounter details require adjustment(s)/ updating by ORC Staff  As of this time Protocols and CDM: did not populate or display   Adjustment(s) made: Department  CDM should display. Medication(s) delegated by the OR.  Will resend refill request encounter to P Centralized Refill Staff Pool.   [facility]   Note composed:10:13 AM 01/25/2022        
This Rx Request does not qualify for assessment with the OR.    Please review protocol details and the Care Due Message for extra clinical information    Reasons Rx Request may be deferred:  1. Labs/Vitals overdue  2. Labs/Vitals abnormal  3. Patient has been seen in the ED/Hospital since the last PCP visit  4. Medication is non-delegated  5. Provider is a non-participating provider     
Breath sounds clear and equal bilaterally.
No

## 2022-02-09 NOTE — PLAN OF CARE
Problem: Adult Inpatient Plan of Care  Goal: Plan of Care Review  Outcome: Ongoing, Progressing  Goal: Patient-Specific Goal (Individualized)  Outcome: Ongoing, Progressing     Problem: Fluid and Electrolyte Imbalance (Acute Kidney Injury/Impairment)  Goal: Fluid and Electrolyte Balance  Outcome: Ongoing, Progressing     Problem: Oral Intake Inadequate (Acute Kidney Injury/Impairment)  Goal: Optimal Nutrition Intake  Outcome: Ongoing, Progressing     Problem: Renal Function Impairment (Acute Kidney Injury/Impairment)  Goal: Effective Renal Function  Outcome: Ongoing, Progressing     Problem: Infection  Goal: Absence of Infection Signs and Symptoms  Outcome: Ongoing, Progressing     Problem: Fall Injury Risk  Goal: Absence of Fall and Fall-Related Injury  Outcome: Ongoing, Progressing     Problem: Skin Injury Risk Increased  Goal: Skin Health and Integrity  Outcome: Ongoing, Progressing     Problem: Coping Ineffective  Goal: Effective Coping  Outcome: Ongoing, Progressing

## 2022-02-09 NOTE — H&P
"Hospital Medicine  Skilled Nursing Facility   History and Physical Exam      Date of Service: 02/09/2022      Patient Name: Johanny Curtis  MRN: [id number]  Admission Date: 2/2/2022  Attending Physician: Jovany Coyle MD  Primary Care Provider: Leticia Weems MD  Code Status: DNR (Do Not Resuscitate)      Principal problem:  Heart failure with reduced ejection fraction      Chief Complaint:   Chief Complaint   Patient presents with    Congestive Heart Failure     Admitted to [facility] for rehabilitation following hospital stay for acute on chronic heart failure exacerbation.           HPI:   "Mrs. Curtis is a 89 year old female PMHx of  HTN, CKD (b/l Cr 2-2.5), HLD, hypothyroidism, obesity, PVD who presents to SNF following hospitalization for acute heart failure with reserved EF.  Admission to SNF for secondary weakness and debility.     [place] [facility] [place] with somnolence, oliguria and THAD on CKD. No known cardiac disease at baseline. Presenting with a week per daughter of fatigue and decreased urine output. No known chest pain or decreased exercise tolerance though functional capacity sounds limited at baseline. Went to urgent care and then encouraged to come to ED when Cr elevated to 3.2. BNP 4130. JVD elevated on exam w lower extremity edema. Bedside TTE with reduced LVEF ~40% with no segmental WMA. Was given IV Lasix 40 x 1 with limited UOP before transitioning to lasix drip with increased urinary output since. Patient on furosemide drip 10mg/hr, producing 150-200 ml / hr, transitioning from BiPap to NC as tolerated. Improving urine output on Furosemide drip, transitioned to Furosemide 40mg IV TID. Formal ECHO showed estimated ejection fraction around 50%, normal LV size with mildly decreased systolic function, and grade II left ventricular diastolic dysfunction. Furosemide IV transitioned to Furosemide 40 BID. Repeat BNP showed improvement to 793. Creatinine improved to " "2.3(around baseline).     Today, patient without any major complaints.  She is oriented x3 but was unable to state her age.  Patient denies any pain but moaned when moved from side to side to check skin.  Patient utilizing 1 L nasal cannula, attempted to remove oxygen and obtain SpO2, could not get reading on portable pulse oximeter.  SpO2 noted to be 95% this morning." per Amanda Dangelo NP on 2/3/22.     She is doing well today. Says that she has occasional constipation, but had a good BM yesterday. Denies any shortness of breath currently. She does not have any other specific complaints today.     Patient admitted with skilled services with PT and OT to improve functional status and ability to perform ADLs.       Past Medical History:   Past Medical History:   Diagnosis Date    AAA (abdominal aortic aneurysm)     Anemia in CKD (chronic kidney disease)     Arthritis     Bacteremia due to Escherichia coli 9/24/2020    Cataract     Class 1 obesity due to excess calories with serious comorbidity in adult 7/6/2017    Gout, chronic     Heart failure with reduced ejection fraction 1/29/2022    High cholesterol     Hypercapnic respiratory failure 1/28/2022    Hypertension     Hypothyroidism     Obesity     Plantar fasciitis     PVD (peripheral vascular disease)     Thyroid trouble        Past Surgical History:   Past Surgical History:   Procedure Laterality Date    BREAST BIOPSY Left     BREAST SURGERY Left 1972    benign breast lump    CATARACT EXTRACTION  3/18/13    both eyes -     CHOLECYSTECTOMY      ENDOSCOPIC ULTRASOUND OF UPPER GASTROINTESTINAL TRACT N/A 9/8/2020    Procedure: ULTRASOUND, UPPER GI TRACT, ENDOSCOPIC;  Surgeon: Rasheed Balbuena MD;  Location: [facility] ([address] [place] [address]);  Service: Endoscopy;  Laterality: N/A;    ERCP N/A 9/8/2020    Procedure: ERCP (ENDOSCOPIC RETROGRADE CHOLANGIOPANCREATOGRAPHY);  Surgeon: Rasheed Balbuena MD;  Location: [facility] ([address] [place] [address]);  Service: " Endoscopy;  Laterality: N/A;    EYE SURGERY      HYSTERECTOMY      SKIN BIOPSY      Left breast       Social History:   Tobacco Use    Smoking status: Former Smoker     Packs/day: 0.50     Years: 60.00     Pack years: 30.00     Quit date: 2001     Years since quittin.6    Smokeless tobacco: Never Used    Tobacco comment: quit in the    Substance and Sexual Activity    Alcohol use: No    Drug use: No    Sexual activity: Not Currently       Family History:   Family History     Problem Relation (Age of Onset)    Breast cancer Sister    Cancer Sister, Sister, Brother    Cataracts Son    Diabetes Son, Son    Glaucoma Son, Daughter    Heart disease Brother    Lupus Daughter    Meningitis Son    Mental illness Son    No Known Problems Brother          No current facility-administered medications on file prior to encounter.     Current Outpatient Medications on File Prior to Encounter   Medication Sig    aspirin 81 MG Chew Take 1 tablet (81 mg total) by mouth once daily.    calcitRIOL (ROCALTROL) 0.25 MCG Cap Take 1 capsule (0.25 mcg total) by mouth every Monday and Friday.    ferrous sulfate 325 (65 FE) MG EC tablet Take 1 tablet (325 mg total) by mouth once daily.    levothyroxine (SYNTHROID) 88 MCG tablet TAKE 1 TABLET (88 MCG TOTAL) BY MOUTH ONCE DAILY.    MULTIVIT-IRON-MIN-FOLIC ACID 3,500-18-0.4 UNIT-MG-MG ORAL CHEW Take 1 capsule by mouth once daily.     NIFEdipine (PROCARDIA-XL) 60 MG (OSM) 24 hr tablet Take 1 tablet (60 mg total) by mouth once daily.    sodium bicarbonate 650 MG tablet TAKE 2 TABLETS DAILY     [DISCONTINUED] levothyroxine (SYNTHROID) 88 MCG tablet TAKE 1 TABLET (88 MCG TOTAL) BY MOUTH ONCE DAILY.       Allergies:   Review of patient's allergies indicates:  No Known Allergies    ROS:  Review of Systems   Constitutional: Negative for appetite change, chills and fever.   HENT: Negative for congestion and sore throat.    Eyes: Negative for redness and visual disturbance.    Respiratory: Negative for cough and shortness of breath.    Cardiovascular: Negative for chest pain and palpitations.   Gastrointestinal: Positive for constipation (occasional). Negative for abdominal pain, nausea and vomiting.   Genitourinary: Negative for difficulty urinating and dysuria.   Musculoskeletal: Negative for arthralgias and back pain.   Skin: Negative for pallor and rash.   Allergic/Immunologic: Negative for environmental allergies and food allergies.   Neurological: Positive for weakness. Negative for dizziness and light-headedness.   Hematological: Does not bruise/bleed easily.   Psychiatric/Behavioral: Negative for sleep disturbance. The patient is not nervous/anxious.          Objective:  Temp:  [97.1 °F (36.2 °C)-97.7 °F (36.5 °C)]   Pulse:  [85-95]   Resp:  [18-20]   BP: (153-170)/(71-72)   SpO2:  [95 %-98 %]     Body mass index is 29.67 kg/m².      Physical Exam  Vitals and nursing note reviewed.   Constitutional:       General: She is not in acute distress.     Appearance: She is well-developed.      Interventions: Nasal cannula in place.   HENT:      Head: Normocephalic and atraumatic.      Right Ear: External ear normal.      Left Ear: External ear normal.      Nose: No nasal deformity or mucosal edema.      Mouth/Throat:      Mouth: Mucous membranes are moist.      Dentition: Abnormal dentition.      Pharynx: No oropharyngeal exudate.   Eyes:      General: No scleral icterus.     Conjunctiva/sclera: Conjunctivae normal.      Pupils: Pupils are equal, round, and reactive to light.   Neck:      Trachea: Phonation normal.   Cardiovascular:      Rate and Rhythm: Normal rate and regular rhythm.      Heart sounds: S1 normal and S2 normal. Murmur heard.    Systolic murmur is present.      Pulmonary:      Effort: Pulmonary effort is normal. No respiratory distress.      Breath sounds: Normal breath sounds. No wheezing or rales.   Chest:   Breasts:      Right: No supraclavicular adenopathy.       Left: No supraclavicular adenopathy.       Abdominal:      General: Bowel sounds are normal. There is no distension.      Palpations: Abdomen is soft.      Tenderness: There is no abdominal tenderness. There is no guarding or rebound.   Musculoskeletal:         General: No tenderness.      Cervical back: Neck supple. No muscular tenderness.      Right lower leg: No edema.      Left lower leg: No edema.   Lymphadenopathy:      Cervical:      Right cervical: No superficial cervical adenopathy.     Left cervical: No superficial cervical adenopathy.      Upper Body:      Right upper body: No supraclavicular adenopathy.      Left upper body: No supraclavicular adenopathy.   Skin:     General: Skin is warm and dry.      Findings: No bruising or rash.   Neurological:      Mental Status: She is alert and oriented to person, place, and time.      Motor: No tremor or seizure activity.   Psychiatric:         Mood and Affect: Mood normal.         Behavior: Behavior normal. Behavior is cooperative.         Thought Content: Thought content normal.         Significant Labs:   A1C:   Recent Labs   Lab 01/30/22  0450   HGBA1C 4.9     TSH:   Recent Labs   Lab 01/27/22  1323   TSH 3.595     CBC:  Recent Labs   Lab 02/08/22  0432   WBC 4.84   HGB 9.7*   HCT 32.0*      MCV 92     BMP:  Recent Labs   Lab 02/08/22  0432   GLU 81      K 3.6   CL 95   CO2 34*   BUN 58*   CREATININE 2.4*   CALCIUM 10.5   MG 2.1   PHOS 2.9       Significant Imaging: I have reviewed all pertinent imaging results/findings completed during prior hospitalization.      Assessment and Plan:  Active Diagnoses:    Diagnosis Date Noted POA    PRINCIPAL PROBLEM:  Heart failure with reduced ejection fraction [I50.20] 01/29/2022 Yes    Palliative care encounter [Z51.5] 02/07/2022 Not Applicable    Hypercapnic respiratory failure [J96.92] 01/28/2022 Yes    Secondary hyperparathyroidism of renal origin [N25.81] 06/23/2021 Yes    THAD (acute kidney injury)  [N17.9] 04/15/2021 Yes    Hyperlipidemia  [E78.5] 08/26/2019 Yes    CKD (chronic kidney disease), stage IV [N18.4] 06/29/2016 Yes    Anemia in CKD (chronic kidney disease) [N18.9, D63.1] 10/22/2015 Yes    Hypothyroidism [E03.9] 08/23/2013 Yes    Essential hypertension [I10] 09/04/2012 Yes      Problems Resolved During this Admission:       Heart failure with reduced ejection fraction  - TTE 1/31- EF 50%  - BMP improving  - Unable to optimize GDMT with aldosterone antagonist or ANRI due to low GFR  - Holding furosemide for now with decreased oral intake.      Advanced frailty  At risk for failure to thrive  At risk for malnutrition  Debility   - Continue with PT/OT for gait training and strengthening and restoration of ADL's   - Encourage mobility, OOB in chair, and early ambulation as appropriate  - Fall precautions   - Monitor for bowel and bladder dysfunction  - Monitor for and prevent skin breakdown and pressure ulcers  - continue DVT prophylaxis with  heparin 5 K q.8 hours  - Palliative care consult appreciated. Code status now DNR      Hypercapnic respiratory failure  -  CXR with right sided opacity that may not be consistent with volume overload alone, consider CT if respiratory function fails to improve w diuresis alone.  - Initially required 2L via NC O2, then transitioned safely to room air  - admitted to SNF with 1 L nasal cannula  - continue to wean O2 to room air     Essential hypertension  AAA (abdominal aortic aneurysm)  - continue nifedipine 30 mg daily ( home dose 60mg to be titrated up as renal function improves), Lasix 40 mg daily     CKD (chronic kidney disease), stage IV  - sCr baseline appears to be 1.7-2.0 range. Likelly new baseline of 2.0-2.5  - continue to monitor twice weekly BMPs  - avoid nephrotoxic agents, renally dose medications when appropriate     Anemia in CKD (chronic kidney disease)  - continue ferrous gluconate 324 mg  - continue to monitor twice weekly CBCs     Secondary  hyperparathyroidism of renal origin  - PTH at baseline  - continue Calcitrol 0.25 mcg twice a week     Hypothyroidism  - Normal TSH  - Continue levothyroxine 88 mcg daily      Anticipated Disposition:  Home with home health      Future Appointments   Date Time Provider Department Center   2/10/2022  7:15 AM [facility] SPEC LAB [facility] SPECLAB [facility]       I certify that SNF services are required to be given on an inpatient basis because Johanny Curtis needs for skilled nursing care and/or skilled rehabilitation are required on a daily basis and such services can only practically be provided in a skilled nursing facility setting and are for an ongoing condition for which she received inpatient care in the hospital.       Jovany Coyle MD  Department of Hospital Medicine   [facility] - Skilled Nursing

## 2022-02-09 NOTE — PROGRESS NOTES
[facility]                                  Skilled Nursing Facility                   Progress Note     Admit Date: 2/2/2022  ION TBD 2/24/2022  Principal Problem:  Heart failure with reduced ejection fraction   HPI obtained from patient interview and chart review     Chief Complaint: Re-evaluation of medical treatment and therapy status:  Lab review, hypokalemia    HPI:   Mrs. Curtis is a 89 year old female PMHx of  HTN, CKD (b/l Cr 2-2.5), HLD, hypothyroidism, obesity, PVD who presents to SNF following hospitalization for acute heart failure with reserved EF.  Admission to SNF for secondary weakness and debility.    Interval history:  All of today's labs reviewed and are listed below.  K 3.6.  24 hr vital sign ranges listed below.  Patient is on 2 L nasal cannula of supplemental O2 with SpO2 of 94-98%.  The concerns for patient's hydration level, discontinuing Lasix.  Patient continues to have poor PO intake, low verbal spontaneity, appears very frail.  Weight recorded on 02/02 showing 160 lb, on 02/07 showing 141 lb.  Palliative care consult completed, patient made DNR.  Patient denies shortness of breath, abdominal discomfort, nausea, or vomiting. Patient denies dysuria.  Patient reports having regular bowel movements.  Patient progessing slowly with PT/OT- requiring moderate to maximal assistance for functional ability. Continuing to follow and treat all acute and chronic conditions.      Past Medical History: Patient has a past medical history of AAA (abdominal aortic aneurysm), Anemia in CKD (chronic kidney disease), Arthritis, Bacteremia due to Escherichia coli (9/24/2020), Cataract, Class 1 obesity due to excess calories with serious comorbidity in adult (7/6/2017), Gout, chronic, Heart failure with reduced ejection fraction (1/29/2022), High cholesterol, Hypercapnic respiratory failure (1/28/2022), Hypertension, Hypothyroidism,  Obesity, Plantar fasciitis, PVD (peripheral vascular disease), and Thyroid trouble.    Past Surgical History: Patient has a past surgical history that includes Hysterectomy; Skin biopsy; Cholecystectomy; Cataract extraction (3/18/13); Eye surgery; Breast surgery (Left, 1972); Breast biopsy (Left); ERCP (N/A, 9/8/2020); and Endoscopic ultrasound of upper gastrointestinal tract (N/A, 9/8/2020).    Social History: Patient reports that she quit smoking about 20 years ago. She has a 30.00 pack-year smoking history. She has never used smokeless tobacco. She reports that she does not drink alcohol and does not use drugs.    Family History: family history includes Breast cancer in her sister; Cancer in her brother, sister, and sister; Cataracts in her son; Diabetes in her son and son; Glaucoma in her daughter and son; Heart disease in her brother; Lupus in her daughter; Meningitis in her son; Mental illness in her son; No Known Problems in her brother.    Allergies: Patient has No Known Allergies.    ROS  Constitutional: Negative for fever   Eyes: Negative for blurred vision, double vision   Respiratory: Negative for cough, shortness of breath   Cardiovascular: Negative for chest pain, palpitations, and leg swelling.   Gastrointestinal: Negative for abdominal pain, constipation, diarrhea, nausea, vomiting.   Genitourinary: Negative for dysuria, frequency   Musculoskeletal:  + generalized weakness. Negative for back pain and myalgias.   Skin: Negative for itching and rash.   Neurological: Negative for dizziness, headaches.   Psychiatric/Behavioral: Negative for depression. The patient is not nervous/anxious.      24 hour Vital Sign Range   Temp:  [97.1 °F (36.2 °C)-97.7 °F (36.5 °C)]   Pulse:  [85-95]   Resp:  [18-20]   BP: (153-170)/(71-72)   SpO2:  [95 %-98 %]     PEx  Constitutional: Patient appears debilitated.  No distress noted  HENT:   Head: Normocephalic and atraumatic.   Eyes: Pupils are equal, round  Neck: Normal  range of motion. Neck supple.   Cardiovascular: Normal rate, regular rhythm and normal heart sounds.    Pulmonary/Chest: Effort normal and breath sounds are clear with diminished bases.  Supplemental O2 in progress.  Abdominal: Soft. Bowel sounds are normal.   Musculoskeletal: Normal range of motion.   Neurological: Alert and oriented to person, place, and time.   Psychiatric: Normal mood and affect. Behavior is normal.   Skin: Skin is warm and dry.     No results for input(s): GLUCOSE, NA, K, CL, CO2, BUN, CREATININE, MG in the last 24 hours.    Invalid input(s):  CALCIUM    No results for input(s): WBC, RBC, HGB, HCT, PLT, MCV, MCH, MCHC in the last 24 hours.      Assessment and Plan:    Hypokalemia  - initiated potassium disintegrating tablet 25 mEq x1 dose    Heart failure with reduced ejection fraction  - TTE 1/31- EF 50%  - BMP improving  - Unable to optimize GDMT with aldosterone antagonist or ANRI due to low GFR  - 2/8 concern for patient's hydration level, discontinue furosemide 40mg PO    Advanced frailty  At risk for failure to thrive  At risk for malnutrition  Debility   - Continue with PT/OT for gait training and strengthening and restoration of ADL's   - Encourage mobility, OOB in chair, and early ambulation as appropriate  - Fall precautions   - Monitor for bowel and bladder dysfunction  - Monitor for and prevent skin breakdown and pressure ulcers  - continue DVT prophylaxis with  heparin 5 K q.8 hours  - 2/8 palliative care consult completed- pt made DNE      Hypercapnic respiratory failure  -  CXR with right sided opacity that may not be consistent with volume overload alone, consider CT if respiratory function fails to improve w diuresis alone.  - Initially required 2L via NC O2, then transitioned safely to room air  - admitted to SNF with 1 L nasal cannula  - unable to obtain SpO2 on my portable pulse oximeter  - continue to wean O2 to room air     Essential hypertension  AAA (abdominal aortic  aneurysm)  - continue nifedipine 30 mg daily ( home dose 60mg to be titrated up as renal function improves), Lasix 40 mg daily    CKD (chronic kidney disease), stage IV  - sCr baseline appears to be 1.7-2.0 range. Likelly new baseline of 2.0-2.5  - continue to monitor twice weekly BMPs, avoid nephrotoxic agents, renally dose medications when appropriate    Anemia in CKD (chronic kidney disease)  - continue ferrous gluconate 324 mg  - continue to monitor twice weekly CBCs    Secondary hyperparathyroidism of renal origin  - PTH at baseline  - continue Calcitrol 0.25 mcg twice a week     Hypothyroidism  - Normal TSH  - Continue levothyroxine 88 mcg daily            Anticipate disposition:  Home with home health      Follow-up needed during SNF stay-    Follow-up needed after discharge from SNF: PCP, Cardiology ( needs to be made)    Future Appointments   Date Time Provider Department Center   2/10/2022  7:15 AM [facility] SPEC LAB [facility] SPECLAB [facility]           Amanda Dangelo NP  Department of Hospital Medicine   [facility]- Skilled Nursing Facility     DOS: 2/8/2022       Patient note was created using MModal Dictation.  Any errors in syntax or even information may not have been identified and edited on initial review prior to signing this note.

## 2022-02-09 NOTE — PLAN OF CARE
Problem: Adult Inpatient Plan of Care  Goal: Plan of Care Review  Outcome: Ongoing, Progressing  Goal: Patient-Specific Goal (Individualized)  Outcome: Ongoing, Progressing  Goal: Absence of Hospital-Acquired Illness or Injury  Outcome: Ongoing, Progressing  Goal: Optimal Comfort and Wellbeing  Outcome: Ongoing, Progressing     Problem: Adjustment to Illness (Sepsis/Septic Shock)  Goal: Optimal Coping  Outcome: Ongoing, Progressing     Problem: Bleeding (Sepsis/Septic Shock)  Goal: Absence of Bleeding  Outcome: Ongoing, Progressing     Problem: Infection Progression (Sepsis/Septic Shock)  Goal: Absence of Infection Signs and Symptoms  Outcome: Ongoing, Progressing     Problem: Nutrition Impaired (Sepsis/Septic Shock)  Goal: Optimal Nutrition Intake  Outcome: Ongoing, Progressing     Problem: Fluid and Electrolyte Imbalance (Acute Kidney Injury/Impairment)  Goal: Fluid and Electrolyte Balance  Outcome: Ongoing, Progressing

## 2022-02-09 NOTE — PLAN OF CARE
Problem: Adult Inpatient Plan of Care  Goal: Plan of Care Review  Outcome: Ongoing, Progressing  Goal: Patient-Specific Goal (Individualized)  Outcome: Ongoing, Progressing     Problem: Bleeding (Sepsis/Septic Shock)  Goal: Absence of Bleeding  Outcome: Ongoing, Progressing     Problem: Infection Progression (Sepsis/Septic Shock)  Goal: Absence of Infection Signs and Symptoms  Outcome: Ongoing, Progressing     Problem: Nutrition Impaired (Sepsis/Septic Shock)  Goal: Optimal Nutrition Intake  Outcome: Ongoing, Progressing     Problem: Fluid and Electrolyte Imbalance (Acute Kidney Injury/Impairment)  Goal: Fluid and Electrolyte Balance  Outcome: Ongoing, Progressing     Problem: Coping Ineffective  Goal: Effective Coping  Outcome: Ongoing, Progressing     Problem: Skin Injury Risk Increased  Goal: Skin Health and Integrity  Outcome: Ongoing, Progressing     Problem: Fall Injury Risk  Goal: Absence of Fall and Fall-Related Injury  Outcome: Ongoing, Progressing     Problem: Infection  Goal: Absence of Infection Signs and Symptoms  Outcome: Ongoing, Progressing

## 2022-02-10 NOTE — PT/OT/SLP PROGRESS
"Physical Therapy Treatment    Patient Name:  Johanny Curtis   MRN:  [id number]  Admit Date: 2/2/2022  Admitting Diagnosis: Heart failure with reduced ejection fraction  Recent Surgeries:     General Precautions: Standard, aspiration,fall   Orthopedic Precautions:N/A   Braces:       Recommendations:     Discharge Recommendations:  home with home health   Level of Assistance Recommended at Discharge: 24 hours significant assistance  Discharge Equipment Recommendations:  (TBD)   Barriers to discharge: Inaccessible home environment    Assessment:     Johanny Curtis is a 89 y.o. female admitted with a medical diagnosis of Heart failure with reduced ejection fraction .  Patient was hollering in pain because her bottom was hurting, daughter present so she agreed to sit up on the side of bed with therapy to get some pressure relief. Patient will benefit from further skilled PT services to address remaining deficits listed below in order to return home with family safely at [facility].      Performance deficits affecting function:  weakness,impaired self care skills,impaired functional mobilty,gait instability,decreased coordination,decreased lower extremity function,pain,decreased ROM,impaired cardiopulmonary response to activity .    Rehab Potential is fair    Activity Tolerance: Fair    Plan:     Patient to be seen 5 x/week to address the above listed problems via gait training,therapeutic activities,therapeutic exercises,neuromuscular re-education    · Plan of Care Expires: 03/05/22  · Plan of Care Reviewed with: patient    Subjective   "My butt hurts, my butt hurts," pt stated and agreed to sit up on the side of bed with PT.    Pain/Comfort:  · Pain Rating 1:  (c/o pain but unable to rate pain level)  · Location - Orientation 1: lower  · Location 1: coccyx  · Pain Addressed 1: Reposition,Nurse notified,Other (see comments) (Sitting up on EOB and repositioned to her R side)  · Pain Rating Post-Intervention 1:  (c/o less pain " after therapy interventions)    Patient's cultural, spiritual, Mu-ism conflicts given the current situation:  · no    Objective:     Communicated with pt prior to session.  Patient found supine with oxygen upon PT entry to room.     Therapeutic Activities and Exercises:   Bed Mobility, Sitting balance activity, sit <-> stand transfer from EOB and repositioning with pillows in bed in Right side lying for pain relief.    Functional Mobility:  · Bed Mobility:  Using bedrails   · Rolling Right: minimum assistance  · Supine to Sit: minimum assistance  · Sit to Supine: moderate assistance  · Transfers:     · Sit to Stand:  moderate assistance and of 2 persons with hand-held assist, pt tolerated standing for approx 30 seconds before fatigue set in and required sitting  · Balance: static sitting balance on EOB with SBA, pt tolerated sitting unsupported for ~ 6 mins.    AM-PAC 6 CLICK MOBILITY  11    Patient left right sidelying with all lines intact, call button in reach PCT notified and daughter present.    GOALS:   Multidisciplinary Problems     Physical Therapy Goals        Problem: Physical Therapy Goal    Goal Priority Disciplines Outcome Goal Variances Interventions   Physical Therapy Goal     PT, PT/OT Ongoing, Progressing     Description: Goals to be met by: 22     Patient will increase functional independence with mobility by performin. Supine to sit with MInimal Assistance  2. Sit to supine with MInimal Assistance  3. Rolling to Left and Right with Minimal Assistance.  4. Sit to stand transfer with Minimal Assistance  5. Bed to chair transfer with Minimal Assistance using Rolling Walker  6. Gait  x 30 feet with Minimal Assistance using Rolling Walker.   7. Wheelchair propulsion x50 feet with Stand-by Assistance using bilateral uppper extremities                     Time Tracking:     PT Received On: 02/10/22  PT Total Time (min):   23    Billable Minutes: Therapeutic Activity 23    Treatment Type:  Treatment  PT/PTA: PTA     PTA Visit Number: 5     02/10/2022

## 2022-02-10 NOTE — PT/OT/SLP PROGRESS
"Occupational Therapy   Treatment    Name: Johanny Curtis  MRN: [id number]  Admit Date: 2/2/2022  Admitting Diagnosis:  Heart failure with reduced ejection fraction    General Precautions: Standard, aspiration,fall   Orthopedic Precautions:N/A   Braces:       Recommendations:     Discharge Recommendations: home health OT  Level of Assistance Recommended at Discharge: 24 hours physical assistance for all ADL's and home management tasks  Discharge Equipment Recommendations:   (TBD)  Barriers to discharge:  Inaccessible home environment    Assessment:     Johanny Curtis is a 89 y.o. female with a medical diagnosis of Heart failure with reduced ejection fraction.  She presents with performance deficits affecting function are  weakness,impaired endurance,impaired sensation,impaired self care skills,impaired functional mobility,gait instability,impaired balance,impaired cognition,decreased coordination,decreased upper extremity function,decreased lower extremity function,decreased safety awareness,pain,decreased ROM,impaired coordination,impaired cardiopulmonary response to activity.Pt tolerated tx fair on today. Pt declined sitting in WC on today . Pt perform GH seated at EOB with Min A/verbal cues to initiate task. Pt perform bed mobility with MOD A. Pt will continue to benefit from skilled OT to improve towards goals.    Rehab Potential is good    Activity tolerance:  Fair    Plan:     Patient to be seen 6 x/week to address the above listed problems via self-care/home management,therapeutic activities,therapeutic exercises,neuromuscular re-education,cognitive retraining    · Plan of Care Expires: 03/03/22  · Plan of Care Reviewed with: patient    Subjective     Communicated with: Gena prior to session. "Hello" .    Pain/Comfort:  · Pain Rating 1: 0/10    Patient's cultural, spiritual, Episcopalian conflicts given the current situation:  · no    Objective:     Patient found supine with oxygen upon OT entry to room.    Bed " Mobility:    · Patient completed Rolling/Turning to Left with  stand by assistance  · Patient completed Rolling/Turning to Right with stand by assistance  · Patient completed Scooting/Bridging with moderate assistance  · Patient completed Supine to Sit with moderate assistance  · Patient completed Sit to Supine with moderate assistance     Functional Mobility/Transfers:  · Functional Mobility: Not tested    Activities of Daily Living:  · Grooming: minimum assistance /verbal cues to perform placing toothpaste on brush , pumping lotion and open hair care products. Pt perform hair care seated at EOB with Set Up     AMPAC 6 Click ADL: 12    Treatment & Education:    · Pt completed ADLs and func mobility activities for tx session as noted above    · Pt educated on role of OT and POC    Patient left supine with call button in reachEducation:      GOALS:   Multidisciplinary Problems     Occupational Therapy Goals        Problem: Occupational Therapy Goal    Goal Priority Disciplines Outcome Interventions   Occupational Therapy Goal     OT, PT/OT Ongoing, Progressing    Description: Goals to be met by: 2/24/22     Patient will increase functional independence with ADLs by performing:    Feeding with Set-up Assistance.  UE Dressing with Minimal Assistance.  LE Dressing with Moderate Assistance.  Grooming while seated at sink with Minimal Assistance.  Toileting from toilet or BSC with Moderate Assistance for hygiene and clothing management.   Bathing from  sitting at sink with Moderate Assistance.  Supine to sit with Stand-by Assistance.  Stand pivot transfers with Minimal Assistance with RW to steady.  Toilet transfer to toilet or BSC with Minimal Assistance using RW.  Upper extremity exercise with assistance as needed.  Caregiver will be educated on level of assist required to safely perform self care tasks and functional transfers..                    Time Tracking:     OT Date of Treatment: OT Date of Treatment:  02/10/22  OT Total Time (min): Total Time (min): 24 min    Billable Minutes:Self Care/Home Management 24    2/10/2022

## 2022-02-10 NOTE — PLAN OF CARE
Interdisciplinary team, Jessica Hernandez, RN Nurse Manager, Dede Suarez, RN Charge Nurse, Blake Lemon, Unit Director, Merari Villatoro, RN MDS Coordinator, Mansi Capellan OT, Rehab Supervisor, Kaylynn Sesay [facility], Kaylynn Valle, Dietician, and YEISON Johnson spoke to patient and patient's daughter for care plan conference, weekly status update, and therapy progress update. Discharge date set for 2/24/22.

## 2022-02-10 NOTE — PT/OT/SLP PROGRESS
"Speech Language Pathology  Treatment    Johanny Curtis   MRN: [id number]   Admitting Diagnosis: Heart failure with reduced ejection fraction    Diet recommendations: Solid Diet Level: Puree  Liquid Diet Level: Nectar Thick 1 bite/sip at a time, Alternating bites/sips, Assistance with meals and Assistance with thickening liquids, Avoid talking while eating, Eliminate distractions, Feed only when awake/alert, Frequent oral care, HOB to 90 degrees, Monitor for s/s of aspiration, Remain upright 30 minutes post meal, Small bites/sips and Strict aspiration precautions    SLP Treatment Date: 02/10/22  Speech Start Time: 0820     Speech Stop Time: 0857     Speech Total (min): 37 min       TREATMENT BILLABLE MINUTES:  Treatment Swallowing Dysfunction 27 and Self Care/Home Management Training 10    Has the patient been evaluated by SLP for swallowing? : Yes  Keep patient NPO?: No   General Precautions: Standard, aspiration,fall,pureed diet,nectar thick          Subjective:  "No more breakfast." Pt was full after consuming approx 25% of breakfast meal.          Objective:       Pt assisted with feeding for breakfast meal this morning to assess diet tolerance.  Pt consumed approx 25% of breakfast meal with included 1/2 tsp bites of grits x 3, pureed pancake x 4, pureed eggs x 4, and pudding x 5. Pt also consumed thin liquids via straws x 2 and cup x 6, as well as nectar thick liquid via cup x 3 and straw x 4.  Pt exhibited throat clear after grits x 1 and throat clear following straw sip of thin liquid x 1 and cup sip of thin liquid x 1.  Pt tolerated nectar thick liquids without any overt s/s of aspiration.  Pt continues to appear very weak and worn out during PO intake.  SLP recommending downgrade to nectar thick liquids and continue pureed diet. Education provided to pt regarding role of SLP, ongoing swallowing assessment, monitoring diet tolerance, aspiration, overt s/s of aspiration, complications associated with " aspiration, recommendations for nectar thick liquids, aspiration precautions, and SLP treatment plan and POC. Pt will benefit from continued reinforcement.     Assessment:  Johanny Curtis is a 89 y.o. female with a medical diagnosis of Heart failure with reduced ejection fraction and presents with dysphagia.     Discharge recommendations: Discharge Facility/Level of Care Needs:  (tbd)     Goals:   Multidisciplinary Problems     SLP Goals        Problem: SLP Goal    Goal Priority Disciplines Outcome   SLP Goal     SLP Ongoing, Progressing   Description: Speech Language Pathology Goals  Goals expected to be met by 02/14  1. Pt will tolerate puree/thin liquid diet w/o any overt s/s of aspiration.   2. Pt will continue to participate with ongoing po trials to determine appropriateness of further diet advancement.                             Plan:   Patient to be seen Therapy Frequency: 4 x/week  Planned Interventions: Dysphagia Therapy  Plan of Care expires:    Plan of Care reviewed with: patient  SLP Follow-up?: Yes  SLP - Next Visit Date: 02/11/22             02/10/2022

## 2022-02-10 NOTE — PROGRESS NOTES
[facility]                                  Skilled Nursing Facility                   Progress Note     Admit Date: 2/2/2022  ION TBD 2/24/2022  Principal Problem:  Heart failure with reduced ejection fraction   HPI obtained from patient interview and chart review     Chief Complaint: Re-evaluation of medical treatment and therapy status:  Lab review, hypokalemia    HPI:   Mrs. Curtis is a 89 year old female PMHx of  HTN, CKD (b/l Cr 2-2.5), HLD, hypothyroidism, obesity, PVD who presents to SNF following hospitalization for acute heart failure with reserved EF.  Admission to SNF for secondary weakness and debility.    Interval history:  All of today's labs reviewed and are listed below.  K 3.6, BUN/creatinine 58/2.4 from 47/2.3.  24 hr vital sign ranges listed below.  Patient is on 2 L nasal cannula of supplemental O2 with SpO2 of 100%.   Patient continues to have poor PO intake, low verbal spontaneity, appears very frail. Patient denies shortness of breath, abdominal discomfort, nausea, or vomiting. Patient denies dysuria.  Patient reports having regular bowel movements.  Patient progessing slowly with PT/OT- Sit to Stand:  moderate assistance and of 2 persons with hand-held assist, pt tolerated standing for approx 30 seconds before fatigue set in and required sitting. Continuing to follow and treat all acute and chronic conditions.    Past Medical History: Patient has a past medical history of AAA (abdominal aortic aneurysm), Anemia in CKD (chronic kidney disease), Arthritis, Bacteremia due to Escherichia coli (9/24/2020), Cataract, Class 1 obesity due to excess calories with serious comorbidity in adult (7/6/2017), Gout, chronic, Heart failure with reduced ejection fraction (1/29/2022), High cholesterol, Hypercapnic respiratory failure (1/28/2022), Hypertension, Hypothyroidism, Obesity, Plantar fasciitis, PVD (peripheral vascular  disease), and Thyroid trouble.    Past Surgical History: Patient has a past surgical history that includes Hysterectomy; Skin biopsy; Cholecystectomy; Cataract extraction (3/18/13); Eye surgery; Breast surgery (Left, 1972); Breast biopsy (Left); ERCP (N/A, 9/8/2020); and Endoscopic ultrasound of upper gastrointestinal tract (N/A, 9/8/2020).    Social History: Patient reports that she quit smoking about 20 years ago. She has a 30.00 pack-year smoking history. She has never used smokeless tobacco. She reports that she does not drink alcohol and does not use drugs.    Family History: family history includes Breast cancer in her sister; Cancer in her brother, sister, and sister; Cataracts in her son; Diabetes in her son and son; Glaucoma in her daughter and son; Heart disease in her brother; Lupus in her daughter; Meningitis in her son; Mental illness in her son; No Known Problems in her brother.    Allergies: Patient has No Known Allergies.    ROS  Constitutional: Negative for fever   Eyes: Negative for blurred vision, double vision   Respiratory: Negative for cough, shortness of breath   Cardiovascular: Negative for chest pain, palpitations, and leg swelling.   Gastrointestinal: Negative for abdominal pain, constipation, diarrhea, nausea, vomiting.   Genitourinary: Negative for dysuria, frequency   Musculoskeletal:  + generalized weakness. Negative for back pain and myalgias.   Skin: Negative for itching and rash.   Neurological: Negative for dizziness, headaches.   Psychiatric/Behavioral: Negative for depression. The patient is not nervous/anxious.      24 hour Vital Sign Range   Temp:  [97.1 °F (36.2 °C)-97.8 °F (36.6 °C)]   Pulse:  []   Resp:  [18]   BP: (144-154)/(80-85)   SpO2:  [97 %-100 %]     PEx  Constitutional: Patient appears debilitated.  No distress noted  HENT:   Head: Normocephalic and atraumatic.   Eyes: Pupils are equal, round  Neck: Normal range of motion. Neck supple.   Cardiovascular: Normal  rate, regular rhythm and normal heart sounds.    Pulmonary/Chest: Effort normal and breath sounds are clear with diminished bases.  Supplemental O2 in progress.  Abdominal: Soft. Bowel sounds are normal.   Musculoskeletal: Normal range of motion.   Neurological: Alert and oriented to person, place, and time.   Psychiatric: Normal mood and affect. Behavior is normal.   Skin: Skin is warm and dry.     No results for input(s): GLUCOSE, NA, K, CL, CO2, BUN, CREATININE, MG in the last 24 hours.    Invalid input(s):  CALCIUM    No results for input(s): WBC, RBC, HGB, HCT, PLT, MCV, MCH, MCHC in the last 24 hours.      Assessment and Plan:    Hypokalemia  - initiated potassium disintegrating tablet 25 mEq x1 dose    CKD (chronic kidney disease), stage IV  - sCr baseline appears to be 1.7-2.0 range. Likelly new baseline of 2.0-2.5  - continue to monitor twice weekly BMPs, avoid nephrotoxic agents, renally dose medications when appropriate     Heart failure with reduced ejection fraction  - TTE 1/31- EF 50%  - BMP improving  - Unable to optimize GDMT with aldosterone antagonist or ANRI due to low GFR  - 2/8 concern for patient's hydration level, discontinue furosemide 40mg PO    Advanced frailty  At risk for failure to thrive  At risk for malnutrition  Debility   - Continue with PT/OT for gait training and strengthening and restoration of ADL's   - Encourage mobility, OOB in chair, and early ambulation as appropriate  - Fall precautions   - Monitor for bowel and bladder dysfunction  - Monitor for and prevent skin breakdown and pressure ulcers  - continue DVT prophylaxis with  heparin 5 K q.8 hours  - 2/8 palliative care consult completed- pt made DNR      Hypercapnic respiratory failure  -  CXR with right sided opacity that may not be consistent with volume overload alone, consider CT if respiratory function fails to improve w diuresis alone.  - Initially required 2L via NC O2, then transitioned safely to room air  -  admitted to SNF with 1 L nasal cannula  - unable to obtain SpO2 on my portable pulse oximeter  - continue to wean O2 to room air     Essential hypertension  AAA (abdominal aortic aneurysm)  - continue nifedipine 30 mg daily ( home dose 60mg to be titrated up as renal function improves), holding Lasix 40 mg daily    Anemia in CKD (chronic kidney disease)  - continue ferrous gluconate 324 mg  - continue to monitor twice weekly CBCs    Secondary hyperparathyroidism of renal origin  - PTH at baseline  - continue Calcitrol 0.25 mcg twice a week     Hypothyroidism  - Normal TSH  - Continue levothyroxine 88 mcg daily            Anticipate disposition:  Home with home health      Follow-up needed during SNF stay-    Follow-up needed after discharge from SNF: PCP, Cardiology ( needs to be made)    No future appointments.        Amanda Dangelo NP  Department of Hospital Medicine   [facility]     DOS: 2/10/2022      Patient note was created using MModal Dictation.  Any errors in syntax or even information may not have been identified and edited on initial review prior to signing this note.

## 2022-02-10 NOTE — PLAN OF CARE
Problem: Adult Inpatient Plan of Care  Goal: Plan of Care Review  Outcome: Ongoing, Progressing  Goal: Patient-Specific Goal (Individualized)  Outcome: Ongoing, Progressing  Goal: Absence of Hospital-Acquired Illness or Injury  Outcome: Ongoing, Progressing  Goal: Optimal Comfort and Wellbeing  Outcome: Ongoing, Progressing  Goal: Readiness for Transition of Care  Outcome: Ongoing, Progressing     Problem: Adjustment to Illness (Sepsis/Septic Shock)  Goal: Optimal Coping  Outcome: Ongoing, Progressing     Problem: Bleeding (Sepsis/Septic Shock)  Goal: Absence of Bleeding  Outcome: Ongoing, Progressing     Problem: Glycemic Control Impaired (Sepsis/Septic Shock)  Goal: Blood Glucose Level Within Desired Range  Outcome: Ongoing, Progressing     Problem: Infection Progression (Sepsis/Septic Shock)  Goal: Absence of Infection Signs and Symptoms  Outcome: Ongoing, Progressing     Problem: Nutrition Impaired (Sepsis/Septic Shock)  Goal: Optimal Nutrition Intake  Outcome: Ongoing, Progressing     Problem: Fluid and Electrolyte Imbalance (Acute Kidney Injury/Impairment)  Goal: Fluid and Electrolyte Balance  Outcome: Ongoing, Progressing     Problem: Oral Intake Inadequate (Acute Kidney Injury/Impairment)  Goal: Optimal Nutrition Intake  Outcome: Ongoing, Progressing     Problem: Renal Function Impairment (Acute Kidney Injury/Impairment)  Goal: Effective Renal Function  Outcome: Ongoing, Progressing     Problem: Infection  Goal: Absence of Infection Signs and Symptoms  Outcome: Ongoing, Progressing     Problem: Fall Injury Risk  Goal: Absence of Fall and Fall-Related Injury  Outcome: Ongoing, Progressing     Problem: Skin Injury Risk Increased  Goal: Skin Health and Integrity  Outcome: Ongoing, Progressing     Problem: Coping Ineffective  Goal: Effective Coping  Outcome: Ongoing, Progressing     Problem: Adult Inpatient Plan of Care  Goal: Plan of Care Review  Outcome: Ongoing, Progressing  Goal: Patient-Specific Goal  (Individualized)  Outcome: Ongoing, Progressing  Goal: Absence of Hospital-Acquired Illness or Injury  Outcome: Ongoing, Progressing  Goal: Optimal Comfort and Wellbeing  Outcome: Ongoing, Progressing  Goal: Readiness for Transition of Care  Outcome: Ongoing, Progressing     Problem: Adjustment to Illness (Sepsis/Septic Shock)  Goal: Optimal Coping  Outcome: Ongoing, Progressing     Problem: Bleeding (Sepsis/Septic Shock)  Goal: Absence of Bleeding  Outcome: Ongoing, Progressing     Problem: Glycemic Control Impaired (Sepsis/Septic Shock)  Goal: Blood Glucose Level Within Desired Range  Outcome: Ongoing, Progressing     Problem: Infection Progression (Sepsis/Septic Shock)  Goal: Absence of Infection Signs and Symptoms  Outcome: Ongoing, Progressing     Problem: Nutrition Impaired (Sepsis/Septic Shock)  Goal: Optimal Nutrition Intake  Outcome: Ongoing, Progressing     Problem: Fluid and Electrolyte Imbalance (Acute Kidney Injury/Impairment)  Goal: Fluid and Electrolyte Balance  Outcome: Ongoing, Progressing     Problem: Oral Intake Inadequate (Acute Kidney Injury/Impairment)  Goal: Optimal Nutrition Intake  Outcome: Ongoing, Progressing     Problem: Renal Function Impairment (Acute Kidney Injury/Impairment)  Goal: Effective Renal Function  Outcome: Ongoing, Progressing     Problem: Infection  Goal: Absence of Infection Signs and Symptoms  Outcome: Ongoing, Progressing     Problem: Fall Injury Risk  Goal: Absence of Fall and Fall-Related Injury  Outcome: Ongoing, Progressing     Problem: Skin Injury Risk Increased  Goal: Skin Health and Integrity  Outcome: Ongoing, Progressing     Problem: Coping Ineffective  Goal: Effective Coping  Outcome: Ongoing, Progressing

## 2022-02-10 NOTE — PLAN OF CARE
Problem: Occupational Therapy Goal  Goal: Occupational Therapy Goal  Description: Goals to be met by: 2/24/22     Patient will increase functional independence with ADLs by performing:    Feeding with Set-up Assistance.  REVISED  =Min (A) with eating  UE Dressing with Minimal Assistance. REVISED =Mod (A) with UBD  LE Dressing with Moderate Assistance. REVISED =Max with LBD at bed level.  Grooming while seated at sink with Minimal Assistance. REVISED =Mod (A) with grooming   Toileting from toilet or BSC with Moderate Assistance for hygiene and clothing management. REVISED =Max (A) with toileting with DME as needed.  Bathing from  sitting at sink with Moderate Assistance.  Supine to sit with Stand-by Assistance.  REVISED =Mod (A) with supine to sit  Stand pivot transfers with Minimal Assistance with RW to steady. REVISED =Max (A) with sliding board Transfers.  Toilet transfer to toilet or BSC with Minimal Assistance using RW. REVISED =Max (A) with toilet transfer with sliding board.  Upper extremity exercise with assistance as needed.  Caregiver will be educated on level of assist required to safely perform self care tasks and functional transfers..   Outcome: Ongoing, Progressing

## 2022-02-11 NOTE — PLAN OF CARE
Problem: Adult Inpatient Plan of Care  Goal: Plan of Care Review  Outcome: Ongoing, Progressing     Problem: Adult Inpatient Plan of Care  Goal: Patient-Specific Goal (Individualized)  Outcome: Ongoing, Progressing     Problem: Adult Inpatient Plan of Care  Goal: Absence of Hospital-Acquired Illness or Injury  Outcome: Ongoing, Progressing     Problem: Adult Inpatient Plan of Care  Goal: Optimal Comfort and Wellbeing  Outcome: Ongoing, Progressing     Problem: Adult Inpatient Plan of Care  Goal: Readiness for Transition of Care  Outcome: Ongoing, Progressing

## 2022-02-11 NOTE — PT/OT/SLP PROGRESS
"Physical Therapy Treatment    Patient Name:  Johanny Curtis   MRN:  [id number]  Admit Date: 2/2/2022  Admitting Diagnosis: Heart failure with reduced ejection fraction    General Precautions: Standard, aspiration,nectar thick,pureed diet,hearing impaired,fall   Orthopedic Precautions:N/A   Braces: N/A     Recommendations:     Discharge Recommendations:  home with home health (with family assist)   Level of Assistance Recommended at Discharge: 24 hours physical assistance  Discharge Equipment Recommendations:  (TBD as pt progresses)   Barriers to discharge: Inaccessible home environment    Assessment:     Johanny Curtis is a 89 y.o. female admitted with a medical diagnosis of Heart failure with reduced ejection fraction . Pt is unsafe with functional mobility at this time due to pt requires moderate assist for bed mobility, moderate assist for transfers, moderate assist for w/c mobility, and moderate assist for gait +1 to follow with w/c due to weakness, decreased step length, and fatigue. Pt is motivated to progress with functional mobility.    Performance deficits affecting function:  weakness,gait instability,impaired balance,impaired functional mobilty,impaired cardiopulmonary response to activity,decreased lower extremity function,decreased coordination,decreased ROM,impaired endurance .    Rehab Potential is good    Activity Tolerance: Fair    Plan:     Patient to be seen 5 x/week to address the above listed problems via gait training,therapeutic activities,therapeutic exercises,neuromuscular re-education,wheelchair management/training    · Plan of Care Expires: 03/05/22  · Plan of Care Reviewed with: patient    Subjective   "I thank you" (upon PT leaving).     Pain/Comfort:  · Pain Rating 1: 0/10  · Location 1: sacral spine  · Pain Addressed 1: Reposition  · Pain Rating Post-Intervention 1: 0/10 (pt states "My bottom hurts" upon sitting after gait x 1 time; unable to grade pain)    Patient's cultural, " spiritual, Gnosticism conflicts given the current situation:  · no    Objective:     Communicated with nurse prior to session.  Patient found up in chair with oxygen (2LPM) upon PT entry to room.    Functional Mobility:  · Bed Mobility:     · Rolling Left:  minimum assistance  · Rolling Right: minimum assistance  · Supine to Sit: moderate assistance  · Sit to Supine: moderate assistance  · Transfers:     · Sit to Stand:  moderate assistance with rolling walker  · Bed to Chair: maximal assistance with  hand-held assist  using  Squat Pivot  · Gait: 5 ft then 10ft with RW with moderate assist +1 to follow with w/c. pt performed gait with flexed trunk,d ecreased clearance of B feet during swing phase, decreased step length, and at slow pace.  · Wheelchair Propulsion:  Pt propelled Standard wheelchair x 8 feet on Level tile with  Bilateral upper extremity with Moderate Assistance due to pt unable to direct the w/c.     AM-PAC 6 CLICK MOBILITY  12    Patient left left sidelying with call button in reach and nurse notified.    GOALS:   Multidisciplinary Problems     Physical Therapy Goals        Problem: Physical Therapy Goal    Goal Priority Disciplines Outcome Goal Variances Interventions   Physical Therapy Goal     PT, PT/OT Ongoing, Progressing     Description: Goals to be met by: 22     Patient will increase functional independence with mobility by performin. Supine to sit with MInimal Assistance  2. Sit to supine with MInimal Assistance  3. Rolling to Left and Right with Minimal Assistance.  4. Sit to stand transfer with Minimal Assistance  5. Bed to chair transfer with Minimal Assistance using Rolling Walker  6. Gait  x 30 feet with Minimal Assistance using Rolling Walker.   7. Wheelchair propulsion x50 feet with Stand-by Assistance using bilateral uppper extremities                     Time Tracking:     PT Received On: 22  PT Total Time (min):   31    Billable Minutes: Gait Training 20 and  Therapeutic Activity 11    Treatment Type: Treatment,6th Visit  PT/PTA: PT     PTA Visit Number: 0     02/11/2022

## 2022-02-11 NOTE — PLAN OF CARE
Problem: Physical Therapy Goal  Goal: Physical Therapy Goal  Description: Goals to be met by: 22     Patient will increase functional independence with mobility by performin. Supine to sit with MInimal Assistance  2. Sit to supine with MInimal Assistance  3. Rolling to Left and Right with Minimal Assistance.  4. Sit to stand transfer with Minimal Assistance  5. Bed to chair transfer with Minimal Assistance using Rolling Walker  6. Gait  x 30 feet with Minimal Assistance using Rolling Walker.   7. Wheelchair propulsion x50 feet with Stand-by Assistance using bilateral uppper extremities    Outcome: Ongoing, Progressing   Pt's goals remain appropriate and pt will continue to benefit from skilled PT services to work towards improved functional mobility including: bed mobility, transfers, and gait.   2022

## 2022-02-11 NOTE — PLAN OF CARE
Problem: Adult Inpatient Plan of Care  Goal: Plan of Care Review  Outcome: Ongoing, Progressing  Goal: Patient-Specific Goal (Individualized)  Outcome: Ongoing, Progressing  Goal: Absence of Hospital-Acquired Illness or Injury  Outcome: Ongoing, Progressing  Goal: Optimal Comfort and Wellbeing  Outcome: Ongoing, Progressing     Problem: Adjustment to Illness (Sepsis/Septic Shock)  Goal: Optimal Coping  Outcome: Ongoing, Progressing     Problem: Bleeding (Sepsis/Septic Shock)  Goal: Absence of Bleeding  Outcome: Ongoing, Progressing     Problem: Glycemic Control Impaired (Sepsis/Septic Shock)  Goal: Blood Glucose Level Within Desired Range  Outcome: Ongoing, Progressing     Problem: Infection Progression (Sepsis/Septic Shock)  Goal: Absence of Infection Signs and Symptoms  Outcome: Ongoing, Progressing     Problem: Nutrition Impaired (Sepsis/Septic Shock)  Goal: Optimal Nutrition Intake  Outcome: Ongoing, Progressing     Problem: Infection  Goal: Absence of Infection Signs and Symptoms  Outcome: Ongoing, Progressing     Problem: Fall Injury Risk  Goal: Absence of Fall and Fall-Related Injury  Outcome: Ongoing, Progressing

## 2022-02-11 NOTE — PT/OT/SLP PROGRESS
Occupational Therapy   Treatment    Name: Johanny Curtis  MRN: [id number]  Admit Date: 2/2/2022  Admitting Diagnosis:  Heart failure with reduced ejection fraction    General Precautions: Standard, aspiration,fall,pureed diet,nectar thick,hearing impaired   Orthopedic Precautions:N/A   Braces: N/A     Recommendations:     Discharge Recommendations: home health OT  Level of Assistance Recommended at Discharge: 24 hours physical assistance for all ADL's and home management tasks  Discharge Equipment Recommendations:   (TBD)  Barriers to discharge:  Inaccessible home environment    Assessment:     Johanny Curtis is a 89 y.o. female with a medical diagnosis of Heart failure with reduced ejection fraction.  Performance deficits affecting function are weakness,impaired endurance,impaired self care skills,impaired functional mobilty,gait instability,impaired balance,decreased coordination,decreased upper extremity function,decreased lower extremity function,decreased safety awareness,pain,decreased ROM,impaired cardiopulmonary response to activity. Pt noticeable fatigued, but with good participation and tolerated Tx without incident. Pt continues to require substantial assist with self care tasks, functional mobility and functional transfers. Pt requires max/total assist for bed mobility and functional t/fs. She would continue to benefit from OT intervention but due to her current level of function would benefit from a reduction in treatment frequency. Pt will be seen 3 x per week ( M-W-F) to further her functional (I)ce and safety.      Rehab Potential is fair    Activity tolerance:  Fair    Plan:     Patient to be seen 3 x/week to address the above listed problems via self-care/home management,therapeutic activities,therapeutic exercises,neuromuscular re-education,cognitive retraining    · Plan of Care Expires: 03/03/22  · Plan of Care Reviewed with: patient    Subjective     Communicated with: nurse prior to session.    Pain/Comfort:  Pain Rating 1: 0/10  Pain Rating Post-Intervention 1: 0/10    Patient's cultural, spiritual, Jew conflicts given the current situation:  no    Objective:     Patient found supine with oxygen upon OT entry to room.    Bed Mobility:    · Patient completed Rolling/Turning to Left with  maximal assistance  · Patient completed Rolling/Turning to Right with maximal assistance  · Patient completed Scooting/Bridging with maximal assistance  · Patient completed Supine to Sit with total assistance     Functional Mobility/Transfers:  · Patient completed Sit <> Stand Transfer with total assistance  with  hand-held assist   · Patient completed Bed <> Chair Transfer using Squat Pivot technique with total assistance with hand-held assist    Activities of Daily Living:  · Grooming: stand by assistance and set up for pt to perform oral hygiene, hair brushing and face washing sitting unsupported EOB. Pt with good static sitting balance. Pt requires max verbal cues for posture and initiation of tasks.   · Upper Body Dressing: maximal assistance to maverick pullover shirt sitting EOB.   · Lower Body Dressing: total assistance to thread feet into pant legs and pull pants over hips with pt rolling (L) and (R). Pt unable to sequence pulling pants over knees with cueing.     [facility] 6 Click ADL: 12    Treatment & Education:  Pt edu on POC, safety when performing self care tasks , benefit of performing OOB activity, and safety when performing functional transfers and mobility.  - White board updated  - Self care tasks completed-- as noted above         Patient left up in chair with all lines intact and call button in reachEducation:      GOALS:   Multidisciplinary Problems     Occupational Therapy Goals        Problem: Occupational Therapy Goal    Goal Priority Disciplines Outcome Interventions   Occupational Therapy Goal     OT, PT/OT Ongoing, Progressing    Description: Goals to be met by: 2/24/22     Patient will  increase functional independence with ADLs by performing:    Feeding with Set-up Assistance.  REVISED  =Min (A) with eating  UE Dressing with Minimal Assistance. REVISED =Mod (A) with UBD  LE Dressing with Moderate Assistance. REVISED =Max with LBD at bed level.  Grooming while seated at sink with Minimal Assistance. REVISED =Mod (A) with grooming   Toileting from toilet or BSC with Moderate Assistance for hygiene and clothing management. REVISED =Max (A) with toileting with DME as needed.  Bathing from  sitting at sink with Moderate Assistance.  Supine to sit with Stand-by Assistance.  REVISED =Mod (A) with supine to sit  Stand pivot transfers with Minimal Assistance with RW to steady. REVISED =Max (A) with sliding board Transfers.  Toilet transfer to toilet or BSC with Minimal Assistance using RW. REVISED =Max (A) with toilet transfer with sliding board.  Upper extremity exercise with assistance as needed.  Caregiver will be educated on level of assist required to safely perform self care tasks and functional transfers..                    Time Tracking:     OT Date of Treatment: OT Date of Treatment: 02/11/22  OT Total Time (min): Total Time (min): 30 min    Billable Minutes:Self Care/Home Management 30    2/11/2022

## 2022-02-11 NOTE — PT/OT/SLP PROGRESS
"Speech Language Pathology  Treatment    Johanny Curtis   MRN: [id number]   Admitting Diagnosis: Heart failure with reduced ejection fraction    Diet recommendations: Solid Diet Level: Puree  Liquid Diet Level: Nectar Thick 1 bite/sip at a time, Alternating bites/sips, Assistance with meals and Assistance with thickening liquids, Avoid talking while eating, Eliminate distractions, Feed only when awake/alert, Frequent oral care, HOB to 90 degrees, Meds whole buried in puree, Monitor for s/s of aspiration, Remain upright 30 minutes post meal, Small bites/sips and Strict aspiration precautions    SLP Treatment Date: 02/11/22  Speech Start Time: 0847     Speech Stop Time: 0903     Speech Total (min): 16 min       TREATMENT BILLABLE MINUTES:  Treatment Swallowing Dysfunction 8 and Self Care/Home Management Training 8    Has the patient been evaluated by SLP for swallowing? : Yes  Keep patient NPO?: No   General Precautions: Standard, aspiration,fall,hearing impaired,pureed diet,nectar thick          Subjective:  "I'm not hungry." "I had enough." PO intake and appetite remain limited.          Objective:      Pt assisted with feeding breakfast meal while sitting upright in recliner chair. Pt awake/alert, but still appearing weak and worn out.  With assistance and encouragement, pt consumed less than 25% of pureed grits mixed with eggs. She also accepted about 3/4 cup of pudding, 1 bite of puree pancake, and 4oz of nectar thick juice via straw.  Pt exhibited throat clearing after 3 out of 10 sips of nectar thick juice.  Pt indicated when she was full and had enough PO intake.  Education was provided to pt regarding role of SLP, monitoring diet tolerance, ongoing swallowing assessment, recommendations to continue pureed diet with nectar thick liquids at this time due to risks of aspiration, importance of maintaining adequate nutrition/hydration, and SLP treatment plan and POC. Pt will benefit from continued reinforcement. "     Assessment:  Johanny Curtis is a 89 y.o. female with a medical diagnosis of Heart failure with reduced ejection fraction and presents with dysphagia.     Discharge recommendations: Discharge Facility/Level of Care Needs:  (tbd)     Goals:   Multidisciplinary Problems     SLP Goals        Problem: SLP Goal    Goal Priority Disciplines Outcome   SLP Goal     SLP Ongoing, Progressing   Description: Speech Language Pathology Goals  Goals expected to be met by 02/14  1. Pt will tolerate puree/thin liquid diet w/o any overt s/s of aspiration.   2. Pt will continue to participate with ongoing po trials to determine appropriateness of further diet advancement.                             Plan:   Patient to be seen Therapy Frequency: 4 x/week  Planned Interventions: Dysphagia Therapy  Plan of Care expires:    Plan of Care reviewed with: patient  SLP Follow-up?: Yes  SLP - Next Visit Date: 02/14/22 02/11/2022

## 2022-02-11 NOTE — PROGRESS NOTES
[facility]                                  Skilled Nursing Facility                   Progress Note     Admit Date: 2/2/2022  ION TBD 2/24/2022  Principal Problem:  Heart failure with reduced ejection fraction   HPI obtained from patient interview and chart review     Chief Complaint: Re-evaluation of medical treatment and therapy status:  Lab review, hypercalcemia    HPI:   Mrs. Curtis is a 89 year old female PMHx of  HTN, CKD (b/l Cr 2-2.5), HLD, hypothyroidism, obesity, PVD who presents to SNF following hospitalization for acute heart failure with reserved EF.  Admission to SNF for secondary weakness and debility.    Interval history:  All of today's labs reviewed and are listed below.  Calcium 11.0, BUN/creatinine 54/2.1.  Patient's clinical presentation does not appear unchanged due to hypercalcemia.  24 hr vital sign ranges listed below.  Yesterday, patient on room air for 1 hour with SpO2 of 96%.  Today, patient back on supplemental O2, unaware as to why.  Bedside nursing will continue to attempt to wean supplemental O2.  Patient continues to have poor PO intake, low verbal spontaneity, appears very frail. Patient denies shortness of breath, abdominal discomfort, nausea, or vomiting. Patient denies dysuria.  Patient reports having regular bowel movements.  Patient progessing slowly with PT/OT- Sit to Stand:  moderate assistance and of 2 persons with hand-held assist, pt tolerated standing for approx 30 seconds before fatigue set in and required sitting. Continuing to follow and treat all acute and chronic conditions.    Past Medical History: Patient has a past medical history of AAA (abdominal aortic aneurysm), Anemia in CKD (chronic kidney disease), Arthritis, Bacteremia due to Escherichia coli (9/24/2020), Cataract, Class 1 obesity due to excess calories with serious comorbidity in adult (7/6/2017), Gout, chronic, Heart failure  with reduced ejection fraction (1/29/2022), High cholesterol, Hypercapnic respiratory failure (1/28/2022), Hypertension, Hypothyroidism, Obesity, Plantar fasciitis, PVD (peripheral vascular disease), and Thyroid trouble.    Past Surgical History: Patient has a past surgical history that includes Hysterectomy; Skin biopsy; Cholecystectomy; Cataract extraction (3/18/13); Eye surgery; Breast surgery (Left, 1972); Breast biopsy (Left); ERCP (N/A, 9/8/2020); and Endoscopic ultrasound of upper gastrointestinal tract (N/A, 9/8/2020).    Social History: Patient reports that she quit smoking about 20 years ago. She has a 30.00 pack-year smoking history. She has never used smokeless tobacco. She reports that she does not drink alcohol and does not use drugs.    Family History: family history includes Breast cancer in her sister; Cancer in her brother, sister, and sister; Cataracts in her son; Diabetes in her son and son; Glaucoma in her daughter and son; Heart disease in her brother; Lupus in her daughter; Meningitis in her son; Mental illness in her son; No Known Problems in her brother.    Allergies: Patient has No Known Allergies.    ROS  Constitutional: Negative for fever   Eyes: Negative for blurred vision, double vision   Respiratory: Negative for cough, shortness of breath   Cardiovascular: Negative for chest pain, palpitations, and leg swelling.   Gastrointestinal: Negative for abdominal pain, constipation, diarrhea, nausea, vomiting.   Genitourinary: Negative for dysuria, frequency   Musculoskeletal:  + generalized weakness. Negative for back pain and myalgias.   Skin: Negative for itching and rash.   Neurological: Negative for dizziness, headaches.   Psychiatric/Behavioral: Negative for depression. The patient is not nervous/anxious.      24 hour Vital Sign Range   Temp:  [97.8 °F (36.6 °C)]   Pulse:  [94]   Resp:  [18]   BP: (142)/(67)   SpO2:  [95 %-96 %]     PEx  Constitutional: Patient appears debilitated.  No  distress noted  HENT:   Head: Normocephalic and atraumatic.   Eyes: Pupils are equal, round  Neck: Normal range of motion. Neck supple.   Cardiovascular: Normal rate, regular rhythm and normal heart sounds.    Pulmonary/Chest: Effort normal and breath sounds are clear with diminished bases.  Supplemental O2 in progress.  Abdominal: Soft. Bowel sounds are normal.   Musculoskeletal: Normal range of motion.   Neurological: Alert and oriented to person, place, and time.   Psychiatric: Normal mood and affect. Behavior is normal.   Skin: Skin is warm and dry.     Recent Labs   Lab 02/11/22  0455      K 4.6      CO2 33*   BUN 54*   CREATININE 2.1*   MG 2.3       Recent Labs   Lab 02/11/22  0455   WBC 4.89   RBC 3.71*   HGB 10.3*   HCT 34.5*      MCV 93   MCH 27.8   MCHC 29.9*         Assessment and Plan:    Hypercalcemia  Secondary hyperparathyroidism of renal origin  - PTH at baseline  - 2/11 discontinuing Calcitrol 0.25 mcg twice a week- will continue to monitor twice weekly BMPs       CKD (chronic kidney disease), stage IV  - sCr baseline appears to be 1.7-2.0 range. Likelly new baseline of 2.0-2.5  - continue to monitor twice weekly BMPs, avoid nephrotoxic agents, renally dose medications when appropriate     Heart failure with reduced ejection fraction  - TTE 1/31- EF 50%  - BMP improving  - Unable to optimize GDMT with aldosterone antagonist or ANRI due to low GFR  - 2/8 concern for patient's hydration level, discontinue furosemide 40mg PO    Advanced frailty  At risk for failure to thrive  At risk for malnutrition  Debility   - Continue with PT/OT for gait training and strengthening and restoration of ADL's   - Encourage mobility, OOB in chair, and early ambulation as appropriate  - Fall precautions   - Monitor for bowel and bladder dysfunction  - Monitor for and prevent skin breakdown and pressure ulcers  - continue DVT prophylaxis with  heparin 5 K q.8 hours  - 2/8 palliative care consult  completed- pt made DNR      Hypercapnic respiratory failure  -  CXR with right sided opacity that may not be consistent with volume overload alone, consider CT if respiratory function fails to improve w diuresis alone.  - Initially required 2L via NC O2, then transitioned safely to room air  - admitted to SNF with 1 L nasal cannula  - unable to obtain SpO2 on my portable pulse oximeter  - continue to wean O2 to room air     Essential hypertension  AAA (abdominal aortic aneurysm)  - continue nifedipine 30 mg daily ( home dose 60mg to be titrated up as renal function improves), holding Lasix 40 mg daily    Anemia in CKD (chronic kidney disease)  - continue ferrous gluconate 324 mg  - continue to monitor twice weekly CBCs    Hypothyroidism  - Normal TSH  - Continue levothyroxine 88 mcg daily            Anticipate disposition:  Home with home health      Follow-up needed during SNF stay-    Follow-up needed after discharge from SNF: PCP, Cardiology ( needs to be made)    No future appointments.        Amanda Dangelo NP  Department of Hospital Medicine   [facility]     DOS: 2/11/2022      Patient note was created using MModal Dictation.  Any errors in syntax or even information may not have been identified and edited on initial review prior to signing this note.

## 2022-02-11 NOTE — PROGRESS NOTES
PT 6th visit  PT/PTA met face to face to discuss patient's treatment plan and progress towards established goals. Goals revised as needed  Patient will be seen by physical therapist every sixth visit and minimally once per month.  Syl Chanel PT 2/11/22

## 2022-02-12 NOTE — PLAN OF CARE
Problem: Adult Inpatient Plan of Care  Goal: Plan of Care Review  Outcome: Ongoing, Progressing  Goal: Patient-Specific Goal (Individualized)  Outcome: Ongoing, Progressing  Goal: Absence of Hospital-Acquired Illness or Injury  Outcome: Ongoing, Progressing  Goal: Optimal Comfort and Wellbeing  Outcome: Ongoing, Progressing     Problem: Adjustment to Illness (Sepsis/Septic Shock)  Goal: Optimal Coping  Outcome: Ongoing, Progressing     Problem: Bleeding (Sepsis/Septic Shock)  Goal: Absence of Bleeding  Outcome: Ongoing, Progressing     Problem: Fluid and Electrolyte Imbalance (Acute Kidney Injury/Impairment)  Goal: Fluid and Electrolyte Balance  Outcome: Ongoing, Progressing     Problem: Oral Intake Inadequate (Acute Kidney Injury/Impairment)  Goal: Optimal Nutrition Intake  Outcome: Ongoing, Progressing     Problem: Infection  Goal: Absence of Infection Signs and Symptoms  Outcome: Ongoing, Progressing     Problem: Fall Injury Risk  Goal: Absence of Fall and Fall-Related Injury  Outcome: Ongoing, Progressing     Problem: Skin Injury Risk Increased  Goal: Skin Health and Integrity  Outcome: Ongoing, Progressing

## 2022-02-13 NOTE — PLAN OF CARE
Problem: Adult Inpatient Plan of Care  Goal: Plan of Care Review  Outcome: Ongoing, Progressing  Goal: Patient-Specific Goal (Individualized)  Outcome: Ongoing, Progressing  Goal: Absence of Hospital-Acquired Illness or Injury  Outcome: Ongoing, Progressing  Goal: Optimal Comfort and Wellbeing  Outcome: Ongoing, Progressing     Problem: Adjustment to Illness (Sepsis/Septic Shock)  Goal: Optimal Coping  Outcome: Ongoing, Progressing     Problem: Bleeding (Sepsis/Septic Shock)  Goal: Absence of Bleeding  Outcome: Ongoing, Progressing     Problem: Infection  Goal: Absence of Infection Signs and Symptoms  Outcome: Ongoing, Progressing     Problem: Fall Injury Risk  Goal: Absence of Fall and Fall-Related Injury  Outcome: Ongoing, Progressing     Problem: Skin Injury Risk Increased  Goal: Skin Health and Integrity  Outcome: Ongoing, Progressing

## 2022-02-13 NOTE — PLAN OF CARE
Problem: Adult Inpatient Plan of Care  Goal: Plan of Care Review  Outcome: Ongoing, Progressing  Goal: Patient-Specific Goal (Individualized)  Outcome: Ongoing, Progressing     Problem: Adjustment to Illness (Sepsis/Septic Shock)  Goal: Optimal Coping  Outcome: Ongoing, Progressing     Problem: Bleeding (Sepsis/Septic Shock)  Goal: Absence of Bleeding  Outcome: Ongoing, Progressing     Problem: Glycemic Control Impaired (Sepsis/Septic Shock)  Goal: Blood Glucose Level Within Desired Range  Outcome: Ongoing, Progressing     Problem: Infection Progression (Sepsis/Septic Shock)  Goal: Absence of Infection Signs and Symptoms  Outcome: Ongoing, Progressing     Problem: Nutrition Impaired (Sepsis/Septic Shock)  Goal: Optimal Nutrition Intake  Outcome: Ongoing, Progressing     Problem: Fluid and Electrolyte Imbalance (Acute Kidney Injury/Impairment)  Goal: Fluid and Electrolyte Balance  Outcome: Ongoing, Progressing     Problem: Oral Intake Inadequate (Acute Kidney Injury/Impairment)  Goal: Optimal Nutrition Intake  Outcome: Ongoing, Progressing

## 2022-02-14 NOTE — PLAN OF CARE
Problem: Adult Inpatient Plan of Care  Goal: Plan of Care Review  Outcome: Ongoing, Progressing  Goal: Patient-Specific Goal (Individualized)  Outcome: Ongoing, Progressing     Problem: Oral Intake Inadequate (Acute Kidney Injury/Impairment)  Goal: Optimal Nutrition Intake  Outcome: Ongoing, Progressing     Problem: Renal Function Impairment (Acute Kidney Injury/Impairment)  Goal: Effective Renal Function  Outcome: Ongoing, Progressing     Problem: Infection  Goal: Absence of Infection Signs and Symptoms  Outcome: Ongoing, Progressing     Problem: Fall Injury Risk  Goal: Absence of Fall and Fall-Related Injury  Outcome: Ongoing, Progressing     Problem: Skin Injury Risk Increased  Goal: Skin Health and Integrity  Outcome: Ongoing, Progressing     Problem: Coping Ineffective  Goal: Effective Coping  Outcome: Ongoing, Progressing

## 2022-02-14 NOTE — PLAN OF CARE
Problem: Adult Inpatient Plan of Care  Goal: Plan of Care Review  Outcome: Ongoing, Progressing  Goal: Patient-Specific Goal (Individualized)  Outcome: Ongoing, Progressing  Goal: Absence of Hospital-Acquired Illness or Injury  Outcome: Ongoing, Progressing  Goal: Optimal Comfort and Wellbeing  Outcome: Ongoing, Progressing  Goal: Readiness for Transition of Care  Outcome: Ongoing, Progressing     Problem: Adjustment to Illness (Sepsis/Septic Shock)  Goal: Optimal Coping  Outcome: Ongoing, Progressing     Problem: Bleeding (Sepsis/Septic Shock)  Goal: Absence of Bleeding  Outcome: Ongoing, Progressing     Problem: Glycemic Control Impaired (Sepsis/Septic Shock)  Goal: Blood Glucose Level Within Desired Range  Outcome: Ongoing, Progressing     Problem: Infection Progression (Sepsis/Septic Shock)  Goal: Absence of Infection Signs and Symptoms  Outcome: Ongoing, Progressing     Problem: Nutrition Impaired (Sepsis/Septic Shock)  Goal: Optimal Nutrition Intake  Outcome: Ongoing, Progressing     Problem: Fluid and Electrolyte Imbalance (Acute Kidney Injury/Impairment)  Goal: Fluid and Electrolyte Balance  Outcome: Ongoing, Progressing     Problem: Oral Intake Inadequate (Acute Kidney Injury/Impairment)  Goal: Optimal Nutrition Intake  Outcome: Ongoing, Progressing     Problem: Renal Function Impairment (Acute Kidney Injury/Impairment)  Goal: Effective Renal Function  Outcome: Ongoing, Progressing     Problem: Infection  Goal: Absence of Infection Signs and Symptoms  Outcome: Ongoing, Progressing     Problem: Fall Injury Risk  Goal: Absence of Fall and Fall-Related Injury  Outcome: Ongoing, Progressing     Problem: Skin Injury Risk Increased  Goal: Skin Health and Integrity  Outcome: Ongoing, Progressing     Problem: Coping Ineffective  Goal: Effective Coping  Outcome: Ongoing, Progressing

## 2022-02-14 NOTE — PT/OT/SLP PROGRESS
"Speech Language Pathology  Treatment    Johanny Curtis   MRN: [id number]   Admitting Diagnosis: Heart failure with reduced ejection fraction    Diet recommendations: Solid Diet Level: Puree  Liquid Diet Level: Nectar Thick 1 bite/sip at a time, Alternating bites/sips, Assistance with meals and Assistance with thickening liquids, Avoid talking while eating, Feed only when awake/alert, HOB to 90 degrees, Meds whole buried in puree, Monitor for s/s of aspiration, Remain upright 30 minutes post meal, Small bites/sips and Strict aspiration precautions    SLP Treatment Date: 02/14/22  Speech Start Time: 0811     Speech Stop Time: 0843     Speech Total (min): 32 min       TREATMENT BILLABLE MINUTES:  Treatment Swallowing Dysfunction 24 and Self Care/Home Management Training 8    Has the patient been evaluated by SLP for swallowing? : Yes  Keep patient NPO?: No   General Precautions: Standard, aspiration,fall,pureed diet,nectar thick          Subjective:  "Need something to drink." pt was able to express need for something to drink       Objective:      Pt was assisted with feeding breakfast meal after repositioned to upright position in bed.  Pt continues to appear weak and worn out, but was able to maintain alertness and accepted approx 50% of pureed breakfast meal and >4oz of nectar thick liquids.  Pt accepted nectar thick liquids via cup x 1 and straw x 12.  Throat clearing was present after 4 out of 12 straw sips of nectar thick liquids.  Pt accepted grits x 8, pureed eggs x 8, pureed Tamazight toast x 4, and applesauce x 5 without overt s/s of aspiration.  Pt indicated when she was full of meal.   Education provided to pt regarding role of SLP, monitoring of diet tolerance, cough/throat clear for airway protection, recommendations to continue pureed diet and nectar thick liquids, aspiration precautions, and SLP treatment plan and POC. Pt will benefit from continued reinforcement.     Assessment:  Johanny Curtis is a 89 " y.o. female with a medical diagnosis of Heart failure with reduced ejection fraction and presents with dysphagia.     Discharge recommendations: Discharge Facility/Level of Care Needs:  (tbd)     Goals:   Multidisciplinary Problems     SLP Goals        Problem: SLP Goal    Goal Priority Disciplines Outcome   SLP Goal     SLP Ongoing, Progressing   Description: Speech Language Pathology Goals  Revised goals expected to be met by 2/21:  1. Pt will tolerate puree/nectar thick liquids without s/s of aspiration.   2. Pt will continue to participate with ongoing po trials to determine appropriateness of further diet advancement.   3. Pt will perform dysphagia exercises x 5-10 with therapist's guide.     Goals expected to be met by 02/14  1. Pt will tolerate puree/thin liquid diet w/o any overt s/s of aspiration. Goal not met for thin liquids.  2. Pt will continue to participate with ongoing po trials to determine appropriateness of further diet advancement. Ongoing                            Plan:   Patient to be seen Therapy Frequency: 4 x/week  Planned Interventions: Dysphagia Therapy  Plan of Care expires:    Plan of Care reviewed with: patient  SLP Follow-up?: Yes  SLP - Next Visit Date: 02/15/22             02/14/2022

## 2022-02-14 NOTE — PT/OT/SLP PROGRESS
"Physical Therapy Treatment    Patient Name:  Johanny Curtis   MRN:  [id number]  Admit Date: 2/2/2022  Admitting Diagnosis: Heart failure with reduced ejection fraction  Recent Surgeries:     General Precautions: Standard, aspiration,fall,pureed diet,nectar thick   Orthopedic Precautions:N/A   Braces: N/A     Recommendations:     Discharge Recommendations:  home with home health   Level of Assistance Recommended at Discharge: 24 hours significant assistance  Discharge Equipment Recommendations:  (TBD)   Barriers to discharge: Inaccessible home environment    Assessment:     Johanny Curtis is a 89 y.o. female admitted with a medical diagnosis of Heart failure with reduced ejection fraction . Patient fatigued very easily with min activity, but was able to transfer out of bed and take a few steps using a RW.      Performance deficits affecting function:  weakness,impaired endurance,impaired self care skills,impaired functional mobilty,impaired balance,decreased coordination,decreased lower extremity function,decreased safety awareness,decreased ROM,impaired cardiopulmonary response to activity .    Rehab Potential is fair    Activity Tolerance: Fair    Plan:     Patient to be seen 5 x/week to address the above listed problems via gait training,therapeutic activities,therapeutic exercises,neuromuscular re-education    · Plan of Care Expires: 03/05/22  · Plan of Care Reviewed with: patient    Subjective     Patient continuously saying" Aha, Aha" but when asked if she was ok, she states " I'm fine".     Pain/Comfort:  · Pain Rating 1:  (Did not rate)  · Location - Side 1: Bilateral  · Location - Orientation 1: lower  · Location 1: sacral spine  · Pain Addressed 1: Reposition,Distraction  · Pain Rating Post-Intervention 1:  (Did not rate, but no c/o pain noted while resting)    Patient's cultural, spiritual, Evangelical conflicts given the current situation:  · no    Objective:     Communicated with NSG prior to session.  " Patient found HOB elevated with oxygen upon PT entry to room.     Therapeutic Activities and Exercises: Donned/Doffed kiesha schmidt. Patient sat at EOB for a few minutes to prepare for treatment. Stand pivot transfer from bed<>bedsdie chair with PTA in front for support and mod assistance. Static standing balance 2x20 secs with RW for support and min assistance. Transfer back to bed and repositioned with max assist of 2. B LE PROM/AAROM x 20 reps on all available planes of motion.    Functional Mobility:  · Bed Mobility:     · Rolling Left:  moderate assistance  · Scooting: total assistance and of 2 persons  · Supine to Sit: moderate assistance  · Sit to Supine: maximal assistance  · Transfers:     · Sit to Stand:  moderate assistance with rolling walker  · Bed to Chair: moderate assistance with  hand-held assist  using  Stand Pivot  · Gait: 6 small suffling steps x 2 trials with RW for support and mod assistance, with chair follow and cues for encouragement and proper RW management.  · Balance: fair+ in sititng and poor in standing.    AM-PAC 6 CLICK MOBILITY  11    Patient left HOB elevated with all lines intact and call button in reach.    GOALS:   Multidisciplinary Problems     Physical Therapy Goals        Problem: Physical Therapy Goal    Goal Priority Disciplines Outcome Goal Variances Interventions   Physical Therapy Goal     PT, PT/OT Ongoing, Progressing     Description: Goals to be met by: 22     Patient will increase functional independence with mobility by performin. Supine to sit with MInimal Assistance  2. Sit to supine with MInimal Assistance  3. Rolling to Left and Right with Minimal Assistance.  4. Sit to stand transfer with Minimal Assistance  5. Bed to chair transfer with Minimal Assistance using Rolling Walker  6. Gait  x 30 feet with Minimal Assistance using Rolling Walker.   7. Wheelchair propulsion x50 feet with Stand-by Assistance using bilateral uppper extremities                      Time Tracking:     PT Received On: 02/14/22  PT Total Time (min):       Billable Minutes: Gait Training 15, Therapeutic Activity 15 and Therapeutic Exercise 10    Treatment Type: Treatment  PT/PTA: PTA     PTA Visit Number: 1     02/14/2022

## 2022-02-14 NOTE — TELEPHONE ENCOUNTER
----- Message from Mady Gordon sent at 2/14/2022 10:01 AM CST -----  Contact: Pt's [phone] or [phone]  The pt's daughter is requesting a callback regarding her mother.  She states that she is getting discharged next week from the hospital and they would like for the doctor to put in a referral for the pt to get Home Health, a nurse or aide, hospital bed and wheelchair.  The daughter would like for everything to be in place before the pt gets home before she gets discharged.    Callback number: Pt's [phone] or [phone]

## 2022-02-14 NOTE — PLAN OF CARE
Problem: SLP Goal  Goal: SLP Goal  Description: Speech Language Pathology Goals  Revised goals expected to be met by 2/21:  1. Pt will tolerate puree/nectar thick liquids without s/s of aspiration.   2. Pt will continue to participate with ongoing po trials to determine appropriateness of further diet advancement.   3. Pt will perform dysphagia exercises x 5-10 with therapist's guide.     Goals expected to be met by 02/14  1. Pt will tolerate puree/thin liquid diet w/o any overt s/s of aspiration. Goal not met for thin liquids.  2. Pt will continue to participate with ongoing po trials to determine appropriateness of further diet advancement. Ongoing          Outcome: Ongoing, Progressing  Goals updated.

## 2022-02-14 NOTE — PT/OT/SLP PROGRESS
"Occupational Therapy   Treatment    Name: Johanny Curtis  MRN: [id number]  Admit Date: 2/2/2022  Admitting Diagnosis:  Heart failure with reduced ejection fraction    General Precautions: Standard, aspiration,fall,pureed diet,nectar thick,hearing impaired   Orthopedic Precautions:N/A   Braces:       Recommendations:     Discharge Recommendations: home health OT  Level of Assistance Recommended at Discharge: 24 hours physical assistance for all ADL's and home management tasks  Discharge Equipment Recommendations:   (TBD)  Barriers to discharge:  Inaccessible home environment    Assessment:     Johanny Curtis is a 89 y.o. female with a medical diagnosis of Heart failure with reduced ejection fraction . Performance deficits affecting function are weakness,impaired endurance,impaired self care skills,impaired functional mobilty,gait instability,impaired balance,decreased coordination,decreased upper extremity function,decreased lower extremity function,decreased safety awareness,pain,decreased ROM,impaired cardiopulmonary response to activity.Pt. participated fair with session on this day. Increased time needed with transfers on this day as Pt. Remains Total (A) x 2 persons with transfers and the need for multiple rest breaks with activity. Son present during session and witness level (A) Pt. Will need with transfers and bed mobility. Pt. Will continue to benefit from continued OT to progress towards goals    Rehab Potential is fair    Activity tolerance:  Fair    Plan:     Patient to be seen 3 x/week to address the above listed problems via self-care/home management,therapeutic activities,therapeutic exercises,neuromuscular re-education,cognitive retraining    · Plan of Care Expires: 03/03/22  · Plan of Care Reviewed with: patient    Subjective     Communicated with: nsg prior to session. "Im ok"    Pain/Comfort:  Pain Rating 1: 0/10  Pain Addressed 1: Nurse notified  Pain Rating Post-Intervention 1: 0/10    Patient's " cultural, spiritual, Scientologist conflicts given the current situation:  no    Objective:     Patient found HOB elevated with oxygen and son present upon OT entry to room.    Bed Mobility:    · Patient completed Rolling/Turning to Left with  maximal assistance  · Patient completed Rolling/Turning to Right with maximal assistance  · Patient completed Scooting/Bridging with total assistance  · Patient completed Supine to Sit with total assistance and 2 persons  · Patient completed Sit to Supine with total assistance and 2 persons     Functional Mobility/Transfers:  · Patient completed Sit<>stand transfer with Max(A) x 2 persons with HHA  · Patient completed Bed <> Chair Transfer 2 trials using Squat Pivot technique with total assistance and of 2 persons with hand-held assist  · Functional Mobility: not tested. Pt. Requested to be propel to gym area to see different view. Pt. Unable to propel self in w/c and required (A) from therapist     Activities of Daily Living:  · Grooming: minimum assistance with oral care and hair care. Max(A) with use of electric shaver  · Lower Body Dressing: total assistance to maverick pants over hips in supine   · Toileting: total assistance with hygiene and clothing management in supine     AMPA 6 Click ADL: 12    Treatment & Education:  Pt. Tolerated static standing trial for approx 15 secs with Max(A) x2 persons with RW and verbal cues for posture and hand placement     Pt. Tolerated sitting EOB with SBA/CGA for approx 10 mins to increase sitting tolerance and endurance.    Pt. With standing and therex performed to increase ROM, endurance selfcare task and fxl mobility for independence     Patient left HOB elevated with all lines intact, call button in reach, nurse notified and son presentEducation:      GOALS:   Multidisciplinary Problems     Occupational Therapy Goals        Problem: Occupational Therapy Goal    Goal Priority Disciplines Outcome Interventions   Occupational Therapy Goal      OT, PT/OT Ongoing, Progressing    Description: Goals to be met by: 2/24/22     Patient will increase functional independence with ADLs by performing:    Feeding with Set-up Assistance.  REVISED  =Min (A) with eating  UE Dressing with Minimal Assistance. REVISED =Mod (A) with UBD  LE Dressing with Moderate Assistance. REVISED =Max with LBD at bed level.  Grooming while seated at sink with Minimal Assistance. REVISED =Mod (A) with grooming   Toileting from toilet or BSC with Moderate Assistance for hygiene and clothing management. REVISED =Max (A) with toileting with DME as needed.  Bathing from  sitting at sink with Moderate Assistance.  Supine to sit with Stand-by Assistance.  REVISED =Mod (A) with supine to sit  Stand pivot transfers with Minimal Assistance with RW to steady. REVISED =Max (A) with sliding board Transfers.  Toilet transfer to toilet or BSC with Minimal Assistance using RW. REVISED =Max (A) with toilet transfer with sliding board.  Upper extremity exercise with assistance as needed.  Caregiver will be educated on level of assist required to safely perform self care tasks and functional transfers..                    Time Tracking:     OT Date of Treatment: OT Date of Treatment: 02/14/22  OT Total Time (min): Total Time (min): 42 min    Billable Minutes:Self Care/Home Management 42    2/14/2022   OT and LAO have discussed the above patients goals and status in collaboration with Plan of Care.

## 2022-02-15 NOTE — TREATMENT PLAN
Rehab Services' DME recommendations    Johanny Curtis  MRN: [id number]     [x] Wheelchair  Number of hours up in a wheelchair per day 12       Style transport w/c     Seat Width 16 (Small adult)    Seat Depth 18    Back Height Standard    Leg Support Standard, Heel Loops and Swing Away    Arm Height Desk and Swing Away    Lap Belt Velcro    Accessories Front Brakes and Safety belt    Cushion Gel    Justification for Cushion sitting long periods of time    Justification for wheelchair order: (Please select all that apply) Caregiver is capable and willing to operate wheelchair safely, The patient requires the use of a wheelchair for ADLs within the home and Patient mobility limitations cannot be sufficiently resolved by the use of other ambulatory therapies      [x] 3 in 1 commode Standard    [x] Hospital bed Semi Electric    Patient requires a bed height different than a fixed height hospital bed to permit transfers to chair, wheelchair or standing. Yes    Accessories Low air mattress    [x] Home health PT, OT, SLP and Aide    Syl Chanel, PT 2/15/2022

## 2022-02-15 NOTE — PLAN OF CARE
Problem: Physical Therapy Goal  Goal: Physical Therapy Goal  Description: Goals to be met by: 22     Patient will increase functional independence with mobility by performin. Supine to sit with MInimal Assistance  2. Sit to supine with MInimal Assistance  3. Rolling to Left and Right with Minimal Assistance.  4. Sit to stand transfer with Minimal Assistance  5. Bed to chair transfer with Minimal Assistance using Rolling Walker  6. Gait  x 30 feet with Minimal Assistance using Rolling Walker.   7. Wheelchair propulsion x50 feet with Stand-by Assistance using bilateral uppper extremities    Outcome: Ongoing, Progressing   Pt's goals remain appropriate and pt will continue to benefit from skilled PT services to work towards improved functional mobility including: bed mobility, transfers, and gait.   2/15/2022

## 2022-02-15 NOTE — PROGRESS NOTES
[facility]                                  Skilled Nursing Facility                   Progress Note     Admit Date: 2/2/2022  ION TBD 2/24/2022  Principal Problem:  Heart failure with reduced ejection fraction   HPI obtained from patient interview and chart review     Chief Complaint: Re-evaluation of medical treatment and therapy status:  Lab review, hypercalcemia    HPI:   Mrs. Curtis is a 89 year old female PMHx of  HTN, CKD (b/l Cr 2-2.5), HLD, hypothyroidism, obesity, PVD who presents to SNF following hospitalization for acute heart failure with reserved EF.  Admission to SNF for secondary weakness and debility    Interval history:  All of today's labs reviewed and are listed below.  Calcium 11.7, BUN/creatinine 49/2.3.  Patient's clinical presentation does not appear unchanged due to hypercalcemia.  Patient is oriented x3, low verbal spot in 80, easily drifts back to sleep or is moaning.  24 hr vital sign ranges listed below.  patient back on supplemental O2, unaware as to why- no charting of weaning attempts. Placed on RA- SpO2 is 96%, O2 left off.  Bedside nursing will continue to attempt to wean supplemental O2.  Patient continues to have poor PO intake, low verbal spontaneity, appears very frail. Patient denies shortness of breath, abdominal discomfort, nausea, or vomiting. Patient denies dysuria.  Patient reports having regular bowel movements.  Patient progessing slowly with PT/OT-Gait: 6 small suffling steps x 2 trials with RW for support and mod assistance, with chair follow and cues for encouragement and proper RW management. Continuing to follow and treat all acute and chronic conditions.    Past Medical History: Patient has a past medical history of AAA (abdominal aortic aneurysm), Anemia in CKD (chronic kidney disease), Arthritis, Bacteremia due to Escherichia coli (9/24/2020), Cataract, Class 1 obesity due to excess  calories with serious comorbidity in adult (7/6/2017), Gout, chronic, Heart failure with reduced ejection fraction (1/29/2022), High cholesterol, Hypercapnic respiratory failure (1/28/2022), Hypertension, Hypothyroidism, Obesity, Plantar fasciitis, PVD (peripheral vascular disease), and Thyroid trouble.    Past Surgical History: Patient has a past surgical history that includes Hysterectomy; Skin biopsy; Cholecystectomy; Cataract extraction (3/18/13); Eye surgery; Breast surgery (Left, 1972); Breast biopsy (Left); ERCP (N/A, 9/8/2020); and Endoscopic ultrasound of upper gastrointestinal tract (N/A, 9/8/2020).    Social History: Patient reports that she quit smoking about 20 years ago. She has a 30.00 pack-year smoking history. She has never used smokeless tobacco. She reports that she does not drink alcohol and does not use drugs.    Family History: family history includes Breast cancer in her sister; Cancer in her brother, sister, and sister; Cataracts in her son; Diabetes in her son and son; Glaucoma in her daughter and son; Heart disease in her brother; Lupus in her daughter; Meningitis in her son; Mental illness in her son; No Known Problems in her brother.    Allergies: Patient has No Known Allergies.    ROS  Constitutional: Negative for fever   Eyes: Negative for blurred vision, double vision   Respiratory: Negative for cough, shortness of breath   Cardiovascular: Negative for chest pain, palpitations, and leg swelling.   Gastrointestinal: Negative for abdominal pain, constipation, diarrhea, nausea, vomiting.   Genitourinary: Negative for dysuria, frequency   Musculoskeletal:  + generalized weakness. Negative for back pain and myalgias.   Skin: Negative for itching and rash.   Neurological: Negative for dizziness, headaches.   Psychiatric/Behavioral: Negative for depression. The patient is not nervous/anxious.      24 hour Vital Sign Range   Temp:  [98 °F (36.7 °C)-98.2 °F (36.8 °C)]   Pulse:  [65-98]   Resp:   [18]   BP: (143-156)/(73-84)   SpO2:  [95 %-100 %]     PEx  Constitutional: Patient appears debilitated.  No distress noted  HENT:   Head: Normocephalic and atraumatic.   Eyes: Pupils are equal, round  Neck: Normal range of motion. Neck supple.   Cardiovascular: Normal rate, regular rhythm and normal heart sounds.    Pulmonary/Chest: Effort normal and breath sounds are clear with diminished bases.  Supplemental O2 in progress.  Abdominal: Soft. Bowel sounds are normal.   Musculoskeletal: Normal range of motion.   Neurological: Alert and oriented to person, place, and time.   Psychiatric: Normal mood and affect. Behavior is normal.   Skin: Skin is warm and dry.     Recent Labs   Lab 02/15/22  0531      K 4.7      CO2 31*   BUN 49*   CREATININE 2.3*   MG 2.4       Recent Labs   Lab 02/15/22  0531   WBC 4.68   RBC 3.99*   HGB 11.5*   HCT 38.7      MCV 97   MCH 28.8   MCHC 29.7*         Assessment and Plan:    Hypercalcemia  Secondary hyperparathyroidism of renal origin  - PTH at baseline  - 2/11 discontinuing Calcitrol 0.25 mcg twice a week  - 2/14 worsening, initiated normal saline 500 mL over 5 hours, repeat BMP tomorrow, will continue to monitor twice weekly BMPs     CKD (chronic kidney disease), stage IV  - sCr baseline appears to be 1.7-2.0 range. Likelly new baseline of 2.0-2.5  - stable, continue to monitor twice weekly BMPs, avoid nephrotoxic agents, renally dose medications when appropriate     Heart failure with reduced ejection fraction  - TTE 1/31- EF 50%  - BMP improving  - Unable to optimize GDMT with aldosterone antagonist or ANRI due to low GFR  - 2/8 concern for patient's hydration level, discontinue furosemide 40mg PO    Advanced frailty  At risk for failure to thrive  At risk for malnutrition  Debility   - Continue with PT/OT for gait training and strengthening and restoration of ADL's   - Encourage mobility, OOB in chair, and early ambulation as appropriate  - Fall precautions   -  Monitor for bowel and bladder dysfunction  - Monitor for and prevent skin breakdown and pressure ulcers  - continue DVT prophylaxis with  heparin 5 K q.8 hours  - 2/8 palliative care consult completed- pt made DNR      Hypercapnic respiratory failure  -  CXR with right sided opacity that may not be consistent with volume overload alone, consider CT if respiratory function fails to improve w diuresis alone.  - Initially required 2L via NC O2, then transitioned safely to room air  - admitted to SNF with 1 L nasal cannula  - unable to obtain SpO2 on my portable pulse oximeter  - continue to wean O2 to room air     Essential hypertension  AAA (abdominal aortic aneurysm)  - continue nifedipine 30 mg daily ( home dose 60mg to be titrated up as renal function improves), holding Lasix 40 mg daily    Anemia in CKD (chronic kidney disease)  - continue ferrous gluconate 324 mg  - continue to monitor twice weekly CBCs    Hypothyroidism  - Normal TSH  - Continue levothyroxine 88 mcg daily            Anticipate disposition:  Home with home health      Follow-up needed during SNF stay-    Follow-up needed after discharge from SNF: PCP, Cardiology ( needs to be made)    No future appointments.        Amanda Dangelo NP  Department of Hospital Medicine   [facility]     DOS: 2/15/2022      Patient note was created using MModal Dictation.  Any errors in syntax or even information may not have been identified and edited on initial review prior to signing this note.

## 2022-02-15 NOTE — PLAN OF CARE
SEB read NOMNC to patient's daughter, Annette Lombard, SW answered any questions, daughter expressed understanding and signed NOMNC, original filed in office.       02/15/22 1716   Medicare Message   Important Message from Medicare regarding Discharge Appeal Rights Given to patient/caregiver;Explained to patient/caregiver;Other (comments)  (Read to Pt's POA via telephone)   Time IMM was signed 1717     .mmr

## 2022-02-15 NOTE — PT/OT/SLP PROGRESS
Physical Therapy Treatment    Patient Name:  Johanny Curtis   MRN:  [id number]  Admit Date: 2/2/2022  Admitting Diagnosis: Heart failure with reduced ejection fraction    General Precautions: Standard, aspiration,fall,pureed diet,nectar thick   Orthopedic Precautions:N/A   Braces: N/A     Recommendations:     Discharge Recommendations:  home with home health   Level of Assistance Recommended at Discharge: Intermittent assistance   Discharge Equipment Recommendations: 3-in-1 commode,hospital bed,wheelchair (transport w/c)   Barriers to discharge: Inaccessible home environment,Decreased caregiver support    Assessment:     Johanny Curtis is a 89 y.o. female admitted with a medical diagnosis of Heart failure with reduced ejection fraction . Pt is unsafe with functional mobility at this time due to pt requires moderate assist for bed mobility, moderate to max assist for transfers, and SBA for sitting balance. Pt is motivated to progress with functional mobility. Pt's daughter present throughout treatment and was educated in proper technique with proper body mechanics while assisting pt with transfer and performed a sit to stand transfer with pt with proper technique.     Performance deficits affecting function:  weakness,gait instability,impaired endurance,impaired balance,impaired functional mobilty,impaired cognition,decreased safety awareness,decreased lower extremity function,impaired cardiopulmonary response to activity .    Rehab Potential is fair    Activity Tolerance: Fair    Plan:     Patient to be seen 5 x/week to address the above listed problems via gait training,therapeutic activities,therapeutic exercises,neuromuscular re-education    · Plan of Care Expires: 03/05/22  · Plan of Care Reviewed with: patient,daughter    Subjective   Pt asked for a paper towel to wipe her face.     Pain/Comfort:  · Pain Rating 1: 0/10 (pt moaning while PT in room and with movement, when pt asked if she is hurting-she states  no)  · Pain Rating Post-Intervention 1: 0/10    Patient's cultural, spiritual, [employer] conflicts given the current situation:  · no    Objective:     Communicated with nurse prior to session.  Patient found HOB elevated with oxygen upon PT entry to room.     Functional Mobility:  · Bed Mobility:     · Rolling Right: moderate assistance  · Supine to Sit: moderate assistance  · Pt required moderate assist to scoot to the EOB in sitting  · Transfers:     · Sit to Stand:  moderate assistance with hand-held assist PT educated pt's daughter in proper technique 1st trial then pt's daughter performed transfer with verbal cues for technique.   · Bed to Chair: maximal assistance with  hand-held assist  using  Stand Pivot   · PT discussed safety with mobility in the home with pt's daughter and the equipment recommendations upon D/C.     AM-PAC 6 CLICK MOBILITY  11    Patient left up in chair with call button in reach, nurse notified and daughter present.    GOALS:   Multidisciplinary Problems     Physical Therapy Goals        Problem: Physical Therapy Goal    Goal Priority Disciplines Outcome Goal Variances Interventions   Physical Therapy Goal     PT, PT/OT Ongoing, Progressing     Description: Goals to be met by: 22     Patient will increase functional independence with mobility by performin. Supine to sit with MInimal Assistance  2. Sit to supine with MInimal Assistance  3. Rolling to Left and Right with Minimal Assistance.  4. Sit to stand transfer with Minimal Assistance  5. Bed to chair transfer with Minimal Assistance using Rolling Walker  6. Gait  x 30 feet with Minimal Assistance using Rolling Walker.   7. Wheelchair propulsion x50 feet with Stand-by Assistance using bilateral uppper extremities                     Time Tracking:     PT Received On: 02/15/22  PT Total Time (min):   38    Billable Minutes: Therapeutic Activity 38    Treatment Type: Family Training,Treatment  PT/PTA: PT     PTA Visit  Number: 0     02/15/2022

## 2022-02-15 NOTE — PLAN OF CARE
Problem: Adult Inpatient Plan of Care  Goal: Plan of Care Review  Outcome: Ongoing, Progressing     Problem: Adult Inpatient Plan of Care  Goal: Patient-Specific Goal (Individualized)  Outcome: Ongoing, Progressing     Problem: Adult Inpatient Plan of Care  Goal: Absence of Hospital-Acquired Illness or Injury  Outcome: Ongoing, Progressing     Problem: Adult Inpatient Plan of Care  Goal: Optimal Comfort and Wellbeing  Outcome: Ongoing, Progressing     Problem: Adult Inpatient Plan of Care  Goal: Readiness for Transition of Care  Outcome: Ongoing, Progressing     Problem: Adjustment to Illness (Sepsis/Septic Shock)  Goal: Optimal Coping  Outcome: Ongoing, Progressing

## 2022-02-15 NOTE — PT/OT/SLP PROGRESS
Speech Language Pathology      Johanny FAITH Curtis  MRN: [id number]    Patient not seen today secondary to  (pt declined therapy and PO intake during SLP attempt this AM.). Will follow-up 2/16/22.

## 2022-02-16 NOTE — PLAN OF CARE
Problem: Adult Inpatient Plan of Care  Goal: Plan of Care Review  Outcome: Ongoing, Progressing     Problem: Adult Inpatient Plan of Care  Goal: Patient-Specific Goal (Individualized)  Outcome: Ongoing, Progressing     Problem: Adult Inpatient Plan of Care  Goal: Absence of Hospital-Acquired Illness or Injury  Outcome: Ongoing, Progressing     Problem: Adult Inpatient Plan of Care  Goal: Optimal Comfort and Wellbeing  Outcome: Ongoing, Progressing

## 2022-02-16 NOTE — PLAN OF CARE
SW spoke to pt's daughter to confirm discharge. DME will be delivered to the patient's home. Patient will need stretcher transportation to her home. Home Health has been referred to [facility].  D/C set for 12 PM.    Kaylynn Sesay [facility]  Case Management Department  [facility]  [email or web address]

## 2022-02-16 NOTE — PT/OT/SLP PROGRESS
"Speech Language Pathology  Treatment    Johanny Curtis   MRN: [id number]   Admitting Diagnosis: Heart failure with reduced ejection fraction    Diet recommendations: Solid Diet Level: Puree  Liquid Diet Level: Nectar Thick 1 bite/sip at a time, Alternating bites/sips, Assistance with meals and Assistance with thickening liquids, Eliminate distractions, HOB to 90 degrees, Meds whole buried in puree, Monitor for s/s of aspiration, Remain upright 30 minutes post meal, Small bites/sips and Strict aspiration precautions    SLP Treatment Date: 02/16/22  Speech Start Time: 0814     Speech Stop Time: 0833     Speech Total (min): 19 min       TREATMENT BILLABLE MINUTES:  Treatment Swallowing Dysfunction 10 and Self Care/Home Management Training 9    Has the patient been evaluated by SLP for swallowing? : Yes  Keep patient NPO?: No   General Precautions: Standard, fall,aspiration,pureed diet,nectar thick,hearing impaired          Subjective:  "Thank you" pt stated this after a successful treatment session    Pain/Comfort  Pain Rating 1: 0/10 (No indications of pain)    Objective:      Pt was awake, alert and resting in bed. SLP adjusted HOB upright. Pt completed po trials of thin liquids via tsp x 6 w/ one exhibit of throat clearance. Pt completed the following dysphagia exercises w/ some intermittent sw's via tsp of thin liquids : Effortful sw x5, Falsetto x5, and basic tongue based retractions 5-7x's. SLP provided education on role of slp, slp goals, po trials, and dysphagia exercises, and POC. Pt demonstrated understanding.     Assessment:  Johanny Curtis is a 89 y.o. female with a medical diagnosis of Heart failure with reduced ejection fraction and presents with Dysphagia.        Discharge recommendations: Discharge Facility/Level of Care Needs: other (see comments) (tbd)     Goals:   Multidisciplinary Problems     SLP Goals        Problem: SLP Goal    Goal Priority Disciplines Outcome   SLP Goal     SLP Ongoing, " Progressing   Description: Speech Language Pathology Goals  Revised goals expected to be met by 2/21:  1. Pt will tolerate puree/nectar thick liquids without s/s of aspiration.   2. Pt will continue to participate with ongoing po trials to determine appropriateness of further diet advancement.   3. Pt will perform dysphagia exercises x 5-10 with therapist's guide.     Goals expected to be met by 02/14  1. Pt will tolerate puree/thin liquid diet w/o any overt s/s of aspiration. Goal not met for thin liquids.  2. Pt will continue to participate with ongoing po trials to determine appropriateness of further diet advancement. Ongoing                            Plan:   Patient to be seen Therapy Frequency: 4 x/week  Planned Interventions: Dysphagia Therapy  Plan of Care expires:    Plan of Care reviewed with: patient  SLP Follow-up?: Yes  SLP - Next Visit Date: 02/17/22         JEANINE Morales      02/16/2022

## 2022-02-16 NOTE — PT/OT/SLP PROGRESS
"Physical Therapy Treatment    Patient Name:  Johanny Curtis   MRN:  [id number]  Admit Date: 2/2/2022  Admitting Diagnosis: Heart failure with reduced ejection fraction  Recent Surgeries:     General Precautions: Standard, fall,aspiration,pureed diet,nectar thick,hearing impaired   Orthopedic Precautions:N/A   Braces: N/A     Recommendations:     Discharge Recommendations:  home with home health   Level of Assistance Recommended at Discharge: 24 hours significant assistance  Discharge Equipment Recommendations: 3-in-1 commode,hospital bed,wheelchair (transport w/c)   Barriers to discharge: Inaccessible home environment,Decreased caregiver support    Assessment:     Johanny Curtis is a 89 y.o. female admitted with a medical diagnosis of Heart failure with reduced ejection fraction .     Treatment session was limited secondary to fatigue and unable to keep eyes open during session. Pt was able to complete some supine therex before closing eyes. Pt continues to benefit from therapy to improve functional mobility.    Performance deficits affecting function:  weakness,gait instability,impaired endurance,impaired balance,impaired functional mobilty,impaired cognition,decreased safety awareness,decreased lower extremity function,impaired cardiopulmonary response to activity .    Rehab Potential is fair    Activity Tolerance: Fair    Plan:     Patient to be seen 5 x/week to address the above listed problems via gait training,therapeutic activities,therapeutic exercises,neuromuscular re-education    · Plan of Care Expires: 03/05/22  · Plan of Care Reviewed with: patient,daughter    Subjective     "I don't feel like it"     Pain/Comfort:  · Pain Rating 1: 0/10  · Pain Rating Post-Intervention 1: 0/10    Patient's cultural, spiritual, Taoism conflicts given the current situation:  · no    Objective:     Communicated with nurse prior to session.  Patient found HOB elevated with   no lines (check with pt's nurse, no oxygen " needed) upon PT entry to room.     Therapeutic Activities and Exercises:    Pt appeared more alert this PM than this AM, but pt had difficulty keeping eyes open during session despite verbal and physical cueing.    Supine BLE therex x15 reps: Ankle pumps    Functional Mobility:  · Bed Mobility:     · Rolling Right: moderate assistance with bed rails  · Supine to Sit: moderate assistance - pull to long sit.   · Pt tolerated sitting up for 30 seconds before laying back down     AM-PAC 6 CLICK MOBILITY  11    Patient left HOB elevated with call button in reach and family present.    GOALS:   Multidisciplinary Problems     Physical Therapy Goals        Problem: Physical Therapy Goal    Goal Priority Disciplines Outcome Goal Variances Interventions   Physical Therapy Goal     PT, PT/OT Ongoing, Progressing     Description: Goals to be met by: 22     Patient will increase functional independence with mobility by performin. Supine to sit with MInimal Assistance  2. Sit to supine with MInimal Assistance  3. Rolling to Left and Right with Minimal Assistance.  4. Sit to stand transfer with Minimal Assistance  5. Bed to chair transfer with Minimal Assistance using Rolling Walker  6. Gait  x 30 feet with Minimal Assistance using Rolling Walker.   7. Wheelchair propulsion x50 feet with Stand-by Assistance using bilateral uppper extremities                     Time Tracking:     PT Received On: 22  PT Total Time (min):   10    Billable Minutes: Therapeutic Exercise 10    Treatment Type: Treatment  PT/PTA: PTA     PTA Visit Number: 2022

## 2022-02-16 NOTE — PLAN OF CARE
[facility] - Skilled Nursing      HOME HEALTH ORDERS  FACE TO FACE ENCOUNTER    Patient Name: Johanny Curtis  YOB: 1930    PCP: Leticia Weems MD   PCP Address: [address] [facility] / [place] [address]  PCP Phone Number: [phone]  PCP Fax: [phone]    Encounter Date: 2/2/22    Admit to Home Health    Diagnoses:  Active Hospital Problems    Diagnosis  POA    *Heart failure with reduced ejection fraction [I50.20]  Yes    Palliative care encounter [Z51.5]  Not Applicable    Hypercapnic respiratory failure [J96.92]  Yes    Secondary hyperparathyroidism of renal origin [N25.81]  Yes    THAD (acute kidney injury) [N17.9]  Yes    Hyperlipidemia  [E78.5]  Yes    CKD (chronic kidney disease), stage IV [N18.4]  Yes    Anemia in CKD (chronic kidney disease) [N18.9, D63.1]  Yes    Hypothyroidism [E03.9]  Yes    Essential hypertension [I10]  Yes      Resolved Hospital Problems   No resolved problems to display.       Follow Up Appointments:  No future appointments.    Allergies:Review of patient's allergies indicates:  No Known Allergies    Medications: Review discharge medications with patient and family and provide education.      I have seen and examined this patient within the last 30 days. My clinical findings that support the need for the home health skilled services and home bound status are the following:no   Weakness/numbness causing balance and gait disturbance due to Heart Failure making it taxing to leave home.     Referrals/ Consults  Physical Therapy to evaluate and treat. Evaluate for home safety and equipment needs; Establish/upgrade home exercise program. Perform / instruct on therapeutic exercises, gait training, transfer training, and Range of Motion.  Occupational Therapy to evaluate and treat. Evaluate home environment for safety and equipment needs. Perform/Instruct on transfers, ADL training, ROM, and therapeutic exercises.  Speech Therapy  to evaluate and treat for   Language and Swallowing.   to evaluate for community resources/long-range planning.  Aide to provide assistance with personal care, ADLs, and vital signs.    Activities:   activity as tolerated     DIET:  Dysphagia .  IDDSI level 4, nectar thick liquids    Nursing:   Agency to admit patient within 24 hours of hospital discharge unless specified on physician order or at patient request    SN to complete comprehensive assessment including routine vital signs. Instruct on disease process and s/s of complications to report to MD. Review/verify medication list sent home with the patient at time of discharge  and instruct patient/caregiver as needed. Frequency may be adjusted depending on start of care date.     Skilled nurse to perform up to 3 visits PRN for symptoms related to diagnosis    Notify MD if SBP > 160 or < 90; DBP > 90 or < 50; HR > 120 or < 50; Temp > 101; O2 < 88%    Ok to schedule additional visits based on staff availability and patient request on consecutive days within the home health episode.    Miscellaneous   Heart Failure:      SN to instruct on the following:    Instruct on the definition of CHF.   Instruct on the signs/sympoms of CHF to be reported.   Instruct on and monitor daily weights.   Instruct on factors that cause exacerbation.   Instruct on action, dose, schedule, and side effects of medications.   Instruct on diet as prescribed.   Instruct on activity allowed.   Instruct on life-style modifications for life long management of CHF   SN to assess compliance with daily weights, diet, medications, fluid retention,    safety precautions, activities permitted and life-style modifications.   Additional 1-2 SN visits per week as needed for signs and symptoms     of CHF exacerbation.      For Weight Gain > 2-3 lbs in 1 day or 4-6 lbs over 1 week notify PCP:  Obtain BMP lab test in 3 days    Home Health Aide:  Nursing Three times weekly, Physical Therapy Three times weekly,  Occupational Therapy Three times weekly, Medical Social Work Three times weekly and Home Health Aide Three times weekly, Speech Therapy Three times weekly    Wound Care Orders  no    I certify that this patient is confined to her home and needs intermittent skilled nursing care, physical therapy, speech therapy and occupational therapy.

## 2022-02-16 NOTE — PT/OT/SLP PROGRESS
Occupational Therapy      Patient Name:  Johanny Curtis   MRN:  [id number]    Patient not seen today secondary to polite decline x 2 attempts  . Will follow-up OT POC.    2/16/2022

## 2022-02-17 NOTE — PLAN OF CARE
Problem: Adult Inpatient Plan of Care  Goal: Plan of Care Review  Outcome: Ongoing, Progressing  Goal: Patient-Specific Goal (Individualized)  Outcome: Ongoing, Progressing  Goal: Absence of Hospital-Acquired Illness or Injury  Outcome: Ongoing, Progressing  Goal: Optimal Comfort and Wellbeing  Outcome: Ongoing, Progressing  Goal: Readiness for Transition of Care  Outcome: Ongoing, Progressing     Problem: Adjustment to Illness (Sepsis/Septic Shock)  Goal: Optimal Coping  Outcome: Ongoing, Progressing     Problem: Bleeding (Sepsis/Septic Shock)  Goal: Absence of Bleeding  Outcome: Ongoing, Progressing     Problem: Glycemic Control Impaired (Sepsis/Septic Shock)  Goal: Blood Glucose Level Within Desired Range  Outcome: Ongoing, Progressing     Problem: Infection Progression (Sepsis/Septic Shock)  Goal: Absence of Infection Signs and Symptoms  Outcome: Ongoing, Progressing     Problem: Nutrition Impaired (Sepsis/Septic Shock)  Goal: Optimal Nutrition Intake  Outcome: Ongoing, Progressing     Problem: Fluid and Electrolyte Imbalance (Acute Kidney Injury/Impairment)  Goal: Fluid and Electrolyte Balance  Outcome: Ongoing, Progressing     Problem: Oral Intake Inadequate (Acute Kidney Injury/Impairment)  Goal: Optimal Nutrition Intake  Outcome: Ongoing, Progressing     Problem: Infection  Goal: Absence of Infection Signs and Symptoms  Outcome: Ongoing, Progressing     Problem: Fall Injury Risk  Goal: Absence of Fall and Fall-Related Injury  Outcome: Ongoing, Progressing     Problem: Skin Injury Risk Increased  Goal: Skin Health and Integrity  Outcome: Ongoing, Progressing     Problem: Coping Ineffective  Goal: Effective Coping  Outcome: Ongoing, Progressing

## 2022-02-17 NOTE — PT/OT/SLP PROGRESS
"Speech Language Pathology  Treatment    Johanny Curtis   MRN: [id number]   Admitting Diagnosis: Heart failure with reduced ejection fraction    Diet recommendations: Solid Diet Level: Puree  Liquid Diet Level: Nectar Thick 1 bite/sip at a time, Alternating bites/sips, Assistance with meals and Assistance with thickening liquids, Avoid talking while eating, Eliminate distractions, Feed only when awake/alert, Frequent oral care, HOB to 90 degrees, Meds whole buried in puree, Monitor for s/s of aspiration, Remain upright 30 minutes post meal, Small bites/sips and Strict aspiration precautions    SLP Treatment Date: 02/17/22  Speech Start Time: 0813     Speech Stop Time: 0847     Speech Total (min): 34 min       TREATMENT BILLABLE MINUTES:  Treatment Swallowing Dysfunction 26 and Self Care/Home Management Training 8    Has the patient been evaluated by SLP for swallowing? : Yes  Keep patient NPO?: No   General Precautions: Standard, aspiration,fall,pureed diet,nectar thick          Subjective:  I have one of those." pt referring to [employer] for hearing aids.         Objective:      Pt assisted with feeding for breakfast meal.  Pt positioned upright to prepare for PO intake.  Pt accepted the following PO trials: pureed eggs x 6, grits x 6, pureed sausage x 2, nectar thick juice via cup x 3 and straw x 8, and nectar thick coffee via cup x 5.  Throat clear present after 2 out of 6 bites of grits. Throat clear x 1 and cough x 1 present after 2 out of 5 cup sips of nectar thick coffee.  Pt indicated when she was full and feeding was stopped.  Education was provided to pt regarding role of SLP, continued recs for puree diet/nectar thick liquids, aspiration precautions, importance of maintaining adequate hydration, and SLP treatment plan and POC.   Pt demonstrated understanding of education provided, but will benefit from continued reinforcement.       Assessment:  Johanny Curtis is a 89 y.o. female with a medical diagnosis " of Heart failure with reduced ejection fraction and presents with dysphagia.     Discharge recommendations: Discharge Facility/Level of Care Needs: home health speech therapy     Goals:   Multidisciplinary Problems     SLP Goals        Problem: SLP Goal    Goal Priority Disciplines Outcome   SLP Goal     SLP Ongoing, Progressing   Description: Speech Language Pathology Goals  Revised goals expected to be met by 2/21:  1. Pt will tolerate puree/nectar thick liquids without s/s of aspiration.   2. Pt will continue to participate with ongoing po trials to determine appropriateness of further diet advancement.   3. Pt will perform dysphagia exercises x 5-10 with therapist's guide.     Goals expected to be met by 02/14  1. Pt will tolerate puree/thin liquid diet w/o any overt s/s of aspiration. Goal not met for thin liquids.  2. Pt will continue to participate with ongoing po trials to determine appropriateness of further diet advancement. Ongoing                            Plan:   Patient to be seen Therapy Frequency: 4 x/week  Planned Interventions: Dysphagia Therapy  Plan of Care expires:    Plan of Care reviewed with: patient  SLP Follow-up?: Yes  SLP - Next Visit Date: 02/18/22 02/17/2022

## 2022-02-17 NOTE — PROGRESS NOTES
[facility]                                  Skilled Nursing Facility                   Progress Note     Admit Date: 2/2/2022  ION TBD 2/18/2022  Principal Problem:  Heart failure with reduced ejection fraction   HPI obtained from patient interview and chart review     Chief Complaint: Re-evaluation of medical treatment and therapy status:  Lab review, hypophosphatemia    HPI:   Mrs. Curtis is a 89 year old female PMHx of  HTN, CKD (b/l Cr 2-2.5), HLD, hypothyroidism, obesity, PVD who presents to SNF following hospitalization for acute heart failure with reserved EF.  Admission to SNF for secondary weakness and debility    Interval history:  All of today's labs reviewed and are listed below.  BUN/creatinine 59/2.2 from 55/2.3, calcium remains at 10.9, phos 2.5.  24 hr vital sign ranges listed below.  Patient has been successfully weaned to room air.  SpO2 is 95% today.  Patient denies shortness of breath, abdominal discomfort, nausea, or vomiting.  Patient reports an adequate appetite.  Patient denies dysuria.  Patient reports having regular bowel movements.  Patient progessing with PT/OT- Gait: 12ft then 18ft then 20ft with RW with moderate assist +1 to follow with w/c for safety. pt rested in sitting between gait trials. pt performed gait with decreased step length, decreased heel strike, and at slow pace. Continuing to follow and treat all acute and chronic conditions.    Past Medical History: Patient has a past medical history of AAA (abdominal aortic aneurysm), Anemia in CKD (chronic kidney disease), Arthritis, Bacteremia due to Escherichia coli (9/24/2020), Cataract, Class 1 obesity due to excess calories with serious comorbidity in adult (7/6/2017), Gout, chronic, Heart failure with reduced ejection fraction (1/29/2022), High cholesterol, Hypercapnic respiratory failure (1/28/2022), Hypertension, Hypothyroidism, Obesity, Plantar  fasciitis, PVD (peripheral vascular disease), and Thyroid trouble.    Past Surgical History: Patient has a past surgical history that includes Hysterectomy; Skin biopsy; Cholecystectomy; Cataract extraction (3/18/13); Eye surgery; Breast surgery (Left, 1972); Breast biopsy (Left); ERCP (N/A, 9/8/2020); and Endoscopic ultrasound of upper gastrointestinal tract (N/A, 9/8/2020).    Social History: Patient reports that she quit smoking about 20 years ago. She has a 30.00 pack-year smoking history. She has never used smokeless tobacco. She reports that she does not drink alcohol and does not use drugs.    Family History: family history includes Breast cancer in her sister; Cancer in her brother, sister, and sister; Cataracts in her son; Diabetes in her son and son; Glaucoma in her daughter and son; Heart disease in her brother; Lupus in her daughter; Meningitis in her son; Mental illness in her son; No Known Problems in her brother.    Allergies: Patient has No Known Allergies.    ROS  Constitutional: Negative for fever   Eyes: Negative for blurred vision, double vision   Respiratory: Negative for cough, shortness of breath   Cardiovascular: Negative for chest pain, palpitations, and leg swelling.   Gastrointestinal: Negative for abdominal pain, constipation, diarrhea, nausea, vomiting.   Genitourinary: Negative for dysuria, frequency   Musculoskeletal:  + generalized weakness. Negative for back pain and myalgias.   Skin: Negative for itching and rash.   Neurological: Negative for dizziness, headaches.   Psychiatric/Behavioral: Negative for depression. The patient is not nervous/anxious.      24 hour Vital Sign Range   Temp:  [97.8 °F (36.6 °C)-97.9 °F (36.6 °C)]   Pulse:  [84-98]   Resp:  [20]   BP: (122-158)/(58-72)   SpO2:  [94 %-98 %]     PEx  Constitutional: Patient appears debilitated.  No distress noted  HENT:   Head: Normocephalic and atraumatic.   Eyes: Pupils are equal, round  Neck: Normal range of motion. Neck  supple.   Cardiovascular: Normal rate, regular rhythm and normal heart sounds.    Pulmonary/Chest: Effort normal and breath sounds are clear with diminished bases.   Abdominal: Soft. Bowel sounds are normal.   Musculoskeletal: Normal range of motion.   Neurological: Alert and oriented to person, place, and time.   Psychiatric: Normal mood and affect. Behavior is normal.   Skin: Skin is warm and dry.     Recent Labs   Lab 02/17/22  0459      K 4.0      CO2 28   BUN 56*   CREATININE 2.2*   MG 2.2       Recent Labs   Lab 02/17/22  0459   WBC 4.45   RBC 3.66*   HGB 10.3*   HCT 33.6*      MCV 92   MCH 28.1   MCHC 30.7*         Assessment and Plan:    Hypophosphatemia  - initiated Phos 5 mg x 1 dose    Hypercalcemia  Secondary hyperparathyroidism of renal origin  - PTH at baseline  - 2/11 discontinuing Calcitrol 0.25 mcg twice a week  - 2/14 worsening, initiated normal saline 500 mL over 5 hours, repeat BMP tomorrow, will continue to monitor twice weekly BMPs  - 2/17 calcium stable at 10.9     CKD (chronic kidney disease), stage IV  - sCr baseline appears to be 1.7-2.0 range. Likelly new baseline of 2.0-2.5  - stable, continue to monitor twice weekly BMPs, avoid nephrotoxic agents, renally dose medications when appropriate     Heart failure with reduced ejection fraction  - TTE 1/31- EF 50%  - BMP improving  - Unable to optimize GDMT with aldosterone antagonist or ANRI due to low GFR  - 2/8 concern for patient's hydration level, discontinue furosemide 40mg PO    Advanced frailty  At risk for failure to thrive  At risk for malnutrition  Debility   - Continue with PT/OT for gait training and strengthening and restoration of ADL's   - Encourage mobility, OOB in chair, and early ambulation as appropriate  - Fall precautions   - Monitor for bowel and bladder dysfunction  - Monitor for and prevent skin breakdown and pressure ulcers  - continue DVT prophylaxis with  heparin 5 K q.8 hours  - 2/8 palliative care  consult completed- pt made DNR      Hypercapnic respiratory failure  -  CXR with right sided opacity that may not be consistent with volume overload alone, consider CT if respiratory function fails to improve w diuresis alone.  - Initially required 2L via NC O2, then transitioned safely to room air  - admitted to SNF with 1 L nasal cannula  - unable to obtain SpO2 on my portable pulse oximeter  - continue to wean O2 to room air     Essential hypertension  AAA (abdominal aortic aneurysm)  - continue nifedipine 30 mg daily ( home dose 60mg to be titrated up as renal function improves), holding Lasix 40 mg daily    Anemia in CKD (chronic kidney disease)  - continue ferrous gluconate 324 mg  - continue to monitor twice weekly CBCs    Hypothyroidism  - Normal TSH  - Continue levothyroxine 88 mcg daily            Anticipate disposition:  Home with home health      Follow-up needed during SNF stay-    Follow-up needed after discharge from SNF: PCP, Cardiology ( needs to be made)    No future appointments.        Amanda Dangelo NP  Department of Hospital Medicine   [facility] Sk[facility]     DOS: 2/17/2022      Patient note was created using MModal Dictation.  Any errors in syntax or even information may not have been identified and edited on initial review prior to signing this note.

## 2022-02-17 NOTE — PT/OT/SLP PROGRESS
"Physical Therapy Treatment    Patient Name:  Johanny Curtis   MRN:  [id number]  Admit Date: 2/2/2022  Admitting Diagnosis: Heart failure with reduced ejection fraction  General Precautions: Standard, aspiration,fall,pureed diet,nectar thick   Orthopedic Precautions:N/A   Braces: N/A     Recommendations:     Discharge Recommendations:  home with home health (with family assist)   Level of Assistance Recommended at Discharge: Intermittent assistance   Discharge Equipment Recommendations: 3-in-1 commode,hospital bed,wheelchair (transport w/c)   Barriers to discharge: Inaccessible home environment,Decreased caregiver support    Assessment:     Johanny Curtis is a 89 y.o. female admitted with a medical diagnosis of Heart failure with reduced ejection fraction . Pt is unsafe with functional mobility at this time due to pt requires moderate assist for bed mobility, moderate/max assist for transfers, and moderate assist +1 to follow with w/c for gait due to weakness, instability, and fatigue. Pt is motivated to progress with functional mobility.    Performance deficits affecting function:  weakness,gait instability,impaired endurance,impaired balance,impaired functional mobilty,impaired self care skills,impaired cognition,decreased safety awareness,decreased lower extremity function .    Rehab Potential is fair    Activity Tolerance: Good    Plan:     Patient to be seen 5 x/week to address the above listed problems via gait training,therapeutic activities,therapeutic exercises,neuromuscular re-education    · Plan of Care Expires: 03/05/22  · Plan of Care Reviewed with: patient    Subjective   "I'm cold"     Pain/Comfort:  · Pain Rating 1: 0/10 (pt moaning at times but states she is not in pain when asked)  · Pain Rating Post-Intervention 1: 0/10    Patient's cultural, spiritual, Restorationist conflicts given the current situation:  · no    Objective:     Communicated with nurse prior to session.  Patient found HOB elevated " with oxygen upon PT entry to room.     Therapeutic Activities and Exercises: pt noted to be soiled with urine upon PT arrival. Pt rolled to L and R as below to be cleaned and diaper replaced. Pt required total assist to don pants prior to transfer.     Functional Mobility:  · Bed Mobility:     · Rolling Left:  moderate assistance  · Rolling Right: moderate assistance  · Supine to Sit: moderate assistance  · Sit to Supine: moderate assistance  · Transfers:     · Sit to Stand:  moderate assistance with rolling walker  · Bed to Chair then w/c to bedside chair: maximal assistance with  hand-held assist  using  Stand Pivot  · Gait: 12ft then 18ft then 20ft with RW with moderate assist +1 to follow with w/c for safety. pt rested in sitting between gait trials. pt performed gait with decreased step length, decreased heel strike, and at slow pace.     AM-PAC 6 CLICK MOBILITY  11    Patient left up in chair with call button in reach and nurse notified.    GOALS:   Multidisciplinary Problems     Physical Therapy Goals        Problem: Physical Therapy Goal    Goal Priority Disciplines Outcome Goal Variances Interventions   Physical Therapy Goal     PT, PT/OT Ongoing, Progressing     Description: Goals to be met by: 22     Patient will increase functional independence with mobility by performin. Supine to sit with MInimal Assistance  2. Sit to supine with MInimal Assistance  3. Rolling to Left and Right with Minimal Assistance.  4. Sit to stand transfer with Minimal Assistance  5. Bed to chair transfer with Minimal Assistance using Rolling Walker  6. Gait  x 30 feet with Minimal Assistance using Rolling Walker.   7. Wheelchair propulsion x50 feet with Stand-by Assistance using bilateral uppper extremities                     Time Tracking:     PT Received On: 22  PT Total Time (min):   28    Billable Minutes: Gait Training 18 and Therapeutic Activity 10    Treatment Type: Treatment  PT/PTA: PT     PTA Visit  Number: 0     02/17/2022

## 2022-02-18 NOTE — PT/OT/SLP DISCHARGE
Notified pt rx has been send to Pharmacy.        Physical Therapy Discharge Summary    Name: Johanny Curtis  MRN: [id number]   Principal Problem: Heart failure with reduced ejection fraction     Patient Discharged from acute Physical Therapy on 22.  Please refer to prior PT noted date on 22 for functional status.     Assessment:     Patient appropriate for care in another setting.    Objective:     GOALS:   Multidisciplinary Problems     Physical Therapy Goals        Problem: Physical Therapy Goal    Goal Priority Disciplines Outcome Goal Variances Interventions   Physical Therapy Goal     PT, PT/OT Ongoing, Progressing     Description: Goals to be met by: 22     Patient will increase functional independence with mobility by performin. Supine to sit with MInimal Assistance  2. Sit to supine with MInimal Assistance  3. Rolling to Left and Right with Minimal Assistance.  4. Sit to stand transfer with Minimal Assistance  5. Bed to chair transfer with Minimal Assistance using Rolling Walker  6. Gait  x 30 feet with Minimal Assistance using Rolling Walker.   7. Wheelchair propulsion x50 feet with Stand-by Assistance using bilateral uppper extremities                   Reasons for Discontinuation of Therapy Services  Transfer to alternate level of care.      Plan:     Patient Discharged to: Home with Home Health Service with family assist      2022

## 2022-02-18 NOTE — PLAN OF CARE
Problem: Adult Inpatient Plan of Care  Goal: Plan of Care Review  Outcome: Ongoing, Progressing  Goal: Patient-Specific Goal (Individualized)  Outcome: Ongoing, Progressing  Goal: Absence of Hospital-Acquired Illness or Injury  Outcome: Ongoing, Progressing  Goal: Optimal Comfort and Wellbeing  Outcome: Ongoing, Progressing     Problem: Oral Intake Inadequate (Acute Kidney Injury/Impairment)  Goal: Optimal Nutrition Intake  Outcome: Ongoing, Progressing

## 2022-02-18 NOTE — DISCHARGE SUMMARY
[facility] Medicine  Discharge Summary      Patient Name: Johanny Curtis  MRN: [id number]  Patient Class: IP- SNF  Admission Date: 2/2/2022  Hospital Length of Stay: 16 days  Discharge Date and Time:  02/18/2022 1:40 PM  Attending Physician: Jovany Coyle MD   Discharging Provider: Amanda Dangelo NP  Primary Care Provider: Leticia Weems MD      HPI:   Mrs. Curtis is a 89 year old female PMHx of  HTN, CKD (b/l Cr 2-2.5), HLD, hypothyroidism, obesity, PVD who presents to SNF following hospitalization for acute heart failure with reserved EF.  Admission to SNF for secondary weakness and debility.     [place] [facility] [place] with somnolence, oliguria and THAD on CKD. No known cardiac disease at baseline. Presenting with a week per daughter of fatigue and decreased urine output. No known chest pain or decreased exercise tolerance though functional capacity sounds limited at baseline. Went to urgent care and then encouraged to come to ED when Cr elevated to 3.2. BNP 4130. JVD elevated on exam w lower extremity edema. Bedside TTE with reduced LVEF ~40% with no segmental WMA. Was given IV Lasix 40 x 1 with limited UOP before transitioning to lasix drip with increased urinary output since. Patient on furosemide drip 10mg/hr, producing 150-200 ml / hr, transitioning from BiPap to NC as tolerated. Improving urine output on Furosemide drip, transitioned to Furosemide 40mg IV TID. Formal ECHO showed estimated ejection fraction around 50%, normal LV size with mildly decreased systolic function, and grade II left ventricular diastolic dysfunction. Furosemide IV transitioned to Furosemide 40 BID. Repeat BNP showed improvement to 793. Creatinine improved to 2.3(around baseline).     Today, patient without any major complaints.  She is oriented x3 but was unable to state her age.  Patient denies any pain but moaned when moved from side to side to check skin.  Patient  utilizing 1 L nasal cannula, attempted to remove oxygen and obtain SpO2, could not get reading on portable pulse oximeter.  SpO2 noted to be 95% this morning.     Patient will be treated at [facility] with PT and OT to improve functional status and ability to perform ADLs.     Hospital Course:   Patient progressed well slowly  PT and OT- last PT note states that patient ambulated 12ft then 18ft then 20ft with RW with moderate assist +1 to follow with w/c for safety. pt rested in sitting between gait trials. pt performed gait with decreased step length, decreased heel strike, and at slow pace. Patient had no significant events during their stay at SNF.  She continues to have profound dysphagia it is requiring a dysphagia soft diet with nectar thick liquids.  Home health was set up. DME was ordered if needed. Follow up appointment to be made by patient within one week. All prescriptions and discharge instructions were ordered to be given to the patient prior to discharge.     PEx  Constitutional: Patient appears debilitated.  No distress noted  HENT:   Head: Normocephalic and atraumatic.   Eyes: Pupils are equal, round  Neck: Normal range of motion. Neck supple.   Cardiovascular: Normal rate, regular rhythm and normal heart sounds.    Pulmonary/Chest: Effort normal and breath sounds are clear with diminished bases.   Abdominal: Soft. Bowel sounds are normal.   Musculoskeletal: Normal range of motion.   Neurological: Alert and oriented to person, place, and time.   Psychiatric: Normal mood and affect. Behavior is normal.   Skin: Skin is warm and dry.        Goals of Care Treatment Preferences:  Code Status: DNR    Health care agent: Annette Lombard Health care agent number: [phone]          What is most important right now is to focus on remaining as independent as possible, improvement in condition but with limits to invasive therapies.  Accordingly, we have decided that the best plan to meet the patient's  goals includes continuing with treatment.      Consults:   Consults (From admission, onward)        Status Ordering Provider     Inpatient consult to Palliative Care  Once        Provider:  (Not yet assigned)    Completed GOYO STARR     Inpatient consult to Registered Dietitian/Nutritionist  Once        Provider:  (Not yet assigned)    Completed ONUR SEPULVEDA     IP consult to case management  Once        Provider:  (Not yet assigned)    Completed ONUR SEPULVEDA     Inpatient consult to Registered Dietitian/Nutritionist  Once        Provider:  (Not yet assigned)    Completed JORDIN CANALES          No new Assessment & Plan notes have been filed under this hospital service since the last note was generated.  Service: Hospital Medicine    Final Active Diagnoses:    Diagnosis Date Noted POA    PRINCIPAL PROBLEM:  Heart failure with reduced ejection fraction [I50.20] 01/29/2022 Yes    Palliative care encounter [Z51.5] 02/07/2022 Not Applicable    Hypercapnic respiratory failure [J96.92] 01/28/2022 Yes    Secondary hyperparathyroidism of renal origin [N25.81] 06/23/2021 Yes    THAD (acute kidney injury) [N17.9] 04/15/2021 Yes    Hyperlipidemia  [E78.5] 08/26/2019 Yes    CKD (chronic kidney disease), stage IV [N18.4] 06/29/2016 Yes    Anemia in CKD (chronic kidney disease) [N18.9, D63.1] 10/22/2015 Yes    Hypothyroidism [E03.9] 08/23/2013 Yes    Essential hypertension [I10] 09/04/2012 Yes      Problems Resolved During this Admission:       Discharged Condition: good    Disposition: Home-Health Care [facility]    Follow Up:   Follow-up Information     Schedule an appointment as soon as possible for a visit with Leticia Weems MD.    Specialty: Internal Medicine  Why: WITHIN 1 WEEK  Contact information:  [address]  [facility] [address]  [phone]                       Patient Instructions:      WHEELCHAIR FOR HOME USE     Order Specific Question Answer Comments   Hours in W/C per day: 12   "  Type of Wheelchair: Lightweight    Patient unable to propel in Standard wheelchair? Yes    Size(Width): 16"(small adult)    Leg Support: STD footrests    Arm Height: Desk length    Arm Height: Swing away    Lap Belt: Velcro    Accessories: Heel loops    Accessories: Front brakes    Accessories: Safety belt    Cushion: Basic    Justification for cushion: Prevent pressure ulcers    Height: 4' 10" (1.473 m)    Weight: 62 kg (136 lb 11 oz)    Does patient have medical equipment at home? shower chair    Does patient have medical equipment at home? walker, rolling    Length of need (1-99 months): 99    Please check all that apply: Caregiver is capable and willing to operate wheelchair safely.    Please check all that apply: The patient requires the use of a w/c for activities of daily living within the Home.    Please check all that apply: Patient mobility limitations cannot be sufficiently resolved by the use of other ambulatory therapies.      3 IN 1 COMMODE FOR HOME USE     Order Specific Question Answer Comments   Type: Standard    Height: 4' 10" (1.473 m)    Weight: 62 kg (136 lb 11 oz)    Does patient have medical equipment at home? shower chair    Does patient have medical equipment at home? walker, rolling    Length of need (1-99 months): 99      HOSPITAL BED FOR HOME USE     Order Specific Question Answer Comments   Type: Semi-electric    Length of need (1-99 months): 99    Does patient have medical equipment at home? shower chair    Does patient have medical equipment at home? walker, rolling    Height: 4' 10" (1.473 m)    Weight: 62 kg (136 lb 11 oz)    Please check all that apply: Patient requires a bed height different than a fixed height hospital bed to permit transfers to chair, wheelchair, or standing.      Ambulatory referral/consult to [facility] - [facility]   Standing Status: Future   Referral Priority: Routine Referral Type: Consultation   Referral Reason: Specialty Services " Required   Number of Visits Requested: 1     No driving until:   Order Comments: Cleared by PCP     Notify your health care provider if you experience any of the following:  temperature >100.4     Notify your health care provider if you experience any of the following:  persistent nausea and vomiting or diarrhea     Notify your health care provider if you experience any of the following:  severe uncontrolled pain     Notify your health care provider if you experience any of the following:  redness, tenderness, or signs of infection (pain, swelling, redness, odor or green/yellow discharge around incision site)     Notify your health care provider if you experience any of the following:  difficulty breathing or increased cough     Notify your health care provider if you experience any of the following:  severe persistent headache     Notify your health care provider if you experience any of the following:  worsening rash     Notify your health care provider if you experience any of the following:  persistent dizziness, light-headedness, or visual disturbances     Notify your health care provider if you experience any of the following:  increased confusion or weakness     Activity as tolerated     Ambulatory Request for Ready Responder Services   Standing Status: Future Standing Exp. Date: 02/18/23     Order Specific Question Answer Comments   Program referred to: Other        Significant Diagnostic Studies: Labs:   BMP:   Recent Labs   Lab 02/17/22  0459   GLU 73      K 4.0      CO2 28   BUN 56*   CREATININE 2.2*   CALCIUM 10.9*   MG 2.2    and CBC   Recent Labs   Lab 02/17/22  0459   WBC 4.45   HGB 10.3*   HCT 33.6*          Pending Diagnostic Studies:     None         Medications:  Reconciled Home Medications:      Medication List      CONTINUE taking these medications    aspirin 81 MG Chew  Take 1 tablet (81 mg total) by mouth once daily.     ferrous sulfate 325 (65 FE) MG EC tablet  Take 1  tablet (325 mg total) by mouth once daily.     levothyroxine 88 MCG tablet  Commonly known as: SYNTHROID  TAKE 1 TABLET (88 MCG TOTAL) BY MOUTH ONCE DAILY.     multivit-iron-min-folic acid 3,500-18-0.4 unit-mg-mg Chew  Take 1 capsule by mouth once daily.     NIFEdipine 60 MG (OSM) 24 hr tablet  Commonly known as: PROCARDIA-XL  Take 1 tablet (60 mg total) by mouth once daily.        STOP taking these medications    calcitRIOL 0.25 MCG Cap  Commonly known as: ROCALTROL     sodium bicarbonate 650 MG tablet            Indwelling Lines/Drains at time of discharge:   Lines/Drains/Airways     None                 Time spent on the discharge of patient: 38 minutes         Amanda Dangelo NP  Department of [facility] Medicine  [facility] - Skilled Nursing

## 2022-02-18 NOTE — HOSPITAL COURSE
Patient progressed well slowly  PT and OT- last PT note states that patient ambulated 12ft then 18ft then 20ft with RW with moderate assist +1 to follow with w/c for safety. pt rested in sitting between gait trials. pt performed gait with decreased step length, decreased heel strike, and at slow pace. Patient had no significant events during their stay at SNF.  She continues to have profound dysphagia it is requiring a dysphagia soft diet with nectar thick liquids.  Home health was set up. DME was ordered if needed. Follow up appointment to be made by patient within one week. All prescriptions and discharge instructions were ordered to be given to the patient prior to discharge.     PEx  Constitutional: Patient appears debilitated.  No distress noted  HENT:   Head: Normocephalic and atraumatic.   Eyes: Pupils are equal, round  Neck: Normal range of motion. Neck supple.   Cardiovascular: Normal rate, regular rhythm and normal heart sounds.    Pulmonary/Chest: Effort normal and breath sounds are clear with diminished bases.   Abdominal: Soft. Bowel sounds are normal.   Musculoskeletal: Normal range of motion.   Neurological: Alert and oriented to person, place, and time.   Psychiatric: Normal mood and affect. Behavior is normal.   Skin: Skin is warm and dry.

## 2022-02-19 NOTE — NURSING
"2005 Patient noted to have increase anxiety throughout the day due to not going home at scheduled.time. patient yelling out help me throughout the shift. AASI here to  patient with belongings to be discharged home. Patient AAO and patient states " I will miss yall." " I will miss watching tv.'  "

## 2022-02-22 NOTE — TELEPHONE ENCOUNTER
----- Message from García Colmenares sent at 2/22/2022  8:50 AM CST -----  Pt daughter calling to speak with you about getting home care set up for the patient she is currently living with the daughter and they are needing assistance with her care.Please advise

## 2022-02-22 NOTE — TELEPHONE ENCOUNTER
----- Message from Coral Melara sent at 2/21/2022  9:30 AM CST -----  Contact: Annette Lombard @ [phone]  Good Morning  Patient's power of  and [employer] would like to talk to  about Hospice Care. Home health denied patient due to care and Hospice would be better after being discharged from [facility] nursing . P.O.A. also having trouble with bring her in to Dr Rowe.     Please call and advise

## 2022-02-23 NOTE — TELEPHONE ENCOUNTER
I am not familiar with [facility]. Please contact them and ask is there a form which I need to fill out for them, or do I just write a hospice order on a prescription pad? Please ask them what they need from me

## 2022-02-28 NOTE — PROGRESS NOTES
"[facility]  Medical Home Visit    Visit Date: 2/28/22  Encounter Provider: Cheo Oscar NP  PCP:  Leticia Weems MD    Subjective:      Patient ID: Johanny Curtis is a 89 y.o. female.    Consult Requested By:  Amanda Dangelo  Reason for Consult: Medical Visit by Home Care Provider    The patient is being seen at home due to a physical debility that presents a taxing effort to leave the home, to mitigate high risk of hospital readmission or due to the limited availability of reliable or safe options for transportation to the point of access to the provider. The visit meets the criteria for medical necessity as defined by CMS as "health-care services needed to prevent, diagnose, or treat an illness, injury, condition, disease, or its symptoms and that meet accepted standards of medicine." Prior to treatment on this visit the chart was reviewed and patient consent was obtained.    Chief Complaint: Follow-up for chronic medical conditions and medication review    HPI:   Mrs. Curtis is a 89 year old female PMHx of  HTN, CKD (b/l Cr 2-2.5), HLD, hypothyroidism, obesity, PVD who presents to SNF following hospitalization for acute heart failure with reserved EF.  Admission to SNF for secondary weakness and debility.   Patient originally presented to [facility] ED on 01/28 with somnolence, oliguria and THAD on CKD. No known cardiac disease at baseline. Presenting with a week per daughter of fatigue and decreased urine output. No known chest pain or decreased exercise tolerance though functional capacity sounds limited at baseline. Went to urgent care and then encouraged to come to ED when Cr elevated to 3.2. BNP 4130. JVD elevated on exam w lower extremity edema. Bedside TTE with reduced LVEF ~40% with no segmental WMA. Was given IV Lasix 40 x 1 with limited UOP before transitioning to lasix drip with increased urinary output since. Patient on furosemide drip 10mg/hr, producing 150-200 ml / hr, transitioning from " BiPap to NC as tolerated. Improving urine output on Furosemide drip, transitioned to Furosemide 40mg IV TID. Formal ECHO showed estimated ejection fraction around 50%, normal LV size with mildly decreased systolic function, and grade II left ventricular diastolic dysfunction. Furosemide IV transitioned to Furosemide 40 BID. Repeat BNP showed improvement to 793. Creatinine improved to 2.3(around baseline).   Today, patient without any major complaints.  She is oriented x3 but was unable to state her age.  Patient denies any pain but moaned when moved from side to side to check skin.  Patient utilizing 1 L nasal cannula, attempted to remove oxygen and obtain SpO2, could not get reading on portable pulse oximeter.  SpO2 noted to be 95% this morning.   Patient will be treated at [facility] with PT and OT to improve functional status and ability to perform ADLs.      Hospital Course:   Patient progressed well slowly  PT and OT- last PT note states that patient ambulated 12ft then 18ft then 20ft with RW with moderate assist +1 to follow with w/c for safety. pt rested in sitting between gait trials. pt performed gait with decreased step length, decreased heel strike, and at slow pace. Patient had no significant events during their stay at SNF.  She continues to have profound dysphagia it is requiring a dysphagia soft diet with nectar thick liquids.  Home health was set up. DME was ordered if needed. Follow up appointment to be made by patient within one week. All prescriptions and discharge instructions were ordered to be given to the patient prior to discharge.          Today:  Ms. Johanny Curtis is a 89 y.o. female. Johanny presents at baseline state of health as reported by patient and caregiver. VSS. Denies chest pain, SOB, fever, chills, cough, congestion.  Reports taking all medications as prescribed. Patient on soft diet, daughter reports tolerating well.  Reports good appetite, sleep pattern, bm pattern.  She is  active with [facility].  Daughter provider 24 hour support with ADLs. No other needs identified at this time. Risks of environmental exposure to coronavirus discussed including: social distancing, hand hygiene, and limiting departures from the home for necessities only.  Reports understanding and willingness to comply.      Attestation: Screening criteria to assess the level of the patient's risk for infection with COVID-19 as recommended by the CDC at the time of the above documented home visit concluded appropriateness to proceed. Universal precautions were maintained at all times, including provider use of >60% alcohol gel hand  immediately prior to entry and upon departing the patient's home as well as cleaning of equipment used in home visit with antibacterial/germicidal disposable wipes.     Review of Systems   Constitutional: Negative for chills, fatigue and fever.   HENT: Negative for congestion, postnasal drip and rhinorrhea.    Eyes: Negative for visual disturbance.   Respiratory: Negative for chest tightness, shortness of breath and wheezing.    Cardiovascular: Negative for chest pain, palpitations and leg swelling.   Gastrointestinal: Negative for abdominal pain, constipation, nausea and vomiting.   Genitourinary: Negative for difficulty urinating.   Musculoskeletal: Negative for arthralgias and myalgias.   Skin: Negative for color change and wound.   Neurological: Positive for weakness. Negative for dizziness.   Hematological: Does not bruise/bleed easily.   Psychiatric/Behavioral: Negative for agitation.       Assessments:  · Environmental: single story home, no steps to enter, adequate lighting and temperature control  · Functional Status: Assistance with ADL's/IADL's,wheelchair bound, incontinent of bowel and bladder  · Safety: Fall Precautions, COVID Precautions/Social Distancing/Mask Use  · Nutritional: Adequate  · Home Health: [facility]  · DME/Supplies: HB, BSC, wheelchair      .     Ethical / Legal: Advance Care Planning   Capacity to make medical decisions:  No, Conflict No  · Surrogate decision maker:  Name Katia, Relationship: Daughter  · Advance Directives:  Yes  · Living Will: Yes  · HCPOA: Yes  · [place]:  No  · Code Status:  Full code    Advanced Care Directives,  [employer] forms left in the home for family review, discussion and signing with instructions to return upon their next provider encounter for inclusion to the medical record. Discussed ACP for 17 minutes.     Objective:     Vitals:    02/28/22 1100   BP: 128/66   Pulse: 74   Resp: 20   Temp: 98 °F (36.7 °C)   SpO2: 98%   PainSc: 0-No pain     There is no height or weight on file to calculate BMI.    Physical Exam  Constitutional:       Appearance: Normal appearance.   HENT:      Head: Normocephalic and atraumatic.      Nose: No congestion or rhinorrhea.      Mouth/Throat:      Mouth: Mucous membranes are moist.   Cardiovascular:      Rate and Rhythm: Normal rate.   Pulmonary:      Effort: No respiratory distress.      Breath sounds: Normal breath sounds.   Abdominal:      General: Bowel sounds are normal.      Palpations: Abdomen is soft.   Musculoskeletal:      Cervical back: Neck supple.      Right lower leg: No edema.      Left lower leg: No edema.   Skin:     General: Skin is warm and dry.   Neurological:      Mental Status: She is alert. Mental status is at baseline.   Psychiatric:         Mood and Affect: Mood normal.         Assessment:     1. Essential hypertension    2. Heart failure with reduced ejection fraction    3. Hypothyroidism, unspecified type    4. Hyperlipidemia, unspecified hyperlipidemia type    5. CKD (chronic kidney disease), stage IV    6. Anemia in stage 4 chronic kidney disease      Plan:        Johanny was seen today for establish care.    Diagnoses and all orders for this visit:    Essential hypertension    Heart failure with reduced ejection fraction  -     Ambulatory referral/consult to  [facility]    Hypothyroidism, unspecified type    Hyperlipidemia, unspecified hyperlipidemia type    CKD (chronic kidney disease), stage IV    Anemia in stage 4 chronic kidney disease      Problem List Items Addressed This Visit        Cardiac/Vascular    Essential hypertension - Primary     Continue nifedipine           Hyperlipidemia      Continue aspirin            Heart failure with reduced ejection fraction     Appears compensated on exam               Renal/    CKD (chronic kidney disease), stage IV     Avoid nephrotoxic agents  Continue to follow with pcp for lab monitoring               Oncology    Anemia in CKD (chronic kidney disease)     Continue iron               Endocrine    Hypothyroidism     Continue synthroid                  Instructions:  - [facility] Practitioner to schedule home follow-up visit with patient in 4-6 weeks or as needed.  - Continue all medications, treatments and therapies as ordered.   - Follow all instructions, recommendations as discussed.  - Maintain Safety Precautions at all times.  - Attend all medical appointments as scheduled.  - For worsening symptoms: call Primary Care Physician or Nurse Practitioner.  - For emergencies, call 911 or immediately report to the nearest emergency room.  - Limit Risks of environmental exposure to coronavirus/COVID-19 as discussed including: social distancing, hand hygiene, and limiting departures from the home for necessities only.        Were controlled substances prescribed? No    Follow Up Appointments:   No future appointments.    Patient consent was obtained prior to treatment on this visit.    Signature:       Cheo Oscar, MSN, APRN, FNP-C  [facility]    Total face-to-face time was 60 min, >50% of this was spent on counseling and coordination of care. The following issues were discussed: primary and secondary diagnoses, co-morbidities, prescribed medications, treatment modalities,  importance of compliance with medical advice and directives for follow-up care

## 2022-03-03 PROBLEM — J96.92 HYPERCAPNIC RESPIRATORY FAILURE: Status: RESOLVED | Noted: 2022-01-01 | Resolved: 2022-01-01

## 2022-03-04 NOTE — PATIENT INSTRUCTIONS
Instructions:  - [facility] to schedule home follow-up visit with patient in 4-6 weeks or as needed.  - Continue all medications, treatments and therapies as ordered.   - Follow all instructions, recommendations as discussed.  - Maintain Safety Precautions at all times.  - Attend all medical appointments as scheduled.  - For worsening symptoms: call Primary Care Physician or Nurse Practitioner.  - For emergencies, call 911 or immediately report to the nearest emergency room.  - Limit Risks of environmental exposure to coronavirus/COVID-19 as discussed including: social distancing, hand hygiene, and limiting departures from the home for necessities only.

## 2022-03-08 NOTE — TELEPHONE ENCOUNTER
pts daughter stated that they can't get her to stand and walk for her to be able to come for her appointment

## 2022-03-08 NOTE — TELEPHONE ENCOUNTER
----- Message from Irene Osborne sent at 3/8/2022  1:05 PM CST -----  Contact: Pt daughter Ms. Headley Mobile# [phone]  Patients daughter Ms. Headley said that her mother's feet and back have been hurting her for about four days now, and she would like to speak with you about this please.

## 2022-03-09 NOTE — TELEPHONE ENCOUNTER
Hello,    I had a virtual visit with our mutual patient, Ms. Curtis. She was complaining of foot pain and redness. I am concerned about cellulitis, so I sent a prescription for Augmentin to her pharmacy. However, my evaluation was limited due to it being a virtual visit. Could you follow up with her in person to re- evaluate her?    Thank you!

## 2022-03-10 PROBLEM — M79.672 BILATERAL FOOT PAIN: Status: ACTIVE | Noted: 2022-01-01

## 2022-03-10 PROBLEM — M79.671 BILATERAL FOOT PAIN: Status: ACTIVE | Noted: 2022-01-01

## 2022-03-11 NOTE — ASSESSMENT & PLAN NOTE
Bilateral foot pain/tenderness noted on exam  No signs of infection  Likely gout vs neuropathy  Will order uric acid level  Tylenol prn

## 2022-03-11 NOTE — PROGRESS NOTES
"[facility]  Medical Home Visit    Visit Date: 3/10/22  Encounter Provider: Cheo Oscar NP  PCP:  Leticia Weems MD    Subjective:      Patient ID: Johanny Curtis is a 89 y.o. female.    Consult Requested By:  No ref. provider found  Reason for Consult: Medical Visit by Home Care Provider    The patient is being seen at home due to a physical debility that presents a taxing effort to leave the home, to mitigate high risk of hospital readmission or due to the limited availability of reliable or safe options for transportation to the point of access to the provider. The visit meets the criteria for medical necessity as defined by CMS as "health-care services needed to prevent, diagnose, or treat an illness, injury, condition, disease, or its symptoms and that meet accepted standards of medicine." Prior to treatment on this visit the chart was reviewed and patient consent was obtained.    Chief Complaint: bilateral foot pain    HPI:   Mrs. Curtis is a 89 year old female PMHx of  HTN, CKD (b/l Cr 2-2.5), HLD, hypothyroidism, obesity, PVD who presents to SNF following hospitalization for acute heart failure with reserved EF.     Today:  Ms. Johanny Curtis is a 89 y.o. female. Johanny presents with reports of bilateral feet pain/tenderness.  Pain reproducible on exam.  Mild redness noted to left great toe and right lateral foot.  No concern for cellulitis infection.  Likely gout vs neuropathy.  VSS. Denies chest pain, SOB, fever, chills, cough, congestion.  Reports taking all medications as prescribed. Patient on soft diet, daughter reports tolerating well.  Reports good appetite, sleep pattern, bm pattern.  She is active with [facility] .  Daughter provides 24 hour support with ADLs. No other needs identified at this time. Risks of environmental exposure to coronavirus discussed including: social distancing, hand hygiene, and limiting departures from the home for necessities only.  Reports " understanding and willingness to comply.      Attestation: Screening criteria to assess the level of the patient's risk for infection with COVID-19 as recommended by the CDC at the time of the above documented home visit concluded appropriateness to proceed. Universal precautions were maintained at all times, including provider use of >60% alcohol gel hand  immediately prior to entry and upon departing the patient's home as well as cleaning of equipment used in home visit with antibacterial/germicidal disposable wipes.     Review of Systems   Constitutional: Negative for chills, fatigue and fever.   HENT: Negative for congestion, postnasal drip and rhinorrhea.    Eyes: Negative for visual disturbance.   Respiratory: Negative for chest tightness, shortness of breath and wheezing.    Cardiovascular: Negative for chest pain, palpitations and leg swelling.   Gastrointestinal: Negative for abdominal pain, constipation, nausea and vomiting.   Genitourinary: Negative for difficulty urinating.   Musculoskeletal: Negative for arthralgias and myalgias.   Skin: Negative for color change and wound.   Neurological: Positive for weakness. Negative for dizziness.        Bilateral foot pain/tenderness    Hematological: Does not bruise/bleed easily.   Psychiatric/Behavioral: Negative for agitation.       Assessments:  · Environmental: single story home, no steps to enter, adequate lighting and temperature control  · Functional Status: Assistance with ADL's/IADL's,wheelchair bound, incontinent of bowel and bladder  · Safety: Fall Precautions, COVID Precautions/Social Distancing/Mask Use  · Nutritional: Adequate  · Home Health: [facility]   · DME/Supplies: HB, BSC, wheelchair     .       Objective:     Vitals:    03/10/22 1300   BP: 120/70   Pulse: 78   Resp: 20   Temp: 98 °F (36.7 °C)   SpO2: 98%   PainSc:   4   PainLoc: Foot     There is no height or weight on file to calculate BMI.    Physical Exam  Constitutional:        Appearance: Normal appearance.   HENT:      Head: Normocephalic and atraumatic.      Nose: No congestion or rhinorrhea.      Mouth/Throat:      Mouth: Mucous membranes are moist.   Cardiovascular:      Rate and Rhythm: Normal rate.   Pulmonary:      Effort: No respiratory distress.      Breath sounds: Normal breath sounds.   Abdominal:      General: Bowel sounds are normal.      Palpations: Abdomen is soft.   Musculoskeletal:      Cervical back: Neck supple.      Right lower leg: No edema.      Left lower leg: No edema.   Skin:     General: Skin is warm and dry.   Neurological:      Mental Status: She is alert. Mental status is at baseline.   Psychiatric:         Mood and Affect: Mood normal.         Assessment:     1. Bilateral foot pain    2. Hypothyroidism, unspecified type    3. Essential hypertension      Plan:        Johanny was seen today for follow-up.    Diagnoses and all orders for this visit:    Bilateral foot pain    Hypothyroidism, unspecified type    Essential hypertension      Problem List Items Addressed This Visit        Cardiac/Vascular    Essential hypertension     Stable  Continue procardia               Endocrine    Hypothyroidism     Continue synthroid               Orthopedic    Bilateral foot pain     Bilateral foot pain/tenderness noted on exam  No signs of infection  Likely gout vs neuropathy  Will order uric acid level  Tylenol prn                  Instructions:  - [facility] Nurse Practitioner to schedule home follow-up visit with patient in 4-6 weeks or as needed.  - Continue all medications, treatments and therapies as ordered.   - Follow all instructions, recommendations as discussed.  - Maintain Safety Precautions at all times.  - Attend all medical appointments as scheduled.  - For worsening symptoms: call Primary Care Physician or Nurse Practitioner.  - For emergencies, call 911 or immediately report to the nearest emergency room.  - Limit Risks of environmental exposure to  coronavirus/COVID-19 as discussed including: social distancing, hand hygiene, and limiting departures from the home for necessities only.        Were controlled substances prescribed? No    Follow Up Appointments:   Future Appointments   Date Time Provider Department Center   4/22/2022  8:00 AM Olivia Oscar NP [address]       Patient consent was obtained prior to treatment on this visit.    Signature:       Cheo Oscar, MSN, APRN, FNP-C  [facility]    Total face-to-face time was 60 min, >50% of this was spent on counseling and coordination of care. The following issues were discussed: primary and secondary diagnoses, co-morbidities, prescribed medications, treatment modalities, importance of compliance with medical advice and directives for follow-up care

## 2022-03-11 NOTE — PATIENT INSTRUCTIONS
Instructions:  - [facility] Nurse Practitioner to schedule home follow-up visit with patient in 4-6 weeks or as needed.  - Continue all medications, treatments and therapies as ordered.   - Follow all instructions, recommendations as discussed.  - Maintain Safety Precautions at all times.  - Attend all medical appointments as scheduled.  - For worsening symptoms: call Primary Care Physician or Nurse Practitioner.  - For emergencies, call 911 or immediately report to the nearest emergency room.  - Limit Risks of environmental exposure to coronavirus/COVID-19 as discussed including: social distancing, hand hygiene, and limiting departures from the home for necessities only.

## 2022-03-16 NOTE — TELEPHONE ENCOUNTER
----- Message from Olivia Baumann sent at 3/16/2022  3:00 PM CDT -----  Contact: [phone]  Laura marte [facility] called to advise she went out to the pt's home to draw her blood; however she was unable to get it and another order for someone else to come out and try.

## 2022-03-17 NOTE — TELEPHONE ENCOUNTER
----- Message from Sarah Thacker sent at 3/17/2022  1:44 PM CDT -----  Contact: [facility]/[phone]  [facility] called in regard to getting orders for home health wound care/shaheed wheel chair cussing/alternating low air pressure mattress. Orders needs to be fax to [facility]. Nurse wanted to let the dr know that she is changing the pt home OT frequency change to 2 times a week for 45 weeks effective 3-21-22. Nurse would like a call back.     Please advise

## 2022-03-17 NOTE — TELEPHONE ENCOUNTER
----- Message from Florecita Joseph sent at 3/17/2022  1:56 PM CDT -----  Contact: Laura [phone]  Laura with [facility] is calling for the pt she states she called yesterday to draw labs and she states she was unable to draw the labs she is asking for a new order for the labs please advise and give return call

## 2022-03-18 NOTE — PROGRESS NOTES
She is here for virtual visit.  She returns for management of hypertension.  She has had hypertension for over a year.  Current treatment has included medications outlined in medication list.  She denies chest pain or shortness of breath.  No palpitations.  Denies left arm or neck pain.  She has been checking her blood pressure at home, and it has been normal.  She complains of bilateral foot tenderness.  No other complaint    PAST MEDICAL HISTORY: Hypertension, congestive heart failure, aortic aneurysm, hypothyroidism, peripheral vascular disease,   hyperlipidemia, osteopenia, ankle fracture,   osteoarthritis, gout, peripheral neuropathy. She had a  colonoscopy July 2010     PAST SURGICAL HISTORY: Hysterectomy.     MEDICATIONS:  Synthroid 0.088 mg daily, Procardia    ALLERGIES: No known drug allergies.     Bilateral feet with mild redness    Assessment and plan:  1.  Hypertension:  Continue Procardia  2. Cellulitis:  Augmentin 875 mg p.o. b.i.d. x7 days.  Evaluation is limited due to it being a virtual visit.  Will request her at home provider to follow-up in person.  3. 35 minute spent advising and counseling patient, coordinating care

## 2022-03-18 NOTE — TELEPHONE ENCOUNTER
----- Message from Adela Colmenares sent at 3/18/2022  3:12 PM CDT -----  Contact: Davin ([facility]) [phone]  Davin from [facility]  physical therapy requesting orders to continue.    Please call and advise

## 2022-03-20 NOTE — TELEPHONE ENCOUNTER
Patient complaining of bilateral foot pain, mostly right great toe area.  Labs collected, Uric acid level elevated at 8.6, likely gout flare.  NSAIDs contraindicated due to kidney function.  Instructed caregiver to give tylenol for pain, ordered allopurinol for prevention and instructed to use ice compresses to joint for 20 minutes several times a day for pain relief.

## 2022-04-09 PROBLEM — R41.0 DISORIENTATION: Status: ACTIVE | Noted: 2022-01-01

## 2022-04-09 PROBLEM — R42 EPISODE OF DIZZINESS: Status: RESOLVED | Noted: 2021-04-16 | Resolved: 2022-01-01

## 2022-04-09 PROBLEM — G30.1 LATE ONSET ALZHEIMER'S DEMENTIA WITH BEHAVIORAL DISTURBANCE: Status: ACTIVE | Noted: 2022-01-01

## 2022-04-09 PROBLEM — K83.1 BILIARY OBSTRUCTION: Status: RESOLVED | Noted: 2020-09-08 | Resolved: 2022-01-01

## 2022-04-09 PROBLEM — I95.1 ORTHOSTASIS: Status: RESOLVED | Noted: 2021-04-16 | Resolved: 2022-01-01

## 2022-04-09 PROBLEM — E86.0 DEHYDRATION: Status: ACTIVE | Noted: 2022-01-01

## 2022-04-09 PROBLEM — L89.620 PRESSURE INJURY OF LEFT HEEL, UNSTAGEABLE: Status: ACTIVE | Noted: 2022-01-01

## 2022-04-09 PROBLEM — F02.818 LATE ONSET ALZHEIMER'S DEMENTIA WITH BEHAVIORAL DISTURBANCE: Status: ACTIVE | Noted: 2022-01-01

## 2022-04-09 PROBLEM — R55 VASOVAGAL NEAR SYNCOPE: Status: RESOLVED | Noted: 2021-04-16 | Resolved: 2022-01-01

## 2022-04-09 PROBLEM — N17.9 AKI (ACUTE KIDNEY INJURY): Status: RESOLVED | Noted: 2021-04-15 | Resolved: 2022-01-01

## 2022-04-09 NOTE — ED TRIAGE NOTES
Johanny Curtis, a 89 y.o. female presents to the ED w/ complaint of pain that started last night. Hx. Of dementia, unable to confirm where pain is. Pressure ulcer to left heel and sacrum.     Triage note:  Chief Complaint   Patient presents with    Fatigue    Wound Check     Per EMS family states pt has been screaming out in pain every time they try to change her diaper, move her. Sacral and foot wounds reported. Bed bound, hx of dementia, contracted. Tachy, disoriented at baseline. Family states in the past few days pt has not been eating, drinking.      Review of patient's allergies indicates:  No Known Allergies  Past Medical History:   Diagnosis Date    AAA (abdominal aortic aneurysm)     Anemia in CKD (chronic kidney disease)     Arthritis     Bacteremia due to Escherichia coli 9/24/2020    Cataract     Class 1 obesity due to excess calories with serious comorbidity in adult 7/6/2017    Gout, chronic     Heart failure with reduced ejection fraction 1/29/2022    High cholesterol     Hypercapnic respiratory failure 1/28/2022    Hypertension     Hypothyroidism     Obesity     Plantar fasciitis     PVD (peripheral vascular disease)     Thyroid trouble      Johanny Curtis, a 89 y.o. female presents to the ED via EMS with CC pain      Patient identifiers verified verbally with daughter and correct for Johanny Curtis.    LOC/ APPEARANCE: The patient is AAOx1. Continuous cardiac monitor, cont pulse ox, and auto BP cuff applied to patient. Pt is clean and well groomed. No JVD visible. Pt reports pain level of 0. Pt updated on POC. Bed low and locked with side rails up x2, call bell in pt reach.  SKIN: Skin is warm dry and color is consistent with ethnicity. Positive for ulcer to left heel and sacrum.   RESPIRATORY: Airway is open and patent. Respirations-tachypnea, equal bilaterally on inspiration and expiration.   CARDIAC: Patient has regular heart rate.  No peripheral edema noted, and patient  has no c/o chest pain. Peripheral pulses present equal and strong throughout.  ABDOMEN: Soft and non-tender to palpation with no distention noted. Positive for dark stools, daughter reports not new finding, related to iron supplement. Positive for poor appetite.   NEUROLOGIC: Eyes open spontaneously and facial expression symmetrical. Pt has dementia and delayed responses/ no responses.   MUSCULOSKELETAL: Positive bed bound, has to be turned. Hand  equal and leg strength strong +5 bilaterally.   : Positive for incontinence.

## 2022-04-09 NOTE — ED PROVIDER NOTES
Encounter Date: 4/9/2022       History     Chief Complaint   Patient presents with    Fatigue    Wound Check     Per EMS family states pt has been screaming out in pain every time they try to change her diaper, move her. Sacral and foot wounds reported. Bed bound, hx of dementia, contracted. Tachy, disoriented at baseline. Family states in the past few days pt has not been eating, drinking.      89-year-old female with AAA, CKD, CHF, hypertension, hyperlipidemia, chronic sacral ulcer, hypothyroid on Synthroid, dementia presents for poor appetite, and decline in alertness for the past several days.  Patient's daughter reports that patient normally answers questions with simple yes or no but recently has not been answering as often.  She also has not been eating or drinking her smoothies that she normally drinks.  Patient also appears to be in pain with movement, especially when patient is rolled over to check her sacral wound and be changed.  Patient does not appear to be in any discomfort when not moving.  Patient was recently treated for gout flare and is currently on allopurinol.        Review of patient's allergies indicates:  No Known Allergies  Past Medical History:   Diagnosis Date    AAA (abdominal aortic aneurysm)     Anemia in CKD (chronic kidney disease)     Arthritis     Bacteremia due to Escherichia coli 9/24/2020    Cataract     Class 1 obesity due to excess calories with serious comorbidity in adult 7/6/2017    Gout, chronic     Heart failure with reduced ejection fraction 1/29/2022    High cholesterol     Hypercapnic respiratory failure 1/28/2022    Hypertension     Hypothyroidism     Late onset Alzheimer's dementia with behavioral disturbance 4/9/2022    Obesity     Plantar fasciitis     PVD (peripheral vascular disease)     Thyroid trouble      Past Surgical History:   Procedure Laterality Date    BREAST BIOPSY Left     BREAST SURGERY Left 1972    benign breast lump    CATARACT  EXTRACTION  3/18/13    both eyes -     CHOLECYSTECTOMY      ENDOSCOPIC ULTRASOUND OF UPPER GASTROINTESTINAL TRACT N/A 2020    Procedure: ULTRASOUND, UPPER GI TRACT, ENDOSCOPIC;  Surgeon: Rasheed Balbuena MD;  [facility]  Service: Endoscopy;  Laterality: N/A;    ERCP N/A 2020    Procedure: ERCP (ENDOSCOPIC RETROGRADE CHOLANGIOPANCREATOGRAPHY);  Surgeon: Rasheed Balbuena MD;  [facility]  Service: Endoscopy;  Laterality: N/A;    EYE SURGERY      HYSTERECTOMY      SKIN BIOPSY      Left breast     Family History   Problem Relation Age of Onset    Breast cancer Sister     Cataracts Son     Glaucoma Son     Mental illness Son     Diabetes Son     Glaucoma Daughter     Lupus Daughter     Meningitis Son     Heart disease Brother     Cancer Sister         leukemia    Cancer Sister         pancreatic    No Known Problems Brother     Cancer Brother         unknown    Diabetes Son     Blindness Neg Hx     Amblyopia Neg Hx     Hypertension Neg Hx     Macular degeneration Neg Hx     Retinal detachment Neg Hx     Strabismus Neg Hx     Stroke Neg Hx     Thyroid disease Neg Hx      Social History     Tobacco Use    Smoking status: Former Smoker     Packs/day: 0.50     Years: 60.00     Pack years: 30.00     Quit date: 2001     Years since quittin.7    Smokeless tobacco: Never Used    Tobacco comment: quit in the    Substance Use Topics    Alcohol use: No    Drug use: No     Review of Systems   Unable to perform ROS: Patient nonverbal       Physical Exam     Initial Vitals   BP Pulse Resp Temp SpO2   22 1322 22 1322 22 1322 22 1322 22 1420   (!) 125/58 (!) 114 (!) 24 97.5 °F (36.4 °C) 96 %      MAP       --                Physical Exam    Nursing note and vitals reviewed.  Constitutional:   Alert, interacts appropriately, on minimal verbal responses, ill-appearing   HENT:   Head: Normocephalic and  atraumatic.   Mouth/Throat: Oropharynx is clear and moist.   Eyes: Conjunctivae and EOM are normal.   Cardiovascular: Regular rhythm, normal heart sounds and intact distal pulses.   Tachycardic   Pulmonary/Chest: Breath sounds normal. No stridor. No respiratory distress.   Abdominal: Abdomen is soft. She exhibits no distension. There is no abdominal tenderness.   Musculoskeletal:      Comments: Eschar overlying ulcer to L heel  Sacral ulcers with some subcutaneous tissue visible without erythema, drainage, or warmth  Pain with range of motion of bilateral lower extremities     Neurological: She is alert.   Diminished strength in BLE   Skin: Skin is warm and dry. No rash noted.   Psychiatric: She has a normal mood and affect. Thought content normal.         ED Course   Procedures  Labs Reviewed   CBC W/ AUTO DIFFERENTIAL - Abnormal; Notable for the following components:       Result Value    WBC 28.79 (*)     RBC 3.85 (*)     Hemoglobin 10.9 (*)     Hematocrit 34.0 (*)     RDW 15.9 (*)     Immature Granulocytes 0.7 (*)     Gran # (ANC) 25.7 (*)     Immature Grans (Abs) 0.21 (*)     Mono # 1.5 (*)     Gran % 89.4 (*)     Lymph % 4.7 (*)     Platelet Estimate Increased (*)     All other components within normal limits   COMPREHENSIVE METABOLIC PANEL - Abnormal; Notable for the following components:    BUN 49 (*)     Creatinine 2.0 (*)     Calcium 11.9 (*)     Albumin 2.1 (*)     ALT 8 (*)     eGFR if  24.6 (*)     eGFR if non  21.4 (*)     All other components within normal limits   PHOSPHORUS - Abnormal; Notable for the following components:    Phosphorus 1.9 (*)     All other components within normal limits   CULTURE, BLOOD   CULTURE, BLOOD   MAGNESIUM   LACTIC ACID, PLASMA   URINALYSIS, REFLEX TO URINE CULTURE   SARS-COV-2 RDRP GENE    Narrative:     This test utilizes isothermal nucleic acid amplification   technology to detect the SARS-CoV-2 RdRp nucleic acid segment.   The  analytical sensitivity (limit of detection) is 125 genome   equivalents/mL.   A POSITIVE result implies infection with the SARS-CoV-2 virus;   the patient is presumed to be contagious.     A NEGATIVE result means that SARS-CoV-2 nucleic acids are not   present above the limit of detection. A NEGATIVE result should be   treated as presumptive. It does not rule out the possibility of   COVID-19 and should not be the sole basis for treatment decisions.   If COVID-19 is strongly suspected based on clinical and exposure   history, re-testing using an alternate molecular assay should be   considered.   This test is only for use under the Food and Drug   Administration s Emergency Use Authorization (EUA).   Commercial kits are provided by [employer].   Performance characteristics of the EUA have been independently   verified by [facility] Department of   Pathology and Laboratory Medicine.   _________________________________________________________________   The authorized Fact Sheet for Healthcare Providers and the authorized Fact   Sheet for Patients of the ID NOW COVID-19 are available on the FDA   website:     https://www.fda.gov/media/840900/download  https://www.fda.gov/media/469733/download             EKG Readings: (Independently Interpreted)   Sinus tachycardia, regular, narrow, rate of 116, no ST changes, rate has increased mildly compared to previous       Imaging Results          X-Ray Chest AP Portable (Final result)  Result time 04/09/22 17:58:48    Final result by Frederick Bell MD (04/09/22 17:58:48)                 Impression:      As above.      Electronically signed by: Frederick Bell MD  Date:    04/09/2022  Time:    17:58             Narrative:    EXAMINATION:  XR CHEST AP PORTABLE    CLINICAL HISTORY:  Sepsis, unspecified organism    TECHNIQUE:  Single frontal view of the chest was performed.    COMPARISON:  Chest radiograph 01/28/2022 and CT thorax 06/07/2017    FINDINGS:  Overall  somewhat improved aeration of the right lung, noting some residual diffuse nonspecific interstitial coarsening and subtle patchy subsegmental opacities that may reflect residual/on going or new asymmetric pulmonary edema versus interstitial type pneumonia from inflammatory or infectious process including atypical bacterial or viral etiology.  Left hemithorax is well expanded without large consolidation or pleural effusion.  No large pleural effusion on the right.  No pneumothorax.    Cardiomediastinal silhouette is relatively midline and stable.  Pulmonary vasculature and hilar contours are within normal limits.  No acute osseous process seen.                               CT Head Without Contrast (Final result)  Result time 04/09/22 17:35:24    Final result by Steve Mensah MD (04/09/22 17:35:24)                 Impression:      Noting limitations imposed by motion, no definite acute intracranial findings are identified.      Electronically signed by: Steve Mensah  Date:    04/09/2022  Time:    17:35             Narrative:    EXAMINATION:  CT HEAD WITHOUT CONTRAST    CLINICAL HISTORY:  Mental status change, unknown cause;    TECHNIQUE:  Low dose axial images were obtained through the head.  Coronal and sagittal reformations were also performed. Contrast was not administered.    COMPARISON:  MRI brain performed 04/15/2021.    FINDINGS:  Motion compromised examination.    Blood: No acute intracranial hemorrhage.    Parenchyma: No definite loss of gray-white differentiation to suggest acute or subacute transcortical infarct. Generalized pattern loss.  Remote left parietal cortical infarct.  Nonspecific areas of white matter hypoattenuation may relate to sequela of chronic small vessel ischemic disease.    Ventricles/Extra-axial spaces: No abnormal extra-axial fluid collection. Basal cisterns are patent.    Vessels: Vascular calcifications    Orbits: Status post bilateral lens replacements    Scalp:  "Unremarkable.    Skull: There are no depressed skull fractures or destructive bone lesions.    Sinuses and mastoids: Chronic opacification of the mastoid air cells, as seen on the 04/15/2021 MRI.  Paranasal sinuses appear essentially clear.    Other findings: None                                 Medications   NIFEdipine 24 hr tablet 60 mg (60 mg Oral Given 4/9/22 1505)   acetaminophen tablet 1,000 mg (1,000 mg Oral Given 4/9/22 1505)   sodium chloride 0.9% bolus 500 mL (0 mLs Intravenous Stopped 4/9/22 1605)   piperacillin-tazobactam 4.5 g in sodium chloride 0.9% 100 mL IVPB (ready to mix system) (0 g Intravenous Stopped 4/9/22 1645)   vancomycin 1.5 g in dextrose 5 % 250 mL IVPB (ready to mix) (0 mg Intravenous Stopped 4/9/22 1837)   sodium chloride 0.9% bolus 500 mL (0 mLs Intravenous Stopped 4/9/22 1739)     Medical Decision Making:   History:   Old Medical Records: I decided to obtain old medical records.  Old Records Summarized: records from clinic visits and records from previous admission(s).  Initial Assessment:   89-year-old female with AAA, CKD, CHF, hypertension, hyperlipidemia, chronic sacral ulcer, hypothyroid on Synthroid, dementia presents for poor appetite, and decline in alertness for the past several days.   Independently Interpreted Test(s):   I have ordered and independently interpreted EKG Reading(s) - see prior notes  Clinical Tests:   Lab Tests: Ordered and Reviewed  Radiological Study: Ordered and Reviewed  Medical Tests: Ordered and Reviewed  Sepsis Perfusion Assessment: "I attest a sepsis perfusion exam was performed within 6 hours of sepsis, severe sepsis, or septic shock presentation, following fluid resuscitation."  ED Management:  History, exam, vital signs concerning for possible sepsis.  Sepsis protocols initiated, patient started on IV fluids, IV antibiotics, broad workup including blood cultures ordered, will evaluate for possible source  Urine suspected source on initial " evaluation  Other differentials include pneumonia, CHF exacerbation, ICH, less likely decubitus ulcer infection or intra-abdominal infection, doubt AAA rupture  IV antibiotics were given empirically (vanc and Zosyn)  Full 30 cc/kg bolus was not ordered as patient does have contraindications to aggressive IV fluid resuscitation (CHF)             ED Course as of 04/09/22 2248   Sat Apr 09, 2022   1651 WBC(!): 28.79 [OK]   2005 SARS-CoV-2 RNA, Amplification, Qual: Negative [OK]   2247 Patient admitted to hospital medicine [OK]      ED Course User Index  [OK] Devin Ohara MD             Clinical Impression:   Final diagnoses:  [R41.82] Altered mental status  [A41.9] Sepsis, due to unspecified organism, unspecified whether acute organ dysfunction present (Primary)  [A41.9] Sepsis          ED Disposition Condition    Admit               Devin Ohara MD  Resident  04/09/22 224

## 2022-04-09 NOTE — ED NOTES
Pericare administered. BM noted. Stool is dark, soft and formed. Mepilex applied to sacrum and left heel ulcer.

## 2022-04-10 PROBLEM — G93.41 ENCEPHALOPATHY, METABOLIC: Status: ACTIVE | Noted: 2022-01-01

## 2022-04-10 NOTE — SUBJECTIVE & OBJECTIVE
Past Medical History:   Diagnosis Date    AAA (abdominal aortic aneurysm)     Anemia in CKD (chronic kidney disease)     Arthritis     Bacteremia due to Escherichia coli 9/24/2020    Cataract     Class 1 obesity due to excess calories with serious comorbidity in adult 7/6/2017    Gout, chronic     Heart failure with reduced ejection fraction 1/29/2022    High cholesterol     Hypercapnic respiratory failure 1/28/2022    Hypertension     Hypothyroidism     Late onset Alzheimer's dementia with behavioral disturbance 4/9/2022    Obesity     Plantar fasciitis     PVD (peripheral vascular disease)     Thyroid trouble        Past Surgical History:   Procedure Laterality Date    BREAST BIOPSY Left     BREAST SURGERY Left 1972    benign breast lump    CATARACT EXTRACTION  3/18/13    both eyes -     CHOLECYSTECTOMY      ENDOSCOPIC ULTRASOUND OF UPPER GASTROINTESTINAL TRACT N/A 9/8/2020    Procedure: ULTRASOUND, UPPER GI TRACT, ENDOSCOPIC;  Surgeon: Rasheed Balbuena MD;  Location: [facility] ([address] [place] [address]);  Service: Endoscopy;  Laterality: N/A;    ERCP N/A 9/8/2020    Procedure: ERCP (ENDOSCOPIC RETROGRADE CHOLANGIOPANCREATOGRAPHY);  Surgeon: Rasheed Balbuena MD;  Location: [facility] ([address] [place] [address]);  Service: Endoscopy;  Laterality: N/A;    EYE SURGERY      HYSTERECTOMY      SKIN BIOPSY      Left breast       Review of patient's allergies indicates:  No Known Allergies    No current facility-administered medications on file prior to encounter.     Current Outpatient Medications on File Prior to Encounter   Medication Sig    levothyroxine (SYNTHROID) 88 MCG tablet TAKE 1 TABLET (88 MCG TOTAL) BY MOUTH ONCE DAILY.    acetaminophen (TYLENOL) 160 mg/5 mL Liqd Take 15.6 mLs (499.2 mg total) by mouth every 6 (six) hours as needed (pain).    allopurinoL (ZYLOPRIM) 100 MG tablet Take 1 tablet (100 mg total) by mouth once daily.    aspirin 81 MG Chew Take 1 tablet (81 mg total) by mouth once daily.    ferrous sulfate 325 (65 FE)  MG EC tablet Take 1 tablet (325 mg total) by mouth once daily.    MULTIVIT-IRON-MIN-FOLIC ACID 3,500-18-0.4 UNIT-MG-MG ORAL CHEW Take 1 capsule by mouth once daily.     NIFEdipine (PROCARDIA-XL) 60 MG (OSM) 24 hr tablet Take 1 tablet (60 mg total) by mouth once daily.     Family History       Problem Relation (Age of Onset)    Breast cancer Sister    Cancer Sister, Sister, Brother    Cataracts Son    Diabetes Son, Son    Glaucoma Son, Daughter    Heart disease Brother    Lupus Daughter    Meningitis Son    Mental illness Son    No Known Problems Brother          Tobacco Use    Smoking status: Former Smoker     Packs/day: 0.50     Years: 60.00     Pack years: 30.00     Quit date: 2001     Years since quittin.7    Smokeless tobacco: Never Used    Tobacco comment: quit in the    Substance and Sexual Activity    Alcohol use: No    Drug use: No    Sexual activity: Not Currently     Review of Systems   Unable to perform ROS: Dementia   Objective:     Vital Signs (Most Recent):  Temp: 97.6 °F (36.4 °C) (04/10/22 0120)  Pulse: 95 (04/10/22 0120)  Resp: 16 (04/10/22 0120)  BP: 137/62 (04/10/22 0120)  SpO2: 95 % (04/10/22 0120) Vital Signs (24h Range):  Temp:  [97.5 °F (36.4 °C)-99.5 °F (37.5 °C)] 97.6 °F (36.4 °C)  Pulse:  [] 95  Resp:  [13-24] 16  SpO2:  [95 %-99 %] 95 %  BP: (125-152)/(58-71) 137/62     Weight: 61.2 kg (135 lb)  Body mass index is 28.22 kg/m².    Physical Exam  Vitals and nursing note reviewed.   Constitutional:       General: She is not in acute distress.     Appearance: She is normal weight.   HENT:      Head: Normocephalic and atraumatic.      Nose: Nose normal.      Mouth/Throat:      Mouth: Mucous membranes are dry.   Eyes:      General: No scleral icterus.     Conjunctiva/sclera: Conjunctivae normal.      Pupils: Pupils are equal, round, and reactive to light.   Cardiovascular:      Rate and Rhythm: Normal rate and regular rhythm.      Pulses: Normal pulses.      Heart sounds:  Normal heart sounds. No murmur heard.    No gallop.   Pulmonary:      Effort: Pulmonary effort is normal.      Breath sounds: Normal breath sounds.   Abdominal:      General: Bowel sounds are normal. There is no distension.      Palpations: Abdomen is soft.      Tenderness: There is no abdominal tenderness. There is no guarding.   Musculoskeletal:      Cervical back: No rigidity.      Right lower leg: No edema.      Left lower leg: No edema.      Comments: Unstageable decubitus ulcer of left heel with overlying black eschar   Lymphadenopathy:      Cervical: No cervical adenopathy.   Skin:     General: Skin is warm and dry.      Findings: No erythema or rash.   Neurological:      Mental Status: She is alert. She is disoriented.      Cranial Nerves: No cranial nerve deficit.      Comments: Answers some simple questions         CRANIAL NERVES     CN III, IV, VI   Pupils are equal, round, and reactive to light.     Significant Labs: All pertinent labs within the past 24 hours have been reviewed.  CBC:   Recent Labs   Lab 04/09/22  1442   WBC 28.79*   HGB 10.9*   HCT 34.0*        CMP:   Recent Labs   Lab 04/09/22  1442      K 4.7      CO2 25   GLU 96   BUN 49*   CREATININE 2.0*   CALCIUM 11.9*   PROT 7.3   ALBUMIN 2.1*   BILITOT 0.8   ALKPHOS 102   AST 18   ALT 8*   ANIONGAP 13   EGFRNONAA 21.4*     Urine Studies: No results for input(s): COLORU, APPEARANCEUA, PHUR, SPECGRAV, PROTEINUA, GLUCUA, KETONESU, BILIRUBINUA, OCCULTUA, NITRITE, UROBILINOGEN, LEUKOCYTESUR, RBCUA, WBCUA, BACTERIA, SQUAMEPITHEL, HYALINECASTS in the last 48 hours.    Invalid input(s): MURALI    Significant Imaging: I have reviewed all pertinent imaging results/findings within the past 24 hours.

## 2022-04-10 NOTE — PLAN OF CARE
Pt alert and oriented x3. Pt took spoonful of applesauce with meds and did not want more. Stated mouth hurt. Was unable to answer all questions. Called daughter Katia for assistance. Number is [phone]. Daughter states pt has been declining with movement and eating. Patient screams out and moans when being moved. Bladderscan was performed because patient had not voided in 4 hours once arrived to unit. With a result of 358. Strait cath and patient put out 675. UA sent to lab. Pt resting with eyes closed unlabored breathing.       Problem: Adult Inpatient Plan of Care      Goal: Plan of Care Review  Outcome: Ongoing, Not Progressing  Goal: Patient-Specific Goal (Individualized)  Outcome: Ongoing, Not Progressing  Goal: Absence of Hospital-Acquired Illness or Injury  Outcome: Ongoing, Not Progressing  Goal: Optimal Comfort and Wellbeing  Outcome: Ongoing, Not Progressing  Goal: Readiness for Transition of Care  Outcome: Ongoing, Not Progressing     Problem: Skin Injury Risk Increased  Goal: Skin Health and Integrity  Outcome: Ongoing, Not Progressing     Problem: Fall Injury Risk  Goal: Absence of Fall and Fall-Related Injury  Outcome: Ongoing, Not Progressing

## 2022-04-10 NOTE — ED NOTES
Adult Physical Assessment  LOC: Johanny Curtis, 89 y.o. female verified via two identifiers.  The patient is awake, alert, oriented x 3 (this is baseline for the patient) and speaking appropriately at this time. Patient has a history of dementia.   APPEARANCE: Patient resting comfortably and appears to be in no acute distress at this time. Patient is clean and well groomed, patient's clothing is properly fastened.  SKIN:The skin is warm and dry, color consistent with ethnicity, patient has normal skin turgor and moist mucus membranes. Patient has sacral wound and wound noted to left foot on lateral side.   MUSCULOSKELETAL: Patient moving all extremities with limited ROM, no obvious swelling or deformities noted.  RESPIRATORY: Airway is open and patent, respirations are spontaneous, patient has a normal effort and rate, no accessory muscle use noted.  CARDIAC: Patient has a normal rate and rhythm, no periphreal edema noted in any extremity, capillary refill < 3 seconds in all extremities.  ABDOMEN: Soft and non tender to palpation, no abdominal distention noted. Bowel sounds present in all four quadrants.  NEUROLOGIC: Eyes open spontaneously, behavior appropriate to situation, follows commands, facial expression symmetrical, bilateral hand grasp equal and even, purposeful motor response noted, normal sensation in all extremities when touched with a finger.

## 2022-04-10 NOTE — HPI
Johanny Curtis is a 89 year old Black woman with hypertension, heart failure with reduced ejection fraction, aortic atherosclerosis, abdominal aortic aneurysm, peripheral artery disease, hypothyroidism, chronic kidney disease stage 4, secondary hyperparathyroidism, anemia, gout, Alzheimer's dementia, history of choledocholithiasis status post biliary sphincterotomy on 9/8/2020, history of cholecystectomy, history of hysterectomy. She lives in [facility]. She is . Her primary care physician is Dr. Leticia Weems. She is DNR.               She presented to [facility] Emergency Department on 4/9/2022 with altered mental status. Her daughter, who has lived with her for the past 10 years due to her dementia, reported progressive debility and cognitive dysfunction over that period of time. She is bed bound and has developed decubitus wounds on her sacrum and heels since her last day at a skilled nursing facility in February, due to prolonged delays in having an inflatable mattress delivered. It only arrived a few days prior to presentation. Her daughter has been turning her every few hours and she has been getting wound care with home health. Her daughter noticed improvement in her wounds, but for the past couple days she had also noticed a decline in her mother's mental status. On the day of presentation, when her daughter tried to move her it seemed like everything hurt. She was also much less responsive to eating and drinking.               In the emergency department, she had tachycardia and leukocytosis (WBC 90626/uL with 89.4% granulocytes). Chest X-ray showed residual diffuse nonspecific interstitial coarsening and subtle patchy subsegmental opacities. Ankle X-ray showed no evidence of osteomyelitis. She was given 1 liter of normal saline, piperacillin-tazobactam, and vancomycin. She was admitted to Hospital Medicine Team A.

## 2022-04-10 NOTE — ASSESSMENT & PLAN NOTE
At baseline renal function, although appears dehydrated.  Has not produced any urine for a UA.  -dose renally cleared medications accordingly

## 2022-04-10 NOTE — H&P
[facility]  History & Physical    Patient Name: Johanny Curtis  MRN: [id number]  Patient Class: IP- Inpatient  Admission Date: 4/9/2022  Attending Physician: Alireza Gamez*   Primary Care Provider: Leticia Weems MD         Patient information was obtained from relative(s), past medical records and ER records.     Subjective:     Principal Problem:Disorientation    Chief Complaint:   Chief Complaint   Patient presents with    Fatigue    Wound Check     Per EMS family states pt has been screaming out in pain every time they try to change her diaper, move her. Sacral and foot wounds reported. Bed bound, hx of dementia, contracted. Tachy, disoriented at baseline. Family states in the past few days pt has not been eating, drinking.         HPI: 89-year-old female brought to the hospital for altered mental status.  History obtained from her daughter at the bedside, who cares for her primarily.  Patient does live with her daughter for the past 10 years due to dementia and she has had progressive debility and cognitive dysfunction over this period of time.  She is bedbound and since her last day at skilled nursing in February, she has developed decubitus wounds on the sacrum and her heels, due to prolonged delays in having an inflatable mattress delivered, which only recently arrived a few days ago.  Her daughter does turn her every few hours and she has been getting wound care with home health and she has noticed improvement in her wounds, but for the past couple days she has noticed a decline in her mother's mental status and today when she tried to move her it seemed like everything hurt.  She has also been much less responsive to eating and drinking.  She has not noticed any fevers, cough, or other obvious signs of infection.      Past Medical History:   Diagnosis Date    AAA (abdominal aortic aneurysm)     Anemia in CKD (chronic kidney disease)     Arthritis     Bacteremia  due to Escherichia coli 9/24/2020    Cataract     Class 1 obesity due to excess calories with serious comorbidity in adult 7/6/2017    Gout, chronic     Heart failure with reduced ejection fraction 1/29/2022    High cholesterol     Hypercapnic respiratory failure 1/28/2022    Hypertension     Hypothyroidism     Late onset Alzheimer's dementia with behavioral disturbance 4/9/2022    Obesity     Plantar fasciitis     PVD (peripheral vascular disease)     Thyroid trouble        Past Surgical History:   Procedure Laterality Date    BREAST BIOPSY Left     BREAST SURGERY Left 1972    benign breast lump    CATARACT EXTRACTION  3/18/13    both eyes -     CHOLECYSTECTOMY      ENDOSCOPIC ULTRASOUND OF UPPER GASTROINTESTINAL TRACT N/A 9/8/2020    Procedure: ULTRASOUND, UPPER GI TRACT, ENDOSCOPIC;  Surgeon: Rasheed Balbuena MD;  [facility]  Service: Endoscopy;  Laterality: N/A;    ERCP N/A 9/8/2020    Procedure: ERCP (ENDOSCOPIC RETROGRADE CHOLANGIOPANCREATOGRAPHY);  Surgeon: Rasheed Balbuena MD;  [facility]  Service: Endoscopy;  Laterality: N/A;    EYE SURGERY      HYSTERECTOMY      SKIN BIOPSY      Left breast       Review of patient's allergies indicates:  No Known Allergies    No current facility-administered medications on file prior to encounter.     Current Outpatient Medications on File Prior to Encounter   Medication Sig    levothyroxine (SYNTHROID) 88 MCG tablet TAKE 1 TABLET (88 MCG TOTAL) BY MOUTH ONCE DAILY.    acetaminophen (TYLENOL) 160 mg/5 mL Liqd Take 15.6 mLs (499.2 mg total) by mouth every 6 (six) hours as needed (pain).    allopurinoL (ZYLOPRIM) 100 MG tablet Take 1 tablet (100 mg total) by mouth once daily.    aspirin 81 MG Chew Take 1 tablet (81 mg total) by mouth once daily.    ferrous sulfate 325 (65 FE) MG EC tablet Take 1 tablet (325 mg total) by mouth once daily.    MULTIVIT-IRON-MIN-FOLIC ACID 3,500-18-0.4 UNIT-MG-MG  ORAL CHEW Take 1 capsule by mouth once daily.     NIFEdipine (PROCARDIA-XL) 60 MG (OSM) 24 hr tablet Take 1 tablet (60 mg total) by mouth once daily.     Family History       Problem Relation (Age of Onset)    Breast cancer Sister    Cancer Sister, Sister, Brother    Cataracts Son    Diabetes Son, Son    Glaucoma Son, Daughter    Heart disease Brother    Lupus Daughter    Meningitis Son    Mental illness Son    No Known Problems Brother          Tobacco Use    Smoking status: Former Smoker     Packs/day: 0.50     Years: 60.00     Pack years: 30.00     Quit date: 2001     Years since quittin.7    Smokeless tobacco: Never Used    Tobacco comment: quit in the    Substance and Sexual Activity    Alcohol use: No    Drug use: No    Sexual activity: Not Currently     Review of Systems   Unable to perform ROS: Dementia   Objective:     Vital Signs (Most Recent):  Temp: 97.6 °F (36.4 °C) (04/10/22 0120)  Pulse: 95 (04/10/22 0120)  Resp: 16 (04/10/22 012)  BP: 137/62 (04/10/22 0120)  SpO2: 95 % (04/10/22 012) Vital Signs (24h Range):  Temp:  [97.5 °F (36.4 °C)-99.5 °F (37.5 °C)] 97.6 °F (36.4 °C)  Pulse:  [] 95  Resp:  [13-24] 16  SpO2:  [95 %-99 %] 95 %  BP: (125-152)/(58-71) 137/62     Weight: 61.2 kg (135 lb)  Body mass index is 28.22 kg/m².    Physical Exam  Vitals and nursing note reviewed.   Constitutional:       General: She is not in acute distress.     Appearance: She is normal weight.   HENT:      Head: Normocephalic and atraumatic.      Nose: Nose normal.      Mouth/Throat:      Mouth: Mucous membranes are dry.   Eyes:      General: No scleral icterus.     Conjunctiva/sclera: Conjunctivae normal.      Pupils: Pupils are equal, round, and reactive to light.   Cardiovascular:      Rate and Rhythm: Normal rate and regular rhythm.      Pulses: Normal pulses.      Heart sounds: Normal heart sounds. No murmur heard.    No gallop.   Pulmonary:      Effort: Pulmonary effort is normal.       Breath sounds: Normal breath sounds.   Abdominal:      General: Bowel sounds are normal. There is no distension.      Palpations: Abdomen is soft.      Tenderness: There is no abdominal tenderness. There is no guarding.   Musculoskeletal:      Cervical back: No rigidity.      Right lower leg: No edema.      Left lower leg: No edema.      Comments: Unstageable decubitus ulcer of left heel with overlying black eschar   Lymphadenopathy:      Cervical: No cervical adenopathy.   Skin:     General: Skin is warm and dry.      Findings: No erythema or rash.   Neurological:      Mental Status: She is alert. She is disoriented.      Cranial Nerves: No cranial nerve deficit.      Comments: Answers some simple questions         CRANIAL NERVES     CN III, IV, VI   Pupils are equal, round, and reactive to light.     Significant Labs: All pertinent labs within the past 24 hours have been reviewed.  CBC:   Recent Labs   Lab 04/09/22  1442   WBC 28.79*   HGB 10.9*   HCT 34.0*        CMP:   Recent Labs   Lab 04/09/22  1442      K 4.7      CO2 25   GLU 96   BUN 49*   CREATININE 2.0*   CALCIUM 11.9*   PROT 7.3   ALBUMIN 2.1*   BILITOT 0.8   ALKPHOS 102   AST 18   ALT 8*   ANIONGAP 13   EGFRNONAA 21.4*     Urine Studies: No results for input(s): COLORU, APPEARANCEUA, PHUR, SPECGRAV, PROTEINUA, GLUCUA, KETONESU, BILIRUBINUA, OCCULTUA, NITRITE, UROBILINOGEN, LEUKOCYTESUR, RBCUA, WBCUA, BACTERIA, SQUAMEPITHEL, HYALINECASTS in the last 48 hours.    Invalid input(s): WRIGHTSUR    Significant Imaging: I have reviewed all pertinent imaging results/findings within the past 24 hours.    Assessment/Plan:     * Disorientation  Likely delirium.  Need to rule out infectious etiology.  -follow-up UA once she is able to produce a sample.  -decubitus wounds also potential source.  Screening for osteo with heel x-ray, CRP, and procalcitonin.  -chest x-ray demonstrating infiltrate-like pattern in right lung, but she has no respiratory  symptoms indicative of pneumonia.      Pressure injury of left heel, unstageable  Wound care consult ordered for heel wound as well as sacral decubitus wound.      Late onset Alzheimer's dementia with behavioral disturbance  Delirium precautions ordered.      Dehydration  Diminished oral intake in context of delirium.  -received bolus in the ED; following that up with 1 L NS over 10 hours due to failure to still produce any urine yet.  -further fluids as deemed necessary      Heart failure with reduced ejection fraction  Currently without evidence of decompensation.  -will need to use IV fluids conservatively to avoid fluid overload, especially with her albumin of 2.      CKD (chronic kidney disease), stage IV  At baseline renal function, although appears dehydrated.  Has not produced any urine for a UA.  -dose renally cleared medications accordingly        VTE Risk Mitigation (From admission, onward)         Ordered     enoxaparin injection 30 mg  Daily         04/09/22 2253     IP VTE HIGH RISK PATIENT  Once         04/09/22 2253     Place sequential compression device  Until discontinued         04/09/22 2253                   Kulwant Yang MD  Department of Hospital Medicine   [facility]

## 2022-04-10 NOTE — ACP (ADVANCE CARE PLANNING)
Advance Care Planning     Date: 04/09/2022    Today a meeting took place: bedside    Patient Participation: Patient is unable to participate     Attendees (Name and  Relationship to patient): Health care power of : Annette Lombard, daughter    Staff attendees (Name and  Role): Kulwant Yang MD; admitting physician    ACP Conversation (General): Understanding of current condition Katia hates seeing her mother like this and does not want to prolong her suffering.  She states that she has had this discussion before with other physicians and wants no machines involved in keeping her mother alive unnaturally.  She has been caring for her in this manner for the past 10 years and has seen her progressive decline.  Experience with serious illness Katia saw her father die of advanced lung and brain cancer and does not want life-saving interventions to keep her mother alive to the point where she is not having any quality of life.  She wants to allow things to take a more natural course, and intervene when needed to treat things that are painful or preventing her from eating and drinking.    Code Status: DNR; status confirmed/order placed in chart     ACP Documents: Reviewed current existing digital forms    Goals of care: The family endorses that what is most important right now is to focus on symptom/pain control and quality of life, even if it means sacrificing a little time    Accordingly, we have decided that the best plan to meet the patient's goals includes Treating acute issues, but in the event of significant clinical deterioration or imminent death focusing on keeping her comfortable instead.      Recommendations/  Follow-up tasks: Other (specify below) Needs LA post completed      Length of ACP   conversation in minutes: 5 minutes

## 2022-04-10 NOTE — ASSESSMENT & PLAN NOTE
Likely delirium.  Need to rule out infectious etiology.  -follow-up UA once she is able to produce a sample.  -decubitus wounds also potential source.  Screening for osteo with heel x-ray, CRP, and procalcitonin.  -chest x-ray demonstrating infiltrate-like pattern in right lung, but she has no respiratory symptoms indicative of pneumonia.

## 2022-04-10 NOTE — ASSESSMENT & PLAN NOTE
Diminished oral intake in context of delirium.  -received bolus in the ED; following that up with 1 L NS over 10 hours due to failure to still produce any urine yet.  -further fluids as deemed necessary

## 2022-04-10 NOTE — ASSESSMENT & PLAN NOTE
CTH negative. Leukocytosis, Procal elevated, crp elevated; Infectious workup significant for possible infiltrate on cxr, positive UA. Blood and urine cultures pending.   Start rocephin for UTI and azithromycin for atypical coverage  Wound care consulted for decub ulcer

## 2022-04-10 NOTE — HOSPITAL COURSE
Speech Language Pathology recommended pureed diet with nectar thickened liquids. Wound Care recommended Triad ointment to sacrum and betadine swabs to left heel. Urinalysis showed positive nitrite, >100 WBC/hpf, many WBC clumps, and many bacteria. Urine culture had no significant growth. Blood cultures grew Staphylococcus hominis. Podiatry was consulted and found no signs of infection of her foot. Infectious Disease recommended continuing vancomycin until 4/25/2022. Physical and Occupational Therapy recommended home with home health or basic nursing facility. She developed hypernatremia, which was treated with 5% dextrose infusion. Rather than discharging her home on Easter Sunday 4/17/2022, she was kept in the hospital until 4/18/2022, which would allow a midline catheter to be placed due to the 7 day maximum antibiotic duration allowed. She was discharged home with home health and lift device after midline catheter placement.

## 2022-04-10 NOTE — PROGRESS NOTES
[facility]  Progress Note    Patient Name: Johanny Curtis  MRN: [id number]  Patient Class: IP- Inpatient   Admission Date: 4/9/2022  Length of Stay: 1 days  Attending Physician: Alireza Gamez*  Primary Care Provider: Leticia Weems MD        Subjective:     Principal Problem:Disorientation        HPI:  89-year-old female brought to the hospital for altered mental status.  History obtained from her daughter at the bedside, who cares for her primarily.  Patient does live with her daughter for the past 10 years due to dementia and she has had progressive debility and cognitive dysfunction over this period of time.  She is bedbound and since her last day at skilled nursing in February, she has developed decubitus wounds on the sacrum and her heels, due to prolonged delays in having an inflatable mattress delivered, which only recently arrived a few days ago.  Her daughter does turn her every few hours and she has been getting wound care with home health and she has noticed improvement in her wounds, but for the past couple days she has noticed a decline in her mother's mental status and today when she tried to move her it seemed like everything hurt.  She has also been much less responsive to eating and drinking.  She has not noticed any fevers, cough, or other obvious signs of infection.      Overview/Hospital Course:  In the ED, CTH negative for acute intracranial findings, CXR with residual diffuse nonspecific interstitial coarsening and subtle patchy subsegmental opacities. XR ankle without evidence of osteomyelitis. Patient was given 1 L NS bolus, vancomycin, zosyn. She was admitted to hospital medicine.       Interval History: Patient admitted to hospital medicine. Denies pain. Denies shortness of breath.   UA positive.     Review of Systems   Unable to perform ROS: Dementia   Objective:     Vital Signs (Most Recent):  Temp: 98.2 °F (36.8 °C) (04/10/22 1151)  Pulse: 98  (04/10/22 1151)  Resp: 17 (04/10/22 1151)  BP: 132/60 (04/10/22 1151)  SpO2: (!) 93 % (04/10/22 1151)   Vital Signs (24h Range):  Temp:  [97.5 °F (36.4 °C)-99.5 °F (37.5 °C)] 98.2 °F (36.8 °C)  Pulse:  [] 98  Resp:  [13-24] 17  SpO2:  [91 %-99 %] 93 %  BP: (125-152)/(58-83) 132/60     Weight: 61.2 kg (135 lb)  Body mass index is 28.22 kg/m².  No intake or output data in the 24 hours ending 04/10/22 1158   Physical Exam  Constitutional:       General: She is not in acute distress.     Appearance: Normal appearance. She is ill-appearing. She is not toxic-appearing or diaphoretic.   Cardiovascular:      Rate and Rhythm: Normal rate and regular rhythm.      Heart sounds: No murmur heard.    No friction rub. No gallop.   Pulmonary:      Effort: Pulmonary effort is normal. No respiratory distress.      Breath sounds: Normal breath sounds. No wheezing or rales.   Abdominal:      General: Abdomen is flat. There is no distension.      Palpations: Abdomen is soft.      Tenderness: There is no abdominal tenderness. There is no guarding or rebound.   Musculoskeletal:      Right lower leg: No edema.      Left lower leg: No edema.   Neurological:      Mental Status: She is alert. She is disoriented.       Computed MELD-Na score unavailable. Necessary lab results were not found in the last year.  Computed MELD score unavailable. Necessary lab results were not found in the last year.    Significant Labs:  CBC:  Recent Labs   Lab 04/09/22  1442 04/10/22  0432   WBC 28.79* 27.73*   HGB 10.9* 11.9*   HCT 34.0* 38.9    241     CMP:  Recent Labs   Lab 04/09/22  1442 04/10/22  0432    138   K 4.7 4.9    102   CO2 25 20*   GLU 96 64*   BUN 49* 53*   CREATININE 2.0* 1.9*   CALCIUM 11.9* 12.2*   PROT 7.3  --    ALBUMIN 2.1*  --    BILITOT 0.8  --    ALKPHOS 102  --    AST 18  --    ALT 8*  --    ANIONGAP 13 16   EGFRNONAA 21.4* 22.7*     PTINR:  No results for input(s): INR in the last 48 hours.    Significant  Procedures:   Dobutamine Stress Test with Color Flow: No results found for this or any previous visit.      Assessment/Plan:      * Encephalopathy, metabolic  CTH negative. Leukocytosis, Procal elevated, crp elevated; Infectious workup significant for possible infiltrate on cxr, positive UA. Blood and urine cultures pending.   Start rocephin for UTI and azithromycin for atypical coverage  Wound care consulted for decub ulcer    Pressure injury of left heel, unstageable  Wound care consult ordered for heel wound as well as sacral decubitus wound.    Late onset Alzheimer's dementia with behavioral disturbance  Delirium precautions ordered.    Dehydration  Diminished oral intake in context of delirium.  -received bolus in the ED; following that up with 1 L NS over 10 hours due to failure to still produce any urine yet.  -further fluids as deemed necessary      Heart failure with reduced ejection fraction  Currently without evidence of decompensation.  Cautious fluid resuscitation    CKD (chronic kidney disease), stage IV  At baseline renal function  -dose renally cleared medications accordingly      Hypothyroidism  Continue synthroid      Essential hypertension  Continue nifedipine      VTE Risk Mitigation (From admission, onward)         Ordered     enoxaparin injection 30 mg  Daily         04/09/22 2253     IP VTE HIGH RISK PATIENT  Once         04/09/22 2253     Place sequential compression device  Until discontinued         04/09/22 2253                Discharge Planning   ION:      Code Status: DNR   Is the patient medically ready for discharge?:     Reason for patient still in hospital (select all that apply): Patient trending condition and Treatment         Alierza Bui MD  Department of Hospital Medicine   [facility]

## 2022-04-10 NOTE — ASSESSMENT & PLAN NOTE
Currently without evidence of decompensation.  -will need to use IV fluids conservatively to avoid fluid overload, especially with her albumin of 2.

## 2022-04-10 NOTE — ED NOTES
Received report from ESTHER Campos. Assumed care of patient.   Report has already been called to floor; waiting on telebox to arrive before patient can be transported to floor.   Telebox has already been requested from unit secretary.

## 2022-04-10 NOTE — SUBJECTIVE & OBJECTIVE
Interval History: Patient admitted to hospital medicine. Denies pain. Denies shortness of breath.   UA positive.     Review of Systems   Unable to perform ROS: Dementia   Objective:     Vital Signs (Most Recent):  Temp: 98.2 °F (36.8 °C) (04/10/22 1151)  Pulse: 98 (04/10/22 1151)  Resp: 17 (04/10/22 1151)  BP: 132/60 (04/10/22 1151)  SpO2: (!) 93 % (04/10/22 1151)   Vital Signs (24h Range):  Temp:  [97.5 °F (36.4 °C)-99.5 °F (37.5 °C)] 98.2 °F (36.8 °C)  Pulse:  [] 98  Resp:  [13-24] 17  SpO2:  [91 %-99 %] 93 %  BP: (125-152)/(58-83) 132/60     Weight: 61.2 kg (135 lb)  Body mass index is 28.22 kg/m².  No intake or output data in the 24 hours ending 04/10/22 1158   Physical Exam  Constitutional:       General: She is not in acute distress.     Appearance: Normal appearance. She is ill-appearing. She is not toxic-appearing or diaphoretic.   Cardiovascular:      Rate and Rhythm: Normal rate and regular rhythm.      Heart sounds: No murmur heard.    No friction rub. No gallop.   Pulmonary:      Effort: Pulmonary effort is normal. No respiratory distress.      Breath sounds: Normal breath sounds. No wheezing or rales.   Abdominal:      General: Abdomen is flat. There is no distension.      Palpations: Abdomen is soft.      Tenderness: There is no abdominal tenderness. There is no guarding or rebound.   Musculoskeletal:      Right lower leg: No edema.      Left lower leg: No edema.   Neurological:      Mental Status: She is alert. She is disoriented.       Computed MELD-Na score unavailable. Necessary lab results were not found in the last year.  Computed MELD score unavailable. Necessary lab results were not found in the last year.    Significant Labs:  CBC:  Recent Labs   Lab 04/09/22  1442 04/10/22  0432   WBC 28.79* 27.73*   HGB 10.9* 11.9*   HCT 34.0* 38.9    241     CMP:  Recent Labs   Lab 04/09/22  1442 04/10/22  0432    138   K 4.7 4.9    102   CO2 25 20*   GLU 96 64*   BUN 49* 53*    CREATININE 2.0* 1.9*   CALCIUM 11.9* 12.2*   PROT 7.3  --    ALBUMIN 2.1*  --    BILITOT 0.8  --    ALKPHOS 102  --    AST 18  --    ALT 8*  --    ANIONGAP 13 16   EGFRNONAA 21.4* 22.7*     PTINR:  No results for input(s): INR in the last 48 hours.    Significant Procedures:   Dobutamine Stress Test with Color Flow: No results found for this or any previous visit.

## 2022-04-11 NOTE — ASSESSMENT & PLAN NOTE
CTH negative. Leukocytosis, Procal elevated, crp elevated; Infectious workup significant for possible infiltrate on cxr, positive UA. Blood and urine cultures pending.   Start rocephin for UTI and azithromycin for atypical coverage  Wound care consulted for decub ulcer  Blood cultures positive GNR and GPC resembling staph, added vancomycin

## 2022-04-11 NOTE — SUBJECTIVE & OBJECTIVE
Interval History: Blood cultures positive GNR and GPC resembling staph. Pt denies pain.     Review of Systems   Unable to perform ROS: Dementia   Objective:     Vital Signs (Most Recent):  Temp: 98.7 °F (37.1 °C) (04/11/22 1156)  Pulse: 109 (04/11/22 1156)  Resp: 18 (04/11/22 1156)  BP: (!) 142/68 (04/11/22 1156)  SpO2: (!) 92 % (04/11/22 1156)   Vital Signs (24h Range):  Temp:  [97.4 °F (36.3 °C)-99.2 °F (37.3 °C)] 98.7 °F (37.1 °C)  Pulse:  [] 109  Resp:  [16-20] 18  SpO2:  [92 %-96 %] 92 %  BP: (123-152)/(63-92) 142/68     Weight: 61.2 kg (135 lb)  Body mass index is 28.22 kg/m².    Intake/Output Summary (Last 24 hours) at 4/11/2022 1207  Last data filed at 4/10/2022 2235  Gross per 24 hour   Intake 240 ml   Output 400 ml   Net -160 ml      Physical Exam  Constitutional:       General: She is not in acute distress.     Appearance: Normal appearance. She is ill-appearing. She is not toxic-appearing or diaphoretic.   Cardiovascular:      Rate and Rhythm: Normal rate and regular rhythm.      Heart sounds: No murmur heard.    No friction rub. No gallop.   Pulmonary:      Effort: Pulmonary effort is normal. No respiratory distress.      Breath sounds: Normal breath sounds. No wheezing or rales.   Abdominal:      General: Abdomen is flat. There is no distension.      Palpations: Abdomen is soft.      Tenderness: There is no abdominal tenderness. There is no guarding or rebound.   Musculoskeletal:      Right lower leg: No edema.      Left lower leg: No edema.   Neurological:      Mental Status: She is alert. She is disoriented.       Computed MELD-Na score unavailable. Necessary lab results were not found in the last year.  Computed MELD score unavailable. Necessary lab results were not found in the last year.    Significant Labs:  CBC:  Recent Labs   Lab 04/09/22  1442 04/10/22  0432 04/11/22  0306   WBC 28.79* 27.73* 32.39*   HGB 10.9* 11.9* 10.0*   HCT 34.0* 38.9 31.3*    241 464*     CMP:  Recent Labs    Lab 04/09/22  1442 04/10/22  0432 04/11/22  0306    138 142   K 4.7 4.9 3.9    102 105   CO2 25 20* 23   GLU 96 64* 71   BUN 49* 53* 48*   CREATININE 2.0* 1.9* 1.7*   CALCIUM 11.9* 12.2* 11.3*   PROT 7.3  --   --    ALBUMIN 2.1*  --   --    BILITOT 0.8  --   --    ALKPHOS 102  --   --    AST 18  --   --    ALT 8*  --   --    ANIONGAP 13 16 14   EGFRNONAA 21.4* 22.7* 26.0*     PTINR:  No results for input(s): INR in the last 48 hours.    Significant Procedures:   Dobutamine Stress Test with Color Flow: No results found for this or any previous visit.

## 2022-04-11 NOTE — PT/OT/SLP EVAL
Speech Language Pathology Evaluation  Bedside Swallow    Patient Name:  Johanny Curtis   MRN:  [id number]  Admitting Diagnosis: Encephalopathy, metabolic    Recommendations:                 General Recommendations:  Dysphagia therapy  Diet recommendations:  NPO, NPO   Aspiration Precautions: Strict aspiration precautions   General Precautions: Standard, aspiration, fall, NPO  Communication strategies:  provide increased time to answer    History:     Past Medical History:   Diagnosis Date    AAA (abdominal aortic aneurysm)     Anemia in CKD (chronic kidney disease)     Arthritis     Bacteremia due to Escherichia coli 9/24/2020    Cataract     Class 1 obesity due to excess calories with serious comorbidity in adult 7/6/2017    Gout, chronic     Heart failure with reduced ejection fraction 1/29/2022    High cholesterol     Hypercapnic respiratory failure 1/28/2022    Hypertension     Hypothyroidism     Late onset Alzheimer's dementia with behavioral disturbance 4/9/2022    Obesity     Plantar fasciitis     PVD (peripheral vascular disease)     Thyroid trouble        Past Surgical History:   Procedure Laterality Date    BREAST BIOPSY Left     BREAST SURGERY Left 1972    benign breast lump    CATARACT EXTRACTION  3/18/13    both eyes -     CHOLECYSTECTOMY      ENDOSCOPIC ULTRASOUND OF UPPER GASTROINTESTINAL TRACT N/A 9/8/2020    Procedure: ULTRASOUND, UPPER GI TRACT, ENDOSCOPIC;  Surgeon: Rasheed Balbuena MD;  Location: [address]);  Service: Endoscopy;  Laterality: N/A;    ERCP N/A 9/8/2020    Procedure: ERCP (ENDOSCOPIC RETROGRADE CHOLANGIOPANCREATOGRAPHY);  Surgeon: Rasheed Balbuena MD;  Location: [address]);  Service: Endoscopy;  Laterality: N/A;    EYE SURGERY      HYSTERECTOMY      SKIN BIOPSY      Left breast       Social History: Patient unable to provide hx     Prior diet: regular/thin.      Subjective     Awake/alert    Pain/Comfort:  · Pain Rating 1:  0/10  · Pain Rating Post-Intervention 1: 0/10    Respiratory Status: Room air    Objective:     Oral Musculature Evaluation  · Oral Musculature: unable to assess due to poor participation/comprehension  · Dentition: edentulous  · Voice Prior to PO Intake: clear    Bedside Swallow Eval:   Consistencies Assessed:  · Thin liquids x2   · Nectar thick liquids x2  · Puree x2     Oral Phase:   · open mouth posture   · Dry mouth   · Slow oral transit time     Pharyngeal Phase:   · Coughing/choking with all consistencies  · decreased hyolaryngeal excursion to palpation  · delayed swallow initation  · multiple spontaneous swallows      Assessment:     Johanny Curtis is a 89 y.o. female with an SLP diagnosis of Dysphagia.     Goals:   Multidisciplinary Problems     SLP Goals        Problem: SLP    Goal Priority Disciplines Outcome   SLP Goal     SLP Ongoing, Progressing   Description: Speech Language Pathology Goals  Goals expected to be met by 4/18    1. Pt will participate in ongoing swallow assessment                           Plan:     · Patient to be seen:  4 x/week   · Plan of Care reviewed with:  patient   · SLP Follow-Up:  Yes       Discharge recommendations:    TBD      Time Tracking:     SLP Treatment Date:   04/11/22  Speech Start Time:  1019  Speech Stop Time:  1025     Speech Total Time (min):  6 min    Billable Minutes: Eval Swallow and Oral Function 6    04/11/2022

## 2022-04-11 NOTE — PROGRESS NOTES
[facility] Med Surg  Adult Nutrition  Progress Note    SUMMARY       Recommendations  Recommendation/Intervention:   1. If NG feedings are required, recommend Isosource 1.5 @ 35 ml/hr to provide 1260 kcal, 57 g protein, 642 ml water, with FWFs per MD.  2. Maintain NPO   Goal: Initiation of nutrition support as deemed appropriate by family and medical team within 48 hours.   Nutrition Goal Status: new  Communication of RD Recs: reviewed with physician    Assessment and Plan  Inadequate oral intake related to cognitive decline as evidenced by suspected 20% weight loss in 3 months, reported decline in intake over the past week, poor appetite, and NPO recommendation for dysphagia from SLP evaluation.   New   Plan  Enteral Nutrition if indicated  Collaboration with other providers  NPO    Malnutrition Assessment  Malnutrition Type: acute illness or injury  Weight Loss (Malnutrition): greater than 7.5% in 3 months  Energy Intake (Malnutrition):  (Suspect inadequate intake if weight loss can be verified)  Subcutaneous Fat (Malnutrition): mild depletion  Muscle Mass (Malnutrition): moderate depletion       Skipwith Region (Muscle Loss): severe depletion  Clavicle Bone Region (Muscle Loss): moderate depletion  Clavicle and Acromion Bone Region (Muscle Loss): severe depletion       Muscle Loss Evaluation (Final Summary): moderate protein-calorie malnutrition    Severe Weight Loss (Malnutrition): greater than 7.5% in 3 months (suspected 20% weight loss in 90 days)     Decubitus ulcers on sacrum and heel    Reason for Assessment  Reason For Assessment: consult  Diagnosis: infection/sepsis  Relevant Medical History: HTN, AAA, CKD, HF, HLD, peripheral neuropathy, hypothyroid, pressure ulcers, alzheimers/dementia, poor appetie, sepsis/bacteremia  Interdisciplinary Rounds: did not attend    Nutrition Risk Screen  Nutrition Risk Screen: large or nonhealing wound, burn or pressure injury    Nutrition/Diet History  Food Allergies:  "[facility]    Anthropometrics  Temp: 98.9 °F (37.2 °C)  Height Method: Stated (daughter stated)  Height: 4' 9" (144.8 cm)  Height (inches): 57 in  Weight Method: Estimated  Weight: 61.2 kg (134 lb 14.7 oz) (kg)  Weight (lb): 134.92 lb  Ideal Body Weight (IBW), Female: 85 lb  % Ideal Body Weight, Female (lb): 158.73 %  BMI (Calculated): 29.2  Weight Loss: unintentional  Weight Change Amount: 15 lb 14.4 oz     Lab/Procedures/Meds  Pertinent Labs Reviewed: reviewed  Pertinent Medications Reviewed: reviewed     Estimated/Assessed Needs  Weight Used For Calorie Calculations: 61.2 kg (134 lb 14.7 oz)  Energy Calorie Requirements (kcal): 1125  Energy Need Method: [place]  Protein Requirements: 49-73 (0.8-1 g/kg)  Weight Used For Protein Calculations: 61.2 kg (134 lb 14.7 oz)  Fluid Requirements (mL): 1 ml/kcal or per MD  Estimated Fluid Requirement Method: RDA Method  RDA Method (mL): 1125     Nutrition Prescription Ordered  Current Diet Order: NPO per SLP recommendation     Evaluation of Received Nutrient/Fluid Intake  % Intake of Estimated Energy Needs: Other: Unable to determine due to AMS   % Meal Intake: Other: unable to assess due to AMS    Nutrition Risk  Level of Risk/Frequency of Follow-up: low     Monitor and Evaluation  Food and Nutrient Intake: energy intake, food and beverage intake  Food and Nutrient Adminstration: diet order  Anthropometric Measurements: weight, weight change, body mass index  Biochemical Data, Medical Tests and Procedures: electrolyte and renal panel, gastrointestinal profile  Nutrition-Focused Physical Findings: skin, head and eyes, extremities, muscles and bones, overall appearance     Nutrition Follow-Up  RD Follow-up?: Yes       "

## 2022-04-11 NOTE — PROGRESS NOTES
Pharmacokinetic Initial Assessment: IV Vancomycin    Assessment/Plan:    Patient was given a 1.5 g IV vancomycin dose on 4/9 around 1700  Random level drawn 4/11 around 0900 resulted as 16  Will plan to give patient a small 500 mg IV vancomycin dose today and follow up with another random level to be drawn with 4/12 AM labs  Desired empiric serum trough concentration is 15 to 20 mcg/mL   The trough goal may be adjusted based on the patients clinical course and culture results   Pharmacy will continue to follow and monitor vancomycin.      Please contact pharmacy at extension [phone] with any questions regarding this assessment.     Thank you for the consult,   Sabas Andrew, PharmD, [facility]      Patient brief summary:  Johanny Curtis is a 89 y.o. female initiated on antimicrobial therapy with IV Vancomycin for treatment of suspected bacteremia    Drug Allergies:   Review of patient's allergies indicates:  No Known Allergies    Actual Body Weight:   61.2 kg    Renal Function:   Estimated Creatinine Clearance: 17.9 mL/min (A) (based on SCr of 1.7 mg/dL (H)).,     Dialysis Method (if applicable):  N/A    CBC (last 72 hours):  Recent Labs   Lab Result Units 04/09/22  1442 04/10/22  0432 04/11/22  0306   WBC K/uL 28.79* 27.73* 32.39*   Hemoglobin g/dL 10.9* 11.9* 10.0*   Hematocrit % 34.0* 38.9 31.3*   Platelets K/uL 435 241 464*   Gran % % 89.4* 90.9* 91.0*   Lymph % % 4.7* 4.8* 4.2*   Mono % % 5.0 3.2* 3.5*   Eosinophil % % 0.0 0.1 0.1   Basophil % % 0.2 0.2 0.2   Differential Method  Automated Automated Automated       Metabolic Panel (last 72 hours):  Recent Labs   Lab Result Units 04/09/22  1442 04/10/22  0432 04/10/22  0530 04/11/22  0306   Sodium mmol/L 138 138  --  142   Potassium mmol/L 4.7 4.9  --  3.9   Chloride mmol/L 100 102  --  105   CO2 mmol/L 25 20*  --  23   Glucose mg/dL 96 64*  --  71   Glucose, UA   --   --  Negative  --    BUN mg/dL 49* 53*  --  48*   Creatinine mg/dL 2.0* 1.9*  --  1.7*   Albumin  g/dL 2.1*  --   --   --    Total Bilirubin mg/dL 0.8  --   --   --    Alkaline Phosphatase U/L 102  --   --   --    AST U/L 18  --   --   --    ALT U/L 8*  --   --   --    Magnesium mg/dL 2.2 2.2  --  2.0   Phosphorus mg/dL 1.9* 2.1*  --  2.2*       Drug levels (last 3 results):  Recent Labs   Lab Result Units 04/11/22  0844   Vancomycin, Random ug/mL 16.0       Microbiologic Results:  Microbiology Results (last 7 days)     Procedure Component Value Units Date/Time    Blood Culture #1 **CANNOT BE ORDERED STAT** [[id number]] Collected: 04/09/22 1613    Order Status: Completed Specimen: Blood from Peripheral, Hand, Left Updated: 04/10/22 2247     Blood Culture, Routine Gram stain aer bottle: Gram negative rods       Positive results previously called to Dawn Choudhary RN 04/10/2022  22:46    Blood Culture #2 **CANNOT BE ORDERED STAT** [[id number]] Collected: 04/09/22 1612    Order Status: Completed Specimen: Blood from Peripheral, Forearm, Left Updated: 04/10/22 2208     Blood Culture, Routine Gram stain aer bottle: Gram positive cocci in clusters resembling Staph       Results called to and read back by:Dawn Choudhary RN 04/10/2022  22:08    Urine culture [[id number]] Collected: 04/10/22 8930    Order Status: No result Specimen: Urine Updated: 04/10/22 0549

## 2022-04-11 NOTE — ASSESSMENT & PLAN NOTE
Diminished oral intake in context of delirium.  -received bolus in the ED; following that up with 1 L NS over 10 hours  -further fluids as deemed necessary

## 2022-04-11 NOTE — PLAN OF CARE
Received patient lying in bed with no apparent distress.  No changes in mentation during shift.  Straight cath for output of 400ml due to non-void  IV antibiotics initiated and tolerated well.   Turn q2h   Hell protectors applied to bilateral feet  AAOx3; person, place, time  Refused all meals, but did take medication crushed with pudding.  Refused PO fluids as well.  Bed alarm on  Call light in reach.

## 2022-04-11 NOTE — PROGRESS NOTES
[facility] Medicine  Progress Note    Patient Name: Johanny Curtis  MRN: [id number]  Patient Class: IP- Inpatient   Admission Date: 4/9/2022  Length of Stay: 2 days  Attending Physician: Alireza Gamez*  Primary Care Provider: Leticia Weems MD        Subjective:     Principal Problem:Encephalopathy, metabolic        HPI:  89-year-old female brought to the hospital for altered mental status.  History obtained from her daughter at the bedside, who cares for her primarily.  Patient does live with her daughter for the past 10 years due to dementia and she has had progressive debility and cognitive dysfunction over this period of time.  She is bedbound and since her last day at skilled nursing in February, she has developed decubitus wounds on the sacrum and her heels, due to prolonged delays in having an inflatable mattress delivered, which only recently arrived a few days ago.  Her daughter does turn her every few hours and she has been getting wound care with home health and she has noticed improvement in her wounds, but for the past couple days she has noticed a decline in her mother's mental status and today when she tried to move her it seemed like everything hurt.  She has also been much less responsive to eating and drinking.  She has not noticed any fevers, cough, or other obvious signs of infection.      Overview/Hospital Course:  In the ED, CTH negative for acute intracranial findings, CXR with residual diffuse nonspecific interstitial coarsening and subtle patchy subsegmental opacities. XR ankle without evidence of osteomyelitis. Patient was given 1 L NS bolus, vancomycin, zosyn. She was admitted to hospital medicine. SLP and wound care consulted.      Interval History: Blood cultures positive GNR and GPC resembling staph. Pt denies pain.     Review of Systems   Unable to perform ROS: Dementia   Objective:     Vital Signs (Most Recent):  Temp: 98.7 °F (37.1 °C) (04/11/22  1156)  Pulse: 109 (04/11/22 1156)  Resp: 18 (04/11/22 1156)  BP: (!) 142/68 (04/11/22 1156)  SpO2: (!) 92 % (04/11/22 1156)   Vital Signs (24h Range):  Temp:  [97.4 °F (36.3 °C)-99.2 °F (37.3 °C)] 98.7 °F (37.1 °C)  Pulse:  [] 109  Resp:  [16-20] 18  SpO2:  [92 %-96 %] 92 %  BP: (123-152)/(63-92) 142/68     Weight: 61.2 kg (135 lb)  Body mass index is 28.22 kg/m².    Intake/Output Summary (Last 24 hours) at 4/11/2022 1207  Last data filed at 4/10/2022 2235  Gross per 24 hour   Intake 240 ml   Output 400 ml   Net -160 ml      Physical Exam  Constitutional:       General: She is not in acute distress.     Appearance: Normal appearance. She is ill-appearing. She is not toxic-appearing or diaphoretic.   Cardiovascular:      Rate and Rhythm: Normal rate and regular rhythm.      Heart sounds: No murmur heard.    No friction rub. No gallop.   Pulmonary:      Effort: Pulmonary effort is normal. No respiratory distress.      Breath sounds: Normal breath sounds. No wheezing or rales.   Abdominal:      General: Abdomen is flat. There is no distension.      Palpations: Abdomen is soft.      Tenderness: There is no abdominal tenderness. There is no guarding or rebound.   Musculoskeletal:      Right lower leg: No edema.      Left lower leg: No edema.   Neurological:      Mental Status: She is alert. She is disoriented.       Computed MELD-Na score unavailable. Necessary lab results were not found in the last year.  Computed MELD score unavailable. Necessary lab results were not found in the last year.    Significant Labs:  CBC:  Recent Labs   Lab 04/09/22  1442 04/10/22  0432 04/11/22  0306   WBC 28.79* 27.73* 32.39*   HGB 10.9* 11.9* 10.0*   HCT 34.0* 38.9 31.3*    241 464*     CMP:  Recent Labs   Lab 04/09/22  1442 04/10/22  0432 04/11/22  0306    138 142   K 4.7 4.9 3.9    102 105   CO2 25 20* 23   GLU 96 64* 71   BUN 49* 53* 48*   CREATININE 2.0* 1.9* 1.7*   CALCIUM 11.9* 12.2* 11.3*   PROT 7.3  --    --    ALBUMIN 2.1*  --   --    BILITOT 0.8  --   --    ALKPHOS 102  --   --    AST 18  --   --    ALT 8*  --   --    ANIONGAP 13 16 14   EGFRNONAA 21.4* 22.7* 26.0*     PTINR:  No results for input(s): INR in the last 48 hours.    Significant Procedures:   Dobutamine Stress Test with Color Flow: No results found for this or any previous visit.      Assessment/Plan:      * Encephalopathy, metabolic  CTH negative. Leukocytosis, Procal elevated, crp elevated; Infectious workup significant for possible infiltrate on cxr, positive UA. Blood and urine cultures pending.   Start rocephin for UTI and azithromycin for atypical coverage  Wound care consulted for decub ulcer  Blood cultures positive GNR and GPC resembling staph, added vancomycin    Pressure injury of left heel, unstageable  Wound care consult ordered for heel wound as well as sacral decubitus wound.    Late onset Alzheimer's dementia with behavioral disturbance  Delirium precautions ordered.    Dehydration  Diminished oral intake in context of delirium.  -received bolus in the ED; following that up with 1 L NS over 10 hours  -further fluids as deemed necessary      Heart failure with reduced ejection fraction  Currently without evidence of decompensation.  Cautious fluid resuscitation    CKD (chronic kidney disease), stage IV  At baseline renal function  -dose renally cleared medications accordingly      Hypothyroidism  Continue synthroid      Essential hypertension  Continue nifedipine      VTE Risk Mitigation (From admission, onward)         Ordered     enoxaparin injection 30 mg  Daily         04/09/22 2253     IP VTE HIGH RISK PATIENT  Once         04/09/22 2253     Place sequential compression device  Until discontinued         04/09/22 2253                Discharge Planning   ION: 4/13/2022     Code Status: DNR   Is the patient medically ready for discharge?: No    Reason for patient still in hospital (select all that apply): Patient trending condition,  Laboratory test and Treatment           Alireza Bui MD  Department of Hospital Medicine   [facility] Surg

## 2022-04-11 NOTE — PLAN OF CARE
Pt alert and oriented x3. Is able to answer questions slowly. Pt has had one incontinent pad. Bladder scanned her x2 with the last at 4am with a result of 224. Had small bowel movement.  Repositioned patient every two hours. Will continue to monitor.    Problem: Adult Inpatient Plan of Care  Goal: Plan of Care Review  4/11/2022 0405 by Dawn Choudhary RN  Outcome: Ongoing, Progressing  4/11/2022 0043 by Dawn Choudhary RN  Outcome: Ongoing, Progressing  Goal: Patient-Specific Goal (Individualized)  4/11/2022 0405 by Dawn Choudhary RN  Outcome: Ongoing, Progressing  4/11/2022 0043 by Dawn Choudhary RN  Outcome: Ongoing, Progressing  Goal: Absence of Hospital-Acquired Illness or Injury  4/11/2022 0405 by Dawn Choudhary RN  Outcome: Ongoing, Progressing  4/11/2022 0043 by Dawn Choudhary RN  Outcome: Ongoing, Progressing  Goal: Optimal Comfort and Wellbeing  4/11/2022 0405 by Dawn Choudhary RN  Outcome: Ongoing, Progressing  4/11/2022 0043 by Dawn Choudhary RN  Outcome: Ongoing, Progressing  Goal: Readiness for Transition of Care  4/11/2022 0405 by Dawn Choudhary RN  Outcome: Ongoing, Progressing  4/11/2022 0043 by Dawn Choudhary RN  Outcome: Ongoing, Progressing     Problem: Skin Injury Risk Increased  Goal: Skin Health and Integrity  4/11/2022 0405 by Dawn Choudhary RN  Outcome: Ongoing, Progressing  4/11/2022 0043 by Dawn Choudhary RN  Outcome: Ongoing, Progressing     Problem: Fall Injury Risk  Goal: Absence of Fall and Fall-Related Injury  4/11/2022 0405 by Dawn Choudhary RN  Outcome: Ongoing, Progressing  4/11/2022 0043 by Dawn Choudhary RN  Outcome: Ongoing, Progressing

## 2022-04-11 NOTE — PLAN OF CARE
[facility] - [facility] Surg  Initial Discharge Assessment       Primary Care Provider: Leticia Weems MD    Admission Diagnosis: Altered mental status [R41.82]  Sepsis [A41.9]  Sepsis, due to unspecified organism, unspecified whether acute organ dysfunction present [A41.9]    Admission Date: 4/9/2022  Expected Discharge Date: 4/13/2022    Discharge Barriers Identified: None    Payor: HUMANA MANAGED MEDICARE / Plan: HUMANA SNP (SPECIAL NEEDS PLAN) / Product Type: Medicare Advantage /     Extended Emergency Contact Information  Primary Emergency Contact: Lombard,Annette   [place]  Mobile Phone: [phone]  Relation:     Discharge Plan A: Home Health  Discharge Plan B: New Nursing Home placement - [facility] care facility      [facility] - [facility] [address] [facility] [address]  [address] [facility] [address]  Phone: [phone] Fax: [phone]    [id number] - [place], [place] - [address] AT [facility]  [address] [place] [address]  Phone: [phone] Fax: [phone]    [id number] [address] AT [address] [address]  Phone: [phone] Fax: [phone]      Initial Assessment (most recent)     Adult Discharge Assessment - 04/11/22 1509        Discharge Assessment    Assessment Type Discharge Planning Assessment     Confirmed/corrected address, phone number and insurance Yes     Confirmed Demographics Correct on Facesheet     Source of Information family     When was your last doctors appointment? 03/09/22     Does patient/caregiver understand observation status Yes     Communicated ION with patient/caregiver Yes     Reason For Admission Altered mental status     Lives With child(shani), adult     Facility Arrived From: Home     Do you expect to return to your current living situation? Yes     Do  you have help at home or someone to help you manage your care at home? Yes     Who are your caregiver(s) and their phone number(s)? Annette Lombard, [phone]     Prior to hospitilization cognitive status: Not Oriented to Person;Not Oriented to Place;Not Oriented to Time     Current cognitive status: Not Oriented to Person;Not Oriented to Place;Not Oriented to Time     Walking or Climbing Stairs Difficulty ambulation difficulty, dependent     Dressing/Bathing Difficulty dressing difficulty, dependent     Home Accessibility wheelchair accessible     Home Layout Able to live on 1st floor     Equipment Currently Used at Home wheelchair;walker, rolling;hospital bed;shower chair;bath bench;bedside commode     Readmission within 30 days? No     Patient currently being followed by outpatient case management? No     Do you currently have service(s) that help you manage your care at home? Yes     Name and Contact number of agency [facility]     Is the pt/caregiver preference to resume services with current agency Yes     Do you take prescription medications? Yes     Do you have prescription coverage? Yes     Do you have any problems affording any of your prescribed medications? No     Is the patient taking medications as prescribed? yes     Who is going to help you get home at discharge? Hospital     How do you get to doctors appointments? other (see comments)   Virtual Visit    Are you on dialysis? No     Do you take coumadin? No     Discharge Plan A Home Health     Discharge Plan B New Nursing Home placement - group home care facility     DME Needed Upon Discharge  none     Discharge Plan discussed with: Patient;Adult children     Discharge Barriers Identified None        Relationship/Environment    Name(s) of Who Lives With Patient Ovidio Montalvo

## 2022-04-12 NOTE — PT/OT/SLP PROGRESS
"Speech Language Pathology Treatment    Patient Name:  Johanny Curtis   MRN:  [id number]  Admitting Diagnosis: Encephalopathy, metabolic    Recommendations:                 General Recommendations:  Dysphagia therapy  Diet recommendations:  NPO, Liquid Diet Level: NPO   Aspiration Precautions: Alternate means of nutrition/hydration   General Precautions: Standard, fall, aspiration  Communication strategies:  provide increased time to answer and use increased vocal loudness - pt [place]    Subjective     SLP communicated with RN prior to entry and received clearance for therapy this date.   Pt asleep upon SLP entry, though easily roused to verbal stim. No family present. Pt agreeable to participate with ST.   "What might I need" - when asked if SLP could assist pt with any needs prior to exiting room.     Pain/Comfort:  · Pain Rating 1: 0/10  · Pain Rating Post-Intervention 1: 0/10    Respiratory Status: Room air    Objective:     Has the patient been evaluated by SLP for swallowing?   Yes  Keep patient NPO? Yes     Pt seen bedside for ongoing assessment of swallow function. Pt presents [place], therefore verbal communication conducted with face mask removed in adherence with COVID distancing guidelines. Pt followed basic commands intermittently. She was oriented to self only. SLP offered po trials including ice chips x2, 1/2 tsp thin liquid water x3, and x2 tsp presentations applesauce of which pt stripped ~1/8 tsp. She demonstrated significantly delayed initiation of pharyngeal swallow with eventual delayed, wet coughing across all consistencies assessed. Ongoing po trials deferred 2/2 increased risk for aspiration. ST educated pt re: role of SLP, risk factors for aspiration, current po rec, safe swallow precautions, and POC moving forward to which pt nodded though would benefit from reinforcement. Pt stated, "okay thank you" as SLP exited room. Continue ST per POC.    Assessment:     Johanny Curtis is a 89 y.o. female " with an SLP diagnosis of Dysphagia.     Goals:   Multidisciplinary Problems     SLP Goals        Problem: SLP    Goal Priority Disciplines Outcome   SLP Goal     SLP Ongoing, Progressing   Description: Speech Language Pathology Goals  Goals expected to be met by 4/18  1. Pt will participate in ongoing swallow assessment                           Plan:     · Patient to be seen:  4 x/week   · Plan of Care reviewed with:  patient   · SLP Follow-Up:  Yes       Discharge recommendations:   (tbd)     Time Tracking:     SLP Treatment Date:   04/12/22  Speech Start Time:  0750  Speech Stop Time:  0803     Speech Total Time (min):  13 min    Billable Minutes: Treatment Swallowing Dysfunction 5 and Self Care/Home Management Training 8  04/12/2022

## 2022-04-12 NOTE — PLAN OF CARE
Received patient lying in bed with no apparent distress  AAOx3  Speech eval complete today   Q2H turn  Incontinence care provided  IV antibiotics administered per MD orders  Family spoke with MD today to have update on patient progress  Bed locked in lowest position  Call light in reach

## 2022-04-12 NOTE — PROGRESS NOTES
Pharmacokinetic Assessment Follow Up: IV Vancomycin    Vancomycin serum concentration assessment(s):    The random level was drawn correctly and can be used to guide therapy at this time. The measurement is within the desired definitive target range of 15 to 20 mcg/mL.    Vancomycin Regimen Plan:    Patient's vancomycin random resulted at the upper end of the 15-20 mcg/mL goal  Plan to hold today's dose and follow up with another random 4/13 AM  Patient likely will require q48h dosing    Drug levels (last 3 results):  Recent Labs   Lab Result Units 04/11/22  0844 04/12/22  0450   Vancomycin, Random ug/mL 16.0 19.7       Pharmacy will continue to follow and monitor vancomycin.    Please contact pharmacy at extension [phone] for questions regarding this assessment.    Thank you for the consult,   Sabas Andrew, PharmD, [facility]      Patient brief summary:  Johanny Curtis is a 89 y.o. female initiated on antimicrobial therapy with IV Vancomycin for treatment of bacteremia    Drug Allergies:   Review of patient's allergies indicates:  No Known Allergies    Actual Body Weight:   61.2 kg    Renal Function:   Estimated Creatinine Clearance: 18.7 mL/min (A) (based on SCr of 1.6 mg/dL (H)).,     Dialysis Method (if applicable):  N/A    CBC (last 72 hours):  Recent Labs   Lab Result Units 04/09/22  1442 04/10/22  0432 04/11/22  0306 04/12/22  0450   WBC K/uL 28.79* 27.73* 32.39* 33.10*   Hemoglobin g/dL 10.9* 11.9* 10.0* 9.2*   Hematocrit % 34.0* 38.9 31.3* 28.2*   Platelets K/uL 435 241 464* 484*   Gran % % 89.4* 90.9* 91.0* 87.8*   Lymph % % 4.7* 4.8* 4.2* 5.3*   Mono % % 5.0 3.2* 3.5* 4.1   Eosinophil % % 0.0 0.1 0.1 0.1   Basophil % % 0.2 0.2 0.2 0.2   Differential Method  Automated Automated Automated Automated       Metabolic Panel (last 72 hours):  Recent Labs   Lab Result Units 04/09/22  1442 04/10/22  0432 04/10/22  0530 04/11/22  0306 04/12/22  0450   Sodium mmol/L 138 138  --  142 141   Potassium mmol/L 4.7 4.9  --   3.9 3.5   Chloride mmol/L 100 102  --  105 107   CO2 mmol/L 25 20*  --  23 23   Glucose mg/dL 96 64*  --  71 80   Glucose, UA   --   --  Negative  --   --    BUN mg/dL 49* 53*  --  48* 47*   Creatinine mg/dL 2.0* 1.9*  --  1.7* 1.6*   Albumin g/dL 2.1*  --   --   --   --    Total Bilirubin mg/dL 0.8  --   --   --   --    Alkaline Phosphatase U/L 102  --   --   --   --    AST U/L 18  --   --   --   --    ALT U/L 8*  --   --   --   --    Magnesium mg/dL 2.2 2.2  --  2.0 1.9   Phosphorus mg/dL 1.9* 2.1*  --  2.2* 2.1*       Vancomycin Administrations:  vancomycin given in the last 96 hours                   vancomycin 500 mg in dextrose 5 % 100 mL IVPB (ready to mix system) (mg) 500 mg New Bag 04/11/22 1124    vancomycin 1.5 g in dextrose 5 % 250 mL IVPB (ready to mix) (mg) 1,500 mg New Bag 04/09/22 1658                Microbiologic Results:  Microbiology Results (last 7 days)     Procedure Component Value Units Date/Time    Blood Culture #1 **CANNOT BE ORDERED STAT** [[id number]] Collected: 04/09/22 1613    Order Status: Completed Specimen: Blood from Peripheral, Hand, Left Updated: 04/11/22 2200     Blood Culture, Routine Gram stain aer bottle: Gram negative rods       Positive results previously called to Dawn Choudhary RN 04/10/2022  22:46      Gram stain lyubov bottle: Gram positive cocci in clusters resembling Staph       Results called to and read back by:Dawn Choudhary  04/11/2022  22:00    Urine culture [[id number]] Collected: 04/10/22 0530    Order Status: Completed Specimen: Urine Updated: 04/11/22 1258     Urine Culture, Routine No significant growth     Comment: Previous comment was modified by [email or web address] at 12:58 on 04/11/2022 No growth       Narrative:      REPLACES ORDER # [id number]    Blood culture [[id number]] Collected: 04/11/22 1222    Order Status: Sent Specimen: Blood from Peripheral, Right Hand Updated: 04/11/22 1232    Blood culture [[id number]] Collected: 04/11/22 1223    Order Status: Sent Specimen: Blood from  Antecubital, Left Arm Updated: 04/11/22 1232    Blood Culture #2 **CANNOT BE ORDERED STAT** [[id number]] Collected: 04/09/22 1612    Order Status: Completed Specimen: Blood from Peripheral, Forearm, Left Updated: 04/10/22 2208     Blood Culture, Routine Gram stain aer bottle: Gram positive cocci in clusters resembling Staph       Results called to and read back by:Dawn Choudhary RN 04/10/2022  22:08

## 2022-04-12 NOTE — ASSESSMENT & PLAN NOTE
CTH negative. Leukocytosis, Procal elevated, crp elevated; Infectious workup significant for possible infiltrate on cxr, positive UA. Blood and urine cultures pending.   Start rocephin for UTI and azithromycin for atypical coverage  Wound care consulted for decub ulcer  Blood cultures positive GNR and GPC resembling staph, added vancomycin   Comment: Well-differentiated Detail Level: Simple Detail Level: Detailed Comment: Nodular [id number]

## 2022-04-12 NOTE — PROGRESS NOTES
[facility] Medicine  Progress Note    Patient Name: Johanny Curtis  MRN: [id number]  Patient Class: IP- Inpatient   Admission Date: 4/9/2022  Length of Stay: 3 days  Attending Physician: Alireza Gamez*  Primary Care Provider: Leticia Weems MD        Subjective:     Principal Problem:Encephalopathy, metabolic        HPI:  89-year-old female brought to the hospital for altered mental status.  History obtained from her daughter at the bedside, who cares for her primarily.  Patient does live with her daughter for the past 10 years due to dementia and she has had progressive debility and cognitive dysfunction over this period of time.  She is bedbound and since her last day at skilled nursing in February, she has developed decubitus wounds on the sacrum and her heels, due to prolonged delays in having an inflatable mattress delivered, which only recently arrived a few days ago.  Her daughter does turn her every few hours and she has been getting wound care with home health and she has noticed improvement in her wounds, but for the past couple days she has noticed a decline in her mother's mental status and today when she tried to move her it seemed like everything hurt.  She has also been much less responsive to eating and drinking.  She has not noticed any fevers, cough, or other obvious signs of infection.      Overview/Hospital Course:  In the ED, CTH negative for acute intracranial findings, CXR with residual diffuse nonspecific interstitial coarsening and subtle patchy subsegmental opacities. XR ankle without evidence of osteomyelitis. Patient was given 1 L NS bolus, vancomycin, zosyn. She was admitted to hospital medicine. SLP and wound care consulted. UA significant for UTI. Blood cultures positive.       Interval History: Pt afebrile.  Blood culture growing staph species  Remains NPO, started gentle fluid for hydration    Review of Systems  Objective:     Vital Signs (Most  Recent):  Temp: 98.5 °F (36.9 °C) (04/12/22 1109)  Pulse: (!) 114 (04/12/22 1109)  Resp: 18 (04/12/22 1109)  BP: (!) 142/61 (04/12/22 1109)  SpO2: 95 % (04/12/22 1109)   Vital Signs (24h Range):  Temp:  [97.3 °F (36.3 °C)-98.9 °F (37.2 °C)] 98.5 °F (36.9 °C)  Pulse:  [] 114  Resp:  [14-20] 18  SpO2:  [92 %-95 %] 95 %  BP: (132-156)/(60-75) 142/61     Weight: 61.2 kg (134 lb 14.7 oz) (kg)  Body mass index is 29.2 kg/m².  No intake or output data in the 24 hours ending 04/12/22 1333   Physical Exam  Constitutional:       General: She is not in acute distress.     Appearance: Normal appearance. She is ill-appearing. She is not toxic-appearing or diaphoretic.   Cardiovascular:      Rate and Rhythm: Normal rate and regular rhythm.      Heart sounds: No murmur heard.    No friction rub. No gallop.   Pulmonary:      Effort: Pulmonary effort is normal. No respiratory distress.      Breath sounds: Normal breath sounds. No wheezing or rales.   Abdominal:      General: Abdomen is flat. There is no distension.      Palpations: Abdomen is soft.      Tenderness: There is no abdominal tenderness. There is no guarding or rebound.   Musculoskeletal:      Right lower leg: No edema.      Left lower leg: No edema.   Neurological:      Mental Status: She is alert. She is disoriented.       Computed MELD-Na score unavailable. Necessary lab results were not found in the last year.  Computed MELD score unavailable. Necessary lab results were not found in the last year.    Significant Labs:  CBC:  Recent Labs   Lab 04/11/22 0306 04/12/22  0450   WBC 32.39* 33.10*   HGB 10.0* 9.2*   HCT 31.3* 28.2*   * 484*     CMP:  Recent Labs   Lab 04/11/22 0306 04/12/22  0450    141   K 3.9 3.5    107   CO2 23 23   GLU 71 80   BUN 48* 47*   CREATININE 1.7* 1.6*   CALCIUM 11.3* 11.5*   ANIONGAP 14 11   EGFRNONAA 26.0* 28.0*     PTINR:  No results for input(s): INR in the last 48 hours.    Significant Procedures:   Dobutamine  Stress Test with Color Flow: No results found for this or any previous visit.      Assessment/Plan:      * Encephalopathy, metabolic  CTH negative. Leukocytosis, Procal elevated, crp elevated; Infectious workup significant for possible infiltrate on cxr, positive UA. Blood and urine cultures pending.   Start rocephin for UTI and azithromycin for atypical coverage  Wound care consulted for decub ulcer  Blood cultures positive GNR and GPC resembling staph, added vancomycin    Pressure injury of left heel, unstageable  Wound care consult ordered for heel wound as well as sacral decubitus wound.    Late onset Alzheimer's dementia with behavioral disturbance  Delirium precautions ordered.    Dehydration  Diminished oral intake in context of delirium.  -received bolus in the ED; following that up with 1 L NS over 10 hours  -further fluids as deemed necessary      Heart failure with reduced ejection fraction  Currently without evidence of decompensation.  Cautious fluid resuscitation    CKD (chronic kidney disease), stage IV  At baseline renal function  -dose renally cleared medications accordingly      Hypothyroidism  Continue synthroid      Essential hypertension  Continue nifedipine      VTE Risk Mitigation (From admission, onward)         Ordered     enoxaparin injection 30 mg  Daily         04/09/22 2253     IP VTE HIGH RISK PATIENT  Once         04/09/22 2253     Place sequential compression device  Until discontinued         04/09/22 2253                Discharge Planning   ION: 4/13/2022     Code Status: DNR   Is the patient medically ready for discharge?: No    Reason for patient still in hospital (select all that apply): Patient trending condition and Treatment  Discharge Plan A: Home Health   Discharge Delays: None known at this time      Alireza Bui MD  Department of Hospital Medicine   [facility] Surg

## 2022-04-12 NOTE — PLAN OF CARE
Recommendation/Intervention:   1. If NG feedings are required, recommend Isosource 1.5 @ 35 ml/hr to provide 1260 kcal, 57 g protein, 642 ml water, with FWFs per MD.  2. Maintain NPO   Goal: Initiation of nutrition support as deemed appropriate by family and medical team within 48 hours.   Nutrition Goal Status: new  Communication of RD Recs: reviewed with physician     Assessment and Plan  Inadequate oral intake related to cognitive decline as evidenced by suspected 20% weight loss in 3 months, reported decline in intake over the past week, poor appetite, and NPO recommendation for dysphagia from SLP evaluation.   New   Plan  Enteral Nutrition if indicated  Collaboration with other providers  NPO

## 2022-04-12 NOTE — PROGRESS NOTES
[facility] Surg  Wound Care    Patient Name:  Johanny Curtis   MRN:  [id number]  Date: 2022  Diagnosis: Encephalopathy, metabolic    History:     Past Medical History:   Diagnosis Date    AAA (abdominal aortic aneurysm)     Anemia in CKD (chronic kidney disease)     Arthritis     Bacteremia due to Escherichia coli 2020    Cataract     Class 1 obesity due to excess calories with serious comorbidity in adult 2017    Gout, chronic     Heart failure with reduced ejection fraction 2022    High cholesterol     Hypercapnic respiratory failure 2022    Hypertension     Hypothyroidism     Late onset Alzheimer's dementia with behavioral disturbance 2022    Obesity     Plantar fasciitis     PVD (peripheral vascular disease)     Thyroid trouble        Social History     Socioeconomic History    Marital status:    Tobacco Use    Smoking status: Former Smoker     Packs/day: 0.50     Years: 60.00     Pack years: 30.00     Quit date: 2001     Years since quittin.8    Smokeless tobacco: Never Used    Tobacco comment: quit in the    Substance and Sexual Activity    Alcohol use: No    Drug use: No    Sexual activity: Not Currently     Social Determinants of Health     Financial Resource Strain: Low Risk     Difficulty of Paying Living Expenses: Not hard at all   Food Insecurity: No Food Insecurity    Worried About Running Out of Food in the Last Year: Never true    Ran Out of Food in the Last Year: Never true   Transportation Needs: No Transportation Needs    Lack of Transportation (Medical): No    Lack of Transportation (Non-Medical): No   Physical Activity: Inactive    Days of Exercise per Week: 0 days    Minutes of Exercise per Session: 0 min   Stress: No Stress Concern Present    Feeling of Stress : Not at all   Social Connections: Socially Isolated    Frequency of Communication with Friends and Family: More than three times a week    Frequency of  Social Gatherings with Friends and Family: Three times a week    Attends Caodaism Services: Never    Active Member of Clubs or Organizations: No    Attends Club or Organization Meetings: Never    Marital Status:    Housing Stability: Low Risk     Unable to Pay for Housing in the Last Year: No    Number of Places Lived in the Last Year: 1    Unstable Housing in the Last Year: No       Precautions:     Allergies as of 04/09/2022    (No Known Allergies)       [facility] Assessment Details/Treatment          04/12/22 0858        Altered Skin Integrity 04/12/22 0858 Left Heel Purple or maroon localized area of discolored intact skin or non-intact skin or a blood-filled blister.   Date First Assessed/Time First Assessed: 04/12/22 0858   Altered Skin Integrity Present on Admission: yes  Side: Left  Location: Heel  Description of Altered Skin Integrity: Purple or maroon localized area of discolored intact skin or non-intact skin ...   Wound Image    Description of Altered Skin Integrity Purple or maroon localized area of discolored intact skin or non-intact skin or a blood-filled blister.   Dressing Appearance Dry;Intact   Drainage Amount None   Drainage Characteristics/Odor No odor   Appearance Intact;Black;Eschar   Black (%), Wound Tissue Color 100 %        Altered Skin Integrity 04/12/22 0858 Sacral spine Moisture associated dermatitis   Date First Assessed/Time First Assessed: 04/12/22 0858   Altered Skin Integrity Present on Admission: yes  Location: Sacral spine  Primary Wound Type: Moisture associated dermatitis   Wound Image    Dressing Appearance Dry;Clean;Intact   Drainage Amount None   Drainage Characteristics/Odor No odor   Appearance Pink;Moist       Wound care consult received from MD for assessment of patients sacrum and heels.Areas noted to be POA.  She is bedbound and since her last day at skilled nursing in February, she has developed decubitus wounds on the sacrum and her heels, due to prolonged  delays in having an inflatable mattress delivered, which only recently arrived a few days ago.  Her daughter does turn her every few hours and she has been getting wound care with home health.          Recommendations made to primary team for :  -Nursing to cleanse sacrum with cleansing cloths or wound cleanser, pat dry, apply triad moisture barrier cream to sacrum BID/PRN   -Nursing to apply betadine to eschar on left heel BID/PRN   -Nursing to continue skin and pressure prevention interventions  RN and MD aware. Orders placed. Wound care signing off. Re-consult wound care as needed.          04/12/2022

## 2022-04-12 NOTE — PLAN OF CARE
[facility] - [facility] Surg  Discharge Reassessment    Primary Care Provider: Leticia Weems MD    Expected Discharge Date: 4/13/2022    Reassessment (most recent)     Discharge Reassessment - 04/12/22 0919        Discharge Reassessment    Assessment Type Discharge Planning Reassessment     Did the patient's condition or plan change since previous assessment? No     Discharge Plan discussed with: Patient;Adult children     Communicated ION with patient/caregiver Yes     Discharge Plan A Home Health     Discharge Plan B New Nursing Home placement - halfway care facility     DME Needed Upon Discharge  none     Discharge Barriers Identified None     Why the patient remains in the hospital Requires continued medical care        Post-Acute Status    Hospital Resources/Appts/Education Provided Appointments scheduled and added to AVS;Post-Acute resouces added to AVS     Discharge Delays None known at this time

## 2022-04-12 NOTE — PLAN OF CARE
Pt alert and oriented x3. Pt screams when moved. Repositioned every two hours to reduce pressure ulcers mepilex on sacral changed. Will continue to monitor.     Problem: Adult Inpatient Plan of Care  Goal: Plan of Care Review  Outcome: Ongoing, Progressing  Goal: Patient-Specific Goal (Individualized)  Outcome: Ongoing, Progressing  Goal: Absence of Hospital-Acquired Illness or Injury  Outcome: Ongoing, Progressing  Goal: Optimal Comfort and Wellbeing  Outcome: Ongoing, Progressing  Goal: Readiness for Transition of Care  Outcome: Ongoing, Progressing     Problem: Skin Injury Risk Increased  Goal: Skin Health and Integrity  Outcome: Ongoing, Progressing     Problem: Fall Injury Risk  Goal: Absence of Fall and Fall-Related Injury  Outcome: Ongoing, Progressing

## 2022-04-12 NOTE — SUBJECTIVE & OBJECTIVE
Interval History: Pt afebrile.  Blood culture growing staph species  Remains NPO, started gentle fluid for hydration    Review of Systems  Objective:     Vital Signs (Most Recent):  Temp: 98.5 °F (36.9 °C) (04/12/22 1109)  Pulse: (!) 114 (04/12/22 1109)  Resp: 18 (04/12/22 1109)  BP: (!) 142/61 (04/12/22 1109)  SpO2: 95 % (04/12/22 1109)   Vital Signs (24h Range):  Temp:  [97.3 °F (36.3 °C)-98.9 °F (37.2 °C)] 98.5 °F (36.9 °C)  Pulse:  [] 114  Resp:  [14-20] 18  SpO2:  [92 %-95 %] 95 %  BP: (132-156)/(60-75) 142/61     Weight: 61.2 kg (134 lb 14.7 oz) (kg)  Body mass index is 29.2 kg/m².  No intake or output data in the 24 hours ending 04/12/22 1333   Physical Exam  Constitutional:       General: She is not in acute distress.     Appearance: Normal appearance. She is ill-appearing. She is not toxic-appearing or diaphoretic.   Cardiovascular:      Rate and Rhythm: Normal rate and regular rhythm.      Heart sounds: No murmur heard.    No friction rub. No gallop.   Pulmonary:      Effort: Pulmonary effort is normal. No respiratory distress.      Breath sounds: Normal breath sounds. No wheezing or rales.   Abdominal:      General: Abdomen is flat. There is no distension.      Palpations: Abdomen is soft.      Tenderness: There is no abdominal tenderness. There is no guarding or rebound.   Musculoskeletal:      Right lower leg: No edema.      Left lower leg: No edema.   Neurological:      Mental Status: She is alert. She is disoriented.       Computed MELD-Na score unavailable. Necessary lab results were not found in the last year.  Computed MELD score unavailable. Necessary lab results were not found in the last year.    Significant Labs:  CBC:  Recent Labs   Lab 04/11/22  0306 04/12/22  0450   WBC 32.39* 33.10*   HGB 10.0* 9.2*   HCT 31.3* 28.2*   * 484*     CMP:  Recent Labs   Lab 04/11/22  0306 04/12/22  0450    141   K 3.9 3.5    107   CO2 23 23   GLU 71 80   BUN 48* 47*   CREATININE 1.7*  1.6*   CALCIUM 11.3* 11.5*   ANIONGAP 14 11   EGFRNONAA 26.0* 28.0*     PTINR:  No results for input(s): INR in the last 48 hours.    Significant Procedures:   Dobutamine Stress Test with Color Flow: No results found for this or any previous visit.

## 2022-04-12 NOTE — PLAN OF CARE
Problem: SLP  Goal: SLP Goal  Description: Speech Language Pathology Goals  Goals expected to be met by 4/18  1. Pt will participate in ongoing swallow assessment  Outcome: Ongoing, Progressing  Pt should remain NPO at this time with alternate means of nutrition/hydration/medication. MD and RN aware of findings. Continue ST per POC.  4/12/2022

## 2022-04-13 PROBLEM — I50.20 HEART FAILURE WITH REDUCED EJECTION FRACTION: Chronic | Status: ACTIVE | Noted: 2022-01-01

## 2022-04-13 PROBLEM — N25.81 SECONDARY HYPERPARATHYROIDISM OF RENAL ORIGIN: Chronic | Status: ACTIVE | Noted: 2021-01-01

## 2022-04-13 PROBLEM — B95.8 BACTEREMIA DUE TO STAPHYLOCOCCUS: Status: ACTIVE | Noted: 2022-01-01

## 2022-04-13 PROBLEM — Z51.5 PALLIATIVE CARE ENCOUNTER: Status: RESOLVED | Noted: 2022-01-01 | Resolved: 2022-01-01

## 2022-04-13 PROBLEM — F02.818 LATE ONSET ALZHEIMER'S DEMENTIA WITH BEHAVIORAL DISTURBANCE: Chronic | Status: ACTIVE | Noted: 2022-01-01

## 2022-04-13 PROBLEM — G30.1 LATE ONSET ALZHEIMER'S DEMENTIA WITH BEHAVIORAL DISTURBANCE: Chronic | Status: ACTIVE | Noted: 2022-01-01

## 2022-04-13 PROBLEM — I70.0 AORTIC ATHEROSCLEROSIS: Chronic | Status: ACTIVE | Noted: 2019-08-12

## 2022-04-13 PROBLEM — R78.81 BACTEREMIA DUE TO STAPHYLOCOCCUS: Status: ACTIVE | Noted: 2022-01-01

## 2022-04-13 NOTE — PROGRESS NOTES
Pharmacokinetic Assessment Follow Up: IV Vancomycin    Vancomycin serum concentration assessment(s):    The random level was drawn correctly and can be used to guide therapy at this time. The measurement is above the desired definitive target range of 15 to 20 mcg/mL.    Vancomycin Regimen Plan:    Will plan on giving a one time 750 mg IV dose this morning  Patient will require q48h dosing  Plan on following up with a random vancomycin level Friday, 4/15, morning    Drug levels (last 3 results):  Recent Labs   Lab Result Units 04/11/22  0844 04/12/22  0450 04/13/22  0255   Vancomycin, Random ug/mL 16.0 19.7 14.3       Pharmacy will continue to follow and monitor vancomycin.    Please contact pharmacy at extension [phone] for questions regarding this assessment.    Thank you for the consult,   Sabas Andrew, PharmD, [facility]      Patient brief summary:  Johanyn Curtis is a 89 y.o. female initiated on antimicrobial therapy with IV Vancomycin for treatment of bacteremia    Drug Allergies:   Review of patient's allergies indicates:  No Known Allergies    Actual Body Weight:   59.4 kg    Renal Function:   Estimated Creatinine Clearance: 18.5 mL/min (A) (based on SCr of 1.6 mg/dL (H)).,     Dialysis Method (if applicable):  N/A    CBC (last 72 hours):  Recent Labs   Lab Result Units 04/11/22  0306 04/12/22  0450   WBC K/uL 32.39* 33.10*   Hemoglobin g/dL 10.0* 9.2*   Hematocrit % 31.3* 28.2*   Platelets K/uL 464* 484*   Gran % % 91.0* 87.8*   Lymph % % 4.2* 5.3*   Mono % % 3.5* 4.1   Eosinophil % % 0.1 0.1   Basophil % % 0.2 0.2   Differential Method  Automated Automated       Metabolic Panel (last 72 hours):  Recent Labs   Lab Result Units 04/11/22  0306 04/12/22  0450   Sodium mmol/L 142 141   Potassium mmol/L 3.9 3.5   Chloride mmol/L 105 107   CO2 mmol/L 23 23   Glucose mg/dL 71 80   BUN mg/dL 48* 47*   Creatinine mg/dL 1.7* 1.6*   Magnesium mg/dL 2.0 1.9   Phosphorus mg/dL 2.2* 2.1*       Vancomycin  Administrations:  vancomycin given in the last 96 hours                   vancomycin 500 mg in dextrose 5 % 100 mL IVPB (ready to mix system) (mg) 500 mg New Bag 04/11/22 1124    vancomycin 1.5 g in dextrose 5 % 250 mL IVPB (ready to mix) (mg) 1,500 mg New Bag 04/09/22 1658                Microbiologic Results:  Microbiology Results (last 7 days)     Procedure Component Value Units Date/Time    Blood Culture #2 **CANNOT BE ORDERED STAT** [[id number]]  (Abnormal) Collected: 04/09/22 1612    Order Status: Completed Specimen: Blood from Peripheral, Forearm, Left Updated: 04/12/22 0847     Blood Culture, Routine Gram stain aer bottle: Gram positive cocci in clusters resembling Staph       Results called to and read back by:Dawn Choudhary RN 04/10/2022  22:08      STAPHYLOCOCCUS SPECIES  Identification and susceptibility pending      Blood Culture #1 **CANNOT BE ORDERED STAT** [[id number]] Collected: 04/09/22 1613    Order Status: Completed Specimen: Blood from Peripheral, Hand, Left Updated: 04/11/22 2200     Blood Culture, Routine Gram stain aer bottle: Gram negative rods       Positive results previously called to Dawn Choudhary RN 04/10/2022  22:46      Gram stain lyubov bottle: Gram positive cocci in clusters resembling Staph       Results called to and read back by:Dawn Choudhary  04/11/2022  22:00    Urine culture [[id number]] Collected: 04/10/22 0530    Order Status: Completed Specimen: Urine Updated: 04/11/22 1258     Urine Culture, Routine No significant growth     Comment: Previous comment was modified by [email or web address] at 12:58 on 04/11/2022 No growth       Narrative:      REPLACES ORDER # [id number]    Blood culture [[id number]] Collected: 04/11/22 1222    Order Status: Sent Specimen: Blood from Peripheral, Right Hand Updated: 04/11/22 1232    Blood culture [[id number]] Collected: 04/11/22 1223    Order Status: Sent Specimen: Blood from Antecubital, Left Arm Updated: 04/11/22 1232

## 2022-04-13 NOTE — TELEPHONE ENCOUNTER
No new care gaps identified.  Powered by [email or web address]. Reference number: [id number].   4/13/2022 3:07:57 PM CDT

## 2022-04-13 NOTE — ASSESSMENT & PLAN NOTE
CTH negative. Leukocytosis, Procal elevated, crp elevated; Infectious workup significant for possible infiltrate on cxr, positive UA.   Started rocephin for UTI and azithromycin  Wound care consulted for decub ulcer  Blood cultures initially positive GNR and GPC resembling staph, then revised to just staph species. Added vancomycin  Infectious disease consulted.

## 2022-04-13 NOTE — PROGRESS NOTES
[facility]  Progress Note    Patient Name: Johanny Curtis  MRN: [id number]  Patient Class: IP- Inpatient   Admission Date: 4/9/2022  Length of Stay: 4 days  Attending Physician: Alireza Gamez*  Primary Care Provider: Leticia Weems MD        Subjective:     Principal Problem:Encephalopathy, metabolic        HPI:  89-year-old female brought to the hospital for altered mental status.  History obtained from her daughter at the bedside, who cares for her primarily.  Patient does live with her daughter for the past 10 years due to dementia and she has had progressive debility and cognitive dysfunction over this period of time.  She is bedbound and since her last day at skilled nursing in February, she has developed decubitus wounds on the sacrum and her heels, due to prolonged delays in having an inflatable mattress delivered, which only recently arrived a few days ago.  Her daughter does turn her every few hours and she has been getting wound care with home health and she has noticed improvement in her wounds, but for the past couple days she has noticed a decline in her mother's mental status and today when she tried to move her it seemed like everything hurt.  She has also been much less responsive to eating and drinking.  She has not noticed any fevers, cough, or other obvious signs of infection.      Overview/Hospital Course:  In the ED, CTH negative for acute intracranial findings, CXR with residual diffuse nonspecific interstitial coarsening and subtle patchy subsegmental opacities. XR ankle without evidence of osteomyelitis. Patient was given 1 L NS bolus, vancomycin, zosyn. She was admitted to hospital medicine. SLP and wound care consulted. UA significant for UTI. Blood cultures positive for GNR and GPC resembling staph. Vancomycin was added for staph coverage. Per lab, blood cultures were reviewed again and revised with no longer identifying GNR, only Staph species. ID  consulted for staph bacteremia. Blood cultures were re-drawn with no growth.        Interval History: Pt febrile overnight to 101. More alert this am, able to answer some questions. SLP evaluated patient and cleared for pureed, nectar thick.     Review of Systems  Objective:     Vital Signs (Most Recent):  Temp: 96.2 °F (35.7 °C) (04/13/22 1040)  Pulse: 110 (04/13/22 1040)  Resp: 16 (04/13/22 1040)  BP: 130/64 (04/13/22 1040)  SpO2: (!) 92 % (04/13/22 1040)   Vital Signs (24h Range):  Temp:  [96.2 °F (35.7 °C)-101 °F (38.3 °C)] 96.2 °F (35.7 °C)  Pulse:  [106-117] 110  Resp:  [16-18] 16  SpO2:  [90 %-95 %] 92 %  BP: (126-136)/(64-82) 130/64     Weight: 59.4 kg (131 lb)  Body mass index is 28.35 kg/m².    Intake/Output Summary (Last 24 hours) at 4/13/2022 1312  Last data filed at 4/13/2022 1102  Gross per 24 hour   Intake 810.55 ml   Output --   Net 810.55 ml      Physical Exam  Constitutional:       General: She is not in acute distress.     Appearance: Normal appearance. She is ill-appearing. She is not toxic-appearing or diaphoretic.   Cardiovascular:      Rate and Rhythm: Normal rate and regular rhythm.      Heart sounds: No murmur heard.    No friction rub. No gallop.   Pulmonary:      Effort: Pulmonary effort is normal. No respiratory distress.      Breath sounds: Normal breath sounds. No wheezing or rales.   Abdominal:      General: Abdomen is flat. There is no distension.      Palpations: Abdomen is soft.      Tenderness: There is no abdominal tenderness. There is no guarding or rebound.   Musculoskeletal:      Right lower leg: No edema.      Left lower leg: No edema.   Neurological:      Mental Status: She is alert. She is disoriented.       Computed MELD-Na score unavailable. Necessary lab results were not found in the last year.  Computed MELD score unavailable. Necessary lab results were not found in the last year.    Significant Labs:  CBC:  Recent Labs   Lab 04/12/22  0450   WBC 33.10*   HGB 9.2*   HCT  28.2*   *     CMP:  Recent Labs   Lab 04/12/22  0450      K 3.5      CO2 23   GLU 80   BUN 47*   CREATININE 1.6*   CALCIUM 11.5*   ANIONGAP 11   EGFRNONAA 28.0*     PTINR:  No results for input(s): INR in the last 48 hours.    Significant Procedures:   Dobutamine Stress Test with Color Flow: No results found for this or any previous visit.      Assessment/Plan:      * Encephalopathy, metabolic  CTH negative. Leukocytosis, Procal elevated, crp elevated; Infectious workup significant for possible infiltrate on cxr, positive UA.   Started rocephin for UTI and azithromycin  Wound care consulted for decub ulcer  Blood cultures initially positive GNR and GPC resembling staph, then revised to just staph species. Added vancomycin  Infectious disease consulted.     Pressure injury of left heel, unstageable  Wound care consult ordered for heel wound as well as sacral decubitus wound.    Late onset Alzheimer's dementia with behavioral disturbance  Delirium precautions ordered.  DNR  Per discussion with patient's daughter at bedside, considering home hospice on discharge, but following treatment of current infection.     Dehydration  Diminished oral intake in context of delirium.  -received bolus in the ED; followed that up with 1 L NS over 10 hours  -further fluids as deemed necessary  -SLP pureed diet, nectar thick      Heart failure with reduced ejection fraction  Currently without evidence of decompensation.  Cautious fluid resuscitation as needed    CKD (chronic kidney disease), stage IV  At baseline renal function  -dose renally cleared medications accordingly      Hypothyroidism  Continue synthroid      Essential hypertension  Continue nifedipine      VTE Risk Mitigation (From admission, onward)         Ordered     enoxaparin injection 30 mg  Daily         04/09/22 2253     IP VTE HIGH RISK PATIENT  Once         04/09/22 2253     Place sequential compression device  Until discontinued         04/09/22  2253                Discharge Planning   ION: 4/13/2022     Code Status: DNR   Is the patient medically ready for discharge?: No    Reason for patient still in hospital (select all that apply): Patient trending condition, Treatment and Consult recommendations  Discharge Plan A: Home Health   Discharge Delays: None known at this time        Alireza Bui MD  Department of Hospital Medicine   [facility]

## 2022-04-13 NOTE — PT/OT/SLP PROGRESS
Speech Language Pathology Treatment    Patient Name:  Johanny Curtis   MRN:  [id number]  Admitting Diagnosis: Encephalopathy, metabolic    Recommendations:                 General Recommendations:  Dysphagia therapy  Diet recommendations:  Puree, Liquid Diet Level: Nectar Thick   Aspiration Precautions: 1 bite/sip at a time, Assistance with meals and Assistance with thickening liquids, Eliminate distractions, Feed only when awake/alert, Small bites/sips and Strict aspiration precautions   General Precautions: Standard, aspiration, fall  Communication strategies:  provide increased time to answer, go to room if call light pushed and hard of hearing    Subjective     Pt awake/alert in bed  Communicated with RN prior to session     Pain/Comfort:  · Pain Rating 1: 0/10  · Pain Rating Post-Intervention 1: 0/10    Respiratory Status: Room air    Objective:     Has the patient been evaluated by SLP for swallowing?   Yes  Keep patient NPO? No     Pt seen for ongoing tx. Pt repositioned in bed for session. Pt appeared to be in discomfort throughout session after repositioning, but did not endorse any pain. Voice appeared WFL prior to PO trials for pt's age. Pt consumed ice chip x1, tsp thin x1, 4oz nectar thick liquid via tsp and straw, and 1/8 tsp puree x2 which improved with max prompting. Pt with wet voice after trials of thin and nectar sip x1 and unproductive cough after thin trials. Suction provided to pt to clear, but no secretions evident. Wet voice eliminated with multiple swallows. No oral residue across all consistencies. Pt educated on diet recs, aspiration precautions, and SLP POC. Pt left with call light in reach.     Assessment:     Johanny Curtis is a 89 y.o. female with an SLP diagnosis of Dysphagia. SLP continue to follow.     Goals:   Multidisciplinary Problems     SLP Goals        Problem: SLP    Goal Priority Disciplines Outcome   SLP Goal     SLP Ongoing, Progressing   Description: Speech Language  Pathology Goals  Goals expected to be met by 4/18  1. Pt will participate in ongoing swallow assessment                           Plan:     · Patient to be seen:  4 x/week   · Plan of Care expires:  05/09/22  · Plan of Care reviewed with:  patient   · SLP Follow-Up:  Yes       Discharge recommendations:  home with home health   Barriers to Discharge:  Level of Skilled Assistance Needed      Time Tracking:     SLP Treatment Date:   04/13/22  Speech Start Time:  0938  Speech Stop Time:  0957     Speech Total Time (min):  19 min    Billable Minutes: Treatment Swallowing Dysfunction 10 and Self Care/Home Management Training 9     REINALDO Hernandez-SLP  Speech-Language Pathology    04/13/2022

## 2022-04-13 NOTE — ASSESSMENT & PLAN NOTE
Delirium precautions ordered.  DNR  Per discussion with patient's daughter at bedside, considering home hospice on discharge, but following treatment of current infection.

## 2022-04-13 NOTE — SUBJECTIVE & OBJECTIVE
Past Medical History:   Diagnosis Date    AAA (abdominal aortic aneurysm)     Anemia in CKD (chronic kidney disease)     Arthritis     Bacteremia due to Escherichia coli 9/24/2020    Cataract     Class 1 obesity due to excess calories with serious comorbidity in adult 7/6/2017    Gout, chronic     Heart failure with reduced ejection fraction 1/29/2022    High cholesterol     Hypercapnic respiratory failure 1/28/2022    Hypertension     Hypothyroidism     Late onset Alzheimer's dementia with behavioral disturbance 4/9/2022    Obesity     Plantar fasciitis     PVD (peripheral vascular disease)     Thyroid trouble        Past Surgical History:   Procedure Laterality Date    BREAST BIOPSY Left     BREAST SURGERY Left 1972    benign breast lump    CATARACT EXTRACTION  3/18/13    both eyes -     CHOLECYSTECTOMY      ENDOSCOPIC ULTRASOUND OF UPPER GASTROINTESTINAL TRACT N/A 9/8/2020    Procedure: ULTRASOUND, UPPER GI TRACT, ENDOSCOPIC;  Surgeon: Rasheed Balbuena MD;  Location: [facility] ([address] [place] [address]);  Service: Endoscopy;  Laterality: N/A;    ERCP N/A 9/8/2020    Procedure: ERCP (ENDOSCOPIC RETROGRADE CHOLANGIOPANCREATOGRAPHY);  Surgeon: Rasheed Balbuena MD;  Location: [facility] ([address] [place] [address]);  Service: Endoscopy;  Laterality: N/A;    EYE SURGERY      HYSTERECTOMY      SKIN BIOPSY      Left breast       Review of patient's allergies indicates:  No Known Allergies    Medications:  Medications Prior to Admission   Medication Sig    levothyroxine (SYNTHROID) 88 MCG tablet TAKE 1 TABLET (88 MCG TOTAL) BY MOUTH ONCE DAILY.    acetaminophen (TYLENOL) 160 mg/5 mL Liqd Take 15.6 mLs (499.2 mg total) by mouth every 6 (six) hours as needed (pain).    allopurinoL (ZYLOPRIM) 100 MG tablet Take 1 tablet (100 mg total) by mouth once daily.    aspirin 81 MG Chew Take 1 tablet (81 mg total) by mouth once daily.    ferrous sulfate 325 (65 FE) MG EC tablet Take 1 tablet (325 mg total) by mouth once daily.    MULTIVIT-IRON-MIN-FOLIC  "ACID 3,500-18-0.4 UNIT-MG-MG ORAL CHEW Take 1 capsule by mouth once daily.     NIFEdipine (PROCARDIA-XL) 60 MG (OSM) 24 hr tablet Take 1 tablet (60 mg total) by mouth once daily.     Antibiotics (From admission, onward)                Start     Stop Route Frequency Ordered    22 0910  vancomycin - pharmacy to dose  (vancomycin IVPB)        "And" Linked Group Details    -- IV pharmacy to manage frequency 22 0810          Antifungals (From admission, onward)                None          Antivirals (From admission, onward)      None             Immunization History   Administered Date(s) Administered    Influenza 10/17/2007, 10/16/2008, 10/20/2009, 2010    Influenza - High Dose - PF (65 years and older) 2011, 2013, 10/28/2014, 10/22/2015, 2017, 2018, 2019, 2020    Influenza - Quadrivalent 2016    Influenza - Quadrivalent - PF *Preferred* (6 months and older) 10/17/2007, 10/16/2008, 10/20/2009, 2010    Pneumococcal Conjugate - 13 Valent 2015    Pneumococcal Polysaccharide - 23 Valent 2017    Tdap 2016    Zoster 2016       Family History       Problem Relation (Age of Onset)    Breast cancer Sister    Cancer Sister, Sister, Brother    Cataracts Son    Diabetes Son, Son    Glaucoma Son, Daughter    Heart disease Brother    Lupus Daughter    Meningitis Son    Mental illness Son    No Known Problems Brother          Social History     Socioeconomic History    Marital status:    Tobacco Use    Smoking status: Former Smoker     Packs/day: 0.50     Years: 60.00     Pack years: 30.00     Quit date: 2001     Years since quittin.8    Smokeless tobacco: Never Used    Tobacco comment: quit in the s   Substance and Sexual Activity    Alcohol use: No    Drug use: No    Sexual activity: Not Currently     Social Determinants of Health     Financial Resource Strain: Low Risk     Difficulty of Paying Living Expenses: Not hard at " all   Food Insecurity: No Food Insecurity    Worried About Running Out of Food in the Last Year: Never true    Ran Out of Food in the Last Year: Never true   Transportation Needs: No Transportation Needs    Lack of Transportation (Medical): No    Lack of Transportation (Non-Medical): No   Physical Activity: Inactive    Days of Exercise per Week: 0 days    Minutes of Exercise per Session: 0 min   Stress: No Stress Concern Present    Feeling of Stress : Not at all   Social Connections: Socially Isolated    Frequency of Communication with Friends and Family: More than three times a week    Frequency of Social Gatherings with Friends and Family: Three times a week    Attends Confucianism Services: Never    Active Member of Clubs or Organizations: No    Attends Club or Organization Meetings: Never    Marital Status:    Housing Stability: Low Risk     Unable to Pay for Housing in the Last Year: No    Number of Places Lived in the Last Year: 1    Unstable Housing in the Last Year: No     Review of Systems   Unable to perform ROS: Dementia   Musculoskeletal:         +bilateral feet pain   Objective:     Vital Signs (Most Recent):  Temp: 96.2 °F (35.7 °C) (04/13/22 1040)  Pulse: 110 (04/13/22 1040)  Resp: 16 (04/13/22 1040)  BP: 130/64 (04/13/22 1040)  SpO2: (!) 92 % (04/13/22 1040)   Vital Signs (24h Range):  Temp:  [96.2 °F (35.7 °C)-101 °F (38.3 °C)] 96.2 °F (35.7 °C)  Pulse:  [106-117] 110  Resp:  [16-18] 16  SpO2:  [90 %-95 %] 92 %  BP: (126-136)/(64-82) 130/64     Weight: 59.4 kg (131 lb)  Body mass index is 28.35 kg/m².    Estimated Creatinine Clearance: 18.5 mL/min (A) (based on SCr of 1.6 mg/dL (H)).    Physical Exam  Vitals reviewed.   Constitutional:       General: She is not in acute distress.     Appearance: Normal appearance. She is not ill-appearing.   HENT:      Head: Normocephalic.   Eyes:      Pupils: Pupils are equal, round, and reactive to light.   Cardiovascular:      Rate and Rhythm: Normal rate and  regular rhythm.   Pulmonary:      Effort: Pulmonary effort is normal. No respiratory distress.      Breath sounds: No stridor.   Abdominal:      General: There is no distension.      Palpations: Abdomen is soft. There is no mass.      Tenderness: There is no abdominal tenderness.      Hernia: No hernia is present.   Musculoskeletal:         General: Tenderness present. No swelling.   Skin:     General: Skin is warm and dry.      Coloration: Skin is not jaundiced or pale.   Neurological:      Mental Status: She is alert and oriented to person, place, and time.     Significant Labs: All pertinent labs within the past 24 hours have been reviewed.    Significant Imaging: I have reviewed all pertinent imaging results/findings within the past 24 hours.

## 2022-04-13 NOTE — TELEPHONE ENCOUNTER
Refill Routing Note   Medication(s) are not appropriate for processing by [facility] for the following reason(s):      - Patient has been seen in the ED/Hospital since the last PCP visit    [facility] action(s):  Route          Medication reconciliation completed: No     Appointments  past 12m or future 3m with PCP    Date Provider   Last Visit   3/9/2022 Leticia Weems MD   Next Visit   Visit date not found Leticia Weems MD   ED visits in past 90 days: 0        Note composed:3:11 PM 04/13/2022

## 2022-04-13 NOTE — ASSESSMENT & PLAN NOTE
Diminished oral intake in context of delirium.  -received bolus in the ED; followed that up with 1 L NS over 10 hours  -further fluids as deemed necessary  -SLP pureed diet, nectar thick

## 2022-04-13 NOTE — ASSESSMENT & PLAN NOTE
Ms. Curtis is a 89 y/o female with dementia, AAA, CKD, HTN, presents to ED with worsening AMS with associated poor appetite and activity. Noted to have tmax 101. ID consulted as blood cultures on admit +Staph species. She is currently on vancomycin and ceftriaxone at this time.    Recommendations  · Continue vancomycin at this time. Trough goal 15-20  · D/c ceftriaxone   · Staph species identified as staph hominis. Will follow susceptibilities  · Recommend podiatry evaluation in regards to left heel. Consider MRI to r/o deep infx given positive blood cultures.     Seen and discussed with ID staff.

## 2022-04-13 NOTE — RESPIRATORY THERAPY
RAPID RESPONSE RESPIRATORY CHART CHECK       Chart check completed.   Please call [phone] for further concerns or assistance.

## 2022-04-13 NOTE — PLAN OF CARE
Patient is AAO to self and place. Vital signs stable. No falls throughout shift. No complaints of pain. IV vancomycin administered. Patient's diet advanced to dysphagia pureed with nectar thick liquids.  Problem: Adult Inpatient Plan of Care  Goal: Plan of Care Review  Outcome: Ongoing, Progressing  Goal: Patient-Specific Goal (Individualized)  Outcome: Ongoing, Progressing  Goal: Absence of Hospital-Acquired Illness or Injury  Outcome: Ongoing, Progressing  Goal: Optimal Comfort and Wellbeing  Outcome: Ongoing, Progressing  Goal: Readiness for Transition of Care  Outcome: Ongoing, Progressing     Problem: Skin Injury Risk Increased  Goal: Skin Health and Integrity  Outcome: Ongoing, Progressing     Problem: Fall Injury Risk  Goal: Absence of Fall and Fall-Related Injury  Outcome: Ongoing, Progressing     Problem: Impaired Wound Healing  Goal: Optimal Wound Healing  Outcome: Ongoing, Progressing

## 2022-04-13 NOTE — SUBJECTIVE & OBJECTIVE
Interval History: Pt febrile overnight to 101. More alert this am, able to answer some questions. SLP evaluated patient and cleared for pureed, nectar thick.     Review of Systems  Objective:     Vital Signs (Most Recent):  Temp: 96.2 °F (35.7 °C) (04/13/22 1040)  Pulse: 110 (04/13/22 1040)  Resp: 16 (04/13/22 1040)  BP: 130/64 (04/13/22 1040)  SpO2: (!) 92 % (04/13/22 1040)   Vital Signs (24h Range):  Temp:  [96.2 °F (35.7 °C)-101 °F (38.3 °C)] 96.2 °F (35.7 °C)  Pulse:  [106-117] 110  Resp:  [16-18] 16  SpO2:  [90 %-95 %] 92 %  BP: (126-136)/(64-82) 130/64     Weight: 59.4 kg (131 lb)  Body mass index is 28.35 kg/m².    Intake/Output Summary (Last 24 hours) at 4/13/2022 1312  Last data filed at 4/13/2022 1102  Gross per 24 hour   Intake 810.55 ml   Output --   Net 810.55 ml      Physical Exam  Constitutional:       General: She is not in acute distress.     Appearance: Normal appearance. She is ill-appearing. She is not toxic-appearing or diaphoretic.   Cardiovascular:      Rate and Rhythm: Normal rate and regular rhythm.      Heart sounds: No murmur heard.    No friction rub. No gallop.   Pulmonary:      Effort: Pulmonary effort is normal. No respiratory distress.      Breath sounds: Normal breath sounds. No wheezing or rales.   Abdominal:      General: Abdomen is flat. There is no distension.      Palpations: Abdomen is soft.      Tenderness: There is no abdominal tenderness. There is no guarding or rebound.   Musculoskeletal:      Right lower leg: No edema.      Left lower leg: No edema.   Neurological:      Mental Status: She is alert. She is disoriented.       Computed MELD-Na score unavailable. Necessary lab results were not found in the last year.  Computed MELD score unavailable. Necessary lab results were not found in the last year.    Significant Labs:  CBC:  Recent Labs   Lab 04/12/22  0450   WBC 33.10*   HGB 9.2*   HCT 28.2*   *     CMP:  Recent Labs   Lab 04/12/22  0450      K 3.5   CL  107   CO2 23   GLU 80   BUN 47*   CREATININE 1.6*   CALCIUM 11.5*   ANIONGAP 11   EGFRNONAA 28.0*     PTINR:  No results for input(s): INR in the last 48 hours.    Significant Procedures:   Dobutamine Stress Test with Color Flow: No results found for this or any previous visit.

## 2022-04-13 NOTE — HPI
Ms. Curtis is a 89 y/o female with dementia, AAA, CKD, HTN, presents to ED with worsening AMS. Noted to have tmax 101. ID consulted as blood cultures on admit +Staph species. She is currently on vancomycin and ceftriaxone at this time.    Per chart review, Patient lives with her daughter for the past 10 years due to dementia and she has had progressive debility and cognitive dysfunction over this period of time.  She is bedbound and since her last day at skilled nursing in February, she has developed decubitus wounds on the sacrum and her heels, due to prolonged delays in having an inflatable mattress delivered, which only recently arrived a few days ago.  Her daughter does turn her every few hours and she has been getting wound care with home health and she has noticed improvement in her wounds, but for the past couple days she has noticed a decline in her mother's mental status and today when she tried to move her it seemed like everything hurt.  She has also been much less responsive to eating and drinking.  She has not noticed any fevers, cough, or other obvious signs of infection.    CT head negative for acute intracranial findings. CXR without new consolidations.     Repeat blood cultures 4/11 NGTD

## 2022-04-13 NOTE — PLAN OF CARE
Problem: Adult Inpatient Plan of Care  Goal: Plan of Care Review  Outcome: Ongoing, Progressing  Goal: Patient-Specific Goal (Individualized)  Outcome: Ongoing, Progressing  Goal: Absence of Hospital-Acquired Illness or Injury  Outcome: Ongoing, Progressing  Goal: Optimal Comfort and Wellbeing  Outcome: Ongoing, Progressing  Goal: Readiness for Transition of Care  Outcome: Ongoing, Progressing     Problem: Skin Injury Risk Increased  Goal: Skin Health and Integrity  Outcome: Ongoing, Progressing     Problem: Fall Injury Risk  Goal: Absence of Fall and Fall-Related Injury  Outcome: Ongoing, Progressing   ALert orient to person and place. Mumbles with pain with an movement of body. Tylenol was given at beginning of shift . Patient slept for approx 4 hours after taking.pt was reposition almosy every 2 hours this shift. Bilateral pressure reduction boots on. Lt heel was painted with betadine. No fall. VSS

## 2022-04-13 NOTE — PLAN OF CARE
Problem: SLP  Goal: SLP Goal  Description: Speech Language Pathology Goals  Goals expected to be met by 4/18  1. Pt will participate in ongoing swallow assessment  Outcome: Ongoing, Progressing     Pt seen for ongoing tx. SLP recommending puree diet with nectar thick liquids. Continue POC.    Mariluz Matias S-SLP  Speech-Language Pathology

## 2022-04-13 NOTE — CONSULTS
[facility]  Infectious Disease  Consult Note    Patient Name: Johanny Curtis  MRN: [id number]  Admission Date: 4/9/2022  Hospital Length of Stay: 4 days  Attending Physician: Alireza Gamez*  Primary Care Provider: Leticia Weems MD     Isolation Status: No active isolations    Inpatient consult to Infectious Diseases  Consult performed by: Maisha Luna PA-C  Consult ordered by: Alireza Bui MD        Assessment/Plan:     Bacteremia due to Staphylococcus  Ms. Curtis is a 89 y/o female with dementia, AAA, CKD, HTN, presents to ED with worsening AMS with associated poor appetite and activity. Noted to have tmax 101. ID consulted as blood cultures on admit +Staph species. She is currently on vancomycin and ceftriaxone at this time.    Recommendations  · Continue vancomycin at this time. Trough goal 15-20  · D/c ceftriaxone   · Staph species identified as staph hominis. Will follow susceptibilities  · Recommend podiatry evaluation in regards to left heel. Consider MRI to r/o deep infx given positive blood cultures.     Seen and discussed with ID staff.       Thank you for your consult. I will follow-up with patient. Please contact us if you have any additional questions.    Maisha Luna PA-C  Infectious Disease  [facility]    Subjective:     Principal Problem: Encephalopathy, metabolic    HPI: Ms. Curtis is a 89 y/o female with dementia, AAA, CKD, HTN, presents to ED with worsening AMS. Noted to have tmax 101. ID consulted as blood cultures on admit +Staph species. She is currently on vancomycin and ceftriaxone at this time.    Per chart review, Patient lives with her daughter for the past 10 years due to dementia and she has had progressive debility and cognitive dysfunction over this period of time.  She is bedbound and since her last day at skilled nursing in February, she has developed decubitus wounds on the sacrum and her heels, due to prolonged delays in having an  inflatable mattress delivered, which only recently arrived a few days ago.  Her daughter does turn her every few hours and she has been getting wound care with home health and she has noticed improvement in her wounds, but for the past couple days she has noticed a decline in her mother's mental status and today when she tried to move her it seemed like everything hurt.  She has also been much less responsive to eating and drinking.  She has not noticed any fevers, cough, or other obvious signs of infection.    CT head negative for acute intracranial findings. CXR without new consolidations.     Repeat blood cultures 4/11 NGTD      Past Medical History:   Diagnosis Date    AAA (abdominal aortic aneurysm)     Anemia in CKD (chronic kidney disease)     Arthritis     Bacteremia due to Escherichia coli 9/24/2020    Cataract     Class 1 obesity due to excess calories with serious comorbidity in adult 7/6/2017    Gout, chronic     Heart failure with reduced ejection fraction 1/29/2022    High cholesterol     Hypercapnic respiratory failure 1/28/2022    Hypertension     Hypothyroidism     Late onset Alzheimer's dementia with behavioral disturbance 4/9/2022    Obesity     Plantar fasciitis     PVD (peripheral vascular disease)     Thyroid trouble        Past Surgical History:   Procedure Laterality Date    BREAST BIOPSY Left     BREAST SURGERY Left 1972    benign breast lump    CATARACT EXTRACTION  3/18/13    both eyes -     CHOLECYSTECTOMY      ENDOSCOPIC ULTRASOUND OF UPPER GASTROINTESTINAL TRACT N/A 9/8/2020    Procedure: ULTRASOUND, UPPER GI TRACT, ENDOSCOPIC;  Surgeon: Rasheed Balbuena MD;  Location: [address]);  Service: Endoscopy;  Laterality: N/A;    ERCP N/A 9/8/2020    Procedure: ERCP (ENDOSCOPIC RETROGRADE CHOLANGIOPANCREATOGRAPHY);  Surgeon: Rasheed Balbuena MD;  Location: [facility] ([address] [place] [address]);  Service: Endoscopy;  Laterality: N/A;    EYE SURGERY       "HYSTERECTOMY      SKIN BIOPSY      Left breast       Review of patient's allergies indicates:  No Known Allergies    Medications:  Medications Prior to Admission   Medication Sig    levothyroxine (SYNTHROID) 88 MCG tablet TAKE 1 TABLET (88 MCG TOTAL) BY MOUTH ONCE DAILY.    acetaminophen (TYLENOL) 160 mg/5 mL Liqd Take 15.6 mLs (499.2 mg total) by mouth every 6 (six) hours as needed (pain).    allopurinoL (ZYLOPRIM) 100 MG tablet Take 1 tablet (100 mg total) by mouth once daily.    aspirin 81 MG Chew Take 1 tablet (81 mg total) by mouth once daily.    ferrous sulfate 325 (65 FE) MG EC tablet Take 1 tablet (325 mg total) by mouth once daily.    MULTIVIT-IRON-MIN-FOLIC ACID 3,500-18-0.4 UNIT-MG-MG ORAL CHEW Take 1 capsule by mouth once daily.     NIFEdipine (PROCARDIA-XL) 60 MG (OSM) 24 hr tablet Take 1 tablet (60 mg total) by mouth once daily.     Antibiotics (From admission, onward)                Start     Stop Route Frequency Ordered    04/11/22 0910  vancomycin - pharmacy to dose  (vancomycin IVPB)        "And" Linked Group Details    -- IV pharmacy to manage frequency 04/11/22 0810          Antifungals (From admission, onward)                None          Antivirals (From admission, onward)      None             Immunization History   Administered Date(s) Administered    Influenza 10/17/2007, 10/16/2008, 10/20/2009, 09/23/2010    Influenza - High Dose - PF (65 years and older) 11/03/2011, 12/20/2013, 10/28/2014, 10/22/2015, 12/12/2017, 09/28/2018, 11/14/2019, 09/24/2020    Influenza - Quadrivalent 12/19/2016    Influenza - Quadrivalent - PF *Preferred* (6 months and older) 10/17/2007, 10/16/2008, 10/20/2009, 09/23/2010    Pneumococcal Conjugate - 13 Valent 09/14/2015    Pneumococcal Polysaccharide - 23 Valent 05/26/2017    Tdap 06/29/2016    Zoster 06/29/2016       Family History       Problem Relation (Age of Onset)    Breast cancer Sister    Cancer Sister, Sister, Brother    Cataracts Son    " Diabetes Son, Son    Glaucoma Son, Daughter    Heart disease Brother    Lupus Daughter    Meningitis Son    Mental illness Son    No Known Problems Brother          Social History     Socioeconomic History    Marital status:    Tobacco Use    Smoking status: Former Smoker     Packs/day: 0.50     Years: 60.00     Pack years: 30.00     Quit date: 2001     Years since quittin.8    Smokeless tobacco: Never Used    Tobacco comment: quit in the 's   Substance and Sexual Activity    Alcohol use: No    Drug use: No    Sexual activity: Not Currently     Social Determinants of Health     Financial Resource Strain: Low Risk     Difficulty of Paying Living Expenses: Not hard at all   Food Insecurity: No Food Insecurity    Worried About Running Out of Food in the Last Year: Never true    Ran Out of Food in the Last Year: Never true   Transportation Needs: No Transportation Needs    Lack of Transportation (Medical): No    Lack of Transportation (Non-Medical): No   Physical Activity: Inactive    Days of Exercise per Week: 0 days    Minutes of Exercise per Session: 0 min   Stress: No Stress Concern Present    Feeling of Stress : Not at all   Social Connections: Socially Isolated    Frequency of Communication with Friends and Family: More than three times a week    Frequency of Social Gatherings with Friends and Family: Three times a week    Attends Jainism Services: Never    Active Member of Clubs or Organizations: No    Attends Club or Organization Meetings: Never    Marital Status:    Housing Stability: Low Risk     Unable to Pay for Housing in the Last Year: No    Number of Places Lived in the Last Year: 1    Unstable Housing in the Last Year: No     Review of Systems   Unable to perform ROS: Dementia   Musculoskeletal:         +bilateral feet pain   Objective:     Vital Signs (Most Recent):  Temp: 96.2 °F (35.7 °C) (22 1040)  Pulse: 110 (22 1040)  Resp: 16  (04/13/22 1040)  BP: 130/64 (04/13/22 1040)  SpO2: (!) 92 % (04/13/22 1040)   Vital Signs (24h Range):  Temp:  [96.2 °F (35.7 °C)-101 °F (38.3 °C)] 96.2 °F (35.7 °C)  Pulse:  [106-117] 110  Resp:  [16-18] 16  SpO2:  [90 %-95 %] 92 %  BP: (126-136)/(64-82) 130/64     Weight: 59.4 kg (131 lb)  Body mass index is 28.35 kg/m².    Estimated Creatinine Clearance: 18.5 mL/min (A) (based on SCr of 1.6 mg/dL (H)).    Physical Exam  Vitals reviewed.   Constitutional:       General: She is not in acute distress.     Appearance: Normal appearance. She is not ill-appearing.   HENT:      Head: Normocephalic.   Eyes:      Pupils: Pupils are equal, round, and reactive to light.   Cardiovascular:      Rate and Rhythm: Normal rate and regular rhythm.   Pulmonary:      Effort: Pulmonary effort is normal. No respiratory distress.      Breath sounds: No stridor.   Abdominal:      General: There is no distension.      Palpations: Abdomen is soft. There is no mass.      Tenderness: There is no abdominal tenderness.      Hernia: No hernia is present.   Musculoskeletal:         General: Tenderness present. No swelling.   Skin:     General: Skin is warm and dry.      Coloration: Skin is not jaundiced or pale.   Neurological:      Mental Status: She is alert and oriented to person, place, and time.     Significant Labs: All pertinent labs within the past 24 hours have been reviewed.    Significant Imaging: I have reviewed all pertinent imaging results/findings within the past 24 hours.

## 2022-04-14 PROBLEM — E86.0 DEHYDRATION: Status: RESOLVED | Noted: 2022-01-01 | Resolved: 2022-01-01

## 2022-04-14 PROBLEM — G93.41 ENCEPHALOPATHY, METABOLIC: Status: RESOLVED | Noted: 2022-01-01 | Resolved: 2022-01-01

## 2022-04-14 NOTE — SUBJECTIVE & OBJECTIVE
Scheduled Meds:   allopurinoL  100 mg Oral Daily    aspirin  81 mg Oral Daily    enoxaparin  30 mg Subcutaneous Daily    ferrous sulfate  1 tablet Oral Daily    levothyroxine  88 mcg Oral Before breakfast    NIFEdipine  60 mg Oral Daily     Continuous Infusions:  PRN Meds:acetaminophen, dextrose 10%, dextrose 10%, glucagon (human recombinant), glucose, glucose, melatonin, metoprolol, senna-docusate 8.6-50 mg, sodium chloride 0.9%, Pharmacy to dose Vancomycin consult **AND** vancomycin - pharmacy to dose    Review of patient's allergies indicates:  No Known Allergies     Past Medical History:   Diagnosis Date    AAA (abdominal aortic aneurysm)     Anemia in CKD (chronic kidney disease)     Arthritis     Bacteremia due to Escherichia coli 9/24/2020    Cataract     Class 1 obesity due to excess calories with serious comorbidity in adult 7/6/2017    Gout, chronic     Heart failure with reduced ejection fraction 1/29/2022    High cholesterol     Hypercapnic respiratory failure 1/28/2022    Hypertension     Hypothyroidism     Late onset Alzheimer's dementia with behavioral disturbance 4/9/2022    Obesity     Plantar fasciitis     PVD (peripheral vascular disease)     Thyroid trouble      Past Surgical History:   Procedure Laterality Date    BREAST BIOPSY Left     BREAST SURGERY Left 1972    benign breast lump    CATARACT EXTRACTION  3/18/13    both eyes -     CHOLECYSTECTOMY      ENDOSCOPIC ULTRASOUND OF UPPER GASTROINTESTINAL TRACT N/A 9/8/2020    Procedure: ULTRASOUND, UPPER GI TRACT, ENDOSCOPIC;  Surgeon: Rasheed Balbuena MD;  Location: [address]);  Service: Endoscopy;  Laterality: N/A;    ERCP N/A 9/8/2020    Procedure: ERCP (ENDOSCOPIC RETROGRADE CHOLANGIOPANCREATOGRAPHY);  Surgeon: Rasheed Balbuena MD;  Location: [address]);  Service: Endoscopy;  Laterality: N/A;    EYE SURGERY      HYSTERECTOMY      SKIN BIOPSY      Left breast       Family History       Problem Relation (Age of  Onset)    Breast cancer Sister    Cancer Sister, Sister, Brother    Cataracts Son    Diabetes Son, Son    Glaucoma Son, Daughter    Heart disease Brother    Lupus Daughter    Meningitis Son    Mental illness Son    No Known Problems Brother          Tobacco Use    Smoking status: Former Smoker     Packs/day: 0.50     Years: 60.00     Pack years: 30.00     Quit date: 2001     Years since quittin.8    Smokeless tobacco: Never Used    Tobacco comment: quit in the 's   Substance and Sexual Activity    Alcohol use: No    Drug use: No    Sexual activity: Not Currently     Review of Systems   Unable to perform ROS: Mental status change   Constitutional:  Negative for fever.   Cardiovascular:  Negative for leg swelling.   Skin:  Positive for color change.   Objective:     Vital Signs (Most Recent):  Temp: 97.9 °F (36.6 °C) (22 1201)  Pulse: 110 (22 1205)  Resp: (!) 28 (22 1205)  BP: 133/62 (22 1205)  SpO2: (!) 94 % (22 1205)   Vital Signs (24h Range):  Temp:  [97.9 °F (36.6 °C)-99.7 °F (37.6 °C)] 97.9 °F (36.6 °C)  Pulse:  [110-125] 110  Resp:  [16-28] 28  SpO2:  [89 %-99 %] 94 %  BP: (119-150)/(62-79) 133/62     Weight: 60.8 kg (134 lb)  Body mass index is 29 kg/m².    Foot Exam    Right Foot/Ankle     Inspection and Palpation  Swelling: none   Skin Exam: skin intact; no drainage, no cellulitis, no ulcer and no erythema     Neurovascular  Dorsalis pedis: absent  Posterior tibial: absent      Left Foot/Ankle      Inspection and Palpation  Swelling: none   Skin Exam: abnormal color and ulcer; no drainage, no cellulitis and no erythema     Neurovascular  Dorsalis pedis: absent  Posterior tibial: absent        Laboratory:  CBC:   Recent Labs   Lab 22  0526   WBC 27.36*   RBC 3.20*   HGB 8.9*   HCT 28.0*   *   MCV 88   MCH 27.8   MCHC 31.8*     CRP:   Recent Labs   Lab 04/10/22  0432   .6*     ESR: No results for input(s): SEDRATE in the last 168 hours.  Wound  Cultures: No results for input(s): LABAERO in the last 4320 hours.  Microbiology Results (last 7 days)       Procedure Component Value Units Date/Time    Blood culture [[id number]] Collected: 04/11/22 1222    Order Status: Completed Specimen: Blood from Peripheral, Right Hand Updated: 04/14/22 1412     Blood Culture, Routine No growth to date      No Growth to date      No Growth to date    Blood culture [[id number]] Collected: 04/11/22 1223    Order Status: Completed Specimen: Blood from Antecubital, Left Arm Updated: 04/14/22 1412     Blood Culture, Routine No growth to date      No Growth to date      No Growth to date    Blood Culture #1 **CANNOT BE ORDERED STAT** [[id number]]  (Abnormal)  (Susceptibility) Collected: 04/09/22 1613    Order Status: Completed Specimen: Blood from Peripheral, Hand, Left Updated: 04/14/22 1308     Blood Culture, Routine Gram stain aer bottle:Upon further review no gram negative rods found       Gram positive cocci in clusters resembling Staph      Corrected Results called to and read back by:  Patricia Panchal 04/13/2022        10:06       Positive results previously called to Dawn Choudhary RN 04/10/2022  22:46      Gram stain lyubov bottle: Gram positive cocci in clusters resembling Staph       Results called to and read back by:Dawn Choudhary  04/11/2022  22:00      STAPHYLOCOCCUS HOMINIS    Blood Culture #2 **CANNOT BE ORDERED STAT** [[id number]]  (Abnormal)  (Susceptibility) Collected: 04/09/22 1612    Order Status: Completed Specimen: Blood from Peripheral, Forearm, Left Updated: 04/14/22 1041     Blood Culture, Routine Gram stain aer bottle: Gram positive cocci in clusters resembling Staph       Results called to and read back by:Dawn Choudhary RN 04/10/2022  22:08      STAPHYLOCOCCUS HOMINIS    Urine culture [[id number]] Collected: 04/10/22 0530    Order Status: Completed Specimen: Urine Updated: 04/11/22 1258     Urine Culture, Routine No significant growth     Comment: Previous comment was  modified by [email or web address] at 12:58 on 04/11/2022 No growth       Narrative:      REPLACES ORDER # [id number]          Specimen (24h ago, onward)                None            Diagnostic Results:  I have reviewed all pertinent imaging results/findings within the past 24 hours.  Calcaneus two views left.  No fracture dislocation bone destruction seen.  There is no definite radiographic evidence of osteomyelitis.       Clinical Findings:  Left lateral heel with well adhered firm eschar, no drainage, no erythema, no fluctuance or crepitus, no malodor.

## 2022-04-14 NOTE — HPI
89 y.o F with PMHx of dementia, PVD, bedbound, who presented to [facility] ED 4/9/2022 due to AMS.HPI obtained primarily through chart review due to patient's dementia. Podiatry consulted to assess left decubitus heel wound.    Recent blood cultures 4/9/2022 +Staph hominis. Repeat blood cultures 4/11/2022 with no growth to date. WBC 27k .6. Xray left calcaneus with no fracture dislocation bone destruction seen.  There is no definite radiographic evidence of osteomyelitis.

## 2022-04-14 NOTE — PT/OT/SLP PROGRESS
Speech Language Pathology Treatment    Patient Name:  Johanny Curtis   MRN:  [id number]  Admitting Diagnosis: Encephalopathy, metabolic    Recommendations:                 General Recommendations:  Dysphagia therapy  Diet recommendations:  Puree, Liquid Diet Level: Nectar Thick   Aspiration Precautions: 1 bite/sip at a time, Assistance with meals and Assistance with thickening liquids, Eliminate distractions, Feed only when awake/alert, Small bites/sips and Strict aspiration precautions   General Precautions: Standard, fall  Communication strategies:  provide increased time to answer, go to room if call light pushed and hard of hearing    Subjective     Pt sleeping in bed, woke with cues  Communicated with RN prior to session     Pain/Comfort:  Pain Rating 1: 0/10  Pain Rating Post-Intervention 1: 0/10    Respiratory Status: Room air    Objective:     Has the patient been evaluated by SLP for swallowing?   Yes  Keep patient NPO? No     Pt seen for ongoing tx. Pt repositioned in bed for session. Prior to PO trials, pt with wet voice, prompted to cough and SLP provided suction to clear.  Pt consumed ice chip x1, 2oz nectar thick liquid via straw, and 1/4 tsp puree x2 with no overt s/s of aspiration and good oral clearance. Pt required max prompting to facilitate lip closure around spoon. Solids trials deferred 2/2 reduced alertness during session and generalized weakness. Pt educated on diet recs, aspiration precautions, and SLP POC. Pt left with call light in reach.     Assessment:     Johanny Curtis is a 89 y.o. female with an SLP diagnosis of Dysphagia. SLP continue to follow.     Goals:   Multidisciplinary Problems     SLP Goals        Problem: SLP    Goal Priority Disciplines Outcome   SLP Goal     SLP Ongoing, Progressing   Description: Speech Language Pathology Goals  Goals expected to be met by 4/18  1. Pt will participate in ongoing swallow assessment                           Plan:     · Patient to be seen:   3 x/week   · Plan of Care expires:  04/28/22  · Plan of Care reviewed with:  patient   · SLP Follow-Up:  Yes       Discharge recommendations:  home with home health   Barriers to Discharge:  None    Time Tracking:     SLP Treatment Date:   04/14/22  Speech Start Time:  1034  Speech Stop Time:  1046     Speech Total Time (min):  12 min    Billable Minutes: Treatment Swallowing Dysfunction 12     REINALDO Hernandez-SLP  Speech-Language Pathology    04/14/2022

## 2022-04-14 NOTE — SUBJECTIVE & OBJECTIVE
Interval History: no acute events    Review of Systems   Unable to perform ROS: Dementia   Objective:     Vital Signs (Most Recent):  Temp: 97.9 °F (36.6 °C) (04/14/22 1201)  Pulse: 110 (04/14/22 1205)  Resp: (!) 28 (04/14/22 1205)  BP: 133/62 (04/14/22 1205)  SpO2: (!) 94 % (04/14/22 1205)   Vital Signs (24h Range):  Temp:  [97.9 °F (36.6 °C)-99.7 °F (37.6 °C)] 97.9 °F (36.6 °C)  Pulse:  [110-125] 110  Resp:  [16-28] 28  SpO2:  [89 %-99 %] 94 %  BP: (119-150)/(62-79) 133/62     Weight: 60.8 kg (134 lb)  Body mass index is 29 kg/m².    Intake/Output Summary (Last 24 hours) at 4/14/2022 1222  Last data filed at 4/14/2022 0600  Gross per 24 hour   Intake 60 ml   Output --   Net 60 ml      Physical Exam  Vitals and nursing note reviewed.   Constitutional:       General: She is not in acute distress.     Appearance: She is well-developed. She is not toxic-appearing or diaphoretic.   Pulmonary:      Effort: Pulmonary effort is normal. No respiratory distress.   Neurological:      Mental Status: She is alert. Mental status is at baseline. She is disoriented.      Motor: Atrophy present. No seizure activity.   Psychiatric:         Mood and Affect: Mood is not anxious. Affect is blunt.         Cognition and Memory: Cognition is impaired. Memory is impaired.           Significant Labs:  CBC:  Recent Labs   Lab 04/14/22  0526   WBC 27.36*   HGB 8.9*   HCT 28.0*   *     CMP:  Recent Labs   Lab 04/14/22  0526      K 3.3*      CO2 23   GLU 79   BUN 48*   CREATININE 1.5*   CALCIUM 11.1*   ANIONGAP 11   EGFRNONAA 30.2*     CT Head Without Contrast 4/09/22: FINDINGS:   Motion compromised examination.   Blood: No acute intracranial hemorrhage.   Parenchyma: No definite loss of gray-white differentiation to suggest acute or subacute transcortical infarct. Generalized pattern loss.  Remote left parietal cortical infarct.  Nonspecific areas of white matter hypoattenuation may relate to sequela of chronic small vessel  ischemic disease.   Ventricles/Extra-axial spaces: No abnormal extra-axial fluid collection. Basal cisterns are patent.   Vessels: Vascular calcifications   Orbits: Status post bilateral lens replacements   Scalp: Unremarkable.   Skull: There are no depressed skull fractures or destructive bone lesions.   Sinuses and mastoids: Chronic opacification of the mastoid air cells, as seen on the 04/15/2021 MRI.  Paranasal sinuses appear essentially clear.   Other findings: None   X-Ray Chest AP Portable 4/09/22: FINDINGS:   Overall somewhat improved aeration of the right lung, noting some residual diffuse nonspecific interstitial coarsening and subtle patchy subsegmental opacities that may reflect residual/on going or new asymmetric pulmonary edema versus interstitial type pneumonia from inflammatory or infectious process including atypical bacterial or viral etiology.  Left hemithorax is well expanded without large consolidation or pleural effusion.  No large pleural effusion on the right.  No pneumothorax.   Cardiomediastinal silhouette is relatively midline and stable.  Pulmonary vasculature and hilar contours are within normal limits.  No acute osseous process seen.   X-Ray Ankle Complete Left 4/09/22: FINDINGS:   No acute fracture, subluxation or dislocation is identified.  No osseous destruction.  Calcaneal spurring inferiorly.  No mass or foreign body.   Impression: No acute radiographic abnormality.

## 2022-04-14 NOTE — CARE UPDATE
RAPID RESPONSE NURSE ROUND       Rounding completed with charge RNGabo. No concerns verbalized at this time. Instructed to call [phone] for further concerns or assistance.

## 2022-04-14 NOTE — ASSESSMENT & PLAN NOTE
Delirium precautions. DNR. Per discussion with patient's daughter at bedside, considering home hospice on discharge, but following treatment of current infection.

## 2022-04-14 NOTE — PLAN OF CARE
Problem: Adult Inpatient Plan of Care  Goal: Plan of Care Review  Outcome: Ongoing, Progressing  Goal: Patient-Specific Goal (Individualized)  Outcome: Ongoing, Progressing  Goal: Absence of Hospital-Acquired Illness or Injury  Outcome: Ongoing, Progressing  Goal: Optimal Comfort and Wellbeing  Outcome: Ongoing, Progressing  Goal: Readiness for Transition of Care  Outcome: Ongoing, Progressing     Problem: Skin Injury Risk Increased  Goal: Skin Health and Integrity  Outcome: Ongoing, Progressing     Problem: Impaired Wound Healing  Goal: Optimal Wound Healing  Outcome: Ongoing, Progressing   Alert orient to person and place.Wound care performed to Left heel by painting black area with betadine. Also sacral area wash with NS pat dry and applied triad to pink ulcer area. Left open to air. Incontinent care also performed. Left diaper off and place under pad between pt legs. Iv had to be re sited due to leakage. New site g Lt wrist. Waffle mattress was place on bed. Patient wearing pressure reduction boots bilateral. Pt was reposition every 2 hours this shift. -131 Md on call made aware. 500 ML NS was given at 100 ML/HR.

## 2022-04-14 NOTE — PLAN OF CARE
Patient is AAO to self and place. Vital signs stable. No falls throughout shift. No complaints of pain. Patient with minimal oral intake.  Problem: Adult Inpatient Plan of Care  Goal: Plan of Care Review  Outcome: Ongoing, Progressing  Goal: Patient-Specific Goal (Individualized)  Outcome: Ongoing, Progressing  Goal: Absence of Hospital-Acquired Illness or Injury  Outcome: Ongoing, Progressing  Goal: Optimal Comfort and Wellbeing  Outcome: Ongoing, Progressing  Goal: Readiness for Transition of Care  Outcome: Ongoing, Progressing     Problem: Skin Injury Risk Increased  Goal: Skin Health and Integrity  Outcome: Ongoing, Progressing     Problem: Fall Injury Risk  Goal: Absence of Fall and Fall-Related Injury  Outcome: Ongoing, Progressing     Problem: Impaired Wound Healing  Goal: Optimal Wound Healing  Outcome: Ongoing, Progressing

## 2022-04-14 NOTE — PROGRESS NOTES
[facility] - Med Surg  Infectious Disease  Progress Note    Patient Name: Johanny Curtis  MRN: [id number]  Admission Date: 4/9/2022  Length of Stay: 5 days  Attending Physician: Isaac Ferrer MD  Primary Care Provider: Leticia Weems MD    Isolation Status: No active isolations  Assessment/Plan:      Bacteremia due to Staphylococcus  Ms. Curtis is a 89 y/o female with dementia, AAA, CKD, HTN, presents to ED with worsening AMS with associated poor appetite and activity. Noted to have tmax 101. ID consulted as blood cultures on admit +Staph hominis.    Recommendations  · Continue vancomycin at this time. Trough goal 15-20  · Staph species identified as staph hominis. Same susceptibilities. Repeat blood cultures NGTD.   · Podiatry following, xray no osseous destruction  · Would continue IV vancomycin x 2 weeks for bacteremia EOC 4/25/22      Outpatient Antibiotic Therapy Plan:    Please send referral to [facility].    1) Infection: bacteremia      2) Discharge Antibiotics:    Intravenous antibiotics:   Vancomycin     3) Therapy Duration:  *2 weeks    Estimated end date of IV antibiotics: 4/25/22    4) Outpatient Weekly Labs:    Order the following labs to be drawn on Mondays:    CBC   CMP    Vancomycin trough. Target 15-20    If discharged on vancomycin IV, order the following additional labs to be drawn on Thursdays:   CMP    Vancomycin trough. Target 15-20    If vancomycin trough is not at target (15-20) prior to discharge, schedule vancomycin trough to be drawn before their fourth outpatient dose.    5) Fax Lab Results to Infectious Diseases Provider: june scanlon *    [facility] Fax Number: [phone]    6) Outpatient Infectious Diseases Follow-up     Follow-up appointment will be arranged by the ID clinic and will be found in the patient's appointments tab.     Prior to discharge, please ensure the patient's follow-up has been scheduled.     If there is still no  follow-up scheduled prior to discharge, please send an EPIC message to Alysa Bai in Infectious Diseases.                  Thank you for your consult. I will sign off. Please contact us if you have any additional questions.    Maisha Luna PA-C  Infectious Disease  [facility] - Med Surg    Subjective:     Principal Problem:Encephalopathy, metabolic    HPI: Ms. Curtis is a 89 y/o female with dementia, AAA, CKD, HTN, presents to ED with worsening AMS. Noted to have tmax 101. ID consulted as blood cultures on admit +Staph species. She is currently on vancomycin and ceftriaxone at this time.    Per chart review, Patient lives with her daughter for the past 10 years due to dementia and she has had progressive debility and cognitive dysfunction over this period of time.  She is bedbound and since her last day at skilled nursing in February, she has developed decubitus wounds on the sacrum and her heels, due to prolonged delays in having an inflatable mattress delivered, which only recently arrived a few days ago.  Her daughter does turn her every few hours and she has been getting wound care with home health and she has noticed improvement in her wounds, but for the past couple days she has noticed a decline in her mother's mental status and today when she tried to move her it seemed like everything hurt.  She has also been much less responsive to eating and drinking.  She has not noticed any fevers, cough, or other obvious signs of infection.    CT head negative for acute intracranial findings. CXR without new consolidations.     Repeat blood cultures 4/11 NGTD    Interval History:   Xray heel no evidence of osseous destruction  Repeat blood cultures NGTD  Blood cultures on admit +Staph hominis       Review of Systems   Unable to perform ROS: Dementia   Objective:     Vital Signs (Most Recent):  Temp: 97.9 °F (36.6 °C) (04/14/22 1201)  Pulse: 110 (04/14/22 1205)  Resp: (!) 28 (04/14/22 1205)  BP: 133/62 (04/14/22  1205)  SpO2: (!) 94 % (04/14/22 1205)   Vital Signs (24h Range):  Temp:  [97.9 °F (36.6 °C)-99.7 °F (37.6 °C)] 97.9 °F (36.6 °C)  Pulse:  [110-125] 110  Resp:  [16-28] 28  SpO2:  [89 %-99 %] 94 %  BP: (119-150)/(62-79) 133/62     Weight: 60.8 kg (134 lb)  Body mass index is 29 kg/m².    Estimated Creatinine Clearance: 19.9 mL/min (A) (based on SCr of 1.5 mg/dL (H)).    Physical Exam  Vitals reviewed.   Constitutional:       General: She is not in acute distress.  HENT:      Head: Normocephalic.   Eyes:      Pupils: Pupils are equal, round, and reactive to light.   Cardiovascular:      Rate and Rhythm: Normal rate and regular rhythm.   Pulmonary:      Effort: Pulmonary effort is normal. No respiratory distress.      Breath sounds: No stridor.   Abdominal:      General: There is no distension.      Palpations: Abdomen is soft. There is no mass.      Tenderness: There is no abdominal tenderness.      Hernia: No hernia is present.   Musculoskeletal:         General: Tenderness present. No swelling.   Skin:     General: Skin is warm and dry.      Coloration: Skin is not jaundiced or pale.   Neurological:      Mental Status: She is alert.       Significant Labs: All pertinent labs within the past 24 hours have been reviewed.    Significant Imaging: I have reviewed all pertinent imaging results/findings within the past 24 hours.

## 2022-04-14 NOTE — PROGRESS NOTES
Page and spoke with Dr Singleton about pt heart rate fluctuating from 119-131. Md stated he will order lopressor for Hr sustaining greater than 130. Md informed so far it is not sustaining. Md also informed patient poor intake. Md order NS at 100 ML for 5 hours

## 2022-04-14 NOTE — SUBJECTIVE & OBJECTIVE
Interval History:   Xray heel no evidence of osseous destruction  Repeat blood cultures NGTD  Blood cultures on admit +Staph hominis       Review of Systems   Unable to perform ROS: Dementia   Objective:     Vital Signs (Most Recent):  Temp: 97.9 °F (36.6 °C) (04/14/22 1201)  Pulse: 110 (04/14/22 1205)  Resp: (!) 28 (04/14/22 1205)  BP: 133/62 (04/14/22 1205)  SpO2: (!) 94 % (04/14/22 1205)   Vital Signs (24h Range):  Temp:  [97.9 °F (36.6 °C)-99.7 °F (37.6 °C)] 97.9 °F (36.6 °C)  Pulse:  [110-125] 110  Resp:  [16-28] 28  SpO2:  [89 %-99 %] 94 %  BP: (119-150)/(62-79) 133/62     Weight: 60.8 kg (134 lb)  Body mass index is 29 kg/m².    Estimated Creatinine Clearance: 19.9 mL/min (A) (based on SCr of 1.5 mg/dL (H)).    Physical Exam  Vitals reviewed.   Constitutional:       General: She is not in acute distress.  HENT:      Head: Normocephalic.   Eyes:      Pupils: Pupils are equal, round, and reactive to light.   Cardiovascular:      Rate and Rhythm: Normal rate and regular rhythm.   Pulmonary:      Effort: Pulmonary effort is normal. No respiratory distress.      Breath sounds: No stridor.   Abdominal:      General: There is no distension.      Palpations: Abdomen is soft. There is no mass.      Tenderness: There is no abdominal tenderness.      Hernia: No hernia is present.   Musculoskeletal:         General: Tenderness present. No swelling.   Skin:     General: Skin is warm and dry.      Coloration: Skin is not jaundiced or pale.   Neurological:      Mental Status: She is alert.       Significant Labs: All pertinent labs within the past 24 hours have been reviewed.    Significant Imaging: I have reviewed all pertinent imaging results/findings within the past 24 hours.

## 2022-04-14 NOTE — ASSESSMENT & PLAN NOTE
Ms. Curtis is a 89 y/o female with dementia, AAA, CKD, HTN, presents to ED with worsening AMS with associated poor appetite and activity. Noted to have tmax 101. ID consulted as blood cultures on admit +Staph hominis.    Recommendations  · Continue vancomycin at this time. Trough goal 15-20  · Staph species identified as staph hominis. Same susceptibilities. Repeat blood cultures NGTD.   · Podiatry following, xray no osseous destruction  · Would continue IV vancomycin x 2 weeks for bacteremia EOC 4/25/22      Outpatient Antibiotic Therapy Plan:    Please send referral to [facility].    1) Infection: bacteremia      2) Discharge Antibiotics:    Intravenous antibiotics:   Vancomycin     3) Therapy Duration:  *2 weeks    Estimated end date of IV antibiotics: 4/25/22    4) Outpatient Weekly Labs:    Order the following labs to be drawn on Mondays:    CBC   CMP    Vancomycin trough. Target 15-20    If discharged on vancomycin IV, order the following additional labs to be drawn on Thursdays:   CMP    Vancomycin trough. Target 15-20    If vancomycin trough is not at target (15-20) prior to discharge, schedule vancomycin trough to be drawn before their fourth outpatient dose.    5) Fax Lab Results to Infectious Diseases Provider: june scanlon *    [facility] Fax Number: [phone]    6) Outpatient Infectious Diseases Follow-up     Follow-up appointment will be arranged by the ID clinic and will be found in the patient's appointments tab.     Prior to discharge, please ensure the patient's follow-up has been scheduled.     If there is still no follow-up scheduled prior to discharge, please send an EPIC message to Alysa Bai in Infectious Diseases.

## 2022-04-14 NOTE — ASSESSMENT & PLAN NOTE
Infectious Disease recommended continuing vancomycin, consulting Podiatry, considering MRI. Consult Podiatry first and see if they want MRI.

## 2022-04-14 NOTE — CONSULTS
[facility]  Podiatry  Consult Note    Patient Name: Johanny Curtis  MRN: [id number]  Admission Date: 4/9/2022  Hospital Length of Stay: 5 days  Attending Physician: Isaac Ferrer MD  Primary Care Provider: Leticia Weems MD     Inpatient consult to Podiatry  Consult performed by: Silva Montiel MD  Consult ordered by: Isaac Ferrer MD        Subjective:     History of Present Illness:  89 y.o F with PMHx of dementia, PVD, bedbound, who presented to [facility] ED 4/9/2022 due to AMS.HPI obtained primarily through chart review due to patient's dementia. Podiatry consulted to assess left decubitus heel wound.    Recent blood cultures 4/9/2022 +Staph hominis. Repeat blood cultures 4/11/2022 with no growth to date. WBC 27k .6. Xray left calcaneus with no fracture dislocation bone destruction seen.  There is no definite radiographic evidence of osteomyelitis.             Scheduled Meds:   allopurinoL  100 mg Oral Daily    aspirin  81 mg Oral Daily    enoxaparin  30 mg Subcutaneous Daily    ferrous sulfate  1 tablet Oral Daily    levothyroxine  88 mcg Oral Before breakfast    NIFEdipine  60 mg Oral Daily     Continuous Infusions:  PRN Meds:acetaminophen, dextrose 10%, dextrose 10%, glucagon (human recombinant), glucose, glucose, melatonin, metoprolol, senna-docusate 8.6-50 mg, sodium chloride 0.9%, Pharmacy to dose Vancomycin consult **AND** vancomycin - pharmacy to dose    Review of patient's allergies indicates:  No Known Allergies     Past Medical History:   Diagnosis Date    AAA (abdominal aortic aneurysm)     Anemia in CKD (chronic kidney disease)     Arthritis     Bacteremia due to Escherichia coli 9/24/2020    Cataract     Class 1 obesity due to excess calories with serious comorbidity in adult 7/6/2017    Gout, chronic     Heart failure with reduced ejection fraction 1/29/2022    High cholesterol     Hypercapnic respiratory failure 1/28/2022    Hypertension      Hypothyroidism     Late onset Alzheimer's dementia with behavioral disturbance 2022    Obesity     Plantar fasciitis     PVD (peripheral vascular disease)     Thyroid trouble      Past Surgical History:   Procedure Laterality Date    BREAST BIOPSY Left     BREAST SURGERY Left 1972    benign breast lump    CATARACT EXTRACTION  3/18/13    both eyes -     CHOLECYSTECTOMY      ENDOSCOPIC ULTRASOUND OF UPPER GASTROINTESTINAL TRACT N/A 2020    Procedure: ULTRASOUND, UPPER GI TRACT, ENDOSCOPIC;  Surgeon: Rasheed Balbuena MD;  Location: [facility] ([address] [place] [address]);  Service: Endoscopy;  Laterality: N/A;    ERCP N/A 2020    Procedure: ERCP (ENDOSCOPIC RETROGRADE CHOLANGIOPANCREATOGRAPHY);  Surgeon: Rasheed Balbuena MD;  Location: [facility] ([address] [place] [address]);  Service: Endoscopy;  Laterality: N/A;    EYE SURGERY      HYSTERECTOMY      SKIN BIOPSY      Left breast       Family History       Problem Relation (Age of Onset)    Breast cancer Sister    Cancer Sister, Sister, Brother    Cataracts Son    Diabetes Son, Son    Glaucoma Son, Daughter    Heart disease Brother    Lupus Daughter    Meningitis Son    Mental illness Son    No Known Problems Brother          Tobacco Use    Smoking status: Former Smoker     Packs/day: 0.50     Years: 60.00     Pack years: 30.00     Quit date: 2001     Years since quittin.8    Smokeless tobacco: Never Used    Tobacco comment: quit in the s   Substance and Sexual Activity    Alcohol use: No    Drug use: No    Sexual activity: Not Currently     Review of Systems   Unable to perform ROS: Mental status change   Constitutional:  Negative for fever.   Cardiovascular:  Negative for leg swelling.   Skin:  Positive for color change.   Objective:     Vital Signs (Most Recent):  Temp: 97.9 °F (36.6 °C) (22 1201)  Pulse: 110 (22 1205)  Resp: (!) 28 (22 1205)  BP: 133/62 (22 1205)  SpO2: (!) 94 % (22 1205)   Vital Signs (24h  Range):  Temp:  [97.9 °F (36.6 °C)-99.7 °F (37.6 °C)] 97.9 °F (36.6 °C)  Pulse:  [110-125] 110  Resp:  [16-28] 28  SpO2:  [89 %-99 %] 94 %  BP: (119-150)/(62-79) 133/62     Weight: 60.8 kg (134 lb)  Body mass index is 29 kg/m².    Foot Exam    Right Foot/Ankle     Inspection and Palpation  Swelling: none   Skin Exam: skin intact; no drainage, no cellulitis, no ulcer and no erythema     Neurovascular  Dorsalis pedis: absent  Posterior tibial: absent      Left Foot/Ankle      Inspection and Palpation  Swelling: none   Skin Exam: abnormal color and ulcer; no drainage, no cellulitis and no erythema     Neurovascular  Dorsalis pedis: absent  Posterior tibial: absent        Laboratory:  CBC:   Recent Labs   Lab 04/14/22  0526   WBC 27.36*   RBC 3.20*   HGB 8.9*   HCT 28.0*   *   MCV 88   MCH 27.8   MCHC 31.8*     CRP:   Recent Labs   Lab 04/10/22  0432   .6*     ESR: No results for input(s): SEDRATE in the last 168 hours.  Wound Cultures: No results for input(s): LABAERO in the last 4320 hours.  Microbiology Results (last 7 days)       Procedure Component Value Units Date/Time    Blood culture [[id number]] Collected: 04/11/22 1222    Order Status: Completed Specimen: Blood from Peripheral, Right Hand Updated: 04/14/22 1412     Blood Culture, Routine No growth to date      No Growth to date      No Growth to date    Blood culture [[id number]] Collected: 04/11/22 1223    Order Status: Completed Specimen: Blood from Antecubital, Left Arm Updated: 04/14/22 1412     Blood Culture, Routine No growth to date      No Growth to date      No Growth to date    Blood Culture #1 **CANNOT BE ORDERED STAT** [[id number]]  (Abnormal)  (Susceptibility) Collected: 04/09/22 1613    Order Status: Completed Specimen: Blood from Peripheral, Hand, Left Updated: 04/14/22 1308     Blood Culture, Routine Gram stain aer bottle:Upon further review no gram negative rods found       Gram positive cocci in clusters resembling Staph       Corrected Results called to and read back by:  Patricia Panchal 04/13/2022        10:06       Positive results previously called to Dawn Choudhary RN 04/10/2022  22:46      Gram stain lyubov bottle: Gram positive cocci in clusters resembling Staph       Results called to and read back by:Dawn Choudhary  04/11/2022  22:00      STAPHYLOCOCCUS HOMINIS    Blood Culture #2 **CANNOT BE ORDERED STAT** [[id number]]  (Abnormal)  (Susceptibility) Collected: 04/09/22 1612    Order Status: Completed Specimen: Blood from Peripheral, Forearm, Left Updated: 04/14/22 1041     Blood Culture, Routine Gram stain aer bottle: Gram positive cocci in clusters resembling Staph       Results called to and read back by:Dawn Choudhary RN 04/10/2022  22:08      STAPHYLOCOCCUS HOMINIS    Urine culture [[id number]] Collected: 04/10/22 0530    Order Status: Completed Specimen: Urine Updated: 04/11/22 1258     Urine Culture, Routine No significant growth     Comment: Previous comment was modified by [email or web address] at 12:58 on 04/11/2022 No growth       Narrative:      REPLACES ORDER # [id number]          Specimen (24h ago, onward)                None            Diagnostic Results:  I have reviewed all pertinent imaging results/findings within the past 24 hours.  Calcaneus two views left.  No fracture dislocation bone destruction seen.  There is no definite radiographic evidence of osteomyelitis.       Clinical Findings:  Left lateral heel with well adhered firm eschar, no drainage, no erythema, no fluctuance or crepitus, no malodor.         Assessment/Plan:     Pressure injury of left heel, unstageable  Xray left calcaneus with no evidence of fractures, dislocations, soft tissue gas or osteomyelitis. No acute signs of infection on clinical exam    Plan:  Consider other sources of Staph hominis bacteremia.  Continue local wound care to left heel with betadine, mepilex border  Recommend aggressive offloading of bilateral heels with heel protector boots and pillows while patient  is in bed to prevent further breakdown  No plans for surgical intervention from podiatry  Podiatry will sign off at this time         Thank you for your consult. I will sign off. Please contact us if you have any additional questions.    Silva Montiel DPM  [facility]  Podiatry PGY3  Pager: [phone]  Spectra:[phone]

## 2022-04-14 NOTE — RESPIRATORY THERAPY
RAPID RESPONSE RESPIRATORY CHART CHECK       Chart check completed.   Please call [phone] for further concerns or assistance.

## 2022-04-14 NOTE — ASSESSMENT & PLAN NOTE
Xray left calcaneus with no evidence of fractures, dislocations, soft tissue gas or osteomyelitis. No acute signs of infection on clinical exam    Plan:  Consider other sources of bacteremia.  Continue local wound care to left heel with betadine, mepilex border  Recommend aggressive offloading of bilateral heels with heel protector boots and pillows while patient is in bed to prevent further breakdown  No plans for surgical intervention from podiatry  Podiatry will sign off at this time

## 2022-04-14 NOTE — PROGRESS NOTES
[facility] Medicine  Progress Note    Patient Name: Johanny Curtis  MRN: [id number]  Patient Class: IP- Inpatient   Admission Date: 4/9/2022  Length of Stay: 5 days  Attending Physician: Isaac Ferrer MD  Primary Care Provider: Leticia Weems MD        Subjective:     Principal Problem:Encephalopathy, metabolic        HPI:  Johanny Curtis is a 89 year old Black woman with hypertension, heart failure with reduced ejection fraction, aortic atherosclerosis, abdominal aortic aneurysm, peripheral artery disease, hypothyroidism, chronic kidney disease stage 4, secondary hyperparathyroidism, anemia, gout, Alzheimer's dementia, history of choledocholithiasis status post biliary sphincterotomy on 9/8/2020, history of cholecystectomy, history of hysterectomy. She lives in [facility]. She is . Her primary care physician is Dr. Leticia Weems. She is DNR.               She presented to [facility] Emergency Department on 4/9/2022 with altered mental status. Her daughter, who has lived with her for the past 10 years due to her dementia, reported progressive debility and cognitive dysfunction over that period of time. She is bed bound and has developed decubitus wounds on her sacrum and heels since her last day at a skilled nursing facility in February, due to prolonged delays in having an inflatable mattress delivered. It only arrived a few days prior to presentation. Her daughter has been turning her every few hours and she has been getting wound care with home health. Her daughter noticed improvement in her wounds, but for the past couple days she had also noticed a decline in her mother's mental status. On the day of presentation, when her daughter tried to move her it seemed like everything hurt. She was also much less responsive to eating and drinking.               In the emergency department, she had tachycardia and leukocytosis (WBC 87524/uL with 89.4%  granulocytes). Chest X-ray showed residual diffuse nonspecific interstitial coarsening and subtle patchy subsegmental opacities. Ankle X-ray showed no evidence of osteomyelitis. She was given 1 liter of normal saline, piperacillin-tazobactam, and vancomycin. She was admitted to Hospital Medicine Team A.      Overview/Hospital Course:  Speech Language Pathology recommended pureed diet with nectar thickened liquids. Wound Care recommended Triad ointment to sacrum and betadine swabs to left heel. Urinalysis showed positive nitrite, >100 WBC/hpf, many WBC clumps, and many bacteria. Urine culture had no significant growth. Blood cultures grew Staphylococcus hominis.      Interval History: no acute events    Review of Systems   Unable to perform ROS: Dementia   Objective:     Vital Signs (Most Recent):  Temp: 97.9 °F (36.6 °C) (04/14/22 1201)  Pulse: 110 (04/14/22 1205)  Resp: (!) 28 (04/14/22 1205)  BP: 133/62 (04/14/22 1205)  SpO2: (!) 94 % (04/14/22 1205)   Vital Signs (24h Range):  Temp:  [97.9 °F (36.6 °C)-99.7 °F (37.6 °C)] 97.9 °F (36.6 °C)  Pulse:  [110-125] 110  Resp:  [16-28] 28  SpO2:  [89 %-99 %] 94 %  BP: (119-150)/(62-79) 133/62     Weight: 60.8 kg (134 lb)  Body mass index is 29 kg/m².    Intake/Output Summary (Last 24 hours) at 4/14/2022 1222  Last data filed at 4/14/2022 0600  Gross per 24 hour   Intake 60 ml   Output --   Net 60 ml      Physical Exam  Vitals and nursing note reviewed.   Constitutional:       General: She is not in acute distress.     Appearance: She is well-developed. She is not toxic-appearing or diaphoretic.   Pulmonary:      Effort: Pulmonary effort is normal. No respiratory distress.   Neurological:      Mental Status: She is alert. Mental status is at baseline. She is disoriented.      Motor: Atrophy present. No seizure activity.   Psychiatric:         Mood and Affect: Mood is not anxious. Affect is blunt.         Cognition and Memory: Cognition is impaired. Memory is impaired.            Significant Labs:  CBC:  Recent Labs   Lab 04/14/22  0526   WBC 27.36*   HGB 8.9*   HCT 28.0*   *     CMP:  Recent Labs   Lab 04/14/22  0526      K 3.3*      CO2 23   GLU 79   BUN 48*   CREATININE 1.5*   CALCIUM 11.1*   ANIONGAP 11   EGFRNONAA 30.2*     CT Head Without Contrast 4/09/22: FINDINGS:   Motion compromised examination.   Blood: No acute intracranial hemorrhage.   Parenchyma: No definite loss of gray-white differentiation to suggest acute or subacute transcortical infarct. Generalized pattern loss.  Remote left parietal cortical infarct.  Nonspecific areas of white matter hypoattenuation may relate to sequela of chronic small vessel ischemic disease.   Ventricles/Extra-axial spaces: No abnormal extra-axial fluid collection. Basal cisterns are patent.   Vessels: Vascular calcifications   Orbits: Status post bilateral lens replacements   Scalp: Unremarkable.   Skull: There are no depressed skull fractures or destructive bone lesions.   Sinuses and mastoids: Chronic opacification of the mastoid air cells, as seen on the 04/15/2021 MRI.  Paranasal sinuses appear essentially clear.   Other findings: None   X-Ray Chest AP Portable 4/09/22: FINDINGS:   Overall somewhat improved aeration of the right lung, noting some residual diffuse nonspecific interstitial coarsening and subtle patchy subsegmental opacities that may reflect residual/on going or new asymmetric pulmonary edema versus interstitial type pneumonia from inflammatory or infectious process including atypical bacterial or viral etiology.  Left hemithorax is well expanded without large consolidation or pleural effusion.  No large pleural effusion on the right.  No pneumothorax.   Cardiomediastinal silhouette is relatively midline and stable.  Pulmonary vasculature and hilar contours are within normal limits.  No acute osseous process seen.   X-Ray Ankle Complete Left 4/09/22: FINDINGS:   No acute fracture, subluxation or  dislocation is identified.  No osseous destruction.  Calcaneal spurring inferiorly.  No mass or foreign body.   Impression: No acute radiographic abnormality.      Assessment/Plan:      Bacteremia due to Staphylococcus  Infectious Disease recommended continuing vancomycin, consulting Podiatry, considering MRI. Consult Podiatry first and see if they want MRI.    Pressure injury of left heel, unstageable  Wound care made recommendations for heel wound as well as sacral decubitus wound.    Late onset Alzheimer's dementia with behavioral disturbance  Delirium precautions. DNR. Per discussion with patient's daughter at bedside, considering home hospice on discharge, but following treatment of current infection.     Heart failure with reduced ejection fraction  Currently without evidence of decompensation. Cautious fluid resuscitation as needed.    CKD (chronic kidney disease), stage IV  Stable.    Hypothyroidism  Continue home levothyroxine.    Essential hypertension  Continue home nifedipine.      VTE Risk Mitigation (From admission, onward)         Ordered     enoxaparin injection 30 mg  Daily         04/09/22 2253     IP VTE HIGH RISK PATIENT  Once         04/09/22 2253     Place sequential compression device  Until discontinued         04/09/22 2253                Discharge Planning   ION: 4/14/2022     Code Status: DNR   Is the patient medically ready for discharge?: No    Reason for patient still in hospital (select all that apply): Treatment and Consult recommendations  Discharge Plan A: Home Health   Discharge Delays: None known at this time              Isaac Ferrer MD  Department of Hospital Medicine   [facility] Surg

## 2022-04-14 NOTE — PLAN OF CARE
[facility] S[facility]  Discharge Reassessment    Primary Care Provider: Leticia Weems MD    Expected Discharge Date: 4/14/2022    Reassessment (most recent)     Discharge Reassessment - 04/14/22 0948        Discharge Reassessment    Assessment Type Discharge Planning Reassessment     Did the patient's condition or plan change since previous assessment? No     Discharge Plan discussed with: Patient;Adult children     Communicated ION with patient/caregiver Yes     Discharge Plan A Home Health     Discharge Plan B Skilled Nursing Facility     DME Needed Upon Discharge  none     Discharge Barriers Identified None     Why the patient remains in the hospital Requires continued medical care        Post-Acute Status    Post-Acute Authorization Home Health     Home Health Status Referrals Sent     Hospital Resources/Appts/Education Provided Post-Acute resouces added to AVS;Appointments scheduled and added to AVS     Discharge Delays None known at this time

## 2022-04-15 NOTE — PROGRESS NOTES
Pharmacokinetic Assessment Follow Up: IV Vancomycin    Vancomycin serum concentration assessment/plan  · The random level was drawn correctly and can be used to guide therapy at this time. The measurement is within the desired definitive target range of 15 to 20 mcg/mL.  · CKD4; requiring q48hr dosing  · ID following for bacteremia, EOT: 4/25/22  · BCx 4/9: Staphylococcus Hominis  · BCx 4/11: NGTD  · One time dose of 500 mg today  · Goal 15-20 mcg/mL  · Re-dose when the random level is less than 20 mcg/mL, next level to be drawn at 0400 with AM labs on 4/16.    Drug levels (last 3 results):  Recent Labs   Lab Result Units 04/13/22  0255 04/15/22  0323   Vancomycin, Random ug/mL 14.3 18.0       Pharmacy will continue to follow and monitor vancomycin.    Please contact pharmacy at extension [phone] for questions regarding this assessment.    Thank you for the consult,   Yogi Smiley PharmD       Patient brief summary:  Johanny Curtis is a 89 y.o. female initiated on antimicrobial therapy with IV Vancomycin for treatment of confirmed bacteremia.    Drug Allergies:   Review of patient's allergies indicates:  No Known Allergies    Actual Body Weight:   60.8 kg    Renal Function:   Estimated Creatinine Clearance: 18.7 mL/min (A) (based on SCr of 1.6 mg/dL (H)).    Dialysis Method (if applicable):  N/A    CBC (last 72 hours):  Recent Labs   Lab Result Units 04/14/22  0526 04/15/22  0323   WBC K/uL 27.36* 25.03*   Hemoglobin g/dL 8.9* 10.2*   Hematocrit % 28.0* 32.2*   Platelets K/uL 464* 515*   Gran % % 88.0* 93.0*   Lymph % % 2.0* 3.0*   Mono % % 4.0 1.0*   Eosinophil % % 0.0 0.0   Basophil % % 1.0 0.0   Differential Method  Manual Manual       Metabolic Panel (last 72 hours):  Recent Labs   Lab Result Units 04/14/22  0526 04/15/22  0323   Sodium mmol/L 142 146*   Potassium mmol/L 3.3* 3.6   Chloride mmol/L 108 111*   CO2 mmol/L 23 23   Glucose mg/dL 79 99   BUN mg/dL 48* 49*   Creatinine mg/dL 1.5* 1.6*       Vancomycin  Administrations:  vancomycin given in the last 96 hours                   vancomycin 750 mg in dextrose 5 % 250 mL IVPB (ready to mix system) (mg) 750 mg New Bag 04/13/22 0912    vancomycin 500 mg in dextrose 5 % 100 mL IVPB (ready to mix system) (mg) 500 mg New Bag 04/11/22 1124                Microbiologic Results:  Microbiology Results (last 7 days)     Procedure Component Value Units Date/Time    Blood culture [[id number]] Collected: 04/11/22 1222    Order Status: Completed Specimen: Blood from Peripheral, Right Hand Updated: 04/14/22 1412     Blood Culture, Routine No growth to date      No Growth to date      No Growth to date    Blood culture [[id number]] Collected: 04/11/22 1223    Order Status: Completed Specimen: Blood from Antecubital, Left Arm Updated: 04/14/22 1412     Blood Culture, Routine No growth to date      No Growth to date      No Growth to date    Blood Culture #1 **CANNOT BE ORDERED STAT** [[id number]]  (Abnormal)  (Susceptibility) Collected: 04/09/22 1613    Order Status: Completed Specimen: Blood from Peripheral, Hand, Left Updated: 04/14/22 1308     Blood Culture, Routine Gram stain aer bottle:Upon further review no gram negative rods found       Gram positive cocci in clusters resembling Staph      Corrected Results called to and read back by:  Patricia Panchal 04/13/2022        10:06       Positive results previously called to Dawn Choudhary RN 04/10/2022  22:46      Gram stain lyubov bottle: Gram positive cocci in clusters resembling Staph       Results called to and read back by:Dawn Choudhary  04/11/2022  22:00      STAPHYLOCOCCUS HOMINIS    Blood Culture #2 **CANNOT BE ORDERED STAT** [[id number]]  (Abnormal)  (Susceptibility) Collected: 04/09/22 1612    Order Status: Completed Specimen: Blood from Peripheral, Forearm, Left Updated: 04/14/22 1041     Blood Culture, Routine Gram stain aer bottle: Gram positive cocci in clusters resembling Staph       Results called to and read back by:Dawn Choudhary RN  04/10/2022  22:08      STAPHYLOCOCCUS HOMINIS    Urine culture [[id number]] Collected: 04/10/22 0530    Order Status: Completed Specimen: Urine Updated: 04/11/22 1258     Urine Culture, Routine No significant growth     Comment: Previous comment was modified by SHERMAN at 12:58 on 04/11/2022 No growth       Narrative:      REPLACES ORDER # [id number]

## 2022-04-15 NOTE — PLAN OF CARE
Pt is AAOx1. Pt remain free from fall and injuries. VS remain stable. Questions and concerns voiced and answered. Medication compliance. No urine out put as Pt is not eating or drinking. Call light in reach. Bed in low locked position. Side rails x2. Belongings at bedside.

## 2022-04-15 NOTE — SUBJECTIVE & OBJECTIVE
Interval History: Evaluated by Podiatry. Antibiotic recommendations made by ID. Now just needs PT and OT evaluations and PICC or midline to continue vancomycin.    Review of Systems   Unable to perform ROS: Dementia   Objective:     Vital Signs (Most Recent):  Temp: 98.1 °F (36.7 °C) (04/15/22 0757)  Pulse: (!) 119 (04/15/22 0757)  Resp: 20 (04/15/22 0757)  BP: 128/73 (04/15/22 0757)  SpO2: (!) 94 % (04/15/22 0757)   Vital Signs (24h Range):  Temp:  [97 °F (36.1 °C)-98.3 °F (36.8 °C)] 98.1 °F (36.7 °C)  Pulse:  [103-119] 119  Resp:  [18-28] 20  SpO2:  [94 %-96 %] 94 %  BP: (128-143)/(62-73) 128/73     Weight: 60.8 kg (134 lb)  Body mass index is 29 kg/m².    Intake/Output Summary (Last 24 hours) at 4/15/2022 0828  Last data filed at 4/15/2022 0638  Gross per 24 hour   Intake 10 ml   Output --   Net 10 ml        Physical Exam  Vitals and nursing note reviewed.   Constitutional:       General: She is not in acute distress.     Appearance: She is well-developed. She is not toxic-appearing or diaphoretic.   Pulmonary:      Effort: Pulmonary effort is normal. No respiratory distress.   Neurological:      Mental Status: She is alert. Mental status is at baseline. She is disoriented.      Motor: Atrophy present. No seizure activity.   Psychiatric:         Mood and Affect: Mood is not anxious. Affect is blunt.         Cognition and Memory: Cognition is impaired. Memory is impaired.           Significant Labs:  CBC:  Recent Labs   Lab 04/14/22  0526 04/15/22  0323   WBC 27.36* 25.03*   HGB 8.9* 10.2*   HCT 28.0* 32.2*   * 515*       CMP:  Recent Labs   Lab 04/14/22  0526 04/15/22  0323    146*   K 3.3* 3.6    111*   CO2 23 23   GLU 79 99   BUN 48* 49*   CREATININE 1.5* 1.6*   CALCIUM 11.1* 11.8*   ANIONGAP 11 12   EGFRNONAA 30.2* 28.0*

## 2022-04-15 NOTE — CARE UPDATE
RAPID RESPONSE NURSE ROUND       Rounding completed with charge RN, Trisha. No concerns verbalized at this time. Instructed to call [phone] for further concerns or assistance.

## 2022-04-15 NOTE — PROGRESS NOTES
[facility] Medicine  Progress Note    Patient Name: Johanny Curtis  MRN: [id number]  Patient Class: IP- Inpatient   Admission Date: 4/9/2022  Length of Stay: 6 days  Attending Physician: Isaac Ferrer MD  Primary Care Provider: Leticia Weems MD        Subjective:     Principal Problem:Encephalopathy, metabolic        HPI:  Johanny Curtis is a 89 year old Black woman with hypertension, heart failure with reduced ejection fraction, aortic atherosclerosis, abdominal aortic aneurysm, peripheral artery disease, hypothyroidism, chronic kidney disease stage 4, secondary hyperparathyroidism, anemia, gout, Alzheimer's dementia, history of choledocholithiasis status post biliary sphincterotomy on 9/8/2020, history of cholecystectomy, history of hysterectomy. She lives in [facility]. She is . Her primary care physician is Dr. Leticia Weems. She is DNR.               She presented to [facility] Emergency Department on 4/9/2022 with altered mental status. Her daughter, who has lived with her for the past 10 years due to her dementia, reported progressive debility and cognitive dysfunction over that period of time. She is bed bound and has developed decubitus wounds on her sacrum and heels since her last day at a skilled nursing facility in February, due to prolonged delays in having an inflatable mattress delivered. It only arrived a few days prior to presentation. Her daughter has been turning her every few hours and she has been getting wound care with home health. Her daughter noticed improvement in her wounds, but for the past couple days she had also noticed a decline in her mother's mental status. On the day of presentation, when her daughter tried to move her it seemed like everything hurt. She was also much less responsive to eating and drinking.               In the emergency department, she had tachycardia and leukocytosis (WBC 23784/uL with 89.4%  granulocytes). Chest X-ray showed residual diffuse nonspecific interstitial coarsening and subtle patchy subsegmental opacities. Ankle X-ray showed no evidence of osteomyelitis. She was given 1 liter of normal saline, piperacillin-tazobactam, and vancomycin. She was admitted to Hospital Medicine Team A.      Overview/Hospital Course:  Speech Language Pathology recommended pureed diet with nectar thickened liquids. Wound Care recommended Triad ointment to sacrum and betadine swabs to left heel. Urinalysis showed positive nitrite, >100 WBC/hpf, many WBC clumps, and many bacteria. Urine culture had no significant growth. Blood cultures grew Staphylococcus hominis. Podiatry was consulted and found no signs of infection of her foot. Infectious Disease recommended continuing vancomycin until 4/25/2022.       Interval History: Evaluated by Podiatry. Antibiotic recommendations made by ID. Now just needs PT and OT evaluations and PICC or midline to continue vancomycin.    Review of Systems   Unable to perform ROS: Dementia   Objective:     Vital Signs (Most Recent):  Temp: 98.1 °F (36.7 °C) (04/15/22 0757)  Pulse: (!) 119 (04/15/22 0757)  Resp: 20 (04/15/22 0757)  BP: 128/73 (04/15/22 0757)  SpO2: (!) 94 % (04/15/22 0757)   Vital Signs (24h Range):  Temp:  [97 °F (36.1 °C)-98.3 °F (36.8 °C)] 98.1 °F (36.7 °C)  Pulse:  [103-119] 119  Resp:  [18-28] 20  SpO2:  [94 %-96 %] 94 %  BP: (128-143)/(62-73) 128/73     Weight: 60.8 kg (134 lb)  Body mass index is 29 kg/m².    Intake/Output Summary (Last 24 hours) at 4/15/2022 0826  Last data filed at 4/15/2022 0638  Gross per 24 hour   Intake 10 ml   Output --   Net 10 ml        Physical Exam  Vitals and nursing note reviewed.   Constitutional:       General: She is not in acute distress.     Appearance: She is well-developed. She is not toxic-appearing or diaphoretic.   Pulmonary:      Effort: Pulmonary effort is normal. No respiratory distress.   Neurological:      Mental Status: She  is alert. Mental status is at baseline. She is disoriented.      Motor: Atrophy present. No seizure activity.   Psychiatric:         Mood and Affect: Mood is not anxious. Affect is blunt.         Cognition and Memory: Cognition is impaired. Memory is impaired.           Significant Labs:  CBC:  Recent Labs   Lab 04/14/22  0526 04/15/22  0323   WBC 27.36* 25.03*   HGB 8.9* 10.2*   HCT 28.0* 32.2*   * 515*       CMP:  Recent Labs   Lab 04/14/22  0526 04/15/22  0323    146*   K 3.3* 3.6    111*   CO2 23 23   GLU 79 99   BUN 48* 49*   CREATININE 1.5* 1.6*   CALCIUM 11.1* 11.8*   ANIONGAP 11 12   EGFRNONAA 30.2* 28.0*           Assessment/Plan:      Bacteremia due to Staphylococcus  Infectious Disease recommended continuing vancomycin until 4/25/2022. If she stays in the hospital until Monday, then she can get a midline catheter. If she is discharged over the weekend, then she can get a PICC since she is not a dialysis candidate.    Pressure injury of left heel, unstageable  Wound care made recommendations for heel wound as well as sacral decubitus wound.    Late onset Alzheimer's dementia with behavioral disturbance  Delirium precautions. DNR. Per discussion with patient's daughter at bedside, considering home hospice on discharge, but following treatment of current infection.     Heart failure with reduced ejection fraction  Currently without evidence of decompensation. Cautious fluid resuscitation as needed.    CKD (chronic kidney disease), stage IV  Stable. DNR so not a dialysis candidate.    Hypothyroidism  Continue home levothyroxine.    Essential hypertension  Continue home nifedipine.    VTE Risk Mitigation (From admission, onward)         Ordered     enoxaparin injection 30 mg  Daily         04/09/22 2253     IP VTE HIGH RISK PATIENT  Once         04/09/22 2253     Place sequential compression device  Until discontinued         04/09/22 2253                Discharge Planning   ION: 4/18/2022      Code Status: DNR   Is the patient medically ready for discharge?: No    Reason for patient still in hospital (select all that apply): PT / OT recommendations and Pending disposition  Discharge Plan A: Home Health   Discharge Delays: None known at this time              Isaac Ferrer MD  Department of Hospital Medicine   [facility]

## 2022-04-15 NOTE — CARE UPDATE
RAPID RESPONSE NURSE ROUND       Rounding completed with charge RN, Ever. No concerns verbalized at this time. Instructed to call [phone] for further concerns or assistance.

## 2022-04-15 NOTE — ASSESSMENT & PLAN NOTE
Infectious Disease recommended continuing vancomycin until 4/25/2022. If she stays in the hospital until Monday, then she can get a midline catheter. If she is discharged over the weekend, then she can get a PICC since she is not a dialysis candidate.

## 2022-04-15 NOTE — PLAN OF CARE
Received patient lying in bed with no apparent distress  Respiration even and unlabored  AAOx3; person, place, time  Turn q2h  IV antibiotics per MD order  Daughter at bedside during the day  Patient tolerate PO water nectar thick, but refused to eat any food  Bed locked in lowest position  Call light in reach

## 2022-04-16 NOTE — ASSESSMENT & PLAN NOTE
Infectious Disease recommended continuing vancomycin until 4/25/2022. If she stays in the hospital until Monday (likey so due to needing skilled nursing facility placement), then she can get a midline catheter. If she is discharged over the weekend, then she can get a PICC since she is not a dialysis candidate.

## 2022-04-16 NOTE — PROGRESS NOTES
Pharmacokinetic Assessment Follow Up: IV Vancomycin    Vancomycin serum concentration assessment(s):    The random level was drawn correctly and can be used to guide therapy at this time. The measurement is above the desired definitive target range of 15 to 20 mcg/mL.    Vancomycin Regimen Plan:    · Re-dose when the random level is less than 20 mcg/mL, next level to be drawn at 0400 on 04/17/2022 with AM lab.   · Patient is CKD stage 4 patient, but is not a candidate for dialysis.  · ID following for bacteremia, EOT 4/25/2022.  · BCx 4/9: staph hominis  · BCx 4/11: ngtd  · Will continue to pulse dose patient when trough is below goal; no dose will be administered today due to trough at 20.4 mcg/mL.  · Goal trough is 15-20 mcg/mL.    Drug levels (last 3 results):  Recent Labs   Lab Result Units 04/15/22  0323 04/16/22  0542   Vancomycin, Random ug/mL 18.0 20.4       Pharmacy will continue to follow and monitor vancomycin.    Please contact pharmacy at extension [phone] for questions regarding this assessment.    Thank you for the consult,     Thomas Dunham, PharmD  Ext: [phone]    Patient brief summary:  Johanny Curtis is a 89 y.o. female initiated on antimicrobial therapy with IV Vancomycin for treatment of bacteremia.    The patient's current regimen is vancomycin   Drug Allergies:   Review of patient's allergies indicates:  No Known Allergies    Actual Body Weight:   60.8 kg    Renal Function:   Estimated Creatinine Clearance: 21.3 mL/min (based on SCr of 1.4 mg/dL).,       CBC (last 72 hours):  Recent Labs   Lab Result Units 04/14/22  0526 04/15/22  0323 04/16/22  1101   WBC K/uL 27.36* 25.03* 24.17*   Hemoglobin g/dL 8.9* 10.2* 10.2*   Hematocrit % 28.0* 32.2* 32.0*   Platelets K/uL 464* 515* 514*   Gran % % 88.0* 93.0* 89.0*   Lymph % % 2.0* 3.0* 6.0*   Mono % % 4.0 1.0* 5.0   Eosinophil % % 0.0 0.0 0.0   Basophil % % 1.0 0.0 0.0   Differential Method  Manual Manual Manual       Metabolic Panel (last 72  hours):  Recent Labs   Lab Result Units 04/14/22  0526 04/15/22  0323 04/16/22  0542   Sodium mmol/L 142 146* 147*   Potassium mmol/L 3.3* 3.6 3.3*   Chloride mmol/L 108 111* 112*   CO2 mmol/L 23 23 22*   Glucose mg/dL 79 99 88   BUN mg/dL 48* 49* 49*   Creatinine mg/dL 1.5* 1.6* 1.4       Vancomycin Administrations:  vancomycin given in the last 96 hours                   vancomycin 500 mg in dextrose 5 % 100 mL IVPB (ready to mix system) (mg) 500 mg New Bag 04/15/22 1030    vancomycin 750 mg in dextrose 5 % 250 mL IVPB (ready to mix system) (mg) 750 mg New Bag 04/13/22 0912                Microbiologic Results:  Microbiology Results (last 7 days)     Procedure Component Value Units Date/Time    Blood culture [[id number]] Collected: 04/11/22 1222    Order Status: Completed Specimen: Blood from Peripheral, Right Hand Updated: 04/15/22 1412     Blood Culture, Routine No growth to date      No Growth to date      No Growth to date      No Growth to date    Blood culture [[id number]] Collected: 04/11/22 1223    Order Status: Completed Specimen: Blood from Antecubital, Left Arm Updated: 04/15/22 1412     Blood Culture, Routine No growth to date      No Growth to date      No Growth to date      No Growth to date    Blood Culture #1 **CANNOT BE ORDERED STAT** [[id number]]  (Abnormal)  (Susceptibility) Collected: 04/09/22 1613    Order Status: Completed Specimen: Blood from Peripheral, Hand, Left Updated: 04/14/22 1308     Blood Culture, Routine Gram stain aer bottle:Upon further review no gram negative rods found       Gram positive cocci in clusters resembling Staph      Corrected Results called to and read back by:  Patricia Panchal 04/13/2022        10:06       Positive results previously called to Dawn Choudhary RN 04/10/2022  22:46      Gram stain lyubov bottle: Gram positive cocci in clusters resembling Staph       Results called to and read back by:Dawn Choudhary  04/11/2022  22:00      STAPHYLOCOCCUS HOMINIS    Blood Culture #2  **CANNOT BE ORDERED STAT** [[id number]]  (Abnormal)  (Susceptibility) Collected: 04/09/22 1612    Order Status: Completed Specimen: Blood from Peripheral, Forearm, Left Updated: 04/14/22 1041     Blood Culture, Routine Gram stain aer bottle: Gram positive cocci in clusters resembling Staph       Results called to and read back by:Dawn Choudhary RN 04/10/2022  22:08      STAPHYLOCOCCUS HOMINIS    Urine culture [[id number]] Collected: 04/10/22 0530    Order Status: Completed Specimen: Urine Updated: 04/11/22 1258     Urine Culture, Routine No significant growth     Comment: Previous comment was modified by [email or web address] at 12:58 on 04/11/2022 No growth       Narrative:      REPLACES ORDER # [id number]

## 2022-04-16 NOTE — PROGRESS NOTES
[facility] Medicine  Progress Note    Patient Name: Johanny Curtis  MRN: [id number]  Patient Class: IP- Inpatient   Admission Date: 4/9/2022  Length of Stay: 7 days  Attending Physician: Isaac Ferrer MD  Primary Care Provider: Leticia Weems MD        Subjective:     Principal Problem:Encephalopathy, metabolic        HPI:  Johanny Curtis is a 89 year old Black woman with hypertension, heart failure with reduced ejection fraction, aortic atherosclerosis, abdominal aortic aneurysm, peripheral artery disease, hypothyroidism, chronic kidney disease stage 4, secondary hyperparathyroidism, anemia, gout, Alzheimer's dementia, history of choledocholithiasis status post biliary sphincterotomy on 9/8/2020, history of cholecystectomy, history of hysterectomy. She lives in [facility]. She is . Her primary care physician is Dr. Leticia Weems. She is DNR.               She presented to [facility] Emergency Department on 4/9/2022 with altered mental status. Her daughter, who has lived with her for the past 10 years due to her dementia, reported progressive debility and cognitive dysfunction over that period of time. She is bed bound and has developed decubitus wounds on her sacrum and heels since her last day at a skilled nursing facility in February, due to prolonged delays in having an inflatable mattress delivered. It only arrived a few days prior to presentation. Her daughter has been turning her every few hours and she has been getting wound care with home health. Her daughter noticed improvement in her wounds, but for the past couple days she had also noticed a decline in her mother's mental status. On the day of presentation, when her daughter tried to move her it seemed like everything hurt. She was also much less responsive to eating and drinking.               In the emergency department, she had tachycardia and leukocytosis (WBC 88913/uL with 89.4%  granulocytes). Chest X-ray showed residual diffuse nonspecific interstitial coarsening and subtle patchy subsegmental opacities. Ankle X-ray showed no evidence of osteomyelitis. She was given 1 liter of normal saline, piperacillin-tazobactam, and vancomycin. She was admitted to Hospital Medicine Team A.      Overview/Hospital Course:  Speech Language Pathology recommended pureed diet with nectar thickened liquids. Wound Care recommended Triad ointment to sacrum and betadine swabs to left heel. Urinalysis showed positive nitrite, >100 WBC/hpf, many WBC clumps, and many bacteria. Urine culture had no significant growth. Blood cultures grew Staphylococcus hominis. Podiatry was consulted and found no signs of infection of her foot. Infectious Disease recommended continuing vancomycin until 4/25/2022.       Interval History: Needs PT and OT evaluations and PICC or midline to continue vancomycin. Likely going to SNF.    Review of Systems   Unable to perform ROS: Dementia   Objective:     Vital Signs (Most Recent):  Temp: 97.9 °F (36.6 °C) (04/16/22 0747)  Pulse: (!) 115 (04/16/22 0747)  Resp: 20 (04/16/22 0747)  BP: 136/62 (04/16/22 0747)  SpO2: (!) 93 % (04/16/22 0747)   Vital Signs (24h Range):  Temp:  [97.9 °F (36.6 °C)-99.7 °F (37.6 °C)] 97.9 °F (36.6 °C)  Pulse:  [115-122] 115  Resp:  [18-27] 20  SpO2:  [88 %-97 %] 93 %  BP: (131-162)/(58-67) 136/62     Weight: 60.8 kg (134 lb)  Body mass index is 29 kg/m².    Intake/Output Summary (Last 24 hours) at 4/16/2022 0808  Last data filed at 4/16/2022 0700  Gross per 24 hour   Intake 300 ml   Output --   Net 300 ml        Physical Exam  Vitals and nursing note reviewed.   Constitutional:       General: She is not in acute distress.     Appearance: She is well-developed. She is not toxic-appearing or diaphoretic.   Pulmonary:      Effort: Pulmonary effort is normal. No respiratory distress.   Neurological:      Mental Status: She is alert. Mental status is at baseline. She  is disoriented.      Motor: Atrophy present. No seizure activity.   Psychiatric:         Mood and Affect: Mood is not anxious. Affect is blunt.         Cognition and Memory: Cognition is impaired. Memory is impaired.           Significant Labs:  CBC:  Recent Labs   Lab 04/15/22  0323   WBC 25.03*   HGB 10.2*   HCT 32.2*   *       CMP:  Recent Labs   Lab 04/15/22  0323 04/16/22  0542   * 147*   K 3.6 3.3*   * 112*   CO2 23 22*   GLU 99 88   BUN 49* 49*   CREATININE 1.6* 1.4   CALCIUM 11.8* 11.5*   ANIONGAP 12 13   EGFRNONAA 28.0* 32.9*           Assessment/Plan:      Bacteremia due to Staphylococcus  Infectious Disease recommended continuing vancomycin until 4/25/2022. If she stays in the hospital until Monday (likey so due to needing skilled nursing facility placement), then she can get a midline catheter. If she is discharged over the weekend, then she can get a PICC since she is not a dialysis candidate.    Pressure injury of left heel, unstageable  Wound care made recommendations for heel wound as well as sacral decubitus wound.    Late onset Alzheimer's dementia with behavioral disturbance  Delirium precautions. DNR. Per discussion with patient's daughter at bedside, considering home hospice on discharge, but following treatment of current infection.     Heart failure with reduced ejection fraction  Currently without evidence of decompensation. Cautious fluid resuscitation as needed.    CKD (chronic kidney disease), stage IV  Stable. DNR so not a dialysis candidate. Give 5% dextrose 100 mL/hr x 5 hours for hypernatremia. Give potassium chloride for hypokalemia.    Hypothyroidism  Continue home levothyroxine.    Essential hypertension  Continue home nifedipine.      VTE Risk Mitigation (From admission, onward)         Ordered     enoxaparin injection 30 mg  Daily         04/09/22 2253     IP VTE HIGH RISK PATIENT  Once         04/09/22 2253     Place sequential compression device  Until  discontinued         04/09/22 2253                Discharge Planning   ION: 4/18/2022     Code Status: DNR   Is the patient medically ready for discharge?: Yes    Reason for patient still in hospital (select all that apply): PT / OT recommendations and Pending disposition  Discharge Plan A: Home Health   Discharge Delays: None known at this time              Isaac Ferrer MD  Department of Hospital Medicine   [facility]

## 2022-04-16 NOTE — ASSESSMENT & PLAN NOTE
Stable. DNR so not a dialysis candidate. Give 5% dextrose 100 mL/hr x 5 hours for hypernatremia. Give potassium chloride for hypokalemia.

## 2022-04-16 NOTE — ASSESSMENT & PLAN NOTE
Infectious Disease recommended continuing vancomycin until 4/25/2022. Still determining proper vancomycin dosing, likely 500 mg every 48 hours. Get midline catheter placed on Monday 4/18/2022 then discharge home with home health.

## 2022-04-16 NOTE — SUBJECTIVE & OBJECTIVE
Interval History: Needs PT and OT evaluations and PICC or midline to continue vancomycin. Likely going to SNF.    Review of Systems   Unable to perform ROS: Dementia   Objective:     Vital Signs (Most Recent):  Temp: 97.9 °F (36.6 °C) (04/16/22 0747)  Pulse: (!) 115 (04/16/22 0747)  Resp: 20 (04/16/22 0747)  BP: 136/62 (04/16/22 0747)  SpO2: (!) 93 % (04/16/22 0747)   Vital Signs (24h Range):  Temp:  [97.9 °F (36.6 °C)-99.7 °F (37.6 °C)] 97.9 °F (36.6 °C)  Pulse:  [115-122] 115  Resp:  [18-27] 20  SpO2:  [88 %-97 %] 93 %  BP: (131-162)/(58-67) 136/62     Weight: 60.8 kg (134 lb)  Body mass index is 29 kg/m².    Intake/Output Summary (Last 24 hours) at 4/16/2022 0808  Last data filed at 4/16/2022 0700  Gross per 24 hour   Intake 300 ml   Output --   Net 300 ml        Physical Exam  Vitals and nursing note reviewed.   Constitutional:       General: She is not in acute distress.     Appearance: She is well-developed. She is not toxic-appearing or diaphoretic.   Pulmonary:      Effort: Pulmonary effort is normal. No respiratory distress.   Neurological:      Mental Status: She is alert. Mental status is at baseline. She is disoriented.      Motor: Atrophy present. No seizure activity.   Psychiatric:         Mood and Affect: Mood is not anxious. Affect is blunt.         Cognition and Memory: Cognition is impaired. Memory is impaired.           Significant Labs:  CBC:  Recent Labs   Lab 04/15/22  0323   WBC 25.03*   HGB 10.2*   HCT 32.2*   *       CMP:  Recent Labs   Lab 04/15/22  0323 04/16/22  0542   * 147*   K 3.6 3.3*   * 112*   CO2 23 22*   GLU 99 88   BUN 49* 49*   CREATININE 1.6* 1.4   CALCIUM 11.8* 11.5*   ANIONGAP 12 13   EGFRNONAA 28.0* 32.9*

## 2022-04-16 NOTE — PT/OT/SLP EVAL
Occupational Therapy   Evaluation and Discharge Note    Name: Johanny Curtis  MRN: [id number]  Admitting Diagnosis:  Encephalopathy, metabolic   Recent Surgery: * No surgery found *      Recommendations:     Discharge Recommendations: home with home health, nursing facility, basic  Discharge Equipment Recommendations:  none  Barriers to discharge:  None    Assessment:     Johanny Curtis is a 89 y.o. female with a medical diagnosis of Encephalopathy, metabolic. At this time, patient is functioning at their prior level of function and does not require further acute OT services.     Plan:     During this hospitalization, patient does not require further acute OT services.  Please re-consult if situation changes.    · Plan of Care Reviewed with: patient    Subjective     Chief Complaint: Pain w/ mobility.   Patient/Family Comments/goals: Return home     Occupational Profile:  Living Environment: Pt lives w/ dtr and is bed bound at baseline.   Previous level of function: Dependent for mobility and ADLs except indep in eating.   Roles and Routines: N/A  Equipment Used at home:  wheelchair, walker, rolling, hospital bed, shower chair, bath bench, bedside commode  Assistance upon Discharge: Pt has assistance from dtr.     Pain/Comfort:  · Pain Rating 1: 0/10  · Pain Rating Post-Intervention 1: 0/10    Patients cultural, spiritual, Buddhist conflicts given the current situation:      Objective:     Communicated with: RN prior to session.  Patient found HOB elevated with   upon OT entry to room.    General Precautions: Standard, fall   Orthopedic Precautions:N/A   Braces: N/A  Respiratory Status: Room air     Occupational Performance:    Bed Mobility:    · Patient completed Rolling/Turning to Left with  total assistance  · Patient completed Rolling/Turning to Right with total assistance  · Patient completed Scooting/Bridging with total assistance    Functional Mobility/Transfers:  · Pt be bound at baseline     Activities  of Daily Living:  · Feeding:  minimum assistance reuqired min a at end range of motor feeding pattern     Cognitive/Visual Perceptual:  Cognitive/Psychosocial Skills:     -       Oriented to: Person, Place and Time   -       Follows Commands/attention:Follows multistep  commands  -       Communication: clear/fluent  -       Memory: No Deficits noted  -       Safety awareness/insight to disability: intact   -       Mood/Affect/Coping skills/emotional control: Appropriate to situation  Visual/Perceptual:      -Intact      Physical Exam:  Upper Extremity Range of Motion:     -       Right Upper Extremity: Deficits: ~30 degrees of AROM  -       Left Upper Extremity: Deficits: ~30 degrees of AROM  Upper Extremity Strength:    -       Right Upper Extremity: WFL  -       Left Upper Extremity: WFL   Strength:    -       Right Upper Extremity: Deficits: 3/10  -       Left Upper Extremity: Deficits: 3/5  Fine Motor Coordination:    -       Intact  Gross motor coordination:   WFL    AMPAC 6 Click ADL:  AMPAC Total Score: 8    Treatment & Education:  Pt educated on:     POC & overall coordination of care   Education:    Patient left HOB elevated with all lines intact, call button in reach and RN present    GOALS:   Multidisciplinary Problems     Occupational Therapy Goals     Not on file                History:     Past Medical History:   Diagnosis Date    AAA (abdominal aortic aneurysm)     Anemia in CKD (chronic kidney disease)     Arthritis     Bacteremia due to Escherichia coli 9/24/2020    Cataract     Class 1 obesity due to excess calories with serious comorbidity in adult 7/6/2017    Gout, chronic     Heart failure with reduced ejection fraction 1/29/2022    High cholesterol     Hypercapnic respiratory failure 1/28/2022    Hypertension     Hypothyroidism     Late onset Alzheimer's dementia with behavioral disturbance 4/9/2022    Obesity     Plantar fasciitis     PVD (peripheral vascular disease)      Thyroid trouble        Past Surgical History:   Procedure Laterality Date    BREAST BIOPSY Left     BREAST SURGERY Left 1972    benign breast lump    CATARACT EXTRACTION  3/18/13    both eyes -     CHOLECYSTECTOMY      ENDOSCOPIC ULTRASOUND OF UPPER GASTROINTESTINAL TRACT N/A 9/8/2020    Procedure: ULTRASOUND, UPPER GI TRACT, ENDOSCOPIC;  Surgeon: Rasheed Balbuena MD;  Location: [address]);  Service: Endoscopy;  Laterality: N/A;    ERCP N/A 9/8/2020    Procedure: ERCP (ENDOSCOPIC RETROGRADE CHOLANGIOPANCREATOGRAPHY);  Surgeon: Rasheed Balbuena MD;  Location: [address]);  Service: Endoscopy;  Laterality: N/A;    EYE SURGERY      HYSTERECTOMY      SKIN BIOPSY      Left breast       Time Tracking:     OT Date of Treatment: 04/16/22  OT Start Time: 1116  OT Stop Time: 1126  OT Total Time (min): 10 min    Billable Minutes:Evaluation 10 minutes    4/16/2022

## 2022-04-16 NOTE — PLAN OF CARE
Received patient lying in bed with no apparent distress  Respiration even and unlabored  AAOx3; person, place, time (year + president)  Turn q2h  Patient ate 50% of breakfast and dinner; with oral intake of 240ml  Heels offloaded with boots  No changes in condition during this shift  Bed locked in lowest position  HOB elevated 45 degrees  Call light in reach.

## 2022-04-17 NOTE — PROGRESS NOTES
Pharmacokinetic Assessment Follow Up: IV Vancomycin    Vancomycin serum concentration assessment(s):    The random level was drawn correctly and can be used to guide therapy at this time. The measurement is within the desired definitive target range of 15 to 20 mcg/mL.    Vancomycin Regimen Plan:    · Re-dose when the random level is less than 20 mcg/mL, next level to be drawn at 0400 on 4/18/22 with AM labs.   · Patient's random trough with her AM lab came back at 17.2 mcg/mL today so she will be re-dosed to maintain her trough goal of 15-20 mcg/mL.  · Patient will be dosed with vancomycin 500 mg IV once today.  · Goal trough is 15-20 mcg/mL and she will continue to be pulse dosed when trough is below 20 mcg/mL.   · Patient is a CKD stage 4 patient, but not a candidate for dialysis.  · ID following for bacteremia, EOT 4/25/2022.  · BCx 4/9: staph hominis  · BCx 4/11: ngtd    Drug levels (last 3 results):  Recent Labs   Lab Result Units 04/15/22  0323 04/16/22  0542 04/17/22  0454   Vancomycin, Random ug/mL 18.0 20.4 17.2       Pharmacy will continue to follow and monitor vancomycin.    Please contact pharmacy at extension [phone] for questions regarding this assessment.    Thank you for the consult,     Thomas Dunham, PharmD  Ext: [phone]     Patient brief summary:  Johanny Curtis is a 89 y.o. female initiated on antimicrobial therapy with IV Vancomycin for treatment of bacteremia.    Drug Allergies:   Review of patient's allergies indicates:  No Known Allergies    Actual Body Weight:   60.8 kg    Renal Function:   Estimated Creatinine Clearance: 19.9 mL/min (A) (based on SCr of 1.5 mg/dL (H)).    CBC (last 72 hours):  Recent Labs   Lab Result Units 04/15/22  0323 04/16/22  1101   WBC K/uL 25.03* 24.17*   Hemoglobin g/dL 10.2* 10.2*   Hematocrit % 32.2* 32.0*   Platelets K/uL 515* 514*   Gran % % 93.0* 89.0*   Lymph % % 3.0* 6.0*   Mono % % 1.0* 5.0   Eosinophil % % 0.0 0.0   Basophil % % 0.0 0.0   Differential Method   Manual Manual       Metabolic Panel (last 72 hours):  Recent Labs   Lab Result Units 04/15/22  0323 04/16/22  0542 04/17/22  0454   Sodium mmol/L 146* 147* 143   Potassium mmol/L 3.6 3.3* 4.7   Chloride mmol/L 111* 112* 108   CO2 mmol/L 23 22* 23   Glucose mg/dL 99 88 93   BUN mg/dL 49* 49* 51*   Creatinine mg/dL 1.6* 1.4 1.5*       Vancomycin Administrations:  vancomycin given in the last 96 hours                   vancomycin 500 mg in dextrose 5 % 100 mL IVPB (ready to mix system) (mg) 500 mg New Bag 04/15/22 1030    vancomycin 750 mg in dextrose 5 % 250 mL IVPB (ready to mix system) (mg) 750 mg New Bag 04/13/22 0912                Microbiologic Results:  Microbiology Results (last 7 days)     Procedure Component Value Units Date/Time    Blood culture [[id number]] Collected: 04/11/22 1223    Order Status: Completed Specimen: Blood from Antecubital, Left Arm Updated: 04/16/22 1412     Blood Culture, Routine No growth after 5 days.    Blood culture [[id number]] Collected: 04/11/22 1222    Order Status: Completed Specimen: Blood from Peripheral, Right Hand Updated: 04/16/22 1412     Blood Culture, Routine No growth after 5 days.    Blood Culture #1 **CANNOT BE ORDERED STAT** [[id number]]  (Abnormal)  (Susceptibility) Collected: 04/09/22 1613    Order Status: Completed Specimen: Blood from Peripheral, Hand, Left Updated: 04/14/22 1308     Blood Culture, Routine Gram stain aer bottle:Upon further review no gram negative rods found       Gram positive cocci in clusters resembling Staph      Corrected Results called to and read back by:  Patricia Panchal 04/13/2022        10:06       Positive results previously called to Dawn Choudhary RN 04/10/2022  22:46      Gram stain lyubov bottle: Gram positive cocci in clusters resembling Staph       Results called to and read back by:Dawn Choudhary  04/11/2022  22:00      STAPHYLOCOCCUS HOMINIS    Blood Culture #2 **CANNOT BE ORDERED STAT** [[id number]]  (Abnormal)  (Susceptibility) Collected:  04/09/22 1612    Order Status: Completed Specimen: Blood from Peripheral, Forearm, Left Updated: 04/14/22 1041     Blood Culture, Routine Gram stain aer bottle: Gram positive cocci in clusters resembling Staph       Results called to and read back by:Dawn Choudhary RN 04/10/2022  22:08      STAPHYLOCOCCUS HOMINIS    Urine culture [[id number]] Collected: 04/10/22 0530    Order Status: Completed Specimen: Urine Updated: 04/11/22 1258     Urine Culture, Routine No significant growth     Comment: Previous comment was modified by [email or web address] at 12:58 on 04/11/2022 No growth       Narrative:      REPLACES ORDER # [id number]

## 2022-04-17 NOTE — PT/OT/SLP EVAL
"Physical Therapy Evaluation and Discharge Note    Patient Name:  Johanny Curtis   MRN:  [id number]    Recommendations:     Discharge Recommendations:  Home (with 24/7 assist)   Discharge Equipment Recommendations: lift device   Barriers to discharge: increased assistance required    Assessment:     Johanny Curtis is a 89 y.o. female admitted with a medical diagnosis of Encephalopathy, metabolic. Per EMR and prior OT evaluation, pt currently primarily resides in supine in bed at home with 24/7 assistance with limited mobility performed. Pt requiring total assistance during attempts at mobility on this date. Pt is currently at or near her functional baseline, which is total assistance for care. Recommend returning home with total assist or NH placement if burden of care is exceeding capabilities. As a result, patient is without further acute care PT needs at this time. Please re-consult if pt has a change in status, will sign off.    Recent Surgery: * No surgery found *      Plan:     During this hospitalization, patient does not require further acute PT services.  Please re-consult if situation changes.      Subjective     Chief Complaint: "Johanny"  Patient/Family Comments/goals: none defined  Pain/Comfort:  · Pain Rating 1: 0/10, however upon attempts at all mobility or ROM assessment pt w/ significant c/o pain, requesting to stop    Patients cultural, spiritual, Mosque conflicts given the current situation: no    Living Environment: Pt is an unreliable historian at this time, per EMR pt lives with her dtr   Prior level of function: total care for all mobility and ADLs, pt primarily bed bound at home with dtr assisting with ADLs  Support available upon discharge: daughter  Equipment owned: walker, rolling, wheelchair, hospital bed    Objective:     Communicated with RN prior to session.  Patient found supine with peripheral IV  upon PT entry to room.     General Precautions: Standard, fall   Orthopedic " Precautions:N/A   Braces: N/A     Exams:  · Cognition: impaired; pt alert and oriented to name, year, and location as [facility]. Pt requiring multiple questions and attempts and increased time with impaired and delayed communication. Pt followed simple, concise commands <50% of the time.  · BLE ROM/strength: limited assessment as pt notable c/o pain with all attempts at mobility, resting in slightly flexed BLE hip and knee flexion    Functional Mobility:  · Bed Mobility: total assist to attempt rolling bilaterally, pt grimacing with pain, deferred further mobility.       Therapeutic Activities and Exercises:   Education provided re: d/c planning, PT POC, safety in mobility. Education provided re: role of therapy and goal of progression. Anticipate limited carryover throughout session.    AM-PAC 6 CLICK MOBILITY  Total Score:6     Patient left supine with all lines intact and call button in reach.    GOALS:   Multidisciplinary Problems     Physical Therapy Goals     Not on file                History:     Past Medical History:   Diagnosis Date    AAA (abdominal aortic aneurysm)     Anemia in CKD (chronic kidney disease)     Arthritis     Bacteremia due to Escherichia coli 9/24/2020    Cataract     Class 1 obesity due to excess calories with serious comorbidity in adult 7/6/2017    Gout, chronic     Heart failure with reduced ejection fraction 1/29/2022    High cholesterol     Hypercapnic respiratory failure 1/28/2022    Hypertension     Hypothyroidism     Late onset Alzheimer's dementia with behavioral disturbance 4/9/2022    Obesity     Plantar fasciitis     PVD (peripheral vascular disease)     Thyroid trouble        Past Surgical History:   Procedure Laterality Date    BREAST BIOPSY Left     BREAST SURGERY Left 1972    benign breast lump    CATARACT EXTRACTION  3/18/13    both eyes -     CHOLECYSTECTOMY      ENDOSCOPIC ULTRASOUND OF UPPER GASTROINTESTINAL TRACT N/A 9/8/2020     Procedure: ULTRASOUND, UPPER GI TRACT, ENDOSCOPIC;  Surgeon: Rasheed Balbuena MD;  Location: [facility] ([address] [place] [address]);  Service: Endoscopy;  Laterality: N/A;    ERCP N/A 9/8/2020    Procedure: ERCP (ENDOSCOPIC RETROGRADE CHOLANGIOPANCREATOGRAPHY);  Surgeon: Rasheed Balbuena MD;  Location: [address]);  Service: Endoscopy;  Laterality: N/A;    EYE SURGERY      HYSTERECTOMY      SKIN BIOPSY      Left breast       Time Tracking:     PT Received On: 04/17/22  PT Start Time: 0933     PT Stop Time: 0942  PT Total Time (min): 9 min     Billable Minutes: Evaluation 1 unit      Beth Concepcion, PT  04/17/2022

## 2022-04-17 NOTE — PROGRESS NOTES
[facility] Medicine  Progress Note    Patient Name: Johanny Curtis  MRN: [id number]  Patient Class: IP- Inpatient   Admission Date: 4/9/2022  Length of Stay: 8 days  Attending Physician: Isaac Ferrer MD  Primary Care Provider: Leticia Weems MD        Subjective:     Principal Problem:Encephalopathy, metabolic        HPI:  Johanny Curtis is a 89 year old Black woman with hypertension, heart failure with reduced ejection fraction, aortic atherosclerosis, abdominal aortic aneurysm, peripheral artery disease, hypothyroidism, chronic kidney disease stage 4, secondary hyperparathyroidism, anemia, gout, Alzheimer's dementia, history of choledocholithiasis status post biliary sphincterotomy on 9/8/2020, history of cholecystectomy, history of hysterectomy. She lives in [facility]. She is . Her primary care physician is Dr. Leticia Weems. She is DNR.               She presented to [facility] Emergency Department on 4/9/2022 with altered mental status. Her daughter, who has lived with her for the past 10 years due to her dementia, reported progressive debility and cognitive dysfunction over that period of time. She is bed bound and has developed decubitus wounds on her sacrum and heels since her last day at a skilled nursing facility in February, due to prolonged delays in having an inflatable mattress delivered. It only arrived a few days prior to presentation. Her daughter has been turning her every few hours and she has been getting wound care with home health. Her daughter noticed improvement in her wounds, but for the past couple days she had also noticed a decline in her mother's mental status. On the day of presentation, when her daughter tried to move her it seemed like everything hurt. She was also much less responsive to eating and drinking.               In the emergency department, she had tachycardia and leukocytosis (WBC 86388/uL with 89.4%  granulocytes). Chest X-ray showed residual diffuse nonspecific interstitial coarsening and subtle patchy subsegmental opacities. Ankle X-ray showed no evidence of osteomyelitis. She was given 1 liter of normal saline, piperacillin-tazobactam, and vancomycin. She was admitted to Hospital Medicine Team A.      Overview/Hospital Course:  Speech Language Pathology recommended pureed diet with nectar thickened liquids. Wound Care recommended Triad ointment to sacrum and betadine swabs to left heel. Urinalysis showed positive nitrite, >100 WBC/hpf, many WBC clumps, and many bacteria. Urine culture had no significant growth. Blood cultures grew Staphylococcus hominis. Podiatry was consulted and found no signs of infection of her foot. Infectious Disease recommended continuing vancomycin until 4/25/2022. Physical and Occupational Therapy recommended home with home health or basic nursing facility. She developed hypernatremia, which was treated with 5% dextrose infusion. Rather than discharging her home on Easter Sunday 4/17/2022, she was kept in the hospital until 4/18/2022, which would allow a midline catheter to be placed due to the 7 day maximum antibiotic duration allowed.      Interval History: PT and OT recommended home with home health. Rather than discharging home today (Easter Sunday), will wait until tomorrow to get midline catheter placed and discharge when Case Management is fully staffed.    Review of Systems   Constitutional:  Negative for chills and fever.   Gastrointestinal:  Negative for nausea and vomiting.   Neurological:  Negative for seizures and syncope.   Objective:     Vital Signs (Most Recent):  Temp: 99.2 °F (37.3 °C) (04/17/22 0422)  Pulse: (!) 118 (04/17/22 0422)  Resp: 18 (04/17/22 0422)  BP: (!) 146/67 (04/17/22 0422)  SpO2: (!) 90 % (04/17/22 0422)   Vital Signs (24h Range):  Temp:  [96.6 °F (35.9 °C)-99.2 °F (37.3 °C)] 99.2 °F (37.3 °C)  Pulse:  [] 118  Resp:  [16-20] 18  SpO2:  [90  %-96 %] 90 %  BP: (114-146)/(56-76) 146/67     Weight: 60.8 kg (134 lb)  Body mass index is 29 kg/m².    Intake/Output Summary (Last 24 hours) at 4/17/2022 0727  Last data filed at 4/16/2022 2135  Gross per 24 hour   Intake 700 ml   Output --   Net 700 ml        Physical Exam  Vitals and nursing note reviewed.   Constitutional:       General: She is not in acute distress.     Appearance: She is well-developed. She is not toxic-appearing or diaphoretic.   Pulmonary:      Effort: Pulmonary effort is normal. No respiratory distress.   Neurological:      Mental Status: She is alert. Mental status is at baseline.      Motor: No seizure activity.           Significant Labs:  CBC:  Recent Labs   Lab 04/16/22  1101   WBC 24.17*   HGB 10.2*   HCT 32.0*   *       CMP:  Recent Labs   Lab 04/16/22  0542 04/17/22  0454   * 143   K 3.3* 4.7   * 108   CO2 22* 23   GLU 88 93   BUN 49* 51*   CREATININE 1.4 1.5*   CALCIUM 11.5* 10.5   ANIONGAP 13 12   EGFRNONAA 32.9* 30.2*           Assessment/Plan:      Bacteremia due to Staphylococcus  Infectious Disease recommended continuing vancomycin until 4/25/2022. Still determining proper vancomycin dosing, likely 500 mg every 48 hours. Get midline catheter placed on Monday 4/18/2022 then discharge home with home health.    Pressure injury of left heel, unstageable  Wound care made recommendations for heel wound as well as sacral decubitus wound.    Late onset Alzheimer's dementia with behavioral disturbance  Delirium precautions. DNR. Per discussion with patient's daughter at bedside, considering home hospice on discharge, but following treatment of current infection.     Heart failure with reduced ejection fraction  Currently without evidence of decompensation. Cautious fluid resuscitation as needed.    CKD (chronic kidney disease), stage IV  Stable. DNR so not a dialysis candidate. Give 5% dextrose 100 mL/hr x 5 hours for hypernatremia. Give potassium chloride for  hypokalemia.    Hypothyroidism  Continue home levothyroxine.    Essential hypertension  Continue home nifedipine.      VTE Risk Mitigation (From admission, onward)         Ordered     enoxaparin injection 30 mg  Daily         04/09/22 2253     IP VTE HIGH RISK PATIENT  Once         04/09/22 2253     Place sequential compression device  Until discontinued         04/09/22 2253                Discharge Planning   ION: 4/18/2022     Code Status: DNR   Is the patient medically ready for discharge?: Yes    Reason for patient still in hospital (select all that apply): Other (specify) midline catheter placement, determination of proper vancomcyin dosing  Discharge Plan A: Home Health   Discharge Delays: None known at this time              Isaac Ferrer MD  Department of Hospital Medicine   [facility] Surg

## 2022-04-18 PROBLEM — T14.8XXA BLOOD BLISTER: Status: ACTIVE | Noted: 2022-01-01

## 2022-04-18 PROBLEM — L30.8 DERMATITIS ASSOCIATED WITH MOISTURE: Status: ACTIVE | Noted: 2022-01-01

## 2022-04-18 NOTE — PT/OT/SLP PROGRESS
"Speech Language Pathology Treatment    Patient Name:  Johanny Curtis   MRN:  [id number]  Admitting Diagnosis: Encephalopathy, metabolic    Recommendations:                 General Recommendations:  Dysphagia therapy  Diet recommendations:  Puree, Liquid Diet Level: Nectar Thick   Aspiration Precautions: 1 bite/sip at a time, Assistance with meals and Assistance with thickening liquids, Eliminate distractions, Feed only when awake/alert, Frequent oral care, HOB to 90 degrees, Meds crushed in puree, Monitor for s/s of aspiration, No straws, Remain upright 30 minutes post meal, Small bites/sips and Strict aspiration precautions   General Precautions: Standard, aspiration, fall, nectar thick, pureed diet  Communication strategies:  provide increased time to answer and go to room if call light pushed    Subjective     "I don't want nothing"    Spoke with RN prior to entering pt's room . Pt seen bedside for session, eyes open with open mouth breathing noted upon arrival. No visible signs of pain.        Pain/Comfort:  · Pain Rating 1: 0/10  · Pain Rating Post-Intervention 1: 0/10    Respiratory Status: Room air    Objective:     Has the patient been evaluated by SLP for swallowing?   Yes  Keep patient NPO? No   Current Respiratory Status:   room air     Pt seen for ongoing dysphagia treatment. Puree/nectar breakfast at bedside upon arrival. Pt required max encouragement to accept single tsp sip of nectar thick liquids; refused further trials despite max encouragement. Provided education re: role of ST, POC, diet recs, and swallow precautions. No evidence of learning. At end of session, pt remained bedside with call light and all needs in reach. White board updated.        Assessment:     Johanny Curtis is a 89 y.o. female with an SLP diagnosis of Dysphagia.  She presents with decreased acceptance of oral trials this date.    Goals:   Multidisciplinary Problems     SLP Goals        Problem: SLP    Goal Priority " Disciplines Outcome   SLP Goal     SLP Ongoing, Progressing   Description: Speech Language Pathology Goals  Goals expected to be met by 4/25  1. Pt will participate in ongoing swallow assessment                           Plan:     · Patient to be seen:  3 x/week   · Plan of Care expires:  04/28/22  · Plan of Care reviewed with:  patient   · SLP Follow-Up:  Yes       Discharge recommendations:   (tbd)   Barriers to Discharge:  Level of Skilled Assistance Needed .    Time Tracking:     SLP Treatment Date:   04/18/22  Speech Start Time:  0755  Speech Stop Time:  0803     Speech Total Time (min):  8 min    Billable Minutes: Treatment Swallowing Dysfunction 8    04/18/2022

## 2022-04-18 NOTE — PROGRESS NOTES
Pharmacokinetic Assessment Follow Up: IV Vancomycin    Vancomycin serum concentration assessment(s):    The random level was drawn correctly and can be used to guide therapy at this time. The measurement is within the desired definitive target range of 15 to 20 mcg/mL.    Vancomycin Regimen Plan:    -Re-dose when the random level is less than 20 mcg/mL, next level to be drawn at 0400 on 4/19 with AM labs.   -Goal trough is 15-20 mcg/mL and she will continue to be pulse dosed when trough is below 20 mcg/mL.   -Patient is a CKD stage 4 patient, but not a candidate for dialysis.  -ID following for bacteremia, EOT 4/25/2022.  · BCx 4/9: staph hominis  · BCx 4/11: ngtd    Drug levels (last 3 results):  Recent Labs   Lab Result Units 04/16/22  0542 04/17/22  0454 04/18/22  0322   Vancomycin, Random ug/mL 20.4 17.2 20.0       Pharmacy will continue to follow and monitor vancomycin.    Please contact pharmacy at extension [phone] for questions regarding this assessment.    Thank you for the consult,     Laura Noriega, PharmD, BCPS  Ext [phone]     Patient brief summary:  Johanny Curtis is a 89 y.o. female initiated on antimicrobial therapy with IV Vancomycin for treatment of bacteremia.    Drug Allergies:   Review of patient's allergies indicates:  No Known Allergies    Actual Body Weight:   60.8 kg    Renal Function:   Estimated Creatinine Clearance: 19.9 mL/min (A) (based on SCr of 1.5 mg/dL (H)).    CBC (last 72 hours):  Recent Labs   Lab Result Units 04/16/22  1101   WBC K/uL 24.17*   Hemoglobin g/dL 10.2*   Hematocrit % 32.0*   Platelets K/uL 514*   Gran % % 89.0*   Lymph % % 6.0*   Mono % % 5.0   Eosinophil % % 0.0   Basophil % % 0.0   Differential Method  Manual       Metabolic Panel (last 72 hours):  Recent Labs   Lab Result Units 04/16/22  0542 04/17/22  0454 04/18/22  0322   Sodium mmol/L 147* 143 143   Potassium mmol/L 3.3* 4.7 3.9   Chloride mmol/L 112* 108 108   CO2 mmol/L 22* 23 24   Glucose mg/dL 88 93 72    BUN mg/dL 49* 51* 50*   Creatinine mg/dL 1.4 1.5* 1.5*       Vancomycin Administrations:  vancomycin given in the last 96 hours                   vancomycin 500 mg in dextrose 5 % 100 mL IVPB (ready to mix system) (mg) 500 mg New Bag 04/15/22 1030    vancomycin 750 mg in dextrose 5 % 250 mL IVPB (ready to mix system) (mg) 750 mg New Bag 04/13/22 0912                Microbiologic Results:  Microbiology Results (last 7 days)     Procedure Component Value Units Date/Time    Blood culture [[id number]] Collected: 04/11/22 1223    Order Status: Completed Specimen: Blood from Antecubital, Left Arm Updated: 04/16/22 1412     Blood Culture, Routine No growth after 5 days.    Blood culture [[id number]] Collected: 04/11/22 1222    Order Status: Completed Specimen: Blood from Peripheral, Right Hand Updated: 04/16/22 1412     Blood Culture, Routine No growth after 5 days.    Blood Culture #1 **CANNOT BE ORDERED STAT** [[id number]]  (Abnormal)  (Susceptibility) Collected: 04/09/22 1613    Order Status: Completed Specimen: Blood from Peripheral, Hand, Left Updated: 04/14/22 1308     Blood Culture, Routine Gram stain aer bottle:Upon further review no gram negative rods found       Gram positive cocci in clusters resembling Staph      Corrected Results called to and read back by:  Patricia Panchal 04/13/2022        10:06       Positive results previously called to Dawn Choudhary RN 04/10/2022  22:46      Gram stain lyubov bottle: Gram positive cocci in clusters resembling Staph       Results called to and read back by:Dawn Choudhary  04/11/2022  22:00      STAPHYLOCOCCUS HOMINIS    Blood Culture #2 **CANNOT BE ORDERED STAT** [[id number]]  (Abnormal)  (Susceptibility) Collected: 04/09/22 1612    Order Status: Completed Specimen: Blood from Peripheral, Forearm, Left Updated: 04/14/22 1041     Blood Culture, Routine Gram stain aer bottle: Gram positive cocci in clusters resembling Staph       Results called to and read back by:Dawn Choudhary RN  04/10/2022  22:08      STAPHYLOCOCCUS HOMINIS    Urine culture [[id number]] Collected: 04/10/22 0530    Order Status: Completed Specimen: Urine Updated: 04/11/22 1258     Urine Culture, Routine No significant growth     Comment: Previous comment was modified by SHERMAN at 12:58 on 04/11/2022 No growth       Narrative:      REPLACES ORDER # [id number]

## 2022-04-18 NOTE — PLAN OF CARE
[facility]  Discharge Final Note    Primary Care Provider: Leticia Weems MD    Expected Discharge Date: 4/18/2022     Future Appointments   Date Time Provider Department Center   4/21/2022  1:00 PM Albert Babb MD [facility]   4/22/2022  8:00 AM Olivia Oscar NP [id number]       Final Discharge Note (most recent)     Final Note - 04/18/22 0836        Final Note    Assessment Type Final Discharge Note     Anticipated Discharge Disposition Home-Health Care Svc     What phone number can be called within the next 1-3 days to see how you are doing after discharge? [phone]     Hospital Resources/Appts/Education Provided Post-Acute resouces added to AVS;Appointments scheduled and added to AVS        Post-Acute Status    Post-Acute Authorization Home Health     Home Health Status Referrals Sent     Discharge Delays None known at this time                 Important Message from Medicare  Important Message from Medicare regarding Discharge Appeal Rights: Given to patient/caregiver, Explained to patient/caregiver, Signed/date by patient/caregiver     Date IMM was signed: 04/14/22  Time IMM was signed: 1326    Contact Info     Albert Babb MD   Specialty: Internal Medicine    [address]  [facility] [address]   Phone: [phone]       Next Steps: Follow up on 4/21/2022    Instructions: 1:00 PM

## 2022-04-18 NOTE — PLAN OF CARE
Received patient lying in bed with no apparent distress  Respiration even and unlabored  AAOx3; person, place, time (year)  Turn q2h  Midline place to LUE at this time; tolerated well  Family (daughter and ORIANA) came to bedside to clarify patient being discharged; MD spoke with daughter via telephone  Wound care assessed wounds  PIV removed from Lt wrist with catheter intact  Bed locked in lowest position  Call light in reach

## 2022-04-18 NOTE — CONSULTS
Placed 20 g x 8 cm midline in left basilic vein for IV vancomycin, end date 4/25/22 using realtime ultrasound guidance.  Lot#[id number].  Max dwell date 5/17/22.  Imagery recorded and saved.

## 2022-04-18 NOTE — PLAN OF CARE
Received patient lying in bed with no apparent distress  Respiration even and unlabored  AAOx3, person, place, time  Turn q2h  Noted pain with turning or moving of extremities  PIV to Lt wrist patent with no signs of inflammation or phlebitis  Heels elevated off bed  Sacral wound dressing changed with triad cream applied; no changes noted.  Family visited with patient today  HOB elevated above 30 degrees  Bed locked in lowest position  Call light in reach

## 2022-04-18 NOTE — HPI
Johanny Curtis is 89 year old female brought to the hospital for altered mental status. Pt lives with her daughter for the past 10 years due to dementia and she has progressively decline cognitively over this period of time. She is bed bound and has since developed wounds to her sacrum and heel after a stay at skilled nursing in February. Wound care is consulted for evaluation of skin injury.

## 2022-04-18 NOTE — PROGRESS NOTES
[facility]  Progress Note    Patient Name: Johanny Curtis  MRN: [id number]  Patient Class: IP- Inpatient   Admission Date: 4/9/2022  Length of Stay: 9 days  Attending Physician: Isaac Ferrer MD  Primary Care Provider: Leticia Weems MD        Subjective:     Principal Problem:Encephalopathy, metabolic        HPI:  Johanny Curtis is a 89 year old Black woman with hypertension, heart failure with reduced ejection fraction, aortic atherosclerosis, abdominal aortic aneurysm, peripheral artery disease, hypothyroidism, chronic kidney disease stage 4, secondary hyperparathyroidism, anemia, gout, Alzheimer's dementia, history of choledocholithiasis status post biliary sphincterotomy on 9/8/2020, history of cholecystectomy, history of hysterectomy. She lives in [facility]. She is . Her primary care physician is Dr. Leticia Weems. She is DNR.               She presented to [facility] Emergency Department on 4/9/2022 with altered mental status. Her daughter, who has lived with her for the past 10 years due to her dementia, reported progressive debility and cognitive dysfunction over that period of time. She is bed bound and has developed decubitus wounds on her sacrum and heels since her last day at a skilled nursing facility in February, due to prolonged delays in having an inflatable mattress delivered. It only arrived a few days prior to presentation. Her daughter has been turning her every few hours and she has been getting wound care with home health. Her daughter noticed improvement in her wounds, but for the past couple days she had also noticed a decline in her mother's mental status. On the day of presentation, when her daughter tried to move her it seemed like everything hurt. She was also much less responsive to eating and drinking.               In the emergency department, she had tachycardia and leukocytosis (WBC 41278/uL with 89.4%  granulocytes). Chest X-ray showed residual diffuse nonspecific interstitial coarsening and subtle patchy subsegmental opacities. Ankle X-ray showed no evidence of osteomyelitis. She was given 1 liter of normal saline, piperacillin-tazobactam, and vancomycin. She was admitted to Hospital Medicine Team A.      Overview/Hospital Course:  Speech Language Pathology recommended pureed diet with nectar thickened liquids. Wound Care recommended Triad ointment to sacrum and betadine swabs to left heel. Urinalysis showed positive nitrite, >100 WBC/hpf, many WBC clumps, and many bacteria. Urine culture had no significant growth. Blood cultures grew Staphylococcus hominis. Podiatry was consulted and found no signs of infection of her foot. Infectious Disease recommended continuing vancomycin until 4/25/2022. Physical and Occupational Therapy recommended home with home health or basic nursing facility. She developed hypernatremia, which was treated with 5% dextrose infusion. Rather than discharging her home on Easter Sunday 4/17/2022, she was kept in the hospital until 4/18/2022, which would allow a midline catheter to be placed due to the 7 day maximum antibiotic duration allowed. She was discharged home with home health and lift device after midline catheter placement.       Interval History: PT and OT recommended home with home health. Rather than discharging home yesterday (Easter Sunday), waited until today to get midline catheter placed and discharge when Case Management is fully staffed.    Review of Systems   Constitutional:  Negative for chills and fever.   Gastrointestinal:  Negative for nausea and vomiting.   Neurological:  Negative for seizures and syncope.   Objective:     Vital Signs (Most Recent):  Temp: 97.7 °F (36.5 °C) (04/18/22 0455)  Pulse: (!) 112 (04/18/22 0500)  Resp: 20 (04/18/22 0212)  BP: (!) 170/86 (04/18/22 0500)  SpO2: 96 % (04/18/22 0500)   Vital Signs (24h Range):  Temp:  [96.8 °F (36 °C)-98.3 °F  (36.8 °C)] 97.7 °F (36.5 °C)  Pulse:  [105-117] 112  Resp:  [16-20] 20  SpO2:  [91 %-96 %] 96 %  BP: (109-170)/(53-86) 170/86     Weight: 60.8 kg (134 lb)  Body mass index is 29 kg/m².    Intake/Output Summary (Last 24 hours) at 4/18/2022 0731  Last data filed at 4/18/2022 0600  Gross per 24 hour   Intake 105 ml   Output --   Net 105 ml        Physical Exam  Vitals and nursing note reviewed.   Constitutional:       General: She is not in acute distress.     Appearance: She is well-developed. She is not toxic-appearing or diaphoretic.   Pulmonary:      Effort: Pulmonary effort is normal. No respiratory distress.   Neurological:      Mental Status: She is alert. Mental status is at baseline.      Motor: No seizure activity.   Psychiatric:         Attention and Perception: Attention normal.         Mood and Affect: Mood and affect normal.         Behavior: Behavior is cooperative.           Significant Labs:  CBC:  Recent Labs   Lab 04/16/22  1101   WBC 24.17*   HGB 10.2*   HCT 32.0*   *       CMP:  Recent Labs   Lab 04/17/22  0454 04/18/22  0322    143   K 4.7 3.9    108   CO2 23 24   GLU 93 72   BUN 51* 50*   CREATININE 1.5* 1.5*   CALCIUM 10.5 11.4*   ANIONGAP 12 11   EGFRNONAA 30.2* 30.2*           Assessment/Plan:      Bacteremia due to Staphylococcus  Infectious Disease recommended continuing vancomycin until 4/25/2022. Get midline catheter placed then discharge home with home health.    Pressure injury of left heel, unstageable  Wound care made recommendations for heel wound as well as sacral decubitus wound.    Late onset Alzheimer's dementia with behavioral disturbance  Delirium precautions. DNR. Per discussion with patient's daughter at bedside, considering home hospice on discharge, but following treatment of current infection.     Heart failure with reduced ejection fraction  Currently without evidence of decompensation. Cautious fluid resuscitation as needed.    CKD (chronic kidney  disease), stage IV  Stable. DNR so not a dialysis candidate. Give 5% dextrose 100 mL/hr x 5 hours for hypernatremia. Give potassium chloride for hypokalemia.    Hypothyroidism  Continue home levothyroxine.    Essential hypertension  Continue home nifedipine.      VTE Risk Mitigation (From admission, onward)         Ordered     enoxaparin injection 30 mg  Daily         04/09/22 2253     IP VTE HIGH RISK PATIENT  Once         04/09/22 2253     Place sequential compression device  Until discontinued         04/09/22 2253                Discharge Planning   ION: 4/18/2022     Code Status: DNR   Is the patient medically ready for discharge?: Yes    Reason for patient still in hospital (select all that apply): Other (specify) midline catheter placement  Discharge Plan A: Home Health   Discharge Delays: None known at this time              Isaac Ferrer MD  Department of Hospital Medicine   [facility]

## 2022-04-18 NOTE — ASSESSMENT & PLAN NOTE
Infectious Disease recommended continuing vancomycin until 4/25/2022. Get midline catheter placed then discharge home with home health.

## 2022-04-18 NOTE — PLAN OF CARE
Pt stable overnight without acute changes. Pt turned q2h, although pain exacerbated by movement. Pt reports no pain at rest. Pt intermittently verbal with staff but gestures appropriately to staff questions. A&O x2-3. Forgetful. Heels elevated. Fluids offered during rounding.     Problem: Adult Inpatient Plan of Care  Goal: Plan of Care Review  Outcome: Ongoing, Progressing  Goal: Patient-Specific Goal (Individualized)  Outcome: Ongoing, Progressing  Goal: Absence of Hospital-Acquired Illness or Injury  Outcome: Ongoing, Progressing  Goal: Optimal Comfort and Wellbeing  Outcome: Ongoing, Progressing  Goal: Readiness for Transition of Care  Outcome: Ongoing, Progressing     Problem: Skin Injury Risk Increased  Goal: Skin Health and Integrity  Outcome: Ongoing, Progressing     Problem: Fall Injury Risk  Goal: Absence of Fall and Fall-Related Injury  Outcome: Ongoing, Progressing     Problem: Impaired Wound Healing  Goal: Optimal Wound Healing  Outcome: Ongoing, Progressing

## 2022-04-18 NOTE — PLAN OF CARE
[facility]      HOME HEALTH ORDERS  FACE TO FACE ENCOUNTER    Patient Name: Johanny Curtis  YOB: 1930    PCP: Leticia Weems MD   PCP Address: [address] / [place] [address]  PCP Phone Number: [phone]  PCP Fax: [phone]    Encounter Date: 4/9/22    Admit to Home Health    Diagnoses:  Active Hospital Problems    Diagnosis  POA    Dermatitis associated with moisture [L30.8]  Yes    Blood blister [T14.8XXA]  Yes    Late onset Alzheimer's dementia with behavioral disturbance [G30.1, F02.81]  Yes     Chronic    Pressure injury of left heel, unstageable [L89.620]  Yes    Bacteremia due to Staphylococcus [R78.81, B95.8]  Yes    Heart failure with reduced ejection fraction [I50.20]  Yes     Chronic    CKD (chronic kidney disease), stage IV [N18.4]  Yes     Chronic    Hypothyroidism [E03.9]  Yes     Chronic    Essential hypertension [I10]  Yes     Chronic      Resolved Hospital Problems    Diagnosis Date Resolved POA    *Encephalopathy, metabolic [G93.41] 04/14/2022 Yes    Dehydration [E86.0] 04/14/2022 Yes       Follow Up Appointments:  Future Appointments   Date Time Provider Department Center   4/21/2022  1:00 PM Albert Babb MD [facility]   4/22/2022  8:00 AM Olivia Oscar NP [facility] [place]   4/25/2022  3:30 PM Paolo Angeles Jr. PA [facility]       Allergies:Review of patient's allergies indicates:  No Known Allergies    Medications: Review discharge medications with patient and family and provide education.    Current Facility-Administered Medications   Medication Dose Route Frequency Provider Last Rate Last Admin    acetaminophen tablet 650 mg  650 mg Oral Q6H PRN Kulwant Yang MD   650 mg at 04/17/22 0836    allopurinoL tablet 100 mg  100 mg Oral Daily Kulwant Yang MD   100 mg at 04/19/22 0828    aspirin chewable tablet 81 mg  81 mg Oral Daily Kulwant Yang MD   81 mg at 04/19/22 0827    dextrose 10%  bolus 125 mL  12.5 g Intravenous PRN Kulwant Ynag MD        dextrose 10% bolus 250 mL  25 g Intravenous PRN Kulwant Yang MD        enoxaparin injection 30 mg  30 mg Subcutaneous Daily Kulwant Yang MD   30 mg at 04/18/22 1758    ferrous sulfate tablet 1 each  1 tablet Oral Daily Kulwant Yang MD   1 each at 04/19/22 0827    glucagon (human recombinant) injection 1 mg  1 mg Intramuscular PRN Kulwant Yang MD        glucose chewable tablet 16 g  16 g Oral PRN Kulwant Yang MD        glucose chewable tablet 24 g  24 g Oral PRN Kulwant Yang MD        levothyroxine tablet 88 mcg  88 mcg Oral Before breakfast Kulwant Yang MD   88 mcg at 04/19/22 0543    melatonin tablet 6 mg  6 mg Oral Nightly PRN Kulwant Yang MD   6 mg at 04/16/22 2135    metoprolol injection 5 mg  5 mg Intravenous Q5 Min PRN Kulwant Yang MD        NIFEdipine 24 hr tablet 60 mg  60 mg Oral Daily Kulwant Yang MD   60 mg at 04/19/22 0827    senna-docusate 8.6-50 mg per tablet 1 tablet  1 tablet Oral BID PRN Kulwant Yang MD        sodium chloride 0.9% flush 10 mL  10 mL Intravenous Q12H PRN Kulwant Yang MD        vancomycin - pharmacy to dose   Intravenous pharmacy to manage frequency Alireza Bui MD        vancomycin 500 mg in dextrose 5 % 100 mL IVPB (ready to mix system)  500 mg Intravenous Once Isaac Ferrer  mL/hr at 04/19/22 0828 500 mg at 04/19/22 0828     Current Discharge Medication List      START taking these medications    Details   vancomycin HCl (VANCOMYCIN 1 G/250 ML D5W, READY TO MIX SYSTEM,) Inject 125 mLs (500 mg total) into the vein every 48 hours. Give on 4/19/22, 4/21/22, 4/23/22, 4/25/22. for 6 days  Qty: 375 mL, Refills: 0    Associated Diagnoses: Bacteremia due to Staphylococcus         CONTINUE these medications which have NOT CHANGED    Details   acetaminophen (TYLENOL) 160 mg/5 mL  Liqd Take 15.6 mLs (499.2 mg total) by mouth every 6 (six) hours as needed (pain).  Qty: 1 each, Refills: 0    Associated Diagnoses: Hyperuricemia; Acute gout, unspecified cause, unspecified site      allopurinoL (ZYLOPRIM) 100 MG tablet Take 1 tablet (100 mg total) by mouth once daily.  Qty: 30 tablet, Refills: 1    Associated Diagnoses: Hyperuricemia; Acute gout, unspecified cause, unspecified site      aspirin 81 MG Chew Take 1 tablet (81 mg total) by mouth once daily.  Qty: 30 tablet, Refills: 11      ferrous sulfate 325 (65 FE) MG EC tablet Take 1 tablet (325 mg total) by mouth once daily.  Qty: 120 tablet, Refills: 6      levothyroxine (SYNTHROID) 88 MCG tablet TAKE 1 TABLET EVERY DAY  Qty: 90 tablet, Refills: 1      MULTIVIT-IRON-MIN-FOLIC ACID 3,500-18-0.4 UNIT-MG-MG ORAL CHEW Take 1 capsule by mouth once daily.       NIFEdipine (PROCARDIA-XL) 60 MG (OSM) 24 hr tablet Take 1 tablet (60 mg total) by mouth once daily.  Qty: 30 tablet, Refills: 3    Comments: .  Associated Diagnoses: Hypertension, unspecified type               I have seen and examined this patient within the last 30 days. My clinical findings that support the need for the home health skilled services and home bound status are the following:no   Requiring assistive device to leave home due to unsteady gait caused by  Infection and Weakness/Debility.  Medical restrictions requiring assistance of another human to leave home due to  IV infusion Needs and Wound care needs.  Mental confusion making it unsafe for patient to leave home alone due to  Dementia.     Diet:   pureed diet  nectar thick    Labs:  On Monday 4/25/22:   · CBC  · CMP   · Vancomycin trough. Target 15-20     On Thursday 4/21/22:  · CMP   · Vancomycin trough. Target 15-20     If vancomycin trough is not at target (15-20) prior to discharge, schedule vancomycin trough to be drawn before their fourth outpatient dose.     5) Fax Lab Results to Infectious Diseases Provider: Pending sale to [facility]  [facility]     [facility] Fax Number: [phone]    Referrals/ Consults  Physical Therapy to evaluate and treat. Evaluate for home safety and equipment needs; Establish/upgrade home exercise program. Perform / instruct on therapeutic exercises, gait training, transfer training, and Range of Motion.  Occupational Therapy to evaluate and treat. Evaluate home environment for safety and equipment needs. Perform/Instruct on transfers, ADL training, ROM, and therapeutic exercises.  Speech Therapy  to evaluate and treat for  Swallowing.  Aide to provide assistance with personal care, ADLs, and vital signs.    Activities:   activity as tolerated    Nursing:   Agency to admit patient within 24 hours of hospital discharge unless specified on physician order or at patient request    SN to complete comprehensive assessment including routine vital signs. Instruct on disease process and s/s of complications to report to MD. Review/verify medication list sent home with the patient at time of discharge  and instruct patient/caregiver as needed. Frequency may be adjusted depending on start of care date.     Skilled nurse to perform up to 3 visits PRN for symptoms related to diagnosis    Notify MD if SBP > 160 or < 90; DBP > 90 or < 50; HR > 120 or < 50; Temp > 101; O2 < 88%    Ok to schedule additional visits based on staff availability and patient request on consecutive days within the home health episode.    When multiple disciplines ordered:    Start of Care occurs on Sunday - Wednesday schedule remaining discipline evaluations as ordered on separate consecutive days following the start of care.    Thursday SOC -schedule subsequent evaluations Friday and Monday the following week.     Friday - Saturday SOC - schedule subsequent discipline evaluations on consecutive days starting Monday of the following week.    For all post-discharge communication and subsequent orders please contact patient's primary care physician. If unable  to reach primary care physician or do not receive response within 30 minutes, please contact Dr. Isaac Ferrer for clinical staff order clarification    Miscellaneous   Home Infusion Therapy:   SN to perform Infusion Therapy/Central Line Care.  Review Central Line Care & Central Line Flush with patient.    Administer (drug and dose): vancomycin 500 mg every 48 hours (4/19/22, 4/21/22, 4/23/22, 4/25/22)     Last dose given: 4/17/22                         Home dose due: 4/19/22    Scrub the Hub: Prior to accessing the line, always perform a 30 second alcohol scrub  Each lumen of the central line is to be flushed at least daily with 10 mL Normal Saline and 3 mL Heparin flush (10 units/mL)  Skilled Nurse (SN) may draw blood from IV access  Blood Draw Procedure:   - Aspirate at least 5 mL of blood   - Discard   - Obtain specimen   - Change injection cap   - Flush with 20 mL Normal Saline followed by a                 3-5 mL Heparin flush (10 units/mL)  Central :   - Sterile dressing changes are done weekly and as needed.   - Use chlor-hexadine scrub to cleanse site, apply Biopatch to insertion site,       apply securement device dressing   - Injection caps are changed weekly and after EVERY lab draw.   - If sterile gauze is under dressing to control oozing,                 dressing change must be performed every 24 hours until gauze is not needed.    Remove midline catheter after last dose of vancomycin    Home Health Aide:  Nursing Three times weekly, Physical Therapy Three times weekly, Occupational Therapy Three times weekly, Speech Language Pathology Three times weekly and Home Health Aide Three times weekly    Wound Care Orders  Cleanse sacrum with cleansing cloths or sea cleanse wound cleanser, then apply a thin layer of Triad ointment to affected areas twice daily and as needed after cleaning. Leave open to air. No cover dressing needed.  Apply betadine swabs twice daily and as needed to left  heel. Leave open to air. No cover dressing needed.    I certify that this patient is confined to her home and needs intermittent skilled nursing care, physical therapy, speech therapy and occupational therapy.      Electronically signed  Isaac Ferrer MD  [facility]

## 2022-04-18 NOTE — SUBJECTIVE & OBJECTIVE
Scheduled Meds:   allopurinoL  100 mg Oral Daily    aspirin  81 mg Oral Daily    enoxaparin  30 mg Subcutaneous Daily    ferrous sulfate  1 tablet Oral Daily    levothyroxine  88 mcg Oral Before breakfast    NIFEdipine  60 mg Oral Daily     Continuous Infusions:  PRN Meds:acetaminophen, dextrose 10%, dextrose 10%, glucagon (human recombinant), glucose, glucose, melatonin, metoprolol, senna-docusate 8.6-50 mg, sodium chloride 0.9%, Pharmacy to dose Vancomycin consult **AND** vancomycin - pharmacy to dose    Review of patient's allergies indicates:  No Known Allergies     Past Medical History:   Diagnosis Date    AAA (abdominal aortic aneurysm)     Anemia in CKD (chronic kidney disease)     Arthritis     Bacteremia due to Escherichia coli 9/24/2020    Cataract     Class 1 obesity due to excess calories with serious comorbidity in adult 7/6/2017    Gout, chronic     Heart failure with reduced ejection fraction 1/29/2022    High cholesterol     Hypercapnic respiratory failure 1/28/2022    Hypertension     Hypothyroidism     Late onset Alzheimer's dementia with behavioral disturbance 4/9/2022    Obesity     Plantar fasciitis     PVD (peripheral vascular disease)     Thyroid trouble      Past Surgical History:   Procedure Laterality Date    BREAST BIOPSY Left     BREAST SURGERY Left 1972    benign breast lump    CATARACT EXTRACTION  3/18/13    both eyes -     CHOLECYSTECTOMY      ENDOSCOPIC ULTRASOUND OF UPPER GASTROINTESTINAL TRACT N/A 9/8/2020    Procedure: ULTRASOUND, UPPER GI TRACT, ENDOSCOPIC;  Surgeon: Rasheed Balbuena MD;  Location: [address]);  Service: Endoscopy;  Laterality: N/A;    ERCP N/A 9/8/2020    Procedure: ERCP (ENDOSCOPIC RETROGRADE CHOLANGIOPANCREATOGRAPHY);  Surgeon: Rasheed Balbuena MD;  Location: [address]);  Service: Endoscopy;  Laterality: N/A;    EYE SURGERY      HYSTERECTOMY      SKIN BIOPSY      Left breast       Family History       Problem Relation (Age of  Onset)    Breast cancer Sister    Cancer Sister, Sister, Brother    Cataracts Son    Diabetes Son, Son    Glaucoma Son, Daughter    Heart disease Brother    Lupus Daughter    Meningitis Son    Mental illness Son    No Known Problems Brother          Tobacco Use    Smoking status: Former Smoker     Packs/day: 0.50     Years: 60.00     Pack years: 30.00     Quit date: 2001     Years since quittin.8    Smokeless tobacco: Never Used    Tobacco comment: quit in the    Substance and Sexual Activity    Alcohol use: No    Drug use: No    Sexual activity: Not Currently     Review of Systems   Skin:  Positive for wound.     Objective:     Vital Signs (Most Recent):  Temp: 98.1 °F (36.7 °C) (22 1040)  Pulse: 94 (22 1040)  Resp: 16 (22 1040)  BP: (!) 115/58 (22 1040)  SpO2: (!) 94 % (22 1040)   Vital Signs (24h Range):  Temp:  [96.5 °F (35.8 °C)-98.1 °F (36.7 °C)] 98.1 °F (36.7 °C)  Pulse:  [] 94  Resp:  [16-20] 16  SpO2:  [92 %-96 %] 94 %  BP: (115-170)/(58-86) 115/58     Weight: 60.8 kg (134 lb)  Body mass index is 29 kg/m².  Physical Exam  Constitutional:       Appearance: Normal appearance.   Skin:     General: Skin is warm and dry.      Findings: Lesion present.   Neurological:      Mental Status: She is alert.       Laboratory:  All pertinent labs reviewed within the last 24 hours.    Diagnostic Results:  None

## 2022-04-18 NOTE — CONSULTS
[facility]  Skin Integrity JAVON  Consult Note    Patient Name: Johanny Curtis  MRN: [id number]  Admission Date: 4/9/2022  Hospital Length of Stay: 9 days  Attending Physician: Isaac Ferrer MD  Primary Care Provider: Leticia Weems MD     Consults  Subjective:     History of Present Illness:  Johanny Curtis is 89 year old female brought to the hospital for altered mental status. Pt lives with her daughter for the past 10 years due to dementia and she has progressively decline cognitively over this period of time. She is bed bound and has since developed wounds to her sacrum and heel after a stay at skilled nursing in February. Wound care is consulted for evaluation of skin injury.       Scheduled Meds:   allopurinoL  100 mg Oral Daily    aspirin  81 mg Oral Daily    enoxaparin  30 mg Subcutaneous Daily    ferrous sulfate  1 tablet Oral Daily    levothyroxine  88 mcg Oral Before breakfast    NIFEdipine  60 mg Oral Daily     Continuous Infusions:  PRN Meds:acetaminophen, dextrose 10%, dextrose 10%, glucagon (human recombinant), glucose, glucose, melatonin, metoprolol, senna-docusate 8.6-50 mg, sodium chloride 0.9%, Pharmacy to dose Vancomycin consult **AND** vancomycin - pharmacy to dose    Review of patient's allergies indicates:  No Known Allergies     Past Medical History:   Diagnosis Date    AAA (abdominal aortic aneurysm)     Anemia in CKD (chronic kidney disease)     Arthritis     Bacteremia due to Escherichia coli 9/24/2020    Cataract     Class 1 obesity due to excess calories with serious comorbidity in adult 7/6/2017    Gout, chronic     Heart failure with reduced ejection fraction 1/29/2022    High cholesterol     Hypercapnic respiratory failure 1/28/2022    Hypertension     Hypothyroidism     Late onset Alzheimer's dementia with behavioral disturbance 4/9/2022    Obesity     Plantar fasciitis     PVD (peripheral vascular disease)     Thyroid trouble      Past Surgical  History:   Procedure Laterality Date    BREAST BIOPSY Left     BREAST SURGERY Left 1972    benign breast lump    CATARACT EXTRACTION  3/18/13    both eyes -     CHOLECYSTECTOMY      ENDOSCOPIC ULTRASOUND OF UPPER GASTROINTESTINAL TRACT N/A 2020    Procedure: ULTRASOUND, UPPER GI TRACT, ENDOSCOPIC;  Surgeon: Rasheed Balbuena MD;  Location: [facility] ([address] [place] [address]);  Service: Endoscopy;  Laterality: N/A;    ERCP N/A 2020    Procedure: ERCP (ENDOSCOPIC RETROGRADE CHOLANGIOPANCREATOGRAPHY);  Surgeon: Rasheed Balbuena MD;  Location: [facility] ([address] [place] [address]);  Service: Endoscopy;  Laterality: N/A;    EYE SURGERY      HYSTERECTOMY      SKIN BIOPSY      Left breast       Family History       Problem Relation (Age of Onset)    Breast cancer Sister    Cancer Sister, Sister, Brother    Cataracts Son    Diabetes Son, Son    Glaucoma Son, Daughter    Heart disease Brother    Lupus Daughter    Meningitis Son    Mental illness Son    No Known Problems Brother          Tobacco Use    Smoking status: Former Smoker     Packs/day: 0.50     Years: 60.00     Pack years: 30.00     Quit date: 2001     Years since quittin.8    Smokeless tobacco: Never Used    Tobacco comment: quit in the    Substance and Sexual Activity    Alcohol use: No    Drug use: No    Sexual activity: Not Currently     Review of Systems   Skin:  Positive for wound.     Objective:     Vital Signs (Most Recent):  Temp: 98.1 °F (36.7 °C) (22 1040)  Pulse: 94 (22 1040)  Resp: 16 (22 1040)  BP: (!) 115/58 (22 1040)  SpO2: (!) 94 % (22 1040)   Vital Signs (24h Range):  Temp:  [96.5 °F (35.8 °C)-98.1 °F (36.7 °C)] 98.1 °F (36.7 °C)  Pulse:  [] 94  Resp:  [16-20] 16  SpO2:  [92 %-96 %] 94 %  BP: (115-170)/(58-86) 115/58     Weight: 60.8 kg (134 lb)  Body mass index is 29 kg/m².  Physical Exam  Constitutional:       Appearance: Normal appearance.   Skin:     General: Skin is warm and dry.       Findings: Lesion present.   Neurological:      Mental Status: She is alert.       Laboratory:  All pertinent labs reviewed within the last 24 hours.    Diagnostic Results:  None        Assessment/Plan:         JAVON Skin Integrity Evaluation      Skin Integrity JAVON evaluation of patient as part of the comprehensive skin care team.     She has been admitted for 9 days. Skin injury was noted on 4/12/22. POA yes.    Sacrum/bilateral buttocks      L heel            Blood blister  - left heel blood blister intact.  - continue betadine bid to area.  - heel boots in place.    Dermatitis associated with moisture  - pt evaluated for skin breakdown.  - pt is bedbound and presented with wounds.  - buttocks/sacral injury is due to moisture.  - recommended continuing the use of Triad bid.  - no briefs.   - mehrdad bed with waffle mattress in place.  - heel boots being utilized.  - nursing to maintain pressure injury prevention measures.         Thank you for your consult. I will follow-up with patient. Please contact us if you have any additional questions.       Ester Clement NP  Skin Integrity JAVON  [facility]

## 2022-04-18 NOTE — SUBJECTIVE & OBJECTIVE
Interval History: PT and OT recommended home with home health. Rather than discharging home yesterday (Easter Sunday), waited until today to get midline catheter placed and discharge when Case Management is fully staffed.    Review of Systems   Constitutional:  Negative for chills and fever.   Gastrointestinal:  Negative for nausea and vomiting.   Neurological:  Negative for seizures and syncope.   Objective:     Vital Signs (Most Recent):  Temp: 97.7 °F (36.5 °C) (04/18/22 0455)  Pulse: (!) 112 (04/18/22 0500)  Resp: 20 (04/18/22 0212)  BP: (!) 170/86 (04/18/22 0500)  SpO2: 96 % (04/18/22 0500)   Vital Signs (24h Range):  Temp:  [96.8 °F (36 °C)-98.3 °F (36.8 °C)] 97.7 °F (36.5 °C)  Pulse:  [105-117] 112  Resp:  [16-20] 20  SpO2:  [91 %-96 %] 96 %  BP: (109-170)/(53-86) 170/86     Weight: 60.8 kg (134 lb)  Body mass index is 29 kg/m².    Intake/Output Summary (Last 24 hours) at 4/18/2022 0731  Last data filed at 4/18/2022 0600  Gross per 24 hour   Intake 105 ml   Output --   Net 105 ml        Physical Exam  Vitals and nursing note reviewed.   Constitutional:       General: She is not in acute distress.     Appearance: She is well-developed. She is not toxic-appearing or diaphoretic.   Pulmonary:      Effort: Pulmonary effort is normal. No respiratory distress.   Neurological:      Mental Status: She is alert. Mental status is at baseline.      Motor: No seizure activity.   Psychiatric:         Attention and Perception: Attention normal.         Mood and Affect: Mood and affect normal.         Behavior: Behavior is cooperative.           Significant Labs:  CBC:  Recent Labs   Lab 04/16/22  1101   WBC 24.17*   HGB 10.2*   HCT 32.0*   *       CMP:  Recent Labs   Lab 04/17/22  0454 04/18/22  0322    143   K 4.7 3.9    108   CO2 23 24   GLU 93 72   BUN 51* 50*   CREATININE 1.5* 1.5*   CALCIUM 10.5 11.4*   ANIONGAP 12 11   EGFRNONAA 30.2* 30.2*

## 2022-04-18 NOTE — ASSESSMENT & PLAN NOTE
- pt evaluated for skin breakdown.  - pt is bedbound and presented with wounds.  - buttocks/sacral injury is due to moisture.  - recommended continuing the use of Triad bid.  - no briefs.   - mehrdad bed with waffle mattress in place.  - heel boots being utilized.  - nursing to maintain pressure injury prevention measures.

## 2022-04-18 NOTE — CARE UPDATE
RAPID RESPONSE NURSE ROUND       Rounding completed with charge RN, Steve. No concerns verbalized at this time. Instructed to call [phone] for further concerns or assistance.

## 2022-04-19 NOTE — PLAN OF CARE
[facility]  Discharge Final Note    Primary Care Provider: Leticia Weems MD    Expected Discharge Date: 4/19/2022     Future Appointments   Date Time Provider Department Center   4/21/2022  1:00 PM Albert Babb MD [facility]   4/22/2022  8:00 AM Olivia Oscar NP [facility] [place]   4/25/2022  3:30 PM Paolo Angeles Jr., PA [facility]       Final Discharge Note (most recent)     Final Note - 04/19/22 0900        Final Note    Assessment Type Final Discharge Note     Anticipated Discharge Disposition Home-Health Care Svc     What phone number can be called within the next 1-3 days to see how you are doing after discharge? [phone]     Hospital Resources/Appts/Education Provided Appointments scheduled and added to AVS;Post-Acute resouces added to AVS        Post-Acute Status    Post-Acute Authorization Home Health     Home Health Status Set-up Complete/Auth obtained                 Important Message from Medicare  Important Message from Medicare regarding Discharge Appeal Rights: Given to patient/caregiver, Explained to patient/caregiver, Signed/date by patient/caregiver     Date IMM was signed: 04/18/22  Time IMM was signed: 0927    Contact Info     Albert Babb MD   Specialty: Internal Medicine    [address]  [facility] [address]   Phone: [phone]       Next Steps: Follow up on 4/21/2022    Instructions: 1:00 PM

## 2022-04-19 NOTE — DISCHARGE SUMMARY
[facility] Medicine  Discharge Summary      Patient Name: Johanny Curtis  MRN: [id number]  Patient Class: IP- Inpatient  Admission Date: 4/9/2022  Hospital Length of Stay: 10 days  Discharge Date and Time: 4/19/2022  6:11 PM  Attending Physician: Isaac Ferrer MD   Discharging Provider: Isaac Ferrer MD  Primary Care Provider: Leticia Weems MD  [facility] Medicine Team: [facility] HOSP MED A Isaac Ferrer MD    HPI:   Johanny Curtis is a 89 year old Black woman with hypertension, heart failure with reduced ejection fraction, aortic atherosclerosis, abdominal aortic aneurysm, peripheral artery disease, hypothyroidism, chronic kidney disease stage 4, secondary hyperparathyroidism, anemia, gout, Alzheimer's dementia, history of choledocholithiasis status post biliary sphincterotomy on 9/8/2020, history of cholecystectomy, history of hysterectomy. She lives in [facility]. She is . Her primary care physician is Dr. Leticia Weems. She is DNR.               She presented to [facility] Emergency Department on 4/9/2022 with altered mental status. Her daughter, who has lived with her for the past 10 years due to her dementia, reported progressive debility and cognitive dysfunction over that period of time. She is bed bound and has developed decubitus wounds on her sacrum and heels since her last day at a skilled nursing facility in February, due to prolonged delays in having an inflatable mattress delivered. It only arrived a few days prior to presentation. Her daughter has been turning her every few hours and she has been getting wound care with home health. Her daughter noticed improvement in her wounds, but for the past couple days she had also noticed a decline in her mother's mental status. On the day of presentation, when her daughter tried to move her it seemed like everything hurt. She was also much less responsive to eating and drinking.               In the  emergency department, she had tachycardia and leukocytosis (WBC 11690/uL with 89.4% granulocytes). Chest X-ray showed residual diffuse nonspecific interstitial coarsening and subtle patchy subsegmental opacities. Ankle X-ray showed no evidence of osteomyelitis. She was given 1 liter of normal saline, piperacillin-tazobactam, and vancomycin. She was admitted to Hospital Medicine Team A.        Hospital Course:   Speech Language Pathology recommended pureed diet with nectar thickened liquids. Wound Care recommended Triad ointment to sacrum and betadine swabs to left heel. Urinalysis showed positive nitrite, >100 WBC/hpf, many WBC clumps, and many bacteria. Urine culture had no significant growth. Blood cultures grew Staphylococcus hominis. Podiatry was consulted and found no signs of infection of her foot. Infectious Disease recommended continuing vancomycin until 4/25/2022. Physical and Occupational Therapy recommended home with home health or basic nursing facility. She developed hypernatremia, which was treated with 5% dextrose infusion. Rather than discharging her home on Easter Sunday 4/17/2022, she was kept in the hospital until 4/18/2022, which would allow a midline catheter to be placed due to the 7 day maximum antibiotic duration allowed. She was discharged home with home health and lift device after midline catheter placement.       Goals of Care Treatment Preferences:  Code Status: DNR    Health care agent: Annette Lombard, jeannette  Health care agent number: [phone]          What is most important right now is to focus on symptom/pain control, quality of life, even if it means sacrificing a little time.  Accordingly, we have decided that the best plan to meet the patient's goals includes Treating acute issues, but in the event of significant clinical deterioration or imminent death focusing on keeping her comfortable instead..      Consults:   Consults (From admission, onward)        Status Ordering  "Provider     Inpatient consult to Midline team  Once        Provider:  (Not yet assigned)    Completed NIRMALA FRANKEL A     Inpatient consult to Podiatry  Once        Provider:  (Not yet assigned)    Completed NIRMALA FRANKEL A     Inpatient consult to Infectious Diseases  Once        Provider:  (Not yet assigned)    Completed BRENDAN ANDERSON     Pharmacy to dose Vancomycin consult  Once        Provider:  (Not yet assigned)   "And" Linked Group Details    Acknowledged BRENDAN ANDERSON     Inpatient consult to Registered Dietitian/Nutritionist  Once        Provider:  (Not yet assigned)    Completed BRENDAN ANDERSON     IP consult to case management  Once        Provider:  (Not yet assigned)    Acknowledged BRENDAN ANDERSON     IP consult to case management  Once        Provider:  (Not yet assigned)    Acknowledged BRENDAN ANDERSON        Final Active Diagnoses:    Diagnosis Date Noted POA    Dermatitis associated with moisture [L30.8] 04/18/2022 Yes    Blood blister [T14.8XXA] 04/18/2022 Yes    Late onset Alzheimer's dementia with behavioral disturbance [G30.1, F02.81] 04/09/2022 Yes     Chronic    Pressure injury of left heel, unstageable [L89.620] 04/09/2022 Yes    Bacteremia due to Staphylococcus [R78.81, B95.8] 04/09/2022 Yes    Heart failure with reduced ejection fraction [I50.20] 01/29/2022 Yes     Chronic    CKD (chronic kidney disease), stage IV [N18.4] 06/29/2016 Yes     Chronic    Hypothyroidism [E03.9] 08/23/2013 Yes     Chronic    Essential hypertension [I10] 09/04/2012 Yes     Chronic      Problems Resolved During this Admission:    Diagnosis Date Noted Date Resolved POA    PRINCIPAL PROBLEM:  Encephalopathy, metabolic [G93.41] 04/09/2022 04/14/2022 Yes    Dehydration [E86.0] 04/09/2022 04/14/2022 Yes       Discharged Condition: good    Disposition: Home-Health Care [facility]    Follow Up:   Follow-up Information     Albert Babb MD " "Follow up on 4/21/2022.    Specialty: Internal Medicine  Why: 1:00 PM  Contact information:  [address]  [facility] [address]  [phone]                       Patient Instructions:      LIFT DEVICE FOR HOME USE     Order Specific Question Answer Comments   Height: 4' 9" (1.448 m)    Weight: 60.8 kg (134 lb)    Does patient have medical equipment at home? walker, rolling    Does patient have medical equipment at home? wheelchair    Does patient have medical equipment at home? hospital bed    Length of need (1-99 months): 99      Ambulatory referral/consult to Outpatient Case Management   Referral Priority: Routine Referral Type: Consultation   Referral Reason: Specialty Services Required   Number of Visits Requested: 1     Change dressing (specify)   Order Comments: Cleanse sacrum with cleansing cloths or sea cleanse wound cleanser, then apply a thin layer of Triad ointment to affected areas twice daily and as needed after cleaning. Leave open to air. No cover dressing needed.  Apply betadine swabs twice daily and as needed to left heel. Leave open to air. No cover dressing needed.     Notify your health care provider if you experience any of the following:  worsening rash     Notify your health care provider if you experience any of the following:  temperature >100.4     Notify your health care provider if you experience any of the following:  persistent nausea and vomiting or diarrhea     Notify your health care provider if you experience any of the following:  increased confusion or weakness     Activity as tolerated       Significant Diagnostic Studies:   CT Head Without Contrast 4/09/22: FINDINGS:   Motion compromised examination.   Blood: No acute intracranial hemorrhage.   Parenchyma: No definite loss of gray-white differentiation to suggest acute or subacute transcortical infarct. Generalized pattern loss.  Remote left parietal cortical infarct.  Nonspecific areas of white matter hypoattenuation " may relate to sequela of chronic small vessel ischemic disease.   Ventricles/Extra-axial spaces: No abnormal extra-axial fluid collection. Basal cisterns are patent.   Vessels: Vascular calcifications   Orbits: Status post bilateral lens replacements   Scalp: Unremarkable.   Skull: There are no depressed skull fractures or destructive bone lesions.   Sinuses and mastoids: Chronic opacification of the mastoid air cells, as seen on the 04/15/2021 MRI.  Paranasal sinuses appear essentially clear.   Other findings: None   X-Ray Chest AP Portable 4/09/22: FINDINGS:   Overall somewhat improved aeration of the right lung, noting some residual diffuse nonspecific interstitial coarsening and subtle patchy subsegmental opacities that may reflect residual/on going or new asymmetric pulmonary edema versus interstitial type pneumonia from inflammatory or infectious process including atypical bacterial or viral etiology.  Left hemithorax is well expanded without large consolidation or pleural effusion.  No large pleural effusion on the right.  No pneumothorax.   Cardiomediastinal silhouette is relatively midline and stable.  Pulmonary vasculature and hilar contours are within normal limits.  No acute osseous process seen.   X-Ray Ankle Complete Left 4/09/22: FINDINGS:   No acute fracture, subluxation or dislocation is identified.  No osseous destruction.  Calcaneal spurring inferiorly.  No mass or foreign body.   Impression: No acute radiographic abnormality.  X-Ray Calcaneus 2 View Left 4/14/22: FINDINGS:   Calcaneus two views left.  No fracture dislocation bone destruction seen.  There is no definite radiographic evidence of osteomyelitis.      Medications:  Reconciled Home Medications:      Medication List      START taking these medications    VANCOMYCIN 1 G/250 ML D5W (READY TO MIX SYSTEM)  Inject 125 mLs (500 mg total) into the vein every 48 hours. Give on 4/19/22, 4/21/22, 4/23/22, 4/25/22. for 6 days        CONTINUE  taking these medications    acetaminophen 160 mg/5 mL Liqd  Commonly known as: TYLENOL  Take 15.6 mLs (499.2 mg total) by mouth every 6 (six) hours as needed (pain).     allopurinoL 100 MG tablet  Commonly known as: ZYLOPRIM  Take 1 tablet (100 mg total) by mouth once daily.     aspirin 81 MG Chew  Take 1 tablet (81 mg total) by mouth once daily.     ferrous sulfate 325 (65 FE) MG EC tablet  Take 1 tablet (325 mg total) by mouth once daily.     levothyroxine 88 MCG tablet  Commonly known as: SYNTHROID  TAKE 1 TABLET EVERY DAY     multivit-iron-min-folic acid 3,500-18-0.4 unit-mg-mg Chew  Take 1 capsule by mouth once daily.     NIFEdipine 60 MG (OSM) 24 hr tablet  Commonly known as: PROCARDIA-XL  Take 1 tablet (60 mg total) by mouth once daily.            Indwelling Lines/Drains at time of discharge: None    Time spent on the discharge of patient: 38 minutes         Isaac Ferrer MD  Department of Hospital Medicine  [facility]

## 2022-04-19 NOTE — PLAN OF CARE
Pt stable overnight without acute changes in condition. Turned q2h. Bedrest. A&O x2-3. Cooperative with care. Purewick utilized for improved output monitoring and skin integrity preservation.

## 2022-04-19 NOTE — PLAN OF CARE
"[facility] educator met with primary caregiver discussed discharge plan for home IVABX. Johanny Curtis will dc home with family support. Patient will infuse medication via Elastomeric Pump. daughter on S.A.S.H procedure. S.A.S.H mat provided.  Patient education checklist reviewed and acknowledged by above person(s) above and are agreeable to discharge with home infusion plan of care. Dtg reports having done infusion on mother in past using same device and " just needed a refresher."  IV administration process using aspetic technique was reviewed with successful return demonstration. Caregiver and grand-daughter feel comfortable with infusion. Patient will dc home with Vancomycin 500mg IV QOD x 3 doses via left Midline catheter for estimated end after 3 home doses on Monday. Dosing schedule times are at 9am on Thursday, Saturday and again on Monday.  [facility] will follow patient for nursing needs and will admit on Thursday and dtg aware no visit on Saturday and agreeable to plan.  Time allotted for questions. Patients nurse and case management team notified teaching has been completed.     Medication delivery will be made to bedside    Patient accepted to care by **JOSE and report called to Marissa Martin.    Family aware to keep F/ U appt and will set virtually with ID - David REEVES to arrange for transfer to home.   "

## 2022-04-19 NOTE — PT/OT/SLP PROGRESS
Speech Language Pathology      Johanny FAITH Crutis  MRN: [id number]    Patient not seen today secondary to imminent D/C. Will follow-up should D/C plans be revised.      4/19/2022

## 2022-04-19 NOTE — NURSING
Pt discharged home via stretcher with belongings. VS stable. D/C discussed with family and all questions answered. Midline in place to Left Upper arm.

## 2022-04-19 NOTE — PLAN OF CARE
Pt's family was taught and hh has accepted, pt ok to dc home. Sw will setup transport for 5pm via stretcher, nurse and family/Pt aware.

## 2022-04-20 NOTE — PROGRESS NOTES
C3 nurse spoke with Johanny Curtis's daughter for a TCC post hospital discharge follow up call. The patient has a scheduled HOSFU appointment with Dr. Rhoades on 4/21/2022 @ 4:20 pm

## 2022-04-20 NOTE — TELEPHONE ENCOUNTER
Pt has a PICC, she is receiving vancomycin for bacteremia, daughter is calling to inform that she is unable to flush the line with the saline.  She reached out to the  nurse and they told her to call [facility].  There is no visible evidence that the line is damaged.  Will message her PCP and the ID provider she is to follow up with.  Reason for Disposition   [1] PICC line not running AND [2] no improvement after using CARE ADVICE (i.e., can't flush line)    Additional Information   Negative: SEVERE difficulty breathing (e.g., struggling for each breath, speaks in single words)   Negative: Shock suspected (e.g., cold/pale/clammy skin, too weak to stand, low BP, rapid pulse)   Negative: Cracked or broken central line or PICC Line   Negative: Sounds like a life-threatening emergency to the triager   Negative: [1] Pain, redness, swelling, or pus at IV Site AND [2] IV running normally   Negative: Patient sounds very sick or weak to the triager   Negative: Fever > 100.4 F (38.0 C)   Negative: Skin swelling at IV site   Negative: Skin redness at IV site   Negative: Pain at IV site or shooting up arm   Negative: IV fluid leaking out of skin hole (where IV catheter enters skin)   Negative: [1] Central line not running or running slowly AND [2] has not tried Care Advice   Negative: [1] PICC line not running or running slowly AND [2] has not tried CARE ADVICE   Negative: [1] Peripheral IV not running or running slowly AND [2] has not tried Care Advice   Negative: [1] Central line not running AND [2] no improvement after using CARE ADVICE (i.e., can't flush line)    Protocols used: IV NOT RUNNING OR RUNNING SLOWLY-A-AH

## 2022-04-20 NOTE — TELEPHONE ENCOUNTER
Called [facility] infusion department to relay order to have PICC line discontinued per ID QUIRINO Luna. Patient has finished vancomycin IV ABT. Called daughter Katia and let her know that it was discontinued. Spoke to Carly at [facility]. The daughter is saying that it is not working.  is going out to evaluate site tomorrow and determine if the PICC line is still good. If it is still good she will get 3 more doses until the 25th per ID QUIRINO Luna.

## 2022-04-20 NOTE — PROGRESS NOTES
Subjective:       Patient ID: Johanny Curtis is a 89 y.o. female.    Chief Complaint: Follow-up (Hospital Follow Up. )      The patient location is: Home  The chief complaint leading to consultation is: Hospital Follow up    Visit type: audiovisual    Face to Face time with patient: 18 minutes  22 minutes of total time spent on the encounter, which includes face to face time and non-face to face time preparing to see the patient (eg, review of tests), Obtaining and/or reviewing separately obtained history, Documenting clinical information in the electronic or other health record, Independently interpreting results (not separately reported) and communicating results to the patient/family/caregiver, or Care coordination (not separately reported).         Each patient to whom he or she provides medical services by telemedicine is:  (1) informed of the relationship between the physician and patient and the respective role of any other health care provider with respect to management of the patient; and (2) notified that he or she may decline to receive medical services by telemedicine and may withdraw from such care at any time.    Notes:     HPI: SPoke with pt and daughter. Home Health is coming out for wound care and general nursing. She finished antibiotics for the UTI. Also had PICC removed  No antibiotics needed for the wounds. Was evaluated for Hospice and family declined. Hospice took the hoses and pump. Family would like that back to protect the skin. Skin breakdown on buttock and left heel are healing but they are worried it will worsen again. She is not able to speak or interact much. This is her baseline per the daughter.       Johanny Curtis is a 89 year old Black woman with hypertension, heart failure with reduced ejection fraction, aortic atherosclerosis, abdominal aortic aneurysm, peripheral artery disease, hypothyroidism, chronic kidney disease stage 4, secondary hyperparathyroidism, anemia, gout,  Alzheimer's dementia, history of choledocholithiasis status post biliary sphincterotomy on 9/8/2020, history of cholecystectomy, history of hysterectomy. She lives in [facility]. She is . Her primary care physician is Dr. Leticia Weems. She is DNR.               She presented to [facility] Emergency Department on 4/9/2022 with altered mental status. Her daughter, who has lived with her for the past 10 years due to her dementia, reported progressive debility and cognitive dysfunction over that period of time. She is bed bound and has developed decubitus wounds on her sacrum and heels since her last day at a skilled nursing facility in February, due to prolonged delays in having an inflatable mattress delivered. It only arrived a few days prior to presentation. Her daughter has been turning her every few hours and she has been getting wound care with home health. Her daughter noticed improvement in her wounds, but for the past couple days she had also noticed a decline in her mother's mental status. On the day of presentation, when her daughter tried to move her it seemed like everything hurt. She was also much less responsive to eating and drinking.               In the emergency department, she had tachycardia and leukocytosis (WBC 70212/uL with 89.4% granulocytes). Chest X-ray showed residual diffuse nonspecific interstitial coarsening and subtle patchy subsegmental opacities. Ankle X-ray showed no evidence of osteomyelitis. She was given 1 liter of normal saline, piperacillin-tazobactam, and vancomycin. She was admitted to Hospital Medicine Team A.         Hospital Course:   Speech Language Pathology recommended pureed diet with nectar thickened liquids. Wound Care recommended Triad ointment to sacrum and betadine swabs to left heel. Urinalysis showed positive nitrite, >100 WBC/hpf, many WBC clumps, and many bacteria. Urine culture had no significant growth. Blood cultures grew  Staphylococcus hominis. Podiatry was consulted and found no signs of infection of her foot. Infectious Disease recommended continuing vancomycin until 4/25/2022. Physical and Occupational Therapy recommended home with home health or basic nursing facility. She developed hypernatremia, which was treated with 5% dextrose infusion. Rather than discharging her home on Easter Sunday 4/17/2022, she was kept in the hospital until 4/18/2022, which would allow a midline catheter to be placed due to the 7 day maximum antibiotic duration allowed. She was discharged home with home health and lift device after midline catheter placement.       Transitional Care Note    Family and/or Caretaker present at visit?  Yes.  Diagnostic tests reviewed/disposition: No diagnosic tests pending after this hospitalization.  Disease/illness education: Pressure Sores, Hospital Bed  Home health/community services discussion/referrals: Patient has home health established at daughter's house.   Establishment or re-establishment of referral orders for community resources: pump and tubing for hospital bed mattress cover.   Discussion with other health care providers: No discussion with other health care providers necessary.          Review of Systems   Constitutional: Positive for fatigue.   Integumentary:  Positive for wound (buttock and heel).   Neurological: Positive for speech difficulty, weakness and memory loss.   Psychiatric/Behavioral: Positive for confusion.         Objective:      Physical Exam  Constitutional:       Comments: She did answer Hi when I said Hello but otherwise no interaction.    Neurological:      Mental Status: She is alert.         Assessment:       Problem List Items Addressed This Visit        Neuro    Late onset Alzheimer's dementia with behavioral disturbance (Chronic)      Other Visit Diagnoses     Urinary tract infection without hematuria, site unspecified    -  Primary    Pressure injury of buttock, stage 1,  unspecified laterality              Plan:       Johanny was seen today for follow-up.    Diagnoses and all orders for this visit:    Urinary tract infection without hematuria, site unspecified    Late onset Alzheimer's dementia with behavioral disturbance    Pressure injury of buttock, stage 1, unspecified laterality

## 2022-04-21 NOTE — Clinical Note
Can we reach out to [facility] and give OK for Pump and Hoses for the Inflatable Mattress Tube.

## 2022-04-21 NOTE — PROGRESS NOTES
Outpatient Care Management   - Low Risk Patient Assessment    Patient: Johanny Curtis  MRN:  [id number]  Date of Service:  4/21/2022  Completed by:  Kelsey Ragsdale LMSW  Referral Date: 04/11/2022  Program: OPCM Low Risk    Reason for Visit   Patient presents with    Social Work Assessment - Low/Mod Risk     04/21/2022    Plan Of Care     04/21/2022    Case Closure     04/21/2022         Brief Summary:  SW completed assessment with patient daughter Ms. Lombard. Daughter reports she and patient resides together. Daughter reports assisting patient with ADL's/. Daughter denied additional support needed with caring for patient.  Daughter reports patient currently bed bound. [facility]. Daughter seeking assistance with respite care. Daughter denied for this SW to refer her to [facility] for respite care services. Daughter requesting resources be mailed to her home to review and make contact. Daughter denied patient needs assistance with food, shelter, medication or medical. Daughter reports assisting with transportation when patient was mobile. Per chart review MPOA on file. Daughter denied any current symptoms. SW mailed all available resources and provided her contact information for any additional needs.     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    Patient Instructions     No follow-ups on file.    Todays OPCM Self-Management Care Plan was developed with the patients/caregivers input and was based on identified barriers from todays assessment.  Goals were written today with the patient/caregiver and the patient has agreed to work towards these goals to improve his/her overall well-being. Patient verbalized understanding of the care plan, goals, and all of today's instructions. Encouraged patient/caregiver to communicate with his/her physician and health care team about health conditions and the treatment plan.  Provided my contact information today and encouraged patient/caregiver to call me with any questions as needed.

## 2022-04-22 NOTE — TELEPHONE ENCOUNTER
Spoke to DME you will need to order alternating pressure pump. According to DME there is an order in Epic for this

## 2022-04-22 NOTE — TELEPHONE ENCOUNTER
----- Message from Benito Rhoades MD sent at 4/21/2022  4:50 PM CDT -----  Can we reach out to [facility] and give OK for Pump and Hoses for the Inflatable Mattress Tube.

## 2022-04-23 PROBLEM — L89.152 PRESSURE INJURY OF SACRAL REGION, STAGE 2: Status: ACTIVE | Noted: 2022-01-01

## 2022-04-23 PROBLEM — N39.0 UTI (URINARY TRACT INFECTION): Status: ACTIVE | Noted: 2022-01-01

## 2022-04-23 PROBLEM — K59.00 CONSTIPATED: Status: ACTIVE | Noted: 2022-01-01

## 2022-04-23 NOTE — PROGRESS NOTES
"[facility]  Transition of Care Home Visit    Visit Date: 4/22/2022  Encounter Provider: Cheo Oscar NP  PCP:  Leticia Weems MD    PRESENTING HISTORY      Patient ID: Johanny Curtis 89 y.o. female.    Consult Requested By:  No ref. provider found  Reason for Consult:  Hospital Follow Up    Chief Complaint: Hospital Follow Up    The patient is being seen at home due to a physical debility that presents a taxing effort to leave the home, to mitigate high risk of hospital readmission or due to the limited availability of reliable or safe options for transportation to the point of access to the provider. The visit meets the criteria for medical necessity as defined by CMS as "health-care services needed to prevent, diagnose, or treat an illness, injury, condition, disease, or its symptoms and that meet accepted standards of medicine." Prior to treatment on this visit the chart was reviewed and patient consent was obtained.    HPI:   Johanny Curtis is a 89 year old Black woman with hypertension, heart failure with reduced ejection fraction, aortic atherosclerosis, abdominal aortic aneurysm, peripheral artery disease, hypothyroidism, chronic kidney disease stage 4, secondary hyperparathyroidism, anemia, gout, Alzheimer's dementia, history of choledocholithiasis status post biliary sphincterotomy on 9/8/2020, history of cholecystectomy, history of hysterectomy. She lives in [facility]. She is . Her primary care physician is Dr. Leticia Weems. She is DNR.               She presented to [facility] on 4/9/2022 with altered mental status. Her daughter, who has lived with her for the past 10 years due to her dementia, reported progressive debility and cognitive dysfunction over that period of time. She is bed bound and has developed decubitus wounds on her sacrum and heels since her last day at a skilled nursing facility in February, due to prolonged " delays in having an inflatable mattress delivered. It only arrived a few days prior to presentation. Her daughter has been turning her every few hours and she has been getting wound care with home health. Her daughter noticed improvement in her wounds, but for the past couple days she had also noticed a decline in her mother's mental status. On the day of presentation, when her daughter tried to move her it seemed like everything hurt. She was also much less responsive to eating and drinking.               In the emergency department, she had tachycardia and leukocytosis (WBC 87995/uL with 89.4% granulocytes). Chest X-ray showed residual diffuse nonspecific interstitial coarsening and subtle patchy subsegmental opacities. Ankle X-ray showed no evidence of osteomyelitis. She was given 1 liter of normal saline, piperacillin-tazobactam, and vancomycin. She was admitted to Hospital Medicine Team A.         Hospital Course:   Speech Language Pathology recommended pureed diet with nectar thickened liquids. Wound Care recommended Triad ointment to sacrum and betadine swabs to left heel. Urinalysis showed positive nitrite, >100 WBC/hpf, many WBC clumps, and many bacteria. Urine culture had no significant growth. Blood cultures grew Staphylococcus hominis. Podiatry was consulted and found no signs of infection of her foot. Infectious Disease recommended continuing vancomycin until 4/25/2022. Physical and Occupational Therapy recommended home with home health or basic nursing facility. She developed hypernatremia, which was treated with 5% dextrose infusion. Rather than discharging her home on Easter Sunday 4/17/2022, she was kept in the hospital until 4/18/2022, which would allow a midline catheter to be placed due to the 7 day maximum antibiotic duration allowed. She was discharged home with home health and lift device after midline catheter placement.       Today:  Ms. Johanny Curtis is a 89 y.o. female is being seen and  examined at home today for transitional care visit to the home environment post-discharge from inpatient hospitalization encounter described above. Johanny presents at baseline state of health as reported by patient and caregiver. She is found resting comfortably in bed, no signs of distress.  Daughter/caregiver at bedside. Daughter reports patient tolerating pureed diet well. Reports she completed course of ABX.  Medications reviewed, reports taking as prescribed.  Reports adequate po intake, good sleep pattern.  Daughter reports constipation. VSS. Denies chest pain, SOB, fever, chills, cough, congestion, worsening signs of infection. Daughter reports recent audio follow up with primary care.  Patient is current with [facility] .  Patient lives with daughter and daughter provides assistance with ADLs.  Risks of environmental exposure to coronavirus discussed including: social distancing, hand hygiene, and limiting departures from the home for necessities only.  Reports understanding and willingness to comply.     Attestation: Screening criteria to assess the level of the patient's risk for infection with COVID-19 as recommended by the CDC at the time of the above documented home visit concluded appropriateness to proceed. Universal precautions were maintained at all times, including provider use of >60% alcohol gel hand  immediately prior to entry and upon departing the patient's home as well as cleaning of equipment used in home visit with antibacterial/germicidal disposable wipes.    Admission Date: 4/9/2022  Hospital Length of Stay: 10 days  Discharge Date and Time: 4/19/2022  6:11 PM  _________________________________________________________________    Review of Systems   Constitutional: Negative for chills and fever.   HENT: Negative for congestion, postnasal drip and rhinorrhea.    Eyes: Negative for visual disturbance.   Respiratory: Negative for chest tightness and shortness of breath.     Gastrointestinal: Positive for constipation. Negative for abdominal pain, nausea and vomiting.   Genitourinary: Negative for difficulty urinating.   Musculoskeletal: Positive for gait problem.   Skin: Positive for wound.   Neurological: Positive for weakness. Negative for dizziness.   Psychiatric/Behavioral: Negative for agitation.       Assessments:  · Environmental: single story home, no steps to enter, adequate lighting and temperature control  · Functional Status: Assistance with ADL's/IADL's, bedbound, incontinent of bowel and bladder  · Safety: Fall Precautions, COVID Precautions/Social Distancing/Mask Use  · Nutritional: Adequate  · Home Health: [facility]   · DME/Supplies: Hospital bed        PAST HISTORY:     Past Medical History:   Diagnosis Date    AAA (abdominal aortic aneurysm)     Anemia in CKD (chronic kidney disease)     Arthritis     Bacteremia due to Escherichia coli 9/24/2020    Cataract     Class 1 obesity due to excess calories with serious comorbidity in adult 7/6/2017    Gout, chronic     Heart failure with reduced ejection fraction 1/29/2022    High cholesterol     Hypercapnic respiratory failure 1/28/2022    Hypertension     Hypothyroidism     Late onset Alzheimer's dementia with behavioral disturbance 4/9/2022    Obesity     Plantar fasciitis     PVD (peripheral vascular disease)     Thyroid trouble        Past Surgical History:   Procedure Laterality Date    BREAST BIOPSY Left     BREAST SURGERY Left 1972    benign breast lump    CATARACT EXTRACTION  3/18/13    both eyes -     CHOLECYSTECTOMY      ENDOSCOPIC ULTRASOUND OF UPPER GASTROINTESTINAL TRACT N/A 9/8/2020    Procedure: ULTRASOUND, UPPER GI TRACT, ENDOSCOPIC;  Surgeon: Rasheed Balbuena MD;  Location: [address]);  Service: Endoscopy;  Laterality: N/A;    ERCP N/A 9/8/2020    Procedure: ERCP (ENDOSCOPIC RETROGRADE CHOLANGIOPANCREATOGRAPHY);  Surgeon: Rasheed Balbuena MD;  Location: [facility] (2ND FLR);  Service: Endoscopy;  Laterality: N/A;    EYE SURGERY      HYSTERECTOMY      SKIN BIOPSY      Left breast       Family History   Problem Relation Age of Onset    Breast cancer Sister     Cataracts Son     Glaucoma Son     Mental illness Son     Diabetes Son     Glaucoma Daughter     Lupus Daughter     Meningitis Son     Heart disease Brother     Cancer Sister         leukemia    Cancer Sister         pancreatic    No Known Problems Brother     Cancer Brother         unknown    Diabetes Son     Blindness Neg Hx     Amblyopia Neg Hx     Hypertension Neg Hx     Macular degeneration Neg Hx     Retinal detachment Neg Hx     Strabismus Neg Hx     Stroke Neg Hx     Thyroid disease Neg Hx        Social History     Socioeconomic History    Marital status:    Tobacco Use    Smoking status: Former Smoker     Packs/day: 0.50     Years: 60.00     Pack years: 30.00     Quit date: 2001     Years since quittin.8    Smokeless tobacco: Never Used    Tobacco comment: quit in the 's   Substance and Sexual Activity    Alcohol use: No    Drug use: No    Sexual activity: Not Currently     Social Determinants of Health     Financial Resource Strain: Low Risk     Difficulty of Paying Living Expenses: Not hard at all   Food Insecurity: No Food Insecurity    Worried About Running Out of Food in the Last Year: Never true    Ran Out of Food in the Last Year: Never true   Transportation Needs: No Transportation Needs    Lack of Transportation (Medical): No    Lack of Transportation (Non-Medical): No   Physical Activity: Inactive    Days of Exercise per Week: 0 days    Minutes of Exercise per Session: 0 min   Stress: No Stress Concern Present    Feeling of Stress : Not at all   Social Connections: Socially Isolated    Frequency of Communication with Friends and Family: More than three times a week    Frequency of Social Gatherings with Friends and Family: Three times a  week    Attends Hinduism Services: Never    Active Member of Clubs or Organizations: No    Attends Club or Organization Meetings: Never    Marital Status:    Housing Stability: Low Risk     Unable to Pay for Housing in the Last Year: No    Number of Places Lived in the Last Year: 1    Unstable Housing in the Last Year: No       MEDICATIONS & ALLERGIES:     Current Outpatient Medications on File Prior to Visit   Medication Sig Dispense Refill    acetaminophen (TYLENOL) 160 mg/5 mL Liqd Take 15.6 mLs (499.2 mg total) by mouth every 6 (six) hours as needed (pain). (Patient not taking: Reported on 4/20/2022) 1 each 0    allopurinoL (ZYLOPRIM) 100 MG tablet Take 1 tablet (100 mg total) by mouth once daily. 30 tablet 1    aspirin 81 MG Chew Take 1 tablet (81 mg total) by mouth once daily. 30 tablet 11    ferrous sulfate 325 (65 FE) MG EC tablet Take 1 tablet (325 mg total) by mouth once daily. 120 tablet 6    levothyroxine (SYNTHROID) 88 MCG tablet TAKE 1 TABLET EVERY DAY 90 tablet 1    MULTIVIT-IRON-MIN-FOLIC ACID 3,500-18-0.4 UNIT-MG-MG ORAL CHEW Take 1 capsule by mouth once daily.       NIFEdipine (PROCARDIA-XL) 60 MG (OSM) 24 hr tablet Take 1 tablet (60 mg total) by mouth once daily. 30 tablet 3    vancomycin HCl (VANCOMYCIN 1 G/250 ML D5W, READY TO MIX SYSTEM,) Inject 125 mLs (500 mg total) into the vein every 48 hours. Give on 4/19/22, 4/21/22, 4/23/22, 4/25/22. for 6 days (Patient not taking: Reported on 4/20/2022) 375 mL 0     No current facility-administered medications on file prior to visit.        Review of patient's allergies indicates:  No Known Allergies    OBJECTIVE:     Vital Signs:  Vitals:    04/22/22 1100   BP: (!) 117/53   Pulse: 71   Resp: 20   Temp: 98 °F (36.7 °C)     There is no height or weight on file to calculate BMI.       Physical Exam  HENT:      Head: Normocephalic and atraumatic.      Nose: No congestion or rhinorrhea.      Mouth/Throat:      Mouth: Mucous membranes  are moist.   Cardiovascular:      Rate and Rhythm: Normal rate.      Pulses: Normal pulses.   Pulmonary:      Effort: No respiratory distress.      Breath sounds: Normal breath sounds.   Abdominal:      General: Bowel sounds are normal.      Palpations: Abdomen is soft.      Tenderness: There is no abdominal tenderness.   Musculoskeletal:      Cervical back: Normal range of motion and neck supple.      Right lower leg: No edema.      Left lower leg: No edema.   Skin:     General: Skin is warm and dry.      Comments: Left heel pressure ulcer/unstageable  Sacral stage 2 pressure ulce    Neurological:      Mental Status: She is alert. Mental status is at baseline.         Laboratory  Lab Results   Component Value Date    WBC 24.17 (H) 04/16/2022    HGB 10.2 (L) 04/16/2022    HCT 32.0 (L) 04/16/2022    MCV 86 04/16/2022     (H) 04/16/2022     Lab Results   Component Value Date    INR 1.0 03/29/2007     Lab Results   Component Value Date    HGBA1C 4.9 01/30/2022     No results for input(s): POCTGLUCOSE in the last 72 hours.    TRANSITION OF CARE:     [facility] Contact Note: 4/20/22    Family and/or Caretaker present at visit?  Yes.  Diagnostic tests reviewed/disposition: No diagnosic tests pending after this hospitalization.  Disease/illness education: Importance of Compliance with all prescribed medications and treatments, COVID Precautions/Social Distancing/Mask Use  Home health/community services discussion/referrals: Patient has home health established at [facility].   Establishment or re-establishment of referral orders for community resources: No other necessary community resources.   Discussion with other health care providers: No discussion with other health care providers necessary.     Transition of Care Visit:  I have reviewed and updated the history and problem list. I have reconciled the medication list. I have discussed the hospitalization and current medical issues, prognosis and plans with  the patient/family.     Medications Reconciliation:   I have reconciled the patient's home medications and discharge medications with the patient/family. I have updated all changes. Refer to After-Visit Medication List.    Discharge plans, follow-up instructions, future appointments, provider contact information, indicators to seek emergency treatment and encouragement to call for any questions, concerns or clarification of the patient's plan of care explained to patient and/or caregiver(s), whom confirm understanding of provided information and endorse willingness to comply.     ASSESSMENT & PLAN:     HIGH RISK CONDITION(S):  Patient has a condition that poses threat to life and bodily function: YAKELIN Orlando was seen today for transitional care.    Diagnoses and all orders for this visit:    Late onset Alzheimer's dementia with behavioral disturbance    Essential hypertension    Heart failure with reduced ejection fraction    CKD (chronic kidney disease), stage IV    Urinary tract infection without hematuria, site unspecified    Pressure injury of left heel, unstageable    Pressure injury of sacral region, stage 2    Idiopathic chronic gout without tophus, unspecified site    Constipation, unspecified constipation type          Problem List Items Addressed This Visit        Neuro    Late onset Alzheimer's dementia with behavioral disturbance (Chronic)    Current Assessment & Plan     At baseline  Continue caregiver support               Cardiac/Vascular    Essential hypertension (Chronic)    Current Assessment & Plan     Chronic  Continue medication as prescribed            Heart failure with reduced ejection fraction (Chronic)    Current Assessment & Plan     Currently without evidence of decompensation               Renal/    CKD (chronic kidney disease), stage IV (Chronic)    Current Assessment & Plan     Chronic  Followed by PCP           UTI (urinary tract infection)    Current Assessment & Plan      Completed course of abx  Reports no worsening symptoms  Encouraged po fluids                GI    Constipated    Current Assessment & Plan     Restart metamucil  Encourage po fluids               Orthopedic    Gout, chronic (Chronic)    Current Assessment & Plan     Continue allopurinol            Pressure injury of left heel, unstageable    Current Assessment & Plan     Continue home health wound care            Pressure injury of sacral region, stage 2    Current Assessment & Plan     Continue home health wound care  Barrier cream  Turn patient q 2 hours  Caregiver support                      Encounter for Support and Coordination of Transition of Care     Instructions:  - [facility] Nurse Practitioner to schedule home follow-up visit with patient in 4-6 weeks or as needed.  - Continue all medications, treatments and therapies as ordered.   - Follow all instructions, recommendations as discussed.  - Maintain Safety Precautions at all times.  - Attend all medical appointments as scheduled.  - For worsening symptoms: call Primary Care Physician or Nurse Practitioner.  - For emergencies, call 911 or immediately report to the nearest emergency room.  - Limit Risks of environmental exposure to coronavirus/COVID-19 as discussed including: social distancing, hand hygiene, and limiting departures from the home for necessities only.         Were controlled substances prescribed?  No      Scheduled Follow-up :  Future Appointments   Date Time Provider Department Center   4/25/2022  3:30 PM Paolo Angeles Jr. PA [facility] ID [address]       After Visit Medication List :     Medication List          Accurate as of April 22, 2022 11:59 PM. If you have any questions, ask your nurse or doctor.            CONTINUE taking these medications    acetaminophen 160 mg/5 mL Liqd  Commonly known as: TYLENOL  Take 15.6 mLs (499.2 mg total) by mouth every 6 (six) hours as needed (pain).     allopurinoL 100 MG tablet  Commonly known as:  ZYLOPRIM  Take 1 tablet (100 mg total) by mouth once daily.     aspirin 81 MG Chew  Take 1 tablet (81 mg total) by mouth once daily.     ferrous sulfate 325 (65 FE) MG EC tablet  Take 1 tablet (325 mg total) by mouth once daily.     levothyroxine 88 MCG tablet  Commonly known as: SYNTHROID  TAKE 1 TABLET EVERY DAY     multivit-iron-min-folic acid 3,500-18-0.4 unit-mg-mg Chew     NIFEdipine 60 MG (OSM) 24 hr tablet  Commonly known as: PROCARDIA-XL  Take 1 tablet (60 mg total) by mouth once daily.     VANCOMYCIN 1 G/250 ML D5W (READY TO MIX SYSTEM)  Inject 125 mLs (500 mg total) into the vein every 48 hours. Give on 4/19/22, 4/21/22, 4/23/22, 4/25/22. for 6 days            Patient consent was obtained prior to treatment on this visit.    Attestation: Screening criteria to assess the level of the patient's risk for infection with COVID-19 as recommended by the CDC at the time of the above documented home visit concluded appropriateness to proceed. Universal precautions were maintained at all times, including provider use of >60% alcohol gel hand  immediately prior to entry and upon departing the patient's home as well as cleaning of equipment used in home visit with antibacterial/germicidal disposable wipes.     Signature:       [facility]    Total Face-to-Face Encounter: 60 minutes with >50% of time spent discussing the care with the patient/family.

## 2022-04-23 NOTE — PROGRESS NOTES
Health Maintenance Due   Topic Date Due    COVID-19 Vaccine (1) Never done    Shingles Vaccine (2 of 3) 08/24/2016    DEXA Scan  04/30/2020    Influenza Vaccine (1) 09/01/2021     Updates were requested from care everywhere.  Chart was reviewed for overdue Proactive [facility] Encounters (CAROLE) topics (CRS, Breast Cancer Screening, Eye exam)  Health Maintenance has been updated.  LINKS immunization registry triggered.  Immunizations were reconciled.

## 2022-04-23 NOTE — PATIENT INSTRUCTIONS
Instructions:  - [facility] to schedule home follow-up visit with patient in 4-6 weeks or as needed.  - Continue all medications, treatments and therapies as ordered.   - Follow all instructions, recommendations as discussed.  - Maintain Safety Precautions at all times.  - Attend all medical appointments as scheduled.  - For worsening symptoms: call Primary Care Physician or Nurse Practitioner.  - For emergencies, call 911 or immediately report to the nearest emergency room.  - Limit Risks of environmental exposure to coronavirus/COVID-19 as discussed including: social distancing, hand hygiene, and limiting departures from the home for necessities only.

## 2022-04-25 NOTE — TELEPHONE ENCOUNTER
----- Message from Genesis Rodgers sent at 4/25/2022  2:29 PM CDT -----  Contact: Iain Suggs [phone]  [employer] is calling back regarding air mattress for pt ! Please f/u with daughter

## 2022-04-27 NOTE — TELEPHONE ENCOUNTER
[facility] does not supply pneumatic pumps.    Contacted all [employer] equipment and verified that they accept [employer]. Order faxed to [employer]

## 2022-11-10 NOTE — PLAN OF CARE
01/31/22 1011   Post-Acute Status   Post-Acute Authorization Placement   Post-Acute Placement Status Referrals Sent     Referral sent to [facility] via [facility].  Will continue to follow.    Syl Durand LMSW  [facility]  [id number]     Carac Counseling:  I discussed with the patient the risks of Carac including but not limited to erythema, scaling, itching, weeping, crusting, and pain.

## 2023-01-03 ENCOUNTER — PES CALL (OUTPATIENT)
Dept: ADMINISTRATIVE | Facility: CLINIC | Age: 88
End: 2023-01-03
Payer: MEDICARE

## 2023-07-14 NOTE — TELEPHONE ENCOUNTER
Spoke to pt daughter     This pt called, in regards to her medications. Pt is now living c her due to current crisis was previously living c brother. Pt daughter wast to know she she still be taking sod bicarb.?  Also pt is almost out of Calcitrol no more refill she need a new prescription for this as well.       Fwd msg to provider              ----- Message from Colin Briseno sent at 5/15/2020 10:57 AM CDT -----  Contact: Pt's daughter Katia  Rx Refill/Request - the caller states that the Pt only has 3 pills left and would like this called in today please.      Is this a Refill or New Rx:   Refill    Rx Name and Strength:   calcitRIOL (ROCALTROL) 0.25 MCG Cap &     Preferred Pharmacy with phone number: [id number] [id number] [facility] AT [facility]                                   - PHONE # [phone]    ADDITIONAL INFO: (The caller would also like to know if the Pt is supposed to be on - sodium bicarbonate 650 MG tablet)    
14-Jul-2023 09:42

## 2024-03-08 NOTE — PT/OT/SLP DISCHARGE
Occupational Therapy Discharge Summary    Johanny Curtis  MRN: [id number]   Principal Problem: Hypercapnic respiratory failure      Patient Discharged from acute Occupational Therapy on 02-03-22.  Please refer to prior OT note dated 02-01-22 for functional status.    Assessment:      Patient appropriate for care in another setting.    Objective:     GOALS:   Multidisciplinary Problems     Occupational Therapy Goals     Not on file          Multidisciplinary Problems (Resolved)        Problem: Occupational Therapy Goal    Goal Priority Disciplines Outcome Interventions   Occupational Therapy Goal   (Resolved)     OT, PT/OT Met    Description: Goals set on 1/30 with expiration date 2/13:  Patient will increase functional independence with ADLs by performing:    Supine <> Sit with Min A.   Feeding while seated EOB/bedside chair with  Min A.   Grooming while standing EOB with  Min A.   UB Dressing with Min A.   LB Dressing with Min A.   Stand pivot transfer with  Min A with DME as needed.  Pt will demonstrate understanding of education provided regarding energy conservation and task modification through teach-back method.   Patient and/or patient's family will verbalize understanding of POC and post d/c support needs, and personal risk factors for fall risk reduction.                      Reasons for Discontinuation of Therapy Services  Transfer to alternate level of care.      Plan:     Patient Discharged to: Skilled Nursing Facility    2/3/2022   Yes